# Patient Record
Sex: FEMALE | Race: BLACK OR AFRICAN AMERICAN | Employment: UNEMPLOYED | URBAN - METROPOLITAN AREA
[De-identification: names, ages, dates, MRNs, and addresses within clinical notes are randomized per-mention and may not be internally consistent; named-entity substitution may affect disease eponyms.]

---

## 2018-10-12 RX ORDER — ASPIRIN 81 MG/1
TABLET ORAL
Qty: 30 TABLET | Refills: 4 | OUTPATIENT
Start: 2018-10-12

## 2019-08-12 ENCOUNTER — HOSPITAL ENCOUNTER (EMERGENCY)
Age: 54
Discharge: HOME | End: 2019-08-12
Payer: MEDICAID

## 2019-08-12 ENCOUNTER — APPOINTMENT (OUTPATIENT)
Dept: ULTRASOUND IMAGING | Age: 54
End: 2019-08-12
Payer: MEDICAID

## 2019-08-12 VITALS
BODY MASS INDEX: 22.43 KG/M2 | SYSTOLIC BLOOD PRESSURE: 174 MMHG | RESPIRATION RATE: 17 BRPM | HEART RATE: 81 BPM | TEMPERATURE: 98.7 F | WEIGHT: 148 LBS | DIASTOLIC BLOOD PRESSURE: 101 MMHG | HEIGHT: 68 IN | OXYGEN SATURATION: 100 %

## 2019-08-12 DIAGNOSIS — K80.50 BILIARY COLIC: ICD-10-CM

## 2019-08-12 DIAGNOSIS — K82.8 GALLBLADDER SLUDGE: ICD-10-CM

## 2019-08-12 DIAGNOSIS — K82.4 GALLBLADDER POLYP: ICD-10-CM

## 2019-08-12 DIAGNOSIS — R10.11 ABDOMINAL PAIN, RIGHT UPPER QUADRANT: Primary | ICD-10-CM

## 2019-08-12 LAB
A/G RATIO: 1.3 (ref 1.1–2.2)
ALBUMIN SERPL-MCNC: 4.2 G/DL (ref 3.4–5)
ALP BLD-CCNC: 93 U/L (ref 40–129)
ALT SERPL-CCNC: 31 U/L (ref 10–40)
ANION GAP SERPL CALCULATED.3IONS-SCNC: 13 MMOL/L (ref 3–16)
AST SERPL-CCNC: 28 U/L (ref 15–37)
BASOPHILS ABSOLUTE: 0.1 K/UL (ref 0–0.2)
BASOPHILS RELATIVE PERCENT: 1.1 %
BILIRUB SERPL-MCNC: <0.2 MG/DL (ref 0–1)
BILIRUBIN URINE: NEGATIVE
BLOOD, URINE: ABNORMAL
BUN BLDV-MCNC: 23 MG/DL (ref 7–20)
CALCIUM SERPL-MCNC: 9.6 MG/DL (ref 8.3–10.6)
CHLORIDE BLD-SCNC: 96 MMOL/L (ref 99–110)
CLARITY: CLEAR
CO2: 23 MMOL/L (ref 21–32)
COLOR: YELLOW
CREAT SERPL-MCNC: 1.9 MG/DL (ref 0.6–1.1)
EOSINOPHILS ABSOLUTE: 0.4 K/UL (ref 0–0.6)
EOSINOPHILS RELATIVE PERCENT: 7.2 %
EPITHELIAL CELLS, UA: ABNORMAL /HPF
GFR AFRICAN AMERICAN: 33
GFR NON-AFRICAN AMERICAN: 28
GLOBULIN: 3.3 G/DL
GLUCOSE BLD-MCNC: 338 MG/DL (ref 70–99)
GLUCOSE URINE: 250 MG/DL
HCT VFR BLD CALC: 30.6 % (ref 36–48)
HEMOGLOBIN: 10.4 G/DL (ref 12–16)
KETONES, URINE: NEGATIVE MG/DL
LEUKOCYTE ESTERASE, URINE: NEGATIVE
LIPASE: 92 U/L (ref 13–60)
LYMPHOCYTES ABSOLUTE: 1.5 K/UL (ref 1–5.1)
LYMPHOCYTES RELATIVE PERCENT: 25.4 %
MCH RBC QN AUTO: 26.6 PG (ref 26–34)
MCHC RBC AUTO-ENTMCNC: 34.1 G/DL (ref 31–36)
MCV RBC AUTO: 77.9 FL (ref 80–100)
MICROSCOPIC EXAMINATION: YES
MONOCYTES ABSOLUTE: 0.4 K/UL (ref 0–1.3)
MONOCYTES RELATIVE PERCENT: 6.9 %
NEUTROPHILS ABSOLUTE: 3.4 K/UL (ref 1.7–7.7)
NEUTROPHILS RELATIVE PERCENT: 59.4 %
NITRITE, URINE: NEGATIVE
PDW BLD-RTO: 14.4 % (ref 12.4–15.4)
PH UA: 6.5 (ref 5–8)
PLATELET # BLD: 218 K/UL (ref 135–450)
PMV BLD AUTO: 7 FL (ref 5–10.5)
POTASSIUM SERPL-SCNC: 4 MMOL/L (ref 3.5–5.1)
PROTEIN UA: 100 MG/DL
RBC # BLD: 3.92 M/UL (ref 4–5.2)
RBC UA: ABNORMAL /HPF (ref 0–2)
SODIUM BLD-SCNC: 132 MMOL/L (ref 136–145)
SPECIFIC GRAVITY UA: 1.02 (ref 1–1.03)
TOTAL PROTEIN: 7.5 G/DL (ref 6.4–8.2)
URINE REFLEX TO CULTURE: ABNORMAL
URINE TYPE: ABNORMAL
UROBILINOGEN, URINE: 0.2 E.U./DL
WBC # BLD: 5.7 K/UL (ref 4–11)
WBC UA: ABNORMAL /HPF (ref 0–5)

## 2019-08-12 PROCEDURE — 96361 HYDRATE IV INFUSION ADD-ON: CPT

## 2019-08-12 PROCEDURE — 80053 COMPREHEN METABOLIC PANEL: CPT

## 2019-08-12 PROCEDURE — 96374 THER/PROPH/DIAG INJ IV PUSH: CPT

## 2019-08-12 PROCEDURE — 83540 ASSAY OF IRON: CPT

## 2019-08-12 PROCEDURE — 85025 COMPLETE CBC W/AUTO DIFF WBC: CPT

## 2019-08-12 PROCEDURE — 81001 URINALYSIS AUTO W/SCOPE: CPT

## 2019-08-12 PROCEDURE — 83690 ASSAY OF LIPASE: CPT

## 2019-08-12 PROCEDURE — 2580000003 HC RX 258: Performed by: PHYSICIAN ASSISTANT

## 2019-08-12 PROCEDURE — 96375 TX/PRO/DX INJ NEW DRUG ADDON: CPT

## 2019-08-12 PROCEDURE — 76705 ECHO EXAM OF ABDOMEN: CPT

## 2019-08-12 PROCEDURE — 99284 EMERGENCY DEPT VISIT MOD MDM: CPT

## 2019-08-12 PROCEDURE — 83550 IRON BINDING TEST: CPT

## 2019-08-12 PROCEDURE — 6360000002 HC RX W HCPCS: Performed by: PHYSICIAN ASSISTANT

## 2019-08-12 PROCEDURE — 6370000000 HC RX 637 (ALT 250 FOR IP): Performed by: PHYSICIAN ASSISTANT

## 2019-08-12 RX ORDER — 0.9 % SODIUM CHLORIDE 0.9 %
1000 INTRAVENOUS SOLUTION INTRAVENOUS ONCE
Status: COMPLETED | OUTPATIENT
Start: 2019-08-12 | End: 2019-08-12

## 2019-08-12 RX ORDER — HYDROCODONE BITARTRATE AND ACETAMINOPHEN 5; 325 MG/1; MG/1
1 TABLET ORAL EVERY 6 HOURS PRN
Qty: 10 TABLET | Refills: 0 | Status: SHIPPED | OUTPATIENT
Start: 2019-08-12 | End: 2019-08-15

## 2019-08-12 RX ORDER — ONDANSETRON 4 MG/1
4 TABLET, ORALLY DISINTEGRATING ORAL EVERY 8 HOURS PRN
Qty: 12 TABLET | Refills: 0 | Status: SHIPPED | OUTPATIENT
Start: 2019-08-12 | End: 2019-08-19

## 2019-08-12 RX ORDER — ONDANSETRON 2 MG/ML
4 INJECTION INTRAMUSCULAR; INTRAVENOUS ONCE
Status: COMPLETED | OUTPATIENT
Start: 2019-08-12 | End: 2019-08-12

## 2019-08-12 RX ORDER — HYDROCODONE BITARTRATE AND ACETAMINOPHEN 5; 325 MG/1; MG/1
1 TABLET ORAL ONCE
Status: COMPLETED | OUTPATIENT
Start: 2019-08-12 | End: 2019-08-12

## 2019-08-12 RX ORDER — MORPHINE SULFATE 4 MG/ML
4 INJECTION, SOLUTION INTRAMUSCULAR; INTRAVENOUS ONCE
Status: COMPLETED | OUTPATIENT
Start: 2019-08-12 | End: 2019-08-12

## 2019-08-12 RX ADMIN — HYDROCODONE BITARTRATE AND ACETAMINOPHEN 1 TABLET: 5; 325 TABLET ORAL at 17:21

## 2019-08-12 RX ADMIN — ONDANSETRON 4 MG: 2 INJECTION INTRAMUSCULAR; INTRAVENOUS at 17:21

## 2019-08-12 RX ADMIN — SODIUM CHLORIDE 1000 ML: 9 INJECTION, SOLUTION INTRAVENOUS at 14:45

## 2019-08-12 RX ADMIN — MORPHINE SULFATE 4 MG: 4 INJECTION INTRAVENOUS at 14:45

## 2019-08-12 ASSESSMENT — PAIN SCALES - GENERAL
PAINLEVEL_OUTOF10: 10

## 2019-08-12 ASSESSMENT — PAIN DESCRIPTION - PAIN TYPE: TYPE: ACUTE PAIN

## 2019-08-12 ASSESSMENT — PAIN DESCRIPTION - LOCATION: LOCATION: FLANK

## 2019-08-12 NOTE — ED NOTES
Pt return from [address] [place] [address] Rd,[address].       Kathryn Alexander, RN  08/12/19 0881

## 2019-08-13 ENCOUNTER — TELEPHONE (OUTPATIENT)
Dept: SURGERY | Age: 54
End: 2019-08-13

## 2019-08-13 LAB
IRON SATURATION: 36 % (ref 15–50)
IRON: 98 UG/DL (ref 37–145)
TOTAL IRON BINDING CAPACITY: 275 UG/DL (ref 260–445)

## 2019-08-13 NOTE — TELEPHONE ENCOUNTER
Tried to call pt to get her an office appt to be eval by Dr Jonatan Rivera. Pts ph # goes to a voicemail that is not her and is not listed on her emergency contacts. There is no additional # I am able to reach the pt at.

## 2019-08-19 ENCOUNTER — PREP FOR PROCEDURE (OUTPATIENT)
Dept: SURGERY | Age: 54
End: 2019-08-19

## 2019-08-19 ENCOUNTER — INITIAL CONSULT (OUTPATIENT)
Dept: SURGERY | Age: 54
End: 2019-08-19
Payer: MEDICAID

## 2019-08-19 VITALS
DIASTOLIC BLOOD PRESSURE: 76 MMHG | WEIGHT: 153.4 LBS | HEIGHT: 68 IN | SYSTOLIC BLOOD PRESSURE: 130 MMHG | BODY MASS INDEX: 23.25 KG/M2

## 2019-08-19 DIAGNOSIS — K81.1 CHRONIC CHOLECYSTITIS: Primary | ICD-10-CM

## 2019-08-19 PROCEDURE — G8427 DOCREV CUR MEDS BY ELIG CLIN: HCPCS | Performed by: SURGERY

## 2019-08-19 PROCEDURE — 99243 OFF/OP CNSLTJ NEW/EST LOW 30: CPT | Performed by: SURGERY

## 2019-08-19 PROCEDURE — G8420 CALC BMI NORM PARAMETERS: HCPCS | Performed by: SURGERY

## 2019-08-19 RX ORDER — AMITRIPTYLINE HYDROCHLORIDE 25 MG/1
25 TABLET, FILM COATED ORAL NIGHTLY
COMMUNITY
End: 2019-08-21 | Stop reason: ALTCHOICE

## 2019-08-19 RX ORDER — SODIUM CHLORIDE 0.9 % (FLUSH) 0.9 %
10 SYRINGE (ML) INJECTION PRN
Status: CANCELLED | OUTPATIENT
Start: 2019-08-19

## 2019-08-19 RX ORDER — OXYCODONE HYDROCHLORIDE 5 MG/1
5 TABLET ORAL EVERY 6 HOURS PRN
Qty: 15 TABLET | Refills: 0 | Status: SHIPPED | OUTPATIENT
Start: 2019-08-19 | End: 2019-08-26

## 2019-08-19 RX ORDER — QUETIAPINE FUMARATE 50 MG/1
50 TABLET, EXTENDED RELEASE ORAL NIGHTLY
Status: ON HOLD | COMMUNITY
End: 2022-07-23 | Stop reason: CLARIF

## 2019-08-19 RX ORDER — HEPARIN SODIUM 5000 [USP'U]/ML
5000 INJECTION, SOLUTION INTRAVENOUS; SUBCUTANEOUS ONCE
Status: CANCELLED | OUTPATIENT
Start: 2019-08-19

## 2019-08-19 RX ORDER — SODIUM CHLORIDE 0.9 % (FLUSH) 0.9 %
10 SYRINGE (ML) INJECTION EVERY 12 HOURS SCHEDULED
Status: CANCELLED | OUTPATIENT
Start: 2019-08-19

## 2019-08-19 RX ORDER — ASPIRIN 325 MG
325 TABLET ORAL DAILY
Status: ON HOLD | COMMUNITY
End: 2022-07-23 | Stop reason: CLARIF

## 2019-08-19 RX ORDER — METOPROLOL SUCCINATE 25 MG/1
25 TABLET, EXTENDED RELEASE ORAL DAILY
COMMUNITY
End: 2022-08-21 | Stop reason: ALTCHOICE

## 2019-08-19 RX ORDER — OMEPRAZOLE 40 MG/1
40 CAPSULE, DELAYED RELEASE ORAL DAILY
Status: ON HOLD | COMMUNITY
End: 2022-07-23 | Stop reason: CLARIF

## 2019-08-19 RX ORDER — ATORVASTATIN CALCIUM 80 MG/1
80 TABLET, FILM COATED ORAL DAILY
Status: ON HOLD | COMMUNITY
End: 2022-07-23 | Stop reason: CLARIF

## 2019-08-19 RX ORDER — FERROUS SULFATE 325(65) MG
325 TABLET ORAL
COMMUNITY
End: 2022-08-21 | Stop reason: ALTCHOICE

## 2019-08-19 RX ORDER — HYDRALAZINE HYDROCHLORIDE 50 MG/1
50 TABLET, FILM COATED ORAL 2 TIMES DAILY
Status: ON HOLD | COMMUNITY
End: 2022-07-27 | Stop reason: SDUPTHER

## 2019-08-19 RX ORDER — LOSARTAN POTASSIUM 50 MG/1
50 TABLET ORAL DAILY
Status: ON HOLD | COMMUNITY
End: 2022-07-23 | Stop reason: CLARIF

## 2019-08-19 RX ORDER — ACETAMINOPHEN 500 MG
500 TABLET ORAL EVERY 6 HOURS PRN
COMMUNITY
End: 2022-08-21 | Stop reason: ALTCHOICE

## 2019-08-19 RX ORDER — BUPROPION HYDROCHLORIDE 150 MG/1
150 TABLET, EXTENDED RELEASE ORAL 2 TIMES DAILY
COMMUNITY
End: 2022-08-21 | Stop reason: ALTCHOICE

## 2019-08-19 RX ORDER — LORATADINE 10 MG/1
10 TABLET ORAL DAILY
Status: ON HOLD | COMMUNITY
End: 2022-07-23 | Stop reason: CLARIF

## 2019-08-23 ENCOUNTER — TELEPHONE (OUTPATIENT)
Dept: SURGERY | Age: 54
End: 2019-08-23

## 2019-08-23 NOTE — TELEPHONE ENCOUNTER
Pt is scheduled for [place] on Monday, she wanted to let you know she has had some phlegm and sinus drainage, no fever, I advised if she develops a cough or a fever then she may need to be rescheduled.

## 2020-06-12 ENCOUNTER — TELEPHONE (OUTPATIENT)
Dept: BARIATRICS/WEIGHT MGMT | Age: 55
End: 2020-06-12

## 2020-06-19 ENCOUNTER — TELEPHONE (OUTPATIENT)
Dept: BARIATRICS/WEIGHT MGMT | Age: 55
End: 2020-06-19

## 2020-06-19 NOTE — TELEPHONE ENCOUNTER
2nd attempt to get a hold of patient. (1st:6/12/202)    LVM instructing pt to call clinic to set up appointment.

## 2020-07-28 DIAGNOSIS — N18.9 HISTORY OF ANEMIA DUE TO CKD: ICD-10-CM

## 2020-07-28 DIAGNOSIS — Z86.2 HISTORY OF ANEMIA DUE TO CKD: ICD-10-CM

## 2020-08-10 ENCOUNTER — TELEPHONE (OUTPATIENT)
Dept: BARIATRICS/WEIGHT MGMT | Age: 55
End: 2020-08-10

## 2020-08-10 NOTE — TELEPHONE ENCOUNTER
[phone]    [facility] for return call     Multiple attempts to get pt scheduled for PD cath referral     Attempt #5

## 2020-08-18 RX ORDER — ACETAMINOPHEN 500 MG/1
TABLET, FILM COATED ORAL
Qty: 90 TABLET | Refills: 0 | OUTPATIENT
Start: 2020-08-18

## 2020-08-20 PROBLEM — N18.9 HISTORY OF ANEMIA DUE TO CKD: Status: ACTIVE | Noted: 2020-08-20

## 2020-08-20 PROBLEM — Z86.2 HISTORY OF ANEMIA DUE TO CKD: Status: ACTIVE | Noted: 2020-08-20

## 2020-09-02 ENCOUNTER — TELEPHONE (OUTPATIENT)
Dept: SURGERY | Age: 55
End: 2020-09-02

## 2020-09-02 NOTE — TELEPHONE ENCOUNTER
Naomi huitron/ Nephrology is requesting a return call @ [phone] regarding an updated referral for patient. Please advise. Thank you!

## 2020-09-02 NOTE — TELEPHONE ENCOUNTER
Spoke with Naomi and advised that multiple attempts were made and Naomi states that pt has been in denial and now with Critical labs     Advised of Dr Menchaca Milder schedule this month and will be talking with Nephro to see what they are wanting as far as this plan now

## 2021-12-15 ENCOUNTER — CLINICAL DOCUMENTATION (OUTPATIENT)
Dept: OTHER | Age: 56
End: 2021-12-15

## 2022-07-22 ENCOUNTER — APPOINTMENT (OUTPATIENT)
Dept: GENERAL RADIOLOGY | Age: 57
DRG: 248 | End: 2022-07-22
Payer: MEDICAID

## 2022-07-22 ENCOUNTER — HOSPITAL ENCOUNTER (INPATIENT)
Age: 57
LOS: 4 days | Discharge: HOME HEALTH | DRG: 248 | End: 2022-07-27
Attending: EMERGENCY MEDICINE | Admitting: INTERNAL MEDICINE
Payer: MEDICAID

## 2022-07-22 ENCOUNTER — APPOINTMENT (OUTPATIENT)
Dept: CT IMAGING | Age: 57
DRG: 248 | End: 2022-07-22
Payer: MEDICAID

## 2022-07-22 DIAGNOSIS — K65.9 PERITONITIS (HCC): Primary | ICD-10-CM

## 2022-07-22 PROCEDURE — 71045 X-RAY EXAM CHEST 1 VIEW: CPT

## 2022-07-22 PROCEDURE — 80048 BASIC METABOLIC PNL TOTAL CA: CPT

## 2022-07-22 PROCEDURE — 83036 HEMOGLOBIN GLYCOSYLATED A1C: CPT

## 2022-07-22 PROCEDURE — 83735 ASSAY OF MAGNESIUM: CPT

## 2022-07-22 PROCEDURE — 89051 BODY FLUID CELL COUNT: CPT

## 2022-07-22 PROCEDURE — 36415 COLL VENOUS BLD VENIPUNCTURE: CPT

## 2022-07-22 PROCEDURE — 81001 URINALYSIS AUTO W/SCOPE: CPT

## 2022-07-22 PROCEDURE — 99285 EMERGENCY DEPT VISIT HI MDM: CPT

## 2022-07-22 PROCEDURE — 85025 COMPLETE CBC W/AUTO DIFF WBC: CPT

## 2022-07-22 RX ORDER — ACETAMINOPHEN 325 MG/1
650 TABLET ORAL ONCE
Status: DISCONTINUED | OUTPATIENT
Start: 2022-07-22 | End: 2022-07-27 | Stop reason: HOSPADM

## 2022-07-22 ASSESSMENT — PAIN DESCRIPTION - ONSET: ONSET: ON-GOING

## 2022-07-22 ASSESSMENT — PAIN DESCRIPTION - FREQUENCY: FREQUENCY: CONTINUOUS

## 2022-07-22 ASSESSMENT — PAIN - FUNCTIONAL ASSESSMENT
PAIN_FUNCTIONAL_ASSESSMENT: 0-10
PAIN_FUNCTIONAL_ASSESSMENT: ACTIVITIES ARE NOT PREVENTED

## 2022-07-22 ASSESSMENT — PAIN DESCRIPTION - ORIENTATION: ORIENTATION: MID

## 2022-07-22 ASSESSMENT — PAIN DESCRIPTION - LOCATION: LOCATION: LEG;ARM

## 2022-07-22 ASSESSMENT — PAIN SCALES - GENERAL: PAINLEVEL_OUTOF10: 9

## 2022-07-22 ASSESSMENT — PAIN DESCRIPTION - DESCRIPTORS: DESCRIPTORS: SHOOTING

## 2022-07-22 ASSESSMENT — PAIN DESCRIPTION - PAIN TYPE: TYPE: ACUTE PAIN

## 2022-07-23 ENCOUNTER — APPOINTMENT (OUTPATIENT)
Dept: CT IMAGING | Age: 57
DRG: 248 | End: 2022-07-23
Payer: MEDICAID

## 2022-07-23 PROBLEM — K65.9 PERITONITIS (HCC): Status: ACTIVE | Noted: 2022-07-23

## 2022-07-23 LAB
AMORPHOUS: ABNORMAL /HPF
ANION GAP SERPL CALCULATED.3IONS-SCNC: 11 MMOL/L (ref 3–16)
APPEARANCE FLUID: NORMAL
BACTERIA: ABNORMAL /HPF
BASO FLUID: 1 %
BASOPHILS ABSOLUTE: 0 K/UL (ref 0–0.2)
BASOPHILS RELATIVE PERCENT: 0.5 %
BILIRUBIN URINE: NEGATIVE
BLOOD, URINE: NEGATIVE
BUN BLDV-MCNC: 67 MG/DL (ref 7–20)
CALCIUM SERPL-MCNC: 9.7 MG/DL (ref 8.3–10.6)
CELL COUNT FLUID TYPE: NORMAL
CHLORIDE BLD-SCNC: 95 MMOL/L (ref 99–110)
CLARITY: CLEAR
CLOT EVALUATION: NORMAL
CO2: 26 MMOL/L (ref 21–32)
COLOR FLUID: NORMAL
COLOR: YELLOW
CREAT SERPL-MCNC: 11 MG/DL (ref 0.6–1.1)
EOSINOPHIL FLUID: 21 %
EOSINOPHILS ABSOLUTE: 0.2 K/UL (ref 0–0.6)
EOSINOPHILS RELATIVE PERCENT: 2.6 %
EPITHELIAL CELLS, UA: ABNORMAL /HPF (ref 0–5)
ESTIMATED AVERAGE GLUCOSE: 137 MG/DL
GFR AFRICAN AMERICAN: 4
GFR NON-AFRICAN AMERICAN: 4
GLUCOSE BLD-MCNC: 110 MG/DL (ref 70–99)
GLUCOSE BLD-MCNC: 125 MG/DL (ref 70–99)
GLUCOSE BLD-MCNC: 169 MG/DL (ref 70–99)
GLUCOSE BLD-MCNC: 69 MG/DL (ref 70–99)
GLUCOSE BLD-MCNC: 80 MG/DL (ref 70–99)
GLUCOSE URINE: NEGATIVE MG/DL
HBA1C MFR BLD: 6.4 %
HCT VFR BLD CALC: 31.5 % (ref 36–48)
HEMOGLOBIN: 10.3 G/DL (ref 12–16)
HYALINE CASTS: ABNORMAL /LPF (ref 0–2)
KETONES, URINE: NEGATIVE MG/DL
LEUKOCYTE ESTERASE, URINE: ABNORMAL
LYMPHOCYTES ABSOLUTE: 2.2 K/UL (ref 1–5.1)
LYMPHOCYTES RELATIVE PERCENT: 27.8 %
LYMPHOCYTES, BODY FLUID: 21 %
MACROPHAGE FLUID: 15 %
MAGNESIUM: 3.1 MG/DL (ref 1.8–2.4)
MCH RBC QN AUTO: 26.9 PG (ref 26–34)
MCHC RBC AUTO-ENTMCNC: 32.7 G/DL (ref 31–36)
MCV RBC AUTO: 82.3 FL (ref 80–100)
MICROSCOPIC EXAMINATION: YES
MONOCYTE, FLUID: 8 %
MONOCYTES ABSOLUTE: 0.7 K/UL (ref 0–1.3)
MONOCYTES RELATIVE PERCENT: 8.2 %
MUCUS: ABNORMAL /LPF
NEUTROPHIL, FLUID: 34 %
NEUTROPHILS ABSOLUTE: 4.9 K/UL (ref 1.7–7.7)
NEUTROPHILS RELATIVE PERCENT: 60.9 %
NITRITE, URINE: NEGATIVE
NUCLEATED CELLS FLUID: 3351 /CUMM
NUMBER OF CELLS COUNTED FLUID: 100
PDW BLD-RTO: 14.6 % (ref 12.4–15.4)
PERFORMED ON: ABNORMAL
PH UA: 7 (ref 5–8)
PLATELET # BLD: 230 K/UL (ref 135–450)
PMV BLD AUTO: 6.7 FL (ref 5–10.5)
POTASSIUM SERPL-SCNC: 4.2 MMOL/L (ref 3.5–5.1)
PROTEIN UA: 30 MG/DL
RBC # BLD: 3.82 M/UL (ref 4–5.2)
RBC FLUID: 2500 /CUMM
RBC UA: ABNORMAL /HPF (ref 0–4)
SODIUM BLD-SCNC: 132 MMOL/L (ref 136–145)
SPECIFIC GRAVITY UA: 1.01 (ref 1–1.03)
URINE REFLEX TO CULTURE: ABNORMAL
URINE TYPE: ABNORMAL
UROBILINOGEN, URINE: 0.2 E.U./DL
WBC # BLD: 8 K/UL (ref 4–11)
WBC UA: ABNORMAL /HPF (ref 0–5)

## 2022-07-23 PROCEDURE — 82746 ASSAY OF FOLIC ACID SERUM: CPT

## 2022-07-23 PROCEDURE — 87205 SMEAR GRAM STAIN: CPT

## 2022-07-23 PROCEDURE — 70450 CT HEAD/BRAIN W/O DYE: CPT

## 2022-07-23 PROCEDURE — 2580000003 HC RX 258: Performed by: INTERNAL MEDICINE

## 2022-07-23 PROCEDURE — 87070 CULTURE OTHR SPECIMN AEROBIC: CPT

## 2022-07-23 PROCEDURE — 3E1M39Z IRRIGATION OF PERITONEAL CAVITY USING DIALYSATE, PERCUTANEOUS APPROACH: ICD-10-PCS | Performed by: INTERNAL MEDICINE

## 2022-07-23 PROCEDURE — 6360000002 HC RX W HCPCS: Performed by: EMERGENCY MEDICINE

## 2022-07-23 PROCEDURE — 6360000002 HC RX W HCPCS: Performed by: INTERNAL MEDICINE

## 2022-07-23 PROCEDURE — 1200000000 HC SEMI PRIVATE

## 2022-07-23 PROCEDURE — 6370000000 HC RX 637 (ALT 250 FOR IP): Performed by: INTERNAL MEDICINE

## 2022-07-23 PROCEDURE — 2580000003 HC RX 258: Performed by: EMERGENCY MEDICINE

## 2022-07-23 PROCEDURE — 82607 VITAMIN B-12: CPT

## 2022-07-23 PROCEDURE — 36415 COLL VENOUS BLD VENIPUNCTURE: CPT

## 2022-07-23 RX ORDER — MORPHINE SULFATE 2 MG/ML
2 INJECTION, SOLUTION INTRAMUSCULAR; INTRAVENOUS ONCE
Status: COMPLETED | OUTPATIENT
Start: 2022-07-24 | End: 2022-07-24

## 2022-07-23 RX ORDER — SODIUM CHLORIDE, SODIUM LACTATE, CALCIUM CHLORIDE, MAGNESIUM CHLORIDE AND DEXTROSE 1.5; 538; 448; 18.3; 5.08 G/100ML; MG/100ML; MG/100ML; MG/100ML; MG/100ML
2000 INJECTION, SOLUTION INTRAPERITONEAL EVERY 6 HOURS
Status: DISCONTINUED | OUTPATIENT
Start: 2022-07-23 | End: 2022-07-25

## 2022-07-23 RX ORDER — ATORVASTATIN CALCIUM 40 MG/1
80 TABLET, FILM COATED ORAL NIGHTLY
Status: DISCONTINUED | OUTPATIENT
Start: 2022-07-23 | End: 2022-07-23

## 2022-07-23 RX ORDER — GABAPENTIN 600 MG/1
300 TABLET ORAL NIGHTLY
COMMUNITY
End: 2022-08-21 | Stop reason: ALTCHOICE

## 2022-07-23 RX ORDER — HEPARIN SODIUM 5000 [USP'U]/ML
5000 INJECTION, SOLUTION INTRAVENOUS; SUBCUTANEOUS EVERY 8 HOURS SCHEDULED
Status: DISCONTINUED | OUTPATIENT
Start: 2022-07-23 | End: 2022-07-27 | Stop reason: HOSPADM

## 2022-07-23 RX ORDER — SUCROFERRIC OXYHYDROXIDE 500 MG/1
500 TABLET, CHEWABLE ORAL
Status: ON HOLD | COMMUNITY
End: 2022-07-27 | Stop reason: HOSPADM

## 2022-07-23 RX ORDER — NIFEDIPINE 30 MG/1
30 TABLET, FILM COATED, EXTENDED RELEASE ORAL 2 TIMES DAILY
COMMUNITY
End: 2022-07-27

## 2022-07-23 RX ORDER — SODIUM CHLORIDE 9 MG/ML
INJECTION, SOLUTION INTRAVENOUS PRN
Status: DISCONTINUED | OUTPATIENT
Start: 2022-07-23 | End: 2022-07-27 | Stop reason: HOSPADM

## 2022-07-23 RX ORDER — BUPROPION HYDROCHLORIDE 150 MG/1
150 TABLET, EXTENDED RELEASE ORAL 2 TIMES DAILY
Status: DISCONTINUED | OUTPATIENT
Start: 2022-07-23 | End: 2022-07-27 | Stop reason: HOSPADM

## 2022-07-23 RX ORDER — SODIUM CHLORIDE 0.9 % (FLUSH) 0.9 %
5-40 SYRINGE (ML) INJECTION EVERY 12 HOURS SCHEDULED
Status: DISCONTINUED | OUTPATIENT
Start: 2022-07-23 | End: 2022-07-27 | Stop reason: HOSPADM

## 2022-07-23 RX ORDER — FLUORIDE TOOTHPASTE
15 TOOTHPASTE DENTAL 3 TIMES DAILY PRN
Status: DISCONTINUED | OUTPATIENT
Start: 2022-07-23 | End: 2022-07-27 | Stop reason: HOSPADM

## 2022-07-23 RX ORDER — QUETIAPINE FUMARATE 400 MG/1
200 TABLET, FILM COATED ORAL NIGHTLY
COMMUNITY
End: 2022-08-21 | Stop reason: ALTCHOICE

## 2022-07-23 RX ORDER — SKIN PROTECTANT 44 G/100G
OINTMENT TOPICAL 4 TIMES DAILY PRN
Status: ON HOLD | COMMUNITY
End: 2022-08-24 | Stop reason: HOSPADM

## 2022-07-23 RX ORDER — INSULIN LISPRO 100 [IU]/ML
0-4 INJECTION, SOLUTION INTRAVENOUS; SUBCUTANEOUS NIGHTLY
Status: DISCONTINUED | OUTPATIENT
Start: 2022-07-23 | End: 2022-07-27 | Stop reason: HOSPADM

## 2022-07-23 RX ORDER — ROSUVASTATIN CALCIUM 40 MG/1
40 TABLET, COATED ORAL NIGHTLY
COMMUNITY

## 2022-07-23 RX ORDER — AMMONIUM LACTATE 12 G/100G
LOTION TOPICAL PRN
Status: ON HOLD | COMMUNITY
End: 2022-08-24 | Stop reason: HOSPADM

## 2022-07-23 RX ORDER — ONDANSETRON 2 MG/ML
4 INJECTION INTRAMUSCULAR; INTRAVENOUS EVERY 6 HOURS PRN
Status: DISCONTINUED | OUTPATIENT
Start: 2022-07-23 | End: 2022-07-27 | Stop reason: HOSPADM

## 2022-07-23 RX ORDER — HYDRALAZINE HYDROCHLORIDE 50 MG/1
50 TABLET, FILM COATED ORAL DAILY
Status: DISCONTINUED | OUTPATIENT
Start: 2022-07-23 | End: 2022-07-27 | Stop reason: HOSPADM

## 2022-07-23 RX ORDER — GENTAMICIN SULFATE 1 MG/G
CREAM TOPICAL DAILY
Status: DISCONTINUED | OUTPATIENT
Start: 2022-07-24 | End: 2022-07-27 | Stop reason: HOSPADM

## 2022-07-23 RX ORDER — TORSEMIDE 20 MG/1
80 TABLET ORAL DAILY
Status: ON HOLD | COMMUNITY
End: 2022-08-24 | Stop reason: HOSPADM

## 2022-07-23 RX ORDER — ERGOCALCIFEROL 1.25 MG/1
50000 CAPSULE ORAL WEEKLY
COMMUNITY
End: 2022-08-21 | Stop reason: ALTCHOICE

## 2022-07-23 RX ORDER — DEXTROSE MONOHYDRATE 100 MG/ML
INJECTION, SOLUTION INTRAVENOUS CONTINUOUS PRN
Status: DISCONTINUED | OUTPATIENT
Start: 2022-07-23 | End: 2022-07-27 | Stop reason: HOSPADM

## 2022-07-23 RX ORDER — POLYETHYLENE GLYCOL 3350 17 G/17G
17 POWDER, FOR SOLUTION ORAL DAILY
COMMUNITY
End: 2022-08-21 | Stop reason: ALTCHOICE

## 2022-07-23 RX ORDER — AMOXICILLIN 250 MG
1 CAPSULE ORAL 2 TIMES DAILY
COMMUNITY
End: 2022-08-15

## 2022-07-23 RX ORDER — CALCITRIOL 0.25 UG/1
0.25 CAPSULE, LIQUID FILLED ORAL DAILY
COMMUNITY

## 2022-07-23 RX ORDER — NEPHROCAP 1 MG
1 CAP ORAL DAILY
Status: ON HOLD | COMMUNITY
End: 2022-09-08 | Stop reason: HOSPADM

## 2022-07-23 RX ORDER — ONDANSETRON 4 MG/1
4 TABLET, ORALLY DISINTEGRATING ORAL EVERY 8 HOURS PRN
Status: DISCONTINUED | OUTPATIENT
Start: 2022-07-23 | End: 2022-07-27 | Stop reason: HOSPADM

## 2022-07-23 RX ORDER — DOXEPIN HYDROCHLORIDE 50 MG/1
50 CAPSULE ORAL NIGHTLY
COMMUNITY
End: 2022-09-08

## 2022-07-23 RX ORDER — BENZTROPINE MESYLATE 1 MG/1
1 TABLET ORAL NIGHTLY
COMMUNITY

## 2022-07-23 RX ORDER — POLYETHYLENE GLYCOL 3350 17 G/17G
17 POWDER, FOR SOLUTION ORAL DAILY PRN
Status: DISCONTINUED | OUTPATIENT
Start: 2022-07-23 | End: 2022-07-27 | Stop reason: HOSPADM

## 2022-07-23 RX ORDER — ACETAMINOPHEN 325 MG/1
650 TABLET ORAL EVERY 6 HOURS PRN
Status: DISCONTINUED | OUTPATIENT
Start: 2022-07-23 | End: 2022-07-27 | Stop reason: HOSPADM

## 2022-07-23 RX ORDER — SODIUM CHLORIDE 0.9 % (FLUSH) 0.9 %
5-40 SYRINGE (ML) INJECTION PRN
Status: DISCONTINUED | OUTPATIENT
Start: 2022-07-23 | End: 2022-07-27 | Stop reason: HOSPADM

## 2022-07-23 RX ORDER — INSULIN LISPRO 100 [IU]/ML
0-4 INJECTION, SOLUTION INTRAVENOUS; SUBCUTANEOUS
Status: DISCONTINUED | OUTPATIENT
Start: 2022-07-23 | End: 2022-07-27 | Stop reason: HOSPADM

## 2022-07-23 RX ORDER — METOPROLOL SUCCINATE 25 MG/1
25 TABLET, EXTENDED RELEASE ORAL DAILY
Status: DISCONTINUED | OUTPATIENT
Start: 2022-07-23 | End: 2022-07-27 | Stop reason: HOSPADM

## 2022-07-23 RX ORDER — LIDOCAINE 50 MG/G
1 PATCH TOPICAL DAILY
COMMUNITY
End: 2022-08-21 | Stop reason: ALTCHOICE

## 2022-07-23 RX ORDER — MORPHINE SULFATE 2 MG/ML
2 INJECTION, SOLUTION INTRAMUSCULAR; INTRAVENOUS
Status: COMPLETED | OUTPATIENT
Start: 2022-07-23 | End: 2022-07-23

## 2022-07-23 RX ORDER — QUETIAPINE FUMARATE 50 MG/1
50 TABLET, EXTENDED RELEASE ORAL NIGHTLY
Status: DISCONTINUED | OUTPATIENT
Start: 2022-07-23 | End: 2022-07-24

## 2022-07-23 RX ORDER — HYDROXYZINE HYDROCHLORIDE 25 MG/1
25 TABLET, FILM COATED ORAL 3 TIMES DAILY PRN
Status: DISCONTINUED | OUTPATIENT
Start: 2022-07-23 | End: 2022-07-27 | Stop reason: HOSPADM

## 2022-07-23 RX ORDER — CLONIDINE HYDROCHLORIDE 0.1 MG/1
0.2 TABLET ORAL 2 TIMES DAILY
Status: DISCONTINUED | OUTPATIENT
Start: 2022-07-23 | End: 2022-07-23

## 2022-07-23 RX ORDER — CHOLECALCIFEROL (VITAMIN D3) 125 MCG
5 CAPSULE ORAL NIGHTLY
COMMUNITY
End: 2022-08-21 | Stop reason: ALTCHOICE

## 2022-07-23 RX ORDER — NEPHROCAP 1 MG
1 CAP ORAL DAILY
Status: DISCONTINUED | OUTPATIENT
Start: 2022-07-23 | End: 2022-07-27 | Stop reason: HOSPADM

## 2022-07-23 RX ORDER — ACETAMINOPHEN 650 MG/1
650 SUPPOSITORY RECTAL EVERY 6 HOURS PRN
Status: DISCONTINUED | OUTPATIENT
Start: 2022-07-23 | End: 2022-07-27 | Stop reason: HOSPADM

## 2022-07-23 RX ADMIN — CEFEPIME 2000 MG: 2 INJECTION, POWDER, FOR SOLUTION INTRAVENOUS at 03:18

## 2022-07-23 RX ADMIN — METOPROLOL SUCCINATE 25 MG: 25 TABLET, EXTENDED RELEASE ORAL at 08:20

## 2022-07-23 RX ADMIN — HEPARIN SODIUM 5000 UNITS: 5000 INJECTION INTRAVENOUS; SUBCUTANEOUS at 14:57

## 2022-07-23 RX ADMIN — MORPHINE SULFATE 2 MG: 2 INJECTION, SOLUTION INTRAMUSCULAR; INTRAVENOUS at 17:00

## 2022-07-23 RX ADMIN — BUPROPION HYDROCHLORIDE 150 MG: 150 TABLET, EXTENDED RELEASE ORAL at 08:20

## 2022-07-23 RX ADMIN — SODIUM CHLORIDE, PRESERVATIVE FREE 10 ML: 5 INJECTION INTRAVENOUS at 08:16

## 2022-07-23 RX ADMIN — VANCOMYCIN HYDROCHLORIDE 1500 MG: 10 INJECTION, POWDER, LYOPHILIZED, FOR SOLUTION INTRAVENOUS at 03:58

## 2022-07-23 RX ADMIN — QUETIAPINE FUMARATE 50 MG: 50 TABLET, EXTENDED RELEASE ORAL at 20:20

## 2022-07-23 RX ADMIN — HEPARIN SODIUM 5000 UNITS: 5000 INJECTION INTRAVENOUS; SUBCUTANEOUS at 20:21

## 2022-07-23 RX ADMIN — SODIUM CHLORIDE, PRESERVATIVE FREE 5 ML: 5 INJECTION INTRAVENOUS at 23:31

## 2022-07-23 RX ADMIN — BUPROPION HYDROCHLORIDE 150 MG: 150 TABLET, EXTENDED RELEASE ORAL at 20:20

## 2022-07-23 RX ADMIN — HYDRALAZINE HYDROCHLORIDE 50 MG: 50 TABLET, FILM COATED ORAL at 08:20

## 2022-07-23 ASSESSMENT — PAIN DESCRIPTION - ONSET
ONSET: GRADUAL
ONSET: GRADUAL

## 2022-07-23 ASSESSMENT — PAIN DESCRIPTION - FREQUENCY
FREQUENCY: INTERMITTENT

## 2022-07-23 ASSESSMENT — ENCOUNTER SYMPTOMS
EYE PAIN: 0
NAUSEA: 0
SHORTNESS OF BREATH: 0
COUGH: 0
DIARRHEA: 0
WHEEZING: 0
ABDOMINAL PAIN: 1
VOMITING: 0

## 2022-07-23 ASSESSMENT — PAIN - FUNCTIONAL ASSESSMENT
PAIN_FUNCTIONAL_ASSESSMENT: ACTIVITIES ARE NOT PREVENTED
PAIN_FUNCTIONAL_ASSESSMENT: ACTIVITIES ARE NOT PREVENTED
PAIN_FUNCTIONAL_ASSESSMENT: PREVENTS OR INTERFERES SOME ACTIVE ACTIVITIES AND ADLS

## 2022-07-23 ASSESSMENT — PAIN DESCRIPTION - LOCATION
LOCATION: ABDOMEN

## 2022-07-23 ASSESSMENT — PAIN DESCRIPTION - ORIENTATION
ORIENTATION: MID
ORIENTATION: LOWER;MID
ORIENTATION: MID

## 2022-07-23 ASSESSMENT — PAIN DESCRIPTION - DESCRIPTORS
DESCRIPTORS: SHARP

## 2022-07-23 ASSESSMENT — PAIN SCALES - GENERAL
PAINLEVEL_OUTOF10: 9
PAINLEVEL_OUTOF10: 3
PAINLEVEL_OUTOF10: 6
PAINLEVEL_OUTOF10: 9

## 2022-07-23 ASSESSMENT — PAIN DESCRIPTION - PAIN TYPE
TYPE: ACUTE PAIN

## 2022-07-23 NOTE — ED PROVIDER NOTES
[address] [facility]          ATTENDING PHYSICIAN NOTE       Date of evaluation: 7/22/2022    Chief Complaint     Fall and Leg Pain      History of Present Illness     Marianne Phillips is a 65 y.o. female who presents with chief complaint of falls. Patient states that she has been having frequent falls for the past 3 to 4months. She states that she seems to just lose her balance and or have weakness and fall to the ground. She does not lose consciousness. Has seen her PCP for this but has not gotten an explanation for the cause. She states that today she was out in the yard when she bent over and continue to fall forward striking her head on the ground. She had no loss of consciousness but was not immediately able to get herself up off the ground. An individual who was walking by helped her up and she was able to stand for a second or 2 when she felt weak and fell back to the ground. She states she had to crawl into her house from there and has pain in her bilateral knees and elbows from that. She denies any vertigo with these episodes though does occasionally have some room spinning symptoms but these are not associated with falls. No headaches. No discoordination of her arms or legs other than when she is walking. He also states that she is been having some lower abdominal pain for the past week. No vomiting. No diarrhea. No dysuria or hematuria. Does have ESRD on PD and states she is been doing her PD with no cloudy diasylate or change in diasylate. Review of Systems     Review of Systems   Constitutional:  Positive for fatigue. Negative for chills and fever. HENT:  Negative for congestion. Eyes:  Negative for pain. Respiratory:  Negative for cough, shortness of breath and wheezing. Cardiovascular:  Negative for chest pain and leg swelling. Gastrointestinal:  Positive for abdominal pain. Negative for diarrhea, nausea and vomiting.    Genitourinary: Negative for dysuria. Musculoskeletal:  Negative for arthralgias. Skin:  Negative for rash. Neurological:  Positive for weakness. Negative for headaches. All other systems reviewed and are negative. Past Medical, Surgical, Family, and Social History         Diagnosis Date    JONATHAN (acute kidney injury) ([facility] 9/3/2014    Anxiety     Asthma     Bipolar disorder ([facility]     Bronchitis     Chronic back pain     Depression     Diabetes mellitus ([facility]     DM II (diabetes mellitus, type II), controlled ([facility] 9/3/2014    Hypertension          Procedure Laterality Date    ENDOMETRIAL ABLATION      INNER EAR SURGERY      TUBAL LIGATION       Her family history includes Cancer in her maternal grandmother and mother; Diabetes in her mother; Heart Disease in her maternal grandfather. She reports that she has been smoking cigarettes. She has a 0.75 pack-year smoking history. She has never used smokeless tobacco. She reports that she does not drink alcohol and does not use drugs. Medications     Current Discharge Medication List        CONTINUE these medications which have NOT CHANGED    Details   Rosuvastatin Calcium 40 MG CPSP Take 40 mg by mouth daily      calcitRIOL (ROCALTROL) 0.25 MCG capsule Take 0.25 mcg by mouth in the morning. doxepin (SINEQUAN) 50 MG capsule Take 50 mg by mouth nightly One hour before bedtime      gabapentin (NEURONTIN) 600 MG tablet Take 600 mg by mouth nightly. At bedtime      melatonin 5 MG TABS tablet Take 5 mg by mouth nightly      NIFEdipine (ADALAT CC) 30 MG extended release tablet Take 30 mg by mouth in the morning and 30 mg before bedtime. QUEtiapine (SEROQUEL) 400 MG tablet Take 400 mg by mouth nightly      senna-docusate (PERICOLACE) 8.6-50 MG per tablet Take 1 tablet by mouth in the morning and 1 tablet in the evening. \"Stimulant laxative plus\".       torsemide (DEMADEX) 20 MG tablet Take 80 mg by mouth in the morning.      hydrOXYzine (ATARAX) 25 MG tablet Take 25 mg by mouth 3 times daily as needed for Itching      loratadine (CLARITIN) 10 MG tablet Take 10 mg by mouth daily      buPROPion (WELLBUTRIN SR) 150 MG extended release tablet Take 150 mg by mouth 2 times daily      QUEtiapine (SEROQUEL XR) 50 MG extended release tablet Take 50 mg by mouth nightly      omeprazole (PRILOSEC) 40 MG delayed release capsule Take 40 mg by mouth daily      acetaminophen (TYLENOL) 500 MG tablet Take 500 mg by mouth every 6 hours as needed for Pain      atorvastatin (LIPITOR) 80 MG tablet Take 80 mg by mouth daily      ferrous sulfate 325 (65 Fe) MG tablet Take 325 mg by mouth daily (with breakfast)      hydrALAZINE (APRESOLINE) 50 MG tablet Take 50 mg by mouth in the morning and 50 mg before bedtime. metoprolol succinate (TOPROL XL) 25 MG extended release tablet Take 25 mg by mouth daily      aspirin 325 MG tablet Take 325 mg by mouth daily      losartan (COZAAR) 50 MG tablet Take 50 mg by mouth daily      insulin glargine (LANTUS;BASAGLAR) 100 UNIT/ML injection pen Inject into the skin nightly      insulin lispro (HUMALOG) 100 UNIT/ML injection vial Inject into the skin 3 times daily (before meals)      cloNIDine (CATAPRES) 0.2 MG tablet Take 1 tablet by mouth 2 times daily  Qty: 14 tablet, Refills: 0      VITAMIN D, CHOLECALCIFEROL, PO Take 50,000 Units by mouth once a week. amLODIPine (NORVASC) 10 MG tablet Take 1 tablet by mouth daily. Qty: 30 tablet, Refills: 3             Allergies     She is allergic to latex, banana, other, peanuts [peanut oil], tomato, barley grass, milk-related compounds, and shellfish-derived products. Physical Exam     ED Triage Vitals [07/22/22 2212]   Enc Vitals Group      /85      Heart Rate 84      Resp 18      Temp 98.5 °F (36.9 °C)      Temp Source Oral      SpO2 98 %      Weight 151 lb 14.4 oz (68.9 kg)      Height 5' 8\" (1.727 m)      Head Circumference       Peak Flow       Pain Score       Pain Loc       Pain Edu? Excl.  in [address]? General:  Non-toxic, no acute distress, fully cooperative with my exam    HEENT:  NCAT    Neck:  Supple    Pulmonary:   No increased work of breathing; CTAB    Cardiac:  RRR, no murmur, thrill or gallop. Capillary refill <3s. 2+ distal pulses    Abdomen:  Soft, mild diffuse tenderness, nondistended; no focal rebound or guarding, PD catheter in place    Musculoskeletal:  Grossly intact without obvious injury or deformity     Neuro:  Alert and oriented X 4, cranial nerves II-XII intact. Patient has 5 out of 5 strength at flexion and extension of the wrist, elbow, and shoulder. Patient has 5 out of 5 strength at flexion and extension of the knee, hip, and ankle. Patient has intact sensation in the distribution of the median, radial, and ulnar nerves bilateral upper extremities, and intact sensation in the distribution of the superficial peroneal, deep peroneal, and tibial nerves bilateral lower extremities. The patient has a normal gait without ataxia, and a negative Romberg test.  There is no pronator drift. Patient has no cerebellar findings, including no dysmetria. Skin: No rashes      Diagnostic Results       RADIOLOGY:  CT Head W/O Contrast   Final Result      No acute intracranial process. XR CHEST PORTABLE   Final Result      No acute chest process.           LABS:   Results for orders placed or performed during the hospital encounter of 07/22/22   BMP   Result Value Ref Range    Sodium 132 (L) 136 - 145 mmol/L    Potassium 4.2 3.5 - 5.1 mmol/L    Chloride 95 (L) 99 - 110 mmol/L    CO2 26 21 - 32 mmol/L    Anion Gap 11 3 - 16    Glucose 80 70 - 99 mg/dL    BUN 67 (H) 7 - 20 mg/dL    Creatinine 11.0 (HH) 0.6 - 1.1 mg/dL    GFR Non-African American 4 (A) >60    GFR  4 (A) >60    Calcium 9.7 8.3 - 10.6 mg/dL   CBC with Auto Differential   Result Value Ref Range    WBC 8.0 4.0 - 11.0 K/uL    RBC 3.82 (L) 4.00 - 5.20 M/uL    Hemoglobin 10.3 (L) 12.0 - 16.0 g/dL    Hematocrit 31.5 (L) 36.0 - 48.0 %    MCV 82.3 80.0 - 100.0 fL    MCH 26.9 26.0 - 34.0 pg    MCHC 32.7 31.0 - 36.0 g/dL    RDW 14.6 12.4 - 15.4 %    Platelets 757 049 - 145 K/uL    MPV 6.7 5.0 - 10.5 fL    Neutrophils % 60.9 %    Lymphocytes % 27.8 %    Monocytes % 8.2 %    Eosinophils % 2.6 %    Basophils % 0.5 %    Neutrophils Absolute 4.9 1.7 - 7.7 K/uL    Lymphocytes Absolute 2.2 1.0 - 5.1 K/uL    Monocytes Absolute 0.7 0.0 - 1.3 K/uL    Eosinophils Absolute 0.2 0.0 - 0.6 K/uL    Basophils Absolute 0.0 0.0 - 0.2 K/uL   Urinalysis with Reflex to Culture    Specimen: Urine, clean catch   Result Value Ref Range    Color, UA Yellow Straw/Yellow    Clarity, UA Clear Clear    Glucose, Ur Negative Negative mg/dL    Bilirubin Urine Negative Negative    Ketones, Urine Negative Negative mg/dL    Specific Gravity, UA 1.015 1.005 - 1.030    Blood, Urine Negative Negative    pH, UA 7.0 5.0 - 8.0    Protein, UA 30 (A) Negative mg/dL    Urobilinogen, Urine 0.2 <2.0 E.U./dL    Nitrite, Urine Negative Negative    Leukocyte Esterase, Urine TRACE (A) Negative    Microscopic Examination YES     Urine Type Voided     Urine Reflex to Culture Not Indicated    Magnesium   Result Value Ref Range    Magnesium 3.10 (H) 1.80 - 2.40 mg/dL   Cell Count with Differential, Body Fluid   Result Value Ref Range    Cell Count Fluid Type Peritoneal dial     Color, Fluid Pale Yellow     Appearance, Fluid Hazy     Clot Eval. see below     Nucl Cell, Fluid 3,351 /cumm    RBC, Fluid 2,500 /cumm    Neutrophil Count, Fluid 34 %    Lymphocytes, Body Fluid 21 %    Monocyte Count, Fluid 8 %    Eos, Fluid 21 %    Basos, Fluid 1 %    Macrophages 15 %    Number of Cells Counted Fluid 100    Microscopic Urinalysis   Result Value Ref Range    Hyaline Casts, UA 3-5 (A) 0 - 2 /LPF    Mucus, UA 2+ (A) None Seen /LPF    WBC, UA 3-5 0 - 5 /HPF    RBC, UA None seen 0 - 4 /HPF    Epithelial Cells, UA 21-50 (A) 0 - 5 /HPF    Bacteria, UA 2+ (A) None Seen /HPF    Amorphous, UA 2+ /HPF Dial) 0-4 Units    insulin lispro (1 Unit Dial) 0-4 Units    Virt-Caps CAPS 1 mg (Patient Supplied)    morphine (PF) injection 2 mg       CONSULTS:  IP CONSULT TO NEPHROLOGY  IP CONSULT TO HOSPITALIST    MEDICAL DECISION MAKING / ASSESSMENT / Rossy Canchola is a 65 y.o. female with ESRD on PD who presents with episodes of fall and weakness. From a musculoskeletal standpoint she has no real tenderness in her knees or elbows and she is able to range it without difficulty. He is able to bear weight. I have very low suspicion for fracture. Does have some mild use abdominal tenderness. Attempting to get the dialysis alert to send for analysis but noted to have slightly worsening renal function with elevated BUN and creatinine is significantly above her baseline. She did exhibit some odd behavior though she had an otherwise normal neurologic examination. I wondered if we were not dealing with some component of uremic encephalopathy. Initially I discussed the case with nephrology with this concern and they recommended there were some changes they could make it to her peritoneal dialysis which might improve this but given that she is having frequent falls and I do not think that she is totally at her baseline I felt this was best done as an inpatient. After I spoke with them however we were able to get dialysis alert and that the cell count was highly concerning for peritonitis. Broad-spectrum antibiotics initiated. Discussed with the hospitalist and admitted for further care. Clinical Impression     1. Peritonitis ([address]        Disposition     PATIENT REFERRED TO:  No follow-up provider specified.     DISCHARGE MEDICATIONS:  Current Discharge Medication List          DISPOSITION Admitted 07/23/2022 03:23:30 AM         Salvador French MD  07/23/22 1041

## 2022-07-23 NOTE — CONSULTS
Clinical Pharmacy Progress Note    Vancomycin - Management by Pharmacy    Consult Date(s): 7/23  Consulting Provider(s): Dr Shruthi Faulkner / Plan    Intraabdominal infection - Vancomycin  Concurrent Antimicrobials: Cefepime  Day of Vanc Therapy / Ordered Duration: #1  Current Dosing Method: Intermittent Dosing by Levels  Therapeutic Goal: ~ 15 mg/L  Current Dose / Frequency: Intermittent via levels  Plan / Rationale:   Patient is ESRD on PD. Will dose via levels at this time. Patient received 1500mg IV today in the ED. No further dose today. Plan to obtain a random level 7/24 AM and reassess. Will continue to monitor clinical condition and make adjustments to regimen as appropriate. Thank you for consulting Pharmacy! Karla Cazares PharmD., BCPS   7/23/2022 10:57 AM  Wireless: [phone]        Subjective/Objective: Ms. Thee Herrera is a 65 y.o. female with a PMHx significant for ESRD on PD, HTN, DM type II, bipolarism, anxiety, depression, and asthma. Presents with chronic weakness with falls at home and lower abdominal pain. Dialysate collected in the ED, concerning for infection. Pharmacy has been consulted to dose vancomycin. Ht Readings from Last 1 Encounters:   07/22/22 5' 8\" (1.727 m)     Wt Readings from Last 1 Encounters:   07/23/22 156 lb 15.5 oz (71.2 kg)       Current & Prior Antimicrobial Regimen(s):  Cefepime (7/23-current)  Vancomycin - pharmacy to dose  1500mg IV in ED (7/23)  Intermittent via levels (7/23-current)  Date: Vanc Level: Vanc Dose:   7/23  1500mg IV   7/24 Ordered             Level(s) / Doses:    Date Time Dose Level / Type of Level Interpretation                 Note: Serum levels collected for AUC-based dosing may be high if collected in close proximity to the dose administered. This is not necessarily indicative of toxicity.     Cultures & Sensitivities:    Date Site Micro Susceptibility / Result   7/23 PD fluid In process            Labs / Ancillary Data:    No estimate of CrCL, as the patient is ESRD on PD. Recent Labs     07/22/22  2345   CREATININE 11.0*   BUN 67*   WBC 8.0       Additional Lab Values / Findings of Note:    Procalcitonin: No results for input(s): PROCAL in the last 72 hours.

## 2022-07-23 NOTE — CONSULTS
Patient Name: Shirlene Bravo                                                    Primary Physician: Ramos Casiano MD  Admitting Dx: Peritonitis [facility]) [K65.9]    Nephrology [facility]  [email or web address]                                          Assessment / Plan:     ESRD on PD  Started on PD 1800 cc fill x 4 exchanges using 1.5%  Vol: Weigth is 71.2 kg. Not removing fluid with PD. Anemia: Hgb 10.3 and on Mircera as outpatient. SHPT: Rocaltrol and Ergo. No binders. Falls / Weakness  Workup per primary service. Neurontin can contribut and she is on a large dose of 600 mg. Hold for now. HTN  On Toprol 25 mg, Hydralazine 50 mg, Nifedipine 30 mg. Please call our office at [phone] or [employer] with any questions or contact me directly. Subjective:     CC / Reason for Consult:  ESRD on PD    HPI / PMHx:  This is a consult for Shirlene Bravo  requested by Ramos Casiano MD for the reason of  ESRD . Shirlene Bravo is a 65 y.o. female admitted for Peritonitis ([facility] [address] [K65.9] with PMHx of ESRD, Anemia in CKD, SHPT. She has been having frequent falls with leg weaknees but denies dizziness or LOC. She feels generally weak all over and has felt this way for months. Covid in December she did not regain her sense of taste and smell and her appetite is poor. She lost the tip of her dialysis catheter twice but says she did not seek assistance with this. She sometimes has a low temperature. No fevers. She says her only medication change is her Seroquel has been decreased. I thank Dr. Ramos Casiano MD for consulting [facility] in the care of your patient. Please call with any questions or concerns. [phone]. [place] [facility]    Objective:     SocHx: Smokes x 15 yrs. Denies alcohol. FamHx: Parents . DM and Cancer.      Current Medications:  Scheduled Medications:  buPROPion, 150 mg, BID  hydrALAZINE, 50 mg, Daily  metoprolol succinate, 25 mg, Daily  QUEtiapine, 50 mg, Nightly  sodium chloride flush, 5-40 mL, 2 times per day  heparin (porcine), 5,000 Units, 3 times per day  insulin lispro, 0-4 Units, TID WC  insulin lispro, 0-4 Units, Nightly  Virt-Caps, 1 capsule, Daily  [START ON 7/24/2022] cefepime, 1,000 mg, Q24H  vancomycin (VANCOCIN) intermittent dosing (placeholder), , RX Placeholder  acetaminophen, 650 mg, Once       IV Meds:  dextrose  sodium chloride       PRN Medications:  hydrOXYzine HCl, 25 mg, TID PRN  glucose, 4 tablet, PRN  dextrose bolus, 125 mL, PRN   Or  dextrose bolus, 250 mL, PRN  glucagon (rDNA), 1 mg, PRN  dextrose, , Continuous PRN  sodium chloride flush, 5-40 mL, PRN  sodium chloride, , PRN  ondansetron, 4 mg, Q8H PRN   Or  ondansetron, 4 mg, Q6H PRN  polyethylene glycol, 17 g, Daily PRN  acetaminophen, 650 mg, Q6H PRN   Or  acetaminophen, 650 mg, Q6H PRN  morphine, 2 mg, Once PRN        Allergies: Latex, Banana, Other, Peanuts [peanut oil], Tomato, Barley grass, Milk-related compounds, and Shellfish-derived products    ROS: Positives in BOLD     GEN:   fevers, chills, sweats, fatigue and weight loss     HEENT:    nasal congestion, sore mouth and sore throat     Resp:    cough, hemoptysis, pneumonia or dyspnea on exertion     Card:  chest pain, chest pressure/discomfort, dyspnea, palpitations,  lower extremity edema     GI:   nausea, vomiting, diarrhea, constipation and abdominal pain     :  dysuria, nocturia, urinary incontinence, hesitancy and hematuria     Derm:   rash, skin lesion(s), pruritus and dryness     Neuro:   headaches, dizziness, seizures, gait problems, tremor and weakness     MS:  myalgias, arthralgias, neck pain and back pain     Endo:    nephropathy and cardiovascular disease    PE:      Gen: alert, well appearing, and in no distress     HEENT:pupils equal and reactive, extraocular eye movements intact      Neuro: alert, oriented, normal speech, no focal findings or movement disorder noted     Neck:  supple, no significant adenopathy     Cardio: normal rate, regular rhythm, normal S1, S2, no murmurs, rubs, clicks or gallops      Resp: clear to auscultation, no wheezes, rales or rhonchi, symmetric air entry. GI:  soft, nontender, nondistended, no masses or organomegaly. Ext:  peripheral pulses normal, no pedal edema, no clubbing or cyanosis      MS: no joint tenderness, deformity or swelling      DERM: normal coloration and turgor, no rashesor bruising.        Vitals: Patient Vitals for the past 8 hrs:   BP Temp Temp src Pulse Resp SpO2 Weight   07/23/22 0814 130/75 97.8 °F (36.6 °C) Oral 88 18 98 % --   07/23/22 0609 -- -- -- -- -- -- 156 lb 15.5 oz (71.2 kg)   07/23/22 0511 132/75 97.2 °F (36.2 °C) Oral 79 18 97 % --   07/23/22 0400 125/73 -- -- 72 18 -- --          I/Os:   Intake/Output Summary (Last 24 hours) at 7/23/2022 1133  Last data filed at 7/23/2022 0730  Gross per 24 hour   Intake 49.9 ml   Output --   Net 49.9 ml       LABS:    Lab Results   Component Value Date/Time    CREATININE 11.0 07/22/2022 11:45 PM    BUN 67 07/22/2022 11:45 PM     07/22/2022 11:45 PM    K 4.2 07/22/2022 11:45 PM    CL 95 07/22/2022 11:45 PM    CO2 26 07/22/2022 11:45 PM     No results found for: [id number]   Lab Results   Component Value Date/Time    WBC 8.0 07/22/2022 11:45 PM    HGB 10.3 07/22/2022 11:45 PM    HCT 31.5 07/22/2022 11:45 PM    MCV 82.3 07/22/2022 11:45 PM     07/22/2022 11:45 PM      Lab Results   Component Value Date/Time    IRON 98 08/12/2019 01:50 PM    TIBC 275 08/12/2019 01:50 PM      No results found for: Anahi Crews  Lab Results   Component Value Date/Time    CALCIUM 9.7 07/22/2022 11:45 PM    PHOS 3.1 09/03/2014 04:34 AM

## 2022-07-23 NOTE — ED TRIAGE NOTES
Patient arrived in the ER with Leg pain. Patient states that she fell around 2:00 pm today. Patient states that she hurt her knee. Also, has pain in her shoulder and elbows.

## 2022-07-23 NOTE — H&P
Hospital Medicine History & Physical      PCP: Montse Damon DO, DO    Date of Admission: 7/22/2022    Date of Service: Pt seen/examined on 07/23/22 and Admitted to Inpatient with expected LOS greater than two midnights due to medical therapy. Chief Complaint:  Falls      History Of Present Illness:      Aura Torres is a 65 y.o. female with past medical history as below, including frequent falls. She has no dizziness or LOC, no lack of coordination or focal weakness. She feels generally weak all over and has felt this way for months. She has felt generally week since she had Covid in December she did not regain her sense of taste and smell and her appetite is poor. She has also been having lower abdominal discomfort. It has actually been present for some time. She lost the tip of her dialysis catheter twice but says she did not seek assistance with his. She sometimes has a low temperature. No fevers. No constipation. She still makes some urine. She has had dysuria but not recently. Smokes occasionally, denies etoh or drug use. She says her only medication change is her Seroquel has been decreased. In ER dialysate was collected concerning for PD infection. She was started on broad spectrum antibiotics. UA 3-5 WBCs, +Epi cells and bacteria. Past Medical History:          Diagnosis Date    JONATHAN (acute kidney injury) ([facility] [address] 9/3/2014    Anxiety     Asthma     Bipolar disorder ([facility] [address]     Bronchitis     Chronic back pain     Depression     Diabetes mellitus ([facility] [address]     DM II (diabetes mellitus, type II), controlled ([facility] [address] 9/3/2014    Hypertension        Past Surgical History:          Procedure Laterality Date    ENDOMETRIAL ABLATION      INNER EAR SURGERY      TUBAL LIGATION         Medications Prior to Admission:      Prior to Admission medications    Medication Sig Start Date End Date Taking?  Authorizing Provider   Rosuvastatin Calcium 40 MG CPSP Take 40 mg by mouth daily   Yes Historical Provider, MD calcitRIOL (ROCALTROL) 0.25 MCG capsule Take 0.25 mcg by mouth in the morning. Yes Historical Provider, MD   doxepin (SINEQUAN) 50 MG capsule Take 50 mg by mouth nightly One hour before bedtime   Yes Historical Provider, MD   gabapentin (NEURONTIN) 600 MG tablet Take 600 mg by mouth nightly. At bedtime   Yes Historical Provider, MD   melatonin 5 MG TABS tablet Take 5 mg by mouth nightly   Yes Historical Provider, MD   NIFEdipine (ADALAT CC) 30 MG extended release tablet Take 30 mg by mouth in the morning and 30 mg before bedtime. Yes Historical Provider, MD   QUEtiapine (SEROQUEL) 400 MG tablet Take 400 mg by mouth nightly   Yes Historical Provider, MD   senna-docusate (PERICOLACE) 8.6-50 MG per tablet Take 1 tablet by mouth in the morning and 1 tablet in the evening. \"Stimulant laxative plus\". Yes Historical Provider, MD   torsemide (DEMADEX) 20 MG tablet Take 80 mg by mouth in the morning.    Yes Historical Provider, MD   hydrOXYzine (ATARAX) 25 MG tablet Take 25 mg by mouth 3 times daily as needed for Itching    Historical Provider, MD   loratadine (CLARITIN) 10 MG tablet Take 10 mg by mouth daily  Patient not taking: Reported on 7/23/2022    Historical Provider, MD   buPROPion (WELLBUTRIN SR) 150 MG extended release tablet Take 150 mg by mouth 2 times daily    Historical Provider, MD   QUEtiapine (SEROQUEL XR) 50 MG extended release tablet Take 50 mg by mouth nightly    Historical Provider, MD   omeprazole (PRILOSEC) 40 MG delayed release capsule Take 40 mg by mouth daily  Patient not taking: Reported on 7/23/2022    Historical Provider, MD   acetaminophen (TYLENOL) 500 MG tablet Take 500 mg by mouth every 6 hours as needed for Pain    Historical Provider, MD   atorvastatin (LIPITOR) 80 MG tablet Take 80 mg by mouth daily  Patient not taking: Reported on 7/23/2022    Historical Provider, MD   ferrous sulfate 325 (65 Fe) MG tablet Take 325 mg by mouth daily (with breakfast)    Historical Provider, MD hydrALAZINE (APRESOLINE) 50 MG tablet Take 50 mg by mouth in the morning and 50 mg before bedtime. Historical Provider, MD   metoprolol succinate (TOPROL XL) 25 MG extended release tablet Take 25 mg by mouth daily    Historical Provider, MD   aspirin 325 MG tablet Take 325 mg by mouth daily  Patient not taking: Reported on 7/23/2022    Historical Provider, MD   losartan (COZAAR) 50 MG tablet Take 50 mg by mouth daily  Patient not taking: Reported on 7/23/2022    Historical Provider, MD   insulin glargine (LANTUS;BASAGLAR) 100 UNIT/ML injection pen Inject into the skin nightly  Patient not taking: Reported on 7/23/2022    Historical Provider, MD   insulin lispro (HUMALOG) 100 UNIT/ML injection vial Inject into the skin 3 times daily (before meals)  Patient not taking: Reported on 7/23/2022    Historical Provider, MD   cloNIDine (CATAPRES) 0.2 MG tablet Take 1 tablet by mouth 2 times daily  Patient not taking: Reported on 7/23/2022 8/3/15   Kathryn Ferreira PA-C   VITAMIN D, CHOLECALCIFEROL, PO Take 50,000 Units by mouth once a week. Historical Provider, MD   amLODIPine (NORVASC) 10 MG tablet Take 1 tablet by mouth daily. Patient not taking: Reported on 7/23/2022 9/3/14   Darlyn Lepe MD       Allergies:  Latex, Banana, Other, Peanuts [peanut oil], Tomato, Barley grass, Milk-related compounds, and Shellfish-derived products    Social History:      The patient currently lives in private residence     TOBACCO:   reports that she has been smoking cigarettes. She has a 0.75 pack-year smoking history. She has never used smokeless tobacco.  ETOH:   reports no history of alcohol use. Family History:      Reviewed in detail and positive as follows:        Problem Relation Age of Onset    Diabetes Mother     Cancer Mother     Cancer Maternal Grandmother     Heart Disease Maternal Grandfather        REVIEW OF SYSTEMS:   Pertinent positives as noted in the HPI. All other systems reviewed and negative.     PHYSICAL EXAM:    /75   Pulse 88   Temp 97.8 °F (36.6 °C) (Oral)   Resp 18   Ht 5' 8\" (1.727 m)   Wt 156 lb 15.5 oz (71.2 kg)   SpO2 98%   BMI 23.87 kg/m²     General appearance: No apparent distress, appears stated age and cooperative. HEENT: Normal cephalic, atraumatic without obvious deformity. Pupils equal, round, and reactive to light. Extra ocular muscles intact. Conjunctivae/corneas clear. Neck: Supple, with full range of motion. No jugular venous distention. Trachea midline. Respiratory:  Normal respiratory effort. Clear to auscultation, bilaterally without Rales/Wheezes/Rhonchi. Cardiovascular: Regular rate and rhythm with normal S1/S2 without murmurs, rubs or gallops. Abdomen: Soft, non-tender, non-distended with normal bowel sounds. Musculoskelatal: No clubbing, cyanosis or edema bilaterally. Full range of motion without deformity. Skin: Skin color, texture, turgor normal.  No rashes or lesions. Neurologic:  Neurovascularly intact without any focal sensory/motor deficits. Cranial nerves: II-XII intact, grossly non-focal.  Psychiatric: Alert and oriented, thought content appropriate, normal insight    Labs:     Recent Labs     07/22/22  2345   WBC 8.0   HGB 10.3*   HCT 31.5*        Recent Labs     07/22/22  2345   *   K 4.2   CL 95*   CO2 26   BUN 67*   CREATININE 11.0*   CALCIUM 9.7     No results for input(s): AST, ALT, BILIDIR, BILITOT, ALKPHOS in the last 72 hours. No results for input(s): INR in the last 72 hours. No results for input(s): Kathyrn Belch in the last 72 hours.     Urinalysis:      Lab Results   Component Value Date/Time    NITRU Negative 07/22/2022 11:36 PM    WBCUA 3-5 07/22/2022 11:36 PM    BACTERIA 2+ 07/22/2022 11:36 PM    RBCUA None seen 07/22/2022 11:36 PM    BLOODU Negative 07/22/2022 11:36 PM    SPECGRAV 1.015 07/22/2022 11:36 PM    GLUCOSEU Negative 07/22/2022 11:36 PM       Studies:  CT Head W/O Contrast   Final Result      No acute intracranial

## 2022-07-23 NOTE — CONSULTS
Clinical Pharmacy Progress Note  Medication History     Admit Date: 7/22/2022    Pharmacy consulted to verify home medication list by Dr Jef Yap. List of of current medications patient is taking is complete. Home Medication list in EPIC updated to reflect changes noted below. Source of information: patient interview via phone, pharmacy fills    [facility]'s home [facility] made to medication list:   Medications removed:   ASA 325mg   Atorvastatin  Amlodipine  Clonidine  Loratadine  Losartan  Lantus  Humalog  Medications added:   Miralax  Ferrous sulfate  Vit D weekly on Sundays  Velphoro  Lidoderm patch  Dermaphor  Benztropine  Virt Caps softgel  LacHydrin  Medication doses adjusted:   Quetiapine -- clarified to 200mg QHS (pt did this herself, takes only half-tab as she feels she doesn't need it)  Gabapentin -- clarified to 300mg QHS  Other notes:   PS sent to Dr Jef Yap.     Current Outpatient Medications   Medication Instructions    acetaminophen (TYLENOL) 500 mg, Oral, EVERY 6 HOURS PRN    ammonium lactate (LAC-HYDRIN) 12 % lotion Topical, PRN, Apply topically to feet    B Complex-C-Folic Acid (VIRT-CAPS) 1 MG CAPS 1 capsule, Oral, DAILY    benztropine (COGENTIN) 1 mg, Oral, Nightly    buPROPion (WELLBUTRIN SR) 150 mg, Oral, 2 TIMES DAILY    calcitRIOL (ROCALTROL) 0.25 mcg, Oral, DAILY    doxepin (SINEQUAN) 50 mg, Oral, NIGHTLY, One hour before bedtime    Emollient (DERMAPHOR) OINT ointment Topical, 4 TIMES DAILY PRN    ferrous sulfate (IRON 325) 325 mg, Oral, DAILY WITH BREAKFAST    gabapentin (NEURONTIN) 300 mg, Oral, NIGHTLY    hydrALAZINE (APRESOLINE) 50 mg, Oral, 2 TIMES DAILY    hydrOXYzine HCl (ATARAX) 25 mg, Oral, 3 TIMES DAILY PRN    lidocaine (LIDODERM) 5 % 1 patch, TransDERmal, DAILY, 12 hours on, 12 hours off.    melatonin 5 mg, Oral, NIGHTLY    metoprolol succinate (TOPROL XL) 25 mg, Oral, DAILY    NIFEdipine (ADALAT CC) 30 mg, Oral, 2 TIMES DAILY    polyethylene glycol (GLYCOLAX) 17 g, Oral, DAILY    QUEtiapine (SEROQUEL) 200 mg, Oral, NIGHTLY    rosuvastatin (CRESTOR) 40 mg, Oral, DAILY    senna-docusate (PERICOLACE) 8.6-50 MG per tablet 1 tablet, Oral, 2 TIMES DAILY    torsemide (DEMADEX) 80 mg, Oral, DAILY    Velphoro 500 mg, Oral, 3 TIMES DAILY WITH MEALS    vitamin D (ERGOCALCIFEROL) 50,000 Units, Oral, WEEKLY, Sundays       Please call with any questions.   Rishi ReyesD., [facility]   7/23/2022 1:23 PM  Wireless: [phone]

## 2022-07-23 NOTE — FLOWSHEET NOTE
CCPD Order              Exchanges: 4              Exchange Volume: 2000ml              Total Time: 9hrs              Dextrose: 1.5%              Last Fill: N/A              Total Volume: 8000ml     Orders verified. Supplies taken to pt's room. Report received. Cycler set up, primed and pre tested. Dressing changed on [facility] Cath site. Initial effluent clear yellow.        07/23/22 1558   Vitals   /84   Temp 97.5 °F (36.4 °C)   Temp Source Oral   Heart Rate 80   Resp 18   Weight 150 lb 12.7 oz (68.4 kg)   Cycler   Verification of Prescription CCPD   Total Volume Programmed 8000 mL   Therapy Time (Hours:Minutes) 0900   Fill Volume 2000 mL   Dextrose Setting Same (Nonextraneal)   I Drain Alarm 0 mL   Number of Cycles 4   Bag Volume 5000 mL   Number of Bags Used 2   Dianeal Solution Other (Comment)  (Delflex 1.5% in 5L)   I Drain (mL) 55 mL

## 2022-07-23 NOTE — PROGRESS NOTES
Admission: Patient received to room 6325 from ED. Patient admitted with Dx of Peritonitis. Patient A&Ox4 upon arrival. Patient c/o 9/10 sharp intermittent abdominal pain upon arrival.Tele monitor applied, rate and rhythm verified with monitor reader. Admission assessment as charted. VSS. Patient oriented to room, staff, and call system. Educated on fall protocol and hourly rounding. Patient informed to utilize call light with any needs. Pt verbalized understanding. Will continue to monitor.

## 2022-07-24 LAB
ANION GAP SERPL CALCULATED.3IONS-SCNC: 11 MMOL/L (ref 3–16)
BASOPHILS ABSOLUTE: 0 K/UL (ref 0–0.2)
BASOPHILS RELATIVE PERCENT: 0.3 %
BUN BLDV-MCNC: 60 MG/DL (ref 7–20)
CALCIUM SERPL-MCNC: 8.5 MG/DL (ref 8.3–10.6)
CHLORIDE BLD-SCNC: 97 MMOL/L (ref 99–110)
CO2: 27 MMOL/L (ref 21–32)
CREAT SERPL-MCNC: 10 MG/DL (ref 0.6–1.1)
EOSINOPHILS ABSOLUTE: 0.3 K/UL (ref 0–0.6)
EOSINOPHILS RELATIVE PERCENT: 4.6 %
FOLATE: >20 NG/ML (ref 4.78–24.2)
GFR AFRICAN AMERICAN: 5
GFR NON-AFRICAN AMERICAN: 4
GLUCOSE BLD-MCNC: 106 MG/DL (ref 70–99)
GLUCOSE BLD-MCNC: 127 MG/DL (ref 70–99)
GLUCOSE BLD-MCNC: 138 MG/DL (ref 70–99)
GLUCOSE BLD-MCNC: 151 MG/DL (ref 70–99)
GLUCOSE BLD-MCNC: 159 MG/DL (ref 70–99)
HCT VFR BLD CALC: 29.9 % (ref 36–48)
HEMOGLOBIN: 9.9 G/DL (ref 12–16)
LYMPHOCYTES ABSOLUTE: 2.1 K/UL (ref 1–5.1)
LYMPHOCYTES RELATIVE PERCENT: 32.2 %
MCH RBC QN AUTO: 27.2 PG (ref 26–34)
MCHC RBC AUTO-ENTMCNC: 33.2 G/DL (ref 31–36)
MCV RBC AUTO: 81.9 FL (ref 80–100)
MONOCYTES ABSOLUTE: 0.5 K/UL (ref 0–1.3)
MONOCYTES RELATIVE PERCENT: 7.4 %
NEUTROPHILS ABSOLUTE: 3.7 K/UL (ref 1.7–7.7)
NEUTROPHILS RELATIVE PERCENT: 55.5 %
PDW BLD-RTO: 14.3 % (ref 12.4–15.4)
PERFORMED ON: ABNORMAL
PLATELET # BLD: 200 K/UL (ref 135–450)
PMV BLD AUTO: 6.4 FL (ref 5–10.5)
POTASSIUM REFLEX MAGNESIUM: 3.6 MMOL/L (ref 3.5–5.1)
RBC # BLD: 3.65 M/UL (ref 4–5.2)
SODIUM BLD-SCNC: 135 MMOL/L (ref 136–145)
VANCOMYCIN RANDOM: 19.5 UG/ML
VITAMIN B-12: 978 PG/ML (ref 211–911)
WBC # BLD: 6.6 K/UL (ref 4–11)

## 2022-07-24 PROCEDURE — 97530 THERAPEUTIC ACTIVITIES: CPT

## 2022-07-24 PROCEDURE — 6360000002 HC RX W HCPCS: Performed by: INTERNAL MEDICINE

## 2022-07-24 PROCEDURE — 80048 BASIC METABOLIC PNL TOTAL CA: CPT

## 2022-07-24 PROCEDURE — G0257 UNSCHED DIALYSIS ESRD PT HOS: HCPCS

## 2022-07-24 PROCEDURE — 36415 COLL VENOUS BLD VENIPUNCTURE: CPT

## 2022-07-24 PROCEDURE — 2580000003 HC RX 258: Performed by: INTERNAL MEDICINE

## 2022-07-24 PROCEDURE — 6370000000 HC RX 637 (ALT 250 FOR IP): Performed by: INTERNAL MEDICINE

## 2022-07-24 PROCEDURE — 97116 GAIT TRAINING THERAPY: CPT

## 2022-07-24 PROCEDURE — 97162 PT EVAL MOD COMPLEX 30 MIN: CPT

## 2022-07-24 PROCEDURE — 1200000000 HC SEMI PRIVATE

## 2022-07-24 PROCEDURE — 85025 COMPLETE CBC W/AUTO DIFF WBC: CPT

## 2022-07-24 PROCEDURE — 80202 ASSAY OF VANCOMYCIN: CPT

## 2022-07-24 RX ORDER — SENNA AND DOCUSATE SODIUM 50; 8.6 MG/1; MG/1
1 TABLET, FILM COATED ORAL 2 TIMES DAILY
Status: DISCONTINUED | OUTPATIENT
Start: 2022-07-24 | End: 2022-07-26

## 2022-07-24 RX ORDER — DOXEPIN HYDROCHLORIDE 25 MG/1
50 CAPSULE ORAL NIGHTLY
Status: DISCONTINUED | OUTPATIENT
Start: 2022-07-24 | End: 2022-07-27 | Stop reason: HOSPADM

## 2022-07-24 RX ORDER — ROSUVASTATIN CALCIUM 20 MG/1
40 TABLET, COATED ORAL DAILY
Status: DISCONTINUED | OUTPATIENT
Start: 2022-07-24 | End: 2022-07-27 | Stop reason: HOSPADM

## 2022-07-24 RX ORDER — TORSEMIDE 20 MG/1
80 TABLET ORAL DAILY
Status: DISCONTINUED | OUTPATIENT
Start: 2022-07-24 | End: 2022-07-27 | Stop reason: HOSPADM

## 2022-07-24 RX ORDER — POLYETHYLENE GLYCOL 3350 17 G/17G
17 POWDER, FOR SOLUTION ORAL DAILY
Status: DISCONTINUED | OUTPATIENT
Start: 2022-07-24 | End: 2022-07-25

## 2022-07-24 RX ORDER — LANOLIN ALCOHOL/MO/W.PET/CERES
3 CREAM (GRAM) TOPICAL NIGHTLY PRN
Status: DISCONTINUED | OUTPATIENT
Start: 2022-07-24 | End: 2022-07-24

## 2022-07-24 RX ORDER — DIPHENHYDRAMINE HYDROCHLORIDE, ZINC ACETATE 2; .1 G/100G; G/100G
CREAM TOPICAL 3 TIMES DAILY PRN
Status: DISCONTINUED | OUTPATIENT
Start: 2022-07-24 | End: 2022-07-27 | Stop reason: HOSPADM

## 2022-07-24 RX ORDER — CALCITRIOL 0.25 UG/1
0.25 CAPSULE, LIQUID FILLED ORAL DAILY
Status: DISCONTINUED | OUTPATIENT
Start: 2022-07-24 | End: 2022-07-27 | Stop reason: HOSPADM

## 2022-07-24 RX ORDER — LANOLIN ALCOHOL/MO/W.PET/CERES
6 CREAM (GRAM) TOPICAL NIGHTLY PRN
Status: DISCONTINUED | OUTPATIENT
Start: 2022-07-24 | End: 2022-07-27 | Stop reason: HOSPADM

## 2022-07-24 RX ORDER — QUETIAPINE FUMARATE 200 MG/1
200 TABLET, FILM COATED ORAL NIGHTLY
Status: DISCONTINUED | OUTPATIENT
Start: 2022-07-24 | End: 2022-07-27 | Stop reason: HOSPADM

## 2022-07-24 RX ORDER — BENZTROPINE MESYLATE 1 MG/1
1 TABLET ORAL NIGHTLY
Status: DISCONTINUED | OUTPATIENT
Start: 2022-07-24 | End: 2022-07-27 | Stop reason: HOSPADM

## 2022-07-24 RX ADMIN — HEPARIN SODIUM 5000 UNITS: 5000 INJECTION INTRAVENOUS; SUBCUTANEOUS at 21:27

## 2022-07-24 RX ADMIN — ROSUVASTATIN CALCIUM 40 MG: 20 TABLET, FILM COATED ORAL at 10:00

## 2022-07-24 RX ADMIN — CALCITRIOL CAPSULES 0.25 MCG 0.25 MCG: 0.25 CAPSULE ORAL at 12:09

## 2022-07-24 RX ADMIN — GENTAMICIN SULFATE: 1 CREAM TOPICAL at 18:12

## 2022-07-24 RX ADMIN — HYDROMORPHONE HYDROCHLORIDE 0.25 MG: 1 INJECTION, SOLUTION INTRAMUSCULAR; INTRAVENOUS; SUBCUTANEOUS at 12:10

## 2022-07-24 RX ADMIN — HYDRALAZINE HYDROCHLORIDE 50 MG: 50 TABLET, FILM COATED ORAL at 08:07

## 2022-07-24 RX ADMIN — HYDROMORPHONE HYDROCHLORIDE 0.25 MG: 1 INJECTION, SOLUTION INTRAMUSCULAR; INTRAVENOUS; SUBCUTANEOUS at 18:33

## 2022-07-24 RX ADMIN — DOXEPIN HYDROCHLORIDE 50 MG: 25 CAPSULE ORAL at 21:30

## 2022-07-24 RX ADMIN — SENNOSIDES AND DOCUSATE SODIUM 1 TABLET: 50; 8.6 TABLET ORAL at 12:09

## 2022-07-24 RX ADMIN — SENNOSIDES AND DOCUSATE SODIUM 1 TABLET: 50; 8.6 TABLET ORAL at 16:40

## 2022-07-24 RX ADMIN — SODIUM CHLORIDE, PRESERVATIVE FREE 5 ML: 5 INJECTION INTRAVENOUS at 23:17

## 2022-07-24 RX ADMIN — POLYETHYLENE GLYCOL (3350) 17 G: 17 POWDER, FOR SOLUTION ORAL at 12:41

## 2022-07-24 RX ADMIN — TORSEMIDE 80 MG: 20 TABLET ORAL at 12:09

## 2022-07-24 RX ADMIN — Medication 15 ML: at 04:05

## 2022-07-24 RX ADMIN — CEFEPIME HYDROCHLORIDE 1000 MG: 1 INJECTION, POWDER, FOR SOLUTION INTRAMUSCULAR; INTRAVENOUS at 06:06

## 2022-07-24 RX ADMIN — HEPARIN SODIUM 5000 UNITS: 5000 INJECTION INTRAVENOUS; SUBCUTANEOUS at 14:50

## 2022-07-24 RX ADMIN — QUETIAPINE FUMARATE 200 MG: 200 TABLET ORAL at 21:27

## 2022-07-24 RX ADMIN — BUPROPION HYDROCHLORIDE 150 MG: 150 TABLET, EXTENDED RELEASE ORAL at 08:07

## 2022-07-24 RX ADMIN — BUPROPION HYDROCHLORIDE 150 MG: 150 TABLET, EXTENDED RELEASE ORAL at 21:26

## 2022-07-24 RX ADMIN — BENZTROPINE MESYLATE 1 MG: 1 TABLET ORAL at 21:26

## 2022-07-24 RX ADMIN — MORPHINE SULFATE 2 MG: 2 INJECTION, SOLUTION INTRAMUSCULAR; INTRAVENOUS at 00:19

## 2022-07-24 RX ADMIN — HEPARIN SODIUM 5000 UNITS: 5000 INJECTION INTRAVENOUS; SUBCUTANEOUS at 06:14

## 2022-07-24 RX ADMIN — Medication 6 MG: at 21:26

## 2022-07-24 RX ADMIN — METOPROLOL SUCCINATE 25 MG: 25 TABLET, EXTENDED RELEASE ORAL at 08:07

## 2022-07-24 ASSESSMENT — PAIN DESCRIPTION - ORIENTATION
ORIENTATION: MID

## 2022-07-24 ASSESSMENT — PAIN DESCRIPTION - ONSET
ONSET: GRADUAL

## 2022-07-24 ASSESSMENT — PAIN SCALES - GENERAL
PAINLEVEL_OUTOF10: 8
PAINLEVEL_OUTOF10: 7
PAINLEVEL_OUTOF10: 2
PAINLEVEL_OUTOF10: 6

## 2022-07-24 ASSESSMENT — PAIN SCALES - WONG BAKER
WONGBAKER_NUMERICALRESPONSE: 0
WONGBAKER_NUMERICALRESPONSE: 2
WONGBAKER_NUMERICALRESPONSE: 0

## 2022-07-24 ASSESSMENT — PAIN - FUNCTIONAL ASSESSMENT
PAIN_FUNCTIONAL_ASSESSMENT: ACTIVITIES ARE NOT PREVENTED

## 2022-07-24 ASSESSMENT — PAIN DESCRIPTION - PAIN TYPE
TYPE: ACUTE PAIN
TYPE: ACUTE PAIN

## 2022-07-24 ASSESSMENT — PAIN DESCRIPTION - LOCATION
LOCATION: ABDOMEN

## 2022-07-24 ASSESSMENT — PAIN DESCRIPTION - DESCRIPTORS
DESCRIPTORS: ACHING

## 2022-07-24 ASSESSMENT — PAIN DESCRIPTION - FREQUENCY
FREQUENCY: INTERMITTENT

## 2022-07-24 NOTE — PROGRESS NOTES
Clinical Pharmacy Progress Note    Vancomycin - Management by Pharmacy    Consult Date(s): 7/23  Consulting Provider(s): Dr Alysa Alvarado / Plan    Intraabdominal infection - Vancomycin  Concurrent Antimicrobials: Cefepime  Day of Vanc Therapy / Ordered Duration: #2  Current Dosing Method: Intermittent Dosing by Levels  Therapeutic Goal: ~ 15 mg/L  Current Dose / Frequency: Intermittent via levels  Plan / Rationale:   Patient is ESRD on PD. Will dose via levels at this time. Patient received 1500mg IV yesterday in the ED. Random level today = 19.5 mg/L. No further dose needed today. Will order a random level for 7/25 AM.    Will continue to monitor clinical condition and make adjustments to regimen as appropriate. Thank you for consulting Pharmacy! Chelsea ReyesD., [facility]   7/24/2022 7:54 AM  Wireless: [phone]    Interval update:  Antibiotics continue. Resumed CCPD yesterday; one complaint of pain during nighttime cycling. Subjective/Objective: Ms. Aleyda Chavez is a 65 y.o. female with a PMHx significant for ESRD on PD, HTN, DM type II, bipolarism, anxiety, depression, and asthma. Presents with chronic weakness with falls at home and lower abdominal pain. Dialysate collected in the ED, concerning for infection. Pharmacy has been consulted to dose vancomycin. Ht Readings from Last 1 Encounters:   07/22/22 5' 8\" (1.727 m)     Wt Readings from Last 1 Encounters:   07/24/22 150 lb 2.1 oz (68.1 kg)       Current & Prior Antimicrobial Regimen(s):  Cefepime (7/23-current)  Vancomycin - pharmacy to dose  1500mg IV in ED (7/23)  Intermittent via levels (7/23-current)  Date: Vanc Level: Vanc Dose:   7/23  1500mg IV   7/24 19.5 mg/L --   7/25 Ordered        Level(s) / Doses:    Date Time Dose Level / Type of Level Interpretation                 Note: Serum levels collected for AUC-based dosing may be high if collected in close proximity to the dose administered.  This is not necessarily indicative of toxicity. Cultures & Sensitivities:    Date Site Micro Susceptibility / Result   7/23 PD fluid No org seen            Labs / Ancillary Data:    No estimate of CrCL, as the patient is ESRD on PD. Recent Labs     07/22/22  2345   CREATININE 11.0*   BUN 67*   WBC 8.0         Additional Lab Values / Findings of Note:    Procalcitonin: No results for input(s): PROCAL in the last 72 hours.

## 2022-07-24 NOTE — PROGRESS NOTES
Patient Name: Luz Sifuentes                                                    Primary Physician: Ursula Carbajal MD  Admitting Dx: Peritonitis [facility]) [K65.9]    Nephrology [address] [facility]  [email or web address] [email or web address]                                          Interval Hx & Plan:     Patient on PD with no issues last night. Reduced time to 8 hrs and may reduce further as she states she was on 6 hrs at home. Labs reviewed and no changes to Rx. Lytes are stable. BP good. Patient states she's been off Neurontin for months so not contributing to falls / weakness. Assessment:     ESRD on PD  Started on PD 1800 cc fill x 4 exchanges using 1.5%  Vol: Weigth is 71.2 kg. Not removing fluid with PD. Anemia: Hgb 10.3 and on Mircera as outpatient. SHPT: Rocaltrol and Ergo. No binders. Falls / Weakness  Workup per primary service. Neurontin can contribut and she is on a large dose of 600 mg. Hold for now. HTN  On Toprol 25 mg, Hydralazine 50 mg, Nifedipine 30 mg. Please call our office at [phone] or [employer] with any questions or contact me directly. Subjective:     CC / Reason for Consult:  ESRD on PD    HPI / PMHx:  This is a consult for Luz Sifuentes  requested by Ursula Carbajal MD for the reason of  ESRD . Luz Sifuentes is a 65 y.o. female admitted for Peritonitis ([facility] [address] [K65.9] with PMHx of ESRD, Anemia in CKD, SHPT. She has been having frequent falls with leg weaknees but denies dizziness or LOC. She feels generally weak all over and has felt this way for months. Covid in December she did not regain her sense of taste and smell and her appetite is poor. She lost the tip of her dialysis catheter twice but says she did not seek assistance with this. She sometimes has a low temperature. No fevers. She says her only medication change is her Seroquel has been decreased. I thank Dr. Ursula Carbajal MD for consulting [facility] [address] [facility] in the care of your patient. Please call with any questions or concerns. [phone]. Reg Trevino    Objective:     SocHx: Smokes x 15 yrs. Denies alcohol. FamHx: Parents . DM and Cancer.      Current Medications:  Scheduled Medications:  polyethylene glycol, 17 g, Daily  QUEtiapine, 200 mg, Nightly  benztropine, 1 mg, Nightly  doxepin, 50 mg, Nightly  calcitRIOL, 0.25 mcg, Daily  rosuvastatin, 40 mg, Daily  sennosides-docusate sodium, 1 tablet, BID  torsemide, 80 mg, Daily  buPROPion, 150 mg, BID  hydrALAZINE, 50 mg, Daily  metoprolol succinate, 25 mg, Daily  sodium chloride flush, 5-40 mL, 2 times per day  heparin (porcine), 5,000 Units, 3 times per day  insulin lispro, 0-4 Units, TID WC  insulin lispro, 0-4 Units, Nightly  Virt-Caps, 1 capsule, Daily  cefepime, 1,000 mg, Q24H  vancomycin (VANCOCIN) intermittent dosing (placeholder), , RX Placeholder  dianeal lo-josh 1.5%, 2,000 mL, Q6H  dianeal lo-josh 1.5%, 2,000 mL, Q6H  dianeal lo-josh 1.5%, 2,000 mL, Q6H  gentamicin, , Daily  acetaminophen, 650 mg, Once     IV Meds:  dextrose  sodium chloride     PRN Medications:  HYDROmorphone, 0.25 mg, Q6H PRN  melatonin, 6 mg, Nightly PRN  diphenhydrAMINE-zinc acetate, , TID PRN  [Held by provider] hydrOXYzine HCl, 25 mg, TID PRN  glucose, 4 tablet, PRN  dextrose bolus, 125 mL, PRN   Or  dextrose bolus, 250 mL, PRN  glucagon (rDNA), 1 mg, PRN  dextrose, , Continuous PRN  sodium chloride flush, 5-40 mL, PRN  sodium chloride, , PRN  ondansetron, 4 mg, Q8H PRN   Or  ondansetron, 4 mg, Q6H PRN  polyethylene glycol, 17 g, Daily PRN  acetaminophen, 650 mg, Q6H PRN   Or  acetaminophen, 650 mg, Q6H PRN  biotene, 15 mL, TID PRN      Allergies: Latex, Banana, Other, Peanuts [peanut oil], Tomato, Barley grass, Milk-related compounds, and Shellfish-derived products    ROS: Positives in BOLD     GEN:   fevers, chills, sweats, fatigue and weight loss     HEENT:    nasal congestion, sore mouth and sore throat     Resp: cough, hemoptysis, pneumonia or dyspnea on exertion     Card:  chest pain, chest pressure/discomfort, dyspnea, palpitations,  lower extremity edema     GI:   nausea, vomiting, diarrhea, constipation and abdominal pain     :  dysuria, nocturia, urinary incontinence, hesitancy and hematuria     Derm:   rash, skin lesion(s), pruritus and dryness     Neuro:   headaches, dizziness, seizures, gait problems, tremor and weakness     MS:  myalgias, arthralgias, neck pain and back pain     Endo:    nephropathy and cardiovascular disease    PE:      Gen: alert, well appearing, and in no distress     HEENT:pupils equal and reactive, extraocular eye movements intact      Neuro: alert, oriented, normal speech, no focal findings or movement disorder noted     Neck:  supple, no significant adenopathy     Cardio: normal rate, regular rhythm, normal S1, S2, no murmurs, rubs, clicks or gallops      Resp: clear to auscultation, no wheezes, rales or rhonchi, symmetric air entry. GI:  soft, nontender, nondistended, no masses or organomegaly. Ext:  peripheral pulses normal, no pedal edema, no clubbing or cyanosis      MS: no joint tenderness, deformity or swelling      DERM: normal coloration and turgor, no rashesor bruising.        Vitals: Patient Vitals for the past 8 hrs:   BP Temp Temp src Pulse Resp SpO2 Weight   07/24/22 0917 132/85 98 °F (36.7 °C) Oral 72 18 -- --   07/24/22 0603 (!) 150/87 98.3 °F (36.8 °C) Oral 75 18 100 % 150 lb 2.1 oz (68.1 kg)            I/Os:   Intake/Output Summary (Last 24 hours) at 7/24/2022 1048  Last data filed at 7/24/2022 0957  Gross per 24 hour   Intake 480 ml   Output 455 ml   Net 25 ml         LABS:    Lab Results   Component Value Date/Time    CREATININE 10.0 07/24/2022 07:41 AM    BUN 60 07/24/2022 07:41 AM     07/24/2022 07:41 AM    K 3.6 07/24/2022 07:41 AM    CL 97 07/24/2022 07:41 AM    CO2 27 07/24/2022 07:41 AM     No results found for: Wilton Snide   Lab Results Component Value Date/Time    WBC 6.6 07/24/2022 07:41 AM    HGB 9.9 07/24/2022 07:41 AM    HCT 29.9 07/24/2022 07:41 AM    MCV 81.9 07/24/2022 07:41 AM     07/24/2022 07:41 AM      Lab Results   Component Value Date/Time    IRON 98 08/12/2019 01:50 PM    TIBC 275 08/12/2019 01:50 PM      No results found for: Krystian Watts  Lab Results   Component Value Date/Time    CALCIUM 8.5 07/24/2022 07:41 AM    PHOS 3.1 09/03/2014 04:34 AM

## 2022-07-24 NOTE — FLOWSHEET NOTE
CCPD Order   Exchanges: 4   Exchange Volume: 2000 ml   Total Time: 8 hrs   Dextrose: 1.5%   Last Fill: 0 ml   Total Volume: 8000 ml     Orders verified. Supplies taken to pt's room. Report received. Cycler set up, primed and pre tested. Dressing changed on [facility] Cath site. Pt connected to cycler. CCPD initiated without problem. Initial effluent clear. If problems should arise please call the [phone] number on top of PD cycler machine.        If problems persist please call [phone]     07/24/22 5801   Vital Signs   BP (!) 148/94   Temp 96.8 °F (36 °C)   Heart Rate (!) 101   Resp 18   SpO2 96 %   Weight 149 lb 14.6 oz (68 kg)   Weight Method Actual;Standing scale   Percent Weight Change -0.15

## 2022-07-24 NOTE — PROGRESS NOTES
Hospitalist Progress Note      PCP: Savannah Vega DO,     Date of Admission: 7/22/2022    Falls     History Of Present Illness:       Maliha Cabrera is a 65 y.o. female with past medical history as below, including frequent falls. She has no dizziness or LOC, no lack of coordination or focal weakness. She feels generally weak all over and has felt this way for months. She has felt generally week since she had Covid in December she did not regain her sense of taste and smell and her appetite is poor. She has also been having lower abdominal discomfort. It has actually been present for some time. She lost the tip of her dialysis catheter twice but says she did not seek assistance with his. She sometimes has a low temperature. No fevers. No constipation. She still makes some urine. She has had dysuria but not recently. Smokes occasionally, denies etoh or drug use. She says her only medication change is her Seroquel has been decreased. In ER dialysate was collected concerning for PD infection. She was started on broad spectrum antibiotics. UA 3-5 WBCs, +Epi cells and bacteria. Subjective: Has some continued abdominal pain. Also complains of generalized weakness. Worked with therapy this morning.      Medications:  Reviewed    Infusion Medications    dextrose      sodium chloride       Scheduled Medications    polyethylene glycol  17 g Oral Daily    QUEtiapine  200 mg Oral Nightly    benztropine  1 mg Oral Nightly    doxepin  50 mg Oral Nightly    calcitRIOL  0.25 mcg Oral Daily    rosuvastatin  40 mg Oral Daily    senna-docusate  1 tablet Oral BID    torsemide  80 mg Oral Daily    buPROPion  150 mg Oral BID    hydrALAZINE  50 mg Oral Daily    metoprolol succinate  25 mg Oral Daily    sodium chloride flush  5-40 mL IntraVENous 2 times per day    heparin (porcine)  5,000 Units SubCUTAneous 3 times per day    insulin lispro  0-4 Units SubCUTAneous TID WC    insulin lispro  0-4 Units SubCUTAneous Nightly Virt-Caps  1 capsule Oral Daily    cefepime  1,000 mg IntraVENous Q24H    vancomycin (VANCOCIN) intermittent dosing (placeholder)   Other RX Placeholder    dianeal lo-josh 1.5%  2,000 mL IntraPERitoneal Q6H    dianeal lo-josh 1.5%  2,000 mL IntraPERitoneal Q6H    dianeal lo-josh 1.5%  2,000 mL IntraPERitoneal Q6H    gentamicin   Topical Daily    acetaminophen  650 mg Oral Once     PRN Meds: HYDROmorphone, melatonin, diphenhydrAMINE-zinc acetate, [Held by provider] hydrOXYzine HCl, glucose, dextrose bolus **OR** dextrose bolus, glucagon (rDNA), dextrose, sodium chloride flush, sodium chloride, ondansetron **OR** ondansetron, polyethylene glycol, acetaminophen **OR** acetaminophen, biotene      Intake/Output Summary (Last 24 hours) at 7/24/2022 0819  Last data filed at 7/24/2022 0603  Gross per 24 hour   Intake 240 ml   Output 455 ml   Net -215 ml       Exam:    BP (!) 150/87   Pulse 75   Temp 98.3 °F (36.8 °C) (Oral)   Resp 18   Ht 5' 8\" (1.727 m)   Wt 150 lb 2.1 oz (68.1 kg)   SpO2 100%   BMI 22.83 kg/m²     General appearance: No apparent distress, appears stated age and cooperative. HEENT: Pupils equal, round, and reactive to light. Conjunctivae/corneas clear. Neck: Supple, with full range of motion. No jugular venous distention. Trachea midline. Respiratory:  Normal respiratory effort. Clear to auscultation, bilaterally without Rales/Wheezes/Rhonchi. Cardiovascular: Regular rate and rhythm with normal S1/S2 without murmurs, rubs or gallops. Abdomen: Soft, non-tender, non-distended with normal bowel sounds. Musculoskelatal: No clubbing, cyanosis or edema bilaterally. Skin: Skin color, texture, turgor normal.  No rashes or lesions.   Neurologic:  Cranial nerves: II-XII intact, grossly non-focal.  Psychiatric: Alert and oriented, thought content appropriate, normal insight    Labs:   Recent Labs     07/22/22  2345 07/24/22  0741   WBC 8.0 6.6   HGB 10.3* 9.9*   HCT 31.5* 29.9*    200     Recent Labs     07/22/22  2345   *   K 4.2   CL 95*   CO2 26   BUN 67*   CREATININE 11.0*   CALCIUM 9.7     No results for input(s): AST, ALT, BILIDIR, BILITOT, ALKPHOS in the last 72 hours. No results for input(s): INR in the last 72 hours. No results for input(s): Arnulfo Shorts in the last 72 hours. Studies:  CT Head W/O Contrast   Final Result      No acute intracranial process. XR CHEST PORTABLE   Final Result      No acute chest process. Assessment/Plan:    Active Hospital Problems    Diagnosis Date Noted    Peritonitis ([facility] [address] [K65.9] 07/23/2022     Priority: Medium     PLAN:     Falls  Fatigue and weight loss ongoing since December  Intermittent tremor  Bilateral leg weakness  PCP note documents significant weight loss  She was sent for CT and colonoscopy last spring as part of weight loss work-up     Hx of BPPV  Curently denies symptoms associated with falls  Previously referred to vestibular rehab     ESRD on dialysis with PD  Dialysate sent for Analysis and culture  Continue vancomycin and Cefepime  Follow-up culture results     DVT Prophylaxis: Heparin subq  Diet: ADULT DIET;  Regular; Low Potassium (Less than 3000 mg/day)  Code Status: Full Code    PT/OT Eval Status:     Dispo - Inpatient to home with [facility] PT/OT when ready    Aleah Javed DO

## 2022-07-24 NOTE — PROGRESS NOTES
Pt complained of abdominal pain. Requesting another dose of Morphine, [facility], medicated with Morphine 2 mg ivp as ordered, offered heating packs as well for added comfort. Will continue to monitor.

## 2022-07-24 NOTE — PROGRESS NOTES
Physical Therapy  Facility/Department: [address]  Physical Therapy Initial Assessment and Treatment    Name: Kelvin Klein  : 1965  MRN: [id number]  Date of Service: 2022    Discharge Recommendations:  Kelvin Klein scored a 20/24 on the AM-PAC short mobility form. Current research shows that an AM-PAC score of 18 or greater is typically associated with a discharge to the patient's home setting. Based on the patient's AM-PAC score and their current functional mobility deficits, it is recommended that the patient have 2-3 sessions per week of Physical Therapy at d/c to increase the patient's independence. At this time, this patient demonstrates the endurance and safety to discharge home. Please see assessment section for further patient specific details. PT Equipment Recommendations  Equipment Needed: Yes (Rolling walker)      Assessment   Assessment: Pt from home with peritonitis. Pt reports multiple recent falls at home. Overall gait is steady with increased balance using rolling walker. Recommend rolling walker for d/c. Pt lives alone and would benefit from home PT. Will continue to follow in hospital setting. Treatment Diagnosis: Decreased mobility associated with peritonitis. Decision Making: Medium Complexity  Requires PT Follow-Up: Yes     Plan   Plan:  (2-5)  Current Treatment Recommendations: Transfer training, Functional mobility training, Strengthening, Gait training    Safety Devices  Type of Devices: Left in chair, Chair alarm in place, Call light within reach, Nurse notified     Restrictions  Up with assist     Subjective   Chart Reviewed: Yes  Additional Pertinent Hx: Pt to ED  with mulitple falls and LE pain. Pt admitted for peritonitis. CXR (-). Head CT (-). PMH:  anxiety, asthma, back pain, bipolar, depression, DM, HTN, JONATHAN  Diagnosis: Peritonitis    Subjective  Subjective: Pt found supine in bed. Pt reports mulitple falls. \"It's scary. \"  Pt tearful about falls and not seeing her sons recently. \"They didn't even see me on [address]. \"  Pt reports stomach pain. Not rated, RN aware. Social/Functional History  Lives With: Alone  Type of Home: Apartment (1st floor)  Home Layout: One level  Home Access: Stairs to enter with rails (3-4 steps with B rails outside + 3 steps inside to unit)  Bathroom Shower/Tub: Tub/Shower unit  Bathroom Toilet: Standard  Bathroom Equipment:  (None)  Home Equipment:  (None)  Has the patient had two or more falls in the past year or any fall with injury in the past year?: Yes (Falls started in December- Pt reports falling every other day recently)  ADL Assistance: Independent  Homemaking Assistance: Independent  Ambulation Assistance: Independent  Transfer Assistance: Independent  Active : No (Takes [employer])  Occupation: On disability  Additional Comments: Pt does her own PD at home. Vision/Hearing  Vision: Within Functional Limits (wears reading glasses)  Hearing: Within functional limits      Cognition   Within Functional Limits     Objective   Gross Assessment  AROM: Within functional limits  Strength:  Within functional limits     Bed mobility  Supine to Sit: Supervision (HOB raised, using rail)    Transfers  Sit to Stand: Stand by assistance  Stand to sit: Stand by assistance    Ambulation 1  Device: No Device  Assistance: Contact guard assistance  Gait Deviations: Decreased step length;Decreased step height  Distance: 15ft    Ambulation 2  Device 2: Rolling Walker  Assistance 2: Stand by assistance  Gait Deviations: Slow Anamaria;Decreased step length  Distance: 40ft    Balance  Sitting - Static: Good  Sitting - Dynamic: Good  Standing - Static: Fair  Standing - Dynamic: Fair (with use of rolling walker)      AM-PAC Score  AM-PAC Inpatient Mobility Raw Score : 20 (07/24/22 0933)  AM-PAC Inpatient T-Scale Score : 47.67 (07/24/22 0933)  Mobility Inpatient CMS 0-100% Score: 35.83 (07/24/22 0933)  Mobility Inpatient CMS G-Code Modifier : ISABELLA (07/24/22 7555)      Goals  Short Term Goals  Time Frame for Short term goals: Discharge  Short term goal 1: supine <> sit independent  Short term goal 2: sit <> stand independent  Short term goal 3: ambulate 150ft with/without assistive device supervision  Short term goal 4: ambulate up/down 4 steps with rail CGA  Patient Goals   Patient goals :  \"Get some more help\"       Education  Patient Education  Education Given To: Patient  Education Provided: Role of Therapy  Education Method: Verbal  Education Outcome: Verbalized understanding      Therapy Time   Individual Concurrent Group Co-treatment   Time In [phone]         Time Out 0932         Minutes 41         Timed Code Treatment Minutes: 25  Total Treatment Minutes:  [id number] [facility],

## 2022-07-25 LAB
ALBUMIN SERPL-MCNC: 2.7 G/DL (ref 3.4–5)
ANION GAP SERPL CALCULATED.3IONS-SCNC: 9 MMOL/L (ref 3–16)
APPEARANCE FLUID: NORMAL
BUN BLDV-MCNC: 53 MG/DL (ref 7–20)
CALCIUM SERPL-MCNC: 8.7 MG/DL (ref 8.3–10.6)
CELL COUNT FLUID TYPE: NORMAL
CHLORIDE BLD-SCNC: 97 MMOL/L (ref 99–110)
CLOT EVALUATION: NORMAL
CO2: 28 MMOL/L (ref 21–32)
COLOR FLUID: NORMAL
CREAT SERPL-MCNC: 9.2 MG/DL (ref 0.6–1.1)
EOSINOPHIL FLUID: 22 %
GFR AFRICAN AMERICAN: 5
GFR NON-AFRICAN AMERICAN: 4
GLUCOSE BLD-MCNC: 115 MG/DL (ref 70–99)
GLUCOSE BLD-MCNC: 141 MG/DL (ref 70–99)
GLUCOSE BLD-MCNC: 146 MG/DL (ref 70–99)
GLUCOSE BLD-MCNC: 171 MG/DL (ref 70–99)
GLUCOSE BLD-MCNC: 72 MG/DL (ref 70–99)
HCT VFR BLD CALC: 30.2 % (ref 36–48)
HEMOGLOBIN: 9.8 G/DL (ref 12–16)
LYMPHOCYTES, BODY FLUID: 4 %
MCH RBC QN AUTO: 26.8 PG (ref 26–34)
MCHC RBC AUTO-ENTMCNC: 32.4 G/DL (ref 31–36)
MCV RBC AUTO: 82.8 FL (ref 80–100)
MESOTHELIAL FLUID: 2 %
MONOCYTE, FLUID: 24 %
NEUTROPHIL, FLUID: 48 %
NUCLEATED CELLS FLUID: 178 /CUMM
NUMBER OF CELLS COUNTED FLUID: 100
PDW BLD-RTO: 14.1 % (ref 12.4–15.4)
PERFORMED ON: ABNORMAL
PERFORMED ON: NORMAL
PHOSPHORUS: 6.2 MG/DL (ref 2.5–4.9)
PLATELET # BLD: 196 K/UL (ref 135–450)
PMV BLD AUTO: 6.7 FL (ref 5–10.5)
POTASSIUM SERPL-SCNC: 3.6 MMOL/L (ref 3.5–5.1)
RBC # BLD: 3.64 M/UL (ref 4–5.2)
RBC FLUID: <1000 /CUMM
SODIUM BLD-SCNC: 134 MMOL/L (ref 136–145)
VANCOMYCIN RANDOM: 17.6 UG/ML
WBC # BLD: 6.5 K/UL (ref 4–11)

## 2022-07-25 PROCEDURE — 90945 DIALYSIS ONE EVALUATION: CPT

## 2022-07-25 PROCEDURE — 6360000002 HC RX W HCPCS: Performed by: INTERNAL MEDICINE

## 2022-07-25 PROCEDURE — 80069 RENAL FUNCTION PANEL: CPT

## 2022-07-25 PROCEDURE — 80202 ASSAY OF VANCOMYCIN: CPT

## 2022-07-25 PROCEDURE — 97166 OT EVAL MOD COMPLEX 45 MIN: CPT

## 2022-07-25 PROCEDURE — 2580000003 HC RX 258: Performed by: INTERNAL MEDICINE

## 2022-07-25 PROCEDURE — 36415 COLL VENOUS BLD VENIPUNCTURE: CPT

## 2022-07-25 PROCEDURE — 87102 FUNGUS ISOLATION CULTURE: CPT

## 2022-07-25 PROCEDURE — 97530 THERAPEUTIC ACTIVITIES: CPT

## 2022-07-25 PROCEDURE — 97535 SELF CARE MNGMENT TRAINING: CPT

## 2022-07-25 PROCEDURE — 89051 BODY FLUID CELL COUNT: CPT

## 2022-07-25 PROCEDURE — 6370000000 HC RX 637 (ALT 250 FOR IP): Performed by: INTERNAL MEDICINE

## 2022-07-25 PROCEDURE — 1200000000 HC SEMI PRIVATE

## 2022-07-25 PROCEDURE — 87070 CULTURE OTHR SPECIMN AEROBIC: CPT

## 2022-07-25 PROCEDURE — 87205 SMEAR GRAM STAIN: CPT

## 2022-07-25 PROCEDURE — 6370000000 HC RX 637 (ALT 250 FOR IP): Performed by: STUDENT IN AN ORGANIZED HEALTH CARE EDUCATION/TRAINING PROGRAM

## 2022-07-25 PROCEDURE — 85027 COMPLETE CBC AUTOMATED: CPT

## 2022-07-25 RX ORDER — POLYETHYLENE GLYCOL 3350 17 G/17G
17 POWDER, FOR SOLUTION ORAL 2 TIMES DAILY
Status: DISCONTINUED | OUTPATIENT
Start: 2022-07-25 | End: 2022-07-26

## 2022-07-25 RX ORDER — SODIUM CHLORIDE, SODIUM LACTATE, CALCIUM CHLORIDE, MAGNESIUM CHLORIDE AND DEXTROSE 1.5; 538; 448; 18.3; 5.08 G/100ML; MG/100ML; MG/100ML; MG/100ML; MG/100ML
1500 INJECTION, SOLUTION INTRAPERITONEAL ONCE
Status: DISCONTINUED | OUTPATIENT
Start: 2022-07-25 | End: 2022-07-25

## 2022-07-25 RX ORDER — SODIUM CHLORIDE, SODIUM LACTATE, CALCIUM CHLORIDE, MAGNESIUM CHLORIDE AND DEXTROSE 1.5; 538; 448; 18.3; 5.08 G/100ML; MG/100ML; MG/100ML; MG/100ML; MG/100ML
2000 INJECTION, SOLUTION INTRAPERITONEAL ONCE
Status: DISCONTINUED | OUTPATIENT
Start: 2022-07-25 | End: 2022-07-25

## 2022-07-25 RX ORDER — SODIUM CHLORIDE, SODIUM LACTATE, CALCIUM CHLORIDE, MAGNESIUM CHLORIDE AND DEXTROSE 1.5; 538; 448; 18.3; 5.08 G/100ML; MG/100ML; MG/100ML; MG/100ML; MG/100ML
2000 INJECTION, SOLUTION INTRAPERITONEAL ONCE
Status: COMPLETED | OUTPATIENT
Start: 2022-07-25 | End: 2022-07-25

## 2022-07-25 RX ADMIN — QUETIAPINE FUMARATE 200 MG: 200 TABLET ORAL at 20:59

## 2022-07-25 RX ADMIN — SODIUM CHLORIDE, PRESERVATIVE FREE 10 ML: 5 INJECTION INTRAVENOUS at 10:24

## 2022-07-25 RX ADMIN — Medication 6 MG: at 21:00

## 2022-07-25 RX ADMIN — HYDROMORPHONE HYDROCHLORIDE 0.25 MG: 1 INJECTION, SOLUTION INTRAMUSCULAR; INTRAVENOUS; SUBCUTANEOUS at 15:54

## 2022-07-25 RX ADMIN — POLYETHYLENE GLYCOL 3350 17 G: 17 POWDER, FOR SOLUTION ORAL at 06:38

## 2022-07-25 RX ADMIN — HYDROMORPHONE HYDROCHLORIDE 0.25 MG: 1 INJECTION, SOLUTION INTRAMUSCULAR; INTRAVENOUS; SUBCUTANEOUS at 20:59

## 2022-07-25 RX ADMIN — CEFEPIME HYDROCHLORIDE 1000 MG: 1 INJECTION, POWDER, FOR SOLUTION INTRAMUSCULAR; INTRAVENOUS at 06:27

## 2022-07-25 RX ADMIN — TORSEMIDE 80 MG: 20 TABLET ORAL at 10:24

## 2022-07-25 RX ADMIN — GENTAMICIN SULFATE: 1 CREAM TOPICAL at 14:21

## 2022-07-25 RX ADMIN — HEPARIN SODIUM 5000 UNITS: 5000 INJECTION INTRAVENOUS; SUBCUTANEOUS at 21:00

## 2022-07-25 RX ADMIN — BENZTROPINE MESYLATE 1 MG: 1 TABLET ORAL at 20:59

## 2022-07-25 RX ADMIN — HYDROMORPHONE HYDROCHLORIDE 0.25 MG: 1 INJECTION, SOLUTION INTRAMUSCULAR; INTRAVENOUS; SUBCUTANEOUS at 06:43

## 2022-07-25 RX ADMIN — BUPROPION HYDROCHLORIDE 150 MG: 150 TABLET, EXTENDED RELEASE ORAL at 10:23

## 2022-07-25 RX ADMIN — HYDRALAZINE HYDROCHLORIDE 50 MG: 50 TABLET, FILM COATED ORAL at 10:24

## 2022-07-25 RX ADMIN — BUPROPION HYDROCHLORIDE 150 MG: 150 TABLET, EXTENDED RELEASE ORAL at 21:00

## 2022-07-25 RX ADMIN — METOPROLOL SUCCINATE 25 MG: 25 TABLET, EXTENDED RELEASE ORAL at 10:24

## 2022-07-25 RX ADMIN — POLYETHYLENE GLYCOL 3350 17 G: 17 POWDER, FOR SOLUTION ORAL at 10:24

## 2022-07-25 RX ADMIN — HEPARIN SODIUM 5000 UNITS: 5000 INJECTION INTRAVENOUS; SUBCUTANEOUS at 06:27

## 2022-07-25 RX ADMIN — SENNOSIDES AND DOCUSATE SODIUM 1 TABLET: 50; 8.6 TABLET ORAL at 10:24

## 2022-07-25 RX ADMIN — SENNOSIDES AND DOCUSATE SODIUM 1 TABLET: 50; 8.6 TABLET ORAL at 15:55

## 2022-07-25 RX ADMIN — SODIUM CHLORIDE, SODIUM LACTATE, CALCIUM CHLORIDE, MAGNESIUM CHLORIDE AND DEXTROSE 2000 ML: 1.5; 538; 448; 18.3; 5.08 INJECTION, SOLUTION INTRAPERITONEAL at 14:19

## 2022-07-25 RX ADMIN — SODIUM CHLORIDE, PRESERVATIVE FREE 10 ML: 5 INJECTION INTRAVENOUS at 21:00

## 2022-07-25 RX ADMIN — HEPARIN SODIUM 5000 UNITS: 5000 INJECTION INTRAVENOUS; SUBCUTANEOUS at 14:21

## 2022-07-25 RX ADMIN — ROSUVASTATIN CALCIUM 40 MG: 20 TABLET, FILM COATED ORAL at 10:24

## 2022-07-25 RX ADMIN — POLYETHYLENE GLYCOL 3350 17 G: 17 POWDER, FOR SOLUTION ORAL at 21:01

## 2022-07-25 RX ADMIN — CALCITRIOL CAPSULES 0.25 MCG 0.25 MCG: 0.25 CAPSULE ORAL at 10:24

## 2022-07-25 ASSESSMENT — PAIN DESCRIPTION - ONSET
ONSET: ON-GOING

## 2022-07-25 ASSESSMENT — PAIN SCALES - WONG BAKER
WONGBAKER_NUMERICALRESPONSE: 0

## 2022-07-25 ASSESSMENT — PAIN DESCRIPTION - ORIENTATION: ORIENTATION: RIGHT;LEFT

## 2022-07-25 ASSESSMENT — PAIN DESCRIPTION - LOCATION
LOCATION: ABDOMEN
LOCATION: BACK;ABDOMEN
LOCATION: ABDOMEN

## 2022-07-25 ASSESSMENT — PAIN SCALES - GENERAL
PAINLEVEL_OUTOF10: 9
PAINLEVEL_OUTOF10: 0
PAINLEVEL_OUTOF10: 8
PAINLEVEL_OUTOF10: 9
PAINLEVEL_OUTOF10: 8

## 2022-07-25 ASSESSMENT — PAIN DESCRIPTION - FREQUENCY
FREQUENCY: CONTINUOUS

## 2022-07-25 ASSESSMENT — PAIN - FUNCTIONAL ASSESSMENT
PAIN_FUNCTIONAL_ASSESSMENT: PREVENTS OR INTERFERES SOME ACTIVE ACTIVITIES AND ADLS
PAIN_FUNCTIONAL_ASSESSMENT: ACTIVITIES ARE NOT PREVENTED

## 2022-07-25 ASSESSMENT — PAIN DESCRIPTION - DESCRIPTORS
DESCRIPTORS: SHARP;JABBING
DESCRIPTORS: JABBING;SHARP
DESCRIPTORS: ACHING;CRAMPING
DESCRIPTORS: CRAMPING

## 2022-07-25 ASSESSMENT — PAIN DESCRIPTION - PAIN TYPE: TYPE: ACUTE PAIN

## 2022-07-25 NOTE — PROGRESS NOTES
Occupational Therapy  Facility/Department: [address]  Occupational Therapy Initial Assessment/Treatment     Name: Thee Herrera  : 1965  MRN: [id number]  Date of Service: 2022    Discharge Recommendations:      Thee Herrera scored a 21/24 on the AM-PAC ADL Inpatient form. Current research shows that an AM-PAC score of 18 or greater is typically associated with a discharge to the patient's home setting. Based on the patient's AM-PAC score, and their current ADL deficits, it is recommended that the patient have 2-3 sessions per week of Occupational Therapy at d/c to increase the patient's independence. At this time, this patient demonstrates the endurance and safety to discharge home with home services and a follow up treatment frequency of 2-3x/wk. Please see assessment section for further patient specific details. If patient discharges prior to next session this note will serve as a discharge summary. Please see below for the latest assessment towards goals. Patient Diagnosis(es): The encounter diagnosis was Peritonitis ([address] Past Medical History:  has a past medical history of JONATHAN (acute kidney injury) ([address] Anxiety, Asthma, Bipolar disorder ([address] Bronchitis, Chronic back pain, Depression, Diabetes mellitus ([address] DM II (diabetes mellitus, type II), controlled ([address] and Hypertension. Past Surgical History:  has a past surgical history that includes Inner ear surgery; Tubal ligation; and Endometrial ablation. Treatment Diagnosis: functional mobility/ADL defitic      Assessment   Performance deficits / Impairments: Decreased functional mobility ; Decreased ADL status; Decreased balance;Decreased safe awareness;Decreased strength;Decreased endurance  Assessment: pt is a 65 y.o. female presenting below baseline this date requiring CGA for all standing balane, transfers and functional mobility with RW.  Pt demo'd ability to don B socks with SBA and anticipate SBA-CGA for all ADLs. Pt reports several falls at home and BLE weakness. OT will follow while admitted and would benefit from initial 24/hr assist and HHOT for increase strength and balance needed for functional mobility and ADL performance. Treatment Diagnosis: functional mobility/ADL defitic  Prognosis: Good  Decision Making: Medium Complexity  REQUIRES OT FOLLOW-UP: Yes  Activity Tolerance  Activity Tolerance: Patient Tolerated treatment well        Plan   Plan  Times per Week: 2-5  Times per Day: Daily  Current Treatment Recommendations: Strengthening, Functional mobility training, Balance training, Endurance training, Self-Care / ADL, Patient/Caregiver education & training, Safety education & training     Restrictions  Position Activity Restriction  Other position/activity restrictions: Up with assist    Subjective   General  Chart Reviewed: Yes  Patient assessed for rehabilitation services?: Yes  Additional Pertinent Hx: Pt to ED 7/22 with mulitple falls and LE pain. Pt admitted for peritonitis. CXR (-). Head CT (-). PMH:  anxiety, asthma, back pain, bipolar, depression, DM, HTN, JONATHAN  Family / Caregiver Present: No  Referring Practitioner: Black  Diagnosis: peritonitis  Subjective  Subjective: pt in bed upon arrival, expressing frustrtion with diet restrictions. Pt educated on diet orders. Pt agreeable to OT evaluation.      Social/Functional History  Social/Functional History  Lives With: Alone  Type of Home: Apartment (1st floor)  Home Layout: One level  Home Access: Stairs to enter with rails (3-4 steps with B rails outside + 3 steps inside to unit)  Bathroom Shower/Tub: Tub/Shower unit  Bathroom Toilet: Standard  Bathroom Equipment:  (None)  Home Equipment:  (None)  Has the patient had two or more falls in the past year or any fall with injury in the past year?: Yes (Falls started in December- Pt reports falling every other day recently)  ADL Assistance: Independent  Homemaking Assistance: Independent  Ambulation Assistance: Independent  Transfer Assistance: Independent  Active : No (Takes [employer])  Occupation: On disability  Additional Comments: Pt does her own PD at home. Objective   Heart Rate: 81  Heart Rate Source: Monitor  BP: 120/77  BP Location: Left upper arm  BP Method: Automatic  Patient Position: Semi fowlers  MAP (Calculated): 91.33  Resp: 16  SpO2: 97 %  O2 Device: None (Room air)             Safety Devices  Type of Devices: Left in chair;Chair alarm in place;Call light within reach;Nurse notified  Bed Mobility Training  Bed Mobility Training: Yes  Supine to Sit: Stand-by assistance (HOB elevated)  Scooting: Supervision (to EOB)  Balance  Sitting: Intact (SPV EOB/chair)  Standing: With support (CGA-SBA with RW)  Transfer Training  Transfer Training: Yes  Sit to Stand: Contact-guard assistance (with RW, VCs for hand placement)  Stand to Sit: Contact-guard assistance  Toilet Transfer: Contact-guard assistance (VCs for hand placement, with Rw)  Gait  Overall Level of Assistance: Contact-guard assistance (pt ambulated <> bathroom and ~50 ft in hallway with RW. Pt jerky and requiring VCs for RW management. No LOB.)     AROM: Generally decreased, functional (BUE shoulder ROM restricted to 90 degrees from previous shoulder injuries, all other joints WFL)  PROM: Within functional limits  Strength: Generally decreased, functional  Tone: Normal  Sensation: Intact  ADL  LE Dressing: Stand by assistance  LE Dressing Skilled Clinical Factors: Pt donned B socks EOB with SBA. Additional Comments: pt declined all other ADLs                 Cognition  Overall Cognitive Status: Exceptions  Arousal/Alertness: Appropriate responses to stimuli  Following Commands: Follows one step commands with increased time; Follows one step commands with repetition  Attention Span: Attends with cues to redirect  Memory: Appears intact  Safety Judgement: Decreased awareness of need for assistance  Insights: Fully aware of deficits  Cognition Comment: Pt appears anxious, fidgity and unable to relax. Easily tearful and frustrated  Orientation  Overall Orientation Status: Within Functional Limits                  Education Given To: Patient  Education Provided: Role of Therapy;Plan of Care; Fall Prevention Strategies;Transfer Training;Precautions  Education Method: Demonstration;Verbal  Barriers to Learning: None  Education Outcome: Verbalized understanding;Demonstrated understanding;Continued education needed                                                                       AM-PAC Score        AM-PAC Inpatient Daily Activity Raw Score: 21 (07/25/22 1238)  AM-PAC Inpatient ADL T-Scale Score : 44.27 (07/25/22 1238)  ADL Inpatient CMS 0-100% Score: 32.79 (07/25/22 1238)  ADL Inpatient CMS G-Code Modifier : CJ (07/25/22 1238)           Goals  Short Term Goals  Time Frame for Short term goals: DC  Short Term Goal 1: pt will complete toileting with SBA-not met  Short Term Goal 2: pt will complete toilet transer with SPV-not met  Short Term Goal 3: pt will increase dynamic standing tolerance to 4 min with RW to complete ADL-not met  Patient Goals   Patient goals : to go home       Therapy Time   Individual Concurrent Group Co-treatment   Time In 2035         Time Out 1150         Minutes 38          Timed code tx minutes:23  Total tx minutes:38         Gabriel Steven, OT

## 2022-07-25 NOTE — FLOWSHEET NOTE
Disconnected from CCPD per protocol. Effluent: clear yellow, no fibrin noted. Total time: 8 hr 59 min   Total UF:  219 ml. Total Volume:  8007 ml. Dwell time gained:  0 hr 7 min. Pt Tolerated procedure without difficulty.    Report given to: Patricio Espinal RN         07/25/22 0413   Vitals   BP (!) 101/59   Temp 97.9 °F (36.6 °C)   Temp Source Oral   Heart Rate 72   Resp 17   SpO2 99 %   Weight 147 lb 14.9 oz (67.1 kg)  (standing scale)   Cycler   Ultrafiltration (UF) (mL) 219 mL

## 2022-07-25 NOTE — PROGRESS NOTES
mg Oral Daily    metoprolol succinate  25 mg Oral Daily    sodium chloride flush  5-40 mL IntraVENous 2 times per day    heparin (porcine)  5,000 Units SubCUTAneous 3 times per day    insulin lispro  0-4 Units SubCUTAneous TID WC    insulin lispro  0-4 Units SubCUTAneous Nightly    Virt-Caps  1 capsule Oral Daily    cefepime  1,000 mg IntraVENous Q24H    vancomycin (VANCOCIN) intermittent dosing (placeholder)   Other RX Placeholder    dianeal lo-josh 1.5%  2,000 mL IntraPERitoneal Q6H    dianeal lo-josh 1.5%  2,000 mL IntraPERitoneal Q6H    dianeal lo-josh 1.5%  2,000 mL IntraPERitoneal Q6H    gentamicin   Topical Daily    acetaminophen  650 mg Oral Once     PRN Meds: HYDROmorphone, melatonin, diphenhydrAMINE-zinc acetate, [Held by provider] hydrOXYzine HCl, glucose, dextrose bolus **OR** dextrose bolus, glucagon (rDNA), dextrose, sodium chloride flush, sodium chloride, ondansetron **OR** ondansetron, polyethylene glycol, acetaminophen **OR** acetaminophen, biotene      Intake/Output Summary (Last 24 hours) at 7/25/2022 0934  Last data filed at 7/25/2022 0413  Gross per 24 hour   Intake 240 ml   Output 219 ml   Net 21 ml         Exam:    /68   Pulse 77   Temp 97.7 °F (36.5 °C) (Oral)   Resp 17   Ht 5' 8\" (1.727 m)   Wt 147 lb 14.9 oz (67.1 kg) Comment: standing scale  SpO2 98%   BMI 22.49 kg/m²     General appearance: No apparent distress, appears stated age and cooperative. HEENT: Pupils equal, round, and reactive to light. Conjunctivae/corneas clear. Neck: Supple, with full range of motion. No jugular venous distention. Trachea midline. Respiratory:  Normal respiratory effort. Clear to auscultation, bilaterally without Rales/Wheezes/Rhonchi. Cardiovascular: Regular rate and rhythm with normal S1/S2 without murmurs, rubs or gallops. Abdomen: Soft, non-tender, non-distended with normal bowel sounds. PD catheter in place  Musculoskelatal: No clubbing, cyanosis or edema bilaterally.     Skin: Skin Nutrition, metabolism, and development symptoms Elevated alkaline phosphatase level

## 2022-07-25 NOTE — PLAN OF CARE
Problem: Pain  Goal: Verbalizes/displays adequate comfort level or baseline comfort level  Outcome: Progressing   Medicated with Dilaudid 0.25 mg ivp this am for complaints of abdominal cramping after PD completion. Requested a laxative, Glycolax given as ordered. Will continue to monitor alteration in comfort.

## 2022-07-25 NOTE — PROGRESS NOTES
Clinical Pharmacy Progress Note    Vancomycin - Management by Pharmacy    Consult Date(s): 7/23  Consulting Provider(s): Dr Leatha Preston / Plan    Intraabdominal infection - Vancomycin  Concurrent Antimicrobials: Cefepime  Day of Vanc Therapy / Ordered Duration: #2  Current Dosing Method: Intermittent Dosing by Levels  Therapeutic Goal: ~ 15 mg/L  Current Dose / Frequency: Intermittent via levels  Plan / Rationale:   Patient is ESRD on PD. Will dose via levels at this time. Patient received 1500mg IV in ED on 7/23. Random level today = 17.6 mcg/mL. (From 19.5 mcg/mL yesterday). Pt clearing vancomycin slowly - expect therapeutic level through tomorrow am.  No further dose needed today. Will order repeat random level for tomorrow am.  Will continue to monitor clinical condition and make adjustments to regimen as appropriate. Thank you for consulting Pharmacy! Please call with any questions:    Yasmin Dasilva  Pharm. D. BCPS   7/25/2022 10:25 AM    [phone] (office)  [phone] (wireless)  [phone] (Main Pharmacy)      Interval update:  Afebrile and HDS. Antibiotics continue. CCPD done again overnight with no issues. Repeat order for PD fluid culture after 7/23 culture negative. Subjective/Objective: Ms. Keo Biggs is a 65 y.o. female with a PMHx significant for ESRD on PD, HTN, DM type II, bipolarism, anxiety, depression, and asthma. Presents with chronic weakness with falls at home and lower abdominal pain. Dialysate collected in the ED, concerning for infection. Pharmacy has been consulted to dose vancomycin.     Ht Readings from Last 1 Encounters:   07/22/22 5' 8\" (1.727 m)     Wt Readings from Last 1 Encounters:   07/25/22 147 lb 14.9 oz (67.1 kg)       Current & Prior Antimicrobial Regimen(s):  Cefepime (7/23-current)  Vancomycin - pharmacy to dose  1500mg IV in ED (7/23)  Intermittent via levels (7/23-current)  Date: Vanc Level: Vanc Dose:   7/23  1500mg IV   7/24 19.5 mcg/mL --   7/25 17.6 mcg/mL -       Cultures & Sensitivities:    Date Site Micro Susceptibility / Result   7/23 PD fluid No org seen    7/25 PD fluid ordered      Labs / Ancillary Data:    No estimate of CrCL, as the patient is ESRD on PD. Recent Labs     07/22/22  2345 07/24/22  0741 07/25/22  0719   CREATININE 11.0* 10.0* 9.2*   BUN 67* 60* 53*   WBC 8.0 6.6 6.5         Additional Lab Values / Findings of Note:    Procalcitonin: No results for input(s): PROCAL in the last 72 hours.

## 2022-07-25 NOTE — CARE COORDINATION
CM following. Pt is from home, plans to return at DC. Pt does home PD. Pt is interested in [facility] and [employer] services; [facility] liaison following, CM will submit COA referral.  CM will continue to follow for DC needs and recs.         Electronically signed by CLAUDIO Willis, YSABEL on 7/25/2022 at 2:37 PM

## 2022-07-25 NOTE — PLAN OF CARE
Dialysate instilled 2L, 1.5%. solution to dwell x 2 hours then drain. Dressing changed to PD catheter and gentamycin ointment applied. No drainage or swelling at site.

## 2022-07-25 NOTE — ADT AUTH CERT
Subscriber Details  Hospital Account [de-identified]  CVG Subscriber Name/Sex/Relation Subscriber  Subscriber Address/Phone Subscriber Emp/Emp Phone   7. [address]   [phone] [address] - Female   (Self) 1965 [address]   [phone](L)        Utilization Reviews         Renal Failure, Chronic - Care Day 2 (2022) by Harsha Paula RN       Review Status Review Entered   Completed 2022 11:05      Criteria Review      Care Day: 2 Care Date: 2022 Level of Care: Telemetry    Guideline Day 2    Level Of Care    (X) Floor    2022 11:05 AM EDT by Livia Wood      tele    Clinical Status    (X) * Hemodynamic stability    2022 11:05 AM EDT by Livia Wood      97.7, 74, 18, 130/82    (X) * Nausea and vomiting absent or improved    2022 11:05 AM EDT by Livia Wood      no issues noted    (X) * Electrolytes at baseline or improved    2022 11:05 AM EDT by Livia Wood      no issues noted    (X) * Acid-base status at baseline or improved    2022 11:05 AM EDT by Livia Wood      no issues noted    Activity    (X) Activity as tolerated    2022 11:05 AM EDT by Livia Wood      as rommel    Routes    (X) Parenteral or oral medications    2022 11:05 AM EDT by Livia Wood      noted    Interventions    (X) Possible dialysis    2022 11:05 AM EDT by Livia Wood      PD noted    (X) Monitor electrolytes, glucose, acid-base, and volume status    2022 11:05 AM EDT by Livia Wood      labs noted    * Milestone   Additional Notes   22      VS- temp 97.7, HR 74, RR 18, /82, O2 94%          Labs- Na 135, Cl 97, BUN 60, Creat 10, GFR 4, Glucose 159, Hgb 9.9,           Internal medicine-   Has some continued abdominal pain. Also complains of generalized weakness. Worked with therapy this morning.         Falls   Fatigue and weight loss ongoing since December Intermittent tremor   Bilateral leg weakness   PCP note documents significant weight loss   She was sent for CT and colonoscopy last spring as part of weight loss work-up       Hx of BPPV   Curently denies symptoms associated with falls   Previously referred to vestibular rehab       ESRD on dialysis with PD   Dialysate sent for Analysis and culture   Continue vancomycin and Cefepime   Follow-up culture results       DVT Prophylaxis: Heparin subq   Diet: ADULT DIET; Regular; Low Potassium (Less than 3000 mg/day)   Code Status: Full Code       PT/OT Eval Status:        Dispo - Inpatient to home with Truong Brady PT/OT when ready                   Nephrology-   Interval Hx & Plan:   Patient on PD with no issues last night. Reduced time to 8 hrs and may reduce further as she states she was on 6 hrs at home. Labs reviewed and no changes to Rx. Lytes are stable. BP good. Patient states she's been off Neurontin for months so not contributing to falls / weakness. Assessment:   ESRD on PD   Started on PD 1800 cc fill x 4 exchanges using 1.5%   Vol: Weigth is 71.2 kg. Not removing fluid with PD. Anemia: Hgb 10.3 and on Mircera as outpatient. SHPT: Rocaltrol and Ergo. No binders. Falls / Weakness   Workup per primary service. Neurontin can contribut and she is on a large dose of 600 mg. Hold for now. HTN   On Toprol 25 mg, Hydralazine 50 mg, Nifedipine 30 mg.               Summary- Patient in Telemetry unit          Patient received Cogentin 1mg PO x1, Wellbutrin 150mg PO x2, Rocatrol 0.25mcg PO x1, Maxipime 1000mg IV x1, Sinequan 50mg PO x1, Heparin 5000units SC x3, Apresoline 50mg PO x1, Insulin SC QID SS, Toprol 25mg PO x1, Morphine 2mg IV x1, Glycolax 17g PO x1, Seroquel 200mg PO x1, Crestor 40mg PO x1, Senokot8.6-50mg PO x2, Demadex 80mg PO x1, Dilaudid 0.25mg IV x2, Melatonin 6mg PO x1,            Physician advisor recommends inpatient by Liza Fontana RN       Review Status Review Entered   In Primary 2022 10:58      Criteria Review   We recommend that the following pt's current hospitalization under Inpatient status Is appropriate       Name: Aura Torres   : 1965   CSN: [id number]   INSURANCE: [place] Medicaid        Clinical summary 66 yo F with ESRD on dialysis with PD presented with chief complaint of falls. Patient states that she has been having frequent falls for the past 3 to 4months. She states that she seems to just lose her balance and or have weakness and fall to the ground. Admitted for peritonitis   Dialysate sent for Analysis and culture  Continue vancomycin and Cefepime  Vitals Stable  Labs and Imaging Cr 10.0  MCG criteria applies yes  Comments       This chart was reviewed at 5:12 PM 2022    Yash Kevin MD   Physician [facility]   CELL : [phone]        Renal Failure, Chronic - Care Day 1 (2022) by Juliana Alcala RN       Review Status Review Entered   Completed 2022 13:51      Criteria Review      Care Day: 1 Care Date: 2022 Level of Care: Telemetry    Guideline Day 1    Clinical Status    (X) * Clinical Indications met    2022 1:51 PM EDT by Tariq Marie      JONATHAN-Peritonitis ([facility] [address]    (X) Possible metabolic abnormalities    2022 1:51 PM EDT by Tariq Marie      22 23:45  BUN,BUNPL: 67 (H)  Creatinine: 11.0 (HH)    Routes    (X) Parenteral or oral hydration    2022 1:51 PM EDT by Tariq Marie      ADULT DIET; Regular; Low Potassium (Less than 3000 mg/day) (Order #[id number]) on 22    (X) Parenteral or oral medications    2022 1:51 PM EDT by Radha Garduno      cefepime (MAXIPIME) 2000 mg IVPB minibag  Dose: 2,000 mg  Freq: ONCE Route: IV    (X) Oral diet as tolerated    2022 1:51 PM EDT by Tariq Marie      ADULT DIET;  Regular; Low Potassium (Less than 3000 mg/day) (Order #[id number]) on 22    Interventions    (X) Urinalysis, chemistries, CBC, other laboratory tests    (X) Possible CXR, ECG, renal ultrasound    7/24/2022 1:51 PM EDT by Mendoza Yates      Chest xray:No acute chest process. (X) Consultations    7/24/2022 1:51 PM EDT by Mendoza Yates      Nephrology    (X) Monitor electrolytes, glucose, acid-base, and volume status    7/24/2022 1:51 PM EDT by Mendoza Yates      7/22/22 23:45  Sodium: 132 (L)  Potassium: 4.2  Chloride: 95 (L)    * Milestone   Additional Notes   DATE: 7/23/22         Chief Complaint/ED Presentation:   Fall and Leg Pain          Relevant baselines: (lab values, vitals, o2 amount/delivery, etc.)   ESRD on PD       Vitals: 105/71, 85, 18, 97.2 and 95%         Abnl/Pertinent Labs/Radiology/Diagnostic Studies:   7/22/22 23:16   XR CHEST PORTABLE: Rpt   No acute chest process. 7/22/22 23:36   Urine Reflex to Culture: Not Indicated   Color, UA: Yellow   Clarity, UA: Clear   Glucose, UA: Negative   Bilirubin, Urine: Negative   Ketones, Urine: Negative   Specific Gravity, UA: 1.015   Blood, Urine: Negative   pH, UA: 7.0   Protein, UA: 30 ! Urobilinogen, Urine: 0.2   Nitrite, Urine: Negative   Leukocyte Esterase, Urine: TRACE ! Urine Type: Voided   Hyaline Casts, UA: 3-5 ! Mucus, UA: 2+ ! WBC, UA: 3-5   RBC, UA: None seen   Epithelial Cells, UA: 21-50 ! Bacteria, UA: 2+ ! Amorphous, UA: 2+   Microscopic Examination: YES   URINALYSIS WITH REFLEX TO CULTURE: Rpt !      7/22/22 23:45   Sodium: 132 (L)   Potassium: 4.2   Chloride: 95 (L)   CO2: 26   BUN,BUNPL: 67 (H)   Creatinine: 11.0 (HH)   Anion Gap: 11   GFR Non-: 4 ! GFR : 4 !    Magnesium: 3.10 (H)   GLUCOSE, FASTING,GF: 80   CALCIUM, SERUM, 000653: 9.7   Hemoglobin A1C: 6.4   eAG (mg/dL): 137.0   WBC: 8.0   RBC: 3.82 (L)   Hemoglobin Quant: 10.3 (L)   Hematocrit: 31.5 (L)   MCV: 82.3   MCH: 26.9   MCHC: 32.7   MPV: 6.7   RDW: 14.6   Platelet Count: 049   Neutrophils %: 60.9   Lymphocyte %: 27.8   Monocytes %: 8.2   Eosinophils %: 2.6   Basophils %: 0.5   Neutrophils Absolute: 4.9   Lymphocytes Absolute: 2.2   Monocytes Absolute: 0.7   Eosinophils Absolute: 0.2   Basophils Absolute: 0.0      7/23/22 00:11   CULTURE, BODY FLUID: Rpt   Gram Stain Result: No organisms seen (IP)   Source + CELL CT/DIFF-BF: Peritoneal dial   Appearance, Fluid: Hazy   Basos, Fluid: 1   Color, Fluid: Pale Yellow   Eos, Fluid: 21   RBC, Fluid: 2,500   Lymphocytes, Body Fluid: 21   Monocyte Count, Fluid: 8   Neutrophil Count, Fluid: 34   Nucl Cell, Fluid: 3,351   Clot Eval.: see below   Number of Cells Counted Fluid: 100   Macrophages: 15      7/23/22 00:31   CT HEAD WO CONTRAST: Rpt   No acute intracranial process. 7/23/22 08:48   POC Glucose: 69 (L)      7/23/22 12:55   FOLATE, FOLAT: >20.00   Vitamin B-12: 978 (H)   FOLATE: Rpt   VITAMIN B12: Rpt ! ED treatment:   ED meds: see below   ED orders: ct head wo contrast, xr chest, hemoglobin a1c, micro urinalysis, bmp, cbc, urinalysis, mag, cell count, culture      Admission Diagnosis/Op Note:   Peritonitis (Hcc)       Pertinent Medical History:   JONATHAN (acute kidney injury) ([facility] [address]   HTN      Physical Exam:   General:  Non-toxic, no acute distress, fully cooperative with my exam   HEENT:  NCAT   Neck:  Supple   Pulmonary:   No increased work of breathing; CTAB   Cardiac:  RRR, no murmur, thrill or gallop. Capillary refill <3s. 2+ distal pulses   Abdomen:  Soft, mild diffuse tenderness, nondistended; no focal rebound or guarding, PD catheter in place   Musculoskeletal:  Grossly intact without obvious injury or deformity   Neuro:  Alert and oriented X 4, cranial nerves II-XII intact. Patient has 5 out of 5 strength at flexion and extension of the wrist, elbow, and shoulder. Patient has 5 out of 5 strength at flexion and extension of the knee, hip, and ankle.   Patient has intact sensation in the distribution of the median, radial, and ulnar nerves bilateral upper extremities, and intact sensation in the distribution of the superficial peroneal, deep peroneal, and tibial nerves bilateral lower extremities. The patient has a normal gait without ataxia, and a negative Romberg test.  There is no pronator drift. Patient has no cerebellar findings, including no dysmetria. Skin: No rashes      MD Consults/Assessments & Plans:   IM note: Active Hospital Problems     Diagnosis Date Noted   · Peritonitis ([facility] [address] [K65.9] 07/23/2022       Priority: Medium           PLAN:       Falls   Fatigue and weight loss   Intermittent tremor   Bilateral leg weakness   PCP note documents significant weight loss   She was sent for CT and colonoscopy last spring as part of weight loss work-up       Hx of BPPV   Curently denies symptoms associated with falls   Previously referred to vestibular rehab       ESRD on dialysis with PD   Dialysate sent for Analysis and culture   Continue vancomycin and Cefepime         Neph note:       Assessment / Plan:       ESRD on PD   · Started on PD 1800 cc fill x 4 exchanges using 1.5%   · Vol: Weigth is 71.2 kg. Not removing fluid with PD. · Anemia: Hgb 10.3 and on Mircera as outpatient. · SHPT: Rocaltrol and Ergo. No binders. Falls / Weakness   · Workup per primary service. · Neurontin can contribut and she is on a large dose of 600 mg. Hold for now. HTN   · On Toprol 25 mg, Hydralazine 50 mg, Nifedipine 30 mg. Medications:   cefepime (MAXIPIME) 2000 mg IVPB minibag   Dose: 2,000 mg   Freq: ONCE Route: IV      vancomycin (VANCOCIN) 1,500 mg in dextrose 5 % 250 mL IVPB   Dose: 1,500 mg   Freq: ONCE Route: IV      morphine (PF) injection 2 mg   Dose: 2 mg   Freq: ONCE PRN Route: IV   PRN Reason: Pain Severe (7-10)x1            PT/OT/SLP/CM Assessments or Notes:   ED doctor note:    Attempting to get the dialysis alert to send for analysis but noted to have slightly worsening renal function with elevated BUN and creatinine is significantly above her baseline. She did exhibit some odd behavior though she had an otherwise normal neurologic examination. I wondered if we were not dealing with some component of uremic encephalopathy. Initially I discussed the case with nephrology with this concern and they recommended there were some changes they could make it to her peritoneal dialysis which might improve this but given that she is having frequent falls and I do not think that she is totally at her baseline I felt this was best done as an inpatient           Renal Failure, Chronic - Clinical Indications for Admission to Inpatient Care by Isaac Hanks RN       Review Status Review Entered   Completed 7/24/2022 13:46      Criteria Review      Clinical Indications for Admission to Inpatient Care    Most Recent : Josephine Baron Most Recent Date: 7/24/2022 1:46 PM EDT    (X) Admission is indicated for  1 or more  of the following  [A] (1) (2) (3) (4) (5) (6) (7) (8):       (X) Infection that requires inpatient care (eg, peritonitis, access site infection not appropriate       for outpatient treatment) (14)       7/24/2022 1:46 PM EDT by Josephine Baron         1. Peritonitis ([facility] [address]

## 2022-07-25 NOTE — PROGRESS NOTES
Patient Name: Mary Salinas                                                    Primary Physician: Adela Rizvi MD  Admitting Dx: Peritonitis [facility]) [K65.9]    Nephrology [address] [facility]  [email or web address]                                          Interval Hx & Plan:     Patient feel fine  No issues with PD. No SOB. Lytes stable  BP softer side. Patient reported she is constipated for 3- 4 day and since then she is having lower abdominal cramping. Increase stool softeners  Rule out peritonitis ( less likely clinically)Once manual PD cycle and labs for PD effluent cell count/Cx ordered. Continue routine PD daily. Monitor BP. In case of hypotension, might have to cut down BP medications. D/W patient and she agree. D/W RN      Assessment:     ESRD on PD  On PD 1800 cc fill x 4 exchanges using 1.5%  Vol: Weigth is 71.2 kg. Not removing fluid with PD. Anemia: Hgb 10.3 and on Mircera as outpatient. SHPT: Rocaltrol and Ergo. No binders. Falls / General Weakness  Workup per primary service. Neurontin can contribut and she is on a large dose of 600 mg. HTN  On Toprol 25 mg, Hydralazine 50 mg, Nifedipine 30 mg. Please call our office at [phone] or [employer] with any questions or contact me directly. Subjective:     CC / Reason for Consult:  ESRD on PD    HPI / PMHx:  This is a consult for Mary Salinas  requested by Adela Rizvi MD for the reason of  ESRD . Mary Salinas is a 65 y.o. female admitted for Peritonitis ([facility] [address] [K65.9] with PMHx of ESRD, Anemia in CKD, SHPT. She has been having frequent falls with leg weaknees but denies dizziness or LOC. She feels generally weak all over and has felt this way for months. Covid in December she did not regain her sense of taste and smell and her appetite is poor. She lost the tip of her dialysis catheter twice but says she did not seek assistance with this.  She sometimes has a low temperature. No fevers. She says her only medication change is her Seroquel has been decreased. I thank Dr. Ronit Muñoz MD for consulting [facility] [address] [facility] Nephrology in the care of your patient. Please call with any questions or concerns. [phone]. Lisa Piedra    Objective:     SocHx: Smokes x 15 yrs. Denies alcohol. FamHx: Parents . DM and Cancer.      Current Medications:  Scheduled Medications:  polyethylene glycol, 17 g, Daily  QUEtiapine, 200 mg, Nightly  benztropine, 1 mg, Nightly  doxepin, 50 mg, Nightly  calcitRIOL, 0.25 mcg, Daily  rosuvastatin, 40 mg, Daily  sennosides-docusate sodium, 1 tablet, BID  torsemide, 80 mg, Daily  buPROPion, 150 mg, BID  hydrALAZINE, 50 mg, Daily  metoprolol succinate, 25 mg, Daily  sodium chloride flush, 5-40 mL, 2 times per day  heparin (porcine), 5,000 Units, 3 times per day  insulin lispro, 0-4 Units, TID WC  insulin lispro, 0-4 Units, Nightly  Virt-Caps, 1 capsule, Daily  cefepime, 1,000 mg, Q24H  vancomycin (VANCOCIN) intermittent dosing (placeholder), , RX Placeholder  dianeal lo-josh 1.5%, 2,000 mL, Q6H  dianeal lo-josh 1.5%, 2,000 mL, Q6H  dianeal lo-josh 1.5%, 2,000 mL, Q6H  gentamicin, , Daily  acetaminophen, 650 mg, Once     IV Meds:  dextrose  sodium chloride     PRN Medications:  HYDROmorphone, 0.25 mg, Q6H PRN  melatonin, 6 mg, Nightly PRN  diphenhydrAMINE-zinc acetate, , TID PRN  [Held by provider] hydrOXYzine HCl, 25 mg, TID PRN  glucose, 4 tablet, PRN  dextrose bolus, 125 mL, PRN   Or  dextrose bolus, 250 mL, PRN  glucagon (rDNA), 1 mg, PRN  dextrose, , Continuous PRN  sodium chloride flush, 5-40 mL, PRN  sodium chloride, , PRN  ondansetron, 4 mg, Q8H PRN   Or  ondansetron, 4 mg, Q6H PRN  polyethylene glycol, 17 g, Daily PRN  acetaminophen, 650 mg, Q6H PRN   Or  acetaminophen, 650 mg, Q6H PRN  biotene, 15 mL, TID PRN      Allergies: Latex, Banana, Other, Peanuts [peanut oil], Tomato, Barley grass, Milk-related compounds, and Shellfish-derived products    ROS: Positives in BOLD     GEN:   fevers, chills, sweats, fatigue and weight loss     HEENT:    nasal congestion, sore mouth and sore throat     Resp:    cough, hemoptysis, pneumonia or dyspnea on exertion     Card:  chest pain, chest pressure/discomfort, dyspnea, palpitations,  lower extremity edema     GI:   nausea, vomiting, diarrhea, constipation and abdominal pain     :  dysuria, nocturia, urinary incontinence, hesitancy and hematuria     Derm:   rash, skin lesion(s), pruritus and dryness     Neuro:   headaches, dizziness, seizures, gait problems, tremor and weakness     MS:  myalgias, arthralgias, neck pain and back pain     Endo:    nephropathy and cardiovascular disease    PE:      Gen: alert, well appearing, and in no distress     HEENT:pupils equal and reactive, extraocular eye movements intact      Neuro: alert, oriented, normal speech, no focal findings or movement disorder noted     Neck:  supple, no significant adenopathy     Cardio: normal rate, regular rhythm, normal S1, S2, no murmurs, rubs, clicks or gallops      Resp: clear to auscultation, no wheezes, rales or rhonchi, symmetric air entry. GI:  soft, ? tender, nondistended, no masses or organomegaly. No erythema or discharge from PD catheter site     Ext:  peripheral pulses normal, no pedal edema, no clubbing or cyanosis      MS: no joint tenderness, deformity or swelling      DERM: normal coloration and turgor, no rashesor bruising.           Vitals: Patient Vitals for the past 8 hrs:   BP Temp Temp src Pulse Resp SpO2 Weight   07/25/22 0413 (!) 101/59 97.9 °F (36.6 °C) Oral 72 17 99 % 147 lb 14.9 oz (67.1 kg)            I/Os:   Intake/Output Summary (Last 24 hours) at 7/25/2022 0751  Last data filed at 7/25/2022 0413  Gross per 24 hour   Intake 240 ml   Output 219 ml   Net 21 ml         LABS:    Lab Results   Component Value Date/Time    CREATININE 10.0 07/24/2022 07:41 AM    BUN 60 07/24/2022 07:41 AM    NA 135 07/24/2022 07:41 AM    K 3.6 07/24/2022 07:41 AM    CL 97 07/24/2022 07:41 AM    CO2 27 07/24/2022 07:41 AM     No results found for: [id number]   Lab Results   Component Value Date/Time    WBC 6.6 07/24/2022 07:41 AM    HGB 9.9 07/24/2022 07:41 AM    HCT 29.9 07/24/2022 07:41 AM    MCV 81.9 07/24/2022 07:41 AM     07/24/2022 07:41 AM      Lab Results   Component Value Date/Time    IRON 98 08/12/2019 01:50 PM    TIBC 275 08/12/2019 01:50 PM      No results found for: Jaime English  Lab Results   Component Value Date/Time    CALCIUM 8.5 07/24/2022 07:41 AM    PHOS 3.1 09/03/2014 04:34 AM

## 2022-07-26 PROBLEM — T80.90XA COMPLICATION OF PERITONEAL DIALYSIS: Status: ACTIVE | Noted: 2022-07-26

## 2022-07-26 PROBLEM — N18.6 CHRONIC KIDNEY DISEASE WITH END STAGE RENAL FAILURE ON DIALYSIS (HCC): Status: ACTIVE | Noted: 2022-07-26

## 2022-07-26 PROBLEM — Z99.2 CHRONIC KIDNEY DISEASE WITH END STAGE RENAL FAILURE ON DIALYSIS (HCC): Status: ACTIVE | Noted: 2022-07-26

## 2022-07-26 PROBLEM — T85.71XA PERITONITIS ASSOCIATED WITH PERITONEAL DIALYSIS (HCC): Status: ACTIVE | Noted: 2022-07-26

## 2022-07-26 PROBLEM — K59.04 CHRONIC IDIOPATHIC CONSTIPATION: Status: ACTIVE | Noted: 2022-07-26

## 2022-07-26 LAB
ALBUMIN SERPL-MCNC: 3.1 G/DL (ref 3.4–5)
ANION GAP SERPL CALCULATED.3IONS-SCNC: 11 MMOL/L (ref 3–16)
BODY FLUID CULTURE, STERILE: NORMAL
BUN BLDV-MCNC: 48 MG/DL (ref 7–20)
CALCIUM SERPL-MCNC: 8.9 MG/DL (ref 8.3–10.6)
CHLORIDE BLD-SCNC: 98 MMOL/L (ref 99–110)
CO2: 27 MMOL/L (ref 21–32)
CREAT SERPL-MCNC: 8.3 MG/DL (ref 0.6–1.1)
GFR AFRICAN AMERICAN: 6
GFR NON-AFRICAN AMERICAN: 5
GLUCOSE BLD-MCNC: 106 MG/DL (ref 70–99)
GLUCOSE BLD-MCNC: 146 MG/DL (ref 70–99)
GLUCOSE BLD-MCNC: 163 MG/DL (ref 70–99)
GLUCOSE BLD-MCNC: 182 MG/DL (ref 70–99)
GLUCOSE BLD-MCNC: 75 MG/DL (ref 70–99)
GLUCOSE BLD-MCNC: 90 MG/DL (ref 70–99)
GRAM STAIN RESULT: NORMAL
PERFORMED ON: ABNORMAL
PERFORMED ON: NORMAL
PHOSPHORUS: 7.3 MG/DL (ref 2.5–4.9)
POTASSIUM SERPL-SCNC: 3.6 MMOL/L (ref 3.5–5.1)
SODIUM BLD-SCNC: 136 MMOL/L (ref 136–145)
VANCOMYCIN RANDOM: 12.9 UG/ML

## 2022-07-26 PROCEDURE — 90945 DIALYSIS ONE EVALUATION: CPT

## 2022-07-26 PROCEDURE — 2580000003 HC RX 258: Performed by: STUDENT IN AN ORGANIZED HEALTH CARE EDUCATION/TRAINING PROGRAM

## 2022-07-26 PROCEDURE — 6370000000 HC RX 637 (ALT 250 FOR IP): Performed by: INTERNAL MEDICINE

## 2022-07-26 PROCEDURE — 6360000002 HC RX W HCPCS: Performed by: INTERNAL MEDICINE

## 2022-07-26 PROCEDURE — 99255 IP/OBS CONSLTJ NEW/EST HI 80: CPT | Performed by: INTERNAL MEDICINE

## 2022-07-26 PROCEDURE — 36415 COLL VENOUS BLD VENIPUNCTURE: CPT

## 2022-07-26 PROCEDURE — 80069 RENAL FUNCTION PANEL: CPT

## 2022-07-26 PROCEDURE — 6360000002 HC RX W HCPCS: Performed by: STUDENT IN AN ORGANIZED HEALTH CARE EDUCATION/TRAINING PROGRAM

## 2022-07-26 PROCEDURE — 2580000003 HC RX 258: Performed by: INTERNAL MEDICINE

## 2022-07-26 PROCEDURE — 6370000000 HC RX 637 (ALT 250 FOR IP): Performed by: STUDENT IN AN ORGANIZED HEALTH CARE EDUCATION/TRAINING PROGRAM

## 2022-07-26 PROCEDURE — 80202 ASSAY OF VANCOMYCIN: CPT

## 2022-07-26 PROCEDURE — 1200000000 HC SEMI PRIVATE

## 2022-07-26 RX ORDER — POLYETHYLENE GLYCOL 3350 17 G/17G
17 POWDER, FOR SOLUTION ORAL 3 TIMES DAILY
Status: DISCONTINUED | OUTPATIENT
Start: 2022-07-26 | End: 2022-07-26

## 2022-07-26 RX ORDER — POLYETHYLENE GLYCOL 3350 17 G/17G
17 POWDER, FOR SOLUTION ORAL 2 TIMES DAILY
Status: DISCONTINUED | OUTPATIENT
Start: 2022-07-26 | End: 2022-07-27 | Stop reason: HOSPADM

## 2022-07-26 RX ORDER — METHOCARBAMOL 500 MG/1
750 TABLET, FILM COATED ORAL 4 TIMES DAILY PRN
Status: DISCONTINUED | OUTPATIENT
Start: 2022-07-26 | End: 2022-07-27 | Stop reason: HOSPADM

## 2022-07-26 RX ORDER — DOCUSATE SODIUM 100 MG/1
100 CAPSULE, LIQUID FILLED ORAL 2 TIMES DAILY
Status: DISCONTINUED | OUTPATIENT
Start: 2022-07-26 | End: 2022-07-27 | Stop reason: HOSPADM

## 2022-07-26 RX ADMIN — CALCITRIOL CAPSULES 0.25 MCG 0.25 MCG: 0.25 CAPSULE ORAL at 09:54

## 2022-07-26 RX ADMIN — HEPARIN SODIUM 5000 UNITS: 5000 INJECTION INTRAVENOUS; SUBCUTANEOUS at 05:57

## 2022-07-26 RX ADMIN — BUPROPION HYDROCHLORIDE 150 MG: 150 TABLET, EXTENDED RELEASE ORAL at 09:55

## 2022-07-26 RX ADMIN — GENTAMICIN SULFATE: 1 CREAM TOPICAL at 09:56

## 2022-07-26 RX ADMIN — BENZTROPINE MESYLATE 1 MG: 1 TABLET ORAL at 20:32

## 2022-07-26 RX ADMIN — DOCUSATE SODIUM 100 MG: 100 CAPSULE, LIQUID FILLED ORAL at 11:46

## 2022-07-26 RX ADMIN — SODIUM CHLORIDE, PRESERVATIVE FREE 10 ML: 5 INJECTION INTRAVENOUS at 20:33

## 2022-07-26 RX ADMIN — HYDROMORPHONE HYDROCHLORIDE 0.25 MG: 1 INJECTION, SOLUTION INTRAMUSCULAR; INTRAVENOUS; SUBCUTANEOUS at 23:34

## 2022-07-26 RX ADMIN — HEPARIN SODIUM 5000 UNITS: 5000 INJECTION INTRAVENOUS; SUBCUTANEOUS at 23:33

## 2022-07-26 RX ADMIN — VANCOMYCIN HYDROCHLORIDE 750 MG: 10 INJECTION, POWDER, LYOPHILIZED, FOR SOLUTION INTRAVENOUS at 11:51

## 2022-07-26 RX ADMIN — DOXEPIN HYDROCHLORIDE 50 MG: 25 CAPSULE ORAL at 20:36

## 2022-07-26 RX ADMIN — SODIUM CHLORIDE, PRESERVATIVE FREE 10 ML: 5 INJECTION INTRAVENOUS at 09:55

## 2022-07-26 RX ADMIN — SENNOSIDES AND DOCUSATE SODIUM 1 TABLET: 50; 8.6 TABLET ORAL at 09:55

## 2022-07-26 RX ADMIN — BUPROPION HYDROCHLORIDE 150 MG: 150 TABLET, EXTENDED RELEASE ORAL at 20:32

## 2022-07-26 RX ADMIN — HYDRALAZINE HYDROCHLORIDE 50 MG: 50 TABLET, FILM COATED ORAL at 09:55

## 2022-07-26 RX ADMIN — ROSUVASTATIN CALCIUM 40 MG: 20 TABLET, FILM COATED ORAL at 09:54

## 2022-07-26 RX ADMIN — HEPARIN SODIUM 5000 UNITS: 5000 INJECTION INTRAVENOUS; SUBCUTANEOUS at 14:31

## 2022-07-26 RX ADMIN — SODIUM CHLORIDE 20 ML/HR: 9 INJECTION, SOLUTION INTRAVENOUS at 11:50

## 2022-07-26 RX ADMIN — POLYETHYLENE GLYCOL 3350 17 G: 17 POWDER, FOR SOLUTION ORAL at 09:54

## 2022-07-26 RX ADMIN — NYSTATIN 500000 UNITS: 100000 SUSPENSION ORAL at 20:32

## 2022-07-26 RX ADMIN — ONDANSETRON 4 MG: 2 INJECTION INTRAMUSCULAR; INTRAVENOUS at 16:09

## 2022-07-26 RX ADMIN — POLYETHYLENE GLYCOL 3350 17 G: 17 POWDER, FOR SOLUTION ORAL at 20:31

## 2022-07-26 RX ADMIN — NYSTATIN 500000 UNITS: 100000 SUSPENSION ORAL at 17:37

## 2022-07-26 RX ADMIN — NYSTATIN 500000 UNITS: 100000 SUSPENSION ORAL at 09:54

## 2022-07-26 RX ADMIN — METOPROLOL SUCCINATE 25 MG: 25 TABLET, EXTENDED RELEASE ORAL at 09:55

## 2022-07-26 RX ADMIN — QUETIAPINE FUMARATE 200 MG: 200 TABLET ORAL at 20:32

## 2022-07-26 RX ADMIN — TORSEMIDE 80 MG: 20 TABLET ORAL at 09:54

## 2022-07-26 RX ADMIN — METHOCARBAMOL 750 MG: 500 TABLET ORAL at 20:42

## 2022-07-26 RX ADMIN — METHOCARBAMOL 750 MG: 500 TABLET ORAL at 11:46

## 2022-07-26 RX ADMIN — HYDROMORPHONE HYDROCHLORIDE 0.25 MG: 1 INJECTION, SOLUTION INTRAMUSCULAR; INTRAVENOUS; SUBCUTANEOUS at 16:09

## 2022-07-26 RX ADMIN — CEFEPIME HYDROCHLORIDE 1000 MG: 1 INJECTION, POWDER, FOR SOLUTION INTRAMUSCULAR; INTRAVENOUS at 05:51

## 2022-07-26 RX ADMIN — DOCUSATE SODIUM 100 MG: 100 CAPSULE, LIQUID FILLED ORAL at 20:32

## 2022-07-26 RX ADMIN — HYDROMORPHONE HYDROCHLORIDE 0.25 MG: 1 INJECTION, SOLUTION INTRAMUSCULAR; INTRAVENOUS; SUBCUTANEOUS at 07:58

## 2022-07-26 RX ADMIN — NYSTATIN 500000 UNITS: 100000 SUSPENSION ORAL at 13:03

## 2022-07-26 ASSESSMENT — PAIN DESCRIPTION - ORIENTATION
ORIENTATION: LEFT;RIGHT
ORIENTATION: LEFT

## 2022-07-26 ASSESSMENT — PAIN DESCRIPTION - PAIN TYPE
TYPE: ACUTE PAIN

## 2022-07-26 ASSESSMENT — PAIN DESCRIPTION - DESCRIPTORS
DESCRIPTORS: SHARP
DESCRIPTORS: CRAMPING;SHARP
DESCRIPTORS: SHARP

## 2022-07-26 ASSESSMENT — PAIN DESCRIPTION - FREQUENCY
FREQUENCY: CONTINUOUS

## 2022-07-26 ASSESSMENT — PAIN - FUNCTIONAL ASSESSMENT
PAIN_FUNCTIONAL_ASSESSMENT: ACTIVITIES ARE NOT PREVENTED

## 2022-07-26 ASSESSMENT — PAIN SCALES - GENERAL
PAINLEVEL_OUTOF10: 9
PAINLEVEL_OUTOF10: 9
PAINLEVEL_OUTOF10: 10
PAINLEVEL_OUTOF10: 10
PAINLEVEL_OUTOF10: 9
PAINLEVEL_OUTOF10: 8
PAINLEVEL_OUTOF10: 10
PAINLEVEL_OUTOF10: 0
PAINLEVEL_OUTOF10: 0
PAINLEVEL_OUTOF10: 10
PAINLEVEL_OUTOF10: 10

## 2022-07-26 ASSESSMENT — PAIN DESCRIPTION - ONSET
ONSET: ON-GOING

## 2022-07-26 ASSESSMENT — PAIN DESCRIPTION - LOCATION
LOCATION: ABDOMEN

## 2022-07-26 NOTE — FLOWSHEET NOTE
Disconnected from CCPD per protocol. Effluent: Clear  Total time: 08 hr 24 min   Total UF:  147 ml. Total Volume:  00 ml. Dwell time gained:  00 hr 17 min.   Pt Tolerated procedure: Pt c/o abd pain - was recently med for it.       07/26/22 0846   Vitals   /82   Temp 97.7 °F (36.5 °C)   Heart Rate 76   Resp 18   Weight 151 lb 10.8 oz (68.8 kg)   Cycler   Ultrafiltration (UF) (mL) 147 mL   Average Dwell Time (Hours:Minutes) 94   Lost Dwell Time (Hours:Minutes) 0017

## 2022-07-26 NOTE — FLOWSHEET NOTE
CCPD Order              Exchanges: 4              Exchange Volume: 2000 ml              Total Time: 8 hrs              Dextrose: 1.5%              Last Fill: 0 ml              Total Volume: 8000 ml     Orders verified. Supplies taken to pt's room. Report received. Cycler set up, primed and pre tested. Dressing changed on [facility] Cath site. Pt connected to cycler. CCPD initiated without problem. Initial effluent clear. If problems should arise please call the [phone] number on top of PD cycler machine.        If problems persist please call [phone]     07/25/22 2346   Vitals   /67   Temp 97.9 °F (36.6 °C)   Temp Source Oral   Heart Rate 74   Resp 18   SpO2 99 %   Weight 151 lb 3.8 oz (68.6 kg)

## 2022-07-26 NOTE — PLAN OF CARE
Problem: Discharge Planning  Goal: Discharge to home or other facility with appropriate resources  7/26/2022 0015 by Wiliam Bhakta RN  Outcome: Progressing Towards Goal     Problem: Pain  Goal: Verbalizes/displays adequate comfort level or baseline comfort level  7/26/2022 0015 by Wiliam Bhakta RN  Outcome: Progressing Towards Goal     Problem: Safety - Adult  Goal: Free from fall injury  7/26/2022 0015 by Wiliam Bhakta RN  Outcome: Progressing Towards Goal     Problem: Skin/Tissue Integrity  Goal: Absence of new skin breakdown  Description: 1. Monitor for areas of redness and/or skin breakdown  2. Assess vascular access sites hourly  3. Every 4-6 hours minimum:  Change oxygen saturation probe site  4. Every 4-6 hours:  If on nasal continuous positive airway pressure, respiratory therapy assess nares and determine need for appliance change or resting period.   7/26/2022 0015 by Wiliam Bhakta RN  Outcome: Progressing Towards Goal     Problem: Chronic Conditions and Co-morbidities  Goal: Patient's chronic conditions and co-morbidity symptoms are monitored and maintained or improved  7/26/2022 0015 by Wiliam Bhakta RN  Outcome: Progressing Towards Goal

## 2022-07-26 NOTE — PLAN OF CARE
Problem: Discharge Planning  Goal: Discharge to home or other facility with appropriate resources  Note: She will return home at discharge. Problem: Pain  Goal: Verbalizes/displays adequate comfort level or baseline comfort level  Note: She complains of abdominal pain. She is on multiple stool softeners and laxatives. She has declined a SSE at this time. She did have one small formed stool. She also declined a suppository because she feels like she will go. Medicated with dilaudid x 1, and robaxin. She did rest after robaxin was given. Problem: Safety - Adult  Goal: Free from fall injury  Note: She is a fall risk. Bed alarm on, and call light in reach. Problem: Skin/Tissue Integrity  Goal: Absence of new skin breakdown  Description: 1. Monitor for areas of redness and/or skin breakdown  2. Assess vascular access sites hourly  3. Every 4-6 hours minimum:  Change oxygen saturation probe site  4. Every 4-6 hours:  If on nasal continuous positive airway pressure, respiratory therapy assess nares and determine need for appliance change or resting period. Note: She has skinned and scabbed knees. Bandaids removed, and areas are open to air. She repositions on her own. Problem: Chronic Conditions and Co-morbidities  Goal: Patient's chronic conditions and co-morbidity symptoms are monitored and maintained or improved  Note: She is receiving CCPD nightly. Treating with IV antibiotics for peritonitis. ID consulted.

## 2022-07-26 NOTE — CONSULTS
Infectious Diseases Inpatient Consult Note    RESIDENT NOTE - reviewed / edited, attending note at bottom    Reason for Consult:   PD associated peritonitis   Requesting Physician:   Dr. Helen Hicks  Primary Care Physician:  Lenin Rodgers DO, DO  History Obtained From:   Pt, EPIC    Admit Date: 7/22/2022  Hospital Day: 5    CHIEF COMPLAINT:       Chief Complaint   Patient presents with    Fall    Leg Pain       HISTORY OF PRESENT ILLNESS:      This is a 65year old female with a past medical history of CKD on PD c/b anemia, BPPV, T2DM, HTN who presents with generalized weakness and abdominal pain. She initially presented to ED with fatigue, weight loss and falls, denies LOC or focal weakness. PD dialysate in ER revealed hazy pale yellow fluid with 3.35K nuclear cells with neutrophils 34, lymphocytes 21, concerning for PD associated peritonitis. Body fluid culture since that time NGTD. BMP with Na 132, WBC 8.0. UA with 2+ bacteria, 3-5 WBC, trace LE, negative nitrite. She was started on cefepime, vancomycin and topical gentamicin at PD catheter site. She has also been started on oral nystatin swish/swallow for fungal peritonitis prophylaxis. Vitals have been unremarkable and she has remained afebrile and hemodynamically stable since then. She had an episode of chills yesterday but denies further episodes, fevers, or diaphoresis. She currently endorses L sided abdominal pain that is intermittent up to an 8/10. The pt states that her PD catheter tip came out last Tuesday and it was replaced in clinic at that time and she thinks may have contributed to possible infection. Of note, per Fredda Kehr PD nurse suggested pt is not following ID prophylaxis. 7/25/22 repeat PD dialysate with hazy pale yellow appearance, 128 nuclear cells, 48 neutrophils, 4 lymphocytes. Repeat dialysate culture at this time NGTD. Fungal cultures pending.     Past Medical History:    Past Medical History:   Diagnosis Date    JONATHAN (acute kidney injury) ([facility] 9/3/2014    Anxiety     Asthma     Bipolar disorder ([facility]     Bronchitis     Chronic back pain     Depression     Diabetes mellitus ([facility]     DM II (diabetes mellitus, type II), controlled ([place] [address].) 9/3/2014    Hypertension        Past Surgical History:    Past Surgical History:   Procedure Laterality Date    ENDOMETRIAL ABLATION      INNER EAR SURGERY      TUBAL LIGATION         Current Medications:     nystatin  5 mL Oral 4x Daily    polyethylene glycol  17 g Oral BID    glycerin (ADULT)  1 suppository Rectal Once    docusate sodium  100 mg Oral BID    QUEtiapine  200 mg Oral Nightly    benztropine  1 mg Oral Nightly    doxepin  50 mg Oral Nightly    calcitRIOL  0.25 mcg Oral Daily    rosuvastatin  40 mg Oral Daily    torsemide  80 mg Oral Daily    buPROPion  150 mg Oral BID    hydrALAZINE  50 mg Oral Daily    metoprolol succinate  25 mg Oral Daily    sodium chloride flush  5-40 mL IntraVENous 2 times per day    heparin (porcine)  5,000 Units SubCUTAneous 3 times per day    insulin lispro  0-4 Units SubCUTAneous TID WC    insulin lispro  0-4 Units SubCUTAneous Nightly    Virt-Caps  1 capsule Oral Daily    cefepime  1,000 mg IntraVENous Q24H    vancomycin (VANCOCIN) intermittent dosing (placeholder)   Other RX Placeholder    gentamicin   Topical Daily    acetaminophen  650 mg Oral Once       Allergies:  Latex, Banana, Other, Peanuts [peanut oil], Tomato, Barley grass, Milk-related compounds, and Shellfish-derived products    Social History:    The patient currently lives in private residence     TOBACCO:   reports that she has been smoking cigarettes. She has a 0.75 pack-year smoking history. She has never used smokeless tobacco.  ETOH:   reports no history of alcohol use. Family History:   No immunodeficiency    REVIEW OF SYSTEMS:  Per HPI  + Chills + Weight loss  No visual change, eye pain, eye discharge. No oral lesion, sore throat, dysphagia. Denies cough / sputum.    Denies chest pain, palpitations. +Abdominal pain. Denies n / v   No diarrhea. Denies dysuria  Denies joint swelling or pain. No myalgia, arthralgia. Denies skin changes, itching  Denies focal weakness, sensory change or other neurologic symptom    Denies new / worse depression, psychiatric symptoms    PHYSICAL EXAM:    Vitals:    /75   Pulse 78   Temp 97 °F (36.1 °C) (Oral)   Resp 18   Ht 5' 8\" (1.727 m)   Wt 151 lb 10.8 oz (68.8 kg)   SpO2 100%   BMI 23.06 kg/m²     GENERAL: No apparent distress. HEENT: Membranes moist, no oral lesion, PERRL  NECK:  Supple, no lymphadenopathy  LUNGS: Clear b/l, no rales, no dullness  CARDIAC: RRR, no murmur appreciated  ABD:  +Clean, non-erythematous L PD site.  Tender L side to palpation with no rebound or guarding. + BS, soft / NT  EXT:  No rash, no edema, no lesions  NEURO: No focal neurologic findings  PSYCH: Orientation, sensorium, mood normal  LINES:  Peripheral iv    DATA:    Lab Results   Component Value Date    WBC 6.5 07/25/2022    HGB 9.8 (L) 07/25/2022    HCT 30.2 (L) 07/25/2022    MCV 82.8 07/25/2022     07/25/2022     Lab Results   Component Value Date    CREATININE 8.3 (HH) 07/26/2022    BUN 48 (H) 07/26/2022     07/26/2022    K 3.6 07/26/2022    CL 98 (L) 07/26/2022    CO2 27 07/26/2022       Hepatic Function Panel:   Lab Results   Component Value Date/Time    ALKPHOS 93 08/12/2019 01:50 PM    ALT 31 08/12/2019 01:50 PM    AST 28 08/12/2019 01:50 PM    PROT 7.5 08/12/2019 01:50 PM    PROT 6.9 05/18/2011 10:15 AM    BILITOT <0.2 08/12/2019 01:50 PM    LABALBU 3.1 07/26/2022 07:50 AM       Micro:  7/23/22 Dialysate cell count: Hazy pale yellow fluid with 3.35K nuclear cells with neutrophils 34, lymphocytes 21  7/23/22 Dialysate culture NGTD  7/25/22 Dialysate cell count: Hazy pale yellow appearance, 128 nuclear cells, 48 neutrophils, 4 lymphocytes  7/25/22 Dialysate culture NGTD  7/25/22 Fungal cultures NGTD    Imaging:   Reviewed    Assessment & Plan:    PD associated peritonitis  Initial dialysate cell count 7/23 concerning for peritonitis, however this seems to be improving with broad spectrum coverage given significantly decreased nuclear cell count on 7/25 dialysate cell count. - 7/23, 7/25 dialysate cultures NGTD  - 7/25 fungal culture NGTD  - Agree with cefepime, vancomycin, topical gentamicin, nystatin rinse:   - Intra-peritoneal abx 10 more days   - Nystatin swish/swallow for 7 more days after abx course  - Educate on PD site care / sterile technique    Discussed with Dr Vi Juarez MD    Addendum to Resident Consult note:  Pt seen, examined and evaluated. I have reviewed the current history, physical findings, labs and assessment and plan and agree with note as documented by resident (Dr Phyllis Figueredo). 66 yo woman with hx CKD on PD, anemia, DM, HTN, weakness  Developed RF and on PD dialysis since Nov / Dec 2021 per pt  No hx PD infection or complication per pt    Presented with fatigue, falls, abd pain. No BM in 'days' (not uncommon for pt to go '4 days' without BM)  Poor historian    In ED 7/22 - afeb, WBC 8. Dialysate 7/23 - hazy, 3,351 WBC - 34%PMN. Cult no growth  Admit, started on Cefepime, Vancomycin    F/u PD fluid 7/25 - 178 WBC - 48%PMN. Cult no growth to date    Today 7/26, afeb  Less abd pain. No BM in 'over 4 days'  + BS, soft, tender LLQ around site of PD catheter, dressing over site    IMP/  PD related peritonitis, improved  No microbiologic dx    Constipation     REC/  Cont empiric antibiotic in hosp - vanco / cefepime  Bowel regimen / meds - hoping less abd pain after BM  F/u Dialysate cell count and diff  Will f/u on PD fluid cult / fungal cult     After discharge IP antibiotic (vanco / cefepime poss choices)    - cefepime daily / vanco every 5 days  for 5 days, per protocol, per Dr Lili Jules Making:   The following items were considered in medical decision making:  Discussion of patient care with other providers  Reviewed clinical lab tests  Reviewed radiology tests  Reviewed other diagnostic tests/interventions  Microbiology cultures and other micro tests reviewed      Risk of Complications/Morbidity: High   Illness(es)/ Infection present that pose threat to bodily function. There is potential for severe exacerbation of infection/side effects of treatment.   Therapy requires intensive monitoring for antimicrobial agent toxicity    Discussed with pt, Renal - Dang Mendez (over phone)  Howard Waters MD      .

## 2022-07-26 NOTE — CARE COORDINATION
CM following. Pt is from home, plans to return at [place]. Pt does home PD. Pt is interested in [place] [phone] and [address] [email or web address] services; [place] [phone] liaison following, CM will submit COA referral.  CM will continue to follow for DC needs and recs.         Electronically signed by CLAUDIO Pryor, LSW on 7/26/2022 at 5:20 PM

## 2022-07-26 NOTE — PROGRESS NOTES
Encounters:   07/26/22 151 lb 10.8 oz (68.8 kg)       Current & Prior Antimicrobial Regimen(s):  Cefepime (7/23-current)  Vancomycin - pharmacy to dose  1500mg IV in ED (7/23)  750 mg IV x1  (7/26)  Intermittent via levels (7/23-current)  Date: Vanc Level: Vanc Dose:   7/23  1500mg IV   7/24 19.5 mcg/mL --   7/25  17.6 mcg/mL -   7/26 12.9 mcg/mL 750 mg IV       Cultures & Sensitivities:    Date Site Micro Susceptibility / Result   7/23 PD fluid No org seen    7/25 PD fluid ordered      Labs / Ancillary Data:    No estimate of CrCL, as the patient is ESRD on PD. Recent Labs     07/24/22  0741 07/25/22  0719 07/26/22  0750   CREATININE 10.0* 9.2* 8.3*   BUN 60* 53* 48*   WBC 6.6 6.5  --          Additional Lab Values / Findings of Note:    Procalcitonin: No results for input(s): PROCAL in the last 72 hours.

## 2022-07-26 NOTE — PROGRESS NOTES
Patient Name: Chelsi Bower                                                    Primary Physician: King Bingham MD  Admitting Dx: Peritonitis [facility]) [K65.9]    Nephrology [facility]  [email or web address]                                          Interval Hx & Plan:     Patient still constipated with abdominal pain. No issues with PD. No SOB. Lytes stable  BP stable. Continue stool softeners and also add suppository   Continue antibiotics for peritonitis. IV Cefepime and Vancomycin  PD fluid Cx so far negative. Follow Cx  D/W Alice Johnson PD nurse and patient is not following proper ID prophylaxis instructions. Arranging Intra-peritoneal antibiotics for 10 more days  Oral Nystatin swish and swallow for fungal peritonitis prophylaxis. Continue till 7 days after patient finishes antibiotics. ID consult   Topical Gentamycin daily at PD catheter insertion site   Continue routine PD daily. Monitor BP    D/W patient and she agree. D/W RN      Assessment:     ESRD on PD  On PD 1800 cc fill x 4 exchanges using 1.5%  Vol: [place] is 71.2 kg. Not removing fluid with PD. Anemia: Hgb 10.3 and on Mircera as outpatient. SHPT: Rocaltrol and Ergo. No binders. Falls / General Weakness  Workup per primary service. Neurontin can contribut and she is on a large dose of 600 mg. HTN  On Toprol 25 mg, Hydralazine 50 mg, Nifedipine 30 mg. Please call our office at [phone] or [employer] with any questions or contact me directly. Subjective:     CC / Reason for Consult:  ESRD on PD    HPI / PMHx:  This is a consult for Chelsi Bower  requested by King Bingham MD for the reason of  ESRD . Chelsi Bower is a 65 y.o. female admitted for Peritonitis ([facility] [K65.9] with PMHx of ESRD, Anemia in CKD, SHPT. She has been having frequent falls with leg weaknees but denies dizziness or LOC.   She feels generally weak all over and has felt this way for months. Covid in December she did not regain her sense of taste and smell and her appetite is poor. She lost the tip of her dialysis catheter twice but says she did not seek assistance with this. She sometimes has a low temperature. No fevers. She says her only medication change is her Seroquel has been decreased. I thank Dr. Luis Alfredo Baez MD for consulting [place] [place] [place] in the care of your patient. Please call with any questions or concerns. [phone]. Elsie Rodriguez    Objective:     SocHx: Smokes x 15 yrs. Denies alcohol. FamHx: Parents . DM and Cancer.      Current Medications:  Scheduled Medications:  polyethylene glycol, 17 g, BID  QUEtiapine, 200 mg, Nightly  benztropine, 1 mg, Nightly  doxepin, 50 mg, Nightly  calcitRIOL, 0.25 mcg, Daily  rosuvastatin, 40 mg, Daily  sennosides-docusate sodium, 1 tablet, BID  torsemide, 80 mg, Daily  buPROPion, 150 mg, BID  hydrALAZINE, 50 mg, Daily  metoprolol succinate, 25 mg, Daily  sodium chloride flush, 5-40 mL, 2 times per day  heparin (porcine), 5,000 Units, 3 times per day  insulin lispro, 0-4 Units, TID WC  insulin lispro, 0-4 Units, Nightly  Virt-Caps, 1 capsule, Daily  cefepime, 1,000 mg, Q24H  vancomycin (VANCOCIN) intermittent dosing (placeholder), , RX Placeholder  gentamicin, , Daily  acetaminophen, 650 mg, Once     IV Meds:  dextrose  sodium chloride     PRN Medications:  HYDROmorphone, 0.25 mg, Q6H PRN  melatonin, 6 mg, Nightly PRN  diphenhydrAMINE-zinc acetate, , TID PRN  [Held by provider] hydrOXYzine HCl, 25 mg, TID PRN  glucose, 4 tablet, PRN  dextrose bolus, 125 mL, PRN   Or  dextrose bolus, 250 mL, PRN  glucagon (rDNA), 1 mg, PRN  dextrose, , Continuous PRN  sodium chloride flush, 5-40 mL, PRN  sodium chloride, , PRN  ondansetron, 4 mg, Q8H PRN   Or  ondansetron, 4 mg, Q6H PRN  polyethylene glycol, 17 g, Daily PRN  acetaminophen, 650 mg, Q6H PRN   Or  acetaminophen, 650 mg, Q6H PRN  biotene, 15 mL, TID PRN      Allergies: Latex, Banana, Other, Peanuts [peanut oil], Tomato, Barley grass, Milk-related compounds, and Shellfish-derived products    ROS: Positives in BOLD     GEN:   fevers, chills, sweats, fatigue and weight loss     HEENT:    nasal congestion, sore mouth and sore throat     Resp:    cough, hemoptysis, pneumonia or dyspnea on exertion     Card:  chest pain, chest pressure/discomfort, dyspnea, palpitations,  lower extremity edema     GI:   nausea, vomiting, diarrhea, constipation and abdominal pain     :  dysuria, nocturia, urinary incontinence, hesitancy and hematuria     Derm:   rash, skin lesion(s), pruritus and dryness     Neuro:   headaches, dizziness, seizures, gait problems, tremor and weakness     MS:  myalgias, arthralgias, neck pain and back pain     Endo:    nephropathy and cardiovascular disease    PE:      Gen: alert, well appearing, and in no distress     HEENT:pupils equal and reactive, extraocular eye movements intact      Neuro: alert, oriented, normal speech, no focal findings or movement disorder noted     Neck:  supple, no significant adenopathy     Cardio: normal rate, regular rhythm, normal S1, S2, no murmurs, rubs, clicks or gallops      Resp: clear to auscultation, no wheezes, rales or rhonchi, symmetric air entry. GI:  soft, + lower abdominal tenderness, nondistended, no masses or organomegaly. No erythema or discharge from PD catheter site     Ext:  peripheral pulses normal, no pedal edema, no clubbing or cyanosis      MS: no joint tenderness, deformity or swelling      DERM: normal coloration and turgor, no rashesor bruising.           Vitals: Patient Vitals for the past 8 hrs:   BP Temp Temp src Pulse Resp SpO2   07/26/22 0415 106/69 96.9 °F (36.1 °C) Oral 66 18 96 %   07/26/22 0139 125/85 97.9 °F (36.6 °C) Oral 76 16 96 %            I/Os:   Intake/Output Summary (Last 24 hours) at 7/26/2022 0728  Last data filed at 7/25/2022 1526  Gross per 24 hour   Intake 2000 ml Output 1950 ml   Net 50 ml         LABS:    Lab Results   Component Value Date/Time    CREATININE 9.2 07/25/2022 07:19 AM    BUN 53 07/25/2022 07:19 AM     07/25/2022 07:19 AM    K 3.6 07/25/2022 07:19 AM    K 3.6 07/24/2022 07:41 AM    CL 97 07/25/2022 07:19 AM    CO2 28 07/25/2022 07:19 AM     No results found for: [id number]   Lab Results   Component Value Date/Time    WBC 6.5 07/25/2022 07:19 AM    HGB 9.8 07/25/2022 07:19 AM    HCT 30.2 07/25/2022 07:19 AM    MCV 82.8 07/25/2022 07:19 AM     07/25/2022 07:19 AM      Lab Results   Component Value Date/Time    IRON 98 08/12/2019 01:50 PM    TIBC 275 08/12/2019 01:50 PM      No results found for: Yulissa Garcia  Lab Results   Component Value Date/Time    CALCIUM 8.7 07/25/2022 07:19 AM    PHOS 6.2 07/25/2022 07:19 AM

## 2022-07-27 VITALS
HEART RATE: 81 BPM | RESPIRATION RATE: 16 BRPM | HEIGHT: 68 IN | TEMPERATURE: 96.7 F | DIASTOLIC BLOOD PRESSURE: 78 MMHG | SYSTOLIC BLOOD PRESSURE: 125 MMHG | OXYGEN SATURATION: 99 % | WEIGHT: 152.12 LBS | BODY MASS INDEX: 23.05 KG/M2

## 2022-07-27 LAB
ALBUMIN SERPL-MCNC: 3.1 G/DL (ref 3.4–5)
ANION GAP SERPL CALCULATED.3IONS-SCNC: 8 MMOL/L (ref 3–16)
BUN BLDV-MCNC: 44 MG/DL (ref 7–20)
CALCIUM SERPL-MCNC: 8.7 MG/DL (ref 8.3–10.6)
CHLORIDE BLD-SCNC: 95 MMOL/L (ref 99–110)
CO2: 30 MMOL/L (ref 21–32)
CREAT SERPL-MCNC: 8 MG/DL (ref 0.6–1.1)
GFR AFRICAN AMERICAN: 6
GFR NON-AFRICAN AMERICAN: 5
GLUCOSE BLD-MCNC: 178 MG/DL (ref 70–99)
GLUCOSE BLD-MCNC: 81 MG/DL (ref 70–99)
GLUCOSE BLD-MCNC: 88 MG/DL (ref 70–99)
PERFORMED ON: ABNORMAL
PERFORMED ON: NORMAL
PHOSPHORUS: 6.9 MG/DL (ref 2.5–4.9)
POTASSIUM SERPL-SCNC: 3.5 MMOL/L (ref 3.5–5.1)
SODIUM BLD-SCNC: 133 MMOL/L (ref 136–145)

## 2022-07-27 PROCEDURE — 2580000003 HC RX 258: Performed by: INTERNAL MEDICINE

## 2022-07-27 PROCEDURE — 97116 GAIT TRAINING THERAPY: CPT

## 2022-07-27 PROCEDURE — 6360000002 HC RX W HCPCS: Performed by: INTERNAL MEDICINE

## 2022-07-27 PROCEDURE — 6370000000 HC RX 637 (ALT 250 FOR IP): Performed by: INTERNAL MEDICINE

## 2022-07-27 PROCEDURE — 80069 RENAL FUNCTION PANEL: CPT

## 2022-07-27 PROCEDURE — 36415 COLL VENOUS BLD VENIPUNCTURE: CPT

## 2022-07-27 PROCEDURE — 99231 SBSQ HOSP IP/OBS SF/LOW 25: CPT | Performed by: INTERNAL MEDICINE

## 2022-07-27 PROCEDURE — 6370000000 HC RX 637 (ALT 250 FOR IP): Performed by: STUDENT IN AN ORGANIZED HEALTH CARE EDUCATION/TRAINING PROGRAM

## 2022-07-27 RX ORDER — DIPHENHYDRAMINE HYDROCHLORIDE, ZINC ACETATE 2; .1 G/100G; G/100G
CREAM TOPICAL
Qty: 15 G | Refills: 0 | Status: ON HOLD | OUTPATIENT
Start: 2022-07-27 | End: 2022-08-24 | Stop reason: HOSPADM

## 2022-07-27 RX ORDER — SEVELAMER CARBONATE 800 MG/1
800 TABLET, FILM COATED ORAL
Status: DISCONTINUED | OUTPATIENT
Start: 2022-07-27 | End: 2022-07-27 | Stop reason: HOSPADM

## 2022-07-27 RX ORDER — SEVELAMER CARBONATE 800 MG/1
800 TABLET, FILM COATED ORAL
Qty: 90 TABLET | Refills: 3 | Status: ON HOLD | OUTPATIENT
Start: 2022-07-27 | End: 2022-08-24 | Stop reason: SDUPTHER

## 2022-07-27 RX ORDER — GENTAMICIN SULFATE 1 MG/G
CREAM TOPICAL
Qty: 15 G | Refills: 0 | Status: SHIPPED | OUTPATIENT
Start: 2022-07-27 | End: 2022-08-21 | Stop reason: ALTCHOICE

## 2022-07-27 RX ORDER — PSEUDOEPHEDRINE HCL 30 MG
100 TABLET ORAL 2 TIMES DAILY
Qty: 60 CAPSULE | Refills: 1 | Status: SHIPPED | OUTPATIENT
Start: 2022-07-27 | End: 2022-08-21 | Stop reason: ALTCHOICE

## 2022-07-27 RX ORDER — HYDRALAZINE HYDROCHLORIDE 50 MG/1
50 TABLET, FILM COATED ORAL DAILY
Qty: 90 TABLET | Refills: 3 | Status: SHIPPED | OUTPATIENT
Start: 2022-07-27 | End: 2022-08-21 | Stop reason: ALTCHOICE

## 2022-07-27 RX ORDER — METHOCARBAMOL 750 MG/1
750 TABLET, FILM COATED ORAL 4 TIMES DAILY PRN
Qty: 40 TABLET | Refills: 0 | Status: SHIPPED | OUTPATIENT
Start: 2022-07-27 | End: 2022-08-06

## 2022-07-27 RX ORDER — FLUORIDE TOOTHPASTE
15 TOOTHPASTE DENTAL 3 TIMES DAILY PRN
Qty: 59 ML | Refills: 0 | Status: SHIPPED | OUTPATIENT
Start: 2022-07-27 | End: 2022-08-21 | Stop reason: ALTCHOICE

## 2022-07-27 RX ADMIN — HEPARIN SODIUM 5000 UNITS: 5000 INJECTION INTRAVENOUS; SUBCUTANEOUS at 05:24

## 2022-07-27 RX ADMIN — POLYETHYLENE GLYCOL 3350 17 G: 17 POWDER, FOR SOLUTION ORAL at 09:32

## 2022-07-27 RX ADMIN — CALCITRIOL CAPSULES 0.25 MCG 0.25 MCG: 0.25 CAPSULE ORAL at 09:33

## 2022-07-27 RX ADMIN — TORSEMIDE 80 MG: 20 TABLET ORAL at 09:32

## 2022-07-27 RX ADMIN — SODIUM CHLORIDE, PRESERVATIVE FREE 10 ML: 5 INJECTION INTRAVENOUS at 09:34

## 2022-07-27 RX ADMIN — HYDROMORPHONE HYDROCHLORIDE 0.25 MG: 1 INJECTION, SOLUTION INTRAMUSCULAR; INTRAVENOUS; SUBCUTANEOUS at 05:24

## 2022-07-27 RX ADMIN — BUPROPION HYDROCHLORIDE 150 MG: 150 TABLET, EXTENDED RELEASE ORAL at 09:33

## 2022-07-27 RX ADMIN — CEFEPIME HYDROCHLORIDE 1000 MG: 1 INJECTION, POWDER, FOR SOLUTION INTRAMUSCULAR; INTRAVENOUS at 05:28

## 2022-07-27 RX ADMIN — NYSTATIN 500000 UNITS: 100000 SUSPENSION ORAL at 09:32

## 2022-07-27 RX ADMIN — HYDRALAZINE HYDROCHLORIDE 50 MG: 50 TABLET, FILM COATED ORAL at 09:33

## 2022-07-27 RX ADMIN — HEPARIN SODIUM 5000 UNITS: 5000 INJECTION INTRAVENOUS; SUBCUTANEOUS at 13:24

## 2022-07-27 RX ADMIN — ROSUVASTATIN CALCIUM 40 MG: 20 TABLET, FILM COATED ORAL at 09:33

## 2022-07-27 RX ADMIN — METOPROLOL SUCCINATE 25 MG: 25 TABLET, EXTENDED RELEASE ORAL at 09:33

## 2022-07-27 RX ADMIN — GENTAMICIN SULFATE: 1 CREAM TOPICAL at 10:51

## 2022-07-27 RX ADMIN — DOCUSATE SODIUM 100 MG: 100 CAPSULE, LIQUID FILLED ORAL at 09:33

## 2022-07-27 RX ADMIN — NYSTATIN 500000 UNITS: 100000 SUSPENSION ORAL at 13:24

## 2022-07-27 RX ADMIN — SEVELAMER CARBONATE 800 MG: 800 TABLET, FILM COATED ORAL at 13:24

## 2022-07-27 ASSESSMENT — PAIN SCALES - GENERAL: PAINLEVEL_OUTOF10: 8

## 2022-07-27 ASSESSMENT — PAIN DESCRIPTION - LOCATION: LOCATION: ABDOMEN

## 2022-07-27 NOTE — PROGRESS NOTES
Clinical Pharmacy Progress Note    Vancomycin - Management by Pharmacy    Consult Date(s): 7/23  Consulting Provider(s): Dr Edith Garcia / Plan    Intraabdominal infection - Vancomycin  Concurrent Antimicrobials: Cefepime  Day of Vanc Therapy / Ordered Duration: #5  Current Dosing Method: Intermittent Dosing by Levels  Therapeutic Goal: ~ 15 mg/L  Current Dose / Frequency: Intermittent via levels  Plan / Rationale:   Patient is ESRD on PD. Will dose via levels at this time. Pt had 750 mg IV x Once yesterday for a vancomycin level of 12.9 mg/L. Will not redose today as pt should remain therapeutic for several days. Plan was to discharge pt yesterday, however pt remained admitted. Will check a level tomorrow AM to assess need for further dosing if pt remains admitted. Will continue to monitor clinical condition and make adjustments to regimen as appropriate. Thank you for consulting Pharmacy! Please call with any questions:    Erica Simeon PharmD  Main Pharmacy: [id number]  7/27/2022 9:51 AM        Interval update:  PD fluid cultures continue no growth. Afebrile, no new CBC to monitor WBC. Pt complains of extreme ABD pain. Subjective/Objective: Ms. Aura Torres is a 65 y.o. female with a PMHx significant for ESRD on PD, HTN, DM type II, bipolarism, anxiety, depression, and asthma. Presents with chronic weakness with falls at home and lower abdominal pain. Dialysate collected in the ED, concerning for infection. Pharmacy has been consulted to dose vancomycin.     Ht Readings from Last 1 Encounters:   07/22/22 5' 8\" (1.727 m)     Wt Readings from Last 1 Encounters:   07/27/22 152 lb 1.9 oz (69 kg)       Current & Prior Antimicrobial Regimen(s):  Cefepime (7/23-current)  Vancomycin - pharmacy to dose  1500mg IV in ED (7/23)  750 mg IV x1  (7/26)  Intermittent via levels (7/23-current)  Date: Vanc Level: Vanc Dose:   7/23  1500mg IV   7/24 19.5 mcg/mL --   7/25  17.6 mcg/mL -   7/26 12.9

## 2022-07-27 NOTE — CARE COORDINATION
Case Management Assessment            Discharge Note                    Date / Time of Note: 7/27/2022 11:24 AM                  Discharge Note Completed by: CLAUDIO Naranjo, ZOHAIBW    Patient Name: Albino Snellen   YOB: 1965  Diagnosis: Peritonitis [facility]) [K65.9]   Date / Time: 7/22/2022 10:09 PM    Current PCP: Patience Fernandez DO, DO  Clinic patient: No    Hospitalization in the last 30 days: No    Advance Directives:  Code Status: Full Code  [place] DNR form completed and on chart: Yes    Financial:  Payor: Enrique Kern / Plan: Son Baeza / Product Type: *No Product type* /      Pharmacy:    [facility] [id number] [address] [phone] - F [phone]  [address] [place] [address]  Phone: [phone] Fax: [phone]    [facility] [id number] [address] [phone] - F [phone]  [address] [address]  Phone: [phone] Fax: [phone]    [facility] [id number] - [facility] [address] [facility] [phone] [facility] [phone]  [address]  Phone: [phone] Fax: [phone]    [facility] [address] [facility] [address] [phone] [facility] [phone]  [address] [place] [address]  Phone: [phone] Fax: [phone]      Assistance purchasing medications?:    Assistance provided by Case Management: None at this time    Does patient want to participate in local refill/ meds to beds program?: Yes    Meds To Beds General Rules:  1. Can ONLY be done Monday- Friday between 8:30am-5pm  2. Prescription(s) must be in pharmacy by 3pm to be filled same day  3. Copy of patient's insurance/ prescription drug card and patient face sheet must be sent along with the prescription(s)  4. Cost of Rx cannot be added to hospital bill.  If financial assistance is needed, please contact unit case manager or ;  or  CANNOT provide pharmacy voucher for patients co-pays  5. Patients can then  the prescription on their way out of the hospital at discharge, or pharmacy can deliver to the bedside if staff is available. (payment due at time of pick-up or delivery - cash, check, or card accepted)     Able to afford home medications/ co-pay costs: Yes    ADLS:  Current PT AM-PAC Score: 20 /24  Current OT AM-PAC Score: 21 /24      DISCHARGE Disposition: Home with [address]: PT/OT     LOC at discharge: Not Applicable  MARGARITA Completed: Yes    Notification completed in HENS/PAS?:  Not Applicable    IMM Completed:   Not Indicated    Transportation:  Transportation PLAN for discharge:  Lyft    Mode of Transport: [address]:  [address] ordered at discharge: Yes  [address]: tonya  Orders faxed:     Durable Medical Equipment:  DME Provider: [address] obtained during hospitalization: bath bench and rollator    Home Oxygen and Respiratory Equipment:  Oxygen needed at discharge?: No    Dialysis:  Dialysis patient: Yes - Home PD    Dialysis Center:  [facility]  Address: [address]   Phone: [phone]      [place] [facility] [place] care:  [place] [facility]    Additional CM Notes:  Pt will DC home today via [employer]. Pt will resume home PD. Liaison is still looking for [place] ; barriers due to insurance and staffing. Pt agreeable with following up on HHC/OP therapies with her PCP. Pt is interested in COA services; CM submitted referral (used son's contact info). Rollator and bath bench were delivered to room via [employer]. Pt reported receiving a [employer] cell phone but losing it, and having no other working phone. CM completed new Assurance phone application online, gave pt the paperwork. No other needs at this time.         The Plan for Transition of Care is related to the following treatment goals of Peritonitis [facility]) [K65.9]    The Patient and/or patient representative Cindi and her family were provided with a choice of provider and agrees with the discharge plan Yes    Freedom of choice list was provided with basic dialogue that supports the patient's individualized plan of care/goals and shares the quality data associated with the providers.  Yes    Care Transitions patient: No    CLAUDIO Alicia, [facility]  Case Management Department  Ph: [phone]  Fax: [phone]

## 2022-07-27 NOTE — PROGRESS NOTES
ID Follow-up NOTE  RESIDENT NOTE - reviewed / edited, attending note at bottom    CC:   PD associated peritonitis   Antibiotics: Cefepime, Vancomycin     Admit Date: 7/22/2022  Hospital Day: 6    Subjective:     Sitting upright on edge of bed in clothes, pleasant and conversational.   States that she overall feels improved, had 1 soft bowel movement since yesterday. Notes abdominal pain that seems to be improved compared to yesterday. She was assessed by PT however she remains concern that she will fall again at home. Objective:     Patient Vitals for the past 8 hrs:   BP Temp Temp src Pulse Resp SpO2 Weight   07/27/22 0755 125/78 (!) 96.7 °F (35.9 °C) Oral 81 16 99 % --   07/27/22 0525 119/83 96.9 °F (36.1 °C) -- 73 16 -- 152 lb 1.9 oz (69 kg)     I/O last 3 completed shifts: In: 360 [P.O.:360]  Out: 463   I/O this shift:  In: 120 [P.O.:120]  Out: -     EXAM:  GENERAL:      No apparent distress. HEENT:           Membranes moist, no oral lesion, PERRL  NECK:             Supple, no lymphadenopathy  LUNGS:           Clear b/l, no rales, no dullness  CARDIAC:       RRR, no murmur appreciated  ABD:                +Clean, non-erythematous L PD site.  Tender L side to palpation with no rebound or guarding. + BS, soft / NT  EXT:                No rash, no edema, no lesions  NEURO:          No focal neurologic findings  PSYCH:           Orientation, sensorium, mood normal  LINES:             Peripheral iv       Data Review:  Lab Results   Component Value Date    WBC 6.5 07/25/2022    HGB 9.8 (L) 07/25/2022    HCT 30.2 (L) 07/25/2022    MCV 82.8 07/25/2022     07/25/2022     Lab Results   Component Value Date    CREATININE 8.0 (HH) 07/27/2022    BUN 44 (H) 07/27/2022     (L) 07/27/2022    K 3.5 07/27/2022    CL 95 (L) 07/27/2022    CO2 30 07/27/2022       Hepatic Function Panel:   Lab Results   Component Value Date/Time    ALKPHOS 93 08/12/2019 01:50 PM    ALT 31 08/12/2019 01:50 PM    AST 28 08/12/2019 01:50 PM    PROT 7.5 08/12/2019 01:50 PM    PROT 6.9 05/18/2011 10:15 AM    BILITOT <0.2 08/12/2019 01:50 PM    LABALBU 3.1 07/27/2022 08:59 AM       MICRO:  7/23/22 Dialysate cell count: Hazy pale yellow fluid with 3.35K nuclear cells with neutrophils 34, lymphocytes 21  7/23/22 Dialysate culture NGTD  7/25/22 Dialysate cell count: Hazy pale yellow appearance, 178 nuclear cells, 48 neutrophils, 4 lymphocytes  7/25/22 Dialysate culture NGTD  7/25/22 Fungal cultures NGTD    IMAGING:  Reviewed    Scheduled Meds:   sevelamer  800 mg Oral TID WC    nystatin  5 mL Oral 4x Daily    polyethylene glycol  17 g Oral BID    glycerin (ADULT)  1 suppository Rectal Once    docusate sodium  100 mg Oral BID    QUEtiapine  200 mg Oral Nightly    benztropine  1 mg Oral Nightly    doxepin  50 mg Oral Nightly    calcitRIOL  0.25 mcg Oral Daily    rosuvastatin  40 mg Oral Daily    torsemide  80 mg Oral Daily    buPROPion  150 mg Oral BID    hydrALAZINE  50 mg Oral Daily    metoprolol succinate  25 mg Oral Daily    sodium chloride flush  5-40 mL IntraVENous 2 times per day    heparin (porcine)  5,000 Units SubCUTAneous 3 times per day    insulin lispro  0-4 Units SubCUTAneous TID WC    insulin lispro  0-4 Units SubCUTAneous Nightly    Virt-Caps  1 capsule Oral Daily    cefepime  1,000 mg IntraVENous Q24H    vancomycin (VANCOCIN) intermittent dosing (placeholder)   Other RX Placeholder    gentamicin   Topical Daily    acetaminophen  650 mg Oral Once       Continuous Infusions:   dextrose      sodium chloride 25 mL/hr at 07/27/22 0526       PRN Meds:  methocarbamol, HYDROmorphone, melatonin, diphenhydrAMINE-zinc acetate, [Held by provider] hydrOXYzine HCl, glucose, dextrose bolus **OR** dextrose bolus, glucagon (rDNA), dextrose, sodium chloride flush, sodium chloride, ondansetron **OR** ondansetron, polyethylene glycol, acetaminophen **OR** acetaminophen, biotene      Assessment & Plan     PD associated peritonitis - improved  Initial dialysate cell count 7/23 concerning for peritonitis, however this seems to be improving with broad spectrum coverage given significantly decreased nuclear cell count on 7/25 dialysate cell count. No history of prior PD related infection per pt. - 7/23, 7/25 dialysate cultures NGTD  - 7/25 fungal culture NGTD  - Inpatient antibiotics:  - Continue inpatient vancomycin and cefepime  - Continue topical gentamicin and oral nystatin rinse/swallow  - Continue IP antibiotics after discharge cefepime daily / vanomycin every 5 days for 5 days  - continue bowel regimen     Discussed with Dr Livier Duran MD PGY-1      Addendum to Resident Progress Note:  Pt seen, examined and evaluated. I have reviewed the current history, physical findings, labs and assessment and plan and agree with note as documented by resident (Dr Ap Alba). 66 yo woman with hx CKD on PD, anemia, DM, HTN, weakness  Developed RF and on PD dialysis since Nov / Dec 2021 per pt  No hx PD infection or complication per pt     Presented with fatigue, falls, abd pain. No BM in 'days' (not uncommon for pt to go '4 days' without BM)  Poor historian     In ED 7/22 - afeb, WBC 8. Dialysate 7/23 - hazy, 3,351 WBC - 34%PMN. Cult no growth  Admit, started on Cefepime, Vancomycin     F/u PD fluid 7/25 - 178 WBC - 48%PMN. Cult no growth to date     7/26, afeb. Less abd pain. No BM in 'over 4 days'  + BS, soft, tender LLQ around site of PD catheter, dressing over site     7/27 Pt reports she had BM. Abd mildly tender on L (around area of PD catheter    IMP/  PD related peritonitis, improved  No microbiologic dx (cult - no growth to date)     Constipation      REC/  Cont empiric antibiotic in hosp - vanco / cefepime  Bowel regimen / meds - hoping less abd pain after BM     After discharge IP antibiotic (vanco / cefepime poss choices)   - cefepime daily / vanco every 5 days  for 5 days, per protocol, per Dr Aryan Jimenez Making:   The following items were considered in medical decision making:  Discussion of patient care with other providers  Reviewed clinical lab tests  Reviewed radiology tests  Reviewed other diagnostic tests/interventions  Microbiology cultures and other micro tests reviewed       Discussed with Edy Pineda (over phone) on 7/26  Zackary Smith MD

## 2022-07-27 NOTE — DISCHARGE INSTR - COC
Continuity of Care Form    Patient Name: Chelsi Bower   :  1965  MRN:  [id number]    Admit date:  2022  Discharge date:  ***    Code Status Order: Full Code   Advance Directives:     Admitting Physician:  King Bingham MD  PCP: Alex Crockett DO, DO    Discharging Nurse: [facility] Unit/Room#: 1399/3306-83  Discharging Unit Phone Number: ***    Emergency Contact:   Extended Emergency Contact Information  Primary Emergency Contact: [address] Phone: [phone]  Relation: Child    Past Surgical History:  Past Surgical History:   Procedure Laterality Date    ENDOMETRIAL ABLATION      INNER EAR SURGERY      TUBAL LIGATION         Immunization History: There is no immunization history on file for this patient.     Active Problems:  Patient Active Problem List   Diagnosis Code    Chest pain R07.9    DM II (diabetes mellitus, type II), controlled (HCC) E11.9    HTN (hypertension) I10    JONATHAN (acute kidney injury) ([facility] [address] N17.9    Asthma J45.909    History of anemia due to CKD N18.9, Z86.2    Peritonitis ([address] K65.9    Chronic kidney disease with end stage renal failure on dialysis ([address] W15.9, P87.0    Complication of peritoneal dialysis T80.90XA    Peritonitis associated with peritoneal dialysis ([address] T85.71XA    Chronic idiopathic constipation K59.04       Isolation/Infection:   Isolation            No Isolation          Patient Infection Status       None to display            Nurse Assessment:  Last Vital Signs: /78   Pulse 81   Temp (!) 96.7 °F (35.9 °C) (Oral)   Resp 16   Ht 5' 8\" (1.727 m)   Wt 152 lb 1.9 oz (69 kg)   SpO2 99%   BMI 23.13 kg/m²     Last documented pain score (0-10 scale): Pain Level: 8  Last Weight:   Wt Readings from Last 1 Encounters:   22 152 lb 1.9 oz (69 kg)       CASE MANAGEMENT/SOCIAL WORK SECTION    Inpatient Status Date: ***    Readmission Risk Assessment Score:  Readmission Risk              Risk of Unplanned Readmission: [id number]           Discharging to Facility/ Agency   Name:   Address:  Phone:  Fax:    Dialysis Facility (if applicable)   Name:  Address:  Dialysis Schedule:  Phone:  Fax:    / signature: {Esignature:[id number]}    PHYSICIAN SECTION    Prognosis: {Prognosis:8779334434}    Condition at Discharge: Khloe Charles Patient Condition:[id number]}    Rehab Potential (if transferring to Rehab): {Prognosis:3259633419}    Recommended Labs or Other Treatments After Discharge: ***    Physician Certification: I certify the above information and transfer of Chet Fang  is necessary for the continuing treatment of the diagnosis listed and that she requires {Admit to Appropriate Level of Care:29625} for {GREATER/LESS:170597432} 30 days.      Update Admission H&P: {CHP DME Changes in AQJD:586672964}    PHYSICIAN SIGNATURE:  {Esignature:[id number]}

## 2022-07-27 NOTE — PLAN OF CARE
Problem: Discharge Planning  Goal: Discharge to home or other facility with appropriate resources  7/27/2022 0136 by Yumiko Gardiner RN  Outcome: Progressing Towards Goal  Flowsheets (Taken 7/27/2022 0136)  Discharge to home or other facility with appropriate resources:   Identify barriers to discharge with patient and caregiver   Arrange for needed discharge resources and transportation as appropriate   Identify discharge learning needs (meds, wound care, etc)   Refer to discharge planning if patient needs post-hospital services based on physician order or complex needs related to functional status, cognitive ability or social support system     Problem: Pain  Goal: Verbalizes/displays adequate comfort level or baseline comfort level  7/27/2022 0136 by Yumiko Gardiner RN  Outcome: Progressing Towards Goal  Flowsheets (Taken 7/27/2022 0136)  Verbalizes/displays adequate comfort level or baseline comfort level:   Encourage patient to monitor pain and request assistance   Assess pain using appropriate pain scale   Administer analgesics based on type and severity of pain and evaluate response   Implement non-pharmacological measures as appropriate and evaluate response     Problem: Safety - Adult  Goal: Free from fall injury  7/27/2022 0136 by Yumiko Gardiner RN  Outcome: Progressing Towards Goal  Note: Pt is at risk for falls. Fall precautions in place. Call light in reach, bed alarm is on, bed locked and in the lowest position. Will continue to monitor. Problem: Skin/Tissue Integrity  Goal: Absence of new skin breakdown  Description: 1. Monitor for areas of redness and/or skin breakdown  2. Assess vascular access sites hourly  3. Every 4-6 hours minimum:  Change oxygen saturation probe site  4. Every 4-6 hours:  If on nasal continuous positive airway pressure, respiratory therapy assess nares and determine need for appliance change or resting period.   7/27/2022 0136 by Yumiko Gardiner RN  Outcome: Progressing Towards Goal  Note: No skin breakdown noted. Has abrasions to bilateral knees. Turns and repositions self.      Problem: Chronic Conditions and Co-morbidities  Goal: Patient's chronic conditions and co-morbidity symptoms are monitored and maintained or improved  7/27/2022 0136 by Yumiko Gardiner RN  Outcome: Progressing Towards Goal  Flowsheets (Taken 7/27/2022 0136)  Care Plan - Patient's Chronic Conditions and Co-Morbidity Symptoms are Monitored and Maintained or Improved: Monitor and assess patient's chronic conditions and comorbid symptoms for stability, deterioration, or improvement

## 2022-07-27 NOTE — PROGRESS NOTES
Patient Name: Marianne Alan                                                    Primary Physician: Thong Szymanski MD  Admitting Dx: Peritonitis [facility]) [K65.9]    Nephrology [address] [facility]  [email or web address] [employer]                                          Interval Hx & Plan:   Patient seen at bedside. Patient appear comfortable but asking for pain medications. Had bowel movement with stool softeners/suppository. No issues with PD. No SOB. Lytes stable except high phos and mild low Na  BP stable. Continue stool softeners  PD fluid Cx so far negative. Follow Cx  Arranged Intra-peritoneal antibiotics for 10 more days. I gram cefepime daily and 1.5 gram Vancomycin every 5 days x 2. Dwell for 6 hours. Oral Nystatin swish and swallow for fungal peritonitis prophylaxis for 20 more days. ID Dr Iwona Palencia seen patient. Appreciate help   Topical Gentamycin daily at PD catheter insertion site   Continue routine PD daily. Monitor BP  Start Sevelamer 800 TID with meals. Hold ferric citrate as it can worsen constipation   Plan for D/C today. Appear to have pain medication seeking behavior    D/W patient and she agree. D/W [facility] home dialysis nurse. Assessment:     ESRD on PD  On PD 1800 cc fill x 4 exchanges using 1.5%  Vol: Weigth is 71.2 kg. Not removing fluid with PD. Anemia: Hgb 10.3 and on Mircera as outpatient. SHPT: Rocaltrol and Ergo. Continue     Falls / General Weakness  Workup per primary service. Neurontin can contribute    HTN  On Toprol 25 mg, Hydralazine 50 mg, Nifedipine 30 mg.     Peritonitis  On Antibiotics as above    Constipation. Started stool softeners. Please call our office at [phone] or [employer] with any questions or contact me directly. Subjective:     CC / Reason for Consult:  ESRD on PD    HPI / PMHx:  This is a consult for Marianne Phillips  requested by Thong Szymanski MD for the reason of  ESRD .   Marianne Phillips is a 65 y.o. female admitted for Peritonitis ([facility] [address] [K65.9] with PMHx of ESRD, Anemia in CKD, SHPT. She has been having frequent falls with leg weaknees but denies dizziness or LOC. She feels generally weak all over and has felt this way for months. Covid in December she did not regain her sense of taste and smell and her appetite is poor. She lost the tip of her dialysis catheter twice but says she did not seek assistance with this. She sometimes has a low temperature. No fevers. She says her only medication change is her Seroquel has been decreased. I thank Dr. Tobi Yepez MD for consulting [facility] [facility] in the care of your patient. Please call with any questions or concerns. [phone]. Samia Netta    Objective:     SocHx: Smokes x 15 yrs. Denies alcohol. FamHx: Parents . DM and Cancer.      Current Medications:  Scheduled Medications:  nystatin, 5 mL, 4x Daily  polyethylene glycol, 17 g, BID  glycerin (ADULT), 1 suppository, Once  docusate sodium, 100 mg, BID  QUEtiapine, 200 mg, Nightly  benztropine, 1 mg, Nightly  doxepin, 50 mg, Nightly  calcitRIOL, 0.25 mcg, Daily  rosuvastatin, 40 mg, Daily  torsemide, 80 mg, Daily  buPROPion, 150 mg, BID  hydrALAZINE, 50 mg, Daily  metoprolol succinate, 25 mg, Daily  sodium chloride flush, 5-40 mL, 2 times per day  heparin (porcine), 5,000 Units, 3 times per day  insulin lispro, 0-4 Units, TID WC  insulin lispro, 0-4 Units, Nightly  Virt-Caps, 1 capsule, Daily  cefepime, 1,000 mg, Q24H  vancomycin (VANCOCIN) intermittent dosing (placeholder), , RX Placeholder  gentamicin, , Daily  acetaminophen, 650 mg, Once     IV Meds:  dextrose  sodium chloride, Last Rate: 25 mL/hr at 22 0526     PRN Medications:  methocarbamol, 750 mg, 4x Daily PRN  HYDROmorphone, 0.25 mg, Q6H PRN  melatonin, 6 mg, Nightly PRN  diphenhydrAMINE-zinc acetate, , TID PRN  [Held by provider] hydrOXYzine HCl, 25 mg, TID PRN  glucose, 4 tablet, PRN  dextrose bolus, 125 mL, PRN   Or  dextrose bolus, 250 mL, PRN  glucagon (rDNA), 1 mg, PRN  dextrose, , Continuous PRN  sodium chloride flush, 5-40 mL, PRN  sodium chloride, , PRN  ondansetron, 4 mg, Q8H PRN   Or  ondansetron, 4 mg, Q6H PRN  polyethylene glycol, 17 g, Daily PRN  acetaminophen, 650 mg, Q6H PRN   Or  acetaminophen, 650 mg, Q6H PRN  biotene, 15 mL, TID PRN      Allergies: Latex, Banana, Other, Peanuts [peanut oil], Tomato, Barley grass, Milk-related compounds, and Shellfish-derived products    ROS: Positives in BOLD     GEN:   fevers, chills, sweats, fatigue and weight loss     HEENT:    nasal congestion, sore mouth and sore throat     Resp:    cough, hemoptysis, pneumonia or dyspnea on exertion     Card:  chest pain, chest pressure/discomfort, dyspnea, palpitations,  lower extremity edema     GI:   nausea, vomiting, diarrhea, constipation and abdominal pain     :  dysuria, nocturia, urinary incontinence, hesitancy and hematuria     Derm:   rash, skin lesion(s), pruritus and dryness     Neuro:   headaches, dizziness, seizures, gait problems, tremor and weakness     MS:  myalgias, arthralgias, neck pain and back pain     Endo:    nephropathy and cardiovascular disease    PE:      Gen: alert, well appearing, and in no distress     HEENT:pupils equal and reactive, extraocular eye movements intact      Neuro: alert, oriented, normal speech, no focal findings or movement disorder noted     Neck:  supple, no significant adenopathy     Cardio: normal rate, regular rhythm, normal S1, S2, no murmurs, rubs, clicks or gallops      Resp: clear to auscultation, no wheezes, rales or rhonchi, symmetric air entry. GI:  soft, + mild lower abdominal tenderness, nondistended, no masses or organomegaly.  No erythema or discharge from PD catheter site( examined yesterday)     Ext:  peripheral pulses normal, no pedal edema, no clubbing or cyanosis      MS: no joint tenderness, deformity or swelling      DERM: normal coloration and turgor, no rashesor bruising.           Vitals: Patient Vitals for the past 8 hrs:   BP Temp Temp src Pulse Resp SpO2 Weight   07/27/22 0755 125/78 (!) 96.7 °F (35.9 °C) Oral 81 16 99 % --   07/27/22 0525 119/83 96.9 °F (36.1 °C) -- 73 16 -- 152 lb 1.9 oz (69 kg)   07/27/22 0402 (!) 144/93 96.9 °F (36.1 °C) Oral 74 16 99 % --            I/Os:   Intake/Output Summary (Last 24 hours) at 7/27/2022 1110  Last data filed at 7/27/2022 0918  Gross per 24 hour   Intake 480 ml   Output 316 ml   Net 164 ml         LABS:    Lab Results   Component Value Date/Time    CREATININE 8.0 07/27/2022 08:59 AM    BUN 44 07/27/2022 08:59 AM     07/27/2022 08:59 AM    K 3.5 07/27/2022 08:59 AM    K 3.6 07/24/2022 07:41 AM    CL 95 07/27/2022 08:59 AM    CO2 30 07/27/2022 08:59 AM     No results found for: [id number]   Lab Results   Component Value Date/Time    WBC 6.5 07/25/2022 07:19 AM    HGB 9.8 07/25/2022 07:19 AM    HCT 30.2 07/25/2022 07:19 AM    MCV 82.8 07/25/2022 07:19 AM     07/25/2022 07:19 AM      Lab Results   Component Value Date/Time    IRON 98 08/12/2019 01:50 PM    TIBC 275 08/12/2019 01:50 PM      No results found for: Roselyn Chen  Lab Results   Component Value Date/Time    CALCIUM 8.7 07/27/2022 08:59 AM    PHOS 6.9 07/27/2022 08:59 AM

## 2022-07-27 NOTE — PROGRESS NOTES
Physical Therapy  Facility/Department: [address]  Daily Treatment Note  NAME: Fly Mccain  : 1965  MRN: [id number]    Date of Service: 2022    Discharge Recommendations: Fly Mccain scored a 21/24 on the AM-PAC short mobility form. Current research shows that an AM-PAC score of 18 or greater is typically associated with a discharge to the patient's home setting. Based on the patient's AM-PAC score and their current functional mobility deficits, it is recommended that the patient have 2-3 sessions per week of Physical Therapy at d/c to increase the patient's independence. At this time, this patient demonstrates the endurance and safety to discharge home with OP PT services and a follow up treatment frequency of 2-3x/wk. Please see assessment section for further patient specific details. If patient discharges prior to next session this note will serve as a discharge summary. Please see below for the latest assessment towards goals. PT Equipment Recommendations  Equipment Needed: Yes  Mobility Devices: Mildred Tesfaye: Rolling    Patient Diagnosis(es): The encounter diagnosis was Peritonitis ([address] Assessment   Assessment: On this date, pt was able to tolerate bed mobility, functional transfers, amb w/ RW, and stair negotiation. required SBA for bed mobility with HOB elevated, SBA for functional transfers, SBA for amb w/ RW, and SBA for stair negotiation. pt did not report any feelings of weakness/fatigue during tx session, but did report lower abdominal discomfort. due to pt's IV line, stair negotiation was limited to 2 stairs, with pt feeling like she could have performed more. pt discussed her fear of falling and is open to further PT services upon d/c. pt would benefit from OP PT upon d/c.  PT will F/U  Activity Tolerance: Patient tolerated evaluation without incident;Patient tolerated treatment well  Equipment Needed: Yes  Mobility Devices: [employer] Plan  Plan:  (2-5)  Current Treatment Recommendations: Transfer training;Functional mobility training;Strengthening;Gait training;ROM;Balance training;Neuromuscular re-education;Stair training;Home exercise program     Restrictions  Position Activity Restriction  Other position/activity restrictions: Up with assist     Subjective    Subjective  Subjective: Pt found supine in bed, agreeable to PT, no reports of SOB or chest pain. Pt to ED 7/22 with mulitple falls and LE pain. Pt admitted for peritonitis. CXR (-). Head CT (-). PMH: anxiety, asthma, back pain, bipolar, depression, DM, HTN, JONATHAN  Pain: reports abdominal discomfort and R shoulder discomfort  Orientation  Overall Orientation Status: Within Functional Limits  Cognition  Overall Cognitive Status: Exceptions  Arousal/Alertness: Appropriate responses to stimuli  Following Commands: Follows one step commands with increased time; Follows one step commands with repetition  Attention Span: Attends with cues to redirect  Memory: Appears intact  Safety Judgement: Decreased awareness of need for assistance  Insights: Fully aware of deficits  Cognition Comment: Pt appears anxious, fidgity and unable to relax.  Easily tearful and frustrated     Objective   Vitals     Bed Mobility Training  Bed Mobility Training: Yes  Supine to Sit: Stand-by assistance (HOB elevated)  Sit to Supine: Stand-by assistance  Scooting: Supervision (at EOB)  Balance  Sitting: Intact  Standing: With support (SBA at [employer])  Transfer Training  Transfer Training: Yes  Sit to Stand: Stand-by assistance (from EOB w/ RW, VC for hand placement)  Stand to Sit: Stand-by assistance (from RW to EOB, VC for hand placement)  Gait Training  Gait Training: Yes  Right Side Weight Bearing: As tolerated  Left Side Weight Bearing: As tolerated  Gait  Overall Level of Assistance: Stand-by assistance (amb 300' w/ RW, no overt LOB, demonstrated a slow and cautious gait)  Speed/Anamaria: Pace decreased (< 100 feet/min); Shuffled  Step Length: Left shortened;Right shortened  Gait Abnormalities: Shuffling gait  Distance (ft): 300 Feet  Assistive Device: Walker, rolling  Rail Use: None  Stairs - Level of Assistance: Stand-by assistance (use of RHR upon ascent, use of LHR upon descent, demonstrated ability to perform reciprocating pattern with no overt LOB)  Number of Stairs Trained: 2           Safety Devices  Type of Devices: Gait belt;Bed alarm in place; Left in bed;Nurse notified       Goals  Short Term Goals  Time Frame for Short term goals: Discharge  Short term goal 1: supine <> sit independent  Short term goal 2: sit <> stand independent  Short term goal 3: ambulate 150ft with/without assistive device supervision  Short term goal 4: ambulate up/down 4 steps with rail CGA  Additional Goals?: Yes  Patient Goals   Patient goals :  \"Get some more help\"    Education  Patient Education  Education Given To: Patient  Education Provided: Role of Therapy;Plan of Care;Home Exercise Program;Precautions  Education Method: Verbal  Education Outcome: Verbalized understanding;Demonstrated understanding    Therapy Time   Individual Concurrent Group Co-treatment   Time In 0957         Time Out 1020         Minutes [address] [facility]

## 2022-07-27 NOTE — DISCHARGE SUMMARY
Hospital Medicine Discharge Summary    Patient ID: Tory Andrade      Patient's PCP: Ingris Pereira DO,     Admit Date: 7/22/2022     Discharge Date:   07/27/22    Admitting Physician: Tiffanie Contreras MD     Discharge Physician: Pietro Mayes MD     Discharge Diagnoses: Active Hospital Problems    Diagnosis Date Noted    Chronic kidney disease with end stage renal failure on dialysis ([facility] [address] [N18.6, Z99.2] 07/26/2022     Priority: Medium    Complication of peritoneal dialysis [T80.90XA] 07/26/2022     Priority: Medium    Peritonitis associated with peritoneal dialysis [facility]) Jack Velarde 07/26/2022     Priority: Medium    Chronic idiopathic constipation [K59.04] 07/26/2022     Priority: Medium    Peritonitis [facility]) [K65.9] 07/23/2022     Priority: Medium       The patient was seen and examined on day of discharge and this discharge summary is in conjunction with any daily progress note from day of discharge. Hospital Course:   Patient was admitted and treated for following:    #Falls  #Fatigue and weight loss ongoing since December  #Intermittent tremor  #Bilateral leg weakness  PCP note documents significant weight loss. She was sent for CT and colonoscopy last spring as part of weight loss work-up . CT chest was unremarkable. EGD showed small hiatal hernia with normal upper endoscopy. Colonoscopy showed 4 small polyps. Patient was advised to have CT scan of the abdomen as an outpatient and repeat colonoscopy in 5 years. Patient weight loss was attributed to poor caloric intake. Patient worked with therapy and did well. Patient is being discharged in stable condition with home health with recommendation to encourage oral intake and follow-up with PCP. Hx of BPPV  Denied symptoms associated with falls. Previously referred to vestibular rehab. Patient would likely benefit from it.   Patient to follow-up with them     #ESRD on dialysis with PD  #PD associated peritonitis  Dialysate sent for analysis and culture 07/23. Culture showed no growth to date. Repeat dialysate fluid analysis suggestive of peritonitis. Cultures were negative. ID was consulted and recommended antibiotics. Antifungal was added per nephrology. Patient to continue intraperitoneal antibiotics and nystatin on discharge. Patient follow-up with nephrology. Constipation  Bowel regimen was given. Patient had a bowel movement with enema. Hypertension  Blood pressure was monitored and medication were continued     Anxiety  Home medication were continued    Physical Exam Performed:     /78   Pulse 81   Temp (!) 96.7 °F (35.9 °C) (Oral)   Resp 16   Ht 5' 8\" (1.727 m)   Wt 152 lb 1.9 oz (69 kg)   SpO2 99%   BMI 23.13 kg/m²     General appearance: No apparent distress, appears stated age and cooperative. HEENT: Pupils equal, round, and reactive to light. Conjunctivae/corneas clear. Neck: Supple, with full range of motion. No jugular venous distention. Trachea midline. Respiratory:  Normal respiratory effort. Clear to auscultation, bilaterally without Rales/Wheezes/Rhonchi. Cardiovascular: Regular rate and rhythm with normal S1/S2 without murmurs, rubs or gallops. Abdomen: Soft, mildly tender with deep palpation, non-distended with normal bowel sounds. PD catheter in place  Musculoskelatal: No clubbing, cyanosis or edema bilaterally. Skin: Skin color, texture, turgor normal.  No rashes or lesions. Neurologic:  Cranial nerves: II-XII intact, grossly non-focal.  Psychiatric: Alert and oriented, thought content appropriate, normal insight    Labs:  For convenience and continuity at follow-up the following most recent labs are provided:      CBC:    Lab Results   Component Value Date/Time    WBC 6.5 07/25/2022 07:19 AM    HGB 9.8 07/25/2022 07:19 AM    HCT 30.2 07/25/2022 07:19 AM     07/25/2022 07:19 AM       Renal:    Lab Results   Component Value Date/Time     07/26/2022 07:50 AM    K 3.6 07/26/2022 07:50 AM    K 3.6 07/24/2022 07:41 AM    CL 98 07/26/2022 07:50 AM    CO2 27 07/26/2022 07:50 AM    BUN 48 07/26/2022 07:50 AM    CREATININE 8.3 07/26/2022 07:50 AM    CALCIUM 8.9 07/26/2022 07:50 AM    PHOS 7.3 07/26/2022 07:50 AM         Significant Diagnostic Studies    Radiology:   CT Head W/O Contrast   Final Result      No acute intracranial process. XR CHEST PORTABLE   Final Result      No acute chest process. Consults:     IP CONSULT TO NEPHROLOGY  IP CONSULT TO HOSPITALIST  IP CONSULT TO PHARMACY  PHARMACY TO DOSE VANCOMYCIN  IP CONSULT TO INFECTIOUS DISEASES    Disposition: Home with home health    Condition at Discharge: Stable    Discharge Instructions/Follow-up:  Use sterile technique for peritoneal dialysis  Follow up with nephrology  Follow up with PCP    Code Status:  Full Code     Activity: activity as tolerated    Diet: renal diet      Discharge Medications:     Current Discharge Medication List             Details   diphenhydrAMINE-zinc acetate 2-0.1 % cream Apply topically 3 times daily as needed. Qty: 15 g, Refills: 0      gentamicin (GARAMYCIN) 0.1 % cream Apply topically 3 times daily. Qty: 15 g, Refills: 0      docusate sodium (COLACE, DULCOLAX) 100 MG CAPS Take 100 mg by mouth in the morning and 100 mg before bedtime. Qty: 60 capsule, Refills: 1      Mouthwashes (BIOTENE) LIQD oral solution Swish and spit 15 mLs 3 times daily as needed (dry mouth)  Qty: 59 mL, Refills: 0      nystatin (MYCOSTATIN) 509130 UNIT/ML suspension Take 5 mLs by mouth in the morning and 5 mLs at noon and 5 mLs in the evening and 5 mLs before bedtime. Do all this for 20 days. Qty: 400 mL, Refills: 0      methocarbamol (ROBAXIN) 750 MG tablet Take 1 tablet by mouth 4 times daily as needed (muscle spasms)  Qty: 40 tablet, Refills: 0                Details   hydrALAZINE (APRESOLINE) 50 MG tablet Take 1 tablet by mouth in the morning.   Qty: 90 tablet, Refills: 3                Details

## 2022-07-28 LAB
BODY FLUID CULTURE, STERILE: NORMAL
GRAM STAIN RESULT: NORMAL

## 2022-08-15 ENCOUNTER — HOSPITAL ENCOUNTER (EMERGENCY)
Age: 57
Discharge: HOME | End: 2022-08-15
Attending: EMERGENCY MEDICINE
Payer: MEDICAID

## 2022-08-15 VITALS
SYSTOLIC BLOOD PRESSURE: 112 MMHG | HEIGHT: 68 IN | DIASTOLIC BLOOD PRESSURE: 72 MMHG | TEMPERATURE: 97.9 F | BODY MASS INDEX: 21.82 KG/M2 | OXYGEN SATURATION: 97 % | HEART RATE: 83 BPM | RESPIRATION RATE: 14 BRPM | WEIGHT: 144 LBS

## 2022-08-15 DIAGNOSIS — K59.00 CONSTIPATION, UNSPECIFIED CONSTIPATION TYPE: ICD-10-CM

## 2022-08-15 DIAGNOSIS — R10.84 GENERALIZED ABDOMINAL PAIN: Primary | ICD-10-CM

## 2022-08-15 LAB
ALBUMIN SERPL-MCNC: 2.9 G/DL (ref 3.4–5)
ALP BLD-CCNC: 88 U/L (ref 40–129)
ALT SERPL-CCNC: 16 U/L (ref 10–40)
ANION GAP SERPL CALCULATED.3IONS-SCNC: 13 MMOL/L (ref 3–16)
AST SERPL-CCNC: 19 U/L (ref 15–37)
BASE EXCESS VENOUS: 5.3 MMOL/L (ref -2–3)
BASOPHILS ABSOLUTE: 0.1 K/UL (ref 0–0.2)
BASOPHILS RELATIVE PERCENT: 0.9 %
BILIRUB SERPL-MCNC: <0.2 MG/DL (ref 0–1)
BILIRUBIN DIRECT: <0.2 MG/DL (ref 0–0.3)
BILIRUBIN, INDIRECT: ABNORMAL MG/DL (ref 0–1)
BUN BLDV-MCNC: 41 MG/DL (ref 7–20)
CALCIUM SERPL-MCNC: 8.8 MG/DL (ref 8.3–10.6)
CARBOXYHEMOGLOBIN: 1.6 % (ref 0–1.5)
CHLORIDE BLD-SCNC: 96 MMOL/L (ref 99–110)
CO2: 27 MMOL/L (ref 21–32)
CREAT SERPL-MCNC: 7.2 MG/DL (ref 0.6–1.1)
EOSINOPHILS ABSOLUTE: 0.2 K/UL (ref 0–0.6)
EOSINOPHILS RELATIVE PERCENT: 3.2 %
GFR AFRICAN AMERICAN: 7
GFR NON-AFRICAN AMERICAN: 6
GLUCOSE BLD-MCNC: 197 MG/DL (ref 70–99)
HCO3 VENOUS: 31.1 MMOL/L (ref 24–28)
HCT VFR BLD CALC: 31 % (ref 36–48)
HEMOGLOBIN, VEN, REDUCED: 25.5 %
HEMOGLOBIN: 10.2 G/DL (ref 12–16)
LIPASE: 31 U/L (ref 13–60)
LYMPHOCYTES ABSOLUTE: 1.1 K/UL (ref 1–5.1)
LYMPHOCYTES RELATIVE PERCENT: 15.2 %
MCH RBC QN AUTO: 26.5 PG (ref 26–34)
MCHC RBC AUTO-ENTMCNC: 33 G/DL (ref 31–36)
MCV RBC AUTO: 80.3 FL (ref 80–100)
METHEMOGLOBIN VENOUS: 0.4 % (ref 0–1.5)
MONOCYTES ABSOLUTE: 0.5 K/UL (ref 0–1.3)
MONOCYTES RELATIVE PERCENT: 6.6 %
NEUTROPHILS ABSOLUTE: 5.4 K/UL (ref 1.7–7.7)
NEUTROPHILS RELATIVE PERCENT: 74.1 %
O2 SAT, VEN: 74 %
PCO2, VEN: 50.3 MMHG (ref 41–51)
PDW BLD-RTO: 15 % (ref 12.4–15.4)
PH VENOUS: 7.4 (ref 7.35–7.45)
PLATELET # BLD: 235 K/UL (ref 135–450)
PMV BLD AUTO: 6.7 FL (ref 5–10.5)
PO2, VEN: 39.2 MMHG (ref 25–40)
POTASSIUM SERPL-SCNC: 4 MMOL/L (ref 3.5–5.1)
RBC # BLD: 3.86 M/UL (ref 4–5.2)
SODIUM BLD-SCNC: 136 MMOL/L (ref 136–145)
TCO2 CALC VENOUS: 33 MMOL/L
TOTAL PROTEIN: 5.7 G/DL (ref 6.4–8.2)
WBC # BLD: 7.3 K/UL (ref 4–11)

## 2022-08-15 PROCEDURE — 80048 BASIC METABOLIC PNL TOTAL CA: CPT

## 2022-08-15 PROCEDURE — 6360000002 HC RX W HCPCS: Performed by: PHYSICIAN ASSISTANT

## 2022-08-15 PROCEDURE — 99284 EMERGENCY DEPT VISIT MOD MDM: CPT

## 2022-08-15 PROCEDURE — 85025 COMPLETE CBC W/AUTO DIFF WBC: CPT

## 2022-08-15 PROCEDURE — 82803 BLOOD GASES ANY COMBINATION: CPT

## 2022-08-15 PROCEDURE — 80076 HEPATIC FUNCTION PANEL: CPT

## 2022-08-15 PROCEDURE — 83690 ASSAY OF LIPASE: CPT

## 2022-08-15 PROCEDURE — 96374 THER/PROPH/DIAG INJ IV PUSH: CPT

## 2022-08-15 RX ORDER — SENNA AND DOCUSATE SODIUM 50; 8.6 MG/1; MG/1
1 TABLET, FILM COATED ORAL DAILY
Qty: 30 TABLET | Refills: 0 | Status: SHIPPED | OUTPATIENT
Start: 2022-08-15

## 2022-08-15 RX ORDER — ONDANSETRON 2 MG/ML
4 INJECTION INTRAMUSCULAR; INTRAVENOUS ONCE
Status: COMPLETED | OUTPATIENT
Start: 2022-08-15 | End: 2022-08-15

## 2022-08-15 RX ORDER — DICYCLOMINE HYDROCHLORIDE 10 MG/1
10 CAPSULE ORAL 4 TIMES DAILY
Qty: 120 CAPSULE | Refills: 3 | Status: SHIPPED | OUTPATIENT
Start: 2022-08-15

## 2022-08-15 RX ADMIN — ONDANSETRON 4 MG: 2 INJECTION INTRAMUSCULAR; INTRAVENOUS at 12:25

## 2022-08-15 ASSESSMENT — PAIN - FUNCTIONAL ASSESSMENT
PAIN_FUNCTIONAL_ASSESSMENT: ACTIVITIES ARE NOT PREVENTED
PAIN_FUNCTIONAL_ASSESSMENT: 0-10

## 2022-08-15 ASSESSMENT — ENCOUNTER SYMPTOMS
DIARRHEA: 0
VOMITING: 0
CONSTIPATION: 1
SHORTNESS OF BREATH: 0
ABDOMINAL PAIN: 1
NAUSEA: 1

## 2022-08-15 ASSESSMENT — PAIN DESCRIPTION - DESCRIPTORS: DESCRIPTORS: DISCOMFORT

## 2022-08-15 ASSESSMENT — PAIN DESCRIPTION - PAIN TYPE: TYPE: ACUTE PAIN

## 2022-08-15 ASSESSMENT — PAIN DESCRIPTION - LOCATION: LOCATION: ABDOMEN

## 2022-08-15 ASSESSMENT — PAIN SCALES - GENERAL: PAINLEVEL_OUTOF10: 9

## 2022-08-15 NOTE — ED TRIAGE NOTES
Patient arrived to ED with continued abdominal pain that she was recently seen in ED for. Patient endorses not being able to have a bowel movement x4 days now. Patient endorses nausea without emesis as well.

## 2022-08-15 NOTE — ED PROVIDER NOTES
[address] [facility] [address] ENCOUNTER          PHYSICIAN ASSISTANT NOTE       Date of evaluation: 8/15/2022    Chief Complaint     Abdominal Pain and Constipation (X4 days now)      History of Present Illness     HPI: Albino Snellen is a 65 y.o. female with history of CKD (on peritoneal dialysis), diabetes, hypertension who presents to the emergency department with generalized abdominal pain and constipation. Patient was recently admitted for presumed peritonitis, though fluid cultures did not grow out anything. She was still treated preventively with antibiotics and antifungals. She is still finishing the nystatin. Patient has had 4 days of diffuse and achy abdominal pain. She has had associated nausea and chills though no documented fevers. She has also been more constipated and notes that her last bowel movement was 4 days ago. She was on a bowel regimen while she was inpatient, but notes that she is only been taking MiraLAX now as an outpatient. With the exception of the above, there are no aggravating or alleviating factors. Review of Systems     Review of Systems   Constitutional:  Positive for chills. Negative for fever. HENT:  Negative for congestion. Respiratory:  Negative for shortness of breath. Cardiovascular:  Negative for chest pain. Gastrointestinal:  Positive for abdominal pain, constipation and nausea. Negative for diarrhea and vomiting. Neurological:  Negative for dizziness and headaches. As stated above, all other systems reviewed and are otherwise negative. Past Medical, Surgical, Family, and Social History     She has a past medical history of JONATHAN (acute kidney injury) ([address] Anxiety, Asthma, Bipolar disorder ([address] Bronchitis, Chronic back pain, Depression, Diabetes mellitus ([address] DM II (diabetes mellitus, type II), controlled ([address] and Hypertension. She has a past surgical history that includes Inner ear surgery;  Tubal ligation; and Endometrial ablation. Her family history includes Cancer in her maternal grandmother and mother; Diabetes in her mother; Heart Disease in her maternal grandfather. She reports that she has been smoking cigarettes. She has a 0.75 pack-year smoking history. She has never used smokeless tobacco. She reports that she does not drink alcohol and does not use drugs. Medications     Previous Medications    ACETAMINOPHEN (TYLENOL) 500 MG TABLET    Take 500 mg by mouth every 6 hours as needed for Pain    AMMONIUM LACTATE (LAC-HYDRIN) 12 % LOTION    Apply topically as needed for Dry Skin Apply topically to feet    B COMPLEX-C-FOLIC ACID (VIRT-CAPS) 1 MG CAPS    Take 1 capsule by mouth in the morning. BENZTROPINE (COGENTIN) 1 MG TABLET    Take 1 mg by mouth at bedtime    BUPROPION (WELLBUTRIN SR) 150 MG EXTENDED RELEASE TABLET    Take 150 mg by mouth 2 times daily    CALCITRIOL (ROCALTROL) 0.25 MCG CAPSULE    Take 0.25 mcg by mouth in the morning. DIPHENHYDRAMINE-ZINC ACETATE 2-0.1 % CREAM    Apply topically 3 times daily as needed. DOCUSATE SODIUM (COLACE, DULCOLAX) 100 MG CAPS    Take 100 mg by mouth in the morning and 100 mg before bedtime. DOXEPIN (SINEQUAN) 50 MG CAPSULE    Take 50 mg by mouth nightly One hour before bedtime    EMOLLIENT (DERMAPHOR) OINT OINTMENT    Apply topically 4 times daily as needed    FERROUS SULFATE 325 (65 FE) MG TABLET    Take 325 mg by mouth daily (with breakfast)    GABAPENTIN (NEURONTIN) 600 MG TABLET    Take 300 mg by mouth nightly. GENTAMICIN (GARAMYCIN) 0.1 % CREAM    Apply topically 3 times daily. HYDRALAZINE (APRESOLINE) 50 MG TABLET    Take 1 tablet by mouth in the morning. HYDROXYZINE (ATARAX) 25 MG TABLET    Take 25 mg by mouth 3 times daily as needed (itching from food allergies)    LIDOCAINE (LIDODERM) 5 %    Place 1 patch onto the skin in the morning. 12 hours on, 12 hours off. Alan Bai     MELATONIN 5 MG TABS TABLET    Take 5 mg by mouth nightly    METOPROLOL SUCCINATE (TOPROL XL) 25 MG EXTENDED RELEASE TABLET    Take 25 mg by mouth daily    MOUTHWASHES (BIOTENE) LIQD ORAL SOLUTION    Swish and spit 15 mLs 3 times daily as needed (dry mouth)    NYSTATIN (MYCOSTATIN) 321217 UNIT/ML SUSPENSION    Take 5 mLs by mouth in the morning and 5 mLs at noon and 5 mLs in the evening and 5 mLs before bedtime. Do all this for 20 days. POLYETHYLENE GLYCOL (GLYCOLAX) 17 G PACKET    Take 17 g by mouth in the morning. QUETIAPINE (SEROQUEL) 400 MG TABLET    Take 200 mg by mouth nightly    ROSUVASTATIN (CRESTOR) 40 MG TABLET    Take 40 mg by mouth daily    SEVELAMER (RENVELA) 800 MG TABLET    Take 1 tablet by mouth in the morning and 1 tablet at noon and 1 tablet in the evening. Take with meals. TORSEMIDE (DEMADEX) 20 MG TABLET    Take 80 mg by mouth in the morning. VITAMIN D (ERGOCALCIFEROL) 1.25 MG (78708 UT) CAPS CAPSULE    Take 50,000 Units by mouth once a week Sundays       Allergies     She is allergic to latex, banana, other, peanuts [peanut oil], tomato, barley grass, milk-related compounds, and shellfish-derived products. Physical Exam     INITIAL VITALS: BP: 118/76, Temp: 97.9 °F (36.6 °C), Heart Rate: 80, Resp: 14, SpO2: 100 %  Physical Exam  Vitals reviewed. Constitutional:       General: She is not in acute distress. Appearance: Normal appearance. She is normal weight. She is not ill-appearing, toxic-appearing or diaphoretic. HENT:      Head: Normocephalic and atraumatic. Nose: Nose normal.      Mouth/Throat:      Mouth: Mucous membranes are moist.      Pharynx: Oropharynx is clear. Eyes:      Extraocular Movements: Extraocular movements intact. Conjunctiva/sclera: Conjunctivae normal.   Cardiovascular:      Rate and Rhythm: Normal rate. Pulses: Normal pulses. Pulmonary:      Effort: Pulmonary effort is normal. No respiratory distress. Abdominal:      General: Abdomen is flat. There is no distension. Palpations: Abdomen is soft. Tenderness: There is no abdominal tenderness. There is no guarding or rebound. Comments: No tenderness whatsoever, no distention, rebound or guarding. No rigidity. Musculoskeletal:         General: Normal range of motion. Cervical back: Normal range of motion. Right lower leg: No edema. Left lower leg: No edema. Skin:     General: Skin is warm and dry. Neurological:      General: No focal deficit present. Mental Status: She is alert and oriented to person, place, and time.    Psychiatric:         Mood and Affect: Mood normal.         Behavior: Behavior normal.     Diagnostic Results     RADIOLOGY:  No orders to display     LABS:   Results for orders placed or performed during the hospital encounter of 08/15/22   CBC with Auto Differential   Result Value Ref Range    WBC 7.3 4.0 - 11.0 K/uL    RBC 3.86 (L) 4.00 - 5.20 M/uL    Hemoglobin 10.2 (L) 12.0 - 16.0 g/dL    Hematocrit 31.0 (L) 36.0 - 48.0 %    MCV 80.3 80.0 - 100.0 fL    MCH 26.5 26.0 - 34.0 pg    MCHC 33.0 31.0 - 36.0 g/dL    RDW 15.0 12.4 - 15.4 %    Platelets 669 520 - 720 K/uL    MPV 6.7 5.0 - 10.5 fL    Neutrophils % 74.1 %    Lymphocytes % 15.2 %    Monocytes % 6.6 %    Eosinophils % 3.2 %    Basophils % 0.9 %    Neutrophils Absolute 5.4 1.7 - 7.7 K/uL    Lymphocytes Absolute 1.1 1.0 - 5.1 K/uL    Monocytes Absolute 0.5 0.0 - 1.3 K/uL    Eosinophils Absolute 0.2 0.0 - 0.6 K/uL    Basophils Absolute 0.1 0.0 - 0.2 K/uL   Basic Metabolic Panel   Result Value Ref Range    Sodium 136 136 - 145 mmol/L    Potassium 4.0 3.5 - 5.1 mmol/L    Chloride 96 (L) 99 - 110 mmol/L    CO2 27 21 - 32 mmol/L    Anion Gap 13 3 - 16    Glucose 197 (H) 70 - 99 mg/dL    BUN 41 (H) 7 - 20 mg/dL    Creatinine 7.2 (HH) 0.6 - 1.1 mg/dL    GFR Non- 6 (A) >60    GFR  7 (A) >60    Calcium 8.8 8.3 - 10.6 mg/dL   Blood gas, venous (Lab)   Result Value Ref Range    pH, Carlos 7.400 7.350 - 7.450    pCO2, Carlos 50.3 41.0 - 51.0 mmHg    pO2, Carlos 39.2 25.0 - 40.0 mmHg    HCO3, Venous 31.1 (H) 24.0 - 28.0 mmol/L    Base Excess, Carlos 5.3 (H) -2.0 - 3.0 mmol/L    O2 Sat, Carlos 74 Not established %    Carboxyhemoglobin 1.6 (H) 0.0 - 1.5 %    MetHgb, Carlos 0.4 0.0 - 1.5 %    TC02 (Calc), Carlos 33 mmol/L    Hemoglobin, Carlos, Reduced 25.50 %   Lipase   Result Value Ref Range    Lipase 31.0 13.0 - 60.0 U/L   Hepatic Function Panel   Result Value Ref Range    Total Protein 5.7 (L) 6.4 - 8.2 g/dL    Albumin 2.9 (L) 3.4 - 5.0 g/dL    Alkaline Phosphatase 88 40 - 129 U/L    ALT 16 10 - 40 U/L    AST 19 15 - 37 U/L    Total Bilirubin <0.2 0.0 - 1.0 mg/dL    Bilirubin, Direct <0.2 0.0 - 0.3 mg/dL    Bilirubin, Indirect see below 0.0 - 1.0 mg/dL       RECENT VITALS:  BP: 112/72, Temp: 97.9 °F (36.6 °C), Heart Rate: 83, Resp: 14, SpO2: 97 %     Procedures     N/a    ED Course     Nursing Notes, Past Medical Hx,Past Surgical Hx, Social Hx, Allergies, and Family Hx were reviewed. The patient was given the following medications:  Orders Placed This Encounter   Medications    ondansetron (ZOFRAN) injection 4 mg    sennosides-docusate sodium (SENOKOT-S) 8.6-50 MG tablet     Sig: Take 1 tablet by mouth in the morning. Dispense:  30 tablet     Refill:  0    dicyclomine (BENTYL) 10 MG capsule     Sig: Take 1 capsule by mouth in the morning and 1 capsule at noon and 1 capsule in the evening and 1 capsule before bedtime. Dispense:  120 capsule     Refill:  3     CONSULTS:  None    MEDICAL DECISION MAKING / ASSESSMENT / PLAN     Vitals:    08/15/22 1137 08/15/22 1203 08/15/22 1430   BP: 118/76 120/77 112/72   Pulse: 80 84 83   Resp: 14 27 14   Temp: 97.9 °F (36.6 °C)     TempSrc: Oral     SpO2: 100% 100% 97%   Weight: 144 lb (65.3 kg)     Height: 5' 8\" (1.727 m)       Leda Cast is a 65 y.o. female who presents to the emergency department with generalized abdominal pain and concerns for constipation.   She was admitted to the hospital from 7/22-7/27 with concerns for peritonitis. Patient was discharged on antibiotics and antifungals. Fluid cultures never ultimately grew out any organisms. Patient is continue to use her peritoneal dialysis catheter throughout this and has not had any issues. For the past 4 days she has had some increased generalized abdominal pain and was concerned that the infection could be recurrent. She also has had decreased bowel movements over the past 4 days. While she was admitted she was on a bowel regimen though has only been taking MiraLAX now added as an outpatient. Patient's abdomen is entirely soft without any focal tenderness, rebound, distention guarding or distention. This in combination with her normal white blood cell count of 7.3 and hemodynamic stability here without any tachycardia or fever I have low suspicion for peritonitis. I did explain this to the patient who verbalized her agreement and understanding of this. We discussed potentially administration of an enema here in the emergency department the patient declined which I do feel is reasonable. Given her benign abdominal exam I have low suspicion for small bowel obstruction as well I do not feel that she requires any CT imaging at this time. Patient's renal function is at her baseline, with no hyperkalemia and the ability for the patient to go home and perform peritoneal dialysis as previously scheduled. Given patient's reassuring work-up here I do feel that she is appropriate for discharge with a more aggressive bowel regimen with strict return precautions and close follow-up. I discussed all this with the patient who was given opportunity to ask questions and feels comfortable to plan for discharge home. I do not believe that this patient requires any further work-up or inpatient management for their complaints, and are appropriate for outpatient follow-up.   I discussed the findings of my physical exam and work-up with the patient, and they were given the opportunity to ask questions. The patient is agreeable with the plan and is ready to be discharged home. The patient was discharged home with prescriptions for Senna, Bentyl. Patient instructed to follow-up with PCP, nephrology as soon as possible, and given strict return precautions. The patient was evaluated by myself and the ED Attending Physician, Dr. Enedelia Montero. All management and disposition plans were discussed and agreed upon. Clinical Impression     1. Generalized abdominal pain    2. Constipation, unspecified constipation type        This note was dictated using voice-recognition software, which occasionally leads to inadvertent typographic errors. Disposition     PATIENT REFERRED TO:  [place]ia [place]  [id number] [address]    Schedule an appointment as soon as possible for a visit       DISCHARGE MEDICATIONS:  New Prescriptions    DICYCLOMINE (BENTYL) 10 MG CAPSULE    Take 1 capsule by mouth in the morning and 1 capsule at noon and 1 capsule in the evening and 1 capsule before bedtime. SENNOSIDES-DOCUSATE SODIUM (SENOKOT-S) 8.6-50 MG TABLET    Take 1 tablet by mouth in the morning.        DISPOSITION Decision To Discharge 08/15/2022 02:12:47 PM       COSMO Allred  08/15/22 [id number]

## 2022-08-15 NOTE — ED NOTES
During discharge instructions patient began to complain about her left shoulder pain and stated that she spoke to provider in regards to it but no updated information or education was provided.      Teo Mahajan RN  08/15/22 [id number]

## 2022-08-15 NOTE — ED NOTES
Discharge instructions provided to patient by RN at bedside. No further concerns addressed at this time.      Jm Olmedo RN  08/15/22 2613

## 2022-08-15 NOTE — DISCHARGE INSTRUCTIONS
ED Course     Today, you were seen in the Emergency Department. You have been diagnosed with:   1. Generalized abdominal pain    2. Constipation, unspecified constipation type      Disposition/Plan     IMPORTANT INSTRUCTIONS:  In order to continue to manage your constipation, you should take docusate as prescribed twice per day every day for at least the next several days in order to soften your stool and make it easier to pass. Once you are having about 1 bowel movement per day, you may begin to back down to using docusate only once per day. If you find that you are having too frequent or soft of bowel movements, you may decrease the docusate at that time to once per day, or once every other day. For the first few days, you may also add a dose of Miralax daily, in order to help resolve the remainder of your constipation. You may stop taking the Miralax once you are having regular bowel movements. During this period of time, you should continue to eat extra fiber and drink extra water, in order to help keep your stool soft. Please understand that as long as you are taking pain medications, you will need to take a stool softener daily, as pain medications cause constipation. Please call your doctor today to discuss your ER visit and arrange a follow-up appointment. Call your doctor, or return to the emergency department, if you have persistent difficulties with bowel movements, worsening abdominal pain, or if you develop vomiting, fever greater than 101°F, or other concerning symptoms. PLEASE FOLLOW UP WITH:  DO Miguel Pelletier [address]    Schedule an appointment as soon as possible for a visit     DISCHARGE MEDICATIONS:  The following medications were prescribed for the you during this visit.  Take them as directed below:     New Prescriptions    DICYCLOMINE (BENTYL) 10 MG CAPSULE    Take 1 capsule by mouth in the morning and 1 capsule at noon and 1 capsule in the evening and 1 capsule before bedtime. SENNOSIDES-DOCUSATE SODIUM (SENOKOT-S) 8.6-50 MG TABLET    Take 1 tablet by mouth in the morning. Additional important information has been included with this packet, please make sure that you have read this information as it will better help you understand you illness/injury better.

## 2022-08-15 NOTE — PROGRESS NOTES
Hospitalist Progress Note      PCP: Mauricio Marie DO,     Date of Admission: 7/22/2022    Falls     History Of Present Illness:       Shirlene Bravo is a 65 y.o. female with past medical history as below, including frequent falls. She has no dizziness or LOC, no lack of coordination or focal weakness. She feels generally weak all over and has felt this way for months. She has felt generally week since she had Covid in December she did not regain her sense of taste and smell and her appetite is poor. She has also been having lower abdominal discomfort. It has actually been present for some time. She lost the tip of her dialysis catheter twice but says she did not seek assistance with his. She sometimes has a low temperature. No fevers. No constipation. She still makes some urine. She has had dysuria but not recently. Smokes occasionally, denies etoh or drug use. She says her only medication change is her Seroquel has been decreased. In ER dialysate was collected concerning for PD infection. She was started on broad spectrum antibiotics. UA 3-5 WBCs, +Epi cells and bacteria. Subjective: Patient on arrival to the room, patient resting comfortably under covers. Patient complaining of extreme abdominal pain. Also mentioning inability to care for herself and she keeps on falling and dropping things but patient worked with therapy and did well. There seems to be discrepancy between what patient complains about and how she appears.     Medications:  Reviewed    Infusion Medications    dextrose      sodium chloride       Scheduled Medications    nystatin  5 mL Oral 4x Daily    polyethylene glycol  17 g Oral BID    QUEtiapine  200 mg Oral Nightly    benztropine  1 mg Oral Nightly    doxepin  50 mg Oral Nightly    calcitRIOL  0.25 mcg Oral Daily    rosuvastatin  40 mg Oral Daily    sennosides-docusate sodium  1 tablet Oral BID    torsemide  80 mg Oral Daily    buPROPion  150 mg Oral BID    hydrALAZINE  50 mg Oral Daily    metoprolol succinate  25 mg Oral Daily    sodium chloride flush  5-40 mL IntraVENous 2 times per day    heparin (porcine)  5,000 Units SubCUTAneous 3 times per day    insulin lispro  0-4 Units SubCUTAneous TID WC    insulin lispro  0-4 Units SubCUTAneous Nightly    Virt-Caps  1 capsule Oral Daily    cefepime  1,000 mg IntraVENous Q24H    vancomycin (VANCOCIN) intermittent dosing (placeholder)   Other RX Placeholder    gentamicin   Topical Daily    acetaminophen  650 mg Oral Once     PRN Meds: HYDROmorphone, melatonin, diphenhydrAMINE-zinc acetate, [Held by provider] hydrOXYzine HCl, glucose, dextrose bolus **OR** dextrose bolus, glucagon (rDNA), dextrose, sodium chloride flush, sodium chloride, ondansetron **OR** ondansetron, polyethylene glycol, acetaminophen **OR** acetaminophen, biotene      Intake/Output Summary (Last 24 hours) at 7/26/2022 0859  Last data filed at 7/26/2022 0846  Gross per 24 hour   Intake 2000 ml   Output 2097 ml   Net -97 ml         Exam:    /82   Pulse 76   Temp 97.7 °F (36.5 °C)   Resp 18   Ht 5' 8\" (1.727 m)   Wt 151 lb 10.8 oz (68.8 kg)   SpO2 100%   BMI 23.06 kg/m²     General appearance: No apparent distress, appears stated age and cooperative. HEENT: Pupils equal, round, and reactive to light. Conjunctivae/corneas clear. Neck: Supple, with full range of motion. No jugular venous distention. Trachea midline. Respiratory:  Normal respiratory effort. Clear to auscultation, bilaterally without Rales/Wheezes/Rhonchi. Cardiovascular: Regular rate and rhythm with normal S1/S2 without murmurs, rubs or gallops. Abdomen: Soft, mildly tender with deep palpation, non-distended with normal bowel sounds. PD catheter in place  Musculoskelatal: No clubbing, cyanosis or edema bilaterally. Skin: Skin color, texture, turgor normal.  No rashes or lesions.   Neurologic:  Cranial nerves: II-XII intact, grossly non-focal.  Psychiatric: Alert and oriented, thought content appropriate, normal insight    Labs:   Recent Labs     07/24/22  0741 07/25/22  0719   WBC 6.6 6.5   HGB 9.9* 9.8*   HCT 29.9* 30.2*    196       Recent Labs     07/24/22  0741 07/25/22  0719   * 134*   K 3.6 3.6   CL 97* 97*   CO2 27 28   BUN 60* 53*   CREATININE 10.0* 9.2*   CALCIUM 8.5 8.7   PHOS  --  6.2*       No results for input(s): AST, ALT, BILIDIR, BILITOT, ALKPHOS in the last 72 hours. No results for input(s): INR in the last 72 hours. No results for input(s): Prentis Revels in the last 72 hours. Studies:  CT Head W/O Contrast   Final Result      No acute intracranial process. XR CHEST PORTABLE   Final Result      No acute chest process. Assessment/Plan:    Active Hospital Problems    Diagnosis Date Noted    Per[facility]itis ([facility] [K65.9] 07/23/2022     Priority: Medium     PLAN:     Falls  Fatigue and weight loss ongoing since December  Intermittent tremor  Bilateral leg weakness  PCP note documents significant weight loss  She was sent for CT and colonoscopy last spring as part of weight loss work-up     Hx of BPPV  Curently denies symptoms associated with falls  Previously referred to vestibular rehab     #ESRD on dialysis with PD  #PD associated peritonitis  Dialysate sent for Analysis and culture 07/23. Culture showed no growth to date  Repeat dialysate fluid analysis  suggestive of peritonitis  Continue vancomycin and Cefepime. Antifungal added  ID consulted  Discussed with Dr. Leonides Lawrence, appreciate recommendation    Constipation  Continue bowel regimen  Soapsuds enema ordered    Hypertension  Monitor blood pressure  Continue home medication    Anxiety  Continue home meds patient    DVT Prophylaxis: Heparin subq  Diet: ADULT DIET;  Regular; Low Potassium (Less than 3000 mg/day)  Code Status: Full Code    PT/OT Eval Status:     Dispo -pending pain resolution, ID consult, nephrology recs, PT/OT     Marylee Ginger, MD  Attending Physician  Hospitalist No

## 2022-08-20 ENCOUNTER — APPOINTMENT (OUTPATIENT)
Dept: CT IMAGING | Age: 57
DRG: 466 | End: 2022-08-20
Payer: MEDICAID

## 2022-08-20 ENCOUNTER — HOSPITAL ENCOUNTER (INPATIENT)
Age: 57
LOS: 12 days | Discharge: NURSING, REHAB OR LONG-TERM CARE | DRG: 466 | End: 2022-09-02
Attending: EMERGENCY MEDICINE | Admitting: INTERNAL MEDICINE
Payer: MEDICAID

## 2022-08-20 ENCOUNTER — APPOINTMENT (OUTPATIENT)
Dept: GENERAL RADIOLOGY | Age: 57
DRG: 466 | End: 2022-08-20
Payer: MEDICAID

## 2022-08-20 DIAGNOSIS — I21.4 NSTEMI (NON-ST ELEVATED MYOCARDIAL INFARCTION) (HCC): Primary | ICD-10-CM

## 2022-08-20 DIAGNOSIS — T85.71XA PERITONITIS ASSOCIATED WITH PERITONEAL DIALYSIS, INITIAL ENCOUNTER (HCC): ICD-10-CM

## 2022-08-20 LAB
BASOPHILS ABSOLUTE: 0.1 K/UL (ref 0–0.2)
BASOPHILS RELATIVE PERCENT: 0.8 %
BILIRUBIN URINE: NEGATIVE
BLOOD, URINE: NEGATIVE
CLARITY: CLEAR
COLOR: YELLOW
EOSINOPHILS ABSOLUTE: 0.3 K/UL (ref 0–0.6)
EOSINOPHILS RELATIVE PERCENT: 3.4 %
GLUCOSE URINE: NEGATIVE MG/DL
HCT VFR BLD CALC: 30.2 % (ref 36–48)
HEMOGLOBIN: 9.9 G/DL (ref 12–16)
KETONES, URINE: ABNORMAL MG/DL
LEUKOCYTE ESTERASE, URINE: NEGATIVE
LYMPHOCYTES ABSOLUTE: 2.1 K/UL (ref 1–5.1)
LYMPHOCYTES RELATIVE PERCENT: 25.3 %
MCH RBC QN AUTO: 26.5 PG (ref 26–34)
MCHC RBC AUTO-ENTMCNC: 32.8 G/DL (ref 31–36)
MCV RBC AUTO: 80.8 FL (ref 80–100)
MICROSCOPIC EXAMINATION: YES
MONOCYTES ABSOLUTE: 0.5 K/UL (ref 0–1.3)
MONOCYTES RELATIVE PERCENT: 6.3 %
NEUTROPHILS ABSOLUTE: 5.2 K/UL (ref 1.7–7.7)
NEUTROPHILS RELATIVE PERCENT: 64.2 %
NITRITE, URINE: NEGATIVE
PDW BLD-RTO: 14.8 % (ref 12.4–15.4)
PH UA: 6 (ref 5–8)
PLATELET # BLD: 250 K/UL (ref 135–450)
PMV BLD AUTO: 6.3 FL (ref 5–10.5)
PROTEIN UA: 30 MG/DL
RBC # BLD: 3.74 M/UL (ref 4–5.2)
SPECIFIC GRAVITY UA: 1.02 (ref 1–1.03)
URINE TYPE: ABNORMAL
UROBILINOGEN, URINE: 0.2 E.U./DL
WBC # BLD: 8.2 K/UL (ref 4–11)

## 2022-08-20 PROCEDURE — 87635 SARS-COV-2 COVID-19 AMP PRB: CPT

## 2022-08-20 PROCEDURE — 96360 HYDRATION IV INFUSION INIT: CPT

## 2022-08-20 PROCEDURE — 81001 URINALYSIS AUTO W/SCOPE: CPT

## 2022-08-20 PROCEDURE — 80048 BASIC METABOLIC PNL TOTAL CA: CPT

## 2022-08-20 PROCEDURE — 36415 COLL VENOUS BLD VENIPUNCTURE: CPT

## 2022-08-20 PROCEDURE — 89051 BODY FLUID CELL COUNT: CPT

## 2022-08-20 PROCEDURE — 93005 ELECTROCARDIOGRAM TRACING: CPT | Performed by: EMERGENCY MEDICINE

## 2022-08-20 PROCEDURE — 71046 X-RAY EXAM CHEST 2 VIEWS: CPT

## 2022-08-20 PROCEDURE — 2580000003 HC RX 258: Performed by: EMERGENCY MEDICINE

## 2022-08-20 PROCEDURE — 84484 ASSAY OF TROPONIN QUANT: CPT

## 2022-08-20 PROCEDURE — 85025 COMPLETE CBC W/AUTO DIFF WBC: CPT

## 2022-08-20 PROCEDURE — 70450 CT HEAD/BRAIN W/O DYE: CPT

## 2022-08-20 PROCEDURE — 99285 EMERGENCY DEPT VISIT HI MDM: CPT

## 2022-08-20 RX ORDER — 0.9 % SODIUM CHLORIDE 0.9 %
500 INTRAVENOUS SOLUTION INTRAVENOUS ONCE
Status: COMPLETED | OUTPATIENT
Start: 2022-08-20 | End: 2022-08-21

## 2022-08-20 RX ADMIN — SODIUM CHLORIDE 500 ML: 9 INJECTION, SOLUTION INTRAVENOUS at 23:29

## 2022-08-20 ASSESSMENT — PAIN - FUNCTIONAL ASSESSMENT
PAIN_FUNCTIONAL_ASSESSMENT: 0-10
PAIN_FUNCTIONAL_ASSESSMENT_SITE2: PREVENTS OR INTERFERES SOME ACTIVE ACTIVITIES AND ADLS
PAIN_FUNCTIONAL_ASSESSMENT: PREVENTS OR INTERFERES SOME ACTIVE ACTIVITIES AND ADLS

## 2022-08-20 ASSESSMENT — PAIN DESCRIPTION - DESCRIPTORS
DESCRIPTORS: ACHING;DULL
DESCRIPTORS_2: SHARP;STABBING

## 2022-08-20 ASSESSMENT — PAIN DESCRIPTION - LOCATION
LOCATION: ABDOMEN
LOCATION_2: SHOULDER

## 2022-08-20 ASSESSMENT — PAIN SCALES - GENERAL: PAINLEVEL_OUTOF10: 9

## 2022-08-20 ASSESSMENT — PAIN DESCRIPTION - ONSET: ONSET: ON-GOING

## 2022-08-20 ASSESSMENT — PAIN DESCRIPTION - INTENSITY: RATING_2: 9

## 2022-08-20 ASSESSMENT — PAIN DESCRIPTION - FREQUENCY: FREQUENCY: CONTINUOUS

## 2022-08-20 ASSESSMENT — PAIN DESCRIPTION - PAIN TYPE: TYPE: CHRONIC PAIN

## 2022-08-20 ASSESSMENT — PAIN DESCRIPTION - ORIENTATION
ORIENTATION: MID;LOWER
ORIENTATION_2: RIGHT

## 2022-08-21 PROBLEM — R77.8 ELEVATED TROPONIN: Status: ACTIVE | Noted: 2022-08-21

## 2022-08-21 PROBLEM — R79.89 ELEVATED TROPONIN: Status: ACTIVE | Noted: 2022-08-21

## 2022-08-21 LAB
AMORPHOUS: ABNORMAL /HPF
ANION GAP SERPL CALCULATED.3IONS-SCNC: 11 MMOL/L (ref 3–16)
ANION GAP SERPL CALCULATED.3IONS-SCNC: 14 MMOL/L (ref 3–16)
APPEARANCE FLUID: CLEAR
BACTERIA: ABNORMAL /HPF
BUN BLDV-MCNC: 45 MG/DL (ref 7–20)
BUN BLDV-MCNC: 46 MG/DL (ref 7–20)
CALCIUM SERPL-MCNC: 9 MG/DL (ref 8.3–10.6)
CALCIUM SERPL-MCNC: 9.5 MG/DL (ref 8.3–10.6)
CELL COUNT FLUID TYPE: NORMAL
CHLORIDE BLD-SCNC: 94 MMOL/L (ref 99–110)
CHLORIDE BLD-SCNC: 98 MMOL/L (ref 99–110)
CLOT EVALUATION: NORMAL
CO2: 27 MMOL/L (ref 21–32)
CO2: 27 MMOL/L (ref 21–32)
COLOR FLUID: YELLOW
CREAT SERPL-MCNC: 8.8 MG/DL (ref 0.6–1.1)
CREAT SERPL-MCNC: 9.2 MG/DL (ref 0.6–1.1)
EKG ATRIAL RATE: 91 BPM
EKG ATRIAL RATE: 99 BPM
EKG DIAGNOSIS: NORMAL
EKG DIAGNOSIS: NORMAL
EKG P AXIS: 57 DEGREES
EKG P AXIS: 60 DEGREES
EKG P-R INTERVAL: 162 MS
EKG P-R INTERVAL: 180 MS
EKG Q-T INTERVAL: 376 MS
EKG Q-T INTERVAL: 402 MS
EKG QRS DURATION: 88 MS
EKG QRS DURATION: 90 MS
EKG QTC CALCULATION (BAZETT): 482 MS
EKG QTC CALCULATION (BAZETT): 494 MS
EKG R AXIS: -23 DEGREES
EKG R AXIS: -39 DEGREES
EKG T AXIS: 52 DEGREES
EKG T AXIS: 91 DEGREES
EKG VENTRICULAR RATE: 91 BPM
EKG VENTRICULAR RATE: 99 BPM
EOSINOPHIL FLUID: 11 %
EPITHELIAL CELLS, UA: ABNORMAL /HPF (ref 0–5)
GFR AFRICAN AMERICAN: 5
GFR AFRICAN AMERICAN: 6
GFR NON-AFRICAN AMERICAN: 4
GFR NON-AFRICAN AMERICAN: 5
GLUCOSE BLD-MCNC: 117 MG/DL (ref 70–99)
GLUCOSE BLD-MCNC: 78 MG/DL (ref 70–99)
LYMPHOCYTES, BODY FLUID: 17 %
MACROPHAGE FLUID: 14 %
MONOCYTE, FLUID: 45 %
MUCUS: ABNORMAL /LPF
NEUTROPHIL, FLUID: 13 %
NUCLEATED CELLS FLUID: 359 /CUMM
NUMBER OF CELLS COUNTED FLUID: 100
POTASSIUM REFLEX MAGNESIUM: 4 MMOL/L (ref 3.5–5.1)
POTASSIUM SERPL-SCNC: 4 MMOL/L (ref 3.5–5.1)
RBC FLUID: <1000 /CUMM
RBC UA: ABNORMAL /HPF (ref 0–4)
SARS-COV-2, NAAT: NOT DETECTED
SODIUM BLD-SCNC: 135 MMOL/L (ref 136–145)
SODIUM BLD-SCNC: 136 MMOL/L (ref 136–145)
TROPONIN: 0.08 NG/ML
TROPONIN: 0.09 NG/ML
TROPONIN: 0.1 NG/ML
WBC UA: ABNORMAL /HPF (ref 0–5)

## 2022-08-21 PROCEDURE — 3E1M39Z IRRIGATION OF PERITONEAL CAVITY USING DIALYSATE, PERCUTANEOUS APPROACH: ICD-10-PCS | Performed by: STUDENT IN AN ORGANIZED HEALTH CARE EDUCATION/TRAINING PROGRAM

## 2022-08-21 PROCEDURE — 84484 ASSAY OF TROPONIN QUANT: CPT

## 2022-08-21 PROCEDURE — 6360000002 HC RX W HCPCS: Performed by: INTERNAL MEDICINE

## 2022-08-21 PROCEDURE — 2580000003 HC RX 258: Performed by: STUDENT IN AN ORGANIZED HEALTH CARE EDUCATION/TRAINING PROGRAM

## 2022-08-21 PROCEDURE — 6360000002 HC RX W HCPCS: Performed by: STUDENT IN AN ORGANIZED HEALTH CARE EDUCATION/TRAINING PROGRAM

## 2022-08-21 PROCEDURE — 93005 ELECTROCARDIOGRAM TRACING: CPT | Performed by: INTERNAL MEDICINE

## 2022-08-21 PROCEDURE — 6370000000 HC RX 637 (ALT 250 FOR IP): Performed by: STUDENT IN AN ORGANIZED HEALTH CARE EDUCATION/TRAINING PROGRAM

## 2022-08-21 PROCEDURE — 80048 BASIC METABOLIC PNL TOTAL CA: CPT

## 2022-08-21 PROCEDURE — 6370000000 HC RX 637 (ALT 250 FOR IP): Performed by: INTERNAL MEDICINE

## 2022-08-21 PROCEDURE — 36415 COLL VENOUS BLD VENIPUNCTURE: CPT

## 2022-08-21 PROCEDURE — 2580000003 HC RX 258: Performed by: INTERNAL MEDICINE

## 2022-08-21 PROCEDURE — 90945 DIALYSIS ONE EVALUATION: CPT

## 2022-08-21 PROCEDURE — 87070 CULTURE OTHR SPECIMN AEROBIC: CPT

## 2022-08-21 PROCEDURE — 87205 SMEAR GRAM STAIN: CPT

## 2022-08-21 PROCEDURE — 93010 ELECTROCARDIOGRAM REPORT: CPT | Performed by: INTERNAL MEDICINE

## 2022-08-21 PROCEDURE — 1200000000 HC SEMI PRIVATE

## 2022-08-21 PROCEDURE — 99254 IP/OBS CNSLTJ NEW/EST MOD 60: CPT | Performed by: INTERNAL MEDICINE

## 2022-08-21 RX ORDER — CHOLECALCIFEROL (VITAMIN D3) 10 MCG
1 TABLET ORAL DAILY
Status: DISCONTINUED | OUTPATIENT
Start: 2022-08-21 | End: 2022-09-02 | Stop reason: HOSPADM

## 2022-08-21 RX ORDER — ROSUVASTATIN CALCIUM 20 MG/1
40 TABLET, COATED ORAL NIGHTLY
Status: DISCONTINUED | OUTPATIENT
Start: 2022-08-21 | End: 2022-09-02 | Stop reason: HOSPADM

## 2022-08-21 RX ORDER — ONDANSETRON 2 MG/ML
4 INJECTION INTRAMUSCULAR; INTRAVENOUS EVERY 6 HOURS PRN
Status: DISCONTINUED | OUTPATIENT
Start: 2022-08-21 | End: 2022-09-02 | Stop reason: HOSPADM

## 2022-08-21 RX ORDER — POLYETHYLENE GLYCOL 3350 17 G/17G
17 POWDER, FOR SOLUTION ORAL DAILY
Status: DISCONTINUED | OUTPATIENT
Start: 2022-08-21 | End: 2022-08-22

## 2022-08-21 RX ORDER — HYDROXYZINE HYDROCHLORIDE 25 MG/1
25 TABLET, FILM COATED ORAL 3 TIMES DAILY PRN
Status: DISCONTINUED | OUTPATIENT
Start: 2022-08-21 | End: 2022-09-02 | Stop reason: HOSPADM

## 2022-08-21 RX ORDER — BUPROPION HYDROCHLORIDE 150 MG/1
150 TABLET, EXTENDED RELEASE ORAL 2 TIMES DAILY
Status: DISCONTINUED | OUTPATIENT
Start: 2022-08-21 | End: 2022-09-02 | Stop reason: HOSPADM

## 2022-08-21 RX ORDER — ACETAMINOPHEN 325 MG/1
650 TABLET ORAL EVERY 6 HOURS PRN
Status: DISCONTINUED | OUTPATIENT
Start: 2022-08-21 | End: 2022-09-02 | Stop reason: HOSPADM

## 2022-08-21 RX ORDER — CALCITRIOL 0.25 UG/1
0.25 CAPSULE, LIQUID FILLED ORAL DAILY
Status: DISCONTINUED | OUTPATIENT
Start: 2022-08-21 | End: 2022-09-02 | Stop reason: HOSPADM

## 2022-08-21 RX ORDER — HYDRALAZINE HYDROCHLORIDE 50 MG/1
50 TABLET, FILM COATED ORAL DAILY
Status: DISCONTINUED | OUTPATIENT
Start: 2022-08-21 | End: 2022-08-25

## 2022-08-21 RX ORDER — GABAPENTIN 600 MG/1
300 TABLET ORAL NIGHTLY
Status: DISCONTINUED | OUTPATIENT
Start: 2022-08-21 | End: 2022-09-02 | Stop reason: HOSPADM

## 2022-08-21 RX ORDER — SODIUM CHLORIDE, SODIUM LACTATE, CALCIUM CHLORIDE, MAGNESIUM CHLORIDE AND DEXTROSE 1.5; 538; 448; 18.3; 5.08 G/100ML; MG/100ML; MG/100ML; MG/100ML; MG/100ML
8000 INJECTION, SOLUTION INTRAPERITONEAL DAILY
Status: DISCONTINUED | OUTPATIENT
Start: 2022-08-21 | End: 2022-09-02 | Stop reason: HOSPADM

## 2022-08-21 RX ORDER — CALCITRIOL 0.25 UG/1
0.25 CAPSULE, LIQUID FILLED ORAL DAILY
Status: ON HOLD | COMMUNITY
End: 2022-08-24 | Stop reason: HOSPADM

## 2022-08-21 RX ORDER — SENNA AND DOCUSATE SODIUM 50; 8.6 MG/1; MG/1
1 TABLET, FILM COATED ORAL DAILY
Status: DISCONTINUED | OUTPATIENT
Start: 2022-08-21 | End: 2022-08-25

## 2022-08-21 RX ORDER — DOCUSATE SODIUM 100 MG/1
100 CAPSULE, LIQUID FILLED ORAL 2 TIMES DAILY
Status: DISCONTINUED | OUTPATIENT
Start: 2022-08-21 | End: 2022-09-02 | Stop reason: HOSPADM

## 2022-08-21 RX ORDER — SODIUM CHLORIDE 9 MG/ML
INJECTION, SOLUTION INTRAVENOUS PRN
Status: DISCONTINUED | OUTPATIENT
Start: 2022-08-21 | End: 2022-09-02 | Stop reason: HOSPADM

## 2022-08-21 RX ORDER — METOPROLOL SUCCINATE 25 MG/1
25 TABLET, EXTENDED RELEASE ORAL DAILY
Status: DISCONTINUED | OUTPATIENT
Start: 2022-08-21 | End: 2022-08-29

## 2022-08-21 RX ORDER — FERROUS SULFATE 325(65) MG
325 TABLET ORAL
Status: DISCONTINUED | OUTPATIENT
Start: 2022-08-21 | End: 2022-08-28

## 2022-08-21 RX ORDER — QUETIAPINE FUMARATE 200 MG/1
200 TABLET, FILM COATED ORAL NIGHTLY
Status: DISCONTINUED | OUTPATIENT
Start: 2022-08-21 | End: 2022-09-02 | Stop reason: HOSPADM

## 2022-08-21 RX ORDER — MECOBALAMIN 5000 MCG
10 TABLET,DISINTEGRATING ORAL NIGHTLY
Status: DISCONTINUED | OUTPATIENT
Start: 2022-08-21 | End: 2022-09-02 | Stop reason: HOSPADM

## 2022-08-21 RX ORDER — ONDANSETRON 4 MG/1
4 TABLET, ORALLY DISINTEGRATING ORAL EVERY 8 HOURS PRN
Status: DISCONTINUED | OUTPATIENT
Start: 2022-08-21 | End: 2022-09-02 | Stop reason: HOSPADM

## 2022-08-21 RX ORDER — DOXEPIN HYDROCHLORIDE 25 MG/1
50 CAPSULE ORAL NIGHTLY
Status: DISCONTINUED | OUTPATIENT
Start: 2022-08-21 | End: 2022-09-02 | Stop reason: HOSPADM

## 2022-08-21 RX ORDER — HEPARIN SODIUM 5000 [USP'U]/ML
5000 INJECTION, SOLUTION INTRAVENOUS; SUBCUTANEOUS EVERY 8 HOURS SCHEDULED
Status: DISCONTINUED | OUTPATIENT
Start: 2022-08-21 | End: 2022-09-02 | Stop reason: HOSPADM

## 2022-08-21 RX ORDER — LIDOCAINE 4 G/G
1 PATCH TOPICAL DAILY
Status: DISCONTINUED | OUTPATIENT
Start: 2022-08-21 | End: 2022-09-02 | Stop reason: HOSPADM

## 2022-08-21 RX ORDER — BENZTROPINE MESYLATE 1 MG/1
1 TABLET ORAL NIGHTLY
Status: DISCONTINUED | OUTPATIENT
Start: 2022-08-21 | End: 2022-09-02 | Stop reason: HOSPADM

## 2022-08-21 RX ORDER — POLYETHYLENE GLYCOL 3350 17 G/17G
17 POWDER, FOR SOLUTION ORAL DAILY PRN
Status: DISCONTINUED | OUTPATIENT
Start: 2022-08-21 | End: 2022-09-02 | Stop reason: HOSPADM

## 2022-08-21 RX ORDER — FLUCONAZOLE 150 MG/1
150 TABLET ORAL ONCE
Status: COMPLETED | OUTPATIENT
Start: 2022-08-21 | End: 2022-08-21

## 2022-08-21 RX ORDER — CHOLECALCIFEROL (VITAMIN D3) 125 MCG
5 CAPSULE ORAL NIGHTLY
Status: DISCONTINUED | OUTPATIENT
Start: 2022-08-21 | End: 2022-08-21

## 2022-08-21 RX ORDER — SODIUM CHLORIDE 0.9 % (FLUSH) 0.9 %
5-40 SYRINGE (ML) INJECTION PRN
Status: DISCONTINUED | OUTPATIENT
Start: 2022-08-21 | End: 2022-09-02 | Stop reason: HOSPADM

## 2022-08-21 RX ORDER — MECLIZINE HCL 12.5 MG/1
12.5 TABLET ORAL 3 TIMES DAILY PRN
COMMUNITY

## 2022-08-21 RX ORDER — ACETAMINOPHEN 500 MG
1000 TABLET ORAL EVERY 6 HOURS PRN
Status: ON HOLD | COMMUNITY
End: 2022-10-16 | Stop reason: HOSPADM

## 2022-08-21 RX ORDER — SEVELAMER CARBONATE 800 MG/1
800 TABLET, FILM COATED ORAL
Status: DISCONTINUED | OUTPATIENT
Start: 2022-08-21 | End: 2022-08-23

## 2022-08-21 RX ORDER — ASPIRIN 81 MG/1
81 TABLET, CHEWABLE ORAL DAILY
Status: DISCONTINUED | OUTPATIENT
Start: 2022-08-22 | End: 2022-09-02 | Stop reason: HOSPADM

## 2022-08-21 RX ORDER — ACETAMINOPHEN 650 MG/1
650 SUPPOSITORY RECTAL EVERY 6 HOURS PRN
Status: DISCONTINUED | OUTPATIENT
Start: 2022-08-21 | End: 2022-09-02 | Stop reason: HOSPADM

## 2022-08-21 RX ORDER — SODIUM CHLORIDE 0.9 % (FLUSH) 0.9 %
5-40 SYRINGE (ML) INJECTION EVERY 12 HOURS SCHEDULED
Status: DISCONTINUED | OUTPATIENT
Start: 2022-08-21 | End: 2022-09-02 | Stop reason: HOSPADM

## 2022-08-21 RX ADMIN — NYSTATIN 500000 UNITS: 100000 SUSPENSION ORAL at 23:12

## 2022-08-21 RX ADMIN — DOCUSATE SODIUM 100 MG: 100 CAPSULE, LIQUID FILLED ORAL at 14:31

## 2022-08-21 RX ADMIN — BENZTROPINE MESYLATE 1 MG: 1 TABLET ORAL at 22:28

## 2022-08-21 RX ADMIN — VANCOMYCIN HYDROCHLORIDE 1500 MG: 10 INJECTION, POWDER, LYOPHILIZED, FOR SOLUTION INTRAVENOUS at 10:09

## 2022-08-21 RX ADMIN — HEPARIN SODIUM 5000 UNITS: 5000 INJECTION INTRAVENOUS; SUBCUTANEOUS at 22:30

## 2022-08-21 RX ADMIN — FLUCONAZOLE 150 MG: 150 TABLET ORAL at 14:31

## 2022-08-21 RX ADMIN — ROSUVASTATIN CALCIUM 40 MG: 20 TABLET, FILM COATED ORAL at 22:29

## 2022-08-21 RX ADMIN — CALCITRIOL CAPSULES 0.25 MCG 0.25 MCG: 0.25 CAPSULE ORAL at 14:06

## 2022-08-21 RX ADMIN — CEFEPIME 1000 MG: 1 INJECTION, POWDER, FOR SOLUTION INTRAMUSCULAR; INTRAVENOUS at 10:31

## 2022-08-21 RX ADMIN — NEPHROCAP 1 MG: 1 CAP ORAL at 14:03

## 2022-08-21 RX ADMIN — ACETAMINOPHEN 650 MG: 325 TABLET, FILM COATED ORAL at 06:14

## 2022-08-21 RX ADMIN — METOPROLOL SUCCINATE 25 MG: 25 TABLET, EXTENDED RELEASE ORAL at 14:09

## 2022-08-21 RX ADMIN — SODIUM CHLORIDE, PRESERVATIVE FREE 10 ML: 5 INJECTION INTRAVENOUS at 22:39

## 2022-08-21 RX ADMIN — HYDRALAZINE HYDROCHLORIDE 50 MG: 50 TABLET, FILM COATED ORAL at 14:08

## 2022-08-21 RX ADMIN — SENNOSIDES AND DOCUSATE SODIUM 1 TABLET: 50; 8.6 TABLET ORAL at 14:32

## 2022-08-21 RX ADMIN — NYSTATIN 500000 UNITS: 100000 SUSPENSION ORAL at 17:56

## 2022-08-21 RX ADMIN — GABAPENTIN 300 MG: 600 TABLET, FILM COATED ORAL at 22:36

## 2022-08-21 RX ADMIN — QUETIAPINE FUMARATE 200 MG: 200 TABLET ORAL at 22:30

## 2022-08-21 RX ADMIN — DOCUSATE SODIUM 100 MG: 100 CAPSULE, LIQUID FILLED ORAL at 22:29

## 2022-08-21 RX ADMIN — BUPROPION HYDROCHLORIDE 150 MG: 150 TABLET, EXTENDED RELEASE ORAL at 22:29

## 2022-08-21 RX ADMIN — BUPROPION HYDROCHLORIDE 150 MG: 150 TABLET, EXTENDED RELEASE ORAL at 14:04

## 2022-08-21 RX ADMIN — HEPARIN SODIUM 5000 UNITS: 5000 INJECTION INTRAVENOUS; SUBCUTANEOUS at 06:15

## 2022-08-21 RX ADMIN — HYDROXYZINE HYDROCHLORIDE 25 MG: 25 TABLET ORAL at 17:57

## 2022-08-21 RX ADMIN — Medication 10 MG: at 23:13

## 2022-08-21 RX ADMIN — SEVELAMER CARBONATE 800 MG: 800 TABLET, FILM COATED ORAL at 14:10

## 2022-08-21 RX ADMIN — SEVELAMER CARBONATE 800 MG: 800 TABLET, FILM COATED ORAL at 17:57

## 2022-08-21 RX ADMIN — POLYETHYLENE GLYCOL 3350 17 G: 17 POWDER, FOR SOLUTION ORAL at 14:33

## 2022-08-21 RX ADMIN — HEPARIN SODIUM 5000 UNITS: 5000 INJECTION INTRAVENOUS; SUBCUTANEOUS at 14:35

## 2022-08-21 ASSESSMENT — ENCOUNTER SYMPTOMS
ABDOMINAL PAIN: 1
EYE PAIN: 0
DIARRHEA: 0
COUGH: 0
NAUSEA: 0
WHEEZING: 0
VOMITING: 0
SHORTNESS OF BREATH: 0

## 2022-08-21 ASSESSMENT — PAIN DESCRIPTION - LOCATION
LOCATION: ABDOMEN;SHOULDER
LOCATION: SHOULDER

## 2022-08-21 ASSESSMENT — PAIN SCALES - GENERAL
PAINLEVEL_OUTOF10: 8

## 2022-08-21 ASSESSMENT — PAIN SCALES - WONG BAKER: WONGBAKER_NUMERICALRESPONSE: 0

## 2022-08-21 ASSESSMENT — PAIN - FUNCTIONAL ASSESSMENT: PAIN_FUNCTIONAL_ASSESSMENT: 0-10

## 2022-08-21 ASSESSMENT — PAIN DESCRIPTION - DESCRIPTORS: DESCRIPTORS: SHARP

## 2022-08-21 ASSESSMENT — PAIN DESCRIPTION - ORIENTATION: ORIENTATION: LEFT

## 2022-08-21 ASSESSMENT — PAIN DESCRIPTION - FREQUENCY: FREQUENCY: CONTINUOUS

## 2022-08-21 NOTE — ED PROVIDER NOTES
[facility]          ATTENDING PHYSICIAN NOTE       Date of evaluation: 8/20/2022    Chief Complaint     Fatigue, Abdominal Pain, and Shoulder Pain      History of Present Illness     Jaylon Fuller is a 65 y.o. female who presents complaining of fatigue, dizziness, bilateral shoulder pain. Patient states that she been having dizziness which she describes lightheadedness with positional changes and bending over. Feeling very weak. Chest discomfort and shortness of breath. Also with abdominal pain. No vomiting. Has a history of ESRD on PD was unable to do her home dialysis yesterday due to generalized weakness. Review of Systems     Review of Systems   Constitutional:  Positive for fatigue. Negative for chills and fever. HENT:  Negative for congestion. Eyes:  Negative for pain. Respiratory:  Negative for cough, shortness of breath and wheezing. Cardiovascular:  Positive for chest pain. Negative for leg swelling. Gastrointestinal:  Positive for abdominal pain. Negative for diarrhea, nausea and vomiting. Genitourinary:  Negative for dysuria. Musculoskeletal:  Negative for arthralgias. Skin:  Negative for rash. Neurological:  Positive for dizziness. Negative for weakness and headaches. All other systems reviewed and are negative. Past Medical, Surgical, Family, and Social History         Diagnosis Date    JONATHAN (acute kidney injury) ([address] 9/3/2014    Anxiety     Asthma     Bipolar disorder ([address]     Bronchitis     Chronic back pain     Depression     Diabetes mellitus ([address]     DM II (diabetes mellitus, type II), controlled ([address] 9/3/2014    Hypertension          Procedure Laterality Date    ENDOMETRIAL ABLATION      INNER EAR SURGERY      TUBAL LIGATION       Her family history includes Cancer in her maternal grandmother and mother; Diabetes in her mother; Heart Disease in her maternal grandfather.   She reports that she has been smoking cigarettes. She has a 0.75 pack-year smoking history. She has never used smokeless tobacco. She reports that she does not drink alcohol and does not use drugs. Medications     Previous Medications    ACETAMINOPHEN (TYLENOL) 500 MG TABLET    Take 500 mg by mouth every 6 hours as needed for Pain    AMMONIUM LACTATE (LAC-HYDRIN) 12 % LOTION    Apply topically as needed for Dry Skin Apply topically to feet    B COMPLEX-C-FOLIC ACID (VIRT-CAPS) 1 MG CAPS    Take 1 capsule by mouth in the morning. BENZTROPINE (COGENTIN) 1 MG TABLET    Take 1 mg by mouth at bedtime    BUPROPION (WELLBUTRIN SR) 150 MG EXTENDED RELEASE TABLET    Take 150 mg by mouth 2 times daily    CALCITRIOL (ROCALTROL) 0.25 MCG CAPSULE    Take 0.25 mcg by mouth in the morning. DICYCLOMINE (BENTYL) 10 MG CAPSULE    Take 1 capsule by mouth in the morning and 1 capsule at noon and 1 capsule in the evening and 1 capsule before bedtime. DIPHENHYDRAMINE-ZINC ACETATE 2-0.1 % CREAM    Apply topically 3 times daily as needed. DOCUSATE SODIUM (COLACE, DULCOLAX) 100 MG CAPS    Take 100 mg by mouth in the morning and 100 mg before bedtime. DOXEPIN (SINEQUAN) 50 MG CAPSULE    Take 50 mg by mouth nightly One hour before bedtime    EMOLLIENT (DERMAPHOR) OINT OINTMENT    Apply topically 4 times daily as needed    FERROUS SULFATE 325 (65 FE) MG TABLET    Take 325 mg by mouth daily (with breakfast)    GABAPENTIN (NEURONTIN) 600 MG TABLET    Take 300 mg by mouth nightly. GENTAMICIN (GARAMYCIN) 0.1 % CREAM    Apply topically 3 times daily. HYDRALAZINE (APRESOLINE) 50 MG TABLET    Take 1 tablet by mouth in the morning. HYDROXYZINE (ATARAX) 25 MG TABLET    Take 25 mg by mouth 3 times daily as needed (itching from food allergies)    LIDOCAINE (LIDODERM) 5 %    Place 1 patch onto the skin in the morning. 12 hours on, 12 hours off. Yue Gomes     MELATONIN 5 MG TABS TABLET    Take 5 mg by mouth nightly    METOPROLOL SUCCINATE (TOPROL XL) 25 MG EXTENDED RELEASE TABLET    Take 25 mg by mouth daily    MOUTHWASHES (BIOTENE) LIQD ORAL SOLUTION    Swish and spit 15 mLs 3 times daily as needed (dry mouth)    POLYETHYLENE GLYCOL (GLYCOLAX) 17 G PACKET    Take 17 g by mouth in the morning. QUETIAPINE (SEROQUEL) 400 MG TABLET    Take 200 mg by mouth nightly    ROSUVASTATIN (CRESTOR) 40 MG TABLET    Take 40 mg by mouth daily    SENNOSIDES-DOCUSATE SODIUM (SENOKOT-S) 8.6-50 MG TABLET    Take 1 tablet by mouth in the morning. SEVELAMER (RENVELA) 800 MG TABLET    Take 1 tablet by mouth in the morning and 1 tablet at noon and 1 tablet in the evening. Take with meals. TORSEMIDE (DEMADEX) 20 MG TABLET    Take 80 mg by mouth in the morning. VITAMIN D (ERGOCALCIFEROL) 1.25 MG (19538 UT) CAPS CAPSULE    Take 50,000 Units by mouth once a week Sundays       Allergies     She is allergic to latex, banana, other, peanuts [peanut oil], tomato, barley grass, milk-related compounds, and shellfish-derived products. Physical Exam     ED Triage Vitals [08/20/22 2219]   Enc Vitals Group      /72      Heart Rate (!) 103      Resp 16      Temp 98.4 °F (36.9 °C)      Temp Source Oral      SpO2 100 %      Weight 144 lb (65.3 kg)      Height 5' 8\" (1.727 m)      Head Circumference       Peak Flow       Pain Score       Pain Loc       Pain Edu? Excl. in [address]? General:  Non-toxic, no acute distress, fully cooperative with my exam    HEENT:  NCAT, PERRL, EOMI    Neck:  Supple    Pulmonary:   No increased work of breathing; CTAB    Cardiac:  RRR, no murmur, thrill or gallop. Capillary refill <3s. 2+ distal pulses    Abdomen:  Soft, nontender, nondistended; no focal rebound or guarding. PD catheter in place. Musculoskeletal:  Grossly intact without obvious injury or deformity    Neuro: Awake alert and oriented x4, no facial droop, no dysarthria, no dysdiadochokinesia though she is not cooperative with testing finger-to-nose dysmetria due to pain in her shoulders.   Normal gait without ataxia. Normal strength in the arms and legs. Sensation intact to light touch diffusely    Skin: No rashes      Diagnostic Results     EKG   Sinus rhythm. Normal axes and intervals. No acute ST or T wave changes. Overall nonspecific EKG without evidence of acute ischemia. RADIOLOGY:  XR CHEST (2 VW)   Final Result      No acute radiographic abnormality of the chest.      CT HEAD WO CONTRAST   Final Result      No acute intracranial hemorrhage or mass effect.                    LABS:   Results for orders placed or performed during the hospital encounter of 08/20/22   COVID-19, Rapid    Specimen: Nasopharyngeal Swab   Result Value Ref Range    SARS-CoV-2, NAAT Not Detected Not Detected   CBC with Auto Differential   Result Value Ref Range    WBC 8.2 4.0 - 11.0 K/uL    RBC 3.74 (L) 4.00 - 5.20 M/uL    Hemoglobin 9.9 (L) 12.0 - 16.0 g/dL    Hematocrit 30.2 (L) 36.0 - 48.0 %    MCV 80.8 80.0 - 100.0 fL    MCH 26.5 26.0 - 34.0 pg    MCHC 32.8 31.0 - 36.0 g/dL    RDW 14.8 12.4 - 15.4 %    Platelets 873 497 - 955 K/uL    MPV 6.3 5.0 - 10.5 fL    Neutrophils % 64.2 %    Lymphocytes % 25.3 %    Monocytes % 6.3 %    Eosinophils % 3.4 %    Basophils % 0.8 %    Neutrophils Absolute 5.2 1.7 - 7.7 K/uL    Lymphocytes Absolute 2.1 1.0 - 5.1 K/uL    Monocytes Absolute 0.5 0.0 - 1.3 K/uL    Eosinophils Absolute 0.3 0.0 - 0.6 K/uL    Basophils Absolute 0.1 0.0 - 0.2 K/uL   BMP   Result Value Ref Range    Sodium 135 (L) 136 - 145 mmol/L    Potassium 4.0 3.5 - 5.1 mmol/L    Chloride 94 (L) 99 - 110 mmol/L    CO2 27 21 - 32 mmol/L    Anion Gap 14 3 - 16    Glucose 117 (H) 70 - 99 mg/dL    BUN 46 (H) 7 - 20 mg/dL    Creatinine 8.8 (HH) 0.6 - 1.1 mg/dL    GFR Non- 5 (A) >60    GFR  6 (A) >60    Calcium 9.5 8.3 - 10.6 mg/dL   Urinalysis   Result Value Ref Range    Color, UA Yellow Straw/Yellow    Clarity, UA Clear Clear    Glucose, Ur Negative Negative mg/dL    Bilirubin Urine Negative Negative Ketones, Urine TRACE (A) Negative mg/dL    Specific Gravity, UA 1.025 1.005 - 1.030    Blood, Urine Negative Negative    pH, UA 6.0 5.0 - 8.0    Protein, UA 30 (A) Negative mg/dL    Urobilinogen, Urine 0.2 <2.0 E.U./dL    Nitrite, Urine Negative Negative    Leukocyte Esterase, Urine Negative Negative    Microscopic Examination YES     Urine Type Voided    Troponin   Result Value Ref Range    Troponin 0.10 (H) <0.01 ng/mL   Microscopic Urinalysis   Result Value Ref Range    Mucus, UA 1+ (A) None Seen /LPF    WBC, UA 6-9 (A) 0 - 5 /HPF    RBC, UA 3-4 0 - 4 /HPF    Epithelial Cells, UA 21-50 (A) 0 - 5 /HPF    Bacteria, UA 1+ (A) None Seen /HPF    Amorphous, UA 2+ /HPF         RECENT VITALS:  BP: 122/73, Temp: 98.4 °F (36.9 °C), Heart Rate: (!) 103, Resp: 16, SpO2: 92 %     Procedures         ED Course     Nursing Notes, Past Medical Hx, Past Surgical Hx, Social Hx, Allergies, and Family Hx were reviewed. The patient was given the following medications:  Orders Placed This Encounter   Medications    0.9 % sodium chloride bolus       CONSULTS:  Brad 26 / ASSESSMENT / Stacey Leesa is a 65 y.o. female who presents with several complaints including fatigue. EKG nonischemic however troponin was found to be elevated at 0.10. Chest pain is somewhat atypical but given her cardiovascular risk factors with chest pain and elevated troponin I did think she would least benefit from trending MDs out and having cardiology evaluation. Is having more lightheadedness than vertigo. Reassuring examination. Noncontrast head CT was normal.  Labs are consistent with her history of ESRD on PD. Does Glanz abdominal pain but soft on exam.  Have ordered fluid studies but my suspicion for peritonitis is overall lower at this point. Case was discussed with the hospitalist who will admit for further care. Clinical Impression     1.  NSTEMI (non-ST elevated myocardial infarction) [facility])        Disposition     PATIENT REFERRED TO:  No follow-up provider specified.     DISCHARGE MEDICATIONS:  New Prescriptions    No medications on file       DISPOSITION Decision To Admit 08/21/2022 01:57:49 AM         Vladimir Plummer MD  08/21/22 9444

## 2022-08-21 NOTE — CONSULTS
Nephrology Consult Note        [facility]    [employer]       Phone: [phone]                                                  8/21/2022 9:00 AM     Patient: Joann Parks [id number]  A01/A01-01  Date of Admit: 8/20/2022 LOS: 0 days  Referring physician:    Plan:  Continue PD while here and resend cell counts and Cx after proper dwell  Start IV Antibiotics and consult ID  Nystatin swish and swallow  Prevent constipation  Continue home phos binders. Renal diet  Chest pain workup per cardiology  Avoid nephrotoxins  Monitor input, output and weight  Monitor labs and vitals closely  Renally dose all medications    PD orders placed and informed dialysis nurse. Thank you for allowing us to participate in this patient's care    In case of any question please call us at our 24 hour answering service [phone] or from 7 AM to 5 PM via [address] [place] [address] or cell number    Rolando De La Rosa MD  [facility]  [address], [address]  Fax: [phone]  Office: [phone]       Assessment & Plan     Renal function:  ESRD  On PD  No tenderness, erythema or discharge around PD catheter noted  Continue PD while here and resend cell counts and Cx after proper dwell      Possible Peritonitis:   Start IV Antibiotics and consult ID      Electrolytes:  Lab Results   Component Value Date    CREATININE 9.2 ([facility]) 08/21/2022    BUN 45 (H) 08/21/2022     08/21/2022    K 4.0 08/21/2022    CL 98 (L) 08/21/2022    CO2 27 08/21/2022            # CKD- MBD:   Secondary hyperparathyroidism due to renal failure  Monitor Phos level while here. Lab Results   Component Value Date    CALCIUM 9.0 08/21/2022    PHOS 6.9 (H) 07/27/2022         Acid/Base status:  Stable. Volume status/BP:  BP stable  Euvolemic.      Hematology:   CKD related anemia  Goal Hgb 10-11    Lab Results   Component Value Date    IRON 98 08/12/2019    TIBC 275 08/12/2019     Lab Results   Component Value Date of Onset    Diabetes Mother     Cancer Mother     Cancer Maternal Grandmother     Heart Disease Maternal Grandfather          Social History     Social History     Tobacco Use    Smoking status: Some Days     Packs/day: 0.05     Years: 15.00     Pack years: 0.75     Types: Cigarettes    Smokeless tobacco: Never   Substance Use Topics    Alcohol use: No          Past medical, family, and social histories were reviewed as previously documented. Updates were made as necessary. Inpatient Medications and Allergies       Scheduled Meds:   Virt-Caps  1 capsule Oral Daily    benztropine  1 mg Oral Nightly    buPROPion  150 mg Oral BID    calcitRIOL  0.25 mcg Oral Daily    docusate  100 mg Oral BID    doxepin  50 mg Oral Nightly    ferrous sulfate  325 mg Oral Daily with breakfast    gabapentin  300 mg Oral Nightly    hydrALAZINE  50 mg Oral Daily    melatonin  5 mg Oral Nightly    metoprolol succinate  25 mg Oral Daily    polyethylene glycol  17 g Oral Daily    QUEtiapine  200 mg Oral Nightly    rosuvastatin  40 mg Oral Daily    sennosides-docusate sodium  1 tablet Oral Daily    sevelamer  800 mg Oral TID WC    sodium chloride flush  5-40 mL IntraVENous 2 times per day    [START ON 8/22/2022] aspirin  81 mg Oral Daily    heparin (porcine)  5,000 Units SubCUTAneous 3 times per day       Allergies:    Allergies   Allergen Reactions    Latex      Added based on information entered during case entry, please review and add reactions, type, and severity as needed    Banana Hives and Swelling     Swelling of tongue/face    Other Hives and Swelling     POTATOES, TOMATOES, LETTUCE, BANANA, SHRIMP, SOY BEAN, OAT, WHEAT, BARLEY, PEANUTS, PECANS  Potatoes and tomatoes Swelling of tongue/face    Peanuts [Peanut Oil] Hives and Swelling     Swelling of tongue/face    Tomato Hives and Swelling     Swelling of tongue/face    Barley Grass     Milk-Related Compounds     Shellfish-Derived Products          Vital Signs     Vitals: 08/21/22 0758   BP: 122/77   Pulse: 92   Resp: 14   Temp:    SpO2: 96%       No intake or output data in the 24 hours ending 08/21/22 0900      Physical Exam     General appearance: NAD  Head: Normocephalic, without obvious abnormality, atraumatic   Mouth: Moist mucous membrane  Neck: Supple. Lungs: Good air entry bilaterally. No respiratory distress  Heart: S1, S2.  Abdomen: soft, mild tenderness, non-distended  Extremities: No leg edema, warm to touch   Skin: No concerning rashes noted  Psych: good eye contact, normal affect  Neuro: Sleepy.        Laboratory Data     Please see above     Diagnostic Studies   Pertinent images reviewed

## 2022-08-21 NOTE — PLAN OF CARE
Problem: Metabolic/Fluid and Electrolytes - Adult  Goal: Electrolytes maintained within normal limits  Outcome: Progressing  Goal: Hemodynamic stability and optimal renal function maintained  Outcome: Progressing  Goal: Glucose maintained within prescribed range  Outcome: Progressing     Problem: Safety - Adult  Goal: Free from fall injury  Outcome: Progressing     Problem: Discharge Planning  Goal: Discharge to home or other facility with appropriate resources  Outcome: Progressing

## 2022-08-21 NOTE — PROGRESS NOTES
Pt A&Ox4 VSS. Pt planned to start PD this evening. Pt reports receiving PD every day. Pt is tolerating PO intake and denies any N/V and pain at this time. Pt in bed with wheels locked and in the lowest position with call light and personal belongings in reach. Bed alarm is set.      Electronically signed by Miguel Miranda RN on 8/21/2022 at 5:21 PM

## 2022-08-21 NOTE — ED TRIAGE NOTES
Pt presents to ED for weakness, abdominal and shoulder pain. Pt states she was admitted previously for same symptoms. Pt was unable to perform at home dialysis yesterday due to weakness.

## 2022-08-21 NOTE — ED NOTES
Msg sent to Dr. Miya Claire asking about oral medication status since patient is still NPO     Sergio Keita RN  08/21/22 [id number]

## 2022-08-21 NOTE — PROGRESS NOTES
4 Eyes Admission Assessment     I agree as the admission nurse that 2 RN's have performed a thorough Head to Toe Skin Assessment on the patient. ALL assessment sites listed below have been assessed on admission. Areas assessed by both nurses:   [x]   Head, Face, and Ears   [x]   Shoulders, Back, and Chest  [x]   Arms, Elbows, and Hands- healed abrasions  [x]   Coccyx, Sacrum, and Ischium  [x]   Legs, Feet, and Heels- healed abrasions        Does the Patient have Skin Breakdown?   No         Papa Prevention initiated:  No   Wound Care Orders initiated:  No      [facility] nurse consulted for Pressure Injury (Stage 3,4, Unstageable, DTI, NWPT, and Complex wounds) or Papa score 18 or lower:  No      Nurse 1 eSignature: Electronically signed by Joseline Calles RN on 8/21/22 at 3:51 PM EDT    **SHARE this note so that the co-signing nurse is able to place an eSignature**    Nurse 2 eSignature: Electronically signed by Kayden Garrett RN on 8/21/22 at 5:27 PM EDT

## 2022-08-21 NOTE — H&P
Hospital Medicine History & Physical      PCP: Joni Hooper DO, DO    Date of Admission: 8/20/2022    Date of Service: Pt seen/examined on 8/21/2022 and Admitted to Inpatient with expected LOS greater than two midnights due to medical therapy. Chief Complaint: Chest pain      History Of Present Illness:      65 y.o. female with past medical history of ESRD on PD, anxiety, bipolar disorder, depression, diabetes mellitus and hypertension who presented with abdominal pain and chest pain. Symptoms were there for about 2 weeks intermittently. She is more thinking the symptoms localized around the abdominal area and sometimes radiate to the chest.  She is very sleepy to provide details on the history. No shortness of breath. No nausea or vomiting reported. Has regular bowel movement. No dysuria. PD fluid collected in the ER and cell count came back with 359 nucleated cells. She was also complaining of vaginal discharge. Past Medical History:          Diagnosis Date    JONATHAN (acute kidney injury) ([facility] [address] 9/3/2014    Anxiety     Asthma     Bipolar disorder ([facility] [address]     Bronchitis     Chronic back pain     Depression     Diabetes mellitus ([facility] [address]     DM II (diabetes mellitus, type II), controlled ([facility] [address] 9/3/2014    Hypertension        Past Surgical History:          Procedure Laterality Date    ENDOMETRIAL ABLATION      INNER EAR SURGERY      TUBAL LIGATION         Medications Prior to Admission:      Prior to Admission medications    Medication Sig Start Date End Date Taking?  Authorizing Provider   calcitRIOL (ROCALTROL) 0.25 MCG capsule Take 0.25 mcg by mouth daily   Yes Historical Provider, MD   meclizine (ANTIVERT) 12.5 MG tablet Take 12.5 mg by mouth 3 times daily as needed for Dizziness   Yes Historical Provider, MD   acetaminophen (TYLENOL) 500 MG tablet Take 1,000 mg by mouth every 6 hours as needed for Pain (1-2 tablets every 4 hours PRN)   Yes Historical Provider, MD   sennosides-docusate sodium (SENOKOT-S) 8.6-50 MG tablet Take 1 tablet by mouth in the morning. Patient not taking: Reported on 8/21/2022 8/15/22   COSMO Costa   dicyclomine (BENTYL) 10 MG capsule Take 1 capsule by mouth in the morning and 1 capsule at noon and 1 capsule in the evening and 1 capsule before bedtime. 8/15/22   COSMO Costa   diphenhydrAMINE-zinc acetate 2-0.1 % cream Apply topically 3 times daily as needed. Patient not taking: Reported on 8/21/2022 7/27/22   Kedar Samayoa MD   sevelamer (RENVELA) 800 MG tablet Take 1 tablet by mouth in the morning and 1 tablet at noon and 1 tablet in the evening. Take with meals. 7/27/22   Kedar Samayoa MD   rosuvastatin (CRESTOR) 40 MG tablet Take 40 mg by mouth daily  Patient not taking: Reported on 8/21/2022    Historical Provider, MD   calcitRIOL (ROCALTROL) 0.25 MCG capsule Take 0.25 mcg by mouth in the morning. Historical Provider, MD   doxepin (SINEQUAN) 50 MG capsule Take 50 mg by mouth nightly One hour before bedtime    Historical Provider, MD   torsemide (DEMADEX) 20 MG tablet Take 80 mg by mouth in the morning. Patient not taking: Reported on 8/21/2022    Historical Provider, MD   Emollient [facility]) OINT ointment Apply topically 4 times daily as needed  Patient not taking: Reported on 8/21/2022    Historical Provider, MD   ammonium lactate (LAC-HYDRIN) 12 % lotion Apply topically as needed for Dry Skin Apply topically to feet  Patient not taking: Reported on 8/21/2022    Historical Provider, MD   benztropine (COGENTIN) 1 MG tablet Take 1 mg by mouth at bedtime  Patient not taking: Reported on 8/21/2022    Historical Provider, MD   B Complex-C-Folic Acid (VIRT-CAPS) 1 MG CAPS Take 1 capsule by mouth in the morning. Patient not taking: Reported on 8/21/2022    Historical Provider, MD   NIFEdipine (ADALAT CC) 30 MG extended release tablet Take 30 mg by mouth in the morning and 30 mg before bedtime.   7/27/22  Historical Provider, MD       Allergies: Latex, Banana, Other, Peanuts [peanut oil], Tomato, Barley grass, Milk-related compounds, and Shellfish-derived products    Social History:      The patient currently lives alone at home. TOBACCO:   reports that she has been smoking cigarettes. She has a 0.75 pack-year smoking history. She has never used smokeless tobacco.  ETOH:   reports no history of alcohol use. E-cigarette/Vaping       Questions Responses    E-cigarette/Vaping Use     Start Date     Passive Exposure     Quit Date     Counseling Given     Comments               Family History:       Reviewed and negative in regards to presenting illness/complaint. Problem Relation Age of Onset    Diabetes Mother     Cancer Mother     Cancer Maternal Grandmother     Heart Disease Maternal Grandfather        REVIEW OF SYSTEMS COMPLETED:   Pertinent positives as noted in the HPI. All other systems reviewed and negative. PHYSICAL EXAM PERFORMED:    /88   Pulse (!) 101   Temp 98.4 °F (36.9 °C) (Oral)   Resp 18   Ht 5' 8\" (1.727 m)   Wt 144 lb (65.3 kg)   SpO2 98%   BMI 21.90 kg/m²     General appearance:  No apparent distress, appears stated age and cooperative. HEENT:  Normal cephalic, atraumatic without obvious deformity. Pupils equal, round, and reactive to light. Extra ocular muscles intact. Conjunctivae/corneas clear. Neck: Supple, with full range of motion. No jugular venous distention. Trachea midline. Respiratory:  Normal respiratory effort. Clear to auscultation, bilaterally without Rales/Wheezes/Rhonchi. Cardiovascular:  Regular rate and rhythm with normal S1/S2 without murmurs, rubs or gallops. Abdomen: Soft, moderate tenderness, non-distended with normal bowel sounds. PD catheter in place covered with gauze. Musculoskeletal:  No clubbing, cyanosis or edema bilaterally. Full range of motion without deformity. Skin: Skin color, texture, turgor normal.  No rashes or lesions.   Neurologic:  Neurovascularly intact without any focal sensory/motor deficits. Cranial nerves: II-XII intact, grossly non-focal.  Psychiatric:  Alert and oriented, thought content appropriate, normal insight      Labs:     Recent Labs     08/20/22  2335   WBC 8.2   HGB 9.9*   HCT 30.2*        Recent Labs     08/20/22  2335 08/21/22  0628   * 136   K 4.0 4.0   CL 94* 98*   CO2 27 27   BUN 46* 45*   CREATININE 8.8* 9.2*   CALCIUM 9.5 9.0     No results for input(s): AST, ALT, BILIDIR, BILITOT, ALKPHOS in the last 72 hours. No results for input(s): INR in the last 72 hours. Recent Labs     08/20/22 2335 08/21/22  0335 08/21/22  0628   TROPONINI 0.10* 0.08* 0.09*       Urinalysis:      Lab Results   Component Value Date/Time    NITRU Negative 08/20/2022 11:35 PM    WBCUA 6-9 08/20/2022 11:35 PM    BACTERIA 1+ 08/20/2022 11:35 PM    RBCUA 3-4 08/20/2022 11:35 PM    BLOODU Negative 08/20/2022 11:35 PM    SPECGRAV 1.025 08/20/2022 11:35 PM    GLUCOSEU Negative 08/20/2022 11:35 PM       Radiology:       XR CHEST (2 VW)   Final Result      No acute radiographic abnormality of the chest.      CT HEAD WO CONTRAST   Final Result      No acute intracranial hemorrhage or mass effect.                NM MYOCARDIAL SPECT REST EXERCISE OR RX    (Results Pending)       Consults:    IP CONSULT TO HOSPITALIST  IP CONSULT TO CARDIOLOGY  IP CONSULT TO NEPHROLOGY  PHARMACY TO DOSE VANCOMYCIN  IP CONSULT TO INFECTIOUS DISEASES    ASSESSMENT:    Active Hospital Problems    Diagnosis Date Noted    Elevated troponin [R77.8] 08/21/2022     Priority: Medium    Atypical chest pain [R07.89] 09/03/2014   #ESRD on PD  #Abdominal pain, peritonitis  #Anxiety  #Hypertension  #Bipolar disorder  #Diabetes mellitus  #Normocytic anemia  #Vaginitis    PLAN:  -Cardiology consulted with plan for echocardiogram and stress test in the morning.  -Nephrology consulted and following for peritoneal dialysis  -Started on cefepime and vancomycin for peritonitis  -Infectious disease consulted by nephrology  -Continue home meds for anxiety and bipolar disorder  -1 dose of fluconazole 150 mg for yeast infection.  -Supportive care    DVT Prophylaxis: Heparin subcu  Diet: Regular diet. N.p.o. after midnight. Code Status: Full Code    PT/OT Eval Status: N/A    Dispo -Justus Joya MD    Thank you Littie Dakins, DO, DO for the opportunity to be involved in this patient's care. If you have any questions or concerns please feel free to contact me at [phone].

## 2022-08-21 NOTE — ED NOTES
Dr. Teofilo Parry here for consult. States he spoke with dialysis and they plan to start PD on patient this evening and then they will send more fluid for another cell count.       Annalise Calle RN  08/21/22 3636

## 2022-08-21 NOTE — ED NOTES
Cardiology has been perfect served about NPO status and status on taking ordered oral routine medications.       Ollie Apgar, RN  [phone]

## 2022-08-21 NOTE — ED NOTES
Call placed to pharmacy to release ordered medications.  Meds have been fully verified with the patient     Lizet Houston RN  08/21/22 7911

## 2022-08-21 NOTE — CONSULTS
Clinical Pharmacy Progress Note    Vancomycin - Management by Pharmacy    Consult Date(s): 8/21  Consulting Provider(s): Dr Ellis Reach / Plan    Intra-abdominal - empiric peritonitis - Vancomycin  Concurrent Antimicrobials: Cefepime  Day of Vanc Therapy / Ordered Duration: #1  Current Dosing Method: Intermittent Dosing by Levels  Therapeutic Goal: ~ 15 mg/L  Current Dose / Frequency: Intermittent via levels  Plan / Rationale:   Patient is ESRD on PD at home. Will dose vancomycin via levels at this time. Will order a loading dose of 1500mg IV x1 today, and then plan for a random level 8/22 AM.    Will continue to monitor clinical condition and make adjustments to regimen as appropriate. Thank you for consulting Pharmacy! Asiya ReyesD., BCPS   8/21/2022 9:24 AM  Wireless: [phone]      Subjective/Objective: Ms. Art Dominguez is a 65 y.o. female with a PMHx significant for ESRD on PD, HTN, DM type II, asthma, chronic back pain, Bipolar dx, and depression. Admitted for IV antibiotics for empiric peritonitis; PD to continue while cultures in progress. Pharmacy has been consulted to dose vancomycin. Ht Readings from Last 1 Encounters:   08/20/22 5' 8\" (1.727 m)     Wt Readings from Last 1 Encounters:   08/20/22 144 lb (65.3 kg)       Current & Prior Antimicrobial Regimen(s):  Cefepime (8/21-current)  Vancomycin - pharmacy to dose  Intermittent via levels (8/21-current)  Date: Vanc level: Vanc dose:   8/21  Ordered 1500mg   8/22 Ordered             Level(s) / Doses:    Date Time Dose Level / Type of Level Interpretation                 Note: Serum levels collected for AUC-based dosing may be high if collected in close proximity to the dose administered. This is not necessarily indicative of toxicity. Cultures & Sensitivities:    Date Site Micro Susceptibility / Result   8/21 PD fluid Ordered            Labs / Ancillary Data:    No estimate of CrCL, as the patient is ESRD on PD. Recent Labs     08/20/22  2335 08/21/22  0628   CREATININE 8.8* 9.2*   BUN 46* 45*   WBC 8.2  --        Additional Lab Values / Findings of Note:    Procalcitonin: No results for input(s): PROCAL in the last 72 hours.

## 2022-08-22 PROBLEM — R10.30 LOWER ABDOMINAL PAIN: Status: ACTIVE | Noted: 2022-08-22

## 2022-08-22 LAB
ALBUMIN SERPL-MCNC: 3 G/DL (ref 3.4–5)
ANION GAP SERPL CALCULATED.3IONS-SCNC: 10 MMOL/L (ref 3–16)
APPEARANCE FLUID: CLEAR
BUN BLDV-MCNC: 41 MG/DL (ref 7–20)
CALCIUM SERPL-MCNC: 9.1 MG/DL (ref 8.3–10.6)
CELL COUNT FLUID TYPE: NORMAL
CHLORIDE BLD-SCNC: 98 MMOL/L (ref 99–110)
CLOT EVALUATION: NORMAL
CO2: 28 MMOL/L (ref 21–32)
COLOR FLUID: COLORLESS
CREAT SERPL-MCNC: 7.8 MG/DL (ref 0.6–1.1)
FLUID DIFF COMMENT: NORMAL
GFR AFRICAN AMERICAN: 6
GFR NON-AFRICAN AMERICAN: 5
GLUCOSE BLD-MCNC: 163 MG/DL (ref 70–99)
HCT VFR BLD CALC: 29.2 % (ref 36–48)
HEMOGLOBIN: 9.5 G/DL (ref 12–16)
LV EF: 53 %
LV EF: 64 %
LVEF MODALITY: NORMAL
LVEF MODALITY: NORMAL
MCH RBC QN AUTO: 26.2 PG (ref 26–34)
MCHC RBC AUTO-ENTMCNC: 32.6 G/DL (ref 31–36)
MCV RBC AUTO: 80.2 FL (ref 80–100)
NUCLEATED CELLS FLUID: 10 /CUMM
PDW BLD-RTO: 14.9 % (ref 12.4–15.4)
PHOSPHORUS: 5.6 MG/DL (ref 2.5–4.9)
PLATELET # BLD: 253 K/UL (ref 135–450)
PMV BLD AUTO: 6.5 FL (ref 5–10.5)
POTASSIUM SERPL-SCNC: 3.7 MMOL/L (ref 3.5–5.1)
RBC # BLD: 3.64 M/UL (ref 4–5.2)
RBC FLUID: 0 /CUMM
SODIUM BLD-SCNC: 136 MMOL/L (ref 136–145)
VANCOMYCIN RANDOM: 21.4 UG/ML
WBC # BLD: 6.9 K/UL (ref 4–11)

## 2022-08-22 PROCEDURE — 2580000003 HC RX 258: Performed by: STUDENT IN AN ORGANIZED HEALTH CARE EDUCATION/TRAINING PROGRAM

## 2022-08-22 PROCEDURE — C8929 TTE W OR WO FOL WCON,DOPPLER: HCPCS

## 2022-08-22 PROCEDURE — 87205 SMEAR GRAM STAIN: CPT

## 2022-08-22 PROCEDURE — 89050 BODY FLUID CELL COUNT: CPT

## 2022-08-22 PROCEDURE — 3430000000 HC RX DIAGNOSTIC RADIOPHARMACEUTICAL: Performed by: INTERNAL MEDICINE

## 2022-08-22 PROCEDURE — 99255 IP/OBS CONSLTJ NEW/EST HI 80: CPT | Performed by: INTERNAL MEDICINE

## 2022-08-22 PROCEDURE — 85027 COMPLETE CBC AUTOMATED: CPT

## 2022-08-22 PROCEDURE — 6370000000 HC RX 637 (ALT 250 FOR IP): Performed by: INTERNAL MEDICINE

## 2022-08-22 PROCEDURE — 93017 CV STRESS TEST TRACING ONLY: CPT

## 2022-08-22 PROCEDURE — 80202 ASSAY OF VANCOMYCIN: CPT

## 2022-08-22 PROCEDURE — 78452 HT MUSCLE IMAGE SPECT MULT: CPT

## 2022-08-22 PROCEDURE — 99223 1ST HOSP IP/OBS HIGH 75: CPT | Performed by: INTERNAL MEDICINE

## 2022-08-22 PROCEDURE — 87070 CULTURE OTHR SPECIMN AEROBIC: CPT

## 2022-08-22 PROCEDURE — A9502 TC99M TETROFOSMIN: HCPCS | Performed by: INTERNAL MEDICINE

## 2022-08-22 PROCEDURE — 36415 COLL VENOUS BLD VENIPUNCTURE: CPT

## 2022-08-22 PROCEDURE — 80069 RENAL FUNCTION PANEL: CPT

## 2022-08-22 PROCEDURE — 6360000002 HC RX W HCPCS: Performed by: INTERNAL MEDICINE

## 2022-08-22 PROCEDURE — 2580000003 HC RX 258: Performed by: INTERNAL MEDICINE

## 2022-08-22 PROCEDURE — 1200000000 HC SEMI PRIVATE

## 2022-08-22 PROCEDURE — 87102 FUNGUS ISOLATION CULTURE: CPT

## 2022-08-22 PROCEDURE — 6360000002 HC RX W HCPCS: Performed by: STUDENT IN AN ORGANIZED HEALTH CARE EDUCATION/TRAINING PROGRAM

## 2022-08-22 PROCEDURE — 6370000000 HC RX 637 (ALT 250 FOR IP): Performed by: STUDENT IN AN ORGANIZED HEALTH CARE EDUCATION/TRAINING PROGRAM

## 2022-08-22 RX ORDER — POLYETHYLENE GLYCOL 3350 17 G/17G
17 POWDER, FOR SOLUTION ORAL 2 TIMES DAILY
Status: DISCONTINUED | OUTPATIENT
Start: 2022-08-22 | End: 2022-09-02 | Stop reason: HOSPADM

## 2022-08-22 RX ORDER — HYDRALAZINE HYDROCHLORIDE 20 MG/ML
5 INJECTION INTRAMUSCULAR; INTRAVENOUS EVERY 6 HOURS PRN
Status: DISCONTINUED | OUTPATIENT
Start: 2022-08-22 | End: 2022-09-02 | Stop reason: HOSPADM

## 2022-08-22 RX ORDER — OXYCODONE HYDROCHLORIDE AND ACETAMINOPHEN 5; 325 MG/1; MG/1
1 TABLET ORAL EVERY 6 HOURS PRN
Status: DISCONTINUED | OUTPATIENT
Start: 2022-08-22 | End: 2022-08-23

## 2022-08-22 RX ADMIN — HEPARIN SODIUM 5000 UNITS: 5000 INJECTION INTRAVENOUS; SUBCUTANEOUS at 07:57

## 2022-08-22 RX ADMIN — HEPARIN SODIUM 5000 UNITS: 5000 INJECTION INTRAVENOUS; SUBCUTANEOUS at 21:15

## 2022-08-22 RX ADMIN — DOXEPIN HYDROCHLORIDE 50 MG: 25 CAPSULE ORAL at 21:15

## 2022-08-22 RX ADMIN — CALCITRIOL CAPSULES 0.25 MCG 0.25 MCG: 0.25 CAPSULE ORAL at 09:32

## 2022-08-22 RX ADMIN — NYSTATIN 500000 UNITS: 100000 SUSPENSION ORAL at 21:07

## 2022-08-22 RX ADMIN — GABAPENTIN 300 MG: 600 TABLET, FILM COATED ORAL at 21:02

## 2022-08-22 RX ADMIN — SODIUM CHLORIDE: 9 INJECTION, SOLUTION INTRAVENOUS at 10:00

## 2022-08-22 RX ADMIN — QUETIAPINE FUMARATE 200 MG: 200 TABLET ORAL at 21:01

## 2022-08-22 RX ADMIN — NYSTATIN 500000 UNITS: 100000 SUSPENSION ORAL at 14:08

## 2022-08-22 RX ADMIN — TETROFOSMIN 10 MILLICURIE: 1.38 INJECTION, POWDER, LYOPHILIZED, FOR SOLUTION INTRAVENOUS at 10:10

## 2022-08-22 RX ADMIN — NYSTATIN 500000 UNITS: 100000 SUSPENSION ORAL at 09:33

## 2022-08-22 RX ADMIN — NEPHROCAP 1 MG: 1 CAP ORAL at 09:31

## 2022-08-22 RX ADMIN — METOPROLOL SUCCINATE 25 MG: 25 TABLET, EXTENDED RELEASE ORAL at 09:32

## 2022-08-22 RX ADMIN — OXYCODONE AND ACETAMINOPHEN 1 TABLET: 5; 325 TABLET ORAL at 15:36

## 2022-08-22 RX ADMIN — HYDRALAZINE HYDROCHLORIDE 50 MG: 50 TABLET, FILM COATED ORAL at 09:31

## 2022-08-22 RX ADMIN — ROSUVASTATIN CALCIUM 40 MG: 20 TABLET, FILM COATED ORAL at 21:01

## 2022-08-22 RX ADMIN — HEPARIN SODIUM 5000 UNITS: 5000 INJECTION INTRAVENOUS; SUBCUTANEOUS at 14:23

## 2022-08-22 RX ADMIN — FERROUS SULFATE TAB 325 MG (65 MG ELEMENTAL FE) 325 MG: 325 (65 FE) TAB at 09:45

## 2022-08-22 RX ADMIN — SEVELAMER CARBONATE 800 MG: 800 TABLET, FILM COATED ORAL at 17:02

## 2022-08-22 RX ADMIN — DOXEPIN HYDROCHLORIDE 50 MG: 25 CAPSULE ORAL at 00:11

## 2022-08-22 RX ADMIN — REGADENOSON 0.4 MG: 0.08 INJECTION, SOLUTION INTRAVENOUS at 11:39

## 2022-08-22 RX ADMIN — SEVELAMER CARBONATE 800 MG: 800 TABLET, FILM COATED ORAL at 09:45

## 2022-08-22 RX ADMIN — BUPROPION HYDROCHLORIDE 150 MG: 150 TABLET, EXTENDED RELEASE ORAL at 09:31

## 2022-08-22 RX ADMIN — SODIUM CHLORIDE, PRESERVATIVE FREE 10 ML: 5 INJECTION INTRAVENOUS at 21:07

## 2022-08-22 RX ADMIN — DOCUSATE SODIUM 100 MG: 100 CAPSULE, LIQUID FILLED ORAL at 09:33

## 2022-08-22 RX ADMIN — TETROFOSMIN 30 MILLICURIE: 1.38 INJECTION, POWDER, LYOPHILIZED, FOR SOLUTION INTRAVENOUS at 11:39

## 2022-08-22 RX ADMIN — SENNOSIDES AND DOCUSATE SODIUM 1 TABLET: 50; 8.6 TABLET ORAL at 09:31

## 2022-08-22 RX ADMIN — ASPIRIN 81 MG 81 MG: 81 TABLET ORAL at 09:33

## 2022-08-22 RX ADMIN — SEVELAMER CARBONATE 800 MG: 800 TABLET, FILM COATED ORAL at 14:08

## 2022-08-22 RX ADMIN — Medication 10 MG: at 21:01

## 2022-08-22 RX ADMIN — BENZTROPINE MESYLATE 1 MG: 1 TABLET ORAL at 21:01

## 2022-08-22 RX ADMIN — DOCUSATE SODIUM 100 MG: 100 CAPSULE, LIQUID FILLED ORAL at 21:01

## 2022-08-22 RX ADMIN — CEFEPIME 1000 MG: 1 INJECTION, POWDER, FOR SOLUTION INTRAMUSCULAR; INTRAVENOUS at 09:51

## 2022-08-22 RX ADMIN — HYDRALAZINE HYDROCHLORIDE 5 MG: 20 INJECTION INTRAMUSCULAR; INTRAVENOUS at 16:17

## 2022-08-22 RX ADMIN — BUPROPION HYDROCHLORIDE 150 MG: 150 TABLET, EXTENDED RELEASE ORAL at 21:01

## 2022-08-22 RX ADMIN — SODIUM CHLORIDE, PRESERVATIVE FREE 10 ML: 5 INJECTION INTRAVENOUS at 09:34

## 2022-08-22 RX ADMIN — POLYETHYLENE GLYCOL 3350 17 G: 17 POWDER, FOR SOLUTION ORAL at 09:32

## 2022-08-22 RX ADMIN — POLYETHYLENE GLYCOL 3350 17 G: 17 POWDER, FOR SOLUTION ORAL at 21:01

## 2022-08-22 RX ADMIN — NYSTATIN 500000 UNITS: 100000 SUSPENSION ORAL at 17:02

## 2022-08-22 ASSESSMENT — PAIN SCALES - GENERAL
PAINLEVEL_OUTOF10: 8
PAINLEVEL_OUTOF10: 5
PAINLEVEL_OUTOF10: 9
PAINLEVEL_OUTOF10: 8

## 2022-08-22 ASSESSMENT — PAIN DESCRIPTION - LOCATION
LOCATION: ABDOMEN;SHOULDER
LOCATION: ABDOMEN;SHOULDER

## 2022-08-22 NOTE — PROGRESS NOTES
[facility] Daily Progress Note      Admit Date:  8/20/2022    Subjective:  Ms. Nidhi Ruelas was seen and examined. F/U cp/trop/RF. No specific complaints. Denies cp.     Objective:   /84   Pulse 79   Temp 97.4 °F (36.3 °C) (Oral)   Resp 16   Ht 5' 8\" (1.727 m)   Wt 140 lb 10.5 oz (63.8 kg)   SpO2 100%   BMI 21.39 kg/m²     Intake/Output Summary (Last 24 hours) at 8/22/2022 1019  Last data filed at 8/22/2022 0555  Gross per 24 hour   Intake 72.7 ml   Output 443 ml   Net -370.3 ml       TELEMETRY: Sinus     Physical Exam:  General:  Awake, alert, NAD  Skin:  Warm and dry  Neck:  JVP not elevated  Chest:  Clear to auscultation, respiration normal  Cardiovascular:  RRR S1S2  Abdomen:  Soft nontender  Extremities:  no edema    Medications:    polyethylene glycol  17 g Oral BID    b complex-C-folic acid  1 capsule Oral Daily    benztropine  1 mg Oral Nightly    buPROPion  150 mg Oral BID    calcitRIOL  0.25 mcg Oral Daily    docusate sodium  100 mg Oral BID    doxepin  50 mg Oral Nightly    ferrous sulfate  325 mg Oral Daily with breakfast    gabapentin  300 mg Oral Nightly    hydrALAZINE  50 mg Oral Daily    metoprolol succinate  25 mg Oral Daily    QUEtiapine  200 mg Oral Nightly    rosuvastatin  40 mg Oral Nightly    sennosides-docusate sodium  1 tablet Oral Daily    sevelamer  800 mg Oral TID WC    sodium chloride flush  5-40 mL IntraVENous 2 times per day    aspirin  81 mg Oral Daily    heparin (porcine)  5,000 Units SubCUTAneous 3 times per day    cefepime  1,000 mg IntraVENous Q24H    dianeal lo-josh 1.5%  8,000 mL IntraPERitoneal Daily    nystatin  5 mL Oral 4x Daily    vancomycin (VANCOCIN) intermittent dosing (placeholder)   Other RX Placeholder    melatonin  10 mg Oral Nightly    lidocaine  1 patch TransDERmal Daily      sodium chloride 25 mL/hr at 08/22/22 1000     technetium tetrofosmin, technetium tetrofosmin, regadenoson, hydrOXYzine HCl, sodium chloride flush, sodium chloride,

## 2022-08-22 NOTE — FLOWSHEET NOTE
Disconnected from [facility]CPD per protocol. Effluent: clear yellow, no fibrin noted. Total time: 9 hr 48 min   Total UF:  409 ml. Total Volume:  8013 ml. Dwell time gained:  0 hr 6 min. Pt Tolerated procedure without difficulty. Report given to: Shelton Lamb RN  Last BM: 8/20/22     Effluent obtained and sent to lab per orders.         08/22/22 0555   Vitals   /79   Temp 97.6 °F (36.4 °C)   Temp Source Oral   Heart Rate 82   Resp 17   Weight 140 lb 10.5 oz (63.8 kg)   Peritoneal Dialysis Catheter Mid lower abdomen   Placement Date: 07/22/22   Present on Admission/Arrival: Yes  Catheter Location: Mid lower abdomen   Status Deaccessed   Site Condition Clean, dry, intact   Dressing Gauze   Dialysis Type Continuous cycling   Exit Site Condition Clean, dry   Catheter Care Given Yes   Cycler   Ultrafiltration (UF) (mL) 409 mL

## 2022-08-22 NOTE — FLOWSHEET NOTE
[facility] Order   Exchanges: 4   Exchange Volume: 2000 ml   Total Time: 9 hrs   Dextrose: 1.5%   Last Fill: 0 ml   Total Volume: 8000 ml     Orders verified. Supplies taken to pt's room. Report received. Cycler set up, primed and pre tested. Dressing changed on [facility] Cath site. Pt connected to cycler. CCPD initiated without problem. Initial effluent clear. If problems should arise please call the [phone] number on top of PD cycler machine.        If problems persist please call [phone]

## 2022-08-22 NOTE — FLOWSHEET NOTE
[facility]PD Order   Exchanges: 4   Exchange Volume: 2000 ml   Total Time: 9 hrs   Dextrose: 1.5%   Last Fill: 0 ml   Total Volume: 8000 ml     Orders verified. Supplies taken to pt's room. Report received. Cycler set up, primed and pre tested. Dressing changed on [facility] Cath site. Pt connected to cycler. CCPD initiated without problem. Initial effluent clear. If problems should arise please call the [phone] number on top of PD cycler machine.            08/22/22 1800   Vital Signs   /83   Temp 98.2 °F (36.8 °C)   Heart Rate 87   Resp 16   SpO2 100 %   Weight 142 lb 3.2 oz (64.5 kg)   Weight Method Actual;Standing scale   Percent Weight Change 1.1

## 2022-08-22 NOTE — PROGRESS NOTES
Patient A&Ox4. VSS. PD running in room over night without issues. Patient is complaining of R shoulder/neck pain - lidocaine patch applied with relief. Patient remains NPO since midnight. No complaints of N/V. Pt takes all PO well one at a time. Bed alarm is on, bed locked in lowest position. Call light and table within reach.

## 2022-08-22 NOTE — PROGRESS NOTES
Comprehensive Nutrition Assessment    Type and Reason for Visit:  Initial, Positive Nutrition Screen    Nutrition Recommendations/Plan:   ADAT per MD  Once diet advances add Glucerna BID  Monitor for intakes once diet advances pt at risk for malnutrition, insufficient data  Monitor nutrition adequacy, pertinent labs, bowel habits, wt changes, and clinical progress     Malnutrition Assessment:  Malnutrition Status: At risk for malnutrition (Comment) (08/22/22 1259)    Context:  Acute Illness     Findings of the 6 clinical characteristics of malnutrition:  Energy Intake:  Unable to assess  Weight Loss:  Greater than 5% over 1 month (8% in 1 month period)       Nutrition Assessment:    Positive nutrition screen: Attempted to visit pt, in cardiology. Pt admitted w/ atypical chest pain, PMHx of ESRD on PD, T2DM and HTN. Pt is currently NPO, no intakes to assess. Per EMR pt has had 8% wt loss in past month. Not enough information to assess malnutrition, however pt is at risk. Once pt diet is advanced will add Glucerna BID and follow up w/ pt to obtain history and assess intakes. Will continue to monitor. Nutrition Related Findings:    Labs reviewed. On PD. Active bowel sounds. Abdomen distended, constipated. Last BM 8/19. BUN 41. Cr 7.8 GFR 5. Phos 5.6. . Wound Type: None       Current Nutrition Intake & Therapies:    Average Meal Intake: NPO  Average Supplements Intake: NPO  Diet NPO    Anthropometric Measures:  Height: 5' 7.99\" (172.7 cm)  Ideal Body Weight (IBW): 140 lbs (64 kg)       Current Body Weight: 140 lb 10.5 oz (63.8 kg), 100.5 % IBW. Weight Source: Standing Scale  Current BMI (kg/m2): 21.4        Weight Adjustment For: No Adjustment                 BMI Categories: Normal Weight (BMI 18.5-24. 9)      Nutrition Diagnosis:   Predicted inadequate energy intake related to inadequate protein-energy intake as evidenced by poor intake prior to admission    Nutrition Interventions:   Food and/or Nutrient Delivery: Start Oral Diet, Start Oral Nutrition Supplement  Nutrition Education/Counseling: Education not indicated  Coordination of Nutrition Care: Continue to monitor while inpatient       Goals:  Previous Goal Met: Progressing toward Goal(s)  Goals: Initiate PO diet, within 2 days       Nutrition Monitoring and Evaluation:   Behavioral-Environmental Outcomes: None Identified  Food/Nutrient Intake Outcomes: Diet Advancement/Tolerance  Physical Signs/Symptoms Outcomes: Biochemical Data, Nutrition Focused Physical Findings, Weight, Constipation    Discharge Planning:     Too soon to determine     Marylene Monaco, [address] [place] [address],   Contact: [phone]

## 2022-08-22 NOTE — PROGRESS NOTES
Clinical Pharmacy Progress Note    Vancomycin - Management by Pharmacy    Consult Date(s): 8/21  Consulting Provider(s): Dr Jovan Sewell / Jairo Hughes:  1)  Intra-abdominal infection - possible PD peritonitis - Vancomycin  Concurrent Antimicrobials: Cefepime  Day of Vanc Therapy / Ordered Duration: #2  Current Dosing Method: Intermittent Dosing by Levels  Therapeutic Goal: Trough ~ 15 mg/L  Plan:  Patient is ESRD on PD at home. Will dose vancomycin intermittently via levels at this time. Random level this AM = 21.4 mcg/mL. Will not give any Vancomycin today as levels will predictably remain therapeutic through at least tomorrow AM.  Random level is ordered for tomorrow AM.  Will continue to monitor clinical condition and make adjustments to regimen as appropriate. Please call with questions--  Thanks--  Shahram Estes, PharmD, BCPS, BCGP  [id number] ([facility])   8/22/2022 9:17 AM        Interval update:  PD fluid culture with no growth thus far. Tolerated PD overnight per notes. Stress test, echo, and cardiology evaluation pending. Subjective/Objective:  Bean Lozano is a 65 y.o. female with a PMHx significant for ESRD on PD, HTN, DM type II, asthma, chronic back pain, Bipolar dx, and depression who is admitted with suspected PD associated peritonitis. Pt also with chest pain & elevated troponin. Pharmacy is consulted to dose vancomycin.     Ht Readings from Last 1 Encounters:   08/20/22 5' 8\" (1.727 m)     Wt Readings from Last 1 Encounters:   08/22/22 140 lb 10.5 oz (63.8 kg)       Current & Prior Antimicrobial Regimen(s):  Cefepime 1000mg EI q24h (8/21-current)  Vancomycin - pharmacy to dose  Intermittent via levels (8/21-current)    Date: Vanc level: Vanc dose:   8/21  1500mg   8/22 21.4 mcg/mL --          Cultures & Sensitivities:    Date Site Micro Susceptibility / Result   8/21 PD fluid No growth to date            Labs / Ancillary Data:    No estimate of CrCL, as the patient is ESRD on PD.     Recent Labs     08/20/22  2335 08/21/22  0628 08/22/22  0604   CREATININE 8.8* 9.2* 7.8*   BUN 46* 45* 41*   WBC 8.2  --  6.9

## 2022-08-22 NOTE — PROGRESS NOTES
Pt A&Ox4 BP elevated this shift, hydralazine given, check MAR. Pt unsteady with gait and claims to fall at home a lot. Pt X1 contact guard assist for ambulation, fall precautions set. Pt starting PD this evening. Pt reports pain in abd and shoulder. Lidocaine patch applied. Pt denies N/V this shift. Pt tolerating PO intake. Pt in bed with wheels locked and in the lowest position with call light and personal belongings in reach. Bed alarm is set.      Electronically signed by Kendal Wheeler RN on 8/22/2022 at 6:36 PM

## 2022-08-22 NOTE — PROGRESS NOTES
Nephrology Consult Note        [facility]    [facility]       Phone: [phone]                                                  8/22/2022 7:59 AM     Patient: Deborah Mitchell [id number]  [id number]  Date of Admit: 8/20/2022 LOS: 1 days  Referring physician:    Plan:  Patient feel fine  Vitals stable  On room air  Lytes stable  Still has lower abdominal pain  Chest pain better  No SOB. Continue PD while here   Follow repeat cell counts and Cx after proper dwell  IV Antibiotics for now  Consulted ID  Nystatin swish and swallow  Increase stool softeners. Patient not having regular bowel movements. Continue home phos binders. Renal diet  Chest pain workup per cardiology  Avoid nephrotoxins  Monitor input, output and weight  Monitor labs and vitals closely  Renally dose all medications    PD orders placed and informed dialysis nurse. Do not know if patient finished last Peritonitis treatment. Left message for [place] PD nurse to call me    Thank you for allowing us to participate in this patient's care    In case of any question please call us at our 24 hour answering service [phone] or from 7 AM to 5 PM via [address] [place] [address] or cell number    Grayson Munguia MD  [facility]  Melva Professor [address], [address]  Fax: [phone]  Office: [phone]       Assessment & Plan     Renal function:  ESRD  On PD  No tenderness, erythema or discharge around PD catheter noted  Continue PD while here and resend cell counts and Cx after proper dwell      Possible Peritonitis:   Antibiotics and consult ID      Electrolytes:  Lab Results   Component Value Date    CREATININE 7.8 (HH) 08/22/2022    BUN 41 (H) 08/22/2022     08/22/2022    K 3.7 08/22/2022    CL 98 (L) 08/22/2022    CO2 28 08/22/2022            # CKD- MBD:   Secondary hyperparathyroidism due to renal failure  Monitor Phos level while here.    Lab Results   Component Value Date    CALCIUM 9.1 08/22/2022    PHOS 5.6 (H) 08/22/2022         Acid/Base status:  Stable. Volume status/BP:  BP stable  Euvolemic. Hematology:   CKD related anemia  Goal Hgb 10-11    Lab Results   Component Value Date    IRON 98 08/12/2019    TIBC 275 08/12/2019     Lab Results   Component Value Date    WBC 6.9 08/22/2022    HGB 9.5 (L) 08/22/2022    HCT 29.2 (L) 08/22/2022    MCV 80.2 08/22/2022     08/22/2022             Reason for Consult and Chief Complaint     Reason for consult: ESRD    Chief complaint:   Chief Complaint   Patient presents with    Fatigue    Abdominal Pain    Shoulder Pain       History of Present Illness   Anny Buckner is a 65 y.o. with PMH significant for ESRD on PD being admitted with abdominal pain and chest pain. Per patient abdominal pain is ongoing for 2 weeks. Patient seen in ER and she is sleepy and goes back to sleep after answering questions. Not sure about PD fluid color. PD fluid cell count checked in ER and it showed 359 nucleated cells. Do not know if PD fluid was collected from dry abdomen or after proper dwell. ER MD is now not here. No SOB. No GI symptoms      Review of Systems   Positive in bold or unable to assess     GEN: Fever, chills, night sweats. HEENT: Changes in vision, sore throat, rhinorrhea   CVS: Chest pain, palpitations, swelling or edema in legs  Pulmonary: Cough, hemoptysis, SOB  GI: Nausea, vomiting, diarrhea, constipation, abdominal pain  : Bladder incontinence, dysuria,hematuria. MSK: Muscle or joint or bone pains  Skin: Rashes, ulcers, skin thickness  CNS: Headache, dizziness, confusion, focal weakness, seizure. Psych: Anxiety, agitation, depression. Reviewed all 12 systems, negative except as above.      Past Medical History     Past Medical History:   Diagnosis Date    JONATHAN (acute kidney injury) ([facility] [address] [facility] 9/3/2014    Anxiety     Asthma     Bipolar disorder ([facility] [facility]     Bronchitis     Chronic back pain     Depression     Diabetes mellitus ([facility] [address] [facility]     DM II (diabetes mellitus, type II), controlled ([facility] [address] 9/3/2014    Hypertension          Past Surgical History     Past Surgical History:   Procedure Laterality Date    ENDOMETRIAL ABLATION      INNER EAR SURGERY      TUBAL LIGATION           Family History     Family History   Problem Relation Age of Onset    Diabetes Mother     Cancer Mother     Cancer Maternal Grandmother     Heart Disease Maternal Grandfather          Social History     Social History     Tobacco Use    Smoking status: Some Days     Packs/day: 0.05     Years: 15.00     Pack years: 0.75     Types: Cigarettes    Smokeless tobacco: Never   Substance Use Topics    Alcohol use: No          Past medical, family, and social histories were reviewed as previously documented. Updates were made as necessary. Inpatient Medications and Allergies       Scheduled Meds:   b complex-C-folic acid  1 capsule Oral Daily    benztropine  1 mg Oral Nightly    buPROPion  150 mg Oral BID    calcitRIOL  0.25 mcg Oral Daily    docusate sodium  100 mg Oral BID    doxepin  50 mg Oral Nightly    ferrous sulfate  325 mg Oral Daily with breakfast    gabapentin  300 mg Oral Nightly    hydrALAZINE  50 mg Oral Daily    metoprolol succinate  25 mg Oral Daily    polyethylene glycol  17 g Oral Daily    QUEtiapine  200 mg Oral Nightly    rosuvastatin  40 mg Oral Nightly    sennosides-docusate sodium  1 tablet Oral Daily    sevelamer  800 mg Oral TID WC    sodium chloride flush  5-40 mL IntraVENous 2 times per day    aspirin  81 mg Oral Daily    heparin (porcine)  5,000 Units SubCUTAneous 3 times per day    cefepime  1,000 mg IntraVENous Q24H    dianeal lo-josh 1.5%  8,000 mL IntraPERitoneal Daily    nystatin  5 mL Oral 4x Daily    vancomycin (VANCOCIN) intermittent dosing (placeholder)   Other RX Placeholder    melatonin  10 mg Oral Nightly    lidocaine  1 patch TransDERmal Daily       Allergies:    Allergies   Allergen Reactions    Latex      Added based on information entered during case entry, please review and add reactions, type, and severity as needed    Banana Hives and Swelling     Swelling of tongue/face    Other Hives and Swelling     POTATOES, TOMATOES, LETTUCE, BANANA, SHRIMP, SOY BEAN, OAT, WHEAT, BARLEY, PEANUTS, PECANS  Potatoes and tomatoes Swelling of tongue/face    Peanuts [Peanut Oil] Hives and Swelling     Swelling of tongue/face    Tomato Hives and Swelling     Swelling of tongue/face    Barley Grass     Milk-Related Compounds     Shellfish-Derived Products          Vital Signs     Vitals:    08/22/22 0555   BP: 129/79   Pulse: 82   Resp: 17   Temp: 97.6 °F (36.4 °C)   SpO2:          Intake/Output Summary (Last 24 hours) at 8/22/2022 0759  Last data filed at 8/22/2022 0555  Gross per 24 hour   Intake 72.7 ml   Output 443 ml   Net -370.3 ml         Physical Exam     General appearance: NAD  Head: Normocephalic, without obvious abnormality, atraumatic   Mouth: Moist mucous membrane  Neck: Supple. Lungs: Good air entry bilaterally. No respiratory distress  Heart: S1, S2.  Abdomen: soft, mild tenderness in lower abdomen,  non-distended  Extremities: No leg edema, warm to touch   Skin: No concerning rashes noted  Psych: good eye contact, normal affect  Neuro: AAO x 3.        Laboratory Data     Please see above     Diagnostic Studies   Pertinent images reviewed

## 2022-08-22 NOTE — PROGRESS NOTES
Pt BP elevated, notified MD. Awaiting new orders.      Electronically signed by Liz Ibrahim RN on 8/22/2022 at 2:53 PM

## 2022-08-22 NOTE — CONSULTS
Infectious Diseases Inpatient Consult Note    Reason for Consult:   Concern PD peritonitis  Requesting Physician:   Dr Ofelia Hartman  Primary Care Physician:  Munir Spencer DO, DO  History Obtained From:   Pt, EPIC    Admit Date: 8/20/2022  Hospital Day: 3    CHIEF COMPLAINT:       Chief Complaint   Patient presents with    Fatigue    Abdominal Pain    Shoulder Pain       HISTORY OF PRESENT ILLNESS:      66 yo man with hx DM, HTN, CRF on PD, LBP, bipolar d/o    Admit 7/22-27 with fall. Pt had elevated WBC in PD fluid, cult neg  Plan to give Vanco + Cefepime in dialyate x 5 days    Poor / confusing historian --  Presents with chest pain - 2 weeks, intermittent, no asssoc with activity, no SOB  Pt also c/o lower abd pain, had improved after last hosp then worse  Assoc nausea and loss of appetite, worse over few days. Poor appetite. Loose, dark stools   She never received dialysate with Vanco / Cefepime    In ED 8/20, afeb, WBC 8.2, UA neg  PD fluid 8/21 - 359 WBC - 13%PMN  Repeat 8/22 -  10 WBC     Today 8/22, afeb  No chest pain.   + abd pain with palpation, less  than yesterday      Past Medical History:    Past Medical History:   Diagnosis Date    JONATHAN (acute kidney injury) ([facility] 9/3/2014    Anxiety     Asthma     Bipolar disorder ([facility]     Bronchitis     Chronic back pain     Depression     Diabetes mellitus ([facility] [address]     DM II (diabetes mellitus, type II), controlled ([facility] [address] 9/3/2014    Hypertension        Past Surgical History:    Past Surgical History:   Procedure Laterality Date    ENDOMETRIAL ABLATION      INNER EAR SURGERY      TUBAL LIGATION         Current Medications:     polyethylene glycol  17 g Oral BID    b complex-C-folic acid  1 capsule Oral Daily    benztropine  1 mg Oral Nightly    buPROPion  150 mg Oral BID    calcitRIOL  0.25 mcg Oral Daily    docusate sodium  100 mg Oral BID    doxepin  50 mg Oral Nightly    ferrous sulfate  325 mg Oral Daily with breakfast    gabapentin  300 mg Oral Nightly hydrALAZINE  50 mg Oral Daily    metoprolol succinate  25 mg Oral Daily    QUEtiapine  200 mg Oral Nightly    rosuvastatin  40 mg Oral Nightly    sennosides-docusate sodium  1 tablet Oral Daily    sevelamer  800 mg Oral TID WC    sodium chloride flush  5-40 mL IntraVENous 2 times per day    aspirin  81 mg Oral Daily    heparin (porcine)  5,000 Units SubCUTAneous 3 times per day    cefepime  1,000 mg IntraVENous Q24H    dianeal lo-josh 1.5%  8,000 mL IntraPERitoneal Daily    nystatin  5 mL Oral 4x Daily    vancomycin (VANCOCIN) intermittent dosing (placeholder)   Other RX Placeholder    melatonin  10 mg Oral Nightly    lidocaine  1 patch TransDERmal Daily       Allergies:  Latex, Banana, Other, Peanuts [peanut oil], Tomato, Barley grass, Milk-related compounds, and Shellfish-derived products    Social History:    TOBACCO:    Occasional cig use  ETOH:    None   DRUGS:   None   MARITAL STATUS:   Single   OCCUPATION:   None     Patient lives in community    Family History:   No immunodeficiency    REVIEW OF SYSTEMS:    No fever / chills / sweats. No weight loss. No visual change, eye pain, eye discharge. No oral lesion, sore throat, dysphagia. Denies cough / sputum. + chest pain  + n / v / abd pain.  + diarrhea. See HPI  Denies dysuria or change in urinary function. Denies joint swelling or pain. No myalgia, arthralgia. Denies skin changes, itching  Denies focal weakness, sensory change or other neurologic symptom    Denies new / worse depression, psychiatric symptoms    PHYSICAL EXAM:      Vitals:    BP (!) 161/97   Pulse 75   Temp 97.4 °F (36.3 °C) (Oral)   Resp 16   Ht 5' 7.99\" (1.727 m)   Wt 140 lb 10.5 oz (63.8 kg)   SpO2 100%   BMI 21.39 kg/m²     GENERAL: No apparent distress.   RA  HEENT: Membranes moist, no oral lesion, PERRL  NECK:  Supple, no lymphadenopathy  LUNGS: Clear b/l, no rales, no dullness  CARDIAC: RRR, no murmur appreciated  ABD:  + BS, soft  - lower abd pain, worse with letting go / rebound  EXT:  No rash, no edema, no lesions  NEURO: No focal neurologic findings  PSYCH: Orientation, sensorium, mood normal  LINES:  Peripheral iv    DATA:    Lab Results   Component Value Date    WBC 6.9 2022    HGB 9.5 (L) 2022    HCT 29.2 (L) 2022    MCV 80.2 2022     2022     Lab Results   Component Value Date    CREATININE 7.8 (HH) 2022    BUN 41 (H) 2022     2022    K 3.7 2022    CL 98 (L) 2022    CO2 28 2022       Hepatic Function Panel:   Lab Results   Component Value Date/Time    ALKPHOS 88 08/15/2022 12:28 PM    ALT 16 08/15/2022 12:28 PM    AST 19 08/15/2022 12:28 PM    PROT 5.7 08/15/2022 12:28 PM    PROT 6.9 2011 10:15 AM    BILITOT <0.2 08/15/2022 12:28 PM    BILIDIR <0.2 08/15/2022 12:28 PM    IBILI see below 08/15/2022 12:28 PM    LABALBU 3.0 2022 06:04 AM       Micro:   SARS CoV-2 NAAT neg   PD flud - GS neg, cult no growth to date    Imagin/20 CXR neg      IMPRESSION:      Patient Active Problem List   Diagnosis    Atypical chest pain    DM II (diabetes mellitus, type II), controlled ([facility] [address]    HTN (hypertension)    JONATHAN (acute kidney injury) ([facility] [facility]    Asthma    History of anemia due to CKD    Peritonitis ([facility] [facility]    Chronic kidney disease with end stage renal failure on dialysis [facility])    Complication of peritoneal dialysis    Peritonitis associated with peritoneal dialysis ([facility] [address]    Chronic idiopathic constipation    Elevated troponin       Hx DM, HTN, CRF on PD, LBP, bipolar d/o, anemia  On PD since Nov / Dec 2021    Admit - with fall.    Pt had elevated WBC in PD fluid ( 0 WBC 3,351 - 34%PMN), cult neg, t  Discharged with plan to give IP vanco / cefepime x 5 days    Presents with chest pain - 2 weeks, intermittent, no asssoc with activity, no SOB  In ED , afeb, WBC 8.2, UA neg    PD fluid  - 359 WBC - 13%PMN  Repeat  -  10 WBC     IMP/  Peritonitis by exam though does NOT have neutrophilic pleocytosis (Neutrophils >250) seen with bacterial peritonitis   - may be 'partially treated' or 'atypical' agent - yeast or mycobacterium    RECOMMENDATIONS:    Cont empiric Vanco / Cefepime  Will f/u on cult   Daily dialysate cell count and diff  Fluid for cult - aerobic and anaerobic, mycobacterial and yeast    Medical Decision Making: The following items were considered in medical decision making:  Discussion of patient care with other providers  Reviewed clinical lab tests  Reviewed radiology tests  Reviewed other diagnostic tests/interventions  Microbiology cultures and other micro tests reviewed      Risk of Complications/Morbidity: High   Illness(es)/ Infection present that pose threat to bodily function. There is potential for severe exacerbation of infection/side effects of treatment.   Therapy requires intensive monitoring for antimicrobial agent toxicity    Will confer with Renal  Discussed with pt, RN  Ahsan Pandey MD

## 2022-08-22 NOTE — CARE COORDINATION
Case Management Assessment           Initial Evaluation                Date / Time of Evaluation: 8/22/2022 5:08 PM                 Assessment Completed by: CLAUDIO Oro    Patient Name: Marianne Phillips     YOB: 1965  Diagnosis: Chest pain [R07.9]  NSTEMI (non-ST elevated myocardial infarction) [facility]) [I21.4]     Date / Time: 8/20/2022 10:12 PM    Patient Admission Status: Inpatient    If patient is discharged prior to next notation, then this note serves as note for discharge by case management.      Current PCP: Michael Mondragon DO, DO  Clinic Patient: No    Chart Reviewed: Yes  Patient/ Family Interviewed: No    Initial assessment completed at bedside with: chart review    Hospitalization in the last 30 days: Yes    Emergency Contacts:  Extended Emergency Contact Information  Primary Emergency Contact: [address] Phone: [phone]  Relation: Child    Advance Directives:   Code Status: Full 2021 Sophia Solisy: No  Agent:   Contact Number:     Copy present: No     In paper Chart: No    Scanned into EMR No    Financial  Payor: Flor Rivas / Plan: Dalton Gu / Product Type: *No Product type* /     Pre-cert required for SNF: Yes    Pharmacy    [facility] [id number] - [address] [phone] - f [phone]  [address] [place], [place] [id number]  Phone: [phone] Fax: [phone]    [facility] [id number] [place], [place] - [address] [place] [phone] - F [phone]  [address] [place] [address]  Phone: [phone] Fax: [phone]    [facility] [id number] - [place], [place] - [address] [facility] [phone] Edward Forman [phone]  [address] [facility] [address] [facility] [address]  Phone: [phone] Fax: [phone]    [address] - [place] [phone] Edward Forman [phone]  [address] [facility] [address]  Phone: [phone] HD/PD prior to admission: Yes  Nephrologist: unknown at this time    HD Center:  [facility]  Address: [address]   Phone: [phone]    DISCHARGE PLAN:  Disposition: home no services vs. Home w/ [address] [facility], [place]HELLE [address] for discharge:  Lyft      Factors facilitating achievement of predicted outcomes: Motivated    Barriers to discharge: cards recs for ECHO/lauren, IV ABX, nephrology following. Additional Case Management Notes: Patient lives at home independently pta. Patient is active with [facility] for PD services. Patient may have been active recently with [facility]. Patient plans to discharge home, [facility] referral may be needed. Patient will need Lyft transportation at discharge. The Plan for Transition of Care is related to the following treatment goals of Chest pain [R07.9]  NSTEMI (non-ST elevated myocardial infarction) [facility]) [I21.4]    The Patient and/or patient representative Cindi and her family were provided with a choice of provider and agrees with the discharge plan Yes    Freedom of choice list was provided with basic dialogue that supports the patient's individualized plan of care/goals and shares the quality data associated with the providers.  Yes    Care Transition patient: No    CLAUDIO England  [facility]  Case Management Department  Ph: [phone]   Fax: [phone]

## 2022-08-23 LAB
ALBUMIN SERPL-MCNC: 2.9 G/DL (ref 3.4–5)
ANION GAP SERPL CALCULATED.3IONS-SCNC: 11 MMOL/L (ref 3–16)
APPEARANCE FLUID: CLEAR
BUN BLDV-MCNC: 37 MG/DL (ref 7–20)
CALCIUM SERPL-MCNC: 9 MG/DL (ref 8.3–10.6)
CELL COUNT FLUID TYPE: NORMAL
CHLORIDE BLD-SCNC: 98 MMOL/L (ref 99–110)
CLOT EVALUATION: NORMAL
CO2: 28 MMOL/L (ref 21–32)
COLOR FLUID: COLORLESS
CREAT SERPL-MCNC: 7.4 MG/DL (ref 0.6–1.1)
FLUID DIFF COMMENT: NORMAL
GFR AFRICAN AMERICAN: 7
GFR NON-AFRICAN AMERICAN: 6
GLUCOSE BLD-MCNC: 173 MG/DL (ref 70–99)
NUCLEATED CELLS FLUID: 3 /CUMM
PHOSPHORUS: 6.3 MG/DL (ref 2.5–4.9)
POTASSIUM SERPL-SCNC: 4.1 MMOL/L (ref 3.5–5.1)
RBC FLUID: 1 /CUMM
SODIUM BLD-SCNC: 137 MMOL/L (ref 136–145)
VANCOMYCIN RANDOM: 16.7 UG/ML

## 2022-08-23 PROCEDURE — 6370000000 HC RX 637 (ALT 250 FOR IP): Performed by: STUDENT IN AN ORGANIZED HEALTH CARE EDUCATION/TRAINING PROGRAM

## 2022-08-23 PROCEDURE — 89050 BODY FLUID CELL COUNT: CPT

## 2022-08-23 PROCEDURE — 87102 FUNGUS ISOLATION CULTURE: CPT

## 2022-08-23 PROCEDURE — 6370000000 HC RX 637 (ALT 250 FOR IP): Performed by: INTERNAL MEDICINE

## 2022-08-23 PROCEDURE — 99232 SBSQ HOSP IP/OBS MODERATE 35: CPT | Performed by: INTERNAL MEDICINE

## 2022-08-23 PROCEDURE — 80069 RENAL FUNCTION PANEL: CPT

## 2022-08-23 PROCEDURE — 6360000002 HC RX W HCPCS: Performed by: STUDENT IN AN ORGANIZED HEALTH CARE EDUCATION/TRAINING PROGRAM

## 2022-08-23 PROCEDURE — 2580000003 HC RX 258: Performed by: STUDENT IN AN ORGANIZED HEALTH CARE EDUCATION/TRAINING PROGRAM

## 2022-08-23 PROCEDURE — 6360000002 HC RX W HCPCS: Performed by: INTERNAL MEDICINE

## 2022-08-23 PROCEDURE — 87205 SMEAR GRAM STAIN: CPT

## 2022-08-23 PROCEDURE — 90945 DIALYSIS ONE EVALUATION: CPT

## 2022-08-23 PROCEDURE — 1200000000 HC SEMI PRIVATE

## 2022-08-23 PROCEDURE — 36415 COLL VENOUS BLD VENIPUNCTURE: CPT

## 2022-08-23 PROCEDURE — 2580000003 HC RX 258: Performed by: INTERNAL MEDICINE

## 2022-08-23 PROCEDURE — 80202 ASSAY OF VANCOMYCIN: CPT

## 2022-08-23 PROCEDURE — 87070 CULTURE OTHR SPECIMN AEROBIC: CPT

## 2022-08-23 RX ORDER — OXYCODONE HYDROCHLORIDE 5 MG/1
10 TABLET ORAL EVERY 4 HOURS PRN
Status: DISCONTINUED | OUTPATIENT
Start: 2022-08-23 | End: 2022-09-02 | Stop reason: HOSPADM

## 2022-08-23 RX ORDER — SEVELAMER CARBONATE 800 MG/1
1600 TABLET, FILM COATED ORAL
Status: DISCONTINUED | OUTPATIENT
Start: 2022-08-23 | End: 2022-09-02 | Stop reason: HOSPADM

## 2022-08-23 RX ADMIN — HEPARIN SODIUM 5000 UNITS: 5000 INJECTION INTRAVENOUS; SUBCUTANEOUS at 22:20

## 2022-08-23 RX ADMIN — METOPROLOL SUCCINATE 25 MG: 25 TABLET, EXTENDED RELEASE ORAL at 08:30

## 2022-08-23 RX ADMIN — POLYETHYLENE GLYCOL 3350 17 G: 17 POWDER, FOR SOLUTION ORAL at 22:19

## 2022-08-23 RX ADMIN — OXYCODONE 10 MG: 5 TABLET ORAL at 23:31

## 2022-08-23 RX ADMIN — HEPARIN SODIUM 5000 UNITS: 5000 INJECTION INTRAVENOUS; SUBCUTANEOUS at 15:22

## 2022-08-23 RX ADMIN — SEVELAMER CARBONATE 1600 MG: 800 TABLET, FILM COATED ORAL at 12:23

## 2022-08-23 RX ADMIN — OXYCODONE AND ACETAMINOPHEN 1 TABLET: 5; 325 TABLET ORAL at 04:48

## 2022-08-23 RX ADMIN — HEPARIN SODIUM 5000 UNITS: 5000 INJECTION INTRAVENOUS; SUBCUTANEOUS at 07:51

## 2022-08-23 RX ADMIN — QUETIAPINE FUMARATE 200 MG: 200 TABLET ORAL at 22:19

## 2022-08-23 RX ADMIN — VANCOMYCIN HYDROCHLORIDE 1000 MG: 10 INJECTION, POWDER, LYOPHILIZED, FOR SOLUTION INTRAVENOUS at 12:26

## 2022-08-23 RX ADMIN — SENNOSIDES AND DOCUSATE SODIUM 1 TABLET: 50; 8.6 TABLET ORAL at 08:31

## 2022-08-23 RX ADMIN — HYDRALAZINE HYDROCHLORIDE 50 MG: 50 TABLET, FILM COATED ORAL at 08:31

## 2022-08-23 RX ADMIN — NEPHROCAP 1 MG: 1 CAP ORAL at 08:28

## 2022-08-23 RX ADMIN — GABAPENTIN 300 MG: 600 TABLET, FILM COATED ORAL at 22:23

## 2022-08-23 RX ADMIN — BUPROPION HYDROCHLORIDE 150 MG: 150 TABLET, EXTENDED RELEASE ORAL at 08:31

## 2022-08-23 RX ADMIN — SEVELAMER CARBONATE 1600 MG: 800 TABLET, FILM COATED ORAL at 17:53

## 2022-08-23 RX ADMIN — GLYCERIN 2 G: 2 SUPPOSITORY RECTAL at 15:23

## 2022-08-23 RX ADMIN — DOCUSATE SODIUM 100 MG: 100 CAPSULE, LIQUID FILLED ORAL at 08:30

## 2022-08-23 RX ADMIN — OXYCODONE AND ACETAMINOPHEN 1 TABLET: 5; 325 TABLET ORAL at 12:23

## 2022-08-23 RX ADMIN — SODIUM CHLORIDE, PRESERVATIVE FREE 10 ML: 5 INJECTION INTRAVENOUS at 22:20

## 2022-08-23 RX ADMIN — BENZTROPINE MESYLATE 1 MG: 1 TABLET ORAL at 22:19

## 2022-08-23 RX ADMIN — FERROUS SULFATE TAB 325 MG (65 MG ELEMENTAL FE) 325 MG: 325 (65 FE) TAB at 08:30

## 2022-08-23 RX ADMIN — NYSTATIN 500000 UNITS: 100000 SUSPENSION ORAL at 22:21

## 2022-08-23 RX ADMIN — CEFEPIME 1000 MG: 1 INJECTION, POWDER, FOR SOLUTION INTRAMUSCULAR; INTRAVENOUS at 08:42

## 2022-08-23 RX ADMIN — NYSTATIN 500000 UNITS: 100000 SUSPENSION ORAL at 17:53

## 2022-08-23 RX ADMIN — DOCUSATE SODIUM 100 MG: 100 CAPSULE, LIQUID FILLED ORAL at 22:19

## 2022-08-23 RX ADMIN — Medication 10 MG: at 22:20

## 2022-08-23 RX ADMIN — BUPROPION HYDROCHLORIDE 150 MG: 150 TABLET, EXTENDED RELEASE ORAL at 22:19

## 2022-08-23 RX ADMIN — ASPIRIN 81 MG 81 MG: 81 TABLET ORAL at 08:30

## 2022-08-23 RX ADMIN — SODIUM CHLORIDE, PRESERVATIVE FREE 10 ML: 5 INJECTION INTRAVENOUS at 08:27

## 2022-08-23 RX ADMIN — ROSUVASTATIN CALCIUM 40 MG: 20 TABLET, FILM COATED ORAL at 22:20

## 2022-08-23 RX ADMIN — POLYETHYLENE GLYCOL 3350 17 G: 17 POWDER, FOR SOLUTION ORAL at 08:27

## 2022-08-23 RX ADMIN — NYSTATIN 500000 UNITS: 100000 SUSPENSION ORAL at 08:28

## 2022-08-23 RX ADMIN — CALCITRIOL CAPSULES 0.25 MCG 0.25 MCG: 0.25 CAPSULE ORAL at 08:29

## 2022-08-23 RX ADMIN — SEVELAMER CARBONATE 1600 MG: 800 TABLET, FILM COATED ORAL at 08:28

## 2022-08-23 RX ADMIN — NYSTATIN 500000 UNITS: 100000 SUSPENSION ORAL at 12:24

## 2022-08-23 ASSESSMENT — PAIN DESCRIPTION - ORIENTATION
ORIENTATION: LOWER
ORIENTATION: LOWER;LEFT
ORIENTATION: LOWER

## 2022-08-23 ASSESSMENT — PAIN DESCRIPTION - DESCRIPTORS
DESCRIPTORS: DULL;ACHING
DESCRIPTORS: STABBING

## 2022-08-23 ASSESSMENT — PAIN DESCRIPTION - LOCATION
LOCATION: ABDOMEN

## 2022-08-23 ASSESSMENT — PAIN SCALES - GENERAL
PAINLEVEL_OUTOF10: 9
PAINLEVEL_OUTOF10: 9
PAINLEVEL_OUTOF10: 10
PAINLEVEL_OUTOF10: 7
PAINLEVEL_OUTOF10: 9
PAINLEVEL_OUTOF10: 7

## 2022-08-23 ASSESSMENT — PAIN DESCRIPTION - ONSET: ONSET: GRADUAL

## 2022-08-23 ASSESSMENT — PAIN SCALES - WONG BAKER: WONGBAKER_NUMERICALRESPONSE: 0

## 2022-08-23 ASSESSMENT — PAIN DESCRIPTION - PAIN TYPE
TYPE: ACUTE PAIN
TYPE: ACUTE PAIN

## 2022-08-23 ASSESSMENT — PAIN DESCRIPTION - FREQUENCY: FREQUENCY: INTERMITTENT

## 2022-08-23 ASSESSMENT — PAIN - FUNCTIONAL ASSESSMENT
PAIN_FUNCTIONAL_ASSESSMENT: PREVENTS OR INTERFERES SOME ACTIVE ACTIVITIES AND ADLS
PAIN_FUNCTIONAL_ASSESSMENT: PREVENTS OR INTERFERES SOME ACTIVE ACTIVITIES AND ADLS

## 2022-08-23 NOTE — PROGRESS NOTES
Pt seen and examined  Has abdominal discomfort  Otherwise she is feeling ok  States pain is in lower area     Vitals:    08/23/22 0543 08/23/22 0823 08/23/22 1210 08/23/22 1543   BP: 115/71 128/84 139/83 135/88   Pulse: 70 88 87 83   Resp: 18 15 16 18   Temp: 97.4 °F (36.3 °C) 97.4 °F (36.3 °C) 97.3 °F (36.3 °C) 98.2 °F (36.8 °C)   TempSrc: Oral Oral Oral Oral   SpO2: 98% 99% 100% 99%   Weight: 142 lb 3.2 oz (64.5 kg)      Height:          Gen: pleasant alert oriented  Neck: no jvd  Chest: clear bilaterally  Cvs: s2s1 no murmurs  Abd: soft nd nt bs normal active tender to deep palpation  Ext: no edema positive pulses    Bun creatinine 37 and 7.4    Albumin 2.9    66 yo female  w esrd on pd admitted w abdominal pain concern for peritonitis  On antibiotics awaiting cultures  Changed to oxycodone for higher dose because per patient percocet was not helping  Continue with monitoring

## 2022-08-23 NOTE — PROGRESS NOTES
Patient A&Ox4. VSS. PD in place and running  without issues. Patient ambulates to bathroom x1 assist - still remains a little unsteady at times. Pt tolerating reg diet, no complaints of N/V. Pt takes all PO well one at a time. Prn percocet given pain with relief. Bed alarm is on, bed locked in lowest position. Call light and table within reach.

## 2022-08-23 NOTE — PROGRESS NOTES
ID Follow-up NOTE    CC:   PD peritonitis  Antibiotics: Vancomycin, Cefepime    Admit Date: 2022  Hospital Day: 4    Subjective:     Patient c/o abdpain, less than yesterday / admission  No nausea / vomiting  No diarrhea      Objective:     Patient Vitals for the past 8 hrs:   BP Temp Temp src Pulse Resp SpO2   22 1210 139/83 97.3 °F (36.3 °C) Oral 87 16 100 %   22 0823 128/84 97.4 °F (36.3 °C) Oral 88 15 99 %     I/O last 3 completed shifts: In: 250 [P.O.:220; I.V.:30]  Out: 1021 [Urine:400]  I/O this shift:  In: 10 [I.V.:10]  Out: -     EXAM:  GENERAL: No apparent distress.     HEENT: Membranes moist, no oral lesion  NECK:  Supple, no lymphadenopathy  LUNGS: Clear b/l, no rales, no dullness  CARDIAC: RRR, no murmur appreciated  ABD:  + BS, soft / NT  EXT:  No rash, no edema, no lesions  NEURO: No focal neurologic findings  PSYCH: Orientation, sensorium, mood normal  LINES:  Peripheral iv       Data Review:  Lab Results   Component Value Date    WBC 6.9 2022    HGB 9.5 (L) 2022    HCT 29.2 (L) 2022    MCV 80.2 2022     2022     Lab Results   Component Value Date    CREATININE 7.4 (HH) 2022    BUN 37 (H) 2022     2022    K 4.1 2022    CL 98 (L) 2022    CO2 28 2022       Hepatic Function Panel:   Lab Results   Component Value Date/Time    ALKPHOS 88 08/15/2022 12:28 PM    ALT 16 08/15/2022 12:28 PM    AST 19 08/15/2022 12:28 PM    PROT 5.7 08/15/2022 12:28 PM    PROT 6.9 2011 10:15 AM    BILITOT <0.2 08/15/2022 12:28 PM    BILIDIR <0.2 08/15/2022 12:28 PM    IBILI see below 08/15/2022 12:28 PM    LABALBU 2.9 2022 05:35 AM       Micro:   SARS CoV-2 NAAT neg   flud - GS neg, cult no growth to date   fluid - GS neg, cult no growth to date   fluid - GS neg, cult in process   fungal cult - stain neg, cult sent up     Imagin/20 CXR neg      Scheduled Meds:   vancomycin  1,000 mg IntraVENous Q72H    sevelamer  1,600 mg Oral TID WC    glycerin (ADULT)  1 suppository Rectal Once    polyethylene glycol  17 g Oral BID    b complex-C-folic acid  1 capsule Oral Daily    benztropine  1 mg Oral Nightly    buPROPion  150 mg Oral BID    calcitRIOL  0.25 mcg Oral Daily    docusate sodium  100 mg Oral BID    doxepin  50 mg Oral Nightly    ferrous sulfate  325 mg Oral Daily with breakfast    gabapentin  300 mg Oral Nightly    hydrALAZINE  50 mg Oral Daily    metoprolol succinate  25 mg Oral Daily    QUEtiapine  200 mg Oral Nightly    rosuvastatin  40 mg Oral Nightly    sennosides-docusate sodium  1 tablet Oral Daily    sodium chloride flush  5-40 mL IntraVENous 2 times per day    aspirin  81 mg Oral Daily    heparin (porcine)  5,000 Units SubCUTAneous 3 times per day    cefepime  1,000 mg IntraVENous Q24H    dianeal lo-josh 1.5%  8,000 mL IntraPERitoneal Daily    nystatin  5 mL Oral 4x Daily    vancomycin (VANCOCIN) intermittent dosing (placeholder)   Other RX Placeholder    melatonin  10 mg Oral Nightly    lidocaine  1 patch TransDERmal Daily       Continuous Infusions:   sodium chloride 25 mL/hr at 08/22/22 1000       PRN Meds:  oxyCODONE-acetaminophen, hydrALAZINE, hydrOXYzine HCl, sodium chloride flush, sodium chloride, ondansetron **OR** ondansetron, acetaminophen **OR** acetaminophen, polyethylene glycol      Assessment:     Hx DM, HTN, CRF on PD, LBP, bipolar d/o, anemia  On PD since Nov / Dec 2021     [facility] 7/22-27 with fall.    Pt had elevated WBC in PD fluid (7/23 0 WBC 3,351 - 34%PMN), cult neg, t  Discharged with plan to give IP vanco / cefepime x 5 days (pt never obtain dialysates / never took this)     Presents with chest pain - 2 weeks, intermittent, no asssoc with activity, no SOB  In ED 8/20, afeb, WBC 8.2, UA neg     PD fluid 8/21 - 359 WBC - 13%PMN  Repeat 8/22 -  10 WBC   Repeat 8/23 - 3 WBC      IMP/  Peritonitis by exam though does NOT have neutrophilic pleocytosis (neutrophils >250) seen with bacterial peritonitis   - may be 'partially treated' or 'atypical' agent - yeast or mycobacterium     Plan:     Cont empiric Vanco / Cefepime  Will f/u on cult - no growth to date  Daily dialysate cell count and diff, WBC 3 today 8/23  Fluid for cult - mycobacterial and yeast sent today 8/23    Empiric antibiotics - after discharge IP antibiotics     Medical Decision Making:   The following items were considered in medical decision making:  Discussion of patient care with other providers  Reviewed clinical lab tests  Reviewed radiology tests  Reviewed other diagnostic tests/interventions  Microbiology cultures and other micro tests reviewed      Discussed with pt  Discussed with Chris Sharpe on 8/22  Colin Wylie MD

## 2022-08-23 NOTE — PROGRESS NOTES
CCPD Order   Exchanges: 4   Exchange Volume: 2000 ml   Total Time: 9 hrs   Dextrose: 1.5%   Last Fill: 0 ml   Total Volume: 8000 ml     Orders verified. Supplies taken to pt's room. Report received. Cycler set up, primed and pre tested. Dressing changed on [facility] Cath site. Pt connected to cycler. CCPD initiated without problem. Initial effluent clear. If problems should arise please call the [phone] number on top of PD cycler machine.        If problems persist please call [phone]

## 2022-08-23 NOTE — PROGRESS NOTES
Patient alert and oriented. VSS. Pt c/o lower abdominal pain near PD catheter access. PRN percocet administered with relief. Patient tolerating diet, no allergic reactions noted. Intermittent antibiotics infused per orders. All needs met at this time.

## 2022-08-23 NOTE — PROGRESS NOTES
Clinical Pharmacy Progress Note    Vancomycin - Management by Pharmacy    Consult Date(s): 8/21  Consulting Provider(s): Dr Mark Pereira / Kunal Huang:  1)  Intra-abdominal infection - possible PD peritonitis - Vancomycin  Concurrent Antimicrobials: Cefepime  Day of Vanc Therapy: 2  Current Dosing Method: Intermittent Dosing by Levels  Therapeutic Goal: Trough ~ 15 mg/L  Plan:  Patient is ESRD on PD at home. Will dose vancomycin intermittently via levels at this time. Random level this AM = 16.7 mcg/mL. Using 2-point kinetics, pt's est half-life ~ 65 hrs. Will schedule 1000mg IV q72h - next dose today. Will plan to check random level in AM 8/25 to ensure levels will remain therapeutic through 72hr interval.  Will continue to monitor clinical condition and make adjustments to regimen as appropriate. Please call with questions--  Thanks--  Baron Beebe, PharmD, BCPS, BCGP  [id number] (\[facility] [place] [facility]\"")   8/23/2022 7:35 AM        Interval update:  PD fluid culture with no growth thus far. Tolerated PD overnight per notes. Subjective/Objective:  Barbara Horan is a 65 y.o. female with a PMHx significant for ESRD on PD, HTN, DM type II, asthma, chronic back pain, Bipolar dx, and depression who is admitted with suspected PD associated peritonitis. Pt also with chest pain & elevated troponin. Pharmacy is consulted to dose vancomycin    Ht Readings from Last 1 Encounters:   08/22/22 5' 7.99\" (1.727 m)     Wt Readings from Last 1 Encounters:   08/23/22 142 lb 3.2 oz (64.5 kg)       Current & Prior Antimicrobial Regimen(s):  Cefepime 1000mg EI q24h (8/21-current)  Vancomycin - pharmacy to dose  Intermittent via levels (8/21-current)    Date: Vanc level: Vanc dose:   8/21  1500mg   8/22 21.4 mcg/mL --   8/23 16.7 mcg/mL 1000mg ordered     2-point kinetics (using levels from 8/22 & 8/23):   Est Ke ~0.0106/hr  Est T1/2 ~65hr    Cultures & Sensitivities:    Date Site Micro Susceptibility / Result   8/21 PD fluid No growth to date    8/22 PD fluid No growth to date        Recent Labs     08/20/22  2335 08/21/22  0628 08/22/22  0604 08/23/22  0535   CREATININE 8.8* 9.2* 7.8* 7.4*   BUN 46* 45* 41* 37*   WBC 8.2  --  6.9  --      CrCl is not estimated 2/2 ESRD on PD

## 2022-08-23 NOTE — CARE COORDINATION
Patient lives at home independently pta. Patient is active with [facility] for PD services. Patient supposed to be set up w/HHC at prior DC but appears they might not have been. Patient plans to discharge home w/HHC and will receive IP abx through PD services. Patient will need Lyft transportation at discharge. SW sent referral to [facility]. SW following.   Electronically signed by CLAUDIO Power, ZOHAIBW on 8/23/2022 at [address] [place] [address]  [phone]

## 2022-08-23 NOTE — PROGRESS NOTES
Nephrology Consult Note        [facility]    [email or web address]       Phone: [phone]                                                  8/23/2022 7:57 AM     Patient: Kim Pedraza [id number]  [id number]  Date of Admit: 8/20/2022 LOS: 2 days  Referring physician:    Plan:  Patient feel fine  Vitals stable  On room air  Lytes stable  Still has lower abdominal pain but improving  Chest pain resolved. No SOB on room air  Still constipated. Continue PD while here   Follow repeat cell counts and Cx. Confirmed with PD nurse from [place] that last time patient did not start IP Antibiotics after discharge. D/W ID and sending fungal and Mycobacterial PD effluent cultures also  Follow pending Cx  IV Antibiotics for now. Appreciate ID help. Nystatin swish and swallow  Continue stool softeners for regular bowel movement. Suppository ordered. Increase Sevelamer dose. Renal diet  Chest pain workup per cardiology  Avoid nephrotoxins  Monitor input, output and weight  Monitor labs and vitals closely  Renally dose all medications    PD orders placed and informed dialysis nurse. Thank you for allowing us to participate in this patient's care    In case of any question please call us at our 24 hour answering service [phone] or from 7 AM to 5 PM via [employer] or cell number    Milagros Ruelas MD  [facility]  Melva Professor Kameron Cain [facility] [address], [address]  Fax: [phone]  Office: [phone]       Assessment & Plan     Renal function:  ESRD  On PD  No tenderness, erythema or discharge around PD catheter noted  Continue PD      Possible Peritonitis:   Antibiotics per ID  Patient non compliant.        Electrolytes:  Lab Results   Component Value Date    CREATININE 7.4 (HH) 08/23/2022    BUN 37 (H) 08/23/2022     08/23/2022    K 4.1 08/23/2022    CL 98 (L) 08/23/2022    CO2 28 08/23/2022            # CKD- MBD:   Secondary hyperparathyroidism due to renal failure  Monitor Phos level while here. Lab Results   Component Value Date    CALCIUM 9.0 08/23/2022    PHOS 6.3 (H) 08/23/2022         Acid/Base status:  Stable. Volume status/BP:  BP stable  Euvolemic. Hematology:   CKD related anemia  Goal Hgb 10-11    Lab Results   Component Value Date    IRON 98 08/12/2019    TIBC 275 08/12/2019     Lab Results   Component Value Date    WBC 6.9 08/22/2022    HGB 9.5 (L) 08/22/2022    HCT 29.2 (L) 08/22/2022    MCV 80.2 08/22/2022     08/22/2022             Reason for Consult and Chief Complaint     Reason for consult: ESRD    Chief complaint:   Chief Complaint   Patient presents with    Fatigue    Abdominal Pain    Shoulder Pain       History of Present Illness   Shirlene Bravo is a 65 y.o. with PMH significant for ESRD on PD being admitted with abdominal pain and chest pain. Per patient abdominal pain is ongoing for 2 weeks. Patient seen in ER and she is sleepy and goes back to sleep after answering questions. Not sure about PD fluid color. PD fluid cell count checked in ER and it showed 359 nucleated cells. Do not know if PD fluid was collected from dry abdomen or after proper dwell. ER MD is now not here. No SOB. No GI symptoms      Review of Systems   Positive in bold or unable to assess     GEN: Fever, chills, night sweats. HEENT: Changes in vision, sore throat, rhinorrhea   CVS: Chest pain, palpitations, swelling or edema in legs  Pulmonary: Cough, hemoptysis, SOB  GI: Nausea, vomiting, diarrhea, constipation, abdominal pain  : Bladder incontinence, dysuria,hematuria. MSK: Muscle or joint or bone pains  Skin: Rashes, ulcers, skin thickness  CNS: Headache, dizziness, confusion, focal weakness, seizure. Psych: Anxiety, agitation, depression. Reviewed all 12 systems, negative except as above.      Past Medical History     Past Medical History:   Diagnosis Date    JONATHAN (acute kidney injury) ([facility] 9/3/2014    Anxiety     Asthma     Bipolar disorder ([facility]     Bronchitis Chronic back pain     Depression     Di[facility]     DM II (diabetes mellitus, type II), controlled ([facility] 9/3/2014    Hypertension          Past Surgical History     Past Surgical History:   Procedure Laterality Date    ENDOMETRIAL ABLATION      INNER EAR SURGERY      TUBAL LIGATION           Family History     Family History   Problem Relation Age of Onset    Diabetes Mother     Cancer Mother     Cancer Maternal Grandmother     Heart Disease Maternal Grandfather          Social History     Social History     Tobacco Use    Smoking status: Some Days     Packs/day: 0.05     Years: 15.00     Pack years: 0.75     Types: Cigarettes    Smokeless tobacco: Never   Substance Use Topics    Alcohol use: No          Past medical, family, and social histories were reviewed as previously documented. Updates were made as necessary.       Inpatient Medications and Allergies       Scheduled Meds:   vancomycin  1,000 mg IntraVENous Q72H    polyethylene glycol  17 g Oral BID    b complex-C-folic acid  1 capsule Oral Daily    benztropine  1 mg Oral Nightly    buPROPion  150 mg Oral BID    calcitRIOL  0.25 mcg Oral Daily    docusate sodium  100 mg Oral BID    doxepin  50 mg Oral Nightly    ferrous sulfate  325 mg Oral Daily with breakfast    gabapentin  300 mg Oral Nightly    hydrALAZINE  50 mg Oral Daily    metoprolol succinate  25 mg Oral Daily    QUEtiapine  200 mg Oral Nightly    rosuvastatin  40 mg Oral Nightly    sennosides-docusate sodium  1 tablet Oral Daily    sevelamer  800 mg Oral TID WC    sodium chloride flush  5-40 mL IntraVENous 2 times per day    aspirin  81 mg Oral Daily    heparin (porcine)  5,000 Units SubCUTAneous 3 times per day    cefepime  1,000 mg IntraVENous Q24H    dianeal lo-josh 1.5%  8,000 mL IntraPERitoneal Daily    nystatin  5 mL Oral 4x Daily    vancomycin (VANCOCIN) intermittent dosing (placeholder)   Other RX Placeholder    melatonin  10 mg Oral Nightly    lidocaine  1 patch TransDERmal Daily       Allergies: Allergies   Allergen Reactions    Latex      Added based on information entered during case entry, please review and add reactions, type, and severity as needed    Banana Hives and Swelling     Swelling of tongue/face    Other Hives and Swelling     POTATOES, TOMATOES, LETTUCE, BANANA, SHRIMP, SOY BEAN, OAT, WHEAT, BARLEY, PEANUTS, PECANS  Potatoes and tomatoes Swelling of tongue/face    Peanuts [Peanut Oil] Hives and Swelling     Swelling of tongue/face    Tomato Hives and Swelling     Swelling of tongue/face    Barley Grass     Milk-Related Compounds     Shellfish-Derived Products          Vital Signs     Vitals:    08/23/22 0543   BP: 115/71   Pulse: 70   Resp: 18   Temp: 97.4 °F (36.3 °C)   SpO2: 98%         Intake/Output Summary (Last 24 hours) at 8/23/2022 0757  Last data filed at 8/23/2022 0543  Gross per 24 hour   Intake 240 ml   Output 578 ml   Net -338 ml           Physical Exam     General appearance: NAD  Head: Normocephalic, without obvious abnormality, atraumatic   Mouth: Moist mucous membrane  Neck: Supple. Lungs: Good air entry bilaterally. No respiratory distress on RA  Heart: S1, S2.  Abdomen: soft, mild tenderness in lower abdomen,  non-distended  Extremities: No leg edema, warm to touch   Skin: No concerning rashes noted  Psych: good eye contact, normal affect  Neuro: AAO x 3.        Laboratory Data     Please see above     Diagnostic Studies   Pertinent images reviewed

## 2022-08-24 LAB
ALBUMIN SERPL-MCNC: 2.8 G/DL (ref 3.4–5)
ANION GAP SERPL CALCULATED.3IONS-SCNC: 12 MMOL/L (ref 3–16)
APPEARANCE FLUID: CLEAR
BODY FLUID CULTURE, STERILE: NORMAL
BUN BLDV-MCNC: 36 MG/DL (ref 7–20)
CALCIUM SERPL-MCNC: 8.6 MG/DL (ref 8.3–10.6)
CELL COUNT FLUID TYPE: NORMAL
CHLORIDE BLD-SCNC: 96 MMOL/L (ref 99–110)
CLOT EVALUATION: NORMAL
CO2: 27 MMOL/L (ref 21–32)
COLOR FLUID: COLORLESS
CREAT SERPL-MCNC: 7.2 MG/DL (ref 0.6–1.1)
FLUID DIFF COMMENT: NORMAL
GFR AFRICAN AMERICAN: 7
GFR NON-AFRICAN AMERICAN: 6
GLUCOSE BLD-MCNC: 90 MG/DL (ref 70–99)
GRAM STAIN RESULT: NORMAL
NUCLEATED CELLS FLUID: 2 /CUMM
PHOSPHORUS: 5.4 MG/DL (ref 2.5–4.9)
POTASSIUM SERPL-SCNC: 3.9 MMOL/L (ref 3.5–5.1)
RBC FLUID: 0 /CUMM
SODIUM BLD-SCNC: 135 MMOL/L (ref 136–145)

## 2022-08-24 PROCEDURE — 97530 THERAPEUTIC ACTIVITIES: CPT

## 2022-08-24 PROCEDURE — 89050 BODY FLUID CELL COUNT: CPT

## 2022-08-24 PROCEDURE — 6370000000 HC RX 637 (ALT 250 FOR IP): Performed by: STUDENT IN AN ORGANIZED HEALTH CARE EDUCATION/TRAINING PROGRAM

## 2022-08-24 PROCEDURE — 36415 COLL VENOUS BLD VENIPUNCTURE: CPT

## 2022-08-24 PROCEDURE — 6370000000 HC RX 637 (ALT 250 FOR IP): Performed by: INTERNAL MEDICINE

## 2022-08-24 PROCEDURE — 1200000000 HC SEMI PRIVATE

## 2022-08-24 PROCEDURE — 6360000002 HC RX W HCPCS: Performed by: INTERNAL MEDICINE

## 2022-08-24 PROCEDURE — 97535 SELF CARE MNGMENT TRAINING: CPT

## 2022-08-24 PROCEDURE — 80069 RENAL FUNCTION PANEL: CPT

## 2022-08-24 PROCEDURE — 99232 SBSQ HOSP IP/OBS MODERATE 35: CPT | Performed by: INTERNAL MEDICINE

## 2022-08-24 PROCEDURE — 2580000003 HC RX 258: Performed by: STUDENT IN AN ORGANIZED HEALTH CARE EDUCATION/TRAINING PROGRAM

## 2022-08-24 PROCEDURE — 97116 GAIT TRAINING THERAPY: CPT

## 2022-08-24 PROCEDURE — 6360000002 HC RX W HCPCS: Performed by: STUDENT IN AN ORGANIZED HEALTH CARE EDUCATION/TRAINING PROGRAM

## 2022-08-24 PROCEDURE — 97166 OT EVAL MOD COMPLEX 45 MIN: CPT

## 2022-08-24 PROCEDURE — 90945 DIALYSIS ONE EVALUATION: CPT

## 2022-08-24 PROCEDURE — 97161 PT EVAL LOW COMPLEX 20 MIN: CPT

## 2022-08-24 PROCEDURE — 2580000003 HC RX 258: Performed by: INTERNAL MEDICINE

## 2022-08-24 RX ORDER — CHOLECALCIFEROL (VITAMIN D3) 125 MCG
5 CAPSULE ORAL NIGHTLY
COMMUNITY
Start: 2022-08-24

## 2022-08-24 RX ORDER — OXYCODONE HYDROCHLORIDE 5 MG/1
5 TABLET ORAL EVERY 6 HOURS PRN
Qty: 15 TABLET | Refills: 0 | Status: SHIPPED | OUTPATIENT
Start: 2022-08-24 | End: 2022-08-31

## 2022-08-24 RX ORDER — METOPROLOL SUCCINATE 25 MG/1
25 TABLET, EXTENDED RELEASE ORAL DAILY
Qty: 30 TABLET | Refills: 0 | Status: SHIPPED | OUTPATIENT
Start: 2022-08-24

## 2022-08-24 RX ORDER — SEVELAMER CARBONATE 800 MG/1
2 TABLET, FILM COATED ORAL
Qty: 90 TABLET | Refills: 3 | Status: ON HOLD | OUTPATIENT
Start: 2022-08-24 | End: 2022-09-08 | Stop reason: HOSPADM

## 2022-08-24 RX ORDER — ASPIRIN 81 MG/1
81 TABLET, CHEWABLE ORAL DAILY
Qty: 30 TABLET | Refills: 3 | Status: SHIPPED | OUTPATIENT
Start: 2022-08-25

## 2022-08-24 RX ORDER — BUPROPION HYDROCHLORIDE 150 MG/1
150 TABLET, EXTENDED RELEASE ORAL DAILY
Qty: 30 TABLET | Refills: 0 | Status: SHIPPED | OUTPATIENT
Start: 2022-08-24

## 2022-08-24 RX ADMIN — NYSTATIN 500000 UNITS: 100000 SUSPENSION ORAL at 12:43

## 2022-08-24 RX ADMIN — ASPIRIN 81 MG 81 MG: 81 TABLET ORAL at 08:07

## 2022-08-24 RX ADMIN — DOXEPIN HYDROCHLORIDE 50 MG: 25 CAPSULE ORAL at 20:25

## 2022-08-24 RX ADMIN — SEVELAMER CARBONATE 1600 MG: 800 TABLET, FILM COATED ORAL at 17:41

## 2022-08-24 RX ADMIN — POLYETHYLENE GLYCOL 3350 17 G: 17 POWDER, FOR SOLUTION ORAL at 20:28

## 2022-08-24 RX ADMIN — HYDROXYZINE HYDROCHLORIDE 25 MG: 25 TABLET ORAL at 17:41

## 2022-08-24 RX ADMIN — NYSTATIN 500000 UNITS: 100000 SUSPENSION ORAL at 15:48

## 2022-08-24 RX ADMIN — QUETIAPINE FUMARATE 200 MG: 200 TABLET ORAL at 20:24

## 2022-08-24 RX ADMIN — SENNOSIDES AND DOCUSATE SODIUM 1 TABLET: 50; 8.6 TABLET ORAL at 08:07

## 2022-08-24 RX ADMIN — DOCUSATE SODIUM 100 MG: 100 CAPSULE, LIQUID FILLED ORAL at 08:07

## 2022-08-24 RX ADMIN — HYDRALAZINE HYDROCHLORIDE 50 MG: 50 TABLET, FILM COATED ORAL at 08:07

## 2022-08-24 RX ADMIN — CALCITRIOL CAPSULES 0.25 MCG 0.25 MCG: 0.25 CAPSULE ORAL at 08:09

## 2022-08-24 RX ADMIN — BENZTROPINE MESYLATE 1 MG: 1 TABLET ORAL at 20:24

## 2022-08-24 RX ADMIN — SEVELAMER CARBONATE 1600 MG: 800 TABLET, FILM COATED ORAL at 08:08

## 2022-08-24 RX ADMIN — SEVELAMER CARBONATE 1600 MG: 800 TABLET, FILM COATED ORAL at 12:43

## 2022-08-24 RX ADMIN — SODIUM CHLORIDE, PRESERVATIVE FREE 10 ML: 5 INJECTION INTRAVENOUS at 08:00

## 2022-08-24 RX ADMIN — NYSTATIN 500000 UNITS: 100000 SUSPENSION ORAL at 20:24

## 2022-08-24 RX ADMIN — NEPHROCAP 1 MG: 1 CAP ORAL at 09:34

## 2022-08-24 RX ADMIN — OXYCODONE 10 MG: 5 TABLET ORAL at 06:57

## 2022-08-24 RX ADMIN — POLYETHYLENE GLYCOL 3350 17 G: 17 POWDER, FOR SOLUTION ORAL at 08:07

## 2022-08-24 RX ADMIN — HEPARIN SODIUM 5000 UNITS: 5000 INJECTION INTRAVENOUS; SUBCUTANEOUS at 06:56

## 2022-08-24 RX ADMIN — Medication 10 MG: at 20:24

## 2022-08-24 RX ADMIN — ROSUVASTATIN CALCIUM 40 MG: 20 TABLET, FILM COATED ORAL at 20:24

## 2022-08-24 RX ADMIN — HEPARIN SODIUM 5000 UNITS: 5000 INJECTION INTRAVENOUS; SUBCUTANEOUS at 15:48

## 2022-08-24 RX ADMIN — CEFEPIME 1000 MG: 1 INJECTION, POWDER, FOR SOLUTION INTRAMUSCULAR; INTRAVENOUS at 08:17

## 2022-08-24 RX ADMIN — OXYCODONE 10 MG: 5 TABLET ORAL at 15:48

## 2022-08-24 RX ADMIN — GABAPENTIN 300 MG: 600 TABLET, FILM COATED ORAL at 20:25

## 2022-08-24 RX ADMIN — BUPROPION HYDROCHLORIDE 150 MG: 150 TABLET, EXTENDED RELEASE ORAL at 09:34

## 2022-08-24 RX ADMIN — DOCUSATE SODIUM 100 MG: 100 CAPSULE, LIQUID FILLED ORAL at 20:24

## 2022-08-24 RX ADMIN — FERROUS SULFATE TAB 325 MG (65 MG ELEMENTAL FE) 325 MG: 325 (65 FE) TAB at 08:14

## 2022-08-24 RX ADMIN — HEPARIN SODIUM 5000 UNITS: 5000 INJECTION INTRAVENOUS; SUBCUTANEOUS at 20:24

## 2022-08-24 RX ADMIN — SODIUM CHLORIDE: 9 INJECTION, SOLUTION INTRAVENOUS at 08:16

## 2022-08-24 RX ADMIN — METOPROLOL SUCCINATE 25 MG: 25 TABLET, EXTENDED RELEASE ORAL at 08:07

## 2022-08-24 RX ADMIN — NYSTATIN 500000 UNITS: 100000 SUSPENSION ORAL at 08:07

## 2022-08-24 ASSESSMENT — PAIN SCALES - GENERAL
PAINLEVEL_OUTOF10: 10
PAINLEVEL_OUTOF10: 9
PAINLEVEL_OUTOF10: 9

## 2022-08-24 ASSESSMENT — PAIN DESCRIPTION - ORIENTATION
ORIENTATION: LOWER
ORIENTATION: LOWER

## 2022-08-24 ASSESSMENT — PAIN DESCRIPTION - FREQUENCY
FREQUENCY: INTERMITTENT
FREQUENCY: INTERMITTENT

## 2022-08-24 ASSESSMENT — PAIN DESCRIPTION - DESCRIPTORS
DESCRIPTORS: ACHING
DESCRIPTORS: ACHING

## 2022-08-24 ASSESSMENT — PAIN DESCRIPTION - PAIN TYPE
TYPE: ACUTE PAIN
TYPE: ACUTE PAIN

## 2022-08-24 ASSESSMENT — PAIN DESCRIPTION - ONSET: ONSET: GRADUAL

## 2022-08-24 ASSESSMENT — PAIN - FUNCTIONAL ASSESSMENT: PAIN_FUNCTIONAL_ASSESSMENT: PREVENTS OR INTERFERES SOME ACTIVE ACTIVITIES AND ADLS

## 2022-08-24 ASSESSMENT — PAIN DESCRIPTION - LOCATION
LOCATION: ABDOMEN
LOCATION: ABDOMEN

## 2022-08-24 NOTE — CARE COORDINATION
be added to hospital bill. If financial assistance is needed, please contact unit  or ;  or  CANNOT provide pharmacy voucher for patients co-pays  5. Patients can then  the prescription on their way out of the hospital at discharge, or pharmacy can deliver to the bedside if staff is available. (payment due at time of pick-up or delivery - cash, check, or card accepted)     Able to afford home medications/ co-pay costs: Yes    ADLS:  Current PT AM-PAC Score:   /24  Current OT AM-PAC Score:   /24      DISCHARGE Disposition: [address]: [address]     LOC at discharge: Not Applicable  MARGARITA Completed: Not Indicated    Notification completed in HENS/PAS?:  Not Applicable    IMM Completed:   Not Indicated    Transportation:  Transportation PLAN for discharge:  Lyft    Mode of Transport:  Lyft  Reason for medical transport: Not Applicable  Name of Transport Company: Not Applicable  Time of Transport: n/a    Transport form completed: Not Indicated    Home Care:  [address] ordered at discharge: Yes  [address]: [facility]  Phone: [phone]  Fax: [phone]  Orders faxed: Yes - Cielo Huerta to fax    Durable Medical Equipment:  DME Provider: ***  Equipment obtained during hospitalization: {EQUIPMENT:[id number]}    Dialysis:  Dialysis patient: Yes    Dialysis Center:  [facility]  Address: [address] [place] [address]   Phone: [phone]    Referrals made at [facility] for outpatient continued care:  Not Applicable    Additional CM Notes: Patient lives independently pta. Patient discharging to her home [place] [address] through [address]. Patient requiring Lyft at discharge. Patient denies any other CM needs at discharge. Patient has 3 children and 14 grandchildren for support post discharge.     The Plan for Transition of Care is related to the following treatment goals of Chest pain [R07.9]  NSTEMI (non-ST elevated myocardial infarction) ([facility] [I21.4]    The Patient and/or patient representative Cindi and her family were provided with a choice of provider and agrees with the discharge plan Yes    Freedom of choice list was provided with basic dialogue that supports the patient's individualized plan of care/goals and shares the quality data associated with the providers.  Yes    Care Transitions patient: No    Sravani Hancock, CLAUDIO  [facility]  Case Management Department  Ph: [phone]  Fax: [phone]

## 2022-08-24 NOTE — FLOWSHEET NOTE
Treatment time: 9 Hours 4 minutes  Net UF: 241 ml  Dwell Time: 9 minutes gained    Treatment completed without complications or complaints from patient. Effluent yellow and lines taped to patient per protocol. Patient resting comfortably with VSS upon exiting room. Copy of dialysis treatment record placed in chart, to be scanned into EMR and report given to Ivory holder RN.

## 2022-08-24 NOTE — PROGRESS NOTES
Patient A&Ox4. VSS on RA. Patient reports pain of 9/10 during the shift and was given prn oxycodone with benefit. Pt experiencing some confusion and some hallucinations overnight. PD administered overnight. Pt tolerating regular diet. SBA to restroom and voiding minimal urinary output. Pt plans to d/c to home with [facility] pending PD cultures. All care needs addressed with no further needs at this time. Bed is locked and in lowest position with alarm on. Call light and bedside table within reach. Will continue to monitor per protocol.      Electronically signed by Ad Naranjo RN on 8/24/2022 at 5:59 AM

## 2022-08-24 NOTE — DISCHARGE INSTR - COC
Continuity of Care Form    Patient Name: Dori David   :  1965  MRN:  [id number]    Admit date:  2022  Discharge date:  2022    Code Status Order: Full Code   Advance Directives:     Admitting Physician:  Johanne Vazquez MD  PCP: Aruna Blackwell DO, DO    Discharging Nurse: [facility] Unit/Room#: 5885/7786-34  Discharging Unit Phone Number: [phone]    Emergency Contact:   Extended Emergency Contact Information  Primary Emergency Contact: Dimitri Santo  Mobile Phone: [phone]  Relation: Child    Past Surgical History:  Past Surgical History:   Procedure Laterality Date    ENDOMETRIAL ABLATION      INNER EAR SURGERY      TUBAL LIGATION         Immunization History: There is no immunization history on file for this patient.     Active Problems:  Patient Active Problem List   Diagnosis Code    Atypical chest pain R07.89    DM II (diabetes mellitus, type II), controlled (HCC) E11.9    HTN (hypertension) I10    JONATHAN (acute kidney injury) ([facility] N17.9    Asthma J45.909    History of anemia due to CKD N18.9, Z86.2    Peritonitis ([facility] K65.9    Chronic kidney disease with end stage renal failure on dialysis ([facility] U14.4, A37.5    Complication of peritoneal dialysis T80.90XA    Peritonitis associated with peritoneal dialysis ([facility] T85.71XA    Chronic idiopathic constipation K59.04    Elevated troponin R77.8    Lower abdominal pain R10.30       Isolation/Infection:   Isolation            No Isolation          Patient Infection Status       Infection Onset Added Last Indicated Last Indicated By Review Planned Expiration Resolved Resolved By    None active    Resolved    COVID-19 (Rule Out) 22 COVID-19, Rapid (Ordered)   22 Rule-Out Test Resulted            Nurse Assessment:  Last Vital Signs: BP 95/79   Pulse 88   Temp 98.3 °F (36.8 °C) (Oral)   Resp 18   Ht 5' 7.99\" (1.727 m)   Wt 149 lb 14.6 oz (68 kg)   SpO2 96%   BMI 22.80 kg/m²     Last documented pain score (0-10 scale): Pain Level: 10  Last Weight:   Wt Readings from Last 1 Encounters:   08/31/22 149 lb 14.6 oz (68 kg)     Mental Status:  oriented and alert    IV Access:  - PD catheter    Nursing Mobility/ADLs:  Walking   Assisted  Transfer  Assisted  Bathing  Assisted  Dressing  Assisted  Toileting  Assisted  Feeding  Assisted  Med Admin  Assisted  Med Delivery   whole    Wound Care Documentation and Therapy:        Elimination:  Continence: Bowel: Yes  Bladder: Yes  Urinary Catheter: None   Colostomy/Ileostomy/Ileal Conduit: No       Date of Last BM: 9/1/22    Intake/Output Summary (Last 24 hours) at 8/31/2022 1337  Last data filed at 8/31/2022 0720  Gross per 24 hour   Intake 240 ml   Output 372 ml   Net -132 ml     I/O last 3 completed shifts: In: 360 [P.O.:360]  Out: -272     Safety Concerns: At Risk for Falls    Impairments/Disabilities:      None    Nutrition Therapy:  Current Nutrition Therapy:   - Oral Diet:  General    Routes of Feeding: Oral  Liquids: Thin Liquids  Daily Fluid Restriction: no  Last Modified Barium Swallow with Video (Video Swallowing Test): not done    Treatments at the Time of Hospital Discharge:   Respiratory Treatments: ***  Oxygen Therapy:  is not on home oxygen therapy.   Ventilator:    - No ventilator support    Rehab Therapies: Physical Therapy and Occupational Therapy  Weight Bearing Status/Restrictions: No weight bearing restrictions  Other Medical Equipment (for information only, NOT a DME order):  walker  Other Treatments: ***    Patient's personal belongings (please select all that are sent with patient):  Glasses    RN SIGNATURE:  Electronically signed by Nicole Blanca RN on 9/2/22 at 10:13 AM EDT    CASE MANAGEMENT/SOCIAL WORK SECTION    Inpatient Status Date: 8/21/22    Readmission Risk Assessment Score:  Readmission Risk              Risk of Unplanned Readmission:  28           Discharging to Facility/ [address] (SNF): [facility]   Address: [address] [place], [place]  Phone: [phone]    Fax: [phone]    Dialysis Facililty  [facility]    Address: [address]     Phone: [phone]    / signature: Electronically signed by Alfredo Miller MSW, LSW on 9/1/22 at 11:43 AM EDT    PHYSICIAN SECTION    Prognosis: Fair    Condition at Discharge: Stable    Rehab Potential (if transferring to Rehab): Fair    Recommended Labs or Other Treatments After Discharge:     Physician Certification: I certify the above information and transfer of Zee Hector  is necessary for the continuing treatment of the diagnosis listed and that she requires [facility] for less 30 days.      Update Admission H&P: No change in H&P    PHYSICIAN SIGNATURE:  Electronically signed by Larisa Arias MD on 8/31/22 at 1:37 PM EDT

## 2022-08-24 NOTE — PROGRESS NOTES
Occupational Therapy  Facility/Department: [address] [place] [address]  Occupational Therapy Initial Assessment/Tx Note    Name: Luz Sifuentes  : 1965  MRN: [id number]  Date of Service: 2022    Assessment: Pt admitted with chest and abdominal pain, AMS, fall. Pt reports upwards of 20 falls since December. On eval, pt presents with severe gross motor ataxia which affects balance and ADLs. She nearly fell from edge of bed at beginning of session then required min assist and walker for additional mobility. Pt set to d/c home with home health care today, but is not at all safe to do so. She also exhibits cognitive deficits that are concerning given she manages her own PD at home. Pt would benefit from continued inpt OT/PT at d/c to increase safety and independence prior to discharging home. RN aware. Discharge Recommendations: Luz Sifuentes scored a 16/24 on the AM-PAC ADL Inpatient form. Current research shows that an AM-PAC score of 17 or less is typically not associated with a discharge to the patient's home setting. Based on the patient's AM-PAC score and their current ADL deficits, it is recommended that the patient have 3-5 sessions per week of Occupational Therapy at d/c to increase the patient's independence. Please see assessment section for further patient specific details. OT Equipment Recommendations  Equipment Needed: No (defer to next facility)       Treatment Diagnosis: Decreased activity tolerance, impaired ADLs and mobility      Assessment   Performance deficits / Impairments: Decreased functional mobility ; Decreased ADL status; Decreased cognition;Decreased safe awareness;Decreased endurance;Decreased balance;Decreased high-level IADLs;Decreased coordination  Treatment Diagnosis: Decreased activity tolerance, impaired ADLs and mobility  Decision Making: Medium Complexity  REQUIRES OT FOLLOW-UP: Yes  Activity Tolerance  Activity Tolerance: Patient Tolerated treatment well  Activity Tolerance Independent  Transfer Assistance: Independent  Active : No (reports has license but no vehicle, uses [employer] transportation for appts and grocery)  Additional Comments: Denies ever having any home health care (chart indicates she has been active with an agency before). Does her own PD at home. Objective              Safety Devices  Type of Devices: Chair alarm in place;Nurse notified;Gait belt;Call light within reach; Left in chair (LEs elevated)  Bed Mobility Training  Bed Mobility Training: Yes  Supine to Sit: Contact-guard assistance  Scooting: Total assistance (pt impulsively attempting to reposition sitting EOB, leaning back, causing sliding. OT required additional assist to prevent fall and reposition pt back onto bed safely)  Balance  Sitting: Impaired (CGA at all times, tendency for posterior leaning causing sliding, 3 episodes of fully losing balance posteriorly, max assist)  Standing: Impaired (CGA at walker, min assist during grooming at sink)  Transfer Training  Transfer Training: Yes  Sit to Stand: Minimum assistance (mod cues)  Stand to Sit: Minimum assistance  Bed to Chair: Minimum assistance (min cues, walker)  Gait  Overall Level of Assistance: Minimum assistance (to/from bathroom with walker)     AROM: Generally decreased, functional (B rotator cuff related impairments. R 75 degrees, L 90 degrees)  Strength: Within functional limits (B shoulder impairments as above,  and elbow flex/ext 4+/5)  Coordination: Grossly decreased, non-functional (significant gross motor ataxia affecting balance/safety, fine motor somewhat less severe, able to perform ADLs with effort)  ADL  Feeding: Modified independent ;Setup  Feeding Skilled Clinical Factors: Did not recall eating lunch, RN clarifies she did  Grooming: Modified independent ;Setup;Verbal cueing  Grooming Skilled Clinical Factors: hand hygiene at sink with CGA to min assist for standing balance  LE Dressing:  Moderate assistance;Setup;Verbal cueing  LE Dressing Skilled Clinical Factors: OT donned shoes, pt doffed them by pushing them off with opposite foot seated EOB  Toileting: Unable to assess(comment)  Toileting Skilled Clinical Factors: anticipate min to mod assist for balance + difficulty transferring on/off toilet              Vision  Vision: Within Functional Limits  Hearing  Hearing: Within functional limits  Cognition  Overall Cognitive Status: Exceptions  Cognition Comment: alert, oriented x4, impaired memory (short and long term), decreased insight and safety awareness, reports frequently losing her train of though mid sentence  Orientation  Overall Orientation Status: Within Functional Limits       Education Given To: Patient  Education Provided: Role of Therapy;Plan of Care;Precautions; ADL Adaptive Strategies;Transfer Training;Energy Conservation; Fall Prevention Strategies  Education Method: Verbal  Barriers to Learning: Cognition  Education Outcome: Verbalized understanding;Continued education needed            AM-PAC Score     AM-[facility] Inpatient Daily Activity Raw Score: 16 (08/24/22 1546)  AM-PAC Inpatient ADL T-Scale Score : 35.96 (08/24/22 1546)  ADL Inpatient CMS 0-100% Score: 53.32 (08/24/22 1546)  ADL Inpatient CMS G-Code Modifier : CK (08/24/22 1546)      Goals  Short Term Goals  Time Frame for Short term goals: by D/C  Short Term Goal 1: Transfer to/from all common surfaces SBA - Not met  Short Term Goal 2: Maintain unsupported sitting balance spvn during functional tasks - Not met  Short Term Goal 3: Dress lower body SBA - Not met  Short Term Goal 4: Maintain static/dynamic standing balance spvn during ADLs - Not met       Therapy Time   Individual Concurrent Group Co-treatment   Time In 1449         Time Out 1542         Minutes 53          Timed Code Tx Min: 38  Total Tx Time: 53       Radha Carrion, OT

## 2022-08-24 NOTE — PROGRESS NOTES
Patient being transferred to [address] [place] [address] room 3309. Called Jairo RN with pt report and Dialysis RN contact information. Transporting pt along with PD machine. Machine is up and running in new room. Contacted [facility] (Dialysis) with new location.

## 2022-08-24 NOTE — PROGRESS NOTES
I have seen, examined and evaluated the patient as did the resident physician. We have discussed the plan of care and decisions made during that discussion were incorporated into this note. I have reviewed the resident physician's note and agree with the assessment and plan of care as documented. Cindi Ortiz 65 y.o. female  - hx of DM, ESRD on PD, anxiety, bipolar disorder, depression, and hypertension who presented with abdominal pain and chest pain, x 2 weeks. Noted to be confused. Chest pain intermittent, non specific. Lower abd pain, a/w nausea/loss of appetite, loose stools. Recently in 07/22-07/27/22 was treated empirically elevated WBC in PD fluid, cult neg, rx with vanc  cefepime x 5 days.   -  PD fluid collected in the ER 08/21 359 nucleated cells, 13%PMN.  Repeat 8/22 - 10 nucleated cells, 08/23 - 3 nucleated cells  - ID is following, being treated for peritonitis, and pending further cultures (anaerobic/aerobic/mycobacterial/yeast)  - Chest pain espinoza negative with no evidence of myocardial ischemia and Echo w/ EF 50-55%, with G2DD, LA dilation, and negative bubble study,    ---- Overall doing well, discussed with Dr. Christ Scherer, he will arrange the PD instill Abx, and I have orderder nystatin swish and swallow x 3 weeks to prevent fungal peritonitis    Meds to beds, discussed with PEACE Casas to [facility] [address], MD  Hospitalist  Attending Physician

## 2022-08-24 NOTE — DISCHARGE INSTRUCTIONS
You will be receiving your antibiotics in your peritoneal dialysate for 10 days. Take oxycodone 5 mg every 6 hours as needed for pain for up to 7 days. Use the nystatin 5 mL 4 times daily for 3 weeks. Start taking Senokot 1 tablet daily to help with bowel movements, aspirin 81 mg daily.

## 2022-08-24 NOTE — PROGRESS NOTES
Nephrology Consult Note        [facility]    [email or web address]       Phone: [phone]                                                  8/24/2022 9:45 AM     Patient: Albino Snellen [id number]  [id number]  Date of Admit: 8/20/2022 LOS: 3 days  Referring physician:    Plan:  Patient feel fine  Vitals stable  On room air  Abdominal pain is improving  Chest pain resolved. No SOB on room air  No labs checked today. Continue PD  Follow repeat cell counts and Cx. So far negative. Follow pending Cx. Patient can be discharged today from nephrology standpoint  Spoke with PD RN for intraperitoneal antibiotics for 10 more days. Cefepime 1 gram daily and Vancomycin 1.5 gram q 5 days x 2. Nystatin swish and swallow for 3 weeks to prevent fungal peritonitis. Continue stool softeners for regular bowel movement. Topical Gentamicin  Increased Sevelamer dose. Chest pain workup per cardiology  Avoid nephrotoxins  Monitor input, output and weight  Monitor labs and vitals closely  Renally dose all medications  Need retraining at PD clinic      D/W patient and Tomasa Cassette PD nurse  D/W Dr Karen Singletary  D/W Conda Manner      Thank you for allowing us to participate in this patient's care    In case of any question please call us at our 24 hour answering service [phone] or from 7 AM to 5 PM via [employer] or cell number    Fidelia Polk MD  [facility]  [address], [address]  Fax: [phone]  Office: [phone]       Assessment & Plan     Renal function:  ESRD  On PD  No tenderness, erythema or discharge around PD catheter noted  Continue PD      Possible Peritonitis:   Antibiotics per ID  Patient non compliant.        Electrolytes:  Lab Results   Component Value Date    CREATININE 7.4 (HH) 08/23/2022    BUN 37 (H) 08/23/2022     08/23/2022    K 4.1 08/23/2022    CL 98 (L) 08/23/2022    CO2 28 08/23/2022            # CKD- MBD:   Secondary hyperparathyroidism due to renal failure  Monitor Phos level while here. Lab Results   Component Value Date    CALCIUM 9.0 08/23/2022    PHOS 6.3 (H) 08/23/2022         Acid/Base status:  Stable. Volume status/BP:  BP stable  Euvolemic. Hematology:   CKD related anemia  Goal Hgb 10-11    Lab Results   Component Value Date    IRON 98 08/12/2019    TIBC 275 08/12/2019     Lab Results   Component Value Date    WBC 6.9 08/22/2022    HGB 9.5 (L) 08/22/2022    HCT 29.2 (L) 08/22/2022    MCV 80.2 08/22/2022     08/22/2022             Reason for Consult and Chief Complaint     Reason for consult: ESRD    Chief complaint:   Chief Complaint   Patient presents with    Fatigue    Abdominal Pain    Shoulder Pain       History of Present Illness   Zenaida Timmons is a 65 y.o. with PMH significant for ESRD on PD being admitted with abdominal pain and chest pain. Per patient abdominal pain is ongoing for 2 weeks. Patient seen in ER and she is sleepy and goes back to sleep after answering questions. Not sure about PD fluid color. PD fluid cell count checked in ER and it showed 359 nucleated cells. Do not know if PD fluid was collected from dry abdomen or after proper dwell. ER MD is now not here. No SOB. No GI symptoms      Review of Systems   Positive in bold or unable to assess     GEN: Fever, chills, night sweats. HEENT: Changes in vision, sore throat, rhinorrhea   CVS: Chest pain, palpitations, swelling or edema in legs  Pulmonary: Cough, hemoptysis, SOB  GI: Nausea, vomiting, diarrhea, constipation, abdominal pain  : Bladder incontinence, dysuria,hematuria. MSK: Muscle or joint or bone pains  Skin: Rashes, ulcers, skin thickness  CNS: Headache, dizziness, confusion, focal weakness, seizure. Psych: Anxiety, agitation, depression. Reviewed all 12 systems, negative except as above.      Past Medical History     Past Medical History:   Diagnosis Date    JONATHAN (acute kidney injury) ([facility] [address] 9/3/2014    Anxiety     Asthma Bipolar disorder ([address]     Bronchitis     Chronic back pain     Depression     Diabetes mellitus ([address]     DM II (diabetes mellitus, type II), controlled ([address] 9/3/2014    Hypertension          Past Surgical History     Past Surgical History:   Procedure Laterality Date    ENDOMETRIAL ABLATION      INNER EAR SURGERY      TUBAL LIGATION           Family History     Family History   Problem Relation Age of Onset    Diabetes Mother     Cancer Mother     Cancer Maternal Grandmother     Heart Disease Maternal Grandfather          Social History     Social History     Tobacco Use    Smoking status: Some Days     Packs/day: 0.05     Years: 15.00     Pack years: 0.75     Types: Cigarettes    Smokeless tobacco: Never   Substance Use Topics    Alcohol use: No          Past medical, family, and social histories were reviewed as previously documented. Updates were made as necessary.       Inpatient Medications and Allergies       Scheduled Meds:   vancomycin  1,000 mg IntraVENous Q72H    sevelamer  1,600 mg Oral TID WC    polyethylene glycol  17 g Oral BID    b complex-C-folic acid  1 capsule Oral Daily    benztropine  1 mg Oral Nightly    buPROPion  150 mg Oral BID    calcitRIOL  0.25 mcg Oral Daily    docusate sodium  100 mg Oral BID    doxepin  50 mg Oral Nightly    ferrous sulfate  325 mg Oral Daily with breakfast    gabapentin  300 mg Oral Nightly    hydrALAZINE  50 mg Oral Daily    metoprolol succinate  25 mg Oral Daily    QUEtiapine  200 mg Oral Nightly    rosuvastatin  40 mg Oral Nightly    sennosides-docusate sodium  1 tablet Oral Daily    sodium chloride flush  5-40 mL IntraVENous 2 times per day    aspirin  81 mg Oral Daily    heparin (porcine)  5,000 Units SubCUTAneous 3 times per day    cefepime  1,000 mg IntraVENous Q24H    dianeal lo-josh 1.5%  8,000 mL IntraPERitoneal Daily    nystatin  5 mL Oral 4x Daily    vancomycin (VANCOCIN) intermittent dosing (placeholder)   Other RX Placeholder    melatonin  10 mg Oral

## 2022-08-24 NOTE — PROGRESS NOTES
Patient remains alert and oriented. VSS. PD setup in room and running. Pt is standby assist, voiding clear/yellow urine, no BM. Pt complains of low abd pain, no n/v and tolerating diet with no allergic reactions noted.

## 2022-08-24 NOTE — DISCHARGE SUMMARY
Hospital Medicine Discharge Summary    Patient ID: Beau Mehta   Gender: female  : 1965   Age: 65 y.o. MRN: [id number]  Code Status: Full Code    Patient's PCP: Lenin Rodgers DO,     Admit Date: 2022     Discharge Date:   22    Admitting Physician: Jass Saez MD     Discharge Physician: Melissa Bocanegra MD     Discharge Diagnoses: Active Hospital Problems    Diagnosis Date Noted    Lower abdominal pain [R10.30] 2022     Priority: Medium    Elevated troponin [R77.8] 2022     Priority: Medium    Peritonitis associated with peritoneal dialysis [facility]) Sathya Ashco 2022     Priority: Medium    Complication of peritoneal dialysis Kenji Mack 2022     Priority: Medium    Chronic kidney disease with end stage renal failure on dialysis [facility]) [N18.6, Z99.2] 2022     Priority: Medium    Atypical chest pain [R07.89] 2014       The patient was seen and examined on day of discharge and this discharge summary is in conjunction with any daily progress note from day of discharge. Hospital Course:  65 y.o. female with past medical history of ESRD on PD, anxiety, bipolar disorder, depression, diabetes mellitus and hypertension who presented with abdominal pain and chest pain. Symptoms were there for about 2 weeks intermittently. She is more thinking the symptoms localized around the abdominal area and sometimes radiate to the chest.  She is very sleepy to provide details on the history. No shortness of breath. No nausea or vomiting reported. Has regular bowel movement. No dysuria. PD fluid collected in the ER and cell count came back with 359 nucleated cells. She was also complaining of vaginal discharge. During hospital stay nephrology was consulted and patient underwent peritoneal dialysis nightly. Infectious disease was consulted and patient was given vancomycin and cefepime intravenously for peritonitis.   Cultures are still pending at time of discharge. Patient was discharged with intraperitoneal Vancomycin and cefepime for 10 more days which nephrology will arrange. Additionally, she was discharged on nystatin swish and swallow for 3 weeks for prophylaxis. For her chest pain, ACS work-up was negative including a negative stress test, echo with EF 50 to 81%, grade 2 diastolic dysfunction left atrial dilation and echo with bubble study. On the date of discharge, the patient reported feeling stable. The patient was found to not be in any acute distress, with vital signs within normal limits, and no new abnormalities on physical examination. Further, the patient expressed appropriate understanding of, and agreement with, the discharge recommendations, medications, and plan. Disposition:  Home [place] [address]    Physical Exam Performed:     /69   Pulse 78   Temp 98.2 °F (36.8 °C) (Oral)   Resp 16   Ht 5' 7.99\" (1.727 m)   Wt 144 lb 6.4 oz (65.5 kg)   SpO2 98%   BMI 21.96 kg/m²       General appearance:  No apparent distress, appears stated age and cooperative. HEENT:  Normal cephalic, atraumatic without obvious deformity. Pupils equal, round, and reactive to light. Extra ocular muscles intact. Conjunctivae/corneas clear. Neck: Supple, with full range of motion. No jugular venous distention. Trachea midline. Respiratory:  Normal respiratory effort. Clear to auscultation, bilaterally without Rales/Wheezes/Rhonchi. Cardiovascular:  Regular rate and rhythm with normal S1/S2 without murmurs, rubs or gallops. Abdomen: Soft, mildly-tender, non-distended with normal bowel sounds. Has left sided peritoneal catheter in place. Musculoskeletal:  No clubbing, cyanosis or edema bilaterally. Full range of motion without deformity. Skin: Skin color, texture, turgor normal.  No rashes or lesions. Neurologic:  Neurovascularly intact without any focal sensory/motor deficits.  Cranial nerves: II-XII intact, grossly non-focal.  Psychiatric:  Alert and oriented, thought content appropriate, normal insight  Capillary Refill: Brisk,< 3 seconds   Peripheral Pulses: +2 palpable, equal bilaterally       Labs: For convenience and continuity at follow-up the following most recent labs are provided:      CBC:    Lab Results   Component Value Date/Time    WBC 6.9 08/22/2022 06:04 AM    HGB 9.5 08/22/2022 06:04 AM    HCT 29.2 08/22/2022 06:04 AM     08/22/2022 06:04 AM       Renal:    Lab Results   Component Value Date/Time     08/24/2022 10:03 AM    K 3.9 08/24/2022 10:03 AM    K 4.0 08/21/2022 06:28 AM    CL 96 08/24/2022 10:03 AM    CO2 27 08/24/2022 10:03 AM    BUN 36 08/24/2022 10:03 AM    CREATININE 7.2 08/24/2022 10:03 AM    CALCIUM 8.6 08/24/2022 10:03 AM    PHOS 5.4 08/24/2022 10:03 AM         Significant Diagnostic Studies    Radiology:   NM MYOCARDIAL SPECT REST EXERCISE OR RX   Final Result      XR CHEST (2 VW)   Final Result      No acute radiographic abnormality of the chest.      CT HEAD WO CONTRAST   Final Result      No acute intracranial hemorrhage or mass effect. Consults:     IP CONSULT TO HOSPITALIST  IP CONSULT TO CARDIOLOGY  IP CONSULT TO NEPHROLOGY  PHARMACY TO DOSE VANCOMYCIN  IP CONSULT TO INFECTIOUS DISEASES  IP CONSULT TO HOME CARE NEEDS      Condition at Discharge: Stable    Discharge Instructions/Follow-up:    Antibiotics in your peritoneal dialysate for 10 days as above. Oxycodone 5 mg every 6 hours as needed for pain for up to 7 days. Nystatin 5 mL 4 times daily for 3 weeks. Senokot 1 tablet daily to help with bowel movements  Aspirin 81 mg daily.     Activity: activity as tolerated    Diet: renal diet      Discharge Medications:     Current Discharge Medication List             Details   aspirin 81 MG chewable tablet Take 1 tablet by mouth daily  Qty: 30 tablet, Refills: 3      nystatin (MYCOSTATIN) [id number] UNIT/ML suspension Take 5 mLs by mouth 4 times daily for 21 days  Qty: 420 mL, Refills: 0 Monica DO, DO for the opportunity to be involved in this patient's care.

## 2022-08-24 NOTE — PROGRESS NOTES
require min A with ambulation. Pt lives alone. Safety concerns for pt returning home as she is at high risk for future falls. Will benefit from continued IP PT to maximize safety and independence. Treatment Diagnosis: impaired gait and transfers  Therapy Prognosis: Good  Decision Making: Low Complexity  Requires PT Follow-Up: Yes     Plan   Plan  Plan:  (2-5x/week)  Current Treatment Recommendations: Balance training, Gait training, Functional mobility training, Transfer training, Stair training, Patient/Caregiver education & training, Therapeutic activities, Equipment evaluation, education, & procurement  Safety Devices  Type of Devices: Chair alarm in place, Nurse notified, Gait belt, Call light within reach, Left in chair (LEs elevated)     Restrictions  Position Activity Restriction  Other position/activity restrictions: up as tolerated (Simultaneous filing. User may not have seen previous data.)     Subjective   General  Chart Reviewed: Yes  Additional Pertinent Hx: Admit 8/20 with Fatigue, Abdominal Pain, and Shoulder Pain; (+) NSTEMI; head CT: (-) acute; PMHx: JONATHAN, anxiety, asthma, bipolar, chronic back pain, DM, HTN  Referring Practitioner: Reg Fitch MD  Diagnosis: chest pain  Subjective  Subjective: Pt found sitting EOB with OT present. Pt agrees to PT. Voices concern about going home alone today due to generalized weakness and imbalance.          Social/Functional History  Social/Functional History  Lives With: Alone  Type of Home: Apartment  Home Layout: One level  Home Access: Stairs to enter with rails  Entrance Stairs - Number of Steps: 3 + 3  Bathroom Shower/Tub: Tub/Shower unit  Bathroom Toilet: Standard  Bathroom Equipment: Shower chair  Home Equipment:  (no DME)  Has the patient had two or more falls in the past year or any fall with injury in the past year?: Yes (1 fall day of admit, 3 in the last month, ~20 since December)  Receives Help From: Other (comment) (has 2 sons in town but reports they do not help her even when asked)  ADL Assistance: [address]: Independent  Ambulation Assistance: Independent  Transfer Assistance: Independent  Active : No (reports has license but no vehicle, uses [email or web address] transportation for appts and grocery)  Additional Comments: Denies ever having any home health care (chart indicates she has been active with an agency before). Does her own PD at home.   Vision/Hearing  Vision  Vision: Within Functional Limits  Hearing  Hearing: Within functional limits    Cognition   Orientation  Overall Orientation Status: Within Functional Limits     Objective         Gross Assessment  Coordination: Generally decreased, functional (B heel to shin coordination impaired)     AROM RLE (degrees)  RLE AROM: WFL  AROM LLE (degrees)  LLE AROM : WFL  Strength RLE  Comment: grossly >3+/5  Strength LLE  Comment: grossly >3+/5              Transfers  Sit to Stand: Minimal Assistance (from EOB x 2 trials)  Stand to sit: Minimal Assistance  Ambulation  Device: Standard Walker  Assistance: Minimal assistance  Quality of Gait: unsteady, ataxic, increased lateral sway, effortful  Gait Deviations: Slow Anamaria;Decreased step length;Decreased step height  Distance: 15 ft, 2 ft  Comments: distance limited by generalized fatigue     Balance  Sitting - Static: Fair  Sitting - Dynamic: Fair (CGA - increased sway noted)  Standing - Static: Fair  Standing - Dynamic: Fair (min A x 1 with RW)           OutComes Score                                                  AM-PAC Score  AM-PAC Inpatient Mobility Raw Score : 15 (08/24/22 1603)  AM-PAC Inpatient T-Scale Score : 39.45 (08/24/22 1603)  Mobility Inpatient CMS 0-100% Score: 57.7 (08/24/22 1603)  Mobility Inpatient CMS G-Code Modifier : CK (08/24/22 1603)          Tinneti Score       Goals  Short Term Goals  Time Frame for Short term goals: discharge  Short term goal 1: Pt will transfer sit <--> stand with supervision  Short term goal 2: Pt will amb 50ft with walker and supervision  Short term goal 3: Pt will negotiate 3 steps with rail and CGA  Patient Goals   Patient goals : eventually return home       Education  Patient Education  Education Given To: Patient  Education Provided: Role of Therapy  Education Method: Demonstration  Barriers to Learning: None  Education Outcome: Verbalized understanding      Therapy Time   Individual Concurrent Group Co-treatment   Time In 1510         Time Out 1535         Minutes 25                 Timed Code Treatment Minutes:  10    Total Treatment Minutes:  25    If patient is discharged prior to next treatment, this note will serve as the discharge summary.   Alberto Avila, PT, DPT  [id number]

## 2022-08-24 NOTE — CARE COORDINATION
CM following: CM met with patient at bedside this AM. Patient verified that she had addy referred for [facility] previously but was unsure of the agency. A referral has been made to [facility] and [facility] liaison Fred Tompkins is following.   Electronically signed by CLAUDIO Gill on 8/24/2022 at 11:46 AM  [phone]

## 2022-08-24 NOTE — PROGRESS NOTES
Clinical Pharmacy Progress Note    Vancomycin - Management by Pharmacy    Consult Date(s): 8/21  Consulting Provider(s): Dr Uche Allison / Sonal Gross:  1)  Intra-abdominal infection - possible PD peritonitis - Vancomycin  Concurrent Antimicrobials: Cefepime  Day of Vanc Therapy: 2  Current Dosing Method: Scheduled PD dosing  Therapeutic Goal: Trough ~ 15 mg/L  Plan:  Patient is ESRD on PD at home. Random level yesterday = 16.7 mcg/mL. Using 2-point kinetics, pt's est half-life ~ 65 hrs. Started yesterday on scheduled dosing of 1000 mg IV q72h. Random level ordered for tomorrow am -in case pt not discharged today, to ensure levels will remain therapeutic through 72hr interval.  Will continue to monitor clinical condition and make adjustments to regimen as appropriate. Please call with any questions:    Juancarlos Vazquez  Pharm. D. BCPS   8/24/2022 10:35 AM    [phone] (office)  [phone] (wireless)           Interval update:   Remains afebrile and HDS. PD fluid culture with no growth thus far. Tolerated PD overnight per notes. OK for discharge today per nephrology with plan for cefepime and vancomycin x 10 days. Subjective/Objective:  Scot Yates is a 65 y.o. female with a PMHx significant for ESRD on PD, HTN, DM type II, asthma, chronic back pain, Bipolar dx, and depression who is admitted with suspected PD associated peritonitis. Pt also with chest pain & elevated troponin. Pharmacy is consulted to dose vancomycin    Ht Readings from Last 1 Encounters:   08/22/22 5' 7.99\" (1.727 m)     Wt Readings from Last 1 Encounters:   08/24/22 144 lb 6.4 oz (65.5 kg)       Current & Prior Antimicrobial Regimen(s):  Cefepime 1000mg EI q24h (8/21-current)  Vancomycin - pharmacy to dose  Intermittent via levels (8/21-current)    Date: Vanc level: Vanc dose:   8/21  1500mg   8/22 21.4 mcg/mL --   8/23 16.7 mcg/mL 1000mg ordered     2-point kinetics (using levels from 8/22 & 8/23):   Est Ke ~0.0106/hr  Est T1/2 ~65hr    Cultures & Sensitivities:    Date Site Micro Susceptibility / Result   8/21 PD fluid No growth to date    8/22 PD fluid No growth to date        Recent Labs     08/22/22  0604 08/23/22  0535   CREATININE 7.8* 7.4*   BUN 41* 37*   WBC 6.9  --      CrCl is not estimated 2/2 ESRD on PD

## 2022-08-24 NOTE — PROGRESS NOTES
ID Follow-up NOTE    CC:   PD peritonitis  Antibiotics: Vancomycin, Cefepime    Admit Date: 2022  Hospital Day: 5    Subjective:     Patient c/o abd pain with exam  No nausea / vomiting  No diarrhea      Objective:     Patient Vitals for the past 8 hrs:   BP Temp Temp src Pulse Resp SpO2   22 1412 111/80 98.4 °F (36.9 °C) Axillary 77 15 97 %       I/O last 3 completed shifts: In: 200 [P.O.:840; I.V.:40; IV Piggyback:350]  Out: 431 [Urine:225]  I/O this shift:  In: -   Out: 241     EXAM:  GENERAL: No apparent distress.     HEENT: Membranes moist, no oral lesion  NECK:  Supple, no lymphadenopathy  LUNGS: Clear b/l, no rales, no dullness  CARDIAC: RRR, no murmur appreciated  ABD:  + BS, soft, PD catheter L side, tender with palpation lower abd with rebound  EXT:  No rash, no edema, no lesions  NEURO: No focal neurologic findings  PSYCH: Orientation, sensorium, mood normal  LINES:  Peripheral iv       Data Review:  Lab Results   Component Value Date    WBC 6.9 2022    HGB 9.5 (L) 2022    HCT 29.2 (L) 2022    MCV 80.2 2022     2022     Lab Results   Component Value Date    CREATININE 7.2 (HH) 2022    BUN 36 (H) 2022     (L) 2022    K 3.9 2022    CL 96 (L) 2022    CO2 27 2022       Hepatic Function Panel:   Lab Results   Component Value Date/Time    ALKPHOS 88 08/15/2022 12:28 PM    ALT 16 08/15/2022 12:28 PM    AST 19 08/15/2022 12:28 PM    PROT 5.7 08/15/2022 12:28 PM    PROT 6.9 2011 10:15 AM    BILITOT <0.2 08/15/2022 12:28 PM    BILIDIR <0.2 08/15/2022 12:28 PM    IBILI see below 08/15/2022 12:28 PM    LABALBU 2.8 2022 10:03 AM       Micro:   SARS CoV-2 NAAT neg   PD flud - GS neg, cult no growth to date   fluid - GS neg, cult no growth to date   PD fluid - GS neg, cult in process   fungal cult - stain neg, cult sent up     Imagin/20 CXR neg      Scheduled Meds:   vancomycin  1,000 mg IntraVENous Q72H    sevelamer  1,600 mg Oral TID WC    polyethylene glycol  17 g Oral BID    b complex-C-folic acid  1 capsule Oral Daily    benztropine  1 mg Oral Nightly    buPROPion  150 mg Oral BID    calcitRIOL  0.25 mcg Oral Daily    docusate sodium  100 mg Oral BID    doxepin  50 mg Oral Nightly    ferrous sulfate  325 mg Oral Daily with breakfast    gabapentin  300 mg Oral Nightly    hydrALAZINE  50 mg Oral Daily    metoprolol succinate  25 mg Oral Daily    QUEtiapine  200 mg Oral Nightly    rosuvastatin  40 mg Oral Nightly    sennosides-docusate sodium  1 tablet Oral Daily    sodium chloride flush  5-40 mL IntraVENous 2 times per day    aspirin  81 mg Oral Daily    heparin (porcine)  5,000 Units SubCUTAneous 3 times per day    cefepime  1,000 mg IntraVENous Q24H    dianeal lo-josh 1.5%  8,000 mL IntraPERitoneal Daily    nystatin  5 mL Oral 4x Daily    vancomycin (VANCOCIN) intermittent dosing (placeholder)   Other RX Placeholder    melatonin  10 mg Oral Nightly    lidocaine  1 patch TransDERmal Daily       Continuous Infusions:   sodium chloride 10 mL/hr at 08/24/22 0816       PRN Meds:  oxyCODONE, hydrALAZINE, hydrOXYzine HCl, sodium chloride flush, sodium chloride, ondansetron **OR** ondansetron, acetaminophen **OR** acetaminophen, polyethylene glycol      Assessment:     Hx DM, HTN, CRF on PD, LBP, bipolar d/o, anemia  On PD since Nov / Dec 2021     [facility] 7/22-27 with fall.    Pt had elevated WBC in PD fluid (7/23 0 WBC 3,351 - 34%PMN), cult neg, t  Discharged with plan to give IP vanco / cefepime x 5 days (pt never obtain dialysates / never took this)     Presents with chest pain - 2 weeks, intermittent, no asssoc with activity, no SOB  In ED 8/20, afeb, WBC 8.2, UA neg     PD fluid 8/21 - 359 WBC - 13%PMN  Repeat 8/22 -  10 WBC   Repeat 8/23 - 3 WBC   Repeat 8/24 - 2 WBC     IMP/  Peritonitis by exam though does NOT have neutrophilic pleocytosis (neutrophils >250) seen with bacterial peritonitis   - may be 'partially treated' or 'atypical' agent - yeast or mycobacterium     Plan:     Cont empiric Vanco / Cefepime  Will f/u on cult - fungal cult in process  Daily dialysate cell count and diff, WBC 2 today 8/24  Fluid for cult - mycobacterial and yeast sent 8/23    Empiric antibiotics - after discharge IP antibiotics     Medical Decision Making:   The following items were considered in medical decision making:  Discussion of patient care with other providers  Reviewed clinical lab tests  Reviewed radiology tests  Reviewed other diagnostic tests/interventions  Microbiology cultures and other micro tests reviewed      Discussed with pt, RN  Discussed with Mark Navarrete by phone this am  Kathryn Clements MD

## 2022-08-24 NOTE — PLAN OF CARE
Problem: Safety - Adult  Goal: Free from fall injury  8/24/2022 0928 by Sabina Sanchez RN  Outcome: Progressing     Problem: Pain  Goal: Verbalizes/displays adequate comfort level or baseline comfort level  Outcome: Progressing     Problem: Genitourinary - Adult  Goal: Absence of urinary retention  Outcome: Progressing

## 2022-08-25 LAB
ANION GAP SERPL CALCULATED.3IONS-SCNC: 8 MMOL/L (ref 3–16)
BODY FLUID CULTURE, STERILE: NORMAL
BUN BLDV-MCNC: 35 MG/DL (ref 7–20)
CALCIUM SERPL-MCNC: 9 MG/DL (ref 8.3–10.6)
CHLORIDE BLD-SCNC: 97 MMOL/L (ref 99–110)
CO2: 29 MMOL/L (ref 21–32)
CREAT SERPL-MCNC: 7 MG/DL (ref 0.6–1.1)
GFR AFRICAN AMERICAN: 7
GFR NON-AFRICAN AMERICAN: 6
GLUCOSE BLD-MCNC: 102 MG/DL (ref 70–99)
GRAM STAIN RESULT: NORMAL
POTASSIUM REFLEX MAGNESIUM: 3.9 MMOL/L (ref 3.5–5.1)
SODIUM BLD-SCNC: 134 MMOL/L (ref 136–145)
VANCOMYCIN RANDOM: 24 UG/ML

## 2022-08-25 PROCEDURE — 6370000000 HC RX 637 (ALT 250 FOR IP): Performed by: STUDENT IN AN ORGANIZED HEALTH CARE EDUCATION/TRAINING PROGRAM

## 2022-08-25 PROCEDURE — 6360000002 HC RX W HCPCS: Performed by: STUDENT IN AN ORGANIZED HEALTH CARE EDUCATION/TRAINING PROGRAM

## 2022-08-25 PROCEDURE — 36415 COLL VENOUS BLD VENIPUNCTURE: CPT

## 2022-08-25 PROCEDURE — 6370000000 HC RX 637 (ALT 250 FOR IP): Performed by: INTERNAL MEDICINE

## 2022-08-25 PROCEDURE — 90945 DIALYSIS ONE EVALUATION: CPT

## 2022-08-25 PROCEDURE — 80048 BASIC METABOLIC PNL TOTAL CA: CPT

## 2022-08-25 PROCEDURE — 80202 ASSAY OF VANCOMYCIN: CPT

## 2022-08-25 PROCEDURE — 2580000003 HC RX 258: Performed by: INTERNAL MEDICINE

## 2022-08-25 PROCEDURE — 6360000002 HC RX W HCPCS: Performed by: INTERNAL MEDICINE

## 2022-08-25 PROCEDURE — A4722 DIALYS SOL FLD VOL > 1999CC: HCPCS | Performed by: STUDENT IN AN ORGANIZED HEALTH CARE EDUCATION/TRAINING PROGRAM

## 2022-08-25 PROCEDURE — 1200000000 HC SEMI PRIVATE

## 2022-08-25 RX ORDER — GABAPENTIN 100 MG/1
100 CAPSULE ORAL ONCE
Status: COMPLETED | OUTPATIENT
Start: 2022-08-25 | End: 2022-08-25

## 2022-08-25 RX ORDER — SENNA AND DOCUSATE SODIUM 50; 8.6 MG/1; MG/1
1 TABLET, FILM COATED ORAL 2 TIMES DAILY
Status: DISCONTINUED | OUTPATIENT
Start: 2022-08-25 | End: 2022-09-02 | Stop reason: HOSPADM

## 2022-08-25 RX ADMIN — SEVELAMER CARBONATE 1600 MG: 800 TABLET, FILM COATED ORAL at 08:23

## 2022-08-25 RX ADMIN — NYSTATIN 500000 UNITS: 100000 SUSPENSION ORAL at 16:00

## 2022-08-25 RX ADMIN — OXYCODONE 10 MG: 5 TABLET ORAL at 21:02

## 2022-08-25 RX ADMIN — BUPROPION HYDROCHLORIDE 150 MG: 150 TABLET, EXTENDED RELEASE ORAL at 08:23

## 2022-08-25 RX ADMIN — POLYETHYLENE GLYCOL 3350 17 G: 17 POWDER, FOR SOLUTION ORAL at 08:24

## 2022-08-25 RX ADMIN — CEFEPIME: 1 INJECTION, POWDER, FOR SOLUTION INTRAMUSCULAR; INTRAVENOUS at 15:16

## 2022-08-25 RX ADMIN — DOCUSATE SODIUM 100 MG: 100 CAPSULE, LIQUID FILLED ORAL at 20:50

## 2022-08-25 RX ADMIN — SEVELAMER CARBONATE 1600 MG: 800 TABLET, FILM COATED ORAL at 16:00

## 2022-08-25 RX ADMIN — NYSTATIN 500000 UNITS: 100000 SUSPENSION ORAL at 20:48

## 2022-08-25 RX ADMIN — ROSUVASTATIN CALCIUM 40 MG: 20 TABLET, FILM COATED ORAL at 23:22

## 2022-08-25 RX ADMIN — HEPARIN SODIUM 5000 UNITS: 5000 INJECTION INTRAVENOUS; SUBCUTANEOUS at 20:50

## 2022-08-25 RX ADMIN — NYSTATIN 500000 UNITS: 100000 SUSPENSION ORAL at 08:22

## 2022-08-25 RX ADMIN — SEVELAMER CARBONATE 1600 MG: 800 TABLET, FILM COATED ORAL at 12:25

## 2022-08-25 RX ADMIN — HYDROXYZINE HYDROCHLORIDE 25 MG: 25 TABLET ORAL at 21:04

## 2022-08-25 RX ADMIN — POLYETHYLENE GLYCOL 3350 17 G: 17 POWDER, FOR SOLUTION ORAL at 20:54

## 2022-08-25 RX ADMIN — NEPHROCAP 1 MG: 1 CAP ORAL at 08:23

## 2022-08-25 RX ADMIN — SODIUM CHLORIDE, PRESERVATIVE FREE 10 ML: 5 INJECTION INTRAVENOUS at 20:52

## 2022-08-25 RX ADMIN — FERROUS SULFATE TAB 325 MG (65 MG ELEMENTAL FE) 325 MG: 325 (65 FE) TAB at 08:22

## 2022-08-25 RX ADMIN — GLYCERIN 2 G: 2 SUPPOSITORY RECTAL at 12:26

## 2022-08-25 RX ADMIN — GABAPENTIN 300 MG: 600 TABLET, FILM COATED ORAL at 20:50

## 2022-08-25 RX ADMIN — DOXEPIN HYDROCHLORIDE 50 MG: 25 CAPSULE ORAL at 20:51

## 2022-08-25 RX ADMIN — GABAPENTIN 100 MG: 100 CAPSULE ORAL at 16:00

## 2022-08-25 RX ADMIN — SODIUM CHLORIDE, PRESERVATIVE FREE 10 ML: 5 INJECTION INTRAVENOUS at 08:24

## 2022-08-25 RX ADMIN — OXYCODONE 10 MG: 5 TABLET ORAL at 08:22

## 2022-08-25 RX ADMIN — QUETIAPINE FUMARATE 200 MG: 200 TABLET ORAL at 23:21

## 2022-08-25 RX ADMIN — SENNOSIDES AND DOCUSATE SODIUM 1 TABLET: 50; 8.6 TABLET ORAL at 20:49

## 2022-08-25 RX ADMIN — CALCITRIOL CAPSULES 0.25 MCG 0.25 MCG: 0.25 CAPSULE ORAL at 08:22

## 2022-08-25 RX ADMIN — HYDRALAZINE HYDROCHLORIDE 50 MG: 50 TABLET, FILM COATED ORAL at 08:22

## 2022-08-25 RX ADMIN — Medication 10 MG: at 20:48

## 2022-08-25 RX ADMIN — NYSTATIN 500000 UNITS: 100000 SUSPENSION ORAL at 12:25

## 2022-08-25 RX ADMIN — ASPIRIN 81 MG 81 MG: 81 TABLET ORAL at 08:22

## 2022-08-25 RX ADMIN — BUPROPION HYDROCHLORIDE 150 MG: 150 TABLET, EXTENDED RELEASE ORAL at 20:50

## 2022-08-25 RX ADMIN — METOPROLOL SUCCINATE 25 MG: 25 TABLET, EXTENDED RELEASE ORAL at 08:22

## 2022-08-25 RX ADMIN — SENNOSIDES AND DOCUSATE SODIUM 1 TABLET: 50; 8.6 TABLET ORAL at 08:22

## 2022-08-25 RX ADMIN — HEPARIN SODIUM 5000 UNITS: 5000 INJECTION INTRAVENOUS; SUBCUTANEOUS at 12:25

## 2022-08-25 RX ADMIN — DOCUSATE SODIUM 100 MG: 100 CAPSULE, LIQUID FILLED ORAL at 08:22

## 2022-08-25 RX ADMIN — BENZTROPINE MESYLATE 1 MG: 1 TABLET ORAL at 20:50

## 2022-08-25 ASSESSMENT — PAIN DESCRIPTION - ORIENTATION: ORIENTATION: LEFT

## 2022-08-25 ASSESSMENT — PAIN DESCRIPTION - LOCATION
LOCATION: ABDOMEN
LOCATION: HIP

## 2022-08-25 ASSESSMENT — PAIN SCALES - GENERAL
PAINLEVEL_OUTOF10: 9
PAINLEVEL_OUTOF10: 9

## 2022-08-25 ASSESSMENT — PAIN DESCRIPTION - DESCRIPTORS
DESCRIPTORS: ACHING
DESCRIPTORS: ACHING

## 2022-08-25 ASSESSMENT — PAIN DESCRIPTION - FREQUENCY: FREQUENCY: INTERMITTENT

## 2022-08-25 ASSESSMENT — PAIN DESCRIPTION - PAIN TYPE: TYPE: ACUTE PAIN

## 2022-08-25 NOTE — PROGRESS NOTES
Internal Medicine PGY-3 Resident Progress Note        PCP: Munir Spencer DO,     Date of Admission: 8/20/2022    Chief Complaint: Abdominal pain    Hospital Course:   65 y.o. female with past medical history of ESRD on PD, anxiety, bipolar disorder, depression, diabetes mellitus and hypertension who presented with abdominal pain and chest pain. Symptoms were there for about 2 weeks intermittently. She is more thinking the symptoms localized around the abdominal area and sometimes radiate to the chest.  She is very sleepy to provide details on the history. No shortness of breath. No nausea or vomiting reported. Has regular bowel movement. No dysuria. PD fluid collected in the ER and cell count came back with 359 nucleated cells. She was also complaining of vaginal discharge. During hospital stay nephrology was consulted and patient underwent peritoneal dialysis nightly. Infectious disease was consulted and patient was given vancomycin and cefepime intravenously for peritonitis. Subjective:   Patient doing better. Still complaining of mild abdominal pain. No other acute complaints. Denies CP, fever/chills. Likely discharge today home with [place] [id number].     Medications:  Reviewed    Infusion Medications    sodium chloride 10 mL/hr at 08/24/22 0816     Scheduled Medications    custom dialysis builder   IntraPERitoneal Once    sennosides-docusate sodium  1 tablet Oral BID    sevelamer  1,600 mg Oral TID WC    polyethylene glycol  17 g Oral BID    b complex-C-folic acid  1 capsule Oral Daily    benztropine  1 mg Oral Nightly    buPROPion  150 mg Oral BID    calcitRIOL  0.25 mcg Oral Daily    docusate sodium  100 mg Oral BID    doxepin  50 mg Oral Nightly    ferrous sulfate  325 mg Oral Daily with breakfast    gabapentin  300 mg Oral Nightly    metoprolol succinate  25 mg Oral Daily    QUEtiapine  200 mg Oral Nightly    rosuvastatin  40 mg Oral Nightly    sodium chloride flush  5-40 mL IntraVENous 2 times per day aspirin  81 mg Oral Daily    heparin (porcine)  5,000 Units SubCUTAneous 3 times per day    dianeal lo-josh 1.5%  8,000 mL IntraPERitoneal Daily    nystatin  5 mL Oral 4x Daily    melatonin  10 mg Oral Nightly    lidocaine  1 patch TransDERmal Daily     PRN Meds: oxyCODONE, hydrALAZINE, hydrOXYzine HCl, sodium chloride flush, sodium chloride, ondansetron **OR** ondansetron, acetaminophen **OR** acetaminophen, polyethylene glycol      Intake/Output Summary (Last 24 hours) at 8/25/2022 1311  Last data filed at 8/25/2022 1733  Gross per 24 hour   Intake 120 ml   Output 5 ml   Net 115 ml       Physical Exam Performed:    /79   Pulse 77   Temp 97.8 °F (36.6 °C) (Oral)   Resp 16   Ht 5' 7.99\" (1.727 m)   Wt 137 lb 2 oz (62.2 kg)   SpO2 96%   BMI 20.85 kg/m²     General appearance:  No apparent distress, appears stated age and cooperative. HEENT:  Normal cephalic, atraumatic without obvious deformity. Pupils equal, round, and reactive to light. Extra ocular muscles intact. Conjunctivae/corneas clear. Neck: Supple, with full range of motion. No jugular venous distention. Trachea midline. Respiratory:  Normal respiratory effort. Clear to auscultation, bilaterally without Rales/Wheezes/Rhonchi. Cardiovascular:  Regular rate and rhythm with normal S1/S2 without murmurs, rubs or gallops. Abdomen: Soft, mildly-tender, non-distended with normal bowel sounds. Has left sided peritoneal catheter in place. Musculoskeletal:  No clubbing, cyanosis or edema bilaterally. Full range of motion without deformity. Skin: Skin color, texture, turgor normal.  No rashes or lesions. Neurologic:  Neurovascularly intact without any focal sensory/motor deficits.  Cranial nerves: II-XII intact, grossly non-focal.  Psychiatric:  Alert and oriented, thought content appropriate, normal insight  Capillary Refill: Brisk,< 3 seconds   Peripheral Pulses: +2 palpable, equal bilaterally       Labs:   No results for input(s): WBC, HGB, HCT, PLT in the last 72 hours. Recent Labs     08/23/22  0535 08/24/22  1003 08/25/22  0928    135* 134*   K 4.1 3.9 3.9   CL 98* 96* 97*   CO2 28 27 29   BUN 37* 36* 35*   CREATININE 7.4* 7.2* 7.0*   CALCIUM 9.0 8.6 9.0   PHOS 6.3* 5.4*  --      No results for input(s): AST, ALT, BILIDIR, BILITOT, ALKPHOS in the last 72 hours. No results for input(s): INR in the last 72 hours. No results for input(s): Leta Mcdonald in the last 72 hours. Urinalysis:      Lab Results   Component Value Date/Time    NITRU Negative 08/20/2022 11:35 PM    WBCUA 6-9 08/20/2022 11:35 PM    BACTERIA 1+ 08/20/2022 11:35 PM    RBCUA 3-4 08/20/2022 11:35 PM    BLOODU Negative 08/20/2022 11:35 PM    SPECGRAV 1.025 08/20/2022 11:35 PM    GLUCOSEU Negative 08/20/2022 11:35 PM       Radiology:  NM MYOCARDIAL SPECT REST EXERCISE OR RX   Final Result      XR CHEST (2 VW)   Final Result      No acute radiographic abnormality of the chest.      CT HEAD WO CONTRAST   Final Result      No acute intracranial hemorrhage or mass effect. Assessment/Plan:    Active Hospital Problems    Diagnosis Date Noted    Lower abdominal pain [R10.30] 08/22/2022     Priority: Medium    Elevated troponin [R77.8] 08/21/2022     Priority: Medium    Peritonitis associated with peritoneal dialysis [facility]) Nain Pamela 07/26/2022     Priority: Medium    Complication of peritoneal dialysis Brissa Plymouth 07/26/2022     Priority: Medium    Chronic kidney disease with end stage renal failure on dialysis ([facility] [address] [N18.6, Z99.2] 07/26/2022     Priority: Medium    Atypical chest pain [R07.89] 09/03/2014   Abdominal pain, peritonitis  PD fluid collected in the ER 08/21 359 nucleated cells, 13%PMN.  Repeat 8/22 - 10 nucleated cells, 08/23 - 3 nucleated cells, 8/24 - 2 nucleated cells  - ID is following, being treated for peritonitis, and pending further cultures  - Continue Vanc and cefepime  - Will need IP abx on discharge  - Nephro consulted  - Nystatin swish and swallow x 3 weeks to prevent fungal peritonitis on DC    Chest pain likely referred from peritonitis - resolved  Cardio c/s  Lexiscan stress test low risk with good uptake at rest and stress. Echo w/ EF 50-55%, with G2DD, LA dilation, and negative bubble study  Ischemia ruled out    ESRD on PD  - Nephro consulted  - PD nightly    Anxiety, Hypertension, Bipolar disorder - continue PTA meds  Diabetes mellitus      DVT Prophylaxis: SQH  Diet: ADULT DIET;  Regular  Code Status: Full Code  PT/OT Eval Status: pending  Dispo - IP, likely DC today    I will discuss the patient with MD Cindy Hayes MD  Internal Medicine Resident PGY-3

## 2022-08-25 NOTE — PROGRESS NOTES
Internal Medicine PGY-3 Resident Progress Note        PCP: Joni Hooper DO,     Date of Admission: 8/20/2022    Chief Complaint: Abdominal pain    Hospital Course:   65 y.o. female with past medical history of ESRD on PD, anxiety, bipolar disorder, depression, diabetes mellitus and hypertension who presented with abdominal pain and chest pain. Symptoms were there for about 2 weeks intermittently. She is more thinking the symptoms localized around the abdominal area and sometimes radiate to the chest.  She is very sleepy to provide details on the history. No shortness of breath. No nausea or vomiting reported. Has regular bowel movement. No dysuria. PD fluid collected in the ER and cell count came back with 359 nucleated cells. She was also complaining of vaginal discharge. During hospital stay nephrology was consulted and patient underwent peritoneal dialysis nightly. Infectious disease was consulted and patient was given vancomycin and cefepime intravenously for peritonitis. Subjective:   Patient not Dc'd 2/2 PT/OT feeling unsafe for pt to DC home alone. Patient doing better. Still complaining of mild abdominal pain. No other acute complaints. Denies CP, fever/chills. Likely discharge once SNF arranged.     Medications:  Reviewed    Infusion Medications    sodium chloride 10 mL/hr at 08/24/22 0816     Scheduled Medications    custom dialysis builder   IntraPERitoneal Once    sennosides-docusate sodium  1 tablet Oral BID    sevelamer  1,600 mg Oral TID WC    polyethylene glycol  17 g Oral BID    b complex-C-folic acid  1 capsule Oral Daily    benztropine  1 mg Oral Nightly    buPROPion  150 mg Oral BID    calcitRIOL  0.25 mcg Oral Daily    docusate sodium  100 mg Oral BID    doxepin  50 mg Oral Nightly    ferrous sulfate  325 mg Oral Daily with breakfast    gabapentin  300 mg Oral Nightly    metoprolol succinate  25 mg Oral Daily    QUEtiapine  200 mg Oral Nightly    rosuvastatin  40 mg Oral Nightly    sodium chloride flush  5-40 mL IntraVENous 2 times per day    aspirin  81 mg Oral Daily    heparin (porcine)  5,000 Units SubCUTAneous 3 times per day    dianeal lo-josh 1.5%  8,000 mL IntraPERitoneal Daily    nystatin  5 mL Oral 4x Daily    melatonin  10 mg Oral Nightly    lidocaine  1 patch TransDERmal Daily     PRN Meds: oxyCODONE, hydrALAZINE, hydrOXYzine HCl, sodium chloride flush, sodium chloride, ondansetron **OR** ondansetron, acetaminophen **OR** acetaminophen, polyethylene glycol      Intake/Output Summary (Last 24 hours) at 8/25/2022 1326  Last data filed at 8/25/2022 9098  Gross per 24 hour   Intake 120 ml   Output 5 ml   Net 115 ml       Physical Exam Performed:    /79   Pulse 77   Temp 97.8 °F (36.6 °C) (Oral)   Resp 16   Ht 5' 7.99\" (1.727 m)   Wt 137 lb 2 oz (62.2 kg)   SpO2 96%   BMI 20.85 kg/m²     General appearance:  No apparent distress, appears stated age and cooperative. HEENT:  Normal cephalic, atraumatic without obvious deformity. Pupils equal, round, and reactive to light. Extra ocular muscles intact. Conjunctivae/corneas clear. Neck: Supple, with full range of motion. No jugular venous distention. Trachea midline. Respiratory:  Normal respiratory effort. Clear to auscultation, bilaterally without Rales/Wheezes/Rhonchi. Cardiovascular:  Regular rate and rhythm with normal S1/S2 without murmurs, rubs or gallops. Abdomen: Soft, mildly-tender, non-distended with normal bowel sounds. Has left sided peritoneal catheter in place. Musculoskeletal:  No clubbing, cyanosis or edema bilaterally. Full range of motion without deformity. Skin: Skin color, texture, turgor normal.  No rashes or lesions. Neurologic:  Neurovascularly intact without any focal sensory/motor deficits.  Cranial nerves: II-XII intact, grossly non-focal.  Psychiatric:  Alert and oriented, thought content appropriate, normal insight  Capillary Refill: Brisk,< 3 seconds   Peripheral Pulses: +2 palpable, equal bilaterally       Labs:   No results for input(s): WBC, HGB, HCT, PLT in the last 72 hours. Recent Labs     08/23/22  0535 08/24/22  1003 08/25/22  0928    135* 134*   K 4.1 3.9 3.9   CL 98* 96* 97*   CO2 28 27 29   BUN 37* 36* 35*   CREATININE 7.4* 7.2* 7.0*   CALCIUM 9.0 8.6 9.0   PHOS 6.3* 5.4*  --      No results for input(s): AST, ALT, BILIDIR, BILITOT, ALKPHOS in the last 72 hours. No results for input(s): INR in the last 72 hours. No results for input(s): Prentis Revels in the last 72 hours. Urinalysis:      Lab Results   Component Value Date/Time    NITRU Negative 08/20/2022 11:35 PM    WBCUA 6-9 08/20/2022 11:35 PM    BACTERIA 1+ 08/20/2022 11:35 PM    RBCUA 3-4 08/20/2022 11:35 PM    BLOODU Negative 08/20/2022 11:35 PM    SPECGRAV 1.025 08/20/2022 11:35 PM    GLUCOSEU Negative 08/20/2022 11:35 PM       Radiology:  NM MYOCARDIAL SPECT REST EXERCISE OR RX   Final Result      XR CHEST (2 VW)   Final Result      No acute radiographic abnormality of the chest.      CT HEAD WO CONTRAST   Final Result      No acute intracranial hemorrhage or mass effect. Assessment/Plan:    Active Hospital Problems    Diagnosis Date Noted    Lower abdominal pain [R10.30] 08/22/2022     Priority: Medium    Elevated troponin [R77.8] 08/21/2022     Priority: Medium    Peritonitis associated with peritoneal dialysis [facility]) Meredeth Handley 07/26/2022     Priority: Medium    Complication of peritoneal dialysis Lyudmila Slocumb 07/26/2022     Priority: Medium    Chronic kidney disease with end stage renal failure on dialysis ([facility] [N18.6, Z99.2] 07/26/2022     Priority: Medium    Atypical chest pain [R07.89] 09/03/2014   Abdominal pain, peritonitis  PD fluid collected in the ER 08/21 359 nucleated cells, 13%PMN.  Repeat 8/22 - 10 nucleated cells, 08/23 - 3 nucleated cells, 8/24 - 2 nucleated cells  - ID is following, being treated for peritonitis, and pending further cultures  - Continue Vanc and cefepime  - Will need IP abx on discharge  - Nephro consulted  - Nystatin swish and swallow x 3 weeks to prevent fungal peritonitis on DC    Chest pain likely referred from peritonitis - resolved  Cardio c/s  Lexiscan stress test low risk with good uptake at rest and stress. Echo w/ EF 50-55%, with G2DD, LA dilation, and negative bubble study  Ischemia ruled out    Deconditioning  PT/OT  -Needs SNF    ESRD on PD  - Nephro consulted  - PD nightly    Anxiety, Hypertension, Bipolar disorder - continue PTA meds  Diabetes mellitus      DVT Prophylaxis: SQH  Diet: ADULT DIET;  Regular  Code Status: Full Code  PT/OT Eval Status: pending  Dispo - IP, Needs SNF    I will discuss the patient with MD Iain Jaquez MD  Internal Medicine Resident PGY-3

## 2022-08-25 NOTE — CARE COORDINATION
JAIME following: JAIME met with patient at bedside this AM and reviewed SNF options. Patient elected for a referral to [facility]. Referrals have been sent. [facility] has declined the referral.  Electronically signed by CLAUDIO Lassiter on 8/25/2022 at 12:38 PM  [phone]    UPDATE: CM left a VM for Robe Shore [phone] with [facility] to follow up on referral sent earlier today. CM left call back information. Electronically signed by CLAUDIO Lassiter on 8/25/2022 at 4:05 PM  [phone]    UPDATE: JAIME spoke with Robe Shore with [facility] who declined the referral. JAIME met with patient at bedside this PM and discussed additional SNF options. Referrals sent to [facility].   Electronically signed by CLAUDIO Lassiter on 8/25/2022 at 4:46 PM  [phone]

## 2022-08-25 NOTE — PROGRESS NOTES
Patient complaining of numbness to R fingers. She is now complaining of numbness to the L hand as well. Neuro assessment otherwise WDL. Patient agitated that nothing is being done for her. MD aware and new orders for gabapentin placed. Will continue to monitor.

## 2022-08-25 NOTE — PROGRESS NOTES
Nutrition Note    Spoke w/ RN to clarify food allergies d/t pt requesting food items on allergy list and RN approving. RN states that MD approved for pt to have these foods as pt is on an allergy medication that prevents allergic reactions.     Electronically signed by Smitha Holliday RD on 8/25/22 at 9:23 AM EDT    Contact: [phone]

## 2022-08-25 NOTE — PROGRESS NOTES
ABX manually infused into PD catheter at 1500. ABX dwelling for 6 hours. Will pass onto next shift that pt will need to be drained at 2100 prior to starting overnight PD.

## 2022-08-25 NOTE — PROGRESS NOTES
Nephrology Consult Note        [facility]    [facility]       Phone: [phone]                                                  8/25/2022 8:19 AM     Patient: Viktor Fuller [id number]  [id number]  Date of Admit: 8/20/2022 LOS: 4 days  Referring physician:    Plan:  Patient feel fine  Vitals stable. BP softer side   On room air  Patient is comfortable but always asking for pain medications  Chest pain resolved. No SOB on room air  Lytes stable on last labs. Today's labs pending. Continue nocturnal Cylcer 4 cycles, 9 hours, 2 liter dwell, 1.5 % dextrose daily at night and after that last fill bag with antibiotics as below. Switch to Intraperitoneal antibiotics, Cefepime 1 gram daily and Vancomycin 1.5 gram today  Dwell for 6 hours and then drain  Continue Cefepime 1 gram daily Intraperitoneal for 9 more days. Next Intraperitoneal Vancomycin on 8/30/22 x 1   Follow repeat cell counts and Cx. Cell counts normal. Cultures So far negative. Follow pending Cx. Nystatin swish and swallow to prevent fungal peritonitis. Continue till one week after finishes antibiotics. Continue stool softeners for regular bowel movement. Suppository x 1 again today. Topical Gentamicin  Increased Sevelamer dose. D/C hydralazine as BP soft. Chest pain workup per cardiology  Avoid nephrotoxins  Monitor input, output and weight  Monitor labs and vitals closely  Renally dose all medications  Need retraining at PD clinic    following for discharge needs. D/W patient  D/W patient's RN  D/W Pharmacist.   Updated Obi Pitts PD nurse    Addendum:   Vancomycin level is OK. No need to give IP vancomycin today.    Next Intraperitoneal Vancomycin dose  1500 mg  on 8/27 and then last dose on 9/1/22  Will continue with Intraperitoneal Cefepime 1 g daily till 9/2/22  D/W Pharmacy and RN          Thank you for allowing us to participate in this patient's care    In case of any question please call us at our 24 hour answering service [phone] or from 7 AM to 5 PM via [facility] or cell number    Maria E Posada MD  [facility] Nephrology  [address]  Fax: [phone]  Office: [phone]       Assessment & Plan     Renal function:  ESRD  On PD  No tenderness, erythema or discharge around PD catheter noted  Continue PD      Possible Peritonitis:   Antibiotics per ID  Patient non compliant. Electrolytes:  Lab Results   Component Value Date    CREATININE 7.2 (HH) 08/24/2022    BUN 36 (H) 08/24/2022     (L) 08/24/2022    K 3.9 08/24/2022    CL 96 (L) 08/24/2022    CO2 27 08/24/2022            # CKD- MBD:   Secondary hyperparathyroidism due to renal failure  Monitor Phos level while here. Lab Results   Component Value Date    CALCIUM 8.6 08/24/2022    PHOS 5.4 (H) 08/24/2022         Acid/Base status:  Stable. Volume status/BP:  BP stable  Euvolemic. Hematology:   CKD related anemia  Goal Hgb 10-11    Lab Results   Component Value Date    IRON 98 08/12/2019    TIBC 275 08/12/2019     Lab Results   Component Value Date    WBC 6.9 08/22/2022    HGB 9.5 (L) 08/22/2022    HCT 29.2 (L) 08/22/2022    MCV 80.2 08/22/2022     08/22/2022             Reason for Consult and Chief Complaint     Reason for consult: ESRD    Chief complaint:   Chief Complaint   Patient presents with    Fatigue    Abdominal Pain    Shoulder Pain       History of Present Illness   Zee Hector is a 65 y.o. with PMH significant for ESRD on PD being admitted with abdominal pain and chest pain. Per patient abdominal pain is ongoing for 2 weeks. Patient seen in ER and she is sleepy and goes back to sleep after answering questions. Not sure about PD fluid color. PD fluid cell count checked in ER and it showed 359 nucleated cells. Do not know if PD fluid was collected from dry abdomen or after proper dwell. ER MD is now not here. No SOB.  No GI symptoms      Review BID    doxepin  50 mg Oral Nightly    ferrous sulfate  325 mg Oral Daily with breakfast    gabapentin  300 mg Oral Nightly    hydrALAZINE  50 mg Oral Daily    metoprolol succinate  25 mg Oral Daily    QUEtiapine  200 mg Oral Nightly    rosuvastatin  40 mg Oral Nightly    sennosides-docusate sodium  1 tablet Oral Daily    sodium chloride flush  5-40 mL IntraVENous 2 times per day    aspirin  81 mg Oral Daily    heparin (porcine)  5,000 Units SubCUTAneous 3 times per day    cefepime  1,000 mg IntraVENous Q24H    dianeal lo-josh 1.5%  8,000 mL IntraPERitoneal Daily    nystatin  5 mL Oral 4x Daily    melatonin  10 mg Oral Nightly    lidocaine  1 patch TransDERmal Daily       Allergies: Allergies   Allergen Reactions    Latex      Added based on information entered during case entry, please review and add reactions, type, and severity as needed    Banana Hives and Swelling     Swelling of tongue/face    Other Hives and Swelling     POTATOES, TOMATOES, LETTUCE, BANANA, SHRIMP, SOY BEAN, OAT, WHEAT, BARLEY, PEANUTS, PECANS  Potatoes and tomatoes Swelling of tongue/face    Peanuts [Peanut Oil] Hives and Swelling     Swelling of tongue/face    Tomato Hives and Swelling     Swelling of tongue/face    Barley Grass     Milk-Related Compounds     Shellfish-Derived Products          Vital Signs     Vitals:    08/24/22 2015   BP: 121/78   Pulse: 79   Resp: 16   Temp: 98.2 °F (36.8 °C)   SpO2: 99%         Intake/Output Summary (Last 24 hours) at 8/25/2022 5273  Last data filed at 8/24/2022 2155  Gross per 24 hour   Intake --   Output 246 ml   Net -246 ml           Physical Exam     General appearance: NAD  Head: Normocephalic, without obvious abnormality, atraumatic   Mouth: Moist mucous membrane  Neck: Supple. Lungs: Good air entry bilaterally.  No respiratory distress on RA  Heart: S1, S2.  Abdomen: soft, mild lower abdominal tenderness ?,   non-distended  Extremities: No leg edema, warm to touch   Skin: No concerning rashes noted  Psych: good eye contact, normal affect  Neuro: AAO x 3.        Laboratory Data     Please see above     Diagnostic Studies   Pertinent images reviewed

## 2022-08-25 NOTE — CONSULTS
Clinical Pharmacy Progress Note    Vancomycin IV has been discontinued (patient lost IV access and abx will be given intraperitoneally now per nephro). Will sign off pharmacy to dose Vancomycin consult. If medication is restarted and pharmacy is to manage dosing, please re-consult at that time.     Please call with questions--  Thanks--  Sierra Barrios, PharmD, BCPS, BCGP  [id number] ([facility])   8/25/2022 10:46 AM

## 2022-08-25 NOTE — FLOWSHEET NOTE
CCPD Order   Exchanges: 4   Exchange Volume: 2000 ml   Total Time: 9 hrs   Dextrose: 1.5%   Last Fill: 0 ml   Total Volume: 8000 ml     Orders verified. Supplies taken to pt's room. Report received. Cycler set up, primed and pre tested. [facility] site. Pt connected to cycler. CCPD initiated without problem. Initial effluent clear. If problems should arise please call the [phone] number on top of PD cycler machine.        If problems persist please call [phone]

## 2022-08-25 NOTE — PROGRESS NOTES
Nephrology Consult Note        [facility]    [facility]       Phone: [phone]                                                  8/25/2022 1:47 PM     Patient: Scot Yates [id number]  [id number]  Date of Admit: 8/20/2022 LOS: 4 days  Referring physician:    Plan:  Patient feel fine  Vitals stable. BP softer side   On room air  Patient is comfortable but always asking for pain medications  Chest pain resolved. No SOB on room air  Lytes stable on last labs. Today's labs pending. Continue nocturnal Cylcer 4 cycles, 9 hours, 2 liter dwell, 1.5 % dextrose daily at night and after that last fill bag with antibiotics as below. Switch to Intraperitoneal antibiotics, Cefepime 1 gram daily and Vancomycin 1.5 gram today  Dwell for 6 hours and then drain  Continue Cefepime 1 gram daily Intraperitoneal for 9 more days. Next Intraperitoneal Vancomycin on 8/30/22 x 1   Follow repeat cell counts and Cx. Cell counts normal. Cultures So far negative. Follow pending Cx. Nystatin swish and swallow to prevent fungal peritonitis. Continue till one week after finishes antibiotics. Continue stool softeners for regular bowel movement. Suppository x 1 again today. Topical Gentamicin  Increased Sevelamer dose. D/C hydralazine as BP soft. Chest pain workup per cardiology  Avoid nephrotoxins  Monitor input, output and weight  Monitor labs and vitals closely  Renally dose all medications  Need retraining at PD clinic    following for discharge needs. D/W patient  D/W patient's RN  D/W Pharmacist.   Updated Sam Rose PD nurse    Addendum:   Vancomycin level is OK. No need to give IP vancomycin today.    Next Intraperitoneal Vancomycin dose  1500 mg  on 8/27 and then last dose on 9/1/22  Will continue with Intraperitoneal Cefepime 1 g daily till 9/2/22  D/W Pharmacy and RN          Thank you for allowing us to participate in this patient's care    In case of any question please call us at our 24 hour answering service [phone] or from 7 AM to 5 PM via [address] or cell number    Megan Santoyo MD  [facility] Nephrology  [address]  Fax: [phone]  Office: [phone]       Assessment & Plan     Renal function:  ESRD  On PD  No tenderness, erythema or discharge around PD catheter noted  Continue PD      Possible Peritonitis:   Antibiotics per ID  Patient non compliant. Electrolytes:  Lab Results   Component Value Date    CREATININE 7.0 (HH) 08/25/2022    BUN 35 (H) 08/25/2022     (L) 08/25/2022    K 3.9 08/25/2022    CL 97 (L) 08/25/2022    CO2 29 08/25/2022            # CKD- MBD:   Secondary hyperparathyroidism due to renal failure  Monitor Phos level while here. Lab Results   Component Value Date    CALCIUM 9.0 08/25/2022    PHOS 5.4 (H) 08/24/2022         Acid/Base status:  Stable. Volume status/BP:  BP stable  Euvolemic. Hematology:   CKD related anemia  Goal Hgb 10-11    Lab Results   Component Value Date    IRON 98 08/12/2019    TIBC 275 08/12/2019     Lab Results   Component Value Date    WBC 6.9 08/22/2022    HGB 9.5 (L) 08/22/2022    HCT 29.2 (L) 08/22/2022    MCV 80.2 08/22/2022     08/22/2022             Reason for Consult and Chief Complaint     Reason for consult: ESRD    Chief complaint:   Chief Complaint   Patient presents with    Fatigue    Abdominal Pain    Shoulder Pain       History of Present Illness   Bean Lozano is a 65 y.o. with PMH significant for ESRD on PD being admitted with abdominal pain and chest pain. Per patient abdominal pain is ongoing for 2 weeks. Patient seen in ER and she is sleepy and goes back to sleep after answering questions. Not sure about PD fluid color. PD fluid cell count checked in ER and it showed 359 nucleated cells. Do not know if PD fluid was collected from dry abdomen or after proper dwell. ER MD is now not here. No SOB.  No GI symptoms      Review of Systems   Positive in bold or unable to assess     GEN: Fever, chills, night sweats. HEENT: Changes in vision, sore throat, rhinorrhea   CVS: Chest pain, palpitations, swelling or edema in legs  Pulmonary: Cough, hemoptysis, SOB  GI: Nausea, vomiting, diarrhea, constipation, abdominal pain  : Bladder incontinence, dysuria,hematuria. MSK: Muscle or joint or bone pains  Skin: Rashes, ulcers, skin thickness  CNS: Headache, dizziness, confusion, focal weakness, seizure. Psych: Anxiety, agitation, depression. Reviewed all 12 systems, negative except as above. Past Medical History     Past Medical History:   Diagnosis Date    [facility] 9/3/2014    Anxiety     Asthma     Bipolar disorder ([facility]     Bronchitis     Chronic back pain     Depression     Diabetes mellitus ([facility]     DM II (diabetes mellitus, type II), controlled ([facility] 9/3/2014    Hypertension          Past Surgical History     Past Surgical History:   Procedure Laterality Date    ENDOMETRIAL ABLATION      INNER EAR SURGERY      TUBAL LIGATION           Family History     Family History   Problem Relation Age of Onset    Diabetes Mother     Cancer Mother     Cancer Maternal Grandmother     Heart Disease Maternal Grandfather          Social History     Social History     Tobacco Use    Smoking status: Some Days     Packs/day: 0.05     Years: 15.00     Pack years: 0.75     Types: Cigarettes    Smokeless tobacco: Never   Substance Use Topics    Alcohol use: No          Past medical, family, and social histories were reviewed as previously documented. Updates were made as necessary.       Inpatient Medications and Allergies       Scheduled Meds:   sennosides-docusate sodium  1 tablet Oral BID    custom dialysis builder   IntraPERitoneal Once    sevelamer  1,600 mg Oral TID WC    polyethylene glycol  17 g Oral BID    b complex-C-folic acid  1 capsule Oral Daily    benztropine  1 mg Oral Nightly    buPROPion  150 mg Oral BID calcitRIOL  0.25 mcg Oral Daily    docusate sodium  100 mg Oral BID    doxepin  50 mg Oral Nightly    ferrous sulfate  325 mg Oral Daily with breakfast    gabapentin  300 mg Oral Nightly    metoprolol succinate  25 mg Oral Daily    QUEtiapine  200 mg Oral Nightly    rosuvastatin  40 mg Oral Nightly    sodium chloride flush  5-40 mL IntraVENous 2 times per day    aspirin  81 mg Oral Daily    heparin (porcine)  5,000 Units SubCUTAneous 3 times per day    dianeal lo-josh 1.5%  8,000 mL IntraPERitoneal Daily    nystatin  5 mL Oral 4x Daily    melatonin  10 mg Oral Nightly    lidocaine  1 patch TransDERmal Daily       Allergies: Allergies   Allergen Reactions    Latex      Added based on information entered during case entry, please review and add reactions, type, and severity as needed    Banana Hives and Swelling     Swelling of tongue/face    Other Hives and Swelling     POTATOES, TOMATOES, LETTUCE, BANANA, SHRIMP, SOY BEAN, OAT, WHEAT, BARLEY, PEANUTS, PECANS  Potatoes and tomatoes Swelling of tongue/face    Peanuts [Peanut Oil] Hives and Swelling     Swelling of tongue/face    Tomato Hives and Swelling     Swelling of tongue/face    Barley Grass     Milk-Related Compounds     Shellfish-Derived Products          Vital Signs     Vitals:    08/25/22 0852   BP:    Pulse:    Resp: 16   Temp:    SpO2:          Intake/Output Summary (Last 24 hours) at 8/25/2022 1347  Last data filed at 8/25/2022 7798  Gross per 24 hour   Intake 120 ml   Output 5 ml   Net 115 ml           Physical Exam     General appearance: NAD  Head: Normocephalic, without obvious abnormality, atraumatic   Mouth: Moist mucous membrane  Neck: Supple. Lungs: Good air entry bilaterally. No respiratory distress on RA  Heart: S1, S2.  Abdomen: soft, mild lower abdominal tenderness ?,   non-distended  Extremities: No leg edema, warm to touch   Skin: No concerning rashes noted  Psych: good eye contact, normal affect  Neuro: AAO x 3.        Laboratory Data Please see above     Diagnostic Studies   Pertinent images reviewed

## 2022-08-26 LAB
ANION GAP SERPL CALCULATED.3IONS-SCNC: 12 MMOL/L (ref 3–16)
BODY FLUID CULTURE, STERILE: NORMAL
BUN BLDV-MCNC: 33 MG/DL (ref 7–20)
CALCIUM SERPL-MCNC: 8.8 MG/DL (ref 8.3–10.6)
CHLORIDE BLD-SCNC: 98 MMOL/L (ref 99–110)
CO2: 25 MMOL/L (ref 21–32)
CREAT SERPL-MCNC: 7.1 MG/DL (ref 0.6–1.1)
GFR AFRICAN AMERICAN: 7
GFR NON-AFRICAN AMERICAN: 6
GLUCOSE BLD-MCNC: 138 MG/DL (ref 70–99)
GLUCOSE BLD-MCNC: 164 MG/DL (ref 70–99)
GRAM STAIN RESULT: NORMAL
PERFORMED ON: ABNORMAL
POTASSIUM REFLEX MAGNESIUM: 3.9 MMOL/L (ref 3.5–5.1)
SODIUM BLD-SCNC: 135 MMOL/L (ref 136–145)

## 2022-08-26 PROCEDURE — 1200000000 HC SEMI PRIVATE

## 2022-08-26 PROCEDURE — 6360000002 HC RX W HCPCS: Performed by: INTERNAL MEDICINE

## 2022-08-26 PROCEDURE — 6370000000 HC RX 637 (ALT 250 FOR IP): Performed by: INTERNAL MEDICINE

## 2022-08-26 PROCEDURE — 6370000000 HC RX 637 (ALT 250 FOR IP): Performed by: STUDENT IN AN ORGANIZED HEALTH CARE EDUCATION/TRAINING PROGRAM

## 2022-08-26 PROCEDURE — 2580000003 HC RX 258: Performed by: INTERNAL MEDICINE

## 2022-08-26 PROCEDURE — 80048 BASIC METABOLIC PNL TOTAL CA: CPT

## 2022-08-26 PROCEDURE — 36415 COLL VENOUS BLD VENIPUNCTURE: CPT

## 2022-08-26 PROCEDURE — 99232 SBSQ HOSP IP/OBS MODERATE 35: CPT | Performed by: INTERNAL MEDICINE

## 2022-08-26 PROCEDURE — A4722 DIALYS SOL FLD VOL > 1999CC: HCPCS | Performed by: STUDENT IN AN ORGANIZED HEALTH CARE EDUCATION/TRAINING PROGRAM

## 2022-08-26 PROCEDURE — 6360000002 HC RX W HCPCS: Performed by: STUDENT IN AN ORGANIZED HEALTH CARE EDUCATION/TRAINING PROGRAM

## 2022-08-26 RX ADMIN — HEPARIN SODIUM 5000 UNITS: 5000 INJECTION INTRAVENOUS; SUBCUTANEOUS at 21:27

## 2022-08-26 RX ADMIN — POLYETHYLENE GLYCOL 3350 17 G: 17 POWDER, FOR SOLUTION ORAL at 08:50

## 2022-08-26 RX ADMIN — QUETIAPINE FUMARATE 200 MG: 200 TABLET ORAL at 21:16

## 2022-08-26 RX ADMIN — BENZTROPINE MESYLATE 1 MG: 1 TABLET ORAL at 21:14

## 2022-08-26 RX ADMIN — NEPHROCAP 1 MG: 1 CAP ORAL at 08:50

## 2022-08-26 RX ADMIN — CEFEPIME: 1 INJECTION, POWDER, FOR SOLUTION INTRAMUSCULAR; INTRAVENOUS at 18:27

## 2022-08-26 RX ADMIN — DOCUSATE SODIUM 100 MG: 100 CAPSULE, LIQUID FILLED ORAL at 21:13

## 2022-08-26 RX ADMIN — DOXEPIN HYDROCHLORIDE 50 MG: 25 CAPSULE ORAL at 21:15

## 2022-08-26 RX ADMIN — DOCUSATE SODIUM 100 MG: 100 CAPSULE, LIQUID FILLED ORAL at 08:49

## 2022-08-26 RX ADMIN — METOPROLOL SUCCINATE 25 MG: 25 TABLET, EXTENDED RELEASE ORAL at 08:49

## 2022-08-26 RX ADMIN — NYSTATIN 500000 UNITS: 100000 SUSPENSION ORAL at 08:49

## 2022-08-26 RX ADMIN — OXYCODONE 10 MG: 5 TABLET ORAL at 08:49

## 2022-08-26 RX ADMIN — OXYCODONE 10 MG: 5 TABLET ORAL at 17:40

## 2022-08-26 RX ADMIN — CALCITRIOL CAPSULES 0.25 MCG 0.25 MCG: 0.25 CAPSULE ORAL at 08:52

## 2022-08-26 RX ADMIN — NYSTATIN 500000 UNITS: 100000 SUSPENSION ORAL at 21:13

## 2022-08-26 RX ADMIN — GABAPENTIN 300 MG: 600 TABLET, FILM COATED ORAL at 21:13

## 2022-08-26 RX ADMIN — HEPARIN SODIUM 5000 UNITS: 5000 INJECTION INTRAVENOUS; SUBCUTANEOUS at 14:10

## 2022-08-26 RX ADMIN — POLYETHYLENE GLYCOL 3350 17 G: 17 POWDER, FOR SOLUTION ORAL at 21:14

## 2022-08-26 RX ADMIN — NYSTATIN 500000 UNITS: 100000 SUSPENSION ORAL at 17:22

## 2022-08-26 RX ADMIN — ACETAMINOPHEN 650 MG: 325 TABLET, FILM COATED ORAL at 21:13

## 2022-08-26 RX ADMIN — BUPROPION HYDROCHLORIDE 150 MG: 150 TABLET, EXTENDED RELEASE ORAL at 08:50

## 2022-08-26 RX ADMIN — ROSUVASTATIN CALCIUM 40 MG: 20 TABLET, FILM COATED ORAL at 21:16

## 2022-08-26 RX ADMIN — SODIUM CHLORIDE, PRESERVATIVE FREE 10 ML: 5 INJECTION INTRAVENOUS at 08:53

## 2022-08-26 RX ADMIN — ASPIRIN 81 MG 81 MG: 81 TABLET ORAL at 08:49

## 2022-08-26 RX ADMIN — NYSTATIN 500000 UNITS: 100000 SUSPENSION ORAL at 14:10

## 2022-08-26 RX ADMIN — SENNOSIDES AND DOCUSATE SODIUM 1 TABLET: 50; 8.6 TABLET ORAL at 21:13

## 2022-08-26 RX ADMIN — SENNOSIDES AND DOCUSATE SODIUM 1 TABLET: 50; 8.6 TABLET ORAL at 08:50

## 2022-08-26 RX ADMIN — FERROUS SULFATE TAB 325 MG (65 MG ELEMENTAL FE) 325 MG: 325 (65 FE) TAB at 08:49

## 2022-08-26 RX ADMIN — SEVELAMER CARBONATE 1600 MG: 800 TABLET, FILM COATED ORAL at 18:04

## 2022-08-26 RX ADMIN — SODIUM CHLORIDE, PRESERVATIVE FREE 10 ML: 5 INJECTION INTRAVENOUS at 21:16

## 2022-08-26 RX ADMIN — SEVELAMER CARBONATE 1600 MG: 800 TABLET, FILM COATED ORAL at 08:51

## 2022-08-26 RX ADMIN — Medication 10 MG: at 21:14

## 2022-08-26 RX ADMIN — BUPROPION HYDROCHLORIDE 150 MG: 150 TABLET, EXTENDED RELEASE ORAL at 21:15

## 2022-08-26 RX ADMIN — SEVELAMER CARBONATE 1600 MG: 800 TABLET, FILM COATED ORAL at 14:10

## 2022-08-26 ASSESSMENT — PAIN SCALES - GENERAL
PAINLEVEL_OUTOF10: 5
PAINLEVEL_OUTOF10: 9
PAINLEVEL_OUTOF10: 9

## 2022-08-26 ASSESSMENT — PAIN DESCRIPTION - DESCRIPTORS: DESCRIPTORS: ACHING

## 2022-08-26 ASSESSMENT — PAIN DESCRIPTION - ORIENTATION
ORIENTATION: LEFT;RIGHT
ORIENTATION: LEFT

## 2022-08-26 ASSESSMENT — PAIN DESCRIPTION - LOCATION
LOCATION: ABDOMEN
LOCATION: ABDOMEN

## 2022-08-26 ASSESSMENT — PAIN DESCRIPTION - FREQUENCY: FREQUENCY: CONTINUOUS

## 2022-08-26 ASSESSMENT — PAIN DESCRIPTION - PAIN TYPE
TYPE: ACUTE PAIN
TYPE: ACUTE PAIN

## 2022-08-26 NOTE — PROGRESS NOTES
08/26/22 0900   Encounter Summary   Encounter Overview/Reason  Initial Encounter   Last Encounter  08/26/22  (volunteer, Mohsen Prescott)

## 2022-08-26 NOTE — PROGRESS NOTES
ID Follow-up NOTE    CC:   PD peritonitis  Antibiotics: Vancomycin, Cefepime    Admit Date: 8/20/2022  Hospital Day: 7    Subjective:     Patient c/o abd pain  No nausea / vomiting  No diarrhea      Objective:     Patient Vitals for the past 8 hrs:   BP Temp Temp src Pulse Resp SpO2 Weight   08/26/22 0820 124/88 98.4 °F (36.9 °C) Oral 81 16 98 % 141 lb 1.5 oz (64 kg)   08/26/22 0330 114/75 97.3 °F (36.3 °C) Oral 78 18 99 % --       I/O last 3 completed shifts: In: 360 [P.O.:360]  Out: 305 [Urine:300]  I/O this shift:  In: -   Out: 416     EXAM:  GENERAL: No apparent distress.     HEENT: Membranes moist, no oral lesion  NECK:  Supple, no lymphadenopathy  LUNGS: Clear b/l, no rales, no dullness  CARDIAC: RRR, no murmur appreciated  ABD:  + BS, soft, PD catheter L side, tender with palpation lower abd - less than previous, no rebound   EXT:  No rash, no edema, no lesions  NEURO: No focal neurologic findings  PSYCH: Orientation, sensorium, mood normal  LINES:  Peripheral iv       Data Review:  Lab Results   Component Value Date    WBC 6.9 08/22/2022    HGB 9.5 (L) 08/22/2022    HCT 29.2 (L) 08/22/2022    MCV 80.2 08/22/2022     08/22/2022     Lab Results   Component Value Date    CREATININE 7.1 (HH) 08/26/2022    BUN 33 (H) 08/26/2022     (L) 08/26/2022    K 3.9 08/26/2022    CL 98 (L) 08/26/2022    CO2 25 08/26/2022       Hepatic Function Panel:   Lab Results   Component Value Date/Time    ALKPHOS 88 08/15/2022 12:28 PM    ALT 16 08/15/2022 12:28 PM    AST 19 08/15/2022 12:28 PM    PROT 5.7 08/15/2022 12:28 PM    PROT 6.9 05/18/2011 10:15 AM    BILITOT <0.2 08/15/2022 12:28 PM    BILIDIR <0.2 08/15/2022 12:28 PM    IBILI see below 08/15/2022 12:28 PM    LABALBU 2.8 08/24/2022 10:03 AM       Micro:  8/20 SARS CoV-2 NAAT neg  8/21 PD flud - GS neg, cult no growth to date  8/22 PD fluid - GS neg, cult no growth to date  8/23 PD fluid - GS neg, cult in process  8/23 fungal cult - stain neg, cult sent up Imagin/20 CXR neg      Scheduled Meds:   sennosides-docusate sodium  1 tablet Oral BID    sevelamer  1,600 mg Oral TID WC    polyethylene glycol  17 g Oral BID    b complex-C-folic acid  1 capsule Oral Daily    benztropine  1 mg Oral Nightly    buPROPion  150 mg Oral BID    calcitRIOL  0.25 mcg Oral Daily    docusate sodium  100 mg Oral BID    doxepin  50 mg Oral Nightly    ferrous sulfate  325 mg Oral Daily with breakfast    gabapentin  300 mg Oral Nightly    metoprolol succinate  25 mg Oral Daily    QUEtiapine  200 mg Oral Nightly    rosuvastatin  40 mg Oral Nightly    sodium chloride flush  5-40 mL IntraVENous 2 times per day    aspirin  81 mg Oral Daily    heparin (porcine)  5,000 Units SubCUTAneous 3 times per day    dianeal lo-josh 1.5%  8,000 mL IntraPERitoneal Daily    nystatin  5 mL Oral 4x Daily    melatonin  10 mg Oral Nightly    lidocaine  1 patch TransDERmal Daily       Continuous Infusions:   sodium chloride 10 mL/hr at 22 0816       PRN Meds:  oxyCODONE, hydrALAZINE, hydrOXYzine HCl, sodium chloride flush, sodium chloride, ondansetron **OR** ondansetron, acetaminophen **OR** acetaminophen, polyethylene glycol      Assessment:     Hx DM, HTN, CRF on PD, LBP, bipolar d/o, anemia  On PD since Nov / Dec 2021     [facility] - with fall.    Pt had elevated WBC in PD fluid ( 0 WBC 3,351 - 34%PMN), cult neg, t  Discharged with plan to give IP vanco / cefepime x 5 days (pt never obtain dialysates / never took this)     Presents with chest pain - 2 weeks, intermittent, no asssoc with activity, no SOB  In ED , afeb, WBC 8.2, UA neg     PD fluid  - 359 WBC - 13%PMN  Repeat  -  10 WBC   Repeat  - 3 WBC   Repeat  - 2 WBC     IMP/  Peritonitis by exam though does NOT have neutrophilic pleocytosis (neutrophils >250) seen with bacterial peritonitis   - may be 'partially treated' or 'atypical' agent - yeast or mycobacterium, cult in process   - f/u PD fluid without significant WBC Plan:     Empiric IP antibiotics (Vanco / Cefepime) per Renal  Will f/u on cult - fungal cult in process (not sent for AF[employer] cult)    Medical Decision Making:   The following items were considered in medical decision making:  Discussion of patient care with other providers  Reviewed clinical lab tests  Reviewed radiology tests  Reviewed other diagnostic tests/interventions  Microbiology cultures and other micro tests reviewed      Discussed with pt  Discussed with Vanesa Thomason by phone 8/24  Will sign off, call with further ID issues   Finn Greenfield MD

## 2022-08-26 NOTE — PROGRESS NOTES
This RN found out this morning that pt had a sealed bag of prescriptions including oxycodone by the sink in pt's room. RN and charge RN came back to the room to talk to her regarding about med safety precaution and that the bag would have to be secured in pharmacy and pt could get them back when she is ready to leave. Pt then got upset and held on to the bag, yelling and cussing at staff for waking her up and entering the room and taking her meds; pt demanded doctor and did not believe nurses being nurses. Supervisors Marisol Watts and Kilo Batista came to the room as well as the house security Leora Dakin tried to talk to patient but still she would not have it. This RN in the mean time attempted to call pt's son Shashank Gates but unsuccessful so left voice mail. Eventually security got a hold of a bag and supervisor Marisol Watts walked it down to pharmacy. Slip is attached to pt's chart!

## 2022-08-26 NOTE — PROGRESS NOTES
Nephrology Consult Note        [facility]    [email or web address]       Phone: [phone]                                                  8/26/2022 2:04 PM     Patient: Zenaida Timmons [id number]  [id number]  Date of Admit: 8/20/2022 LOS: 5 days  Referring physician:    Plan:  Patient seen at bedside  Look comfortable but she has multiple pain issues all over and appear to have psychiatric and pain medicine seeking behavior  Vitals stable. On room air. No SOB. Chest pain resolved. Lytes stable on last labs. Today's labs pending. Continue nocturnal Cylcer 4 cycles, 9 hours, 2 liter dwell, 1.5 % dextrose daily at night and after that last fill bag with antibiotics as below. 2 liter bag 1.5 % with Cefepime 1 gram daily till 9/2/22   Next Intraperitoneal Vancomycin dose 1500 mg  on 8/27 and then last dose on 9/1/22  Follow repeat cell counts and Cx. Cell counts normal. Cultures So far negative. Nystatin swish and swallow to prevent fungal peritonitis. Continue till one week after finishes antibiotics. Continue stool softeners for regular bowel movement. Suppository x 1 again today. Topical Gentamicin  Increased Sevelamer dose. Chest pain workup per cardiology  Avoid nephrotoxins  Monitor input, output and weight  Monitor labs and vitals closely  Renally dose all medications  Need retraining at PD clinic    following for discharge needs.       D/W patient  D/W patient's RN  D/W pharmacist and dialysis RN  D/W Dr Sandi Barnes          Thank you for allowing us to participate in this patient's care    In case of any question please call us at our 24 hour answering service [phone] or from 7 AM to 5 PM via [employer] or cell number    Pito Hdz MD  [facility]  [address]  [place], [address]  Fax: [phone]  Office: [phone]       Assessment & Plan     Renal function:  ESRD  On PD  No tenderness, erythema or discharge around PD or joint or bone pains  Skin: Rashes, ulcers, skin thickness  CNS: Headache, dizziness, confusion, focal weakness, seizure. Psych: Anxiety, agitation, depression. Reviewed all 12 systems, negative except as above. Past Medical History     Past Medical History:   Diagnosis Date    JONATHAN (acute kidney injury) ([facility] 9/3/2014    Anxiety     Asthma     Bipolar disorder ([facility]     Bronchitis     Chronic back pain     Depression     Diabetes mellitus ([facility]     DM II (diabetes mellitus, type II), controlled ([facility] 9/3/2014    Hypertension          Past Surgical History     Past Surgical History:   Procedure Laterality Date    ENDOMETRIAL ABLATION      INNER EAR SURGERY      TUBAL LIGATION           Family History     Family History   Problem Relation Age of Onset    Diabetes Mother     Cancer Mother     Cancer Maternal Grandmother     Heart Disease Maternal Grandfather          Social History     Social History     Tobacco Use    Smoking status: Some Days     Packs/day: 0.05     Years: 15.00     Pack years: 0.75     Types: Cigarettes    Smokeless tobacco: Never   Substance Use Topics    Alcohol use: No          Past medical, family, and social histories were reviewed as previously documented. Updates were made as necessary.       Inpatient Medications and Allergies       Scheduled Meds:   custom dialysis builder   IntraPERitoneal Once    sennosides-docusate sodium  1 tablet Oral BID    sevelamer  1,600 mg Oral TID WC    polyethylene glycol  17 g Oral BID    b complex-C-folic acid  1 capsule Oral Daily    benztropine  1 mg Oral Nightly    buPROPion  150 mg Oral BID    calcitRIOL  0.25 mcg Oral Daily    docusate sodium  100 mg Oral BID    doxepin  50 mg Oral Nightly    ferrous sulfate  325 mg Oral Daily with breakfast    gabapentin  300 mg Oral Nightly    metoprolol succinate  25 mg Oral Daily    QUEtiapine  200 mg Oral Nightly    rosuvastatin  40 mg Oral Nightly    sodium chloride flush  5-40 mL IntraVENous 2 times per day

## 2022-08-26 NOTE — PROGRESS NOTES
Internal Medicine PGY-3 Resident Progress Note        PCP: Margarito Taylor DO,     Date of Admission: 8/20/2022    Chief Complaint: Abdominal pain    Hospital Course:   65 y.o. female with past medical history of ESRD on PD, anxiety, bipolar disorder, depression, diabetes mellitus and hypertension who presented with abdominal pain and chest pain. Symptoms were there for about 2 weeks intermittently. She is more thinking the symptoms localized around the abdominal area and sometimes radiate to the chest.  She is very sleepy to provide details on the history. No shortness of breath. No nausea or vomiting reported. Has regular bowel movement. No dysuria. PD fluid collected in the ER and cell count came back with 359 nucleated cells. She was also complaining of vaginal discharge. During hospital stay nephrology was consulted and patient underwent peritoneal dialysis nightly. Infectious disease was consulted and patient was given vancomycin and cefepime intravenously for peritonitis. Subjective:   Pt found to have a bag of pills including oxycodone by the sink in her bathroom overnight. Required security and the charge and nurse manager to have patient give pill bag to be stored in pharmacy until discharge. No longer with any abdominal pain. No other acute complaints. Denies CP, fever/chills. Discharge once SNF arranged.     Medications:  Reviewed    Infusion Medications    sodium chloride 10 mL/hr at 08/24/22 0816     Scheduled Medications    sennosides-docusate sodium  1 tablet Oral BID    sevelamer  1,600 mg Oral TID WC    polyethylene glycol  17 g Oral BID    b complex-C-folic acid  1 capsule Oral Daily    benztropine  1 mg Oral Nightly    buPROPion  150 mg Oral BID    calcitRIOL  0.25 mcg Oral Daily    docusate sodium  100 mg Oral BID    doxepin  50 mg Oral Nightly    ferrous sulfate  325 mg Oral Daily with breakfast    gabapentin  300 mg Oral Nightly    metoprolol succinate  25 mg Oral Daily QUEtiapine  200 mg Oral Nightly    rosuvastatin  40 mg Oral Nightly    sodium chloride flush  5-40 mL IntraVENous 2 times per day    aspirin  81 mg Oral Daily    heparin (porcine)  5,000 Units SubCUTAneous 3 times per day    dianeal lo-josh 1.5%  8,000 mL IntraPERitoneal Daily    nystatin  5 mL Oral 4x Daily    melatonin  10 mg Oral Nightly    lidocaine  1 patch TransDERmal Daily     PRN Meds: oxyCODONE, hydrALAZINE, hydrOXYzine HCl, sodium chloride flush, sodium chloride, ondansetron **OR** ondansetron, acetaminophen **OR** acetaminophen, polyethylene glycol      Intake/Output Summary (Last 24 hours) at 8/26/2022 0911  Last data filed at 8/26/2022 0820  Gross per 24 hour   Intake 240 ml   Output 716 ml   Net -476 ml       Physical Exam Performed:    /88   Pulse 81   Temp 98.4 °F (36.9 °C) (Oral)   Resp 16   Ht 5' 7.99\" (1.727 m)   Wt 141 lb 1.5 oz (64 kg)   SpO2 98%   BMI 21.46 kg/m²     General appearance:  No apparent distress, appears stated age and cooperative. HEENT:  Normal cephalic, atraumatic without obvious deformity. Pupils equal, round, and reactive to light. Extra ocular muscles intact. Conjunctivae/corneas clear. Neck: Supple, with full range of motion. No jugular venous distention. Trachea midline. Respiratory:  Normal respiratory effort. Clear to auscultation, bilaterally without Rales/Wheezes/Rhonchi. Cardiovascular:  Regular rate and rhythm with normal S1/S2 without murmurs, rubs or gallops. Abdomen: Soft, non-tender, non-distended with normal bowel sounds. Has left sided peritoneal catheter in place. Musculoskeletal:  No clubbing, cyanosis or edema bilaterally. Full range of motion without deformity. Skin: Skin color, texture, turgor normal.  No rashes or lesions. Neurologic:  Neurovascularly intact without any focal sensory/motor deficits.  Cranial nerves: II-XII intact, grossly non-focal.  Psychiatric:  Alert and oriented, thought content appropriate, normal insight  Capillary Refill: Brisk,< 3 seconds   Peripheral Pulses: +2 palpable, equal bilaterally       Labs:   No results for input(s): WBC, HGB, HCT, PLT in the last 72 hours. Recent Labs     08/24/22  1003 08/25/22  0928 08/26/22  0757   * 134* 135*   K 3.9 3.9 3.9   CL 96* 97* 98*   CO2 27 29 25   BUN 36* 35* 33*   CREATININE 7.2* 7.0* 7.1*   CALCIUM 8.6 9.0 8.8   PHOS 5.4*  --   --      No results for input(s): AST, ALT, BILIDIR, BILITOT, ALKPHOS in the last 72 hours. No results for input(s): INR in the last 72 hours. No results for input(s): Assunta Grewal in the last 72 hours. Urinalysis:      Lab Results   Component Value Date/Time    NITRU Negative 08/20/2022 11:35 PM    WBCUA 6-9 08/20/2022 11:35 PM    BACTERIA 1+ 08/20/2022 11:35 PM    RBCUA 3-4 08/20/2022 11:35 PM    BLOODU Negative 08/20/2022 11:35 PM    SPECGRAV 1.025 08/20/2022 11:35 PM    GLUCOSEU Negative 08/20/2022 11:35 PM       Radiology:  NM MYOCARDIAL SPECT REST EXERCISE OR RX   Final Result      XR CHEST (2 VW)   Final Result      No acute radiographic abnormality of the chest.      CT HEAD WO CONTRAST   Final Result      No acute intracranial hemorrhage or mass effect. Assessment/Plan:    Active Hospital Problems    Diagnosis Date Noted    Lower abdominal pain [R10.30] 08/22/2022     Priority: Medium    Elevated troponin [R77.8] 08/21/2022     Priority: Medium    Peritonitis associated with peritoneal dialysis [facility] George Daniels 07/26/2022     Priority: Medium    Complication of peritoneal dialysis Perales Derick 07/26/2022     Priority: Medium    Chronic kidney disease with end stage renal failure on dialysis ([facility] [N18.6, Z99.2] 07/26/2022     Priority: Medium    Atypical chest pain [R07.89] 09/03/2014   Abdominal pain, peritonitis  PD fluid collected in the ER 08/21 359 nucleated cells, 13%PMN.  Repeat 8/22 - 10 nucleated cells, 08/23 - 3 nucleated cells, 8/24 - 2 nucleated cells  - ID is following, being treated for peritonitis, and pending further cultures  - Continue Vanc and cefepime  - Will need IP abx on discharge  - Nephro consulted  - Nystatin swish and swallow x 3 weeks to prevent fungal peritonitis on DC    Chest pain likely referred from peritonitis - resolved  Cardio c/s  Lexiscan stress test low risk with good uptake at rest and stress. Echo w/ EF 50-55%, with G2DD, LA dilation, and negative bubble study  Ischemia ruled out    Deconditioning  PT/OT  -Needs SNF    ESRD on PD  - Nephro consulted  - PD nightly    Anxiety, Hypertension, Bipolar disorder - continue PTA meds  Diabetes mellitus      DVT Prophylaxis: SQH  Diet: ADULT DIET;  Regular  Code Status: Full Code  PT/OT Eval Status: pending  Dispo - IP, Needs SNF    I will discuss the patient with MD Ricardo Moise MD  Internal Medicine Resident PGY-3

## 2022-08-26 NOTE — FLOWSHEET NOTE
Disconnected from [facility] per protocol. Effluent: Clear  Total time: 12 hr 37 min   Total UF:  416 ml. Total Volume:  8007 ml. Dwell time gained:  00 hr 00 min. Pt Tolerated procedure: Pt tearful in room, c/o are vague. Upset her \"meds\" were taken from her.          08/26/22 0820   Vitals   /88   Temp 98.4 °F (36.9 °C)   Heart Rate 81   Resp 16   Weight 141 lb 1.5 oz (64 kg)   Cycler   Ultrafiltration (UF) (mL) 416 mL   Average Dwell Time (Hours:Minutes) 105   Lost Dwell Time (Hours:Minutes) 0000

## 2022-08-26 NOTE — CARE COORDINATION
CM following:   CM left a VM for patient's son Luis M Dillard per request placed with patient's current nurse. CM left appropriate contact information. JAIME spoke with Carmen Francisco at [facility] who reports they can accept the patient and verified that the patient utilizes [facility] for PD. CM inquired about accepting the patient with precert pending, however, Carmen Francisco explained that [facility] will require that they conduct training with [facility] staff prior to admissions. Carmen Francisco will keep in touch with CM about potential timing of admission. Carmen Francisco started precert for patient today. Electronically signed by CLAUDIO Villalobos on 8/26/2022 at 9:34 AM  [phone]    UPDATE: CM spoke with patient's son Luis M Dillard by phone. CM updated that original SNF referrals were declined but that new SNF referrals have been made. Luis M Dillard expressed appreciation and understanding. CM met with patient at bedside and informed patient that [facility] has accepted the referral and started precert. Patient requested a brochure for [facility]. CM reminded that one was provided yesterday, patient unable to locate and requested a new one be provided. CM to locate and provide. Electronically signed by CLAUDIO Villalobos on 8/26/2022 at 3:41 PM  [phone]    UPDATE: CM brought [facility] brochure to patient's room. Patient was sleeping and unable to be roused. CM discussed with nurse on duty who will ensure the patient receives the brochure left in her room.   Electronically signed by CLAUDIO Villalobos on 8/26/2022 at 4:44 PM  [phone]

## 2022-08-27 PROCEDURE — 6370000000 HC RX 637 (ALT 250 FOR IP): Performed by: INTERNAL MEDICINE

## 2022-08-27 PROCEDURE — 6360000002 HC RX W HCPCS: Performed by: INTERNAL MEDICINE

## 2022-08-27 PROCEDURE — 6370000000 HC RX 637 (ALT 250 FOR IP): Performed by: STUDENT IN AN ORGANIZED HEALTH CARE EDUCATION/TRAINING PROGRAM

## 2022-08-27 PROCEDURE — 1200000000 HC SEMI PRIVATE

## 2022-08-27 RX ADMIN — FERROUS SULFATE TAB 325 MG (65 MG ELEMENTAL FE) 325 MG: 325 (65 FE) TAB at 09:33

## 2022-08-27 RX ADMIN — DOXEPIN HYDROCHLORIDE 50 MG: 25 CAPSULE ORAL at 22:01

## 2022-08-27 RX ADMIN — GABAPENTIN 300 MG: 600 TABLET, FILM COATED ORAL at 21:55

## 2022-08-27 RX ADMIN — QUETIAPINE FUMARATE 200 MG: 200 TABLET ORAL at 21:55

## 2022-08-27 RX ADMIN — SEVELAMER CARBONATE 1600 MG: 800 TABLET, FILM COATED ORAL at 09:34

## 2022-08-27 RX ADMIN — ROSUVASTATIN CALCIUM 40 MG: 20 TABLET, FILM COATED ORAL at 21:55

## 2022-08-27 RX ADMIN — HEPARIN SODIUM 5000 UNITS: 5000 INJECTION INTRAVENOUS; SUBCUTANEOUS at 21:55

## 2022-08-27 RX ADMIN — SENNOSIDES AND DOCUSATE SODIUM 1 TABLET: 50; 8.6 TABLET ORAL at 21:55

## 2022-08-27 RX ADMIN — DOCUSATE SODIUM 100 MG: 100 CAPSULE, LIQUID FILLED ORAL at 21:54

## 2022-08-27 RX ADMIN — CALCITRIOL CAPSULES 0.25 MCG 0.25 MCG: 0.25 CAPSULE ORAL at 09:34

## 2022-08-27 RX ADMIN — BUPROPION HYDROCHLORIDE 150 MG: 150 TABLET, EXTENDED RELEASE ORAL at 09:34

## 2022-08-27 RX ADMIN — METOPROLOL SUCCINATE 25 MG: 25 TABLET, EXTENDED RELEASE ORAL at 09:33

## 2022-08-27 RX ADMIN — SEVELAMER CARBONATE 1600 MG: 800 TABLET, FILM COATED ORAL at 17:25

## 2022-08-27 RX ADMIN — Medication 10 MG: at 21:54

## 2022-08-27 RX ADMIN — POLYETHYLENE GLYCOL 3350 17 G: 17 POWDER, FOR SOLUTION ORAL at 09:33

## 2022-08-27 RX ADMIN — NYSTATIN 500000 UNITS: 100000 SUSPENSION ORAL at 17:24

## 2022-08-27 RX ADMIN — BENZTROPINE MESYLATE 1 MG: 1 TABLET ORAL at 21:55

## 2022-08-27 RX ADMIN — BUPROPION HYDROCHLORIDE 150 MG: 150 TABLET, EXTENDED RELEASE ORAL at 22:01

## 2022-08-27 RX ADMIN — ASPIRIN 81 MG 81 MG: 81 TABLET ORAL at 09:33

## 2022-08-27 RX ADMIN — NEPHROCAP 1 MG: 1 CAP ORAL at 09:34

## 2022-08-27 RX ADMIN — SENNOSIDES AND DOCUSATE SODIUM 1 TABLET: 50; 8.6 TABLET ORAL at 09:33

## 2022-08-27 RX ADMIN — DOCUSATE SODIUM 100 MG: 100 CAPSULE, LIQUID FILLED ORAL at 09:33

## 2022-08-27 RX ADMIN — NYSTATIN 500000 UNITS: 100000 SUSPENSION ORAL at 09:33

## 2022-08-27 NOTE — PROGRESS NOTES
Internal Medicine PGY-3 Resident Progress Note        PCP: Mena Adams DO, DO    Date of Admission: 8/20/2022    Chief Complaint: Abdominal pain    Hospital Course:   65 y.o. female with past medical history of ESRD on PD, anxiety, bipolar disorder, depression, diabetes mellitus and hypertension who presented with abdominal pain and chest pain. Symptoms were there for about 2 weeks intermittently. She is more thinking the symptoms localized around the abdominal area and sometimes radiate to the chest.  She is very sleepy to provide details on the history. No shortness of breath. No nausea or vomiting reported. Has regular bowel movement. No dysuria. PD fluid collected in the ER and cell count came back with 359 nucleated cells. She was also complaining of vaginal discharge. During hospital stay nephrology was consulted and patient underwent peritoneal dialysis nightly. Infectious disease was consulted and patient was given vancomycin and cefepime intravenously for peritonitis.     Subjective:   No new complains, says wants to make sure the snf is good and will find out today    Medications:  Reviewed    Infusion Medications    sodium chloride 10 mL/hr at 08/24/22 0816     Scheduled Medications    custom dialysis builder   IntraPERitoneal Once    sennosides-docusate sodium  1 tablet Oral BID    sevelamer  1,600 mg Oral TID WC    polyethylene glycol  17 g Oral BID    b complex-C-folic acid  1 capsule Oral Daily    benztropine  1 mg Oral Nightly    buPROPion  150 mg Oral BID    calcitRIOL  0.25 mcg Oral Daily    docusate sodium  100 mg Oral BID    doxepin  50 mg Oral Nightly    ferrous sulfate  325 mg Oral Daily with breakfast    gabapentin  300 mg Oral Nightly    metoprolol succinate  25 mg Oral Daily    QUEtiapine  200 mg Oral Nightly    rosuvastatin  40 mg Oral Nightly    sodium chloride flush  5-40 mL IntraVENous 2 times per day    aspirin  81 mg Oral Daily    heparin (porcine)  5,000 Units SubCUTAneous 3 times per day    dianeal lo-josh 1.5%  8,000 mL IntraPERitoneal Daily    nystatin  5 mL Oral 4x Daily    melatonin  10 mg Oral Nightly    lidocaine  1 patch TransDERmal Daily     PRN Meds: oxyCODONE, hydrALAZINE, hydrOXYzine HCl, sodium chloride flush, sodium chloride, ondansetron **OR** ondansetron, acetaminophen **OR** acetaminophen, polyethylene glycol      Intake/Output Summary (Last 24 hours) at 8/27/2022 0755  Last data filed at 8/26/2022 1959  Gross per 24 hour   Intake 2240 ml   Output 416 ml   Net 1824 ml       Physical Exam Performed:    BP 98/63   Pulse 74   Temp 97.5 °F (36.4 °C) (Oral)   Resp 16   Ht 5' 7.99\" (1.727 m)   Wt 141 lb 1.5 oz (64 kg)   SpO2 92%   BMI 21.46 kg/m²     General appearance:  No apparent distress, appears stated age and cooperative. HEENT:  Normal cephalic, atraumatic without obvious deformity. Pupils equal, round, and reactive to light. Extra ocular muscles intact. Conjunctivae/corneas clear. Neck: Supple, with full range of motion. No jugular venous distention. Trachea midline. Respiratory:  Normal respiratory effort. Clear to auscultation, bilaterally without Rales/Wheezes/Rhonchi. Cardiovascular:  Regular rate and rhythm with normal S1/S2 without murmurs, rubs or gallops. Abdomen: Soft, non-tender, non-distended with normal bowel sounds. Has left sided peritoneal catheter in place. Musculoskeletal:  No clubbing, cyanosis or edema bilaterally. Full range of motion without deformity. Skin: Skin color, texture, turgor normal.  No rashes or lesions. Neurologic:  Neurovascularly intact without any focal sensory/motor deficits. Cranial nerves: II-XII intact, grossly non-focal.  Psychiatric:  Alert and oriented, thought content appropriate, normal insight  Capillary Refill: Brisk,< 3 seconds   Peripheral Pulses: +2 palpable, equal bilaterally       Labs:   No results for input(s): WBC, HGB, HCT, PLT in the last 72 hours.   Recent Labs     08/24/22  1003 08/25/22  0928 08/26/22  0757   * 134* 135*   K 3.9 3.9 3.9   CL 96* 97* 98*   CO2 27 29 25   BUN 36* 35* 33*   CREATININE 7.2* 7.0* 7.1*   CALCIUM 8.6 9.0 8.8   PHOS 5.4*  --   --      No results for input(s): AST, ALT, BILIDIR, BILITOT, ALKPHOS in the last 72 hours. No results for input(s): INR in the last 72 hours. No results for input(s): Kiersten Gazella in the last 72 hours. Urinalysis:      Lab Results   Component Value Date/Time    NITRU Negative 08/20/2022 11:35 PM    WBCUA 6-9 08/20/2022 11:35 PM    BACTERIA 1+ 08/20/2022 11:35 PM    RBCUA 3-4 08/20/2022 11:35 PM    BLOODU Negative 08/20/2022 11:35 PM    SPECGRAV 1.025 08/20/2022 11:35 PM    GLUCOSEU Negative 08/20/2022 11:35 PM       Radiology:  NM MYOCARDIAL SPECT REST EXERCISE OR RX   Final Result      XR CHEST (2 VW)   Final Result      No acute radiographic abnormality of the chest.      CT HEAD WO CONTRAST   Final Result      No acute intracranial hemorrhage or mass effect. Assessment/Plan:    Active Hospital Problems    Diagnosis Date Noted    Lower abdominal pain [R10.30] 08/22/2022     Priority: Medium    Elevated troponin [R77.8] 08/21/2022     Priority: Medium    Peritonitis associated with peritoneal dialysis [facility]) Isaac Yap 07/26/2022     Priority: Medium    Complication of peritoneal dialysis [facility] 07/26/2022     Priority: Medium    Chronic kidney disease with end stage renal failure on dialysis ([facility] [place] [address] [N18.6, Z99.2] 07/26/2022     Priority: Medium    Atypical chest pain [R07.89] 09/03/2014   Abdominal pain, peritonitis  PD fluid collected in the ER 08/21 359 nucleated cells, 13%PMN.  Repeat 8/22 - 10 nucleated cells, 08/23 - 3 nucleated cells, 8/24 - 2 nucleated cells  - ID is following, being treated for peritonitis, and pending further cultures  - Continue Vanc and cefepime  - Will need IP abx on discharge  - Nephro consulted  - Nystatin swish and swallow x 3 weeks to prevent fungal peritonitis on DC    Chest pain likely referred from peritonitis - resolved  Cardio c/s  Lexiscan stress test low risk with good uptake at rest and stress. Echo w/ EF 50-55%, with G2DD, LA dilation, and negative bubble study  Ischemia ruled out    Deconditioning  PT/OT  -Needs SNF    ESRD on PD  - Nephro consulted  - PD nightly    Anxiety, Hypertension, Bipolar disorder - continue PTA meds  Diabetes mellitus      DVT Prophylaxis: SQH  Diet: ADULT DIET;  Regular  Code Status: Full Code  PT/OT Eval Status: pending  Dispo - medically optimized for dc, pending placement, precert started for [facility]    Celia Aguilera MD  Hospitalist  Attending Physician

## 2022-08-27 NOTE — PROGRESS NOTES
BP is stable   Labs are pending     Plan:    Continue nocturnal Cylcer 4 cycles, 9 hours, 2 liter dwell, 1.5 % dextrose daily at night and after that last fill bag with antibiotics as below. 2 liter bag 1.5 % with Cefepime 1 gram daily till 9/2/22   Next Intraperitoneal Vancomycin dose 1500 mg  on 8/27 and then last dose on 9/1/22  Follow repeat cell counts and Cx. Cell counts normal. Cultures So far negative. Nystatin swish and swallow to prevent fungal peritonitis. Continue till one week after finishes antibiotics. Continue stool softeners for regular bowel movement. Topical Gentamicin  ContinueSevelamer dose. Chest pain workup per cardiology     Assessment :       ESRD  On PD  No tenderness, erythema or discharge around PD catheter noted  Continue PD        Possible Peritonitis:   Antibiotics per ID  Patient non compliant. Acid/Base status:  Stable. Volume status/BP:  BP stable  Euvolemic. Hematology:   CKD related anemia  Goal Hgb 10-11     Please call with questions at-  24 hrs Answering service [phone]   7 am-5 pm at Perfect [employer], or cell phone  Dr Allison Michael MD        CC/ Reason for consult:     ESRD    HPI:     Aleyda Chavez is a 65 y.o. with PMH significant for ESRD on PD being admitted with abdominal pain and chest pain. Per patient abdominal pain is ongoing for 2 weeks. Patient seen in ER and she is sleepy and goes back to sleep after answering questions. Not sure about PD fluid color. PD fluid cell count checked in ER and it showed 359 nucleated cells. Do not know if PD fluid was collected from dry abdomen or after proper dwell. ER MD is now not here. No SOB. No GI symptoms    ROS:     GEN: Fever, chills, night sweats.   HEENT: Changes in vision, sore throat, rhinorrhea   CVS: Chest pain, palpitations, swelling or edema in legs  Pulmonary: Cough, hemoptysis, SOB  GI: Nausea, vomiting, diarrhea, constipation, abdominal pain  : Bladder incontinence, dysuria,hematuria. MSK: Muscle or joint or bone pains  Skin: Rashes, ulcers, skin thickness  CNS: Headache, dizziness, confusion, focal weakness, seizure. Psych: Anxiety, agitation, depression. Reviewed all 12 systems, negative except as above. All other ROS are reviewed and are Negative    Vitals:     Vitals:    08/27/22 0426   BP: 98/63   Pulse: 74   Resp: 16   Temp: 97.5 °F (36.4 °C)   SpO2: 92%       I & O:       Intake/Output Summary (Last 24 hours) at 8/27/2022 0743  Last data filed at 8/26/2022 1959  Gross per 24 hour   Intake 2240 ml   Output 416 ml   Net 1824 ml         Physical Examination:     General appearance: NAD  Head: Normocephalic, without obvious abnormality, atraumatic   Mouth: Moist mucous membrane  Neck: Supple. Lungs: Good air entry bilaterally. No respiratory distress on RA  Heart: S1, S2.  Abdomen: soft, mild lower abdominal tenderness ?,   non-distended  Extremities: No leg edema, warm to touch   Skin: No concerning rashes noted  Psych: good eye contact, normal affect  Neuro: AAO x 3. Non focal grossly.           Meds:      custom dialysis builder   IntraPERitoneal Once    sennosides-docusate sodium  1 tablet Oral BID    sevelamer  1,600 mg Oral TID WC    polyethylene glycol  17 g Oral BID    b complex-C-folic acid  1 capsule Oral Daily    benztropine  1 mg Oral Nightly    buPROPion  150 mg Oral BID    calcitRIOL  0.25 mcg Oral Daily    docusate sodium  100 mg Oral BID    doxepin  50 mg Oral Nightly    ferrous sulfate  325 mg Oral Daily with breakfast    gabapentin  300 mg Oral Nightly    metoprolol succinate  25 mg Oral Daily    QUEtiapine  200 mg Oral Nightly    rosuvastatin  40 mg Oral Nightly    sodium chloride flush  5-40 mL IntraVENous 2 times per day    aspirin  81 mg Oral Daily    heparin (porcine)  5,000 Units SubCUTAneous 3 times per day    dianeal lo-josh 1.5%  8,000 mL IntraPERitoneal Daily    nystatin  5 mL Oral 4x Daily    melatonin  10 mg Oral Nightly    lidocaine  1

## 2022-08-27 NOTE — PROGRESS NOTES
Pt started manual dialysis at [address] and completed and disconnected at 0125. Tolerated well.  No complaints

## 2022-08-27 NOTE — PROGRESS NOTES
VSS, afebrile  Safety precautions in place  Call light in reach  Pt denies pain  Pt reports weakness to BLE and decreased sensation to all extremities   Updated on care plan

## 2022-08-28 LAB
ANION GAP SERPL CALCULATED.3IONS-SCNC: 9 MMOL/L (ref 3–16)
BASOPHILS ABSOLUTE: 0.1 K/UL (ref 0–0.2)
BASOPHILS RELATIVE PERCENT: 0.7 %
BUN BLDV-MCNC: 37 MG/DL (ref 7–20)
CALCIUM SERPL-MCNC: 9 MG/DL (ref 8.3–10.6)
CHLORIDE BLD-SCNC: 99 MMOL/L (ref 99–110)
CO2: 29 MMOL/L (ref 21–32)
CREAT SERPL-MCNC: 7.8 MG/DL (ref 0.6–1.1)
EOSINOPHILS ABSOLUTE: 0.4 K/UL (ref 0–0.6)
EOSINOPHILS RELATIVE PERCENT: 5.3 %
GFR AFRICAN AMERICAN: 6
GFR NON-AFRICAN AMERICAN: 5
GLUCOSE BLD-MCNC: 144 MG/DL (ref 70–99)
HCT VFR BLD CALC: 27 % (ref 36–48)
HEMOGLOBIN: 8.9 G/DL (ref 12–16)
LYMPHOCYTES ABSOLUTE: 2.4 K/UL (ref 1–5.1)
LYMPHOCYTES RELATIVE PERCENT: 32.7 %
MCH RBC QN AUTO: 26.1 PG (ref 26–34)
MCHC RBC AUTO-ENTMCNC: 32.9 G/DL (ref 31–36)
MCV RBC AUTO: 79.3 FL (ref 80–100)
MONOCYTES ABSOLUTE: 0.4 K/UL (ref 0–1.3)
MONOCYTES RELATIVE PERCENT: 5.7 %
NEUTROPHILS ABSOLUTE: 4.1 K/UL (ref 1.7–7.7)
NEUTROPHILS RELATIVE PERCENT: 55.6 %
PDW BLD-RTO: 14.7 % (ref 12.4–15.4)
PLATELET # BLD: 191 K/UL (ref 135–450)
PMV BLD AUTO: 6.8 FL (ref 5–10.5)
POTASSIUM REFLEX MAGNESIUM: 4.2 MMOL/L (ref 3.5–5.1)
RBC # BLD: 3.4 M/UL (ref 4–5.2)
SODIUM BLD-SCNC: 137 MMOL/L (ref 136–145)
WBC # BLD: 7.4 K/UL (ref 4–11)

## 2022-08-28 PROCEDURE — 6370000000 HC RX 637 (ALT 250 FOR IP): Performed by: INTERNAL MEDICINE

## 2022-08-28 PROCEDURE — 90945 DIALYSIS ONE EVALUATION: CPT

## 2022-08-28 PROCEDURE — 89051 BODY FLUID CELL COUNT: CPT

## 2022-08-28 PROCEDURE — A4722 DIALYS SOL FLD VOL > 1999CC: HCPCS | Performed by: STUDENT IN AN ORGANIZED HEALTH CARE EDUCATION/TRAINING PROGRAM

## 2022-08-28 PROCEDURE — 85025 COMPLETE CBC W/AUTO DIFF WBC: CPT

## 2022-08-28 PROCEDURE — 6360000002 HC RX W HCPCS: Performed by: STUDENT IN AN ORGANIZED HEALTH CARE EDUCATION/TRAINING PROGRAM

## 2022-08-28 PROCEDURE — 6360000002 HC RX W HCPCS: Performed by: INTERNAL MEDICINE

## 2022-08-28 PROCEDURE — 36415 COLL VENOUS BLD VENIPUNCTURE: CPT

## 2022-08-28 PROCEDURE — 2580000003 HC RX 258: Performed by: INTERNAL MEDICINE

## 2022-08-28 PROCEDURE — 6370000000 HC RX 637 (ALT 250 FOR IP): Performed by: STUDENT IN AN ORGANIZED HEALTH CARE EDUCATION/TRAINING PROGRAM

## 2022-08-28 PROCEDURE — 1200000000 HC SEMI PRIVATE

## 2022-08-28 PROCEDURE — 80048 BASIC METABOLIC PNL TOTAL CA: CPT

## 2022-08-28 RX ADMIN — SODIUM CHLORIDE, PRESERVATIVE FREE 10 ML: 5 INJECTION INTRAVENOUS at 21:00

## 2022-08-28 RX ADMIN — GABAPENTIN 300 MG: 600 TABLET, FILM COATED ORAL at 20:53

## 2022-08-28 RX ADMIN — NYSTATIN 500000 UNITS: 100000 SUSPENSION ORAL at 13:01

## 2022-08-28 RX ADMIN — POLYETHYLENE GLYCOL 3350 17 G: 17 POWDER, FOR SOLUTION ORAL at 20:53

## 2022-08-28 RX ADMIN — FERROUS SULFATE TAB 325 MG (65 MG ELEMENTAL FE) 325 MG: 325 (65 FE) TAB at 10:45

## 2022-08-28 RX ADMIN — METOPROLOL SUCCINATE 25 MG: 25 TABLET, EXTENDED RELEASE ORAL at 10:45

## 2022-08-28 RX ADMIN — VANCOMYCIN HYDROCHLORIDE: 1 INJECTION, POWDER, LYOPHILIZED, FOR SOLUTION INTRAVENOUS at 16:33

## 2022-08-28 RX ADMIN — DOCUSATE SODIUM 100 MG: 100 CAPSULE, LIQUID FILLED ORAL at 20:54

## 2022-08-28 RX ADMIN — ROSUVASTATIN CALCIUM 40 MG: 20 TABLET, FILM COATED ORAL at 20:54

## 2022-08-28 RX ADMIN — ASPIRIN 81 MG 81 MG: 81 TABLET ORAL at 10:45

## 2022-08-28 RX ADMIN — BUPROPION HYDROCHLORIDE 150 MG: 150 TABLET, EXTENDED RELEASE ORAL at 20:54

## 2022-08-28 RX ADMIN — SENNOSIDES AND DOCUSATE SODIUM 1 TABLET: 50; 8.6 TABLET ORAL at 10:46

## 2022-08-28 RX ADMIN — SENNOSIDES AND DOCUSATE SODIUM 1 TABLET: 50; 8.6 TABLET ORAL at 20:55

## 2022-08-28 RX ADMIN — QUETIAPINE FUMARATE 200 MG: 200 TABLET ORAL at 20:55

## 2022-08-28 RX ADMIN — BENZTROPINE MESYLATE 1 MG: 1 TABLET ORAL at 20:54

## 2022-08-28 RX ADMIN — DOCUSATE SODIUM 100 MG: 100 CAPSULE, LIQUID FILLED ORAL at 10:45

## 2022-08-28 RX ADMIN — HEPARIN SODIUM 5000 UNITS: 5000 INJECTION INTRAVENOUS; SUBCUTANEOUS at 13:01

## 2022-08-28 RX ADMIN — NYSTATIN 500000 UNITS: 100000 SUSPENSION ORAL at 16:34

## 2022-08-28 RX ADMIN — OXYCODONE 10 MG: 5 TABLET ORAL at 12:57

## 2022-08-28 RX ADMIN — CALCITRIOL CAPSULES 0.25 MCG 0.25 MCG: 0.25 CAPSULE ORAL at 10:45

## 2022-08-28 RX ADMIN — SEVELAMER CARBONATE 1600 MG: 800 TABLET, FILM COATED ORAL at 16:36

## 2022-08-28 RX ADMIN — NYSTATIN 500000 UNITS: 100000 SUSPENSION ORAL at 20:53

## 2022-08-28 RX ADMIN — SEVELAMER CARBONATE 1600 MG: 800 TABLET, FILM COATED ORAL at 10:45

## 2022-08-28 RX ADMIN — BUPROPION HYDROCHLORIDE 150 MG: 150 TABLET, EXTENDED RELEASE ORAL at 10:46

## 2022-08-28 RX ADMIN — HEPARIN SODIUM 5000 UNITS: 5000 INJECTION INTRAVENOUS; SUBCUTANEOUS at 20:54

## 2022-08-28 RX ADMIN — DOXEPIN HYDROCHLORIDE 50 MG: 25 CAPSULE ORAL at 20:58

## 2022-08-28 RX ADMIN — NYSTATIN 500000 UNITS: 100000 SUSPENSION ORAL at 10:46

## 2022-08-28 RX ADMIN — Medication 10 MG: at 20:54

## 2022-08-28 RX ADMIN — NEPHROCAP 1 MG: 1 CAP ORAL at 10:46

## 2022-08-28 ASSESSMENT — PAIN DESCRIPTION - ORIENTATION: ORIENTATION: LEFT;RIGHT

## 2022-08-28 ASSESSMENT — PAIN DESCRIPTION - PAIN TYPE: TYPE: ACUTE PAIN

## 2022-08-28 ASSESSMENT — PAIN - FUNCTIONAL ASSESSMENT: PAIN_FUNCTIONAL_ASSESSMENT: PREVENTS OR INTERFERES SOME ACTIVE ACTIVITIES AND ADLS

## 2022-08-28 ASSESSMENT — PAIN SCALES - GENERAL
PAINLEVEL_OUTOF10: 7
PAINLEVEL_OUTOF10: 0
PAINLEVEL_OUTOF10: 3

## 2022-08-28 ASSESSMENT — PAIN DESCRIPTION - ONSET: ONSET: GRADUAL

## 2022-08-28 ASSESSMENT — PAIN DESCRIPTION - LOCATION: LOCATION: ABDOMEN

## 2022-08-28 ASSESSMENT — PAIN DESCRIPTION - DESCRIPTORS: DESCRIPTORS: ACHING

## 2022-08-28 ASSESSMENT — PAIN DESCRIPTION - FREQUENCY: FREQUENCY: CONTINUOUS

## 2022-08-28 NOTE — PROGRESS NOTES
Internal Medicine Progress Note        PCP: Abigail Claudio DO,     Date of Admission: 8/20/2022    Chief Complaint: Abdominal pain    Hospital Course:   65 y.o. female with past medical history of ESRD on PD, anxiety, bipolar disorder, depression, diabetes mellitus and hypertension who presented with abdominal pain and chest pain. Symptoms were there for about 2 weeks intermittently. She is more thinking the symptoms localized around the abdominal area and sometimes radiate to the chest.  She is very sleepy to provide details on the history. No shortness of breath. No nausea or vomiting reported. Has regular bowel movement. No dysuria. PD fluid collected in the ER and cell count came back with 359 nucleated cells. She was also complaining of vaginal discharge. During hospital stay nephrology was consulted and patient underwent peritoneal dialysis nightly. Infectious disease was consulted and patient was given vancomycin and cefepime intravenously for peritonitis. Subjective:   Still complains abdominal pain, today states she had stool which was black and tarry appearing.     Medications:  Reviewed    Infusion Medications    sodium chloride 10 mL/hr at 08/24/22 0816     Scheduled Medications    sennosides-docusate sodium  1 tablet Oral BID    sevelamer  1,600 mg Oral TID WC    polyethylene glycol  17 g Oral BID    b complex-C-folic acid  1 capsule Oral Daily    benztropine  1 mg Oral Nightly    buPROPion  150 mg Oral BID    calcitRIOL  0.25 mcg Oral Daily    docusate sodium  100 mg Oral BID    doxepin  50 mg Oral Nightly    gabapentin  300 mg Oral Nightly    metoprolol succinate  25 mg Oral Daily    QUEtiapine  200 mg Oral Nightly    rosuvastatin  40 mg Oral Nightly    sodium chloride flush  5-40 mL IntraVENous 2 times per day    aspirin  81 mg Oral Daily    heparin (porcine)  5,000 Units SubCUTAneous 3 times per day    dianeal lo-josh 1.5%  8,000 mL IntraPERitoneal Daily    nystatin  5 mL Oral 4x Daily    melatonin  10 mg Oral Nightly    lidocaine  1 patch TransDERmal Daily     PRN Meds: oxyCODONE, hydrALAZINE, hydrOXYzine HCl, sodium chloride flush, sodium chloride, ondansetron **OR** ondansetron, acetaminophen **OR** acetaminophen, polyethylene glycol      Intake/Output Summary (Last 24 hours) at 8/28/2022 1942  Last data filed at 8/28/2022 1025  Gross per 24 hour   Intake --   Output 152 ml   Net -152 ml       Physical Exam Performed:    BP (!) 116/94   Pulse 78   Temp 97.7 °F (36.5 °C) (Oral)   Resp 17   Ht 5' 7.99\" (1.727 m)   Wt 141 lb 8.6 oz (64.2 kg)   SpO2 100%   BMI 21.53 kg/m²     General appearance:  No apparent distress, appears stated age and cooperative. HEENT:  Normal cephalic, atraumatic without obvious deformity. Pupils equal, round, and reactive to light. Extra ocular muscles intact. Conjunctivae/corneas clear. Neck: Supple, with full range of motion. No jugular venous distention. Trachea midline. Respiratory:  Normal respiratory effort. Clear to auscultation, bilaterally without Rales/Wheezes/Rhonchi. Cardiovascular:  Regular rate and rhythm with normal S1/S2 without murmurs, rubs or gallops. Abdomen: Soft, non-tender, non-distended with normal bowel sounds. Has left sided peritoneal catheter in place. Musculoskeletal:  No clubbing, cyanosis or edema bilaterally. Full range of motion without deformity. Skin: Skin color, texture, turgor normal.  No rashes or lesions. Neurologic:  Neurovascularly intact without any focal sensory/motor deficits. Cranial nerves: II-XII intact, grossly non-focal.  Psychiatric:  Alert and oriented, thought content appropriate, normal insight  Capillary Refill: Brisk,< 3 seconds   Peripheral Pulses: +2 palpable, equal bilaterally       Labs:   No results for input(s): WBC, HGB, HCT, PLT in the last 72 hours.   Recent Labs     08/26/22  0757 08/28/22  0719   * 137   K 3.9 4.2   CL 98* 99   CO2 25 29   BUN 33* 37*   CREATININE 7.1* 7.8* CALCIUM 8.8 9.0     No results for input(s): AST, ALT, BILIDIR, BILITOT, ALKPHOS in the last 72 hours. No results for input(s): INR in the last 72 hours. No results for input(s): Nuha Horsfall in the last 72 hours. Urinalysis:      Lab Results   Component Value Date/Time    NITRU Negative 08/20/2022 11:35 PM    WBCUA 6-9 08/20/2022 11:35 PM    BACTERIA 1+ 08/20/2022 11:35 PM    RBCUA 3-4 08/20/2022 11:35 PM    BLOODU Negative 08/20/2022 11:35 PM    SPECGRAV 1.025 08/20/2022 11:35 PM    GLUCOSEU Negative 08/20/2022 11:35 PM       Radiology:  NM MYOCARDIAL SPECT REST EXERCISE OR RX   Final Result      XR CHEST (2 VW)   Final Result      No acute radiographic abnormality of the chest.      CT HEAD WO CONTRAST   Final Result      No acute intracranial hemorrhage or mass effect. Assessment/Plan:    Active Hospital Problems    Diagnosis Date Noted    Lower abdominal pain [R10.30] 08/22/2022     Priority: Medium    Elevated troponin [R77.8] 08/21/2022     Priority: Medium    Peritonitis associated with peritoneal dialysis [facility]) Karel Rosenberg 07/26/2022     Priority: Medium    Complication of peritoneal dialysis [facility] 07/26/2022     Priority: Medium    Chronic kidney disease with end stage renal failure on dialysis [facility]) [N18.6, Z99.2] 07/26/2022     Priority: Medium    Atypical chest pain [R07.89] 09/03/2014     Black tarry stool, likely due to iron pill use, with abdominal pain I will DC oral iron pills. Recheck CBC. Hemodynamically she is stable and low suspicion for GI bleed. Abdominal pain, peritonitis  PD fluid collected in the ER 08/21 359 nucleated cells, 13%PMN.  Repeat 8/22 - 10 nucleated cells, 08/23 - 3 nucleated cells, 8/24 - 2 nucleated cells  - ID is following, being treated for peritonitis, and pending further cultures  - Continue Vanc and cefepime  - Will need IP abx on discharge  - Nephro consulted  - Nystatin swish and swallow x 3 weeks to prevent fungal peritonitis on DC    Chest pain likely referred from peritonitis - resolved  Cardio c/s  Lexiscan stress test low risk with good uptake at rest and stress. Echo w/ EF 50-55%, with G2DD, LA dilation, and negative bubble study  Ischemia ruled out    Deconditioning  PT/OT  -Needs SNF    ESRD on PD  - Nephro consulted  - PD nightly    Anxiety, Hypertension, Bipolar disorder - continue PTA meds  Diabetes mellitus      DVT Prophylaxis: SQH  Diet: ADULT DIET;  Regular  Code Status: Full Code  PT/OT Eval Status: pending  Dispo - medically optimized for dc, pending placement, precert started for [facility]    Temo Baker MD  Hospitalist  Attending Physician

## 2022-08-28 NOTE — CARE COORDINATION
SW following Pt today, Sunday. We are planning SNF placement at [facility]  and are still awaiting [facility] which was started last Friday. Pt will continue HD at [facility].  AMAYA/JAIME will follow up in AM.    YSABEL Lancaster  Case Management  [phone]

## 2022-08-28 NOTE — PROGRESS NOTES
Bladder incontinence, dysuria,hematuria. MSK: Muscle or joint or bone pains  Skin: Rashes, ulcers, skin thickness  CNS: Headache, dizziness, confusion, focal weakness, seizure. Psych: Anxiety, agitation, depression. Reviewed all 12 systems, negative except as above. All other ROS are reviewed and are Negative    Vitals:     Vitals:    08/28/22 0320   BP: 97/67   Pulse: 76   Resp: 16   Temp: 98 °F (36.7 °C)   SpO2: 98%       I & O:     No intake or output data in the 24 hours ending 08/28/22 0942        Physical Examination:     General appearance: NAD  Head: Normocephalic, without obvious abnormality, atraumatic   Mouth: Moist mucous membrane  Neck: Supple. Lungs: Good air entry bilaterally. No respiratory distress on RA  Heart: S1, S2.  Abdomen: soft, mild lower abdominal tenderness ?,   non-distended  Extremities: No leg edema, warm to touch   Skin: No concerning rashes noted  Psych: good eye contact, normal affect  Neuro: AAO x 3. Non focal grossly.           Meds:      custom dialysis builder   IntraPERitoneal Once    sennosides-docusate sodium  1 tablet Oral BID    sevelamer  1,600 mg Oral TID WC    polyethylene glycol  17 g Oral BID    b complex-C-folic acid  1 capsule Oral Daily    benztropine  1 mg Oral Nightly    buPROPion  150 mg Oral BID    calcitRIOL  0.25 mcg Oral Daily    docusate sodium  100 mg Oral BID    doxepin  50 mg Oral Nightly    ferrous sulfate  325 mg Oral Daily with breakfast    gabapentin  300 mg Oral Nightly    metoprolol succinate  25 mg Oral Daily    QUEtiapine  200 mg Oral Nightly    rosuvastatin  40 mg Oral Nightly    sodium chloride flush  5-40 mL IntraVENous 2 times per day    aspirin  81 mg Oral Daily    heparin (porcine)  5,000 Units SubCUTAneous 3 times per day    dianeal lo-josh 1.5%  8,000 mL IntraPERitoneal Daily    nystatin  5 mL Oral 4x Daily    melatonin  10 mg Oral Nightly    lidocaine  1 patch TransDERmal Daily       Labs:     No results for input(s): WBC, HGB, HCT, PLT in the last 72 hours.        Recent Labs     08/26/22  0757 08/28/22  0719   * 137   K 3.9 4.2   CL 98* 99   CO2 25 29   BUN 33* 37*   CREATININE 7.1* 7.8*   GLUCOSE 138* 144*          Nephrology  [address] [place] [address], [facility]  Office: [phone]  Fax: [phone]

## 2022-08-28 NOTE — FLOWSHEET NOTE
Disconnected from [facility] per protocol. Effluent: clear   Total time: 9 hr 02 min   Total UF:  152 ml. Total Volume:  8007 ml. Dwell time gained:  0 hr 05 min. Pt Tolerated procedure: well    Last BM: 8/28 this am per pt  Pt verbally and aggressive, dis shoveled.         08/28/22 1025   Vitals   Temp 98 °F (36.7 °C)   Temp Source Oral   Weight 141 lb 8.6 oz (64.2 kg)   Peritoneal Dialysis Catheter Mid lower abdomen   Placement Date: 07/22/22   Present on Admission/Arrival: Yes  Catheter Location: Mid lower abdomen   Status Deaccessed   Site Condition Clean, dry, intact   Cycler   Verification of Prescription CCPD   Total Volume Programmed 8007 mL   Therapy Time (Hours:Minutes) 9:02   Number of Cycles 4   Number of Bags Used 2   Ultrafiltration (UF) (mL) 152 mL   Average Dwell Time (Hours:Minutes) 0:05

## 2022-08-28 NOTE — PROGRESS NOTES
4 Eyes Admission Assessment     I agree as the admission nurse that 2 RN's have performed a thorough Head to Toe Skin Assessment on the patient. ALL assessment sites listed below have been assessed on admission. Areas assessed by both nurses: Yu/Renata  [x]   Head, Face, and Ears   [x]   Shoulders, Back, and Chest  [x]   Arms, Elbows, and Hands   [x]   Coccyx, Sacrum, and Ischium  [x]   Legs, Feet, and Heels        Does the Patient have Skin Breakdown?   No         Papa Prevention initiated:  No   Wound Care Orders initiated:  No      [facility] nurse consulted for Pressure Injury (Stage 3,4, Unstageable, DTI, NWPT, and Complex wounds) or Papa score 18 or lower:  No      Nurse 1 eSignature: Electronically signed by Yoselin Donovan RN on 8/28/22 at 6:36 PM EDT    **SHARE this note so that the co-signing nurse is able to place an eSignature**    Nurse 2 eSignature: Electronically signed by Oralia Saavedra RN on 8/28/22 at 6:37 PM EDT

## 2022-08-28 NOTE — PROGRESS NOTES
This RN attempted to wake patient for vitals, assessment, and morning medications. Called pt name 3 times, pt did not respond. This RN tapped pt arm in attempt to arouse for meds. Pt yelled at this nurse Donavan Aas are you touching me, bitch? \" Informed pt that was inappropriate and stated that this RN was trying to give her her morning medications. Pt stated \"get the fuck out of here, and don't touch me again, you stupid bitch. \"   Will re-attempt vitals, assessment, and medications at a later time

## 2022-08-28 NOTE — PROGRESS NOTES
[facility] Order              Exchanges: 4              Exchange Volume: 2000 ml              Total Time: 9 hrs              Dextrose: 1.5%              Last Fill: 0 ml              Total Volume: 8000 ml     Orders verified. Supplies taken to pt's room. Report received. Cycler set up, primed and pre tested. Dressing changed on at PD Cath site. Pt connected to cycler. CCPD initiated without problem. Initial effluent clear. If problems should arise please call the [phone] number on top of PD cycler machine.        If problems persist please call [phone]

## 2022-08-29 LAB
ANION GAP SERPL CALCULATED.3IONS-SCNC: 10 MMOL/L (ref 3–16)
APPEARANCE FLUID: CLEAR
BASO FLUID: 2 %
BUN BLDV-MCNC: 36 MG/DL (ref 7–20)
CALCIUM SERPL-MCNC: 9.2 MG/DL (ref 8.3–10.6)
CELL COUNT FLUID TYPE: NORMAL
CHLORIDE BLD-SCNC: 98 MMOL/L (ref 99–110)
CLOT EVALUATION: NORMAL
CO2: 26 MMOL/L (ref 21–32)
COLOR FLUID: COLORLESS
CREAT SERPL-MCNC: 7.3 MG/DL (ref 0.6–1.1)
EOSINOPHIL FLUID: 6 %
FLUID DIFF COMMENT: NORMAL
FUNGUS (MYCOLOGY) CULTURE: NORMAL
FUNGUS STAIN: NORMAL
GFR AFRICAN AMERICAN: 7
GFR NON-AFRICAN AMERICAN: 6
GLUCOSE BLD-MCNC: 126 MG/DL (ref 70–99)
LYMPHOCYTES, BODY FLUID: 26 %
MACROPHAGE FLUID: 20 %
MESOTHELIAL FLUID: 2 %
MONOCYTE, FLUID: 22 %
NEUTROPHIL, FLUID: 22 %
NUCLEATED CELLS FLUID: 7 /CUMM
NUMBER OF CELLS COUNTED FLUID: 50
POTASSIUM REFLEX MAGNESIUM: 4 MMOL/L (ref 3.5–5.1)
RBC FLUID: <1000 /CUMM
SODIUM BLD-SCNC: 134 MMOL/L (ref 136–145)

## 2022-08-29 PROCEDURE — 87205 SMEAR GRAM STAIN: CPT

## 2022-08-29 PROCEDURE — 6360000002 HC RX W HCPCS: Performed by: STUDENT IN AN ORGANIZED HEALTH CARE EDUCATION/TRAINING PROGRAM

## 2022-08-29 PROCEDURE — 6370000000 HC RX 637 (ALT 250 FOR IP): Performed by: INTERNAL MEDICINE

## 2022-08-29 PROCEDURE — 6370000000 HC RX 637 (ALT 250 FOR IP): Performed by: STUDENT IN AN ORGANIZED HEALTH CARE EDUCATION/TRAINING PROGRAM

## 2022-08-29 PROCEDURE — 87015 SPECIMEN INFECT AGNT CONCNTJ: CPT

## 2022-08-29 PROCEDURE — 80048 BASIC METABOLIC PNL TOTAL CA: CPT

## 2022-08-29 PROCEDURE — A4722 DIALYS SOL FLD VOL > 1999CC: HCPCS | Performed by: STUDENT IN AN ORGANIZED HEALTH CARE EDUCATION/TRAINING PROGRAM

## 2022-08-29 PROCEDURE — 87206 SMEAR FLUORESCENT/ACID STAI: CPT

## 2022-08-29 PROCEDURE — 87075 CULTR BACTERIA EXCEPT BLOOD: CPT

## 2022-08-29 PROCEDURE — 87116 MYCOBACTERIA CULTURE: CPT

## 2022-08-29 PROCEDURE — 1200000000 HC SEMI PRIVATE

## 2022-08-29 PROCEDURE — 6360000002 HC RX W HCPCS: Performed by: INTERNAL MEDICINE

## 2022-08-29 PROCEDURE — 90945 DIALYSIS ONE EVALUATION: CPT

## 2022-08-29 PROCEDURE — 36415 COLL VENOUS BLD VENIPUNCTURE: CPT

## 2022-08-29 PROCEDURE — 87102 FUNGUS ISOLATION CULTURE: CPT

## 2022-08-29 PROCEDURE — 2580000003 HC RX 258: Performed by: INTERNAL MEDICINE

## 2022-08-29 RX ORDER — METOPROLOL SUCCINATE 25 MG/1
12.5 TABLET, EXTENDED RELEASE ORAL DAILY
Status: DISCONTINUED | OUTPATIENT
Start: 2022-08-30 | End: 2022-08-30

## 2022-08-29 RX ADMIN — QUETIAPINE FUMARATE 200 MG: 200 TABLET ORAL at 21:27

## 2022-08-29 RX ADMIN — GABAPENTIN 300 MG: 600 TABLET, FILM COATED ORAL at 21:27

## 2022-08-29 RX ADMIN — OXYCODONE 10 MG: 5 TABLET ORAL at 19:10

## 2022-08-29 RX ADMIN — DOXEPIN HYDROCHLORIDE 50 MG: 25 CAPSULE ORAL at 21:30

## 2022-08-29 RX ADMIN — DOCUSATE SODIUM 100 MG: 100 CAPSULE, LIQUID FILLED ORAL at 21:27

## 2022-08-29 RX ADMIN — CALCITRIOL CAPSULES 0.25 MCG 0.25 MCG: 0.25 CAPSULE ORAL at 09:19

## 2022-08-29 RX ADMIN — NYSTATIN 500000 UNITS: 100000 SUSPENSION ORAL at 09:19

## 2022-08-29 RX ADMIN — HEPARIN SODIUM 5000 UNITS: 5000 INJECTION INTRAVENOUS; SUBCUTANEOUS at 14:47

## 2022-08-29 RX ADMIN — NYSTATIN 500000 UNITS: 100000 SUSPENSION ORAL at 21:28

## 2022-08-29 RX ADMIN — SODIUM CHLORIDE, PRESERVATIVE FREE 10 ML: 5 INJECTION INTRAVENOUS at 09:20

## 2022-08-29 RX ADMIN — POLYETHYLENE GLYCOL 3350 17 G: 17 POWDER, FOR SOLUTION ORAL at 21:28

## 2022-08-29 RX ADMIN — SENNOSIDES AND DOCUSATE SODIUM 1 TABLET: 50; 8.6 TABLET ORAL at 21:27

## 2022-08-29 RX ADMIN — NYSTATIN 500000 UNITS: 100000 SUSPENSION ORAL at 14:30

## 2022-08-29 RX ADMIN — ROSUVASTATIN CALCIUM 40 MG: 20 TABLET, FILM COATED ORAL at 21:26

## 2022-08-29 RX ADMIN — Medication 10 MG: at 21:26

## 2022-08-29 RX ADMIN — SEVELAMER CARBONATE 1600 MG: 800 TABLET, FILM COATED ORAL at 14:47

## 2022-08-29 RX ADMIN — BUPROPION HYDROCHLORIDE 150 MG: 150 TABLET, EXTENDED RELEASE ORAL at 09:19

## 2022-08-29 RX ADMIN — POLYETHYLENE GLYCOL 3350 17 G: 17 POWDER, FOR SOLUTION ORAL at 09:18

## 2022-08-29 RX ADMIN — BUPROPION HYDROCHLORIDE 150 MG: 150 TABLET, EXTENDED RELEASE ORAL at 21:27

## 2022-08-29 RX ADMIN — CEFEPIME: 1 INJECTION, POWDER, FOR SOLUTION INTRAMUSCULAR; INTRAVENOUS at 12:24

## 2022-08-29 RX ADMIN — ASPIRIN 81 MG 81 MG: 81 TABLET ORAL at 09:19

## 2022-08-29 RX ADMIN — HEPARIN SODIUM 5000 UNITS: 5000 INJECTION INTRAVENOUS; SUBCUTANEOUS at 06:52

## 2022-08-29 RX ADMIN — OXYCODONE 10 MG: 5 TABLET ORAL at 09:19

## 2022-08-29 RX ADMIN — SENNOSIDES AND DOCUSATE SODIUM 1 TABLET: 50; 8.6 TABLET ORAL at 09:19

## 2022-08-29 RX ADMIN — HEPARIN SODIUM 5000 UNITS: 5000 INJECTION INTRAVENOUS; SUBCUTANEOUS at 21:28

## 2022-08-29 RX ADMIN — NEPHROCAP 1 MG: 1 CAP ORAL at 09:19

## 2022-08-29 RX ADMIN — BENZTROPINE MESYLATE 1 MG: 1 TABLET ORAL at 21:27

## 2022-08-29 RX ADMIN — SEVELAMER CARBONATE 1600 MG: 800 TABLET, FILM COATED ORAL at 09:28

## 2022-08-29 RX ADMIN — DOCUSATE SODIUM 100 MG: 100 CAPSULE, LIQUID FILLED ORAL at 09:19

## 2022-08-29 ASSESSMENT — PAIN DESCRIPTION - LOCATION
LOCATION: ABDOMEN
LOCATION: ARM;CHEST
LOCATION: ABDOMEN

## 2022-08-29 ASSESSMENT — PAIN DESCRIPTION - ORIENTATION
ORIENTATION: MID
ORIENTATION: LOWER
ORIENTATION: LOWER

## 2022-08-29 ASSESSMENT — PAIN DESCRIPTION - FREQUENCY
FREQUENCY: CONTINUOUS

## 2022-08-29 ASSESSMENT — PAIN SCALES - GENERAL
PAINLEVEL_OUTOF10: 9
PAINLEVEL_OUTOF10: 0
PAINLEVEL_OUTOF10: 10
PAINLEVEL_OUTOF10: 9

## 2022-08-29 ASSESSMENT — PAIN DESCRIPTION - DESCRIPTORS
DESCRIPTORS: ACHING;DULL
DESCRIPTORS: CRAMPING
DESCRIPTORS: DISCOMFORT
DESCRIPTORS: DULL;ACHING
DESCRIPTORS: ACHING;DULL

## 2022-08-29 ASSESSMENT — PAIN DESCRIPTION - PAIN TYPE: TYPE: ACUTE PAIN

## 2022-08-29 ASSESSMENT — PAIN DESCRIPTION - ONSET
ONSET: ON-GOING

## 2022-08-29 ASSESSMENT — PAIN - FUNCTIONAL ASSESSMENT
PAIN_FUNCTIONAL_ASSESSMENT: ACTIVITIES ARE NOT PREVENTED

## 2022-08-29 NOTE — PROGRESS NOTES
Date    CREATININE 7.8 ([facility]) 08/28/2022    BUN 37 (H) 08/28/2022     08/28/2022    K 4.2 08/28/2022    CL 99 08/28/2022    CO2 29 08/28/2022            # CKD- MBD:   Secondary hyperparathyroidism due to renal failure  Monitor Phos level while here. Lab Results   Component Value Date    CALCIUM 9.0 08/28/2022    PHOS 5.4 (H) 08/24/2022         Acid/Base status:  Stable. Volume status/BP:  BP soft. Asymptomatic. On metoprolol 25 mg daily  EF 50- 55 % with grade 2 Diastolic dysfunction. Euvolemic. Hematology:   CKD related anemia  Goal Hgb 10-11    Lab Results   Component Value Date    IRON 98 08/12/2019    TIBC 275 08/12/2019     Lab Results   Component Value Date    WBC 7.4 08/28/2022    HGB 8.9 (L) 08/28/2022    HCT 27.0 (L) 08/28/2022    MCV 79.3 (L) 08/28/2022     08/28/2022             Reason for Consult and Chief Complaint     Reason for consult: ESRD    Chief complaint:   Chief Complaint   Patient presents with    Fatigue    Abdominal Pain    Shoulder Pain       History of Present Illness   Chet Fang is a 65 y.o. with PMH significant for ESRD on PD being admitted with abdominal pain and chest pain. Per patient abdominal pain is ongoing for 2 weeks. Patient seen in ER and she is sleepy and goes back to sleep after answering questions. Not sure about PD fluid color. PD fluid cell count checked in ER and it showed 359 nucleated cells. Do not know if PD fluid was collected from dry abdomen or after proper dwell. ER MD is now not here. No SOB. No GI symptoms      Review of Systems   Positive in bold or unable to assess     GEN: Fever, chills, night sweats. HEENT: Changes in vision, sore throat, rhinorrhea   CVS: Chest pain, palpitations, swelling or edema in legs  Pulmonary: Cough, hemoptysis, SOB  GI: Nausea, vomiting, diarrhea, constipation, abdominal pain  : Bladder incontinence, dysuria,hematuria.    MSK: Muscle or joint or bone pains  Skin: Rashes, ulcers, skin thickness  CNS: Headache, dizziness, confusion, focal weakness, seizure. Psych: Anxiety, agitation, depression. Reviewed all 12 systems, negative except as above. Past Medical History     Past Medical History:   Diagnosis Date    JONATHAN (acute kidney injury) ([facility] 9/3/2014    Anxiety     Asthma     Bipolar disorder ([facility]     Bronchitis     Chronic back pain     Depression     Diabetes mellitus ([facility]     DM II (diabetes mellitus, type II), controlled ([facility] 9/3/2014    Hypertension          Past Surgical History     Past Surgical History:   Procedure Laterality Date    ENDOMETRIAL ABLATION      INNER EAR SURGERY      TUBAL LIGATION           Family History     Family History   Problem Relation Age of Onset    Diabetes Mother     Cancer Mother     Cancer Maternal Grandmother     Heart Disease Maternal Grandfather          Social History     Social History     Tobacco Use    Smoking status: Some Days     Packs/day: 0.05     Years: 15.00     Pack years: 0.75     Types: Cigarettes    Smokeless tobacco: Never   Substance Use Topics    Alcohol use: No          Past medical, family, and social histories were reviewed as previously documented. Updates were made as necessary.       Inpatient Medications and Allergies       Scheduled Meds:   custom dialysis builder   IntraPERitoneal Daily    sennosides-docusate sodium  1 tablet Oral BID    sevelamer  1,600 mg Oral TID WC    polyethylene glycol  17 g Oral BID    b complex-C-folic acid  1 capsule Oral Daily    benztropine  1 mg Oral Nightly    buPROPion  150 mg Oral BID    calcitRIOL  0.25 mcg Oral Daily    docusate sodium  100 mg Oral BID    doxepin  50 mg Oral Nightly    gabapentin  300 mg Oral Nightly    metoprolol succinate  25 mg Oral Daily    QUEtiapine  200 mg Oral Nightly    rosuvastatin  40 mg Oral Nightly    sodium chloride flush  5-40 mL IntraVENous 2 times per day    aspirin  81 mg Oral Daily    heparin (porcine)  5,000 Units SubCUTAneous 3 times per day    dianeal lo-josh 1.5%  8,000 mL IntraPERitoneal Daily    nystatin  5 mL Oral 4x Daily    melatonin  10 mg Oral Nightly    lidocaine  1 patch TransDERmal Daily       Allergies: Allergies   Allergen Reactions    Latex      Added based on information entered during case entry, please review and add reactions, type, and severity as needed    Banana Hives and Swelling     Swelling of tongue/face    Other Hives and Swelling     POTATOES, TOMATOES, LETTUCE, BANANA, SHRIMP, SOY BEAN, OAT, WHEAT, BARLEY, PEANUTS, PECANS  Potatoes and tomatoes Swelling of tongue/face    Peanuts [Peanut Oil] Hives and Swelling     Swelling of tongue/face    Tomato Hives and Swelling     Swelling of tongue/face    Barley Grass     Milk-Related Compounds     Shellfish-Derived Products          Vital Signs     Vitals:    08/29/22 0354   BP: 108/68   Pulse: 81   Resp: 16   Temp: 98 °F (36.7 °C)   SpO2: 100%         Intake/Output Summary (Last 24 hours) at 8/29/2022 0741  Last data filed at 8/28/2022 2202  Gross per 24 hour   Intake 120 ml   Output 1259 ml   Net -1139 ml           Physical Exam     General appearance: NAD  Head: Normocephalic, without obvious abnormality, atraumatic   Mouth: Moist mucous membrane  Neck: Supple. Lungs: Good air entry bilaterally. No respiratory distress on RA  Heart: S1, S2.  Abdomen: soft, non tender, non-distended  Extremities: No leg edema, warm to touch   Skin: No concerning rashes noted  Psych: good eye contact, normal affect  Neuro: AAO x 3. Non focal grossly.        Laboratory Data     Please see above     Diagnostic Studies   Pertinent images reviewed

## 2022-08-29 NOTE — PROGRESS NOTES
Hospitalist Progress Note      PCP: Mauricio Marie DO,     Date of Admission: 8/20/2022    HPI and Hospital Course:  65 y.o. female with past medical history of ESRD on PD, anxiety, bipolar disorder, depression, diabetes mellitus and hypertension who presented with abdominal pain and chest pain. Symptoms were there for about 2 weeks intermittently. She is more thinking the symptoms localized around the abdominal area and sometimes radiate to the chest.  She is very sleepy to provide details on the history. No shortness of breath. No nausea or vomiting reported. Has regular bowel movement. No dysuria. PD fluid collected in the ER and cell count came back with 359 nucleated cells. She was also complaining of vaginal discharge. During hospital stay nephrology was consulted and patient underwent peritoneal dialysis nightly. Infectious disease was consulted and patient was given vancomycin and cefepime intravenously for peritonitis. Subjective:   Reports feeling ok and wanting to go home. Denies cp or sob. No ad pain.      Medications:  Reviewed    Infusion Medications    sodium chloride 10 mL/hr at 08/24/22 0816     Scheduled Medications    custom dialysis builder   IntraPERitoneal Daily    [START ON 8/30/2022] metoprolol succinate  12.5 mg Oral Daily    sennosides-docusate sodium  1 tablet Oral BID    sevelamer  1,600 mg Oral TID WC    polyethylene glycol  17 g Oral BID    b complex-C-folic acid  1 capsule Oral Daily    benztropine  1 mg Oral Nightly    buPROPion  150 mg Oral BID    calcitRIOL  0.25 mcg Oral Daily    docusate sodium  100 mg Oral BID    doxepin  50 mg Oral Nightly    gabapentin  300 mg Oral Nightly    QUEtiapine  200 mg Oral Nightly    rosuvastatin  40 mg Oral Nightly    sodium chloride flush  5-40 mL IntraVENous 2 times per day    aspirin  81 mg Oral Daily    heparin (porcine)  5,000 Units SubCUTAneous 3 times per day    dianeal lo-josh 1.5%  8,000 mL IntraPERitoneal Daily nystatin  5 mL Oral 4x Daily    melatonin  10 mg Oral Nightly    lidocaine  1 patch TransDERmal Daily     PRN Meds: oxyCODONE, hydrALAZINE, hydrOXYzine HCl, sodium chloride flush, sodium chloride, ondansetron **OR** ondansetron, acetaminophen **OR** acetaminophen, polyethylene glycol      Intake/Output Summary (Last 24 hours) at 8/29/2022 1428  Last data filed at 8/28/2022 2202  Gross per 24 hour   Intake 120 ml   Output 1107 ml   Net -987 ml       Physical Exam Performed:    BP 91/61   Pulse 76   Temp 96.9 °F (36.1 °C) (Oral)   Resp 16   Ht 5' 7.99\" (1.727 m)   Wt 149 lb 4 oz (67.7 kg) Comment: with 2 liter CAPD fill, before drain, standing scale  SpO2 97%   BMI 22.70 kg/m²     General appearance: No apparent distress  HEENT: Conjunctivae/corneas clear. Neck: Supple, with full range of motion. Respiratory:  Normal respiratory effort. Clear to auscultation  Cardiovascular: Regular rate and rhythm   Abdomen: Soft, non-tender  Musculoskeletal: No clubbing, cyanosis or edema bilaterally. Full range of motion without deformity. Neurologic:  grossly non-focal.  Psychiatric: Alert and oriented    Labs:   Recent Labs     08/28/22  1942   WBC 7.4   HGB 8.9*   HCT 27.0*        Recent Labs     08/28/22  0719 08/29/22  0843    134*   K 4.2 4.0   CL 99 98*   CO2 29 26   BUN 37* 36*   CREATININE 7.8* 7.3*   CALCIUM 9.0 9.2     Urinalysis:      Lab Results   Component Value Date/Time    NITRU Negative 08/20/2022 11:35 PM    WBCUA 6-9 08/20/2022 11:35 PM    BACTERIA 1+ 08/20/2022 11:35 PM    RBCUA 3-4 08/20/2022 11:35 PM    BLOODU Negative 08/20/2022 11:35 PM    SPECGRAV 1.025 08/20/2022 11:35 PM    GLUCOSEU Negative 08/20/2022 11:35 PM       Radiology:  NM MYOCARDIAL SPECT REST EXERCISE OR RX   Final Result      XR CHEST (2 VW)   Final Result      No acute radiographic abnormality of the chest.      CT HEAD WO CONTRAST   Final Result      No acute intracranial hemorrhage or mass effect. Assessment/Plan:    Active Hospital Problems    Diagnosis     Lower abdominal pain [R10.30]      Priority: Medium    Elevated troponin [R77.8]      Priority: Medium    Peritonitis associated with peritoneal dialysis [facility] [place]      Priority: Medium    Complication of peritoneal dialysis [T80.90XA]      Priority: Medium    Chronic kidney disease with end stage renal failure on dialysis ([facility] [N18.6, Z99.2]      Priority: Medium    Atypical chest pain [R07.89]      Completed treatment for peritonitis- appreciate ID help. DVT Prophylaxis: Heparin  Diet: ADULT DIET;  Regular  Code Status: Full Code    PT/OT Eval Status: Ongoing    Dispo - Awaiting placement      Heide Kocher, MD

## 2022-08-29 NOTE — PROGRESS NOTES
CCPD Order   Exchanges: 4   Exchange Volume: 2000 ml   Total Time: 9 hrs   Dextrose: 1.5%   Last Fill: 0 ml   Total Volume: 8000 ml     Orders verified. Supplies taken to pt's room. Report received. Cycler set up, primed and pre tested. [facility]. Pt connected to cycler. CCPD initiated without problem. Initial effluent clear. If problems should arise please call the [phone] number on top of PD cycler machine.        If problems persist please call [phone]

## 2022-08-29 NOTE — PROGRESS NOTES
Disconnected from CCPD per protocol. Effluent: Clear/ pale yellow   Total time: 9 hr 30 min   Total UF:  1305 ml. Total Volume:  8013 ml. Dwell time gained:  0 hr 10 min. Pt Tolerated procedure:  Well   Report given to: Dany Greene RN

## 2022-08-29 NOTE — CARE COORDINATION
CM following. CM called [address] [place] admissions [phone] to follow up. They reported that they are in touch with pt's OP PD, Carlo Dawson and coordinating for one of [place]'s nurses Diane Glez) to come and do a PD training, and then [facility] will start precert (since [address] are typically quick). [facility] will call CM back later today with update.         Electronically signed by CLAUDIO Iniguez LSW on 8/29/2022 at 11:41 AM

## 2022-08-29 NOTE — PROGRESS NOTES
CCPD Order   Exchanges: 4   Exchange Volume: 2000 ml   Total Time: 9 hrs   Dextrose: 1.5%   Last Fill: 0 ml   Total Volume: 8000 ml     Orders verified. Supplies taken to pt's room. Report received. Cycler set up, primed and pre tested. [facility]. Pt connected to cycler. CCPD initiated without problem. Initial effluent clear. If problems should arise please call the [phone] number on top of PD cycler machine.        If problems persist please call [phone]

## 2022-08-29 NOTE — PLAN OF CARE
Problem: Infection - Adult  Goal: Absence of infection during hospitalization  Outcome: Progressing  Patient complained of abdominal pain this morning . Patient currently has cefepime dianeal fluid in her abdomen. Problem: Anxiety  Goal: Will report anxiety at manageable levels  Description: INTERVENTIONS:  1. Administer medication as ordered  2. Teach and rehearse alternative coping skills  3. Provide emotional support with 1:1 interaction with staff  Outcome: Progressing  Patient gets frustrated very easily . She does not always express herself well and gets upset. Trying to teach the patient to keep calm.

## 2022-08-29 NOTE — PROGRESS NOTES
Comprehensive Nutrition Assessment    Type and Reason for Visit:  Reassess    Nutrition Recommendations/Plan:   Continue Regular diet  Monitor nutrition adequacy, pertinent labs, bowel habits, wt changes, and clinical progress     Malnutrition Assessment:  Malnutrition Status: At risk for malnutrition (Comment) (08/22/22 1259)    Context:  Acute Illness     Findings of the 6 clinical characteristics of malnutrition:  Energy Intake:  Unable to assess  Weight Loss:  Greater than 5% over 1 month (8% in 1 month period)       Nutrition Assessment:    Follow up: Pt on Regular diet. Previous poor intakes d/t allergies listed in EMR. Per RN ok to have allergen foods. Pt has only 1 recorded meal of %, set to discharge today after PD nurse comes to give pt education. No wt loss during current admission. Will continue to monitor. Nutrition Related Findings: On PD. Active bowel sounds. +BM 8/27. Na 134. Cr 7.3. BUN 36. . Wound Type: None       Current Nutrition Intake & Therapies:    Average Meal Intake: %  Average Supplements Intake: None Ordered  ADULT DIET; Regular    Anthropometric Measures:  Height: 5' 7.99\" (172.7 cm)  Ideal Body Weight (IBW): 140 lbs (64 kg)       Current Body Weight: 140 lb 10.5 oz (63.8 kg), 100.5 % IBW. Weight Source: Standing Scale  Current BMI (kg/m2): 21.4        Weight Adjustment For: No Adjustment                 BMI Categories: Normal Weight (BMI 18.5-24. 9)      Nutrition Diagnosis:   Predicted inadequate energy intake related to inadequate protein-energy intake as evidenced by poor intake prior to admission    Nutrition Interventions:   Food and/or Nutrient Delivery: Continue Current Diet  Nutrition Education/Counseling: Education not indicated  Coordination of Nutrition Care: Continue to monitor while inpatient       Goals:  Previous Goal Met: Progressing toward Goal(s)  Goals: Meet at least 75% of estimated needs, prior to discharge       Nutrition Monitoring and Evaluation:   Behavioral-Environmental Outcomes: None Identified  Food/Nutrient Intake Outcomes: Food and Nutrient Intake  Physical Signs/Symptoms Outcomes: Biochemical Data, Nutrition Focused Physical Findings, Weight    Discharge Planning:     Too soon to determine     Bhakti Martinez, Merle N [address] [place] [address],   Contact: [phone]

## 2022-08-30 LAB
ANION GAP SERPL CALCULATED.3IONS-SCNC: 11 MMOL/L (ref 3–16)
APPEARANCE FLUID: CLEAR
BUN BLDV-MCNC: 36 MG/DL (ref 7–20)
CALCIUM SERPL-MCNC: 8.8 MG/DL (ref 8.3–10.6)
CELL COUNT FLUID TYPE: NORMAL
CHLORIDE BLD-SCNC: 95 MMOL/L (ref 99–110)
CLOT EVALUATION: NORMAL
CO2: 28 MMOL/L (ref 21–32)
COLOR FLUID: COLORLESS
CREAT SERPL-MCNC: 7.9 MG/DL (ref 0.6–1.1)
FLUID DIFF COMMENT: NORMAL
GFR AFRICAN AMERICAN: 6
GFR NON-AFRICAN AMERICAN: 5
GLUCOSE BLD-MCNC: 114 MG/DL (ref 70–99)
NUCLEATED CELLS FLUID: 1 /CUMM
POTASSIUM REFLEX MAGNESIUM: 3.8 MMOL/L (ref 3.5–5.1)
RBC FLUID: 1 /CUMM
SODIUM BLD-SCNC: 134 MMOL/L (ref 136–145)

## 2022-08-30 PROCEDURE — 2580000003 HC RX 258: Performed by: STUDENT IN AN ORGANIZED HEALTH CARE EDUCATION/TRAINING PROGRAM

## 2022-08-30 PROCEDURE — 80048 BASIC METABOLIC PNL TOTAL CA: CPT

## 2022-08-30 PROCEDURE — 97530 THERAPEUTIC ACTIVITIES: CPT

## 2022-08-30 PROCEDURE — 36415 COLL VENOUS BLD VENIPUNCTURE: CPT

## 2022-08-30 PROCEDURE — 6360000002 HC RX W HCPCS: Performed by: STUDENT IN AN ORGANIZED HEALTH CARE EDUCATION/TRAINING PROGRAM

## 2022-08-30 PROCEDURE — 2580000003 HC RX 258: Performed by: INTERNAL MEDICINE

## 2022-08-30 PROCEDURE — 6370000000 HC RX 637 (ALT 250 FOR IP): Performed by: STUDENT IN AN ORGANIZED HEALTH CARE EDUCATION/TRAINING PROGRAM

## 2022-08-30 PROCEDURE — 90945 DIALYSIS ONE EVALUATION: CPT

## 2022-08-30 PROCEDURE — 97116 GAIT TRAINING THERAPY: CPT

## 2022-08-30 PROCEDURE — 6360000002 HC RX W HCPCS: Performed by: INTERNAL MEDICINE

## 2022-08-30 PROCEDURE — 89050 BODY FLUID CELL COUNT: CPT

## 2022-08-30 PROCEDURE — 6370000000 HC RX 637 (ALT 250 FOR IP): Performed by: INTERNAL MEDICINE

## 2022-08-30 PROCEDURE — 1200000000 HC SEMI PRIVATE

## 2022-08-30 PROCEDURE — A4722 DIALYS SOL FLD VOL > 1999CC: HCPCS | Performed by: STUDENT IN AN ORGANIZED HEALTH CARE EDUCATION/TRAINING PROGRAM

## 2022-08-30 RX ORDER — 0.9 % SODIUM CHLORIDE 0.9 %
500 INTRAVENOUS SOLUTION INTRAVENOUS ONCE
Status: DISCONTINUED | OUTPATIENT
Start: 2022-08-30 | End: 2022-08-30

## 2022-08-30 RX ORDER — SODIUM CHLORIDE 9 MG/ML
INJECTION, SOLUTION INTRAVENOUS ONCE
Status: COMPLETED | OUTPATIENT
Start: 2022-08-30 | End: 2022-08-30

## 2022-08-30 RX ORDER — 0.9 % SODIUM CHLORIDE 0.9 %
500 INTRAVENOUS SOLUTION INTRAVENOUS ONCE
Status: COMPLETED | OUTPATIENT
Start: 2022-08-30 | End: 2022-08-30

## 2022-08-30 RX ADMIN — SODIUM CHLORIDE 500 ML: 900 INJECTION, SOLUTION INTRAVENOUS at 07:06

## 2022-08-30 RX ADMIN — EPOETIN ALFA-EPBX 10000 UNITS: 10000 INJECTION, SOLUTION INTRAVENOUS; SUBCUTANEOUS at 12:10

## 2022-08-30 RX ADMIN — OXYCODONE 10 MG: 5 TABLET ORAL at 18:08

## 2022-08-30 RX ADMIN — BENZTROPINE MESYLATE 1 MG: 1 TABLET ORAL at 20:43

## 2022-08-30 RX ADMIN — POLYETHYLENE GLYCOL 3350 17 G: 17 POWDER, FOR SOLUTION ORAL at 20:43

## 2022-08-30 RX ADMIN — ASPIRIN 81 MG 81 MG: 81 TABLET ORAL at 09:29

## 2022-08-30 RX ADMIN — BUPROPION HYDROCHLORIDE 150 MG: 150 TABLET, EXTENDED RELEASE ORAL at 20:46

## 2022-08-30 RX ADMIN — SEVELAMER CARBONATE 1600 MG: 800 TABLET, FILM COATED ORAL at 09:35

## 2022-08-30 RX ADMIN — POLYETHYLENE GLYCOL 3350 17 G: 17 POWDER, FOR SOLUTION ORAL at 09:25

## 2022-08-30 RX ADMIN — NYSTATIN 500000 UNITS: 100000 SUSPENSION ORAL at 20:43

## 2022-08-30 RX ADMIN — SODIUM CHLORIDE, PRESERVATIVE FREE 10 ML: 5 INJECTION INTRAVENOUS at 20:52

## 2022-08-30 RX ADMIN — QUETIAPINE FUMARATE 200 MG: 200 TABLET ORAL at 20:44

## 2022-08-30 RX ADMIN — NYSTATIN 500000 UNITS: 100000 SUSPENSION ORAL at 09:29

## 2022-08-30 RX ADMIN — HEPARIN SODIUM 5000 UNITS: 5000 INJECTION INTRAVENOUS; SUBCUTANEOUS at 20:43

## 2022-08-30 RX ADMIN — GABAPENTIN 300 MG: 600 TABLET, FILM COATED ORAL at 20:44

## 2022-08-30 RX ADMIN — SODIUM CHLORIDE, PRESERVATIVE FREE 10 ML: 5 INJECTION INTRAVENOUS at 09:31

## 2022-08-30 RX ADMIN — SODIUM CHLORIDE: 9 INJECTION, SOLUTION INTRAVENOUS at 13:17

## 2022-08-30 RX ADMIN — CALCITRIOL CAPSULES 0.25 MCG 0.25 MCG: 0.25 CAPSULE ORAL at 09:29

## 2022-08-30 RX ADMIN — NYSTATIN 500000 UNITS: 100000 SUSPENSION ORAL at 18:00

## 2022-08-30 RX ADMIN — DOCUSATE SODIUM 100 MG: 100 CAPSULE, LIQUID FILLED ORAL at 20:43

## 2022-08-30 RX ADMIN — SENNOSIDES AND DOCUSATE SODIUM 1 TABLET: 50; 8.6 TABLET ORAL at 09:29

## 2022-08-30 RX ADMIN — BUPROPION HYDROCHLORIDE 150 MG: 150 TABLET, EXTENDED RELEASE ORAL at 09:29

## 2022-08-30 RX ADMIN — NEPHROCAP 1 MG: 1 CAP ORAL at 09:29

## 2022-08-30 RX ADMIN — SEVELAMER CARBONATE 1600 MG: 800 TABLET, FILM COATED ORAL at 13:31

## 2022-08-30 RX ADMIN — CEFEPIME: 1 INJECTION, POWDER, FOR SOLUTION INTRAMUSCULAR; INTRAVENOUS at 12:10

## 2022-08-30 RX ADMIN — HEPARIN SODIUM 5000 UNITS: 5000 INJECTION INTRAVENOUS; SUBCUTANEOUS at 13:31

## 2022-08-30 RX ADMIN — DOXEPIN HYDROCHLORIDE 50 MG: 25 CAPSULE ORAL at 22:00

## 2022-08-30 RX ADMIN — NYSTATIN 500000 UNITS: 100000 SUSPENSION ORAL at 13:32

## 2022-08-30 RX ADMIN — Medication 10 MG: at 20:42

## 2022-08-30 RX ADMIN — SENNOSIDES AND DOCUSATE SODIUM 1 TABLET: 50; 8.6 TABLET ORAL at 20:42

## 2022-08-30 RX ADMIN — ROSUVASTATIN CALCIUM 40 MG: 20 TABLET, FILM COATED ORAL at 20:43

## 2022-08-30 RX ADMIN — HEPARIN SODIUM 5000 UNITS: 5000 INJECTION INTRAVENOUS; SUBCUTANEOUS at 05:26

## 2022-08-30 RX ADMIN — SEVELAMER CARBONATE 1600 MG: 800 TABLET, FILM COATED ORAL at 17:59

## 2022-08-30 RX ADMIN — DOCUSATE SODIUM 100 MG: 100 CAPSULE, LIQUID FILLED ORAL at 09:29

## 2022-08-30 ASSESSMENT — PAIN SCALES - GENERAL
PAINLEVEL_OUTOF10: 0
PAINLEVEL_OUTOF10: 10

## 2022-08-30 ASSESSMENT — PAIN DESCRIPTION - LOCATION: LOCATION: BACK;ABDOMEN

## 2022-08-30 ASSESSMENT — PAIN DESCRIPTION - DESCRIPTORS: DESCRIPTORS: ACHING

## 2022-08-30 NOTE — PROGRESS NOTES
Physical Therapy  Facility/Department: [address]  Daily Treatment Note  NAME: Joann Parks  : 1965  MRN: [id number]    Date of Service: 2022    Discharge Recommendations: Joann Parks scored a 21/24 on the AM-PAC short mobility form. Current research shows that an AM-PAC score of 17 or less is typically not associated with a discharge to the patient's home setting. Based on the patient's AM-PAC score and their current functional mobility deficits, it is recommended that the patient have 3-5 sessions per week of Physical Therapy at d/c to increase the patient's independence. Please see assessment section for further patient specific details. PT Equipment Recommendations  Equipment Needed: Yes  Other: rec rolling walker if pt returns home    Patient Diagnosis(es): The primary encounter diagnosis was NSTEMI (non-ST elevated myocardial infarction) ([facility] A diagnosis of Peritonitis associated with peritoneal dialysis, initial encounter [facility]) was also pertinent to this visit. Assessment   Assessment: Pt tolerated session well. Gait training was unsteady requiring sequencing cues throughout; lateral  LOB noted throughout. Equipment Needed: Yes  Other: rec rolling walker if pt returns home     Plan    Plan  Plan:  (2-5)     Restrictions  Position Activity Restriction  Other position/activity restrictions: up as tolerated     Subjective    Subjective  Subjective: Pt was sitting EOB upon arrival  Pain: denies pain  Orientation  Overall Orientation Status: Within Functional Limits     Objective   Bed mobility:   Sit to supine: Supervision  Transfer Training  Transfer Training: Yes  Sit to Stand: Stand-by assistance  Stand to Sit: Stand-by assistance  Gait Training  Gait Training: Yes  Gait  Overall Level of Assistance: Minimum assistance  Gait Abnormalities: slow unsteady reciprocal pattern with intermittent path deviations with a slow stepping strategy.    Distance (ft): 140 Feet  Assistive Device: None     PT Exercises  Static Sitting Balance Exercises: Sitting EOB for approx 2x5 mins     Safety Devices  Type of Devices: Chair alarm in place;Nurse notified;Gait belt;Call light within reach; Left in chair       Goals  Short Term Goals  Time Frame for Short term goals: discharge  Short term goal 1: Pt will transfer sit <--> stand with supervision  Short term goal 2: Pt will amb 50ft with walker and supervision  Short term goal 3: Pt will negotiate 3 steps with rail and CGA  Patient Goals   Patient goals : eventually return home    Education  Patient Education  Education Given To: Patient  Education Provided: Role of Therapy  Education Method: Demonstration  Barriers to Learning: None  Education Outcome: Verbalized understanding    Therapy Time   Individual Concurrent Group Co-treatment   Time In 1520         Time Out 1543         Minutes 21               Evens Nino, PTA

## 2022-08-30 NOTE — PROGRESS NOTES
Nephrology Consult Note        [facility]    [email or web address]       Phone: [phone]                                                  8/30/2022 7:50 AM     Patient: Trevin Hawkins [id number]  [id number]  Date of Admit: 8/20/2022 LOS: 9 days  Referring physician:    Plan:  Patient seen at bedside with nurse. Look comfortable  BP was low which improved after IVF bolus. On room air. No SOB. Lytes stable on last labs. Hb was low 8.9        Continue nocturnal Cylcer 4 cycles, 9 hours, 2 liter dwell, 1.5 % dextrose daily at night and after that last fill bag with antibiotics as below. 2 liter bag 1.5 % with Cefepime 1 gram daily till 9/2/22   Next Intraperitoneal Vancomycin dose 1500 mg on 9/1/22 x 1   Bacterial fluid cultures neg. Follow fungal culture  Nystatin swish and swallow to prevent fungal peritonitis. Continue till one week after finishes antibiotics. Continue stool softeners for regular bowel movement  Topical Gentamicin  Increased Sevelamer dose. D/C Metoprolol as BP was low. Agree with IVF bolus as needed. Monitor BP closely. Epogen for anemia. Avoid nephrotoxins  Monitor input, output and weight  Monitor labs and vitals closely  Renally dose all medications  Need retraining at PD clinic    following for discharge needs. Awaiting discharge to rehab      D/W patient      Thank you for allowing us to participate in this patient's care    In case of any question please call us at our 24 hour answering service [phone] or from 7 AM to 5 PM via Perfect Serve or cell number    Diamond Quintana MD  [facility]phrology  Melva Cain [facility] [address], [address]  Fax: [phone]  Office: [phone]       Assessment & Plan   Pain medicine seeking behavior  Patient always look comfortable but whenever ask about any pain patient says\"yes\"     Multiple Psychiatric problems.    Anxiety, bipolar disorder, depression      Renal function:  ESRD  On PD  Continue PD      Peritonitis:   Antibiotics per ID  Patient non compliant      Electrolytes:  Lab Results   Component Value Date    CREATININE 7.3 (HH) 08/29/2022    BUN 36 (H) 08/29/2022     (L) 08/29/2022    K 4.0 08/29/2022    CL 98 (L) 08/29/2022    CO2 26 08/29/2022            # CKD- MBD:   Secondary hyperparathyroidism due to renal failure  Monitor Phos level while here. Lab Results   Component Value Date    CALCIUM 9.2 08/29/2022    PHOS 5.4 (H) 08/24/2022         Acid/Base status:  Stable. Volume status/BP:  BP low  Asymptomatic. On metoprolol   EF 50- 55 % with grade 2 Diastolic dysfunction. Euvolemic. Hematology:   CKD related anemia  Goal Hgb 10-11    Lab Results   Component Value Date    IRON 98 08/12/2019    TIBC 275 08/12/2019     Lab Results   Component Value Date    WBC 7.4 08/28/2022    HGB 8.9 (L) 08/28/2022    HCT 27.0 (L) 08/28/2022    MCV 79.3 (L) 08/28/2022     08/28/2022             Reason for Consult and Chief Complaint     Reason for consult: ESRD    Chief complaint:   Chief Complaint   Patient presents with    Fatigue    Abdominal Pain    Shoulder Pain       History of Present Illness   Jackson Bonilla is a 65 y.o. with PMH significant for ESRD on PD being admitted with abdominal pain and chest pain. Per patient abdominal pain is ongoing for 2 weeks. Patient seen in ER and she is sleepy and goes back to sleep after answering questions. Not sure about PD fluid color. PD fluid cell count checked in ER and it showed 359 nucleated cells. Do not know if PD fluid was collected from dry abdomen or after proper dwell. ER MD is now not here. No SOB. No GI symptoms      Review of Systems   Positive in bold or unable to assess     GEN: Fever, chills, night sweats.   HEENT: Changes in vision, sore throat, rhinorrhea   CVS: Chest pain, palpitations, swelling or edema in legs  Pulmonary: Cough, hemoptysis, SOB  GI: Nausea, vomiting, diarrhea, constipation, abdominal pain  : Bladder incontinence, dysuria,hematuria. MSK: Muscle or joint or bone pains  Skin: Rashes, ulcers, skin thickness  CNS: Headache, dizziness, confusion, focal weakness, seizure. Psych: Anxiety, agitation, depression. Reviewed all 12 systems, negative except as above. Past Medical History     Past Medical History:   Diagnosis Date    JONATHAN (acute kidney injury) ([facility] 9/3/2014    Anxiety     Asthma     Bipolar disorder ([facility]     Bronchitis     Chronic back pain     Depression     Diabetes mellitus ([facility]     DM II (diabetes mellitus, type II), controlled ([facility] 9/3/2014    Hypertension          Past Surgical History     Past Surgical History:   Procedure Laterality Date    ENDOMETRIAL ABLATION      INNER EAR SURGERY      TUBAL LIGATION           Family History     Family History   Problem Relation Age of Onset    Diabetes Mother     Cancer Mother     Cancer Maternal Grandmother     Heart Disease Maternal Grandfather          Social History     Social History     Tobacco Use    Smoking status: Some Days     Packs/day: 0.05     Years: 15.00     Pack years: 0.75     Types: Cigarettes    Smokeless tobacco: Never   Substance Use Topics    Alcohol use: No          Past medical, family, and social histories were reviewed as previously documented. Updates were made as necessary.       Inpatient Medications and Allergies       Scheduled Meds:   sodium chloride  500 mL IntraVENous Once    custom dialysis builder   IntraPERitoneal Daily    sennosides-docusate sodium  1 tablet Oral BID    sevelamer  1,600 mg Oral TID WC    polyethylene glycol  17 g Oral BID    b complex-C-folic acid  1 capsule Oral Daily    benztropine  1 mg Oral Nightly    buPROPion  150 mg Oral BID    calcitRIOL  0.25 mcg Oral Daily    docusate sodium  100 mg Oral BID    doxepin  50 mg Oral Nightly    gabapentin  300 mg Oral Nightly    QUEtiapine  200 mg Oral Nightly    rosuvastatin  40 mg Oral Nightly    sodium chloride flush  5-40 mL IntraVENous 2 times per day    aspirin  81 mg Oral Daily    heparin (porcine)  5,000 Units SubCUTAneous 3 times per day    dianeal lo-josh 1.5%  8,000 mL IntraPERitoneal Daily    nystatin  5 mL Oral 4x Daily    melatonin  10 mg Oral Nightly    lidocaine  1 patch TransDERmal Daily       Allergies: Allergies   Allergen Reactions    Latex      Added based on information entered during case entry, please review and add reactions, type, and severity as needed    Banana Hives and Swelling     Swelling of tongue/face    Other Hives and Swelling     POTATOES, TOMATOES, LETTUCE, BANANA, SHRIMP, SOY BEAN, OAT, WHEAT, BARLEY, PEANUTS, PECANS  Potatoes and tomatoes Swelling of tongue/face    Peanuts [Peanut Oil] Hives and Swelling     Swelling of tongue/face    Tomato Hives and Swelling     Swelling of tongue/face    Barley Grass     Milk-Related Compounds     Shellfish-Derived Products          Vital Signs     Vitals:    08/30/22 0456   BP: 88/64   Pulse: 78   Resp: 18   Temp: 97 °F (36.1 °C)   SpO2: 100%         Intake/Output Summary (Last 24 hours) at 8/30/2022 0750  Last data filed at 8/29/2022 1818  Gross per 24 hour   Intake 2120 ml   Output 2020 ml   Net 100 ml           Physical Exam     General appearance: NAD  Head: Normocephalic, without obvious abnormality, atraumatic   Mouth: Moist mucous membrane  Neck: Supple. Lungs: Good air entry bilaterally. No respiratory distress on RA  Heart: S1, S2.  Abdomen: soft, non tender, non-distended  Extremities: No leg edema, warm to touch   Skin: No concerning rashes noted  Psych: good eye contact, normal affect  Neuro: AAO x 3. Non focal grossly.        Laboratory Data     Please see above     Diagnostic Studies   Pertinent images reviewed

## 2022-08-30 NOTE — FLOWSHEET NOTE
Disconnected from [facility] per protocol. Effluent: Clear    Total time: 16 hr 31 min   Total UF:  -272 ml. Total Volume:  6005 ml. Dwell time gained:  - 01 hr 40 min. Pt Tolerated procedure: Pt offers no c/o. Pt only did 3 exchanges. HD RN went ot room @ 0530 - alarm was going off, pt was laying on tubing. Pt at that time was in drain 3 of 4. HD RN went back to room @ 1100 - to find nothing changed, tubing was clamped now. Drained 3rd exchanged and stopped tx.      Cell count & differential sent to lab.       08/30/22 1115   Vitals   /72   Temp 98.6 °F (37 °C)   Heart Rate 88   Resp 16   Weight 148 lb 13 oz (67.5 kg)   Cycler   Ultrafiltration (UF) (mL) -272 mL   Average Dwell Time (Hours:Minutes) 105   Lost Dwell Time (Hours:Minutes) 01:40

## 2022-08-30 NOTE — PLAN OF CARE
Problem: Anxiety  Goal: Will report anxiety at manageable levels  Description: INTERVENTIONS:  1. Administer medication as ordered  2. Teach and rehearse alternative coping skills  3.  Provide emotional support with 1:1 interaction with staff  8/29/2022 2245 by Rafal Andrade RN  Outcome: Progressing     Problem: Infection - Adult  Goal: Absence of infection during hospitalization  8/29/2022 2244 by Rafal Andrade RN  Outcome: Progressing     Problem: Safety - Adult  Goal: Free from fall injury  Outcome: Progressing

## 2022-08-30 NOTE — PLAN OF CARE
Problem: Anxiety  Goal: Will report anxiety at manageable levels  Description: INTERVENTIONS:  1. Administer medication as ordered  2. Teach and rehearse alternative coping skills  3. Provide emotional support with 1:1 interaction with staff  Outcome: Progressing     Problem: Safety - Adult  Goal: Free from fall injury  Outcome: Progressing     Medications per order, resting in room up in chair today, will continue to monitor.

## 2022-08-30 NOTE — CARE COORDINATION
CM following. JAIME called [employer] in  admissions [phone] to follow up again today. She reported that their DON has made contact with pt's OP PD, Bacilio Ogden about coordinating for one of [place]'s nurses to come and do a PD training. ASHWIN [employer] will call JAIME when she gets another update.         Electronically signed by CLAUDIO Bryan LSW on 8/30/2022 at 12:09 PM

## 2022-08-30 NOTE — PROGRESS NOTES
Patient asking about dietary restrictions. Explained to patient that her allergies listed restrict a lot of choices. Patient told this RN that the allergies listed are not correct. Verified with patient all allergies and which can be removed. Per patient , not allergic to bananas or any fruit, not milk, not wheat, and not potatoes. Unsure of shellfish allergy, tomatoes, and peanuts so those are kept in the chart.

## 2022-08-30 NOTE — PROGRESS NOTES
Hospitalist Progress Note      PCP: Margarito Taylor DO,     Date of Admission: 8/20/2022    HPI and Hospital Course:  65 y.o. female with past medical history of ESRD on PD, anxiety, bipolar disorder, depression, diabetes mellitus and hypertension who presented with abdominal pain and chest pain. Symptoms were there for about 2 weeks intermittently. She is more thinking the symptoms localized around the abdominal area and sometimes radiate to the chest.  She is very sleepy to provide details on the history. No shortness of breath. No nausea or vomiting reported. Has regular bowel movement. No dysuria. PD fluid collected in the ER and cell count came back with 359 nucleated cells. She was also complaining of vaginal discharge. During hospital stay nephrology was consulted and patient underwent peritoneal dialysis nightly. Infectious disease was consulted and patient was given vancomycin and cefepime intravenously for peritonitis. Subjective:   No new complaints. Denies cp or sob. No abd pain. No fever.     Medications:  Reviewed    Infusion Medications    sodium chloride 10 mL/hr at 08/24/22 0816     Scheduled Medications    custom dialysis builder   IntraPERitoneal Daily    sennosides-docusate sodium  1 tablet Oral BID    sevelamer  1,600 mg Oral TID WC    polyethylene glycol  17 g Oral BID    b complex-C-folic acid  1 capsule Oral Daily    benztropine  1 mg Oral Nightly    buPROPion  150 mg Oral BID    calcitRIOL  0.25 mcg Oral Daily    docusate sodium  100 mg Oral BID    doxepin  50 mg Oral Nightly    gabapentin  300 mg Oral Nightly    QUEtiapine  200 mg Oral Nightly    rosuvastatin  40 mg Oral Nightly    sodium chloride flush  5-40 mL IntraVENous 2 times per day    aspirin  81 mg Oral Daily    heparin (porcine)  5,000 Units SubCUTAneous 3 times per day    dianeal lo-josh 1.5%  8,000 mL IntraPERitoneal Daily    nystatin  5 mL Oral 4x Daily    melatonin  10 mg Oral Nightly    lidocaine  1 patch TransDERmal Daily     PRN Meds: oxyCODONE, hydrALAZINE, hydrOXYzine HCl, sodium chloride flush, sodium chloride, ondansetron **OR** ondansetron, acetaminophen **OR** acetaminophen, polyethylene glycol      Intake/Output Summary (Last 24 hours) at 8/30/2022 1524  Last data filed at 8/30/2022 1115  Gross per 24 hour   Intake 240 ml   Output 1748 ml   Net -1508 ml       Physical Exam Performed:    /71   Pulse 88   Temp 98.7 °F (37.1 °C) (Oral)   Resp 16   Ht 5' 7.99\" (1.727 m)   Wt 148 lb 13 oz (67.5 kg)   SpO2 100%   BMI 22.63 kg/m²     General appearance: NAD  Neck: Supple, with full range of motion. Respiratory:  Normal respiratory effort. Musculoskeletal: No edema b/l le  Neurologic:  grossly non-focal.  Psychiatric: Alert and oriented    Labs:   Recent Labs     08/28/22  1942   WBC 7.4   HGB 8.9*   HCT 27.0*        Recent Labs     08/28/22  0719 08/29/22  0843 08/30/22  1053    134* 134*   K 4.2 4.0 3.8   CL 99 98* 95*   CO2 29 26 28   BUN 37* 36* 36*   CREATININE 7.8* 7.3* 7.9*   CALCIUM 9.0 9.2 8.8     Urinalysis:      Lab Results   Component Value Date/Time    NITRU Negative 08/20/2022 11:35 PM    WBCUA 6-9 08/20/2022 11:35 PM    BACTERIA 1+ 08/20/2022 11:35 PM    RBCUA 3-4 08/20/2022 11:35 PM    BLOODU Negative 08/20/2022 11:35 PM    SPECGRAV 1.025 08/20/2022 11:35 PM    GLUCOSEU Negative 08/20/2022 11:35 PM       Radiology:  NM MYOCARDIAL SPECT REST EXERCISE OR RX   Final Result      XR CHEST (2 VW)   Final Result      No acute radiographic abnormality of the chest.      CT HEAD WO CONTRAST   Final Result      No acute intracranial hemorrhage or mass effect.                  Assessment/Plan:    Active Hospital Problems    Diagnosis     Lower abdominal pain [R10.30]      Priority: Medium    Elevated troponin [R77.8]      Priority: Medium    Peritonitis associated with peritoneal dialysis ([address] [T85.71XA]      Priority: Medium    Complication of peritoneal dialysis Deven Rodriguez Priority: Medium    Chronic kidney disease with end stage renal failure on dialysis ([address] [N18.6, Z99.2]      Priority: Medium    Atypical chest pain [R07.89]    Was on abx for peritonitis- completed. Now w/cefepime irrigation- for 6 hours. DVT Prophylaxis: Heparin  Diet: ADULT DIET;  Regular  Code Status: Full Code    PT/OT Eval Status: Ongoing    Dispo - Awaiting placement      Fauzia Grier MD

## 2022-08-31 LAB
APPEARANCE FLUID: CLEAR
CELL COUNT FLUID TYPE: NORMAL
CLOT EVALUATION: NORMAL
COLOR FLUID: COLORLESS
FLUID DIFF COMMENT: NORMAL
NUCLEATED CELLS FLUID: 3 /CUMM
RBC FLUID: <1000 /CUMM

## 2022-08-31 PROCEDURE — 6360000002 HC RX W HCPCS: Performed by: STUDENT IN AN ORGANIZED HEALTH CARE EDUCATION/TRAINING PROGRAM

## 2022-08-31 PROCEDURE — 6370000000 HC RX 637 (ALT 250 FOR IP): Performed by: STUDENT IN AN ORGANIZED HEALTH CARE EDUCATION/TRAINING PROGRAM

## 2022-08-31 PROCEDURE — 6370000000 HC RX 637 (ALT 250 FOR IP): Performed by: INTERNAL MEDICINE

## 2022-08-31 PROCEDURE — 6360000002 HC RX W HCPCS: Performed by: INTERNAL MEDICINE

## 2022-08-31 PROCEDURE — 2580000003 HC RX 258: Performed by: INTERNAL MEDICINE

## 2022-08-31 PROCEDURE — 90945 DIALYSIS ONE EVALUATION: CPT

## 2022-08-31 PROCEDURE — 89050 BODY FLUID CELL COUNT: CPT

## 2022-08-31 PROCEDURE — A4722 DIALYS SOL FLD VOL > 1999CC: HCPCS | Performed by: STUDENT IN AN ORGANIZED HEALTH CARE EDUCATION/TRAINING PROGRAM

## 2022-08-31 PROCEDURE — 1200000000 HC SEMI PRIVATE

## 2022-08-31 RX ADMIN — OXYCODONE 10 MG: 5 TABLET ORAL at 19:05

## 2022-08-31 RX ADMIN — NYSTATIN 500000 UNITS: 100000 SUSPENSION ORAL at 13:22

## 2022-08-31 RX ADMIN — CALCITRIOL CAPSULES 0.25 MCG 0.25 MCG: 0.25 CAPSULE ORAL at 09:04

## 2022-08-31 RX ADMIN — SEVELAMER CARBONATE 1600 MG: 800 TABLET, FILM COATED ORAL at 09:03

## 2022-08-31 RX ADMIN — GABAPENTIN 300 MG: 600 TABLET, FILM COATED ORAL at 20:34

## 2022-08-31 RX ADMIN — BUPROPION HYDROCHLORIDE 150 MG: 150 TABLET, EXTENDED RELEASE ORAL at 20:34

## 2022-08-31 RX ADMIN — QUETIAPINE FUMARATE 200 MG: 200 TABLET ORAL at 20:34

## 2022-08-31 RX ADMIN — CEFEPIME: 1 INJECTION, POWDER, FOR SOLUTION INTRAMUSCULAR; INTRAVENOUS at 09:57

## 2022-08-31 RX ADMIN — DOCUSATE SODIUM 100 MG: 100 CAPSULE, LIQUID FILLED ORAL at 20:34

## 2022-08-31 RX ADMIN — NEPHROCAP 1 MG: 1 CAP ORAL at 09:04

## 2022-08-31 RX ADMIN — POLYETHYLENE GLYCOL 3350 17 G: 17 POWDER, FOR SOLUTION ORAL at 09:03

## 2022-08-31 RX ADMIN — SENNOSIDES AND DOCUSATE SODIUM 1 TABLET: 50; 8.6 TABLET ORAL at 20:34

## 2022-08-31 RX ADMIN — SODIUM CHLORIDE, PRESERVATIVE FREE 10 ML: 5 INJECTION INTRAVENOUS at 20:34

## 2022-08-31 RX ADMIN — BUPROPION HYDROCHLORIDE 150 MG: 150 TABLET, EXTENDED RELEASE ORAL at 09:04

## 2022-08-31 RX ADMIN — ASPIRIN 81 MG 81 MG: 81 TABLET ORAL at 09:04

## 2022-08-31 RX ADMIN — DOXEPIN HYDROCHLORIDE 50 MG: 25 CAPSULE ORAL at 20:34

## 2022-08-31 RX ADMIN — HEPARIN SODIUM 5000 UNITS: 5000 INJECTION INTRAVENOUS; SUBCUTANEOUS at 20:34

## 2022-08-31 RX ADMIN — Medication 10 MG: at 20:34

## 2022-08-31 RX ADMIN — SODIUM CHLORIDE, PRESERVATIVE FREE 10 ML: 5 INJECTION INTRAVENOUS at 09:06

## 2022-08-31 RX ADMIN — ROSUVASTATIN CALCIUM 40 MG: 20 TABLET, FILM COATED ORAL at 20:34

## 2022-08-31 RX ADMIN — HEPARIN SODIUM 5000 UNITS: 5000 INJECTION INTRAVENOUS; SUBCUTANEOUS at 13:22

## 2022-08-31 RX ADMIN — SENNOSIDES AND DOCUSATE SODIUM 1 TABLET: 50; 8.6 TABLET ORAL at 09:04

## 2022-08-31 RX ADMIN — NYSTATIN 500000 UNITS: 100000 SUSPENSION ORAL at 09:04

## 2022-08-31 RX ADMIN — BENZTROPINE MESYLATE 1 MG: 1 TABLET ORAL at 20:34

## 2022-08-31 RX ADMIN — DOCUSATE SODIUM 100 MG: 100 CAPSULE, LIQUID FILLED ORAL at 09:04

## 2022-08-31 RX ADMIN — SEVELAMER CARBONATE 1600 MG: 800 TABLET, FILM COATED ORAL at 13:22

## 2022-08-31 RX ADMIN — NYSTATIN 500000 UNITS: 100000 SUSPENSION ORAL at 17:09

## 2022-08-31 RX ADMIN — SEVELAMER CARBONATE 1600 MG: 800 TABLET, FILM COATED ORAL at 17:08

## 2022-08-31 ASSESSMENT — PAIN DESCRIPTION - PAIN TYPE
TYPE: ACUTE PAIN
TYPE: ACUTE PAIN

## 2022-08-31 ASSESSMENT — PAIN DESCRIPTION - ORIENTATION
ORIENTATION: RIGHT;LOWER
ORIENTATION: RIGHT;LOWER
ORIENTATION: LOWER;RIGHT

## 2022-08-31 ASSESSMENT — PAIN DESCRIPTION - FREQUENCY
FREQUENCY: CONTINUOUS
FREQUENCY: CONTINUOUS

## 2022-08-31 ASSESSMENT — PAIN DESCRIPTION - DESCRIPTORS
DESCRIPTORS: ACHING

## 2022-08-31 ASSESSMENT — PAIN DESCRIPTION - ONSET
ONSET: ON-GOING
ONSET: ON-GOING

## 2022-08-31 ASSESSMENT — PAIN SCALES - GENERAL
PAINLEVEL_OUTOF10: 7
PAINLEVEL_OUTOF10: 9
PAINLEVEL_OUTOF10: 9

## 2022-08-31 ASSESSMENT — PAIN DESCRIPTION - LOCATION
LOCATION: ABDOMEN

## 2022-08-31 NOTE — PROGRESS NOTES
Nephrology Consult Note        [facility]    [email or web address]       Phone: [phone]                                                  8/31/2022 6:33 AM     Patient: Deborah Mitchell [id number]  [id number]  Date of Admit: 8/20/2022 LOS: 10 days  Referring physician:    Plan:  Patient seen at bedside with nurse. Look comfortable  Not in pain. BP better after IVF bolus and stopping metoprolol  On room air. No SOB. Lytes stable on last labs. Continue nocturnal Cylcer 4 cycles, 9 hours, 2 liter dwell, 1.5 % dextrose daily at night and after that last fill bag with antibiotics as below. 2 liter bag 1.5 % with Cefepime 1 gram daily till 9/2/22   Next Intraperitoneal Vancomycin dose 1500 mg on 9/1/22 x 1   Bacterial fluid cultures neg. Follow fungal, AFB cultures  Nystatin swish and swallow to prevent fungal peritonitis. Continue till one week after finishes antibiotics. Continue stool softeners for regular bowel movement  Topical Gentamicin  Continue Sevelamer  Epogen given on 8/30 for anemia. Avoid nephrotoxins  Monitor input, output and weight  Monitor labs and vitals closely  Renally dose all medications  Need retraining at PD clinic    following for discharge needs. Awaiting discharge to rehab      D/W patient      Thank you for allowing us to participate in this patient's care    In case of any question please call us at our 24 hour answering service [phone] or from 7 AM to 5 PM via Perfect Serve or cell number    Grayson Munguia MD  [facility] Nephrology  [address]  Fax: [phone]  Office: [phone]       Assessment & Plan   Pain medicine seeking behavior  Patient always look comfortable but whenever ask about any pain patient says\"yes\"     Multiple Psychiatric problems.    Anxiety, bipolar disorder, depression      Renal function:  ESRD  On PD  Continue PD      Peritonitis:   Antibiotics per ID  Patient non compliant      Electrolytes:  Lab Results   Component Value Date    CREATININE 7.9 ([facility]) 08/30/2022    BUN 36 (H) 08/30/2022     (L) 08/30/2022    K 3.8 08/30/2022    CL 95 (L) 08/30/2022    CO2 28 08/30/2022            # CKD- MBD:   Secondary hyperparathyroidism due to renal failure  Monitor Phos level while here. Lab Results   Component Value Date    CALCIUM 8.8 08/30/2022    PHOS 5.4 (H) 08/24/2022         Acid/Base status:  Stable. Volume status/BP:  BP low  Asymptomatic. On metoprolol   EF 50- 55 % with grade 2 Diastolic dysfunction. Euvolemic. Hematology:   CKD related anemia  Goal Hgb 10-11    Lab Results   Component Value Date    IRON 98 08/12/2019    TIBC 275 08/12/2019     Lab Results   Component Value Date    WBC 7.4 08/28/2022    HGB 8.9 (L) 08/28/2022    HCT 27.0 (L) 08/28/2022    MCV 79.3 (L) 08/28/2022     08/28/2022             Reason for Consult and Chief Complaint     Reason for consult: ESRD    Chief complaint:   Chief Complaint   Patient presents with    Fatigue    Abdominal Pain    Shoulder Pain       History of Present Illness   Barbara Horan is a 65 y.o. with PMH significant for ESRD on PD being admitted with abdominal pain and chest pain. Per patient abdominal pain is ongoing for 2 weeks. Patient seen in ER and she is sleepy and goes back to sleep after answering questions. Not sure about PD fluid color. PD fluid cell count checked in ER and it showed 359 nucleated cells. Do not know if PD fluid was collected from dry abdomen or after proper dwell. ER MD is now not here. No SOB. No GI symptoms      Review of Systems   Positive in bold or unable to assess     GEN: Fever, chills, night sweats. HEENT: Changes in vision, sore throat, rhinorrhea   CVS: Chest pain, palpitations, swelling or edema in legs  Pulmonary: Cough, hemoptysis, SOB  GI: Nausea, vomiting, diarrhea, constipation, abdominal pain  : Bladder incontinence, dysuria,hematuria.    MSK: Muscle or dianeal lo-josh 1.5%  8,000 mL IntraPERitoneal Daily    nystatin  5 mL Oral 4x Daily    melatonin  10 mg Oral Nightly    lidocaine  1 patch TransDERmal Daily       Allergies: Allergies   Allergen Reactions    Latex      Added based on information entered during case entry, please review and add reactions, type, and severity as needed    Peanuts [Peanut Oil] Hives and Swelling     Swelling of tongue/face    Tomato Hives and Swelling     Swelling of tongue/face    Shellfish-Derived Products          Vital Signs     Vitals:    08/31/22 0340   BP: 106/67   Pulse: 87   Resp: 18   Temp: 98 °F (36.7 °C)   SpO2: 95%         Intake/Output Summary (Last 24 hours) at 8/31/2022 6149  Last data filed at 8/30/2022 1815  Gross per 24 hour   Intake 360 ml   Output -272 ml   Net 632 ml           Physical Exam     General appearance: NAD  Head: Normocephalic, without obvious abnormality, atraumatic   Mouth: Moist mucous membrane  Neck: Supple. Lungs: Good air entry bilaterally. No respiratory distress on RA  Heart: S1, S2.  Abdomen: soft, non tender, non-distended  Extremities: No leg edema, warm to touch   Skin: No concerning rashes noted  Psych: good eye contact, normal affect  Neuro: AAO x 3. Non focal grossly.        Laboratory Data     Please see above     Diagnostic Studies   Pertinent images reviewed

## 2022-08-31 NOTE — CARE COORDINATION
[phone] reported that Mauricio Reyna scheduled PD training for Friday, 9/2. CM called [place] to see about a sooner appt as pt has been ready for DC, but that is the soon[facility] day [facility]. Per nephrology, pt cannot miss 1 day of PD (miss tomorrow's if she went to SNF today) because she's also receiving abx with PD. CM messaged pt's Attending to discuss concerns that CM and RN's have about pt going home, as pt does not seem safe and competent enough to function and perform PD at home. Attending is in agreement with pt staying admitted until tomorrow after PD. CM rescheduled transport for tomorrow afternoon at 4pm, and notified pt's RN and Isabela. RN will contact [facility] dialysis to make aware that pt will need PD tomorrow.         Electronically signed by CLAUDIO Cordero LSW on 8/31/2022 at 2:48 PM

## 2022-08-31 NOTE — PROGRESS NOTES
Hospitalist Progress Note      PCP: Mia Cox DO,     Date of Admission: 8/20/2022    HPI and Hospital Course:  65 y.o. female with past medical history of ESRD on PD, anxiety, bipolar disorder, depression, diabetes mellitus and hypertension who presented with abdominal pain and chest pain. Symptoms were there for about 2 weeks intermittently. She is more thinking the symptoms localized around the abdominal area and sometimes radiate to the chest.  She is very sleepy to provide details on the history. No shortness of breath. No nausea or vomiting reported. Has regular bowel movement. No dysuria. PD fluid collected in the ER and cell count came back with 359 nucleated cells. She was also complaining of vaginal discharge. During hospital stay nephrology was consulted and patient underwent peritoneal dialysis nightly. Infectious disease was consulted and patient was given vancomycin and cefepime intravenously for peritonitis. Subjective:   Unable to go home as cannot manage PD at home by hersel and also unsteady on her feet. Denies any specific complaints.     Medications:  Reviewed    Infusion Medications    sodium chloride 10 mL/hr at 08/24/22 0816     Scheduled Medications    custom dialysis builder   IntraPERitoneal Daily    sennosides-docusate sodium  1 tablet Oral BID    sevelamer  1,600 mg Oral TID WC    polyethylene glycol  17 g Oral BID    b complex-C-folic acid  1 capsule Oral Daily    benztropine  1 mg Oral Nightly    buPROPion  150 mg Oral BID    calcitRIOL  0.25 mcg Oral Daily    docusate sodium  100 mg Oral BID    doxepin  50 mg Oral Nightly    gabapentin  300 mg Oral Nightly    QUEtiapine  200 mg Oral Nightly    rosuvastatin  40 mg Oral Nightly    sodium chloride flush  5-40 mL IntraVENous 2 times per day    aspirin  81 mg Oral Daily    heparin (porcine)  5,000 Units SubCUTAneous 3 times per day    dianeal lo-josh 1.5%  8,000 mL IntraPERitoneal Daily    nystatin  5 mL Oral 4x Daily    melatonin  10 mg Oral Nightly    lidocaine  1 patch TransDERmal Daily     PRN Meds: oxyCODONE, hydrALAZINE, hydrOXYzine HCl, sodium chloride flush, sodium chloride, ondansetron **OR** ondansetron, acetaminophen **OR** acetaminophen, polyethylene glycol      Intake/Output Summary (Last 24 hours) at 8/31/2022 1604  Last data filed at 8/31/2022 0720  Gross per 24 hour   Intake 120 ml   Output 372 ml   Net -252 ml       Physical Exam Performed:    /77   Pulse 90   Temp 98.1 °F (36.7 °C) (Oral)   Resp 18   Ht 5' 7.99\" (1.727 m)   Wt 149 lb 14.6 oz (68 kg)   SpO2 96%   BMI 22.80 kg/m²     General appearance: No distress  Respiratory:  Ctab  CV: RRR  Musculoskeletal: No edema b/l le  Neurologic:  grossly non-focal.  Psychiatric: Alert and oriented    Labs:   Recent Labs     08/28/22  1942   WBC 7.4   HGB 8.9*   HCT 27.0*        Recent Labs     08/29/22  0843 08/30/22  1053   * 134*   K 4.0 3.8   CL 98* 95*   CO2 26 28   BUN 36* 36*   CREATININE 7.3* 7.9*   CALCIUM 9.2 8.8     Urinalysis:      Lab Results   Component Value Date/Time    NITRU Negative 08/20/2022 11:35 PM    WBCUA 6-9 08/20/2022 11:35 PM    BACTERIA 1+ 08/20/2022 11:35 PM    RBCUA 3-4 08/20/2022 11:35 PM    BLOODU Negative 08/20/2022 11:35 PM    SPECGRAV 1.025 08/20/2022 11:35 PM    GLUCOSEU Negative 08/20/2022 11:35 PM       Radiology:  NM MYOCARDIAL SPECT REST EXERCISE OR RX   Final Result      XR CHEST (2 VW)   Final Result      No acute radiographic abnormality of the chest.      CT HEAD WO CONTRAST   Final Result      No acute intracranial hemorrhage or mass effect.                  Assessment/Plan:    Active Hospital Problems    Diagnosis     Lower abdominal pain [R10.30]      Priority: Medium    Elevated troponin [R77.8]      Priority: Medium    Peritonitis associated with peritoneal dialysis ([address] George Jeremiah      Priority: Medium    Complication of peritoneal dialysis [T80.90XA]      Priority: Medium    Chronic kidney disease with end stage renal failure on dialysis ([facility] [address] [N18.6, Z99.2]      Priority: Medium    Atypical chest pain [R07.89]      SBP:  Was on abx for peritonitis- completed. Now w/cefepime irrigation- for 6 hours. ESRD:  On PD- will need to get PD in am prior to dc to snf. DVT Prophylaxis: Heparin  Diet: ADULT DIET;  Regular  Code Status: Full Code    PT/OT Eval Status: Ongoing    Dispo - hopefully dc to snf in am after PD      Barb Man MD

## 2022-08-31 NOTE — DISCHARGE SUMMARY
Hospital Medicine Discharge Summary    Patient ID: Shirlene Bravo      Patient's PCP: Mauricio Marie DO,     Admit Date: 8/20/2022     Discharge Date:   9/2/2022    Admitting Provider: Ramos Casiano MD     Discharge Provider: Mariposa Rutherford MD     Discharge Diagnoses: Active Hospital Problems    Diagnosis     Lower abdominal pain [R10.30]      Priority: Medium    Elevated troponin [R77.8]      Priority: Medium    Peritonitis associated with peritoneal dialysis [facility]) Kelli Watkins      Priority: Medium    Complication of peritoneal dialysis [T80.90XA]      Priority: Medium    Chronic kidney disease with end stage renal failure on dialysis ([address] [N18.6, Z99.2]      Priority: Medium    Atypical chest pain [R07.89]        The patient was seen and examined on day of discharge and this discharge summary is in conjunction with any daily progress note from day of discharge. HPI and Hospital Course:   65 y.o. female with past medical history of ESRD on PD, anxiety, bipolar disorder, depression, diabetes mellitus and hypertension who presented with abdominal pain and chest pain. Symptoms were there for about 2 weeks intermittently. She is more thinking the symptoms localized around the abdominal area and sometimes radiate to the chest.  She is very sleepy to provide details on the history. No shortness of breath. No nausea or vomiting reported. Has regular bowel movement. No dysuria. PD fluid collected in the ER and cell count came back with 359 nucleated cells. She was also complaining of vaginal discharge. During hospital stay nephrology was consulted and patient underwent peritoneal dialysis nightly. Infectious disease was consulted and patient was given vancomycin and cefepime intravenously for peritonitis. SBP:  Was on abx for peritonitis- completed. Thereafter had abx irrigation during PD cycle- completed 9/2. Appreciate nephro help.      ESRD:  On PD- cont PD cycling on dc at snf.    Physical Exam Performed:     BP (!) 134/97   Pulse 86   Temp 97.8 °F (36.6 °C) (Oral)   Resp 18   Ht 5' 7.99\" (1.727 m)   Wt 150 lb 2.1 oz (68.1 kg)   SpO2 99%   BMI 22.83 kg/m²       General appearance:  No apparent distress  Respiratory:  Normal respiratory effort. Clear to auscultation  Cardiovascular:  Regular rate and rhythm   Musculoskeletal:  No le edema b/l  Neurologic:  Grossly non-focal.  Psychiatric:  Alert and oriented, thought content appropriate    Labs: For convenience and continuity at follow-up the following most recent labs are provided:      CBC:    Lab Results   Component Value Date/Time    WBC 7.4 08/28/2022 07:42 PM    HGB 8.9 08/28/2022 07:42 PM    HCT 27.0 08/28/2022 07:42 PM     08/28/2022 07:42 PM       Renal:    Lab Results   Component Value Date/Time     09/01/2022 06:52 AM    K 3.3 09/01/2022 06:52 AM    K 3.8 08/30/2022 10:53 AM    CL 95 09/01/2022 06:52 AM    CO2 28 09/01/2022 06:52 AM    BUN 29 09/01/2022 06:52 AM    CREATININE 7.7 09/01/2022 06:52 AM    CALCIUM 8.5 09/01/2022 06:52 AM    PHOS 4.5 09/01/2022 06:52 AM         Significant Diagnostic Studies    Radiology:   NM MYOCARDIAL SPECT REST EXERCISE OR RX   Final Result      XR CHEST (2 VW)   Final Result      No acute radiographic abnormality of the chest.      CT HEAD WO CONTRAST   Final Result      No acute intracranial hemorrhage or mass effect.                       Consults:     IP CONSULT TO HOSPITALIST  IP CONSULT TO CARDIOLOGY  IP CONSULT TO NEPHROLOGY  PHARMACY TO DOSE VANCOMYCIN  IP CONSULT TO INFECTIOUS DISEASES  IP CONSULT TO HOME CARE NEEDS    Disposition:  SNF     Condition at Discharge: Stable    Discharge Instructions/Follow-up:  Nephro    Code Status:  Full Code     Activity: activity as tolerated    Diet: renal diet      Discharge Medications:     Current Discharge Medication List             Details   aspirin 81 MG chewable tablet Take 1 tablet by mouth daily  Qty: 30 tablet, Refills: 3      nystatin (MYCOSTATIN) 457705 UNIT/ML suspension Take 5 mLs by mouth 4 times daily for 21 days  Qty: 420 mL, Refills: 0      oxyCODONE (ROXICODONE) 5 MG immediate release tablet Take 1 tablet by mouth every 6 hours as needed for Pain for up to 7 days. Qty: 15 tablet, Refills: 0    Comments: Reduce doses taken as pain becomes manageable  Associated Diagnoses: Peritonitis associated with peritoneal dialysis, initial encounter ([facility]                Details   sevelamer (RENVELA) 800 MG tablet Take 2 tablets by mouth 3 times daily (with meals)  Qty: 90 tablet, Refills: 3      buPROPion (WELLBUTRIN SR) 150 MG extended release tablet Take 1 tablet by mouth daily  Qty: 30 tablet, Refills: 0      metoprolol succinate (TOPROL XL) 25 MG extended release tablet Take 1 tablet by mouth daily  Qty: 30 tablet, Refills: 0      melatonin 5 MG TABS tablet Take 1 tablet by mouth nightly                Details   meclizine (ANTIVERT) 12.5 MG tablet Take 12.5 mg by mouth 3 times daily as needed for Dizziness      acetaminophen (TYLENOL) 500 MG tablet Take 1,000 mg by mouth every 6 hours as needed for Pain (1-2 tablets every 4 hours PRN)      sennosides-docusate sodium (SENOKOT-S) 8.6-50 MG tablet Take 1 tablet by mouth in the morning. Qty: 30 tablet, Refills: 0      dicyclomine (BENTYL) 10 MG capsule Take 1 capsule by mouth in the morning and 1 capsule at noon and 1 capsule in the evening and 1 capsule before bedtime. Qty: 120 capsule, Refills: 3      rosuvastatin (CRESTOR) 40 MG tablet Take 40 mg by mouth daily      calcitRIOL (ROCALTROL) 0.25 MCG capsule Take 0.25 mcg by mouth in the morning. doxepin (SINEQUAN) 50 MG capsule Take 50 mg by mouth nightly One hour before bedtime      benztropine (COGENTIN) 1 MG tablet Take 1 mg by mouth at bedtime      B Complex-C-Folic Acid (VIRT-CAPS) 1 MG CAPS Take 1 capsule by mouth in the morning.              Time Spent on discharge is more than  32 minutes  in the examination, evaluation, counseling and review of medications and discharge plan. Signed:    Fauzia Grier MD   9/2/2022      Thank you Mena Adams DO, DO for the opportunity to be involved in this patient's care.

## 2022-08-31 NOTE — DIALYSIS
CCPD Order              Exchanges: 4              Exchange Volume: 2000 ml              Total Time: 9 hrs              Dextrose: 1.5%              Last Fill: 0 ml              Total Volume: 8000 ml     Orders verified. Supplies taken to pt's room. Report received. Cycler set up, primed and pre tested. Dressing changed on [facility] Cath site. Pt connected to cycler. CCPD initiated without problem. Initial effluent clear. If problems should arise please call the [phone] number on top of PD cycler machine.        If problems persist please call [phone]

## 2022-08-31 NOTE — PROGRESS NOTES
CCPD Order   Exchanges: 4   Exchange Volume: 2000 ml   Total Time: 9 hrs   Dextrose: 1.5%   Last Fill: 0 ml   Total Volume: 8000 ml     Orders verified. Supplies taken to pt's room. Report received. Cycler set up, primed and pre tested. Dressing changed on [facility] Cath site. Pt connected to cycler. CCPD initiated without problem. Initial effluent clear. If problems should arise please call the [phone] number on top of PD cycler machine.        If problems persist please call [phone]

## 2022-08-31 NOTE — FLOWSHEET NOTE
Disconnected from CCPD per protocol. Effluent: Clear  Total time: 09 hr 23 min   Total UF:  372 ml. Total Volume:  8007 ml. Dwell time gained:  00 hr 16 min. Pt Tolerated procedure: Pt offers no c/o.     Cell count & differential sent to lab.       08/31/22 0720   Vitals   /71   Temp 98.1 °F (36.7 °C)   Heart Rate 85   Resp 16   Weight 149 lb 14.6 oz (68 kg)   Cycler   Ultrafiltration (UF) (mL) 372 mL   Average Dwell Time (Hours:Minutes) 110   Lost Dwell Time (Hours:Minutes) 0016

## 2022-08-31 NOTE — PLAN OF CARE
Problem: Anxiety  Goal: Will report anxiety at manageable levels  Description: INTERVENTIONS:  1. Administer medication as ordered  2. Teach and rehearse alternative coping skills  3. Provide emotional support with 1:1 interaction with staff  Outcome: Progressing     Problem: Infection - Adult  Goal: Absence of infection during hospitalization  Outcome: Progressing     Problem: Safety - Adult  Goal: Free from fall injury  Outcome: Progressing     Medications per order, resting in room watching TV/talking on the phone with family. No distress noted, will continue to monitor.

## 2022-09-01 LAB
ALBUMIN SERPL-MCNC: 2.6 G/DL (ref 3.4–5)
ANION GAP SERPL CALCULATED.3IONS-SCNC: 10 MMOL/L (ref 3–16)
APPEARANCE FLUID: CLEAR
BASO FLUID: 1 %
BUN BLDV-MCNC: 29 MG/DL (ref 7–20)
CALCIUM SERPL-MCNC: 8.5 MG/DL (ref 8.3–10.6)
CELL COUNT FLUID TYPE: NORMAL
CHLORIDE BLD-SCNC: 95 MMOL/L (ref 99–110)
CLOT EVALUATION: NORMAL
CO2: 28 MMOL/L (ref 21–32)
COLOR FLUID: COLORLESS
CREAT SERPL-MCNC: 7.7 MG/DL (ref 0.6–1.1)
EOSINOPHIL FLUID: 8 %
GFR AFRICAN AMERICAN: 7
GFR NON-AFRICAN AMERICAN: 5
GLUCOSE BLD-MCNC: 95 MG/DL (ref 70–99)
LYMPHOCYTES, BODY FLUID: 63 %
MACROPHAGE FLUID: 1 %
MESOTHELIAL FLUID: 4 %
MONOCYTE, FLUID: 14 %
NEUTROPHIL, FLUID: 9 %
NUCLEATED CELLS FLUID: 23 /CUMM
NUMBER OF CELLS COUNTED FLUID: 100
PHOSPHORUS: 4.5 MG/DL (ref 2.5–4.9)
POTASSIUM SERPL-SCNC: 3.3 MMOL/L (ref 3.5–5.1)
RBC FLUID: <1000 /CUMM
SODIUM BLD-SCNC: 133 MMOL/L (ref 136–145)

## 2022-09-01 PROCEDURE — 6370000000 HC RX 637 (ALT 250 FOR IP): Performed by: STUDENT IN AN ORGANIZED HEALTH CARE EDUCATION/TRAINING PROGRAM

## 2022-09-01 PROCEDURE — 89051 BODY FLUID CELL COUNT: CPT

## 2022-09-01 PROCEDURE — 6360000002 HC RX W HCPCS: Performed by: STUDENT IN AN ORGANIZED HEALTH CARE EDUCATION/TRAINING PROGRAM

## 2022-09-01 PROCEDURE — 6370000000 HC RX 637 (ALT 250 FOR IP): Performed by: INTERNAL MEDICINE

## 2022-09-01 PROCEDURE — 2580000003 HC RX 258: Performed by: INTERNAL MEDICINE

## 2022-09-01 PROCEDURE — A4722 DIALYS SOL FLD VOL > 1999CC: HCPCS | Performed by: STUDENT IN AN ORGANIZED HEALTH CARE EDUCATION/TRAINING PROGRAM

## 2022-09-01 PROCEDURE — 1200000000 HC SEMI PRIVATE

## 2022-09-01 PROCEDURE — 80069 RENAL FUNCTION PANEL: CPT

## 2022-09-01 PROCEDURE — 6360000002 HC RX W HCPCS: Performed by: INTERNAL MEDICINE

## 2022-09-01 PROCEDURE — 36415 COLL VENOUS BLD VENIPUNCTURE: CPT

## 2022-09-01 PROCEDURE — 8040000000 HC CCPD INPATIENT

## 2022-09-01 RX ORDER — POTASSIUM CHLORIDE 20 MEQ/1
40 TABLET, EXTENDED RELEASE ORAL ONCE
Status: COMPLETED | OUTPATIENT
Start: 2022-09-01 | End: 2022-09-01

## 2022-09-01 RX ADMIN — SEVELAMER CARBONATE 1600 MG: 800 TABLET, FILM COATED ORAL at 17:20

## 2022-09-01 RX ADMIN — POLYETHYLENE GLYCOL 3350 17 G: 17 POWDER, FOR SOLUTION ORAL at 21:08

## 2022-09-01 RX ADMIN — SEVELAMER CARBONATE 1600 MG: 800 TABLET, FILM COATED ORAL at 12:38

## 2022-09-01 RX ADMIN — BENZTROPINE MESYLATE 1 MG: 1 TABLET ORAL at 21:08

## 2022-09-01 RX ADMIN — SODIUM CHLORIDE, PRESERVATIVE FREE 5 ML: 5 INJECTION INTRAVENOUS at 22:14

## 2022-09-01 RX ADMIN — QUETIAPINE FUMARATE 200 MG: 200 TABLET ORAL at 21:08

## 2022-09-01 RX ADMIN — OXYCODONE 10 MG: 5 TABLET ORAL at 21:30

## 2022-09-01 RX ADMIN — Medication 10 MG: at 21:07

## 2022-09-01 RX ADMIN — POTASSIUM CHLORIDE 40 MEQ: 1500 TABLET, EXTENDED RELEASE ORAL at 08:03

## 2022-09-01 RX ADMIN — SODIUM CHLORIDE, PRESERVATIVE FREE 10 ML: 5 INJECTION INTRAVENOUS at 08:05

## 2022-09-01 RX ADMIN — DOXEPIN HYDROCHLORIDE 50 MG: 25 CAPSULE ORAL at 21:22

## 2022-09-01 RX ADMIN — DOCUSATE SODIUM 100 MG: 100 CAPSULE, LIQUID FILLED ORAL at 08:03

## 2022-09-01 RX ADMIN — POLYETHYLENE GLYCOL 3350 17 G: 17 POWDER, FOR SOLUTION ORAL at 08:03

## 2022-09-01 RX ADMIN — NYSTATIN 500000 UNITS: 100000 SUSPENSION ORAL at 12:38

## 2022-09-01 RX ADMIN — ASPIRIN 81 MG 81 MG: 81 TABLET ORAL at 08:03

## 2022-09-01 RX ADMIN — SENNOSIDES AND DOCUSATE SODIUM 1 TABLET: 50; 8.6 TABLET ORAL at 08:03

## 2022-09-01 RX ADMIN — BUPROPION HYDROCHLORIDE 150 MG: 150 TABLET, EXTENDED RELEASE ORAL at 08:02

## 2022-09-01 RX ADMIN — ROSUVASTATIN CALCIUM 40 MG: 20 TABLET, FILM COATED ORAL at 21:08

## 2022-09-01 RX ADMIN — SENNOSIDES AND DOCUSATE SODIUM 1 TABLET: 50; 8.6 TABLET ORAL at 21:08

## 2022-09-01 RX ADMIN — HEPARIN SODIUM 5000 UNITS: 5000 INJECTION INTRAVENOUS; SUBCUTANEOUS at 15:00

## 2022-09-01 RX ADMIN — VANCOMYCIN HYDROCHLORIDE: 1 INJECTION, POWDER, LYOPHILIZED, FOR SOLUTION INTRAVENOUS at 11:45

## 2022-09-01 RX ADMIN — BUPROPION HYDROCHLORIDE 150 MG: 150 TABLET, EXTENDED RELEASE ORAL at 21:08

## 2022-09-01 RX ADMIN — SEVELAMER CARBONATE 1600 MG: 800 TABLET, FILM COATED ORAL at 08:03

## 2022-09-01 RX ADMIN — DOCUSATE SODIUM 100 MG: 100 CAPSULE, LIQUID FILLED ORAL at 21:08

## 2022-09-01 RX ADMIN — NYSTATIN 500000 UNITS: 100000 SUSPENSION ORAL at 17:20

## 2022-09-01 RX ADMIN — NYSTATIN 500000 UNITS: 100000 SUSPENSION ORAL at 21:08

## 2022-09-01 RX ADMIN — GABAPENTIN 300 MG: 600 TABLET, FILM COATED ORAL at 21:08

## 2022-09-01 RX ADMIN — CALCITRIOL CAPSULES 0.25 MCG 0.25 MCG: 0.25 CAPSULE ORAL at 08:03

## 2022-09-01 RX ADMIN — NEPHROCAP 1 MG: 1 CAP ORAL at 08:03

## 2022-09-01 RX ADMIN — NYSTATIN 500000 UNITS: 100000 SUSPENSION ORAL at 08:02

## 2022-09-01 ASSESSMENT — PAIN DESCRIPTION - DESCRIPTORS: DESCRIPTORS: CRAMPING

## 2022-09-01 ASSESSMENT — PAIN DESCRIPTION - LOCATION: LOCATION: ABDOMEN

## 2022-09-01 ASSESSMENT — PAIN SCALES - GENERAL: PAINLEVEL_OUTOF10: 7

## 2022-09-01 NOTE — ADT AUTH CERT
Chest Pain - Care Day 12 (9/1/2022) by Giovanna Balderas RN       Review Status Review Entered   Completed 9/1/2022 17:01      Criteria Review      Care Day: 12 Care Date: 9/1/2022 Level of Care: Telemetry    Guideline Day 2    Clinical Status    (X) * Hemodynamic stability    9/1/2022 5:01 PM EDT by Milan Issa      144/74  91  No Ischemia noted    ( ) * Acute coronary syndrome ruled out    ( ) * Pain absent or managed    ( ) * Dangerous arrhythmia absent    ( ) * No identification of etiology of pain requiring inpatient care    ( ) * Discharge plans and education understood    Activity    (X) * Ambulatory or acceptable for next level of care    9/1/2022 5:01 PM EDT by Milan Issa      Up as rommel    Routes    (X) * Oral hydration    9/1/2022 5:01 PM EDT by Milan Issa      reg diet    (X) * Oral medications or regimen acceptable for next level of care    9/1/2022 5:01 PM EDT by Milan Issa      ASA 81 mg daily  Renvela 800mg 2tbs 3xs day  Neurontin 300mg Nightly    (X) * Oral diet or acceptable for next level of care    9/1/2022 5:01 PM EDT by Milan Issa      reg diet    Medications    (X) Possible aspirin or cardiac medications    * Milestone   Additional Notes   DATE: 9/1         PERTINENT UPDATES:   Unable to go today as will not get PD until tonight- otherwise will miss PD for today. VITALS:   Temp 97.9 °F (36.6 °C) (Oral)   Resp 18  SpO2 98% RA      ABNL/PERTINENT LABS/RADIOLOGY/DIAGNOSTIC STUDIES:   *   K 3.3*   CL 95*   CO2 28   BUN 29*   CREATININE 7.7*            PHYSICAL EXAM:         MD CONSULTS/ASSESSMENT AND PLAN:   Nephrology   Plan:   Patient seen at bedside   Look comfortable   No pain   BP stable. On room air. No SOB. K mild low   Na mild low           Continue nocturnal   PD with Cylcer 4 cycles, 9 hours, 2 liter dwell, 1.5 % dextrose daily at night and after that last fill bag with antibiotics as below.    2 liter bag 1.5 % with Cefepime 1 gram daily till 9/2/22    Intraperitoneal Vancomycin dose 1500 mg today ( last dose)    Bacterial fluid cultures neg. Follow fungal, AFB cultures   Nystatin swish and swallow to prevent fungal peritonitis. Continue till one week after finishes antibiotics. Continue stool softeners for regular bowel movement   Topical Gentamicin   Continue Sevelamer   Replete potassium   Epogen given on 8/30 for anemia. Avoid nephrotoxins   Monitor input, output and weight   Monitor labs and vitals closely   Renally dose all medications   Need retraining at PD clinic     following for discharge needs. Awaiting discharge to rehab   ============   IM          Peritonitis ([address]      Complication of peritoneal dialysis      Peritonitis associated with peritoneal dialysis [facility]) for esrd        On PD- needs daily cycling- nxt cycle tonight and SNF cannot do the PD until tomorrow.    Will under PD tonight and dc to snf in am.        =======================      MEDICATIONS:   heparin (porcine) injection  5,000 Units,   SubCUTAneous,   3 times per day                    CM ASSESSMENT OR NOTES:   DC will need to be delayed until tomorrow morning due to pt needing to have PD cycling tonight, and not being able to get this st the SNF until tomorrow              Chest Pain - Care Day 10 (8/30/2022) by John Molina RN       Review Status Review Entered   Completed 9/1/2022 16:51      Criteria Review      Care Day: 10 Care Date: 8/30/2022 Level of Care: Telemetry    Guideline Day 2    Clinical Status    (X) * Hemodynamic stability    9/1/2022 4:51 PM EDT by Jazmyne Weathers      110/71  88  No Ischemia noted    ( ) * Acute coronary syndrome ruled out    (X) * Pain absent or managed    9/1/2022 4:51 PM EDT by Jazmyne Weathers      rOXICODONE 10MG tAB x1    ( ) * Dangerous arrhythmia absent    ( ) * No identification of etiology of pain requiring inpatient care    ( ) * Discharge plans and education understood    Activity    (X) * Ambulatory or acceptable for next level of care    9/1/2022 4:51 PM EDT by Haroldine Mask      up as rommel    Routes    (X) * Oral hydration    9/1/2022 4:51 PM EDT by Haroldine Mask      REG DIET    (X) * Oral medications or regimen acceptable for next level of care    9/1/2022 4:51 PM EDT by Haroldine Mask      ASA 81 mg daily  Renvela 800mg 2tbs 3xs day  Neurontin 300mg Nightly    (X) * Oral diet or acceptable for next level of care    9/1/2022 4:51 PM EDT by Haroldine Mask      ADULT DIET; Regular    Medications    (X) Possible aspirin or cardiac medications    9/1/2022 4:51 PM EDT by Kelley Escobar      ASA 81mg Daily    ( ) Discontinue IV medications    9/1/2022 4:51 PM EDT by Patricio Spencer lo-josh 1.5% 2,000 mL with cefepime 1,000 mg solution  Freq: DAILY Route: IP    * Milestone   Additional Notes   DATE: 8/30         PERTINENT UPDATES:   Dialysis   CCPD Order               Exchanges: 4               Exchange Volume: 2000 ml               Total Time: 9 hrs               Dextrose: 1.5%               Last Fill: 0 ml               Total Volume: 8000 ml      VITALS:   /71   Pulse 88   Temp 98.7 °F (37.1 °C) (Oral)   Resp 16 SpO2 100% RA      ABNL/PERTINENT LABS/RADIOLOGY/DIAGNOSTIC STUDIES:   *   K 3.8   CL 95*   CO2 28   BUN 36*   CREATININE 7.9*            PHYSICAL EXAM:         MD CONSULTS/ASSESSMENT AND PLAN:   · Atypical chest pain [R07.89]     Was on abx for peritonitis- completed. Now w/cefepime irrigation- for 6 hours. DVT Prophylaxis: Heparin   Diet: ADULT DIET; Regular   Code Status: Full Code       PT/OT Eval Status: Ongoing       Dispo - Awaiting placement   =========   Nephrology       Plan:   Patient seen at bedside with nurse. Look comfortable   BP was low which improved after IVF bolus. On room air. No SOB. Lytes stable on last labs.     Hb was low 8.9               Continue nocturnal Cylcer 4 cycles, 9 hours, 2 liter dwell, 1.5 % dextrose daily at night and after that last fill bag with antibiotics as below. 2 liter bag 1.5 % with Cefepime 1 gram daily till 9/2/22    Next Intraperitoneal Vancomycin dose 1500 mg on 9/1/22 x 1    Bacterial fluid cultures neg. Follow fungal culture   Nystatin swish and swallow to prevent fungal peritonitis. Continue till one week after finishes antibiotics. Continue stool softeners for regular bowel movement   Topical Gentamicin   Increased Sevelamer dose. D/C Metoprolol as BP was low. Agree with IVF bolus as needed. Monitor BP closely. Epogen for anemia. Avoid nephrotoxins   Monitor input, output and weight   Monitor labs and vitals closely   Renally dose all medications   Need retraining at PD clinic     following for discharge needs.    Awaiting discharge to rehab             MEDICATIONS:      heparin (porcine) injection  5,000 Units,   SubCUTAneous,   3 times per day                    PT/OT/SLP/CM ASSESSMENT OR NOTES:   PT   recommended that the patient have 3-5 sessions per week of Physical Therapy at d/c to increase the patient's independence      CM   Working on SNF placement

## 2022-09-01 NOTE — DIALYSIS
Treatment time: 12 Hours 46 minutes  Net UF: 84 ml      Treatment completed without complications or complaints from patient. Effluent has minimal fibrin and lines taped to patient per protocol. Patient resting comfortably with VSS upon exiting room. Copy of dialysis treatment record placed in chart, to be scanned into EMR and report given to Mir Reeder RN.

## 2022-09-01 NOTE — CARE COORDINATION
ADDENDUM  DC will need to be delayed until tomorrow morning due to pt needing to have PD cycling tonight, and not being able to get this st the SNF until tomorrow. CM rescheduled transport from today to tomorrow at via [facility]. CM updated [facility] at [place], pt's RN and son, Abilio Martinez. Transport form is in pt's chart. Case Management Assessment            Discharge Note                    Date / Time of Note: 9/1/2022 9:08 AM                  Discharge Note Completed by: CLAUDIO Nava, ZOHAIBW    Patient Name: Art Dominguez   YOB: 1965  Diagnosis: Chest pain [R07.9]  NSTEMI (non-ST elevated myocardial infarction) [facility]) [I21.4]   Date / Time: 8/20/2022 10:12 PM    Current PCP: Tessa Carrel, DO, DO  Clinic patient: No    Hospitalization in the last 30 days:  Yes    Advance Directives:  Code Status: Full Code  [place] DNR form completed and on chart: Yes    Financial:  Payor: Sun Woo / Plan: Richard Trujillo / Product Type: *No Product type* /      Pharmacy:    [facility] [id number] - [address] [phone] - F [phone]  [address] [place] [address]  Phone: [phone] Fax: [phone]    [facility] [id number] [place] [id number]  Phone: [phone] Fax: [phone]    [facility] [id number] - [place], [place] - [address] [facility] [phone] - F [phone]  [address] [facility] [address]  Phone: [phone] Fax: [phone]    [facility] [address] [place], [place] - Abhijeet Yanes [phone] Gladys Nova [phone]  [address] [place] [address]  Phone: [phone] Fax: [phone]      Assistance purchasing medications?: Potential Assistance Purchasing Medications: No  Assistance provided by Case Management: None at this time    Does patient want to participate in local refill/ meds to beds program?: Yes    Meds To Beds General Rules:  1. Can ONLY be done Monday- Friday between 8:30am-5pm  2. Prescription(s) must be in pharmacy by 3pm to be filled same day  3. Copy of patient's insurance/ prescription drug card and patient face sheet must be sent along with the prescription(s)  4. Cost of Rx cannot be added to hospital bill. If financial assistance is needed, please contact unit  or ;  or  CANNOT provide pharmacy voucher for patients co-pays  5. Patients can then  the prescription on their way out of the hospital at discharge, or pharmacy can deliver to the bedside if staff is available. (payment due at time of pick-up or delivery - cash, check, or card accepted)     Able to afford home medications/ co-pay costs: Yes    ADLS:  Current PT AM-PAC Score: 21 /24  Current OT AM-PAC Score: 16 /24      DISCHARGE Disposition: [facility] (SNF): [facility] Phone: [phone] Fax: [phone]    LOC at discharge: Skilled  MARGARITA Completed: Yes    Notification completed in HENS/PAS?:  Yes : CM has completed HENS online through secure website for SNF admission at .    Document ID #: [id number]    IMM Completed:   Not Indicated    Transportation:  Transportation PLAN for discharge: EMS transportation   Mode of Transport: Ambulance stretcher - BLS  Reason for medical transport: Bed confined: Meets the following criteria 1) unable to get out of bed without assistance or ambulate, 2) unable to safely sit up in a wheelchair, 3) unable to maintain erect seating position in a chair for time needed for transport  Name of [address] [place] [address], [address]:  [place] [facility]   Phone: [phone]  Time of Transport: 4:00pm    Transport form completed: Yes    Home Oxygen and Respiratory Equipment:  Oxygen needed at discharge?: No    Dialysis:  Dialysis patient: Yes    Dialysis Center:  [facility]  Address: [address]   Phone: [phone]    Referrals made at [facility] for outpatient continued care:  Not Applicable    Additional CM Notes:  Pt will DC to [facility] today. Pt will received PD today in hospital, and Mauricio Reyna will perform PD training [facility] tomorrow. CM notified pt's RN, Miguel Ruiz with [facility] [phone] and son, Jerry Aguirre [phone] of transport time. No other needs at this time. The Plan for Transition of Care is related to the following treatment goals of Chest pain [R07.9]  NSTEMI (non-ST elevated myocardial infarction) [facility]) [I21.4]    The Patient and/or patient representative [place] and her family were provided with a choice of provider and agrees with the discharge plan Yes    Freedom of choice list was provided with basic dialogue that supports the patient's individualized plan of care/goals and shares the quality data associated with the providers.  Yes    Care Transitions patient: No    CLAUDIO Cordero, [facility] [employer]  Case Management Department  Ph: [phone]  Fax: [phone]

## 2022-09-01 NOTE — PROGRESS NOTES
Progress Note  Admit Date: 8/20/2022         Interval History:   Unable to go today as will not get PD until tonight- otherwise will miss PD for today. Pt herself denies any complaints. Scheduled Medications:    sennosides-docusate sodium  1 tablet Oral BID    sevelamer  1,600 mg Oral TID WC    polyethylene glycol  17 g Oral BID    b complex-C-folic acid  1 capsule Oral Daily    benztropine  1 mg Oral Nightly    buPROPion  150 mg Oral BID    calcitRIOL  0.25 mcg Oral Daily    docusate sodium  100 mg Oral BID    doxepin  50 mg Oral Nightly    gabapentin  300 mg Oral Nightly    QUEtiapine  200 mg Oral Nightly    rosuvastatin  40 mg Oral Nightly    sodium chloride flush  5-40 mL IntraVENous 2 times per day    aspirin  81 mg Oral Daily    heparin (porcine)  5,000 Units SubCUTAneous 3 times per day    dianeal lo-josh 1.5%  8,000 mL IntraPERitoneal Daily    nystatin  5 mL Oral 4x Daily    melatonin  10 mg Oral Nightly    lidocaine  1 patch TransDERmal Daily      PRN Medications: oxyCODONE, hydrALAZINE, hydrOXYzine HCl, sodium chloride flush, sodium chloride, ondansetron **OR** ondansetron, acetaminophen **OR** acetaminophen, polyethylene glycol  Diet: ADULT DIET; Regular    Continuous Infusions:   sodium chloride 10 mL/hr at 08/24/22 0816       PHYSICAL EXAM:  /70   Pulse 85   Temp 97.9 °F (36.6 °C) (Oral)   Resp 18   Ht 5' 7.99\" (1.727 m)   Wt 150 lb 2.1 oz (68.1 kg)   SpO2 98%   BMI 22.83 kg/m²   No results for input(s): POCGLU in the last 72 hours.     Intake/Output Summary (Last 24 hours) at 9/1/2022 1409  Last data filed at 9/1/2022 1140  Gross per 24 hour   Intake 2340 ml   Output --   Net 2340 ml       General appearance: alert, appears stated age, and cooperative  Head: Normocephalic, without obvious abnormality, atraumatic  Lungs: clear to auscultation bilaterally  Extremities: no edema, redness or tenderness in the calves or thighs  Neurologic: Mental status: Alert, oriented, thought content appropriate  Motor:grossly normal    LABS:  No results for input(s): WBC, HGB, HCT, PLT in the last 72 hours. Recent Labs     08/30/22  1053 09/01/22  0652   * 133*   K 3.8 3.3*   CL 95* 95*   CO2 28 28   BUN 36* 29*   CREATININE 7.9* 7.7*   GLUCOSE 114* 95     Assessment & Plan:         Peritonitis (HCC)     Complication of peritoneal dialysis     Peritonitis associated with peritoneal dialysis ([facility] for esrd      On PD- needs daily cycling- nxt cycle tonight and SNF cannot do the PD until tomorrow. Will under PD tonight and dc to snf in am.    The patient was informed of the results of any tests, a time was given to answer questions, a plan was proposed and they agreed with plan.     Disposition: Inpatient- dc hopefully tomorrow    Full Code      Delores Bernardo MD

## 2022-09-01 NOTE — PROGRESS NOTES
Nephrology Consult Note        [facility]    [email or web address]       Phone: [phone]                                                  9/1/2022 7:33 AM     Patient: Aleyda Chavez [id number]  [id number]  Date of Admit: 8/20/2022 LOS: 11 days  Referring physician:    Plan:  Patient seen at bedside  Look comfortable  No pain  BP stable. On room air. No SOB. K mild low  Na mild low      Continue nocturnal  PD with Cylcer 4 cycles, 9 hours, 2 liter dwell, 1.5 % dextrose daily at night and after that last fill bag with antibiotics as below. 2 liter bag 1.5 % with Cefepime 1 gram daily till 9/2/22   Intraperitoneal Vancomycin dose 1500 mg today ( last dose)   Bacterial fluid cultures neg. Follow fungal, AFB cultures  Nystatin swish and swallow to prevent fungal peritonitis. Continue till one week after finishes antibiotics. Continue stool softeners for regular bowel movement  Topical Gentamicin  Continue Sevelamer  Replete potassium  Epogen given on 8/30 for anemia. Avoid nephrotoxins  Monitor input, output and weight  Monitor labs and vitals closely  Renally dose all medications  Need retraining at PD clinic    following for discharge needs. Awaiting discharge to rehab      D/W patient      Thank you for allowing us to participate in this patient's care    In case of any question please call us at our 24 hour answering service [phone] or from 7 AM to 5 PM via [employer] or cell number    Jody Ornelas MD  [facility] Nephrology  [address]  Fax: [phone]  Office: [phone]       Assessment & Plan   Pain medicine seeking behavior  Patient always look comfortable but whenever ask about any pain patient says\"yes\"     Multiple Psychiatric problems.    Anxiety, bipolar disorder, depression      Renal function:  ESRD  On PD  Continue PD      Peritonitis:   Antibiotics per ID  Patient non compliant      Electrolytes:  Lab Results   Component confusion, focal weakness, seizure. Psych: Anxiety, agitation, depression. Reviewed all 12 systems, negative except as above. Past Medical History     Past Medical History:   Diagnosis Date    JONATHAN (acute kidney [facility]) ([facility] 9/3/2014    Anxiety     Asthma     Bipolar disorder ([facility]     Bronchitis     Chronic back pain     Depression     Diabetes mellitus ([facility]     DM II (diabetes mellitus, type II), controlled ([facility] 9/3/2014    Hypertension          Past Surgical History     Past Surgical History:   Procedure Laterality Date    ENDOMETRIAL ABLATION      INNER EAR SURGERY      TUBAL LIGATION           Family History     Family History   Problem Relation Age of Onset    Diabetes Mother     Cancer Mother     Cancer Maternal Grandmother     Heart Disease Maternal Grandfather          Social History     Social History     Tobacco Use    Smoking status: Some Days     Packs/day: 0.05     Years: 15.00     Pack years: 0.75     Types: Cigarettes    Smokeless tobacco: Never   Substance Use Topics    Alcohol use: No          Past medical, family, and social histories were reviewed as previously documented. Updates were made as necessary.       Inpatient Medications and Allergies       Scheduled Meds:   custom dialysis builder   IntraPERitoneal Once    sennosides-docusate sodium  1 tablet Oral BID    sevelamer  1,600 mg Oral TID WC    polyethylene glycol  17 g Oral BID    b complex-C-folic acid  1 capsule Oral Daily    benztropine  1 mg Oral Nightly    buPROPion  150 mg Oral BID    calcitRIOL  0.25 mcg Oral Daily    docusate sodium  100 mg Oral BID    doxepin  50 mg Oral Nightly    gabapentin  300 mg Oral Nightly    QUEtiapine  200 mg Oral Nightly    rosuvastatin  40 mg Oral Nightly    sodium chloride flush  5-40 mL IntraVENous 2 times per day    aspirin  81 mg Oral Daily    heparin (porcine)  5,000 Units SubCUTAneous 3 times per day    dianeal lo-josh 1.5%  8,000 mL IntraPERitoneal Daily    nystatin  5 mL Oral 4x Daily melatonin  10 mg Oral Nightly    lidocaine  1 patch TransDERmal Daily       Allergies: Allergies   Allergen Reactions    Latex      Added based on information entered during case entry, please review and add reactions, type, and severity as needed    Peanuts [Peanut Oil] Hives and Swelling     Swelling of tongue/face    Tomato Hives and Swelling     Swelling of tongue/face    Shellfish-Derived Products          Vital Signs     Vitals:    09/01/22 0621   BP: 117/74   Pulse: 80   Resp: 18   Temp: 97.3 °F (36.3 °C)   SpO2: 93%         Intake/Output Summary (Last 24 hours) at 9/1/2022 9851  Last data filed at 8/31/2022 1844  Gross per 24 hour   Intake 4360 ml   Output --   Net 4360 ml           Physical Exam     General appearance: NAD  Head: Normocephalic, without obvious abnormality, atraumatic   Mouth: Moist mucous membrane  Neck: Supple. Lungs: Good air entry bilaterally. No respiratory distress on RA  Heart: S1, S2.  Abdomen: soft, non tender, non-distended  Extremities: No leg edema, warm to touch   Skin: No concerning rashes noted  Psych: good eye contact, normal affect  Neuro: AAO x 3. Non focal grossly.        Laboratory Data     Please see above     Diagnostic Studies   Pertinent images reviewed

## 2022-09-02 VITALS
SYSTOLIC BLOOD PRESSURE: 134 MMHG | RESPIRATION RATE: 18 BRPM | OXYGEN SATURATION: 99 % | TEMPERATURE: 97.8 F | DIASTOLIC BLOOD PRESSURE: 97 MMHG | BODY MASS INDEX: 22.75 KG/M2 | WEIGHT: 150.13 LBS | HEART RATE: 86 BPM | HEIGHT: 68 IN

## 2022-09-02 PROCEDURE — 6370000000 HC RX 637 (ALT 250 FOR IP): Performed by: STUDENT IN AN ORGANIZED HEALTH CARE EDUCATION/TRAINING PROGRAM

## 2022-09-02 PROCEDURE — 6360000002 HC RX W HCPCS: Performed by: STUDENT IN AN ORGANIZED HEALTH CARE EDUCATION/TRAINING PROGRAM

## 2022-09-02 PROCEDURE — 97535 SELF CARE MNGMENT TRAINING: CPT

## 2022-09-02 PROCEDURE — 97530 THERAPEUTIC ACTIVITIES: CPT

## 2022-09-02 PROCEDURE — 6370000000 HC RX 637 (ALT 250 FOR IP): Performed by: INTERNAL MEDICINE

## 2022-09-02 PROCEDURE — 6360000002 HC RX W HCPCS: Performed by: INTERNAL MEDICINE

## 2022-09-02 PROCEDURE — A4722 DIALYS SOL FLD VOL > 1999CC: HCPCS | Performed by: STUDENT IN AN ORGANIZED HEALTH CARE EDUCATION/TRAINING PROGRAM

## 2022-09-02 PROCEDURE — 2580000003 HC RX 258: Performed by: INTERNAL MEDICINE

## 2022-09-02 RX ADMIN — POLYETHYLENE GLYCOL 3350 17 G: 17 POWDER, FOR SOLUTION ORAL at 08:42

## 2022-09-02 RX ADMIN — OXYCODONE 10 MG: 5 TABLET ORAL at 08:42

## 2022-09-02 RX ADMIN — CALCITRIOL CAPSULES 0.25 MCG 0.25 MCG: 0.25 CAPSULE ORAL at 08:42

## 2022-09-02 RX ADMIN — BUPROPION HYDROCHLORIDE 150 MG: 150 TABLET, EXTENDED RELEASE ORAL at 08:42

## 2022-09-02 RX ADMIN — SENNOSIDES AND DOCUSATE SODIUM 1 TABLET: 50; 8.6 TABLET ORAL at 08:42

## 2022-09-02 RX ADMIN — SEVELAMER CARBONATE 1600 MG: 800 TABLET, FILM COATED ORAL at 08:42

## 2022-09-02 RX ADMIN — ASPIRIN 81 MG 81 MG: 81 TABLET ORAL at 08:42

## 2022-09-02 RX ADMIN — SODIUM CHLORIDE, PRESERVATIVE FREE 10 ML: 5 INJECTION INTRAVENOUS at 08:51

## 2022-09-02 RX ADMIN — HYDROXYZINE HYDROCHLORIDE 25 MG: 25 TABLET ORAL at 08:42

## 2022-09-02 RX ADMIN — CEFEPIME: 1 INJECTION, POWDER, FOR SOLUTION INTRAMUSCULAR; INTRAVENOUS at 12:53

## 2022-09-02 RX ADMIN — NEPHROCAP 1 MG: 1 CAP ORAL at 08:42

## 2022-09-02 RX ADMIN — NYSTATIN 500000 UNITS: 100000 SUSPENSION ORAL at 08:42

## 2022-09-02 RX ADMIN — DOCUSATE SODIUM 100 MG: 100 CAPSULE, LIQUID FILLED ORAL at 08:42

## 2022-09-02 ASSESSMENT — PAIN DESCRIPTION - DESCRIPTORS: DESCRIPTORS: ACHING

## 2022-09-02 ASSESSMENT — PAIN DESCRIPTION - ORIENTATION: ORIENTATION: MID

## 2022-09-02 ASSESSMENT — PAIN SCALES - GENERAL
PAINLEVEL_OUTOF10: 6
PAINLEVEL_OUTOF10: 0

## 2022-09-02 ASSESSMENT — PAIN DESCRIPTION - LOCATION: LOCATION: ABDOMEN;BACK;LEG

## 2022-09-02 NOTE — DIALYSIS
Per Shannon Krause RN  Treatment time: 9 Hours 10 minutes  Net UF: 1894 ml  Dwell Time: 8 minutes {Gained;   Treatment completed without complications or complaints from patient. Effluent clear and lines taped to patient per protocol. Patient resting comfortably with VSS upon exiting room. Copy of dialysis treatment record placed in chart, to be scanned into EMR and report given to Nicole Blanca RN.

## 2022-09-02 NOTE — PROGRESS NOTES
Pt d/c'd 9/2/22. IV access removed with no complications. Reviewed d/c instructions, home meds, and f/u information utilizing teach-back method. Patient verbalized understanding. Patient discharged in ambulance and left with all documented belongings. Report called to MILLER Hogue at .

## 2022-09-02 NOTE — PROGRESS NOTES
Nephrology Consult Note        [facility]    [email or web address]       Phone: [phone]                                                  9/2/2022 9:15 AM     Patient: Albino Snellen [id number]  [id number]  Date of Admit: 8/20/2022 LOS: 12 days  Referring physician:    Plan:  Patient seen at bedside  Look comfortable  No pain  BP stable. On room air. No SOB. K mild low  Na mild low      Continue nocturnal  PD with Cylcer 4 cycles, 9 hours, 2 liter dwell, 1.5 % dextrose daily at night and after that last fill bag with antibiotics as below. 2 liter bag 1.5 % with Cefepime 1 gram daily till 9/2/22   Bacterial fluid cultures neg. Follow fungal, AFB cultures  Nystatin swish and swallow to prevent fungal peritonitis. Continue till one week after finishes antibiotics. Continue stool softeners for regular bowel movement  Topical Gentamicin  Continue Sevelamer  Labs pending  Continue retacrit   Avoid nephrotoxins  Monitor input, output and weight  Monitor labs and vitals closely  Renally dose all medications  Need retraining at PD clinic    following for discharge needs. Awaiting discharge to rehab      D/W patient      Thank you for allowing us to participate in this patient's care    In case of any question please call us at our 24 hour answering service [phone] or from 7 AM to 5 PM via [employer] or cell number    Maricarmen Avina MD  [facility] Nephrology  [address]va [address] [facility] [address], [address]  Fax: [phone]  Office: [phone]       Assessment & Plan   Pain medicine seeking behavior  Patient always look comfortable but whenever ask about any pain patient says\"yes\"     Multiple Psychiatric problems.    Anxiety, bipolar disorder, depression      Renal function:  ESRD  On PD  Continue PD      Peritonitis:   Antibiotics per ID  Patient non compliant      Electrolytes:  Lab Results   Component Value Date    CREATININE 7.7 (HH) 09/01/2022    BUN 29 (H) 09/01/2022     depression. Reviewed all 12 systems, negative except as above. Past Medical History     Past Medical History:   Diagnosis Date    JONATHAN (acute kidney injury) ([facility] 9/3/2014    Anxiety     Asthma     Bipolar disorder ([facility]     Bronchitis     Chronic back pain     Depression     Diabetes mellitus ([facility]     DM II (diabetes mellitus, type II), controlled ([facility] 9/3/2014    Hypertension          Past Surgical History     Past Surgical History:   Procedure Laterality Date    ENDOMETRIAL ABLATION      INNER EAR SURGERY      TUBAL LIGATION           Family History     Family History   Problem Relation Age of Onset    Diabetes Mother     Cancer Mother     Cancer Maternal Grandmother     Heart Disease Maternal Grandfather          Social History     Social History     Tobacco Use    Smoking status: Some Days     Packs/day: 0.05     Years: 15.00     Pack years: 0.75     Types: Cigarettes    Smokeless tobacco: Never   Substance Use Topics    Alcohol use: No          Past medical, family, and social histories were reviewed as previously documented. Updates were made as necessary.       Inpatient Medications and Allergies       Scheduled Meds:   custom dialysis builder   IntraPERitoneal Once    sennosides-docusate sodium  1 tablet Oral BID    sevelamer  1,600 mg Oral TID WC    polyethylene glycol  17 g Oral BID    b complex-C-folic acid  1 capsule Oral Daily    benztropine  1 mg Oral Nightly    buPROPion  150 mg Oral BID    calcitRIOL  0.25 mcg Oral Daily    docusate sodium  100 mg Oral BID    doxepin  50 mg Oral Nightly    gabapentin  300 mg Oral Nightly    QUEtiapine  200 mg Oral Nightly    rosuvastatin  40 mg Oral Nightly    sodium chloride flush  5-40 mL IntraVENous 2 times per day    aspirin  81 mg Oral Daily    heparin (porcine)  5,000 Units SubCUTAneous 3 times per day    dianeal lo-josh 1.5%  8,000 mL IntraPERitoneal Daily    nystatin  5 mL Oral 4x Daily    melatonin  10 mg Oral Nightly    lidocaine  1 patch TransDERmal Daily       Allergies: Allergies   Allergen Reactions    Latex      Added based on information entered during case entry, please review and add reactions, type, and severity as needed    Peanuts [Peanut Oil] Hives and Swelling     Swelling of tongue/face    Tomato Hives and Swelling     Swelling of tongue/face    Shellfish-Derived Products          Vital Signs     Vitals:    09/02/22 0835   BP: (!) 146/93   Pulse: 98   Resp: 18   Temp: 97.9 °F (36.6 °C)   SpO2: 100%         Intake/Output Summary (Last 24 hours) at 9/2/2022 0915  Last data filed at 9/1/2022 1140  Gross per 24 hour   Intake 100 ml   Output --   Net 100 ml           Physical Exam     General appearance: NAD  Head: Normocephalic, without obvious abnormality, atraumatic   Mouth: Moist mucous membrane  Neck: Supple. Lungs: Good air entry bilaterally. No respiratory distress on RA  Heart: S1, S2.  Abdomen: soft, non tender, non-distended  Extremities: No leg edema, warm to touch   Skin: No concerning rashes noted  Psych: good eye contact, normal affect  Neuro: AAO x 3. Non focal grossly.        Laboratory Data     Please see above     Diagnostic Studies   Pertinent images reviewed

## 2022-09-02 NOTE — PROGRESS NOTES
Call placed to [facility] at [phone] to confirm PD training and cycling capability once transferred to new facility prior to instilling dialysate as per order.  Per Lois Delong in admissions facility confirmed they are prepared for cycling at [facility] and it is safe to instill dialysate prior to discharge from hospital.

## 2022-09-02 NOTE — PLAN OF CARE
Problem: Anxiety  Goal: Will report anxiety at manageable levels  Description: INTERVENTIONS:  1. Administer medication as ordered  2. Teach and rehearse alternative coping skills  3. Provide emotional support with 1:1 interaction with staff  Outcome: Progressing     Problem: Infection - Adult  Goal: Absence of infection during hospitalization  Outcome: Progressing     Problem: Safety - Adult  Goal: Free from fall injury  Outcome: Progressing     Problem: Skin/Tissue Integrity  Goal: Absence of new skin breakdown  Description: 1. Monitor for areas of redness and/or skin breakdown  2. Assess vascular access sites hourly  3. Every 4-6 hours minimum:  Change oxygen saturation probe site  4. Every 4-6 hours:  If on nasal continuous positive airway pressure, respiratory therapy assess nares and determine need for appliance change or resting period.   Outcome: Progressing     Problem: ABCDS Injury Assessment  Goal: Absence of physical injury  Recent Flowsheet Documentation  Taken 9/1/2022 9423 by Adam Cartagena RN  Absence of Physical Injury: Implement safety measures based on patient assessment

## 2022-09-02 NOTE — PROGRESS NOTES
Occupational Therapy  Occupational Therapy  Daily Treatment Note  Patient Name: Jaylon Fuller  MRN: [id number]    Assessment:   Pt met up in room attempting to get into shower. Pt is unsafe when up requiring heavy VC for safety awareness. Pt completing showering and dressing tasks with good endurance. Pt with requiring CGA to Min A during mobility with 2 lateral LOB. Pt would benefit from inpt OT. Continue OT per POC. Discharge Recommendations:     Jaylon Fuller scored a 17/24 on the AM-PAC ADL Inpatient form. Current research shows that an AM-PAC score of 17 or less is typically not associated with a discharge to the patient's home setting. Based on the patient's AM-PAC score and their current ADL deficits, it is recommended that the patient have 3-5 sessions per week of Occupational Therapy at d/c to increase the patient's independence. Please see assessment section for further patient specific details. If patient discharges prior to next session this note will serve as a discharge summary. Please see below for the latest assessment towards goals. Equipment Needs:  defer     Chart Reviewed: Yes       Other position/activity restrictions: up as tolerated     Additional Pertinent Hx: Admit 8/20 with Fatigue, Abdominal Pain, and Shoulder Pain; (+) NSTEMI; head CT: (-) acute; PMHx: JONATHAN, anxiety, asthma, bipolar, chronic back pain, DM, HTN         Treatment Diagnosis: Decreased activity tolerance, impaired ADLs and mobility    Subjective:   Pt met up in room attempting to get into shower. Pt agitated but becoming more calm throughout session. Pt able to follow commands and easily redirected.      Pain:  denies        Social/Functional History  Lives With: Alone  Type of Home: Apartment  Home Layout: One level  Home Access: Stairs to enter with rails  Entrance Stairs - Number of Steps: 3 + 3  Bathroom Shower/Tub: Tub/Shower unit  Bathroom Toilet: Standard  Bathroom Equipment: Shower chair  Home Equipment:  (no DME)  Has the patient had two or more falls in the past year or any fall with injury in the past year?: Yes (1 fall day of admit, 3 in the last month, ~20 since December)  Receives Help From: Other (comment) (has 2 sons in town but reports they do not help her even when asked)  ADL Assistance: Independent  Homemaking Assistance: Independent  Ambulation Assistance: Independent  Transfer Assistance: Independent  Active : No (reports has license but no vehicle, uses [employer] transportation for appts and grocery)  Additional Comments: Denies ever having any home health care (chart indicates she has been active with an agency before). Does her own PD at home. Prior Function  Receives Help From: Other (comment) (has 2 sons in town but reports they do not help her even when asked)  ADL Assistance: Independent  Homemaking Assistance: Independent  Ambulation Assistance: Independent  Transfer Assistance: Independent  Additional Comments: Denies ever having any home health care (chart indicates she has been active with an agency before). Does her own PD at home. Objective:    Cognition/Orientation:  Impaired    Bed mobility   Sit to Supine:  CGA with heavy Vcs for safety. Pt laying back quickly bumping head on GB. Pt stating \"That didn't hurt\"  Scooting: CGA scooting back on EOB and laterally on shower seat    Functional Mobility   Sit to Stand: CGA to Min A from EOB and shower seat with Vcs for safe positioning. Stand to Sit: CGA Vcs for eccentric control  Bed to Chair Transfer:  CGA to Min A with heavy Vcs for environmental awareness and safety  Other:  Functional mobility to and from bathroom with no device and CGA to Min A with unsteady gait and two lateral LOB with Min A for recovery. Pt requiring Vcs for safety throughout session    ADLs   Bathing: SB to CGA with water and soap management seated on shower seat. LE washing completed seated and in stance with heavy Vcs for safety.    UB dressing: SBA doffing gown and bra   LB dressing: Min A donning briefs with Vcs for safety pt leaning to floor and losing balance seated      Activity Tolerance:  Pt with good endurance for tasks    Patient Education:   General safety and self care - pt will need reinforcement      Safety Devices in Place:  Pt left supine in bed in care of RN. Bed alarm on and call light in reach. Therapy Time:   Individual Concurrent Group Co-treatment   Time In  757         Time Out  850         Minutes  53           Timed Code Treatment Minutes: 53     Total Treatment Minutes: 53      Timed Code Treatment Minutes: Total Treatment Time:     Goals: (as determined and assessed by primary OT)   Short Term Goals  Time Frame for Short term goals: by D/C  Short Term Goal 1: Transfer to/from all common surfaces SBA - Not met  Short Term Goal 2: Maintain unsupported sitting balance spvn during functional tasks - Not met  Short Term Goal 3: Dress lower body SBA - Not met  Short Term Goal 4: Maintain static/dynamic standing balance spvn during ADLs - Not met         Plan:      Times per Week: 2-5x   Times per Day: Daily    If patient is discharged prior to next treatment, this note will serve as the discharge summary.       [address] [place] [address], Ne, YEN/L

## 2022-09-03 LAB — ANAEROBIC CULTURE: NORMAL

## 2022-09-06 ENCOUNTER — HOSPITAL ENCOUNTER (INPATIENT)
Age: 57
LOS: 3 days | Discharge: HOME HEALTH | DRG: 470 | End: 2022-09-09
Attending: STUDENT IN AN ORGANIZED HEALTH CARE EDUCATION/TRAINING PROGRAM | Admitting: INTERNAL MEDICINE
Payer: MEDICAID

## 2022-09-06 ENCOUNTER — APPOINTMENT (OUTPATIENT)
Dept: CT IMAGING | Age: 57
DRG: 470 | End: 2022-09-06
Payer: MEDICAID

## 2022-09-06 ENCOUNTER — APPOINTMENT (OUTPATIENT)
Dept: GENERAL RADIOLOGY | Age: 57
DRG: 470 | End: 2022-09-06
Payer: MEDICAID

## 2022-09-06 DIAGNOSIS — R41.82 ALTERED MENTAL STATUS, UNSPECIFIED ALTERED MENTAL STATUS TYPE: Primary | ICD-10-CM

## 2022-09-06 LAB
ALBUMIN SERPL-MCNC: 2.6 G/DL (ref 3.4–5)
ALP BLD-CCNC: 83 U/L (ref 40–129)
ALT SERPL-CCNC: 36 U/L (ref 10–40)
ANION GAP SERPL CALCULATED.3IONS-SCNC: 10 MMOL/L (ref 3–16)
AST SERPL-CCNC: 50 U/L (ref 15–37)
BACTERIA: ABNORMAL /HPF
BASOPHILS ABSOLUTE: 0.1 K/UL (ref 0–0.2)
BASOPHILS RELATIVE PERCENT: 0.7 %
BILIRUB SERPL-MCNC: <0.2 MG/DL (ref 0–1)
BILIRUBIN DIRECT: <0.2 MG/DL (ref 0–0.3)
BILIRUBIN URINE: NEGATIVE
BILIRUBIN, INDIRECT: ABNORMAL MG/DL (ref 0–1)
BLOOD, URINE: ABNORMAL
BUN BLDV-MCNC: 46 MG/DL (ref 7–20)
CALCIUM SERPL-MCNC: 9.4 MG/DL (ref 8.3–10.6)
CHLORIDE BLD-SCNC: 97 MMOL/L (ref 99–110)
CLARITY: CLEAR
CO2: 28 MMOL/L (ref 21–32)
COLOR: YELLOW
CREAT SERPL-MCNC: 11.8 MG/DL (ref 0.6–1.1)
CRYSTALS, UA: ABNORMAL /HPF
CRYSTALS, UA: ABNORMAL /HPF
EKG ATRIAL RATE: 79 BPM
EKG DIAGNOSIS: NORMAL
EKG P AXIS: 58 DEGREES
EKG P-R INTERVAL: 184 MS
EKG Q-T INTERVAL: 410 MS
EKG QRS DURATION: 94 MS
EKG QTC CALCULATION (BAZETT): 470 MS
EKG R AXIS: -17 DEGREES
EKG T AXIS: 37 DEGREES
EKG VENTRICULAR RATE: 79 BPM
EOSINOPHILS ABSOLUTE: 0.3 K/UL (ref 0–0.6)
EOSINOPHILS RELATIVE PERCENT: 3.4 %
EPITHELIAL CELLS, UA: ABNORMAL /HPF (ref 0–5)
GFR AFRICAN AMERICAN: 4
GFR NON-AFRICAN AMERICAN: 3
GLUCOSE BLD-MCNC: 66 MG/DL (ref 70–99)
GLUCOSE URINE: NEGATIVE MG/DL
HCT VFR BLD CALC: 24.4 % (ref 36–48)
HEMOGLOBIN: 7.9 G/DL (ref 12–16)
KETONES, URINE: NEGATIVE MG/DL
LEUKOCYTE ESTERASE, URINE: NEGATIVE
LYMPHOCYTES ABSOLUTE: 1.5 K/UL (ref 1–5.1)
LYMPHOCYTES RELATIVE PERCENT: 20 %
MAGNESIUM: 2.9 MG/DL (ref 1.8–2.4)
MCH RBC QN AUTO: 26.6 PG (ref 26–34)
MCHC RBC AUTO-ENTMCNC: 32.6 G/DL (ref 31–36)
MCV RBC AUTO: 81.8 FL (ref 80–100)
MICROSCOPIC EXAMINATION: YES
MONOCYTES ABSOLUTE: 0.6 K/UL (ref 0–1.3)
MONOCYTES RELATIVE PERCENT: 7.4 %
NEUTROPHILS ABSOLUTE: 5.3 K/UL (ref 1.7–7.7)
NEUTROPHILS RELATIVE PERCENT: 68.5 %
NITRITE, URINE: NEGATIVE
PDW BLD-RTO: 15.6 % (ref 12.4–15.4)
PH UA: 8 (ref 5–8)
PLATELET # BLD: 205 K/UL (ref 135–450)
PMV BLD AUTO: 6.4 FL (ref 5–10.5)
POTASSIUM SERPL-SCNC: 5.4 MMOL/L (ref 3.5–5.1)
PRO-BNP: ABNORMAL PG/ML (ref 0–124)
PROTEIN UA: 30 MG/DL
RBC # BLD: 2.98 M/UL (ref 4–5.2)
RBC UA: ABNORMAL /HPF (ref 0–4)
SODIUM BLD-SCNC: 135 MMOL/L (ref 136–145)
SPECIFIC GRAVITY UA: 1.01 (ref 1–1.03)
TOTAL PROTEIN: 4.9 G/DL (ref 6.4–8.2)
TROPONIN: 0.08 NG/ML
URINE REFLEX TO CULTURE: ABNORMAL
URINE TYPE: ABNORMAL
UROBILINOGEN, URINE: 0.2 E.U./DL
WBC # BLD: 7.7 K/UL (ref 4–11)
WBC UA: ABNORMAL /HPF (ref 0–5)

## 2022-09-06 PROCEDURE — 83735 ASSAY OF MAGNESIUM: CPT

## 2022-09-06 PROCEDURE — 83880 ASSAY OF NATRIURETIC PEPTIDE: CPT

## 2022-09-06 PROCEDURE — 84484 ASSAY OF TROPONIN QUANT: CPT

## 2022-09-06 PROCEDURE — 71045 X-RAY EXAM CHEST 1 VIEW: CPT

## 2022-09-06 PROCEDURE — 87040 BLOOD CULTURE FOR BACTERIA: CPT

## 2022-09-06 PROCEDURE — 70450 CT HEAD/BRAIN W/O DYE: CPT

## 2022-09-06 PROCEDURE — 6370000000 HC RX 637 (ALT 250 FOR IP)

## 2022-09-06 PROCEDURE — 85025 COMPLETE CBC W/AUTO DIFF WBC: CPT

## 2022-09-06 PROCEDURE — 1200000000 HC SEMI PRIVATE

## 2022-09-06 PROCEDURE — 36415 COLL VENOUS BLD VENIPUNCTURE: CPT

## 2022-09-06 PROCEDURE — 99285 EMERGENCY DEPT VISIT HI MDM: CPT

## 2022-09-06 PROCEDURE — 81001 URINALYSIS AUTO W/SCOPE: CPT

## 2022-09-06 PROCEDURE — 80076 HEPATIC FUNCTION PANEL: CPT

## 2022-09-06 PROCEDURE — 2580000003 HC RX 258

## 2022-09-06 PROCEDURE — 6360000002 HC RX W HCPCS

## 2022-09-06 PROCEDURE — 80048 BASIC METABOLIC PNL TOTAL CA: CPT

## 2022-09-06 PROCEDURE — 93005 ELECTROCARDIOGRAM TRACING: CPT | Performed by: STUDENT IN AN ORGANIZED HEALTH CARE EDUCATION/TRAINING PROGRAM

## 2022-09-06 RX ORDER — SODIUM CHLORIDE, SODIUM LACTATE, CALCIUM CHLORIDE, MAGNESIUM CHLORIDE AND DEXTROSE 1.5; 538; 448; 18.3; 5.08 G/100ML; MG/100ML; MG/100ML; MG/100ML; MG/100ML
2000 INJECTION, SOLUTION INTRAPERITONEAL
Status: COMPLETED | OUTPATIENT
Start: 2022-09-06 | End: 2022-09-07

## 2022-09-06 RX ORDER — ACETAMINOPHEN 650 MG/1
650 SUPPOSITORY RECTAL EVERY 6 HOURS PRN
Status: DISCONTINUED | OUTPATIENT
Start: 2022-09-06 | End: 2022-09-09 | Stop reason: HOSPADM

## 2022-09-06 RX ORDER — POLYETHYLENE GLYCOL 3350 17 G/17G
17 POWDER, FOR SOLUTION ORAL DAILY PRN
Status: DISCONTINUED | OUTPATIENT
Start: 2022-09-06 | End: 2022-09-09 | Stop reason: HOSPADM

## 2022-09-06 RX ORDER — DICYCLOMINE HYDROCHLORIDE 10 MG/1
10 CAPSULE ORAL 4 TIMES DAILY
Status: DISCONTINUED | OUTPATIENT
Start: 2022-09-06 | End: 2022-09-09 | Stop reason: HOSPADM

## 2022-09-06 RX ORDER — ONDANSETRON 2 MG/ML
4 INJECTION INTRAMUSCULAR; INTRAVENOUS EVERY 6 HOURS PRN
Status: DISCONTINUED | OUTPATIENT
Start: 2022-09-06 | End: 2022-09-09 | Stop reason: HOSPADM

## 2022-09-06 RX ORDER — GENTAMICIN SULFATE 1 MG/G
OINTMENT TOPICAL DAILY
Status: DISCONTINUED | OUTPATIENT
Start: 2022-09-06 | End: 2022-09-09 | Stop reason: HOSPADM

## 2022-09-06 RX ORDER — ACETAMINOPHEN 325 MG/1
650 TABLET ORAL EVERY 6 HOURS PRN
Status: DISCONTINUED | OUTPATIENT
Start: 2022-09-06 | End: 2022-09-09 | Stop reason: HOSPADM

## 2022-09-06 RX ORDER — MECOBALAMIN 5000 MCG
5 TABLET,DISINTEGRATING ORAL NIGHTLY
Status: DISCONTINUED | OUTPATIENT
Start: 2022-09-06 | End: 2022-09-09 | Stop reason: HOSPADM

## 2022-09-06 RX ORDER — METOPROLOL SUCCINATE 25 MG/1
25 TABLET, EXTENDED RELEASE ORAL DAILY
Status: DISCONTINUED | OUTPATIENT
Start: 2022-09-07 | End: 2022-09-09 | Stop reason: HOSPADM

## 2022-09-06 RX ORDER — SODIUM CHLORIDE 0.9 % (FLUSH) 0.9 %
5-40 SYRINGE (ML) INJECTION EVERY 12 HOURS SCHEDULED
Status: DISCONTINUED | OUTPATIENT
Start: 2022-09-06 | End: 2022-09-09 | Stop reason: HOSPADM

## 2022-09-06 RX ORDER — SODIUM CHLORIDE 9 MG/ML
INJECTION, SOLUTION INTRAVENOUS PRN
Status: DISCONTINUED | OUTPATIENT
Start: 2022-09-06 | End: 2022-09-09 | Stop reason: HOSPADM

## 2022-09-06 RX ORDER — SODIUM CHLORIDE 0.9 % (FLUSH) 0.9 %
5-40 SYRINGE (ML) INJECTION PRN
Status: DISCONTINUED | OUTPATIENT
Start: 2022-09-06 | End: 2022-09-09 | Stop reason: HOSPADM

## 2022-09-06 RX ORDER — ASPIRIN 81 MG/1
81 TABLET, CHEWABLE ORAL DAILY
Status: DISCONTINUED | OUTPATIENT
Start: 2022-09-07 | End: 2022-09-09 | Stop reason: HOSPADM

## 2022-09-06 RX ORDER — ONDANSETRON 4 MG/1
4 TABLET, ORALLY DISINTEGRATING ORAL EVERY 8 HOURS PRN
Status: DISCONTINUED | OUTPATIENT
Start: 2022-09-06 | End: 2022-09-09 | Stop reason: HOSPADM

## 2022-09-06 RX ORDER — HEPARIN SODIUM 5000 [USP'U]/ML
5000 INJECTION, SOLUTION INTRAVENOUS; SUBCUTANEOUS EVERY 8 HOURS SCHEDULED
Status: DISCONTINUED | OUTPATIENT
Start: 2022-09-06 | End: 2022-09-09 | Stop reason: HOSPADM

## 2022-09-06 RX ADMIN — EPOETIN ALFA-EPBX 10000 UNITS: 10000 INJECTION, SOLUTION INTRAVENOUS; SUBCUTANEOUS at 22:38

## 2022-09-06 RX ADMIN — SODIUM CHLORIDE, SODIUM LACTATE, CALCIUM CHLORIDE, MAGNESIUM CHLORIDE AND DEXTROSE 2000 ML: 1.5; 538; 448; 18.3; 5.08 INJECTION, SOLUTION INTRAPERITONEAL at 23:17

## 2022-09-06 RX ADMIN — GENTAMICIN SULFATE: 1 OINTMENT TOPICAL at 22:37

## 2022-09-06 RX ADMIN — NYSTATIN 500000 UNITS: 100000 SUSPENSION ORAL at 22:37

## 2022-09-06 RX ADMIN — Medication 5 MG: at 22:37

## 2022-09-06 RX ADMIN — SODIUM ZIRCONIUM CYCLOSILICATE 10 G: 10 POWDER, FOR SUSPENSION ORAL at 22:38

## 2022-09-06 RX ADMIN — HEPARIN SODIUM 5000 UNITS: 5000 INJECTION INTRAVENOUS; SUBCUTANEOUS at 22:37

## 2022-09-06 RX ADMIN — DICYCLOMINE HYDROCHLORIDE 10 MG: 10 CAPSULE ORAL at 22:37

## 2022-09-06 ASSESSMENT — PAIN - FUNCTIONAL ASSESSMENT: PAIN_FUNCTIONAL_ASSESSMENT: NONE - DENIES PAIN

## 2022-09-06 ASSESSMENT — PAIN SCALES - GENERAL: PAINLEVEL_OUTOF10: 3

## 2022-09-06 ASSESSMENT — ENCOUNTER SYMPTOMS: ABDOMINAL PAIN: 1

## 2022-09-06 ASSESSMENT — PAIN DESCRIPTION - DESCRIPTORS: DESCRIPTORS: CRAMPING

## 2022-09-06 ASSESSMENT — PAIN DESCRIPTION - LOCATION: LOCATION: ABDOMEN

## 2022-09-06 NOTE — ED NOTES
[address] [facility] 113 RESIDENT NOTE       Date of evaluation: 9/6/2022    Chief Complaint     Other (Has not had dialysis since thur)      History of Present Illness     Kim Pedraza is a 65 y.o. female with PMH of ESRD on PD, anxiety, bipolar disorder, DM2, HTN who presents to the ED from [facility] for worsening confusion and denying dialysis for past week. Patient was AAO x2 on my exam and reported she thinks that her \"abdominal infection is returning Loanne Ask is why she is here. Patient was recently admitted on 8/20/2022 for SBP and was treated with broad-spectrum ABX Vanco and cefepime and discharged to SNF. I called the SNF who reported patient has been getting increasingly confused and denying care including medications and her dialysis (last dialysis was 9/1/2022). She has been walking around naked at SNF has been extremely emotionally labile with frequent crying episodes. Denied any fever, chills, burning micturition, urinary urgency/frequency. Review of Systems     Review of Systems   Constitutional:  Positive for activity change and appetite change. Gastrointestinal:  Positive for abdominal pain. Past Medical, Surgical, Family, and Social History     She has a past medical history of JONATHAN (acute kidney injury) ([address] Anxiety, Asthma, Bipolar disorder ([address] Bronchitis, Chronic back pain, Depression, Diabetes mellitus ([address] DM II (diabetes mellitus, type II), controlled ([address] and Hypertension. She has a past surgical history that includes Inner ear surgery; Tubal ligation; and Endometrial ablation. Her family history includes Cancer in her maternal grandmother and mother; Diabetes in her mother; Heart Disease in her maternal grandfather. She reports that she has been smoking cigarettes. She has a 0.75 pack-year smoking history.  She has never used smokeless tobacco. She reports that she does not drink alcohol and does not use drugs.    Medications     Previous Medications    ACETAMINOPHEN (TYLENOL) 500 MG TABLET    Take 1,000 mg by mouth every 6 hours as needed for Pain (1-2 tablets every 4 hours PRN)    ASPIRIN 81 MG CHEWABLE TABLET    Take 1 tablet by mouth daily    B COMPLEX-C-FOLIC ACID (VIRT-CAPS) 1 MG CAPS    Take 1 capsule by mouth in the morning. BENZTROPINE (COGENTIN) 1 MG TABLET    Take 1 mg by mouth at bedtime    BUPROPION (WELLBUTRIN SR) 150 MG EXTENDED RELEASE TABLET    Take 1 tablet by mouth daily    CALCITRIOL (ROCALTROL) 0.25 MCG CAPSULE    Take 0.25 mcg by mouth in the morning. DICYCLOMINE (BENTYL) 10 MG CAPSULE    Take 1 capsule by mouth in the morning and 1 capsule at noon and 1 capsule in the evening and 1 capsule before bedtime. DOXEPIN (SINEQUAN) 50 MG CAPSULE    Take 50 mg by mouth nightly One hour before bedtime    MECLIZINE (ANTIVERT) 12.5 MG TABLET    Take 12.5 mg by mouth 3 times daily as needed for Dizziness    MELATONIN 5 MG TABS TABLET    Take 1 tablet by mouth nightly    METOPROLOL SUCCINATE (TOPROL XL) 25 MG EXTENDED RELEASE TABLET    Take 1 tablet by mouth daily    NYSTATIN (MYCOSTATIN) 370489 UNIT/ML SUSPENSION    Take 5 mLs by mouth 4 times daily for 21 days    ROSUVASTATIN (CRESTOR) 40 MG TABLET    Take 40 mg by mouth daily    SENNOSIDES-DOCUSATE SODIUM (SENOKOT-S) 8.6-50 MG TABLET    Take 1 tablet by mouth in the morning. SEVELAMER (RENVELA) 800 MG TABLET    Take 2 tablets by mouth 3 times daily (with meals)       Allergies     She is allergic to latex, peanuts [peanut oil], tomato, and shellfish-derived products. Physical Exam     INITIAL VITALS: BP: (!) 154/88, Temp: 97.9 °F (36.6 °C), Heart Rate: 79, Resp: 17, SpO2: 98 %   Physical Exam    Diagnostic Results     EKG   Not performed. RADIOLOGY:  XR CHEST PORTABLE   Final Result      1. Negative portable chest.         CT HEAD WO CONTRAST   Final Result      No acute intracranial process.           LABS:   Results for orders placed or performed during the hospital encounter of 09/06/22   CBC with Auto Differential   Result Value Ref Range    WBC 7.7 4.0 - 11.0 K/uL    RBC 2.98 (L) 4.00 - 5.20 M/uL    Hemoglobin 7.9 (L) 12.0 - 16.0 g/dL    Hematocrit 24.4 (L) 36.0 - 48.0 %    MCV 81.8 80.0 - 100.0 fL    MCH 26.6 26.0 - 34.0 pg    MCHC 32.6 31.0 - 36.0 g/dL    RDW 15.6 (H) 12.4 - 15.4 %    Platelets 003 699 - 879 K/uL    MPV 6.4 5.0 - 10.5 fL    Neutrophils % 68.5 %    Lymphocytes % 20.0 %    Monocytes % 7.4 %    Eosinophils % 3.4 %    Basophils % 0.7 %    Neutrophils Absolute 5.3 1.7 - 7.7 K/uL    Lymphocytes Absolute 1.5 1.0 - 5.1 K/uL    Monocytes Absolute 0.6 0.0 - 1.3 K/uL    Eosinophils Absolute 0.3 0.0 - 0.6 K/uL    Basophils Absolute 0.1 0.0 - 0.2 K/uL   Basic Metabolic Panel   Result Value Ref Range    Sodium 135 (L) 136 - 145 mmol/L    Potassium 5.4 (H) 3.5 - 5.1 mmol/L    Chloride 97 (L) 99 - 110 mmol/L    CO2 28 21 - 32 mmol/L    Anion Gap 10 3 - 16    Glucose 66 (L) 70 - 99 mg/dL    BUN 46 (H) 7 - 20 mg/dL    Creatinine 11.8 (HH) 0.6 - 1.1 mg/dL    GFR Non-African American 3 (A) >60    GFR  4 (A) >60    Calcium 9.4 8.3 - 10.6 mg/dL   Magnesium   Result Value Ref Range    Magnesium 2.90 (H) 1.80 - 2.40 mg/dL   Urinalysis with Reflex to Culture    Specimen: Urine   Result Value Ref Range    Color, UA Yellow Straw/Yellow    Clarity, UA Clear Clear    Glucose, Ur Negative Negative mg/dL    Bilirubin Urine Negative Negative    Ketones, Urine Negative Negative mg/dL    Specific Gravity, UA 1.015 1.005 - 1.030    Blood, Urine TRACE-INTACT (A) Negative    pH, UA 8.0 5.0 - 8.0    Protein, UA 30 (A) Negative mg/dL    Urobilinogen, Urine 0.2 <2.0 E.U./dL    Nitrite, Urine Negative Negative    Leukocyte Esterase, Urine Negative Negative    Microscopic Examination YES     Urine Type NotGiven     Urine Reflex to Culture Not Indicated    Microscopic Urinalysis   Result Value Ref Range    WBC, UA 0-2 0 - 5 /HPF    RBC, UA 0-2 0 - 4 /HPF    Epithelial Cells, UA 6-10 (A) 0 - 5 /HPF    Bacteria, UA 2+ (A) None Seen /HPF    Crystals, UA Few Ca. Oxalate (A) None Seen /HPF    Crystals, UA Few Uric Acid (A) None Seen /HPF       ED BEDSIDE ULTRASOUND:  No results found. RECENT VITALS:  BP: 115/78, Temp: 98.7 °F (37.1 °C),Heart Rate: 79, Resp: 12, SpO2: 97 %     Procedures     None    ED Course     Nursing Notes, Past Medical Hx, Past Surgical Hx, Social Hx, Allergies, and FamilyHx were reviewed. The patient was giventhe following medications:  Orders Placed This Encounter   Medications    dianeal lo-josh 1.5% 2,000 mL    nystatin (MYCOSTATIN) 515735 UNIT/ML suspension 500,000 Units       CONSULTS:  IP CONSULT TO HOSPITALIST    [address] [place] [address] / ASSESSMENT / Orlando Delfino is a 65 y.o. female with PMH of DM2 HTN, ESRD on PD, bipolar disorder, depression/anxiety. Initial evaluation patient was crying reported she does not know why she was sent to the SNF he thinks that the sent her here because her abdominal infection is coming back. Patient was recently admitted for SBP and was treated with intraperitoneal vancomycin and cefepime. I called the SNF who reported patient has been acting increasingly confused and emotionally labile. She has been actively refusing all her medications and has been refusing PD since last Thursday. They reported that she was walking around naked at SNF. On my assessment patient was AO x2, oriented to self and place was confused about the date but knew it was 2022. Patient kept repeating same thing about being confused about why she was here as she was told she was getting better. Patient is a poor historian. In the ED patient is hemodynamically stable. BUN 46, creatinine 11.8, potassium 5.4 and mag of 2.9.  UA revealing calcium oxalate and uric acid crystals. CT head without contrast obtained for confusion was nonrevealing. CXR nonrevealing.   Given patient's worsening confusion and no peritoneal dialysis since past 6 days, she needs to be admitted with internal medicine and nephrology consulted for dialysis. Patient gets dialysis per Dr. Bartolo Villalpando with [address] [place] [address] nephrology group. Dr. Sharmila Doshi saw the patient in the ED and confirmed patient to be admitted inpatient for dialysis and also reported not to collect in the peritoneal dialysis fluid for CBC/culture in the ED as it would be a dry fluid and needs at least 2 to 3 hours for collection. Hence will be collected by the dialysis nurse tomorrow a.m. This patient was also evaluated by the attending physician. All care plans were discussed and agreed upon. Clinical Impression     1. Altered mental status, unspecified altered mental status type        Disposition     PATIENT REFERRED TO:  No follow-up provider specified.     DISCHARGE MEDICATIONS:  New Prescriptions    No medications on file       DISPOSITION Decision To Admit 09/06/2022 06:21:18 PM       Jules Leonard MD  Resident  09/06/22 [id number]

## 2022-09-06 NOTE — ED PROVIDER NOTES
ED Attending Attestation Note     Date of evaluation: 9/6/2022    This patient was seen by the resident. I have seen and examined the patient, agree with the workup, evaluation, management and diagnosis. The care plan has been discussed. I have reviewed the ECG and concur with the resident's interpretation. My assessment reveals a well-appearing woman who presents with questionable altered mental status from her facility where she had been refusing all care. Fortuitously we were able to consult Dr. Helen Hicks in an expeditious fashion. He was able to provide some input to us as well. On exam, the patient is awake alert conversant. She has no aphasia or dysarthria. She has no pronator drift and her lower extremities are at least antigravity x10 seconds bilaterally. She has no deficit with extraocular movements and there is no nystagmus observed. Her lungs are clear to auscultation bilaterally. Her rhythm is regular and her rate is normal and there is no murmur. Her abdomen is soft and benign, and the PD site appears completely unremarkable at the left abdominal wall. Sclera clear and there is no discharge from the eyes. Plan for laboratory studies and CT head.      Myriam Denis MD  09/06/22 [id number]

## 2022-09-06 NOTE — H&P
Internal Medicine  PGY 1  History & Physical      CC AMS    History Obtained From:  patient, electronic medical record    HISTORY OF PRESENT ILLNESS:  Aleyda Chavez is a 65 y.o. female with PMH of ESRD on PD, anxiety, bipolar disorder, DM2, HTN who presents to the ED from [facility] for worsening confusion and denying dialysis for past week. Patient was AAO x2 on my exam and reported she thinks that her \"abdominal infection is returning Shahnaz Spencer is why she is here. Patient was recently admitted on 8/20/2022 for SBP and was treated with broad-spectrum ABX Vanco and cefepime and discharged to SNF (last dose vanc/cef on 9/2). I called the SNF who reported patient has been getting increasingly confused and denying care including medications and her dialysis (last dialysis was 9/1/2022). She has been walking around naked at SNF has been extremely emotionally labile with frequent crying episodes. Denied any fever, chills, burning micturition, urinary urgency/frequency. In the ED patient is hemodynamically stable. BUN 46, creatinine 11.8, potassium 5.4 and mag of 2.9.  UA revealing calcium oxalate and uric acid crystals. CT head without contrast obtained for confusion was nonrevealing. CXR nonrevealing. Given patient's worsening confusion and no peritoneal dialysis since past 6 days, she needs to be admitted with internal medicine and nephrology consulted for dialysis. Patient gets dialysis per Dr. Oneida Hawley with [address] [place] [address] nephrology group. Dr. Elvia Abreu saw the patient in the ED and confirmed patient to be admitted inpatient for dialysis and also reported not to collect in the peritoneal dialysis fluid for CBC/culture in the ED as it would be a dry fluid and needs at least 2 to 3 hours for collection. Hence will be collected by the dialysis nurse tomorrow a.m.     Past Medical History:        Diagnosis Date    JONATHAN (acute kidney injury) ([facility] [address] 9/3/2014    Anxiety     Asthma     Bipolar disorder ([facility]     Bronchitis Chronic back pain     Depression     Diabetes mellitus ([facility]     DM II (diabetes mellitus, type II), controlled ([facility] 9/3/2014    Hypertension        Past Surgical History:        Procedure Laterality Date    ENDOMETRIAL ABLATION      INNER EAR SURGERY      TUBAL LIGATION         Medications Priorto Admission:    Not in a hospital admission. Allergies:  Latex, Peanuts [peanut oil], Tomato, and Shellfish-derived products    Social History:   TOBACCO:   reports that she has been smoking cigarettes. She has a 0.75 pack-year smoking history. She has never used smokeless tobacco.  ETOH:   reports no history of alcohol use. DRUGS : Denies  Patient currently lives [facility]    Family History:       Problem Relation Age of Onset    Diabetes Mother     Cancer Mother     Cancer Maternal Grandmother     Heart Disease Maternal Grandfather        Review of Systems    ROS: A 10 point review of systems was conducted, significant findings as noted in HPI. Physical Exam  Physical exam:      General appearance:  No apparent distress, appears stated age and cooperative. HEENT:  Normal cephalic, atraumatic without obvious deformity. Pupils equal, round, and reactive to light. Extra ocular muscles intact. Conjunctivae/corneas clear. Neck: Supple, with full range of motion. No jugular venous distention. Trachea midline. Respiratory:  Normal respiratory effort. Clear to auscultation, bilaterally without Rales/Wheezes/Rhonchi. Cardiovascular:  Regular rate and rhythm with normal S1/S2 without murmurs, rubs or gallops. Abdomen: Soft, mildly-tender, non-distended with normal bowel sounds. Has left sided peritoneal catheter in place. Musculoskeletal:  No clubbing, cyanosis or edema bilaterally. Full range of motion without deformity. Skin: Skin color, texture, turgor normal.  No rashes or lesions. Neurologic:  Neurovascularly intact without any focal sensory/motor deficits.  Cranial nerves: II-XII intact, grossly non-focal.  Psychiatric:  Alert and oriented, thought content appropriate, normal insight  Capillary Refill: Brisk,< 3 seconds   Peripheral Pulses: +2 palpable, equal bilaterally      Vitals:    09/06/22 1800   BP: 115/78   Pulse:    Resp: 12   Temp:    SpO2: 97%       DATA:    Labs:  CBC:   Recent Labs     09/06/22  1517   WBC 7.7   HGB 7.9*   HCT 24.4*          BMP:   Recent Labs     09/06/22  1517   *   K 5.4*   CL 97*   CO2 28   BUN 46*   CREATININE 11.8*   GLUCOSE 66*     LFT's: No results for input(s): AST, ALT, ALB, BILITOT, ALKPHOS in the last 72 hours. Troponin: No results for input(s): TROPONINI in the last 72 hours. BNP:No results for input(s): BNP in the last 72 hours. ABGs: No results for input(s): PHART, VCE1LCH, PO2ART in the last 72 hours. INR: No results for input(s): INR in the last 72 hours. U/A:  Recent Labs     09/06/22  1643   COLORU Yellow   PHUR 8.0   WBCUA 0-2   RBCUA 0-2   BACTERIA 2+*   CLARITYU Clear   SPECGRAV 1.015   LEUKOCYTESUR Negative   UROBILINOGEN 0.2   BILIRUBINUR Negative   BLOODU TRACE-INTACT*   GLUCOSEU Negative       XR CHEST PORTABLE   Final Result      1. Negative portable chest.         CT HEAD WO CONTRAST   Final Result      No acute intracranial process. ASSESSMENT AND PLAN:    Shirlene Bravo is a 65 y.o. female with PMH of ESRD on PD, anxiety, bipolar disorder, DM2, HTN who presents to the ED from [facility] for worsening confusion and denying dialysis for past week. #Acute Metabolic Encephalopathy  Increasing confusion in setting of missing dialysis. Also w hx of BPD and anxiety. Emotionally labile and Aox2 on interview. - Monitor for improvement w dialysis  - Consider Psych consult    #ESRD on PD  SNF reported patient has been getting increasingly confused and denying care including medications and her dialysis. Last dialysis on 9/1/22. Elevated K to 5.4, Mg 2.9, BUN 46.  CT head no acute abnormalities  - Nephro consult  -

## 2022-09-06 NOTE — CONSULTS
09/06/2022    PHOS 4.5 09/01/2022         Acid/Base status:  Check labs. Volume status/BP:  No volume overload issues    BP stable. Hematology:   CKD related anemia  Goal Hgb 10-11    Lab Results   Component Value Date    IRON 98 08/12/2019    TIBC 275 08/12/2019     Lab Results   Component Value Date    WBC 7.7 09/06/2022    HGB 7.9 (L) 09/06/2022    HCT 24.4 (L) 09/06/2022    MCV 81.8 09/06/2022     09/06/2022             Reason for Consult and Chief Complaint     Reason for consult: ESRD    Chief complaint:   Chief Complaint   Patient presents with    Other     Has not had dialysis since thur       History of Present Illness   Beau Mehta is a 65 y.o. with PMH significant for ESRD on PD was recently discharged from hospital to rehab after getting treatment for peritonitis. Per report patient was refusing PD there and now transferred to ER for \" Altered mental status\". Patient is awake and alert and she does have cognitive issues and to me she appear to be ~ her baseline mental status. Patient is able to follow simple commands and she denied SOB. Patient is very poor historian and she always say \"yes\" to pain question so cannot assess if she has any abdominal or chest pain but she look comfortable. No report of fever. No GI symptoms. Patient just finished IP antibiotics for peritonitis and last IP Ab were on 9/2        Review of Systems   Positive in bold or unable to assess     GEN: Fever, chills, night sweats. HEENT: Changes in vision, sore throat, rhinorrhea   CVS: Chest pain, palpitations, swelling or edema in legs  Pulmonary: Cough, hemoptysis, SOB  GI: Nausea, vomiting, diarrhea, constipation, abdominal pain  : Bladder incontinence, dysuria,hematuria. MSK: Muscle or joint or bone pains  Skin: Rashes, ulcers, skin thickness  CNS: Headache, dizziness, confusion, focal weakness, seizure. Psych: Anxiety, agitation, depression. Reviewed all 12 systems, negative except as above. concerning rashes noted  Psych: good eye contact, normal affect  Neuro: Awake and alert and communicating.        Laboratory Data     Please see above     Diagnostic Studies   Pertinent images reviewed

## 2022-09-06 NOTE — PROGRESS NOTES
Discussed with ER nurse regarding 2 cycles of manual PD once patient reaches floor and also to send PD fluid for cell counts and culture after first PD cycle    Orders placed. ER nurse will d/w floor nurse.      Jozef Ruvalcaab MD

## 2022-09-07 LAB
ALBUMIN SERPL-MCNC: 2.6 G/DL (ref 3.4–5)
ANION GAP SERPL CALCULATED.3IONS-SCNC: 11 MMOL/L (ref 3–16)
APPEARANCE FLUID: CLEAR
BUN BLDV-MCNC: 44 MG/DL (ref 7–20)
CALCIUM SERPL-MCNC: 8.7 MG/DL (ref 8.3–10.6)
CELL COUNT FLUID TYPE: NORMAL
CHLORIDE BLD-SCNC: 101 MMOL/L (ref 99–110)
CLOT EVALUATION: NORMAL
CO2: 25 MMOL/L (ref 21–32)
COLOR FLUID: COLORLESS
CREAT SERPL-MCNC: 11.2 MG/DL (ref 0.6–1.1)
FERRITIN: 1631 NG/ML (ref 15–150)
FOLATE: 19.05 NG/ML (ref 4.78–24.2)
GFR AFRICAN AMERICAN: 4
GFR NON-AFRICAN AMERICAN: 4
GLUCOSE BLD-MCNC: 75 MG/DL (ref 70–99)
HCT VFR BLD CALC: 21.8 % (ref 36–48)
HEMOGLOBIN: 7.3 G/DL (ref 12–16)
IRON SATURATION: 47 % (ref 15–50)
IRON SATURATION: 47 % (ref 15–50)
IRON: 71 UG/DL (ref 37–145)
IRON: 76 UG/DL (ref 37–145)
LYMPHOCYTES, BODY FLUID: 21 %
MCH RBC QN AUTO: 27.1 PG (ref 26–34)
MCHC RBC AUTO-ENTMCNC: 33.3 G/DL (ref 31–36)
MCV RBC AUTO: 81.2 FL (ref 80–100)
MONOCYTE, FLUID: 55 %
NEUTROPHIL, FLUID: 24 %
NUCLEATED CELLS FLUID: 35 /CUMM
NUMBER OF CELLS COUNTED FLUID: 100
PARATHYROID HORMONE INTACT: 48.6 PG/ML (ref 14–72)
PDW BLD-RTO: 15.9 % (ref 12.4–15.4)
PHOSPHORUS: 7.3 MG/DL (ref 2.5–4.9)
PLATELET # BLD: 198 K/UL (ref 135–450)
PMV BLD AUTO: 6.6 FL (ref 5–10.5)
POTASSIUM REFLEX MAGNESIUM: 4.1 MMOL/L (ref 3.5–5.1)
POTASSIUM SERPL-SCNC: 4.1 MMOL/L (ref 3.5–5.1)
RBC # BLD: 2.68 M/UL (ref 4–5.2)
RBC FLUID: <1000 /CUMM
SODIUM BLD-SCNC: 137 MMOL/L (ref 136–145)
TOTAL IRON BINDING CAPACITY: 151 UG/DL (ref 260–445)
TOTAL IRON BINDING CAPACITY: 161 UG/DL (ref 260–445)
VITAMIN B-12: 1116 PG/ML (ref 211–911)
WBC # BLD: 6.4 K/UL (ref 4–11)

## 2022-09-07 PROCEDURE — 82607 VITAMIN B-12: CPT

## 2022-09-07 PROCEDURE — 83970 ASSAY OF PARATHORMONE: CPT

## 2022-09-07 PROCEDURE — 87040 BLOOD CULTURE FOR BACTERIA: CPT

## 2022-09-07 PROCEDURE — 83540 ASSAY OF IRON: CPT

## 2022-09-07 PROCEDURE — 1200000000 HC SEMI PRIVATE

## 2022-09-07 PROCEDURE — 6360000002 HC RX W HCPCS

## 2022-09-07 PROCEDURE — 6370000000 HC RX 637 (ALT 250 FOR IP)

## 2022-09-07 PROCEDURE — 80069 RENAL FUNCTION PANEL: CPT

## 2022-09-07 PROCEDURE — 87070 CULTURE OTHR SPECIMN AEROBIC: CPT

## 2022-09-07 PROCEDURE — 85027 COMPLETE CBC AUTOMATED: CPT

## 2022-09-07 PROCEDURE — 89051 BODY FLUID CELL COUNT: CPT

## 2022-09-07 PROCEDURE — 2580000003 HC RX 258

## 2022-09-07 PROCEDURE — 36415 COLL VENOUS BLD VENIPUNCTURE: CPT

## 2022-09-07 PROCEDURE — 82728 ASSAY OF FERRITIN: CPT

## 2022-09-07 PROCEDURE — 83550 IRON BINDING TEST: CPT

## 2022-09-07 PROCEDURE — 87205 SMEAR GRAM STAIN: CPT

## 2022-09-07 PROCEDURE — 87086 URINE CULTURE/COLONY COUNT: CPT

## 2022-09-07 PROCEDURE — 3E1M39Z IRRIGATION OF PERITONEAL CAVITY USING DIALYSATE, PERCUTANEOUS APPROACH: ICD-10-PCS | Performed by: STUDENT IN AN ORGANIZED HEALTH CARE EDUCATION/TRAINING PROGRAM

## 2022-09-07 PROCEDURE — 82746 ASSAY OF FOLIC ACID SERUM: CPT

## 2022-09-07 PROCEDURE — 90945 DIALYSIS ONE EVALUATION: CPT

## 2022-09-07 RX ORDER — SODIUM CHLORIDE, SODIUM LACTATE, CALCIUM CHLORIDE, MAGNESIUM CHLORIDE AND DEXTROSE 1.5; 538; 448; 18.3; 5.08 G/100ML; MG/100ML; MG/100ML; MG/100ML; MG/100ML
8000 INJECTION, SOLUTION INTRAPERITONEAL DAILY
Status: DISCONTINUED | OUTPATIENT
Start: 2022-09-07 | End: 2022-09-09 | Stop reason: HOSPADM

## 2022-09-07 RX ORDER — BISACODYL 10 MG
10 SUPPOSITORY, RECTAL RECTAL DAILY PRN
COMMUNITY

## 2022-09-07 RX ORDER — BISACODYL 10 MG
10 SUPPOSITORY, RECTAL RECTAL DAILY PRN
Status: DISCONTINUED | OUTPATIENT
Start: 2022-09-07 | End: 2022-09-09 | Stop reason: HOSPADM

## 2022-09-07 RX ORDER — OXYCODONE HYDROCHLORIDE 5 MG/1
5 TABLET ORAL EVERY 6 HOURS PRN
Status: DISCONTINUED | OUTPATIENT
Start: 2022-09-07 | End: 2022-09-09 | Stop reason: HOSPADM

## 2022-09-07 RX ORDER — BUPROPION HYDROCHLORIDE 150 MG/1
150 TABLET, EXTENDED RELEASE ORAL DAILY
Status: DISCONTINUED | OUTPATIENT
Start: 2022-09-07 | End: 2022-09-09 | Stop reason: HOSPADM

## 2022-09-07 RX ORDER — OXYCODONE HYDROCHLORIDE 5 MG/1
5 TABLET ORAL EVERY 6 HOURS PRN
COMMUNITY

## 2022-09-07 RX ORDER — SEVELAMER CARBONATE 800 MG/1
1600 TABLET, FILM COATED ORAL
Status: DISCONTINUED | OUTPATIENT
Start: 2022-09-07 | End: 2022-09-09 | Stop reason: HOSPADM

## 2022-09-07 RX ADMIN — SODIUM CHLORIDE, PRESERVATIVE FREE 10 ML: 5 INJECTION INTRAVENOUS at 09:54

## 2022-09-07 RX ADMIN — SEVELAMER CARBONATE 1600 MG: 800 TABLET, FILM COATED ORAL at 12:52

## 2022-09-07 RX ADMIN — NYSTATIN 500000 UNITS: 100000 SUSPENSION ORAL at 15:47

## 2022-09-07 RX ADMIN — Medication 5 MG: at 21:02

## 2022-09-07 RX ADMIN — ASPIRIN 81 MG 81 MG: 81 TABLET ORAL at 09:39

## 2022-09-07 RX ADMIN — HEPARIN SODIUM 5000 UNITS: 5000 INJECTION INTRAVENOUS; SUBCUTANEOUS at 21:02

## 2022-09-07 RX ADMIN — DICYCLOMINE HYDROCHLORIDE 10 MG: 10 CAPSULE ORAL at 17:29

## 2022-09-07 RX ADMIN — HEPARIN SODIUM 5000 UNITS: 5000 INJECTION INTRAVENOUS; SUBCUTANEOUS at 13:32

## 2022-09-07 RX ADMIN — DICYCLOMINE HYDROCHLORIDE 10 MG: 10 CAPSULE ORAL at 12:52

## 2022-09-07 RX ADMIN — METOPROLOL SUCCINATE 25 MG: 25 TABLET, FILM COATED, EXTENDED RELEASE ORAL at 09:39

## 2022-09-07 RX ADMIN — DICYCLOMINE HYDROCHLORIDE 10 MG: 10 CAPSULE ORAL at 09:39

## 2022-09-07 RX ADMIN — SODIUM CHLORIDE, SODIUM LACTATE, CALCIUM CHLORIDE, MAGNESIUM CHLORIDE AND DEXTROSE 2000 ML: 1.5; 538; 448; 18.3; 5.08 INJECTION, SOLUTION INTRAPERITONEAL at 04:00

## 2022-09-07 RX ADMIN — SODIUM CHLORIDE, PRESERVATIVE FREE 10 ML: 5 INJECTION INTRAVENOUS at 21:02

## 2022-09-07 RX ADMIN — SEVELAMER CARBONATE 1600 MG: 800 TABLET, FILM COATED ORAL at 17:29

## 2022-09-07 RX ADMIN — NYSTATIN 500000 UNITS: 100000 SUSPENSION ORAL at 09:39

## 2022-09-07 RX ADMIN — NYSTATIN 500000 UNITS: 100000 SUSPENSION ORAL at 21:02

## 2022-09-07 RX ADMIN — GENTAMICIN SULFATE: 1 OINTMENT TOPICAL at 18:08

## 2022-09-07 RX ADMIN — DICYCLOMINE HYDROCHLORIDE 10 MG: 10 CAPSULE ORAL at 21:02

## 2022-09-07 RX ADMIN — BUPROPION HYDROCHLORIDE 150 MG: 150 TABLET, EXTENDED RELEASE ORAL at 09:54

## 2022-09-07 ASSESSMENT — PAIN DESCRIPTION - INTENSITY: RATING_2: 0

## 2022-09-07 NOTE — PROGRESS NOTES
Pt admitted to [place] [address] from ED. Pt is A&Ox4, VSS, NSR on tele. Fall precautions in place. Manual PD exchange 1 of 2 in process. Pt instructed to call for assistance.

## 2022-09-07 NOTE — PROGRESS NOTES
4 Eyes Admission Assessment     I agree as the admission nurse that 2 RN's have performed a thorough Head to Toe Skin Assessment on the patient. ALL assessment sites listed below have been assessed on admission. Areas assessed by both nurses:   [x]   Head, Face, and Ears   [x]   Shoulders, Back, and Chest  [x]   Arms, Elbows, and Hands   [x]   Coccyx, Sacrum, and Ischium  [x]   Legs, Feet, and Heels        Does the Patient have Skin Breakdown?   No         Papa Prevention initiated:  No   Wound Care Orders initiated:  No      [facility] nurse consulted for Pressure Injury (Stage 3,4, Unstageable, DTI, NWPT, and Complex wounds) or Papa score 18 or lower:  No      Nurse 1 eSignature: Electronically signed by Arden Saleem RN on 9/6/22 at 11:03 PM EDT    **SHARE this note so that the co-signing nurse is able to place an eSignature**    Nurse 2 eSignature: Electronically signed by Thien Smith RN on 9/6/22 at 11:47 PM EDT

## 2022-09-07 NOTE — PROGRESS NOTES
Progress Note    Admit Date: 9/6/2022  Diet: ADULT DIET; Regular; Low Potassium (Less than 3000 mg/day); Low Phosphorus (Less than 1000 mg)    CC: AMS    Interval history:   - REKHAO  - Received 2 rounds of PD overnight  - Denies abdominal pain, nausea, fever, chills today  - Sad, tearful thinking of wanting to be home with her grandchildren  - Was refusing dialysis and PO medications at [facility] for week since she was discharged    HPI: Tory Andrade is a 65 y.o. female with PMH of ESRD on PD, anxiety, bipolar disorder, DM2, HTN who presents to the ED from [facility] for worsening confusion and denying dialysis for past week. Patient was AAO x2 on my exam and reported she thinks that her \"abdominal infection is returning Drenda Duhamel is why she is here. Patient was recently admitted on 8/20/2022 for SBP and was treated with broad-spectrum ABX Vanco and cefepime and discharged to SNF (last dose vanc/cef on 9/2). I called the SNF who reported patient has been getting increasingly confused and denying care including medications and her dialysis (last dialysis was 9/1/2022). She has been walking around naked at SNF has been extremely emotionally labile with frequent crying episodes. Denied any fever, chills, burning micturition, urinary urgency/frequency. In the ED patient is hemodynamically stable. BUN 46, creatinine 11.8, potassium 5.4 and mag of 2.9.  UA revealing calcium oxalate and uric acid crystals. CT head without contrast obtained for confusion was nonrevealing. CXR nonrevealing. Given patient's worsening confusion and no peritoneal dialysis since past 6 days, she needs to be admitted with internal medicine and nephrology consulted for dialysis. Patient gets dialysis per Dr. Gilford Harman with [address] [place] [address] nephrology group.  Dr. Davis Leavitt saw the patient in the ED and confirmed patient to be admitted inpatient for dialysis and also reported not to collect in the peritoneal dialysis fluid for CBC/culture in the ED as it would be a dry fluid and needs at least 2 to 3 hours for collection. Hence will be collected by the dialysis nurse tomorrow a.m. Medications:     Scheduled Meds:   buPROPion  150 mg Oral Daily    sevelamer  1,600 mg Oral TID WC    nystatin  5 mL Oral TID    gentamicin   Topical Daily    epoetin samanta-epbx  10,000 Units SubCUTAneous Once per day on Tue Thu Sat    aspirin  81 mg Oral Daily    dicyclomine  10 mg Oral 4x Daily    melatonin  5 mg Oral Nightly    metoprolol succinate  25 mg Oral Daily    sodium chloride flush  5-40 mL IntraVENous 2 times per day    heparin (porcine)  5,000 Units SubCUTAneous 3 times per day     Continuous Infusions:   sodium chloride       PRN Meds:bisacodyl, oxyCODONE, sodium chloride flush, sodium chloride, ondansetron **OR** ondansetron, polyethylene glycol, acetaminophen **OR** acetaminophen    Objective:   Vitals:   T-max:  Patient Vitals for the past 8 hrs:   BP Temp Temp src Pulse Resp SpO2   09/07/22 0939 139/89 97.9 °F (36.6 °C) Oral 84 16 100 %   09/07/22 0304 (!) 158/93 97.1 °F (36.2 °C) Oral 89 18 95 %       Intake/Output Summary (Last 24 hours) at 9/7/2022 1049  Last data filed at 9/7/2022 9138  Gross per 24 hour   Intake 4000 ml   Output 4585 ml   Net -585 ml     Review of Systems    Physical Exam  Constitutional:       General: She is not in acute distress. Appearance: Normal appearance. She is not ill-appearing. HENT:      Head: Normocephalic and atraumatic. Cardiovascular:      Rate and Rhythm: Normal rate and regular rhythm. Pulmonary:      Effort: Pulmonary effort is normal.      Breath sounds: Normal breath sounds. Abdominal:      General: There is distension. Tenderness: There is no abdominal tenderness. There is no guarding or rebound. Musculoskeletal:         General: No swelling. Right lower leg: No edema. Left lower leg: No edema. Skin:     Coloration: Skin is not jaundiced.    Neurological:      General: No focal deficit present. Mental Status: She is alert. LABS:    CBC:   Recent Labs     09/06/22 1517 09/07/22  0736   WBC 7.7 6.4   HGB 7.9* 7.3*   HCT 24.4* 21.8*    198   MCV 81.8 81.2     Renal:    Recent Labs     09/06/22 1517 09/07/22  0736   * 137   K 5.4* 4.1  4.1   CL 97* 101   CO2 28 25   BUN 46* 44*   CREATININE 11.8* 11.2*   GLUCOSE 66* 75   CALCIUM 9.4 8.7   MG 2.90*  --    PHOS  --  7.3*   ANIONGAP 10 11     Hepatic:   Recent Labs     09/06/22 2212 09/07/22 0736   AST 50*  --    ALT 36  --    BILITOT <0.2  --    BILIDIR <0.2  --    PROT 4.9*  --    LABALBU 2.6* 2.6*   ALKPHOS 83  --      Troponin:   Recent Labs     09/06/22 2212   TROPONINI 0.08*     BNP: No results for input(s): BNP in the last 72 hours. Lipids: No results for input(s): CHOL, HDL in the last 72 hours. Invalid input(s): LDLCALCU, TRIGLYCERIDE  ABGs:  No results for input(s): PHART, AYN9OLZ, PO2ART, LKN2HJC, BEART, THGBART, R6HVYVGL, UST5YTD in the last 72 hours. INR: No results for input(s): INR in the last 72 hours. Lactate: No results for input(s): LACTATE in the last 72 hours. Cultures:  -----------------------------------------------------------------  RAD:   XR CHEST PORTABLE   Final Result      1. Negative portable chest.         CT HEAD WO CONTRAST   Final Result      No acute intracranial process. Assessment/Plan:     Susan Rapp is a 65 y.o. female with PMH of ESRD on PD, anxiety, bipolar disorder, DM2, HTN admitted from [facility] for acute metabolic encephalopathy 2/2 uremia 2/2 missed dialysis  for a week(last session 6 days prior). Acute Metabolic Encephalopathy  Polypharmacy  Increasing confusion in setting of missing dialysis. Also w hx of BPD and anxiety. Emotionally labile and AOx2 on interview. - Monitor for improvement w dialysis  - 9/7: Med rec completed, showing several psychoactive medications including doxepin, benztropine, bupropion, oxycodone.  Mental status has improved after 2 cycles of PD.  - Psychiatry consulted, appreciate recs     ESRD on PD  SNF reported patient has been getting increasingly confused and denying care including medications and her dialysis. Last dialysis on 9/1/22. Elevated K to 5.4, Mg 2.9, BUN 46. CT head no acute abnormalities  - Nephro consult  - Dialysis  - Monitor daily BMP reflex Mg  - Monitor for improvement in mental status w re initiation of dialysis     Chronic Medical Conditions  Normocytic anemia  Hgb 7.9 on admission, down from baseline ~10. No source of bleeding. Colonoscopy & EGD March 2022 w/ 4 benign polyps & small hiatal hernia. Likely d/t ESRD/ACD. - Monitor serial CBC: Hgb 7.9> 7.3  - B12, Folate normal/elevated on 7/23/22  - Ferritin elevated 1631  - Iron, TIBC, Saturation results pending     Anxiety/Bipolar disorder  - Cont. 150 bupropion daily, 50 mg doxepin nightly    Hypertension  Initially hypertensive in ED, now normalizing  - Continue home 30 mg nifedipine, 25 toprol xl    Diabetes mellitus  - Hx DM2, A1C 6.4 7/22/22  - LDSSI inpatient, hypoglycemia protocol     Will discuss with attending physician Dr. Osorio Buchanan.     Code Status: Full Code  FEN: Regular  PPX: SQH  DISPO: Sachi Ovalle MD, PGY-1  09/07/22  10:49 AM    This patient has been staffed and discussed with Lai Curtis MD.

## 2022-09-07 NOTE — ED NOTES
Attempted report [facility] on [address] states Charge to call back      Render , [facility]ACE  09/06/22 4332

## 2022-09-07 NOTE — DISCHARGE INSTR - COC
Continuity of Care Form    Patient Name: Bean Lozano   :  1965  MRN:  [id number]    Admit date:  2022  Discharge date:  ***    Code Status Order: Full Code   Advance Directives:     Admitting Physician:  Maddison Ace MD  PCP: Kaylyn Conklin DO, DO    Discharging Nurse: [facility] Unit/Room#: 1965/9299-38  Discharging Unit Phone Number: ***    Emergency Contact:   Extended Emergency Contact Information  Primary Emergency Contact: Ira Maria  Mobile Phone: [phone]  Relation: Child    Past Surgical History:  Past Surgical History:   Procedure Laterality Date    ENDOMETRIAL ABLATION      INNER EAR SURGERY      TUBAL LIGATION         Immunization History: There is no immunization history on file for this patient.     Active Problems:  Patient Active Problem List   Diagnosis Code    Atypical chest pain R07.89    DM II (diabetes mellitus, type II), controlled (HCC) E11.9    HTN (hypertension) I10    JONATHAN (acute kidney injury) ([address] N17.9    Asthma J45.909    History of anemia due to CKD N18.9, Z86.2    Peritonitis ([address] K65.9    ESRD on peritoneal dialysis ([address] R05.3, C03.8    Complication of peritoneal dialysis T80.90XA    Peritonitis associated with peritoneal dialysis ([address] T85.71XA    Chronic idiopathic constipation K59.04    Elevated troponin R77.8    Lower abdominal pain R10.30    AMS (altered mental status) R41.82       Isolation/Infection:   Isolation            No Isolation          Patient Infection Status       Infection Onset Added Last Indicated Last Indicated By Review Planned Expiration Resolved Resolved By    None active    Resolved    COVID-19 (Rule Out) 22 COVID-19, Rapid (Ordered)   22 Rule-Out Test Resulted            Nurse Assessment:  Last Vital Signs: /74   Pulse 78   Temp 98.4 °F (36.9 °C) (Oral)   Resp 16   Ht 5' 8\" (1.727 m)   Wt 166 lb 1.6 oz (75.3 kg)   SpO2 95%   BMI 25.26 kg/m²     Last documented pain score (0-10 scale): Pain Level: 3  Last Weight:   Wt Readings from Last 1 Encounters:   22 166 lb 1.6 oz (75.3 kg)     Mental Status:  {IP PT MENTAL STATUS:}    IV Access:  [facility] IV ACCESS:192240710}    Nursing Mobility/ADLs:  Walking   {CHP DME GMGS:884333703}  Transfer  {CHP DME CMYP:359771556}  Bathing  {CHP DME EHSZ:312261907}  Dressing  {CHP DME IVPC:371148915}  Toileting  {CHP DME SFRN:379030845}  Feeding  {CHP DME KKOC:035140949}  Med Admin  {CHP DME TPVZ:557013952}  Med Delivery   { MARGARITA MED Delivery:684849766}    Wound Care Documentation and Therapy:        Elimination:  Continence: Bowel: {YES / WD:17153}  Bladder: {YES / XS:41028}  Urinary Catheter: {Urinary Catheter:673133750}   Colostomy/Ileostomy/Ileal Conduit: {YES / JA:30417}       Date of Last BM: ***    Intake/Output Summary (Last 24 hours) at 2022 1611  Last data filed at 2022 1102  Gross per 24 hour   Intake 4360 ml   Output 4585 ml   Net -225 ml     I/O last 3 completed shifts: In:  [Other:]  Out: 9052 [Urine:500;  Other:2275]    Safety Concerns:     [facility] Safety Concerns:106236817}    Impairments/Disabilities:      [facility] Impairments/Disabilities:049473277}    Nutrition Therapy:  Current Nutrition Therapy:   [facility] Diet List:828606737}    Routes of Feeding: {CHP DME Other Feedings:050927159}  Liquids: {Slp liquid thickness:02645}  Daily Fluid Restriction: {CHP DME Yes amt example:607806038}  Last Modified Barium Swallow with Video (Video Swallowing Test): {Done Not Done ECLS:818211963}    Treatments at the Time of Hospital Discharge:   Respiratory Treatments: ***  Oxygen Therapy:  {Therapy; copd oxygen:53345}  Ventilator:    { CC Vent MPJS:462040030}    Rehab Therapies: {THERAPEUTIC INTERVENTION:5734509586}  Weight Bearing Status/Restrictions: Felicia8 Kristen SANTOS Weight Bearin}  Other Medical Equipment (for information only, NOT a DME order):  {EQUIPMENT:201238900}  Other Treatments: ***    Patient's personal belongings (please select all that are sent with patient):  {CHP DME Belongings:204258401}    RN SIGNATURE:  {Esignature:893760865}    CASE MANAGEMENT/SOCIAL WORK SECTION    Inpatient Status Date: ***    Readmission Risk Assessment Score:  Readmission Risk              Risk of Unplanned Readmission:  28           Discharging to Facility/ [address] [facility]   [address] [facility] [address]           Dialysis Facility (if applicable)   Name:  Address:  Dialysis Schedule:  Phone:  Fax:    / signature: {Esignature:356715580}    PHYSICIAN SECTION    Prognosis: Fair    Condition at Discharge: Stable    Rehab Potential (if transferring to Rehab): Fair    Recommended Labs or Other Treatments After Discharge: Obtain RFP every 5 days and to follow up with nephrology     Physician Certification: I certify the above information and transfer of Aleyda Chavez  is necessary for the continuing treatment of the diagnosis listed and that she requires [place] for greater 30 days.      Update Admission H&P: No change in H&P    PHYSICIAN SIGNATURE:  Electronically signed by Kal Pandya MD on 9/8/22 at 2:55 PM EDT

## 2022-09-07 NOTE — PROGRESS NOTES
Per nephrology order, 1 manual PD exchange completed with 2000ml dialysate 1.5%. Effluent clear. I&O recorded in flowsheet. Body fluid sent off to lab. Second exchange dwelling, to be drained at 0730 am. Catheter exit site care completed. New dressing placed over site on 9/6. Pt tolerated well.

## 2022-09-07 NOTE — CARE COORDINATION
CM  following for  d/c planning:    Patient  admitted  from:  SNF  w/  PD  on site  . [address] Available   Skilled Nursing        Address   [address] [place] [address] [address]    [phone]           CM  will follow up with Slime Howard in admissions  [phone]  if patient can  return when  medically stable  ( and pt agreeable )  . Electronically signed by Tariq Herzog RN on 9/7/2022 at [place] [address]  RN Case Manager  [facility]  [address] [place] [address].   [facility] [address]  [phone]  Fax [phone]

## 2022-09-07 NOTE — CONSULTS
Clinical Pharmacy Progress Note  Medication History      Asked to verify home medications by Dr. Deedee Espinosa. List of of current medications patient is taking is complete. Home Medication list in EPIC updated to reflect changes noted below. Source of information: medication list from [facility]  Per NH report, last doses of maintenance meds were given 9/5. Pt refused meds on 9/6.   Unclear when pt had last doses of PRN meds - marked as \"unknown\" last dose times, but pt has active orders for them at [facility]     Changes made to home medication list:   Medications removed (no longer taking):  none     Medications added:   Bisacodyl prn  Oxycodone prn     Medication doses / instructions adjusted:   none     Please call with questions--  Thanks--  Justin Sanchez, PharmD, [facility], [facility]  [id number] ([facility])   9/7/2022 7:15 AM      Current Outpatient Medications   Medication Instructions    acetaminophen (TYLENOL) 1,000 mg, Oral, EVERY 6 HOURS PRN    aspirin 81 mg, Oral, DAILY    B Complex-C-Folic Acid (VIRT-CAPS) 1 MG CAPS 1 capsule, Oral, DAILY    benztropine (COGENTIN) 1 mg, Oral, Nightly    bisacodyl (DULCOLAX) 10 mg, Rectal, DAILY PRN    buPROPion (WELLBUTRIN SR) 150 mg, Oral, DAILY    calcitRIOL (ROCALTROL) 0.25 mcg, Oral, DAILY    dicyclomine (BENTYL) 10 mg, Oral, 4 TIMES DAILY    doxepin (SINEQUAN) 50 mg, Oral, NIGHTLY, One hour before bedtime    meclizine (ANTIVERT) 12.5 mg, Oral, 3 TIMES DAILY PRN    melatonin 5 mg, Oral, NIGHTLY    metoprolol succinate (TOPROL XL) 25 mg, Oral, DAILY    nystatin (MYCOSTATIN) 500,000 Units, Oral, 4 TIMES DAILY    oxyCODONE (ROXICODONE) 5 mg, Oral, EVERY 6 HOURS PRN    rosuvastatin (CRESTOR) 40 mg, Oral, Nightly    sennosides-docusate sodium (SENOKOT-S) 8.6-50 MG tablet 1 tablet, Oral, DAILY    sevelamer (RENVELA) 1,600 mg, Oral, 3 TIMES DAILY WITH MEALS

## 2022-09-07 NOTE — PROGRESS NOTES
now      Electrolytes:  Lab Results   Component Value Date    CREATININE 11.2 (HH) 09/07/2022    BUN 44 (H) 09/07/2022     09/07/2022    K 4.1 09/07/2022    K 4.1 09/07/2022     09/07/2022    CO2 25 09/07/2022            # CKD- MBD:   Secondary hyperparathyroidism due to renal failure  Monitor Phos level while here. Lab Results   Component Value Date    PTH 48.6 09/07/2022    CALCIUM 8.7 09/07/2022    PHOS 7.3 (H) 09/07/2022         Acid/Base status:  Check labs. Volume status/BP:  No volume overload issues    BP stable. Hematology:   CKD related anemia  Goal Hgb 10-11    Lab Results   Component Value Date    IRON 98 08/12/2019    TIBC 275 08/12/2019    FERRITIN 1,631.0 (H) 09/07/2022     Lab Results   Component Value Date    WBC 6.4 09/07/2022    HGB 7.3 (L) 09/07/2022    HCT 21.8 (L) 09/07/2022    MCV 81.2 09/07/2022     09/07/2022             Reason for Consult and Chief Complaint     Reason for consult: ESRD    Chief complaint:   Chief Complaint   Patient presents with    Other     Has not had dialysis since thur       History of Present Illness   Jaylon Fuller is a 65 y.o. with PMH significant for ESRD on PD was recently discharged from hospital to rehab after getting treatment for peritonitis. Per report patient was refusing PD there and now transferred to ER for \" Altered mental status\". Patient is awake and alert and she does have cognitive issues and to me she appear to be ~ her baseline mental status. Patient is able to follow simple commands and she denied SOB. Patient is very poor historian and she always say \"yes\" to pain question so cannot assess if she has any abdominal or chest pain but she look comfortable. No report of fever. No GI symptoms. Patient just finished IP antibiotics for peritonitis and last IP Ab were on 9/2        Review of Systems   Positive in bold or unable to assess     GEN: Fever, chills, night sweats.   HEENT: Changes in vision, sore throat, rhinorrhea   CVS: Chest pain, palpitations, swelling or edema in legs  Pulmonary: Cough, hemoptysis, SOB  GI: Nausea, vomiting, diarrhea, constipation, abdominal pain  : Bladder incontinence, dysuria,hematuria. MSK: Muscle or joint or bone pains  Skin: Rashes, ulcers, skin thickness  CNS: Headache, dizziness, confusion, focal weakness, seizure. Psych: Anxiety, agitation, depression. Reviewed all 12 systems, negative except as above. Past Medical History     Past Medical History:   Diagnosis Date    JONATHAN (acute kidney injury) ([facility] 9/3/2014    Anxiety     Asthma     Bipolar disorder ([facility]     Bronchitis     Chronic back pain     Depression     Diabetes mellitus ([facility]     DM II (diabetes mellitus, type II), controlled ([facility] 9/3/2014    Hypertension          Past Surgical History     Past Surgical History:   Procedure Laterality Date    ENDOMETRIAL ABLATION      INNER EAR SURGERY      TUBAL LIGATION           Family History     Family History   Problem Relation Age of Onset    Diabetes Mother     Cancer Mother     Cancer Maternal Grandmother     Heart Disease Maternal Grandfather          Social History     Social History     Tobacco Use    Smoking status: Some Days     Packs/day: 0.05     Years: 15.00     Pack years: 0.75     Types: Cigarettes    Smokeless tobacco: Never   Substance Use Topics    Alcohol use: No          Past medical, family, and social histories were reviewed as previously documented. Updates were made as necessary. Inpatient Medications and Allergies       Scheduled Meds:    Allergies:    Allergies   Allergen Reactions    Latex      Added based on information entered during case entry, please review and add reactions, type, and severity as needed    Peanuts [Peanut Oil] Hives and Swelling     Swelling of tongue/face    Tomato Hives and Swelling     Swelling of tongue/face    Shellfish-Derived Products          Vital Signs     Vitals:    09/07/22 0304   BP: (!) 158/93   Pulse: 89 Resp: 18   Temp: 97.1 °F (36.2 °C)   SpO2: 95%         Intake/Output Summary (Last 24 hours) at 9/7/2022 0904  Last data filed at 9/7/2022 1466  Gross per 24 hour   Intake 2000 ml   Output 2775 ml   Net -775 ml         Physical Exam     General appearance: NAD  Head: Normocephalic, without obvious abnormality, atraumatic   Mouth: Moist mucous membrane  Neck: Supple. Lungs: Good air entry bilaterally. No respiratory distress on RA  Heart: S1, S2.  Abdomen: soft, non-tender non-distended  Extremities: Mild  leg edema, warm to touch   Skin: No concerning rashes noted  Psych: good eye contact, normal affect  Neuro: Awake and alert and communicating.  Non focal.       Laboratory Data     Please see above     Diagnostic Studies   Pertinent images reviewed

## 2022-09-08 LAB
ALBUMIN SERPL-MCNC: 2.5 G/DL (ref 3.4–5)
ANION GAP SERPL CALCULATED.3IONS-SCNC: 10 MMOL/L (ref 3–16)
APPEARANCE FLUID: CLEAR
BUN BLDV-MCNC: 39 MG/DL (ref 7–20)
CALCIUM SERPL-MCNC: 8.6 MG/DL (ref 8.3–10.6)
CELL COUNT FLUID TYPE: NORMAL
CHLORIDE BLD-SCNC: 100 MMOL/L (ref 99–110)
CLOT EVALUATION: NORMAL
CO2: 29 MMOL/L (ref 21–32)
COLOR FLUID: COLORLESS
CREAT SERPL-MCNC: 10 MG/DL (ref 0.6–1.1)
FLUID DIFF COMMENT: NORMAL
GFR AFRICAN AMERICAN: 5
GFR NON-AFRICAN AMERICAN: 4
GLUCOSE BLD-MCNC: 160 MG/DL (ref 70–99)
NUCLEATED CELLS FLUID: 3 /CUMM
PHOSPHORUS: 6.5 MG/DL (ref 2.5–4.9)
POTASSIUM SERPL-SCNC: 4 MMOL/L (ref 3.5–5.1)
RBC FLUID: <1000 /CUMM
SODIUM BLD-SCNC: 139 MMOL/L (ref 136–145)
URINE CULTURE, ROUTINE: NORMAL

## 2022-09-08 PROCEDURE — 6370000000 HC RX 637 (ALT 250 FOR IP)

## 2022-09-08 PROCEDURE — 97530 THERAPEUTIC ACTIVITIES: CPT

## 2022-09-08 PROCEDURE — 2580000003 HC RX 258

## 2022-09-08 PROCEDURE — 97166 OT EVAL MOD COMPLEX 45 MIN: CPT

## 2022-09-08 PROCEDURE — 97162 PT EVAL MOD COMPLEX 30 MIN: CPT

## 2022-09-08 PROCEDURE — 6370000000 HC RX 637 (ALT 250 FOR IP): Performed by: STUDENT IN AN ORGANIZED HEALTH CARE EDUCATION/TRAINING PROGRAM

## 2022-09-08 PROCEDURE — 89050 BODY FLUID CELL COUNT: CPT

## 2022-09-08 PROCEDURE — 6360000002 HC RX W HCPCS: Performed by: STUDENT IN AN ORGANIZED HEALTH CARE EDUCATION/TRAINING PROGRAM

## 2022-09-08 PROCEDURE — 97116 GAIT TRAINING THERAPY: CPT

## 2022-09-08 PROCEDURE — 6370000000 HC RX 637 (ALT 250 FOR IP): Performed by: NURSE PRACTITIONER

## 2022-09-08 PROCEDURE — 36415 COLL VENOUS BLD VENIPUNCTURE: CPT

## 2022-09-08 PROCEDURE — 90945 DIALYSIS ONE EVALUATION: CPT

## 2022-09-08 PROCEDURE — 6360000002 HC RX W HCPCS

## 2022-09-08 PROCEDURE — 1200000000 HC SEMI PRIVATE

## 2022-09-08 PROCEDURE — 80069 RENAL FUNCTION PANEL: CPT

## 2022-09-08 PROCEDURE — 97535 SELF CARE MNGMENT TRAINING: CPT

## 2022-09-08 PROCEDURE — 2580000003 HC RX 258: Performed by: STUDENT IN AN ORGANIZED HEALTH CARE EDUCATION/TRAINING PROGRAM

## 2022-09-08 RX ORDER — SEVELAMER CARBONATE 800 MG/1
1600 TABLET, FILM COATED ORAL
Qty: 90 TABLET | Refills: 3 | Status: SHIPPED | OUTPATIENT
Start: 2022-09-08

## 2022-09-08 RX ORDER — MIRTAZAPINE 7.5 MG/1
7.5 TABLET, FILM COATED ORAL NIGHTLY
Qty: 30 TABLET | Refills: 3 | Status: SHIPPED | OUTPATIENT
Start: 2022-09-08

## 2022-09-08 RX ORDER — MIRTAZAPINE 15 MG/1
7.5 TABLET, FILM COATED ORAL NIGHTLY
Status: DISCONTINUED | OUTPATIENT
Start: 2022-09-08 | End: 2022-09-09 | Stop reason: HOSPADM

## 2022-09-08 RX ADMIN — NYSTATIN 500000 UNITS: 100000 SUSPENSION ORAL at 14:10

## 2022-09-08 RX ADMIN — GENTAMICIN SULFATE: 1 OINTMENT TOPICAL at 20:53

## 2022-09-08 RX ADMIN — SODIUM CHLORIDE, PRESERVATIVE FREE 10 ML: 5 INJECTION INTRAVENOUS at 09:35

## 2022-09-08 RX ADMIN — BUPROPION HYDROCHLORIDE 150 MG: 150 TABLET, EXTENDED RELEASE ORAL at 09:34

## 2022-09-08 RX ADMIN — DICYCLOMINE HYDROCHLORIDE 10 MG: 10 CAPSULE ORAL at 12:36

## 2022-09-08 RX ADMIN — NYSTATIN 500000 UNITS: 100000 SUSPENSION ORAL at 20:52

## 2022-09-08 RX ADMIN — IRON SUCROSE 200 MG: 20 INJECTION, SOLUTION INTRAVENOUS at 13:10

## 2022-09-08 RX ADMIN — HEPARIN SODIUM 5000 UNITS: 5000 INJECTION INTRAVENOUS; SUBCUTANEOUS at 14:08

## 2022-09-08 RX ADMIN — SEVELAMER CARBONATE 1600 MG: 800 TABLET, FILM COATED ORAL at 12:36

## 2022-09-08 RX ADMIN — DICYCLOMINE HYDROCHLORIDE 10 MG: 10 CAPSULE ORAL at 09:35

## 2022-09-08 RX ADMIN — SODIUM CHLORIDE, PRESERVATIVE FREE 10 ML: 5 INJECTION INTRAVENOUS at 20:53

## 2022-09-08 RX ADMIN — SEVELAMER CARBONATE 1600 MG: 800 TABLET, FILM COATED ORAL at 09:46

## 2022-09-08 RX ADMIN — DICYCLOMINE HYDROCHLORIDE 10 MG: 10 CAPSULE ORAL at 20:52

## 2022-09-08 RX ADMIN — SODIUM CHLORIDE: 9 INJECTION, SOLUTION INTRAVENOUS at 13:10

## 2022-09-08 RX ADMIN — Medication 5 MG: at 20:53

## 2022-09-08 RX ADMIN — SEVELAMER CARBONATE 1600 MG: 800 TABLET, FILM COATED ORAL at 16:57

## 2022-09-08 RX ADMIN — HEPARIN SODIUM 5000 UNITS: 5000 INJECTION INTRAVENOUS; SUBCUTANEOUS at 20:52

## 2022-09-08 RX ADMIN — MIRTAZAPINE 7.5 MG: 15 TABLET, FILM COATED ORAL at 20:53

## 2022-09-08 RX ADMIN — ASPIRIN 81 MG 81 MG: 81 TABLET ORAL at 09:34

## 2022-09-08 RX ADMIN — NYSTATIN 500000 UNITS: 100000 SUSPENSION ORAL at 09:34

## 2022-09-08 RX ADMIN — METOPROLOL SUCCINATE 25 MG: 25 TABLET, FILM COATED, EXTENDED RELEASE ORAL at 09:34

## 2022-09-08 RX ADMIN — EPOETIN ALFA-EPBX 10000 UNITS: 10000 INJECTION, SOLUTION INTRAVENOUS; SUBCUTANEOUS at 09:47

## 2022-09-08 RX ADMIN — DICYCLOMINE HYDROCHLORIDE 10 MG: 10 CAPSULE ORAL at 16:57

## 2022-09-08 ASSESSMENT — PAIN SCALES - GENERAL: PAINLEVEL_OUTOF10: 10
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
## 2022-09-08 NOTE — FLOWSHEET NOTE
09/07/22 1801   Vitals   /85   Temp 98.1 °F (36.7 °C)   Temp Source Oral   Heart Rate 83   Resp 16   SpO2 97 %   Weight 157 lb 10.1 oz (71.5 kg)       CCPD Order   Exchanges: 4   Exchange Volume: 2000 ml   Total Time: 9 hrs   Dextrose: 1.5%   Last Fill: 0 ml   Total Volume: 8000 ml     Orders verified. Supplies taken to pt's room. Report received. Cycler set up, primed and pre tested. Dressing changed on [facility] Cath site. Pt connected to cycler. CCPD initiated without problem. Initial effluent clear. If problems should arise please call the [phone] number on top of PD cycler machine.        If problems persist please call [phone]

## 2022-09-08 NOTE — PROGRESS NOTES
Physical Therapy  Facility/Department: [address]  Physical Therapy Initial Assessment / Treatment    Name: Fly Mccain  : 1965  MRN: [id number]  Date of Service: 2022    Discharge Recommendations: Fly Mccain scored a 19/24 on the AM-PAC short mobility form. Current research shows that an AM-PAC score of 17 or less is typically not associated with a discharge to the patient's home setting. Based on the patient's AM-PAC score and their current functional mobility deficits, it is recommended that the patient have 3-5 sessions per week of Physical Therapy at d/c to increase the patient's independence. Please see assessment section for further patient specific details. If patient discharges prior to next session this note will serve as a discharge summary. Please see below for the latest assessment towards goals. PT Equipment Recommendations  Equipment Needed: No      Patient Diagnosis(es): The encounter diagnosis was Altered mental status, unspecified altered mental status type. Past Medical History:  has a past medical history of JONATHAN (acute kidney injury) ([address] Anxiety, Asthma, Bipolar disorder ([address] Bronchitis, Chronic back pain, Depression, Diabetes mellitus ([address] DM II (diabetes mellitus, type II), controlled ([address] and Hypertension. Past Surgical History:  has a past surgical history that includes Inner ear surgery; Tubal ligation; and Endometrial ablation. Assessment   Body Structures, Functions, Activity Limitations Requiring Skilled Therapeutic Intervention: Decreased functional mobility ; Decreased safe awareness;Decreased cognition;Decreased strength;Decreased balance  Assessment: Pt is a 65 y.o. who recently restarted her PD after 6 days w/o. Pt required CGA while ambulating w/o a device. Minimal unsteadiness while ambulating w/o an AD. Pt became intermittently emotional and aggitated throughout session when discussing SNF and d/c options.  Pt will benefit from continued IP care. Upon d/c PT rec skilled care d/t cognitive status and below baseline status of functional mobility required for her return home. Treatment Diagnosis: decreased functional mobility  Therapy Prognosis: Good  Decision Making: Medium Complexity  Requires PT Follow-Up: Yes  Activity Tolerance  Activity Tolerance: Patient tolerated treatment well;Patient tolerated evaluation without incident     Plan   Plan  Plan:  (2-5x a week)  Current Treatment Recommendations: Strengthening, Balance training, Functional mobility training, Transfer training, Patient/Caregiver education & training, Therapeutic activities, Endurance training, Gait training  Safety Devices  Type of Devices: Left in chair, Nurse notified, Gait belt, Chair alarm in place, Call light within reach     Restrictions  Position Activity Restriction  Other position/activity restrictions: up with assist     Subjective   General  Chart Reviewed: Yes  Additional Pertinent Hx: Adm 9/6 with AMS and refusing dialysis from NH. Referring Practitioner: Yuniel Kwon  Diagnosis: AMS  Follows Commands: Within Functional Limits  Subjective  Subjective: Pt found in bed upon PT arrival, Pt agreeable to therapy. Pt notes no pain. Pt emotional when discussing recent SNF experience as she states they did not allow her to exit her room.          Social/Functional History  Social/Functional History  Type of Home: Facility  Bathroom Shower/Tub: Walk-in shower (at facility)  Bathroom Equipment: Shower chair  Bathroom Accessibility: Accessible  Home Equipment: [address], 4 wheeled (has [employer] at home)  ADL Assistance: Needs assistance  Homemaking Assistance: Needs assistance  Ambulation Assistance: Independent  Transfer Assistance: Independent  Vision/Hearing  Vision  Vision: Impaired  Vision Exceptions: Wears glasses for reading  Hearing  Hearing: Within functional limits    Cognition   Orientation  Overall Orientation Status: Within Functional Limits  Cognition  Overall Cognitive Status: Exceptions  Cognition Comment: Pt has decreased safety awareness, Pt was intermittently aggitated.  Pt admits to denying PD for multiple days     Objective   AROM RLE (degrees)  RLE AROM: WFL  AROM LLE (degrees)  LLE AROM : WFL  Strength RLE  Strength RLE: WFL  Strength LLE  Strength LLE: WFL           Bed mobility  Rolling to Right: Independent  Supine to Sit: Independent  Transfers  Sit to Stand: Stand by assistance  Stand to sit: Stand by assistance  Ambulation  Surface: level tile  Device: No Device  Assistance: Contact guard assistance  Quality of Gait: Pt demonstrated decreased gait speed with minimal unsteadiness  Distance: 200ft x 2, 10ft        OutComes Score  AM-PAC Score  AM-PAC Inpatient Mobility Raw Score : 19 (09/08/22 0936)  AM-PAC Inpatient T-Scale Score : 45.44 (09/08/22 0936)  Mobility Inpatient CMS 0-100% Score: 41.77 (09/08/22 0936)  Mobility Inpatient CMS G-Code Modifier : CK (09/08/22 0936)     Goals  Short Term Goals  Time Frame for Short term goals: d/c  Short term goal 1: Ambulate 400ft with supervision  Short term goal 2: complete all transfers independently  Patient Goals   Patient goals : return home       Education  Patient Education  Education Given To: Patient  Education Provided:  (role of therapy / POC)  Education Method: Verbal  Barriers to Learning: Cognition  Education Outcome: Verbalized understanding;Continued education needed      Therapy Time   Individual Concurrent Group Co-treatment   Time In 0830         Time Out 0910         Minutes 40             Timed code treatment minutes: 25    Total treatment minutes: [facility], Student Physical Therapist

## 2022-09-08 NOTE — FLOWSHEET NOTE
Disconnected from CCPD per protocol. Effluent: Clear  Total time: 09 hr 44 min   Total UF:  109 ml. Total Volume:  8007 ml. Dwell time gained:  01 hr 16 min. Pt Tolerated procedure: Pt mumbling - unable to understand.      Cell count & differential sent to lab.       09/08/22 0658   Vitals   BP (!) 142/82   Temp 98.4 °F (36.9 °C)   Heart Rate 84   Resp 14   Weight 154 lb 15.7 oz (70.3 kg)   Cycler   Ultrafiltration (UF) (mL) 109 mL   Average Dwell Time (Hours:Minutes) 103   Lost Dwell Time (Hours:Minutes) 0076

## 2022-09-08 NOTE — PLAN OF CARE
Problem: Discharge Planning  Goal: Discharge to home or other facility with appropriate resources  Outcome: Progressing  Flowsheets (Taken 9/7/2022 1950)  Discharge to home or other facility with appropriate resources: Identify barriers to discharge with patient and caregiver     Problem: Safety - Adult  Goal: Free from fall injury  Outcome: Progressing     Problem: ABCDS Injury Assessment  Goal: Absence of physical injury  Recent Flowsheet Documentation  Taken 9/8/2022 0058 by Rodolfo Cazares RN  Absence of Physical Injury: Implement safety measures based on patient assessment     Problem: Chronic Conditions and Co-morbidities  Goal: Patient's chronic conditions and co-morbidity symptoms are monitored and maintained or improved  Recent Flowsheet Documentation  Taken 9/7/2022 1950 by Estrella Villarreal 34 - Patient's Chronic Conditions and Co-Morbidity Symptoms are Monitored and Maintained or Improved: Monitor and assess patient's chronic conditions and comorbid symptoms for stability, deterioration, or improvement

## 2022-09-08 NOTE — CARE COORDINATION
CM  following for  d/c planning:    Patient  admitted  from:  SNF  w/  PD  on site  . [address] Available   Skilled Nursing        Address   [address] [place] [address] [address]    [phone]           CM  will follow up with Miguel Ruiz in admissions  [phone]  if patient can  return when  medically stable  ( and pt agreeable )  . Patient  was  out of room  working with  PTOT this am .     Electronically signed by Darian Anders RN on 9/8/2022 at 11:12 AM        Darian Anders RN Case Manager  [facility]  [address] [place] [address].   [facility] [address] [phone]  Fax [phone]

## 2022-09-08 NOTE — PROGRESS NOTES
Nephrology Consult Note        [facility]    [email or web address]       Phone: [phone]                                                  9/8/2022 7:46 AM     Patient: Scot Yates [id number]  [id number]  Date of Admit: 9/6/2022 LOS: 2 days      Interval History and Plan:  Patient seen at bedside  Awake and alert  No complaints. On room air. No SOB. Vitals stable  Lytes stable  Iron level low      Continue PD daily  Continue oral nystatin swish and swallow to minimize chances of fungal peritonitis till 9/9/22  Topical gentamicin ~ PD catheter exit site  Stool softeners to prevent constipation. Continue Sevelamer  Stop Calcitriol as PTH is low. Trend PTH as outpatient. Epogen three times per week for anemia. IV iron while here. Improve nutrition   Avoid nephrotoxins  Monitor input, output and weight  Monitor labs and vitals closely  Renally dose all medications  Patient was advised to be compliant with dialysis and she agree. D/W patient  PD orders placed and informed dialysis nurse      Thank you for allowing us to participate in this patient's care    In case of any question please call us at our 24 hour answering service [phone] or from 7 AM to 5 PM via [employer] or cell number    Selina Bellamy MD  [facility]  [address], [address] [place] [address] Ave  Fax: [phone]  Office: [phone]       16 [facility] ? CT head neg for acute process  Non focal exam  No evidence of peritonitis. Likely due to non compliance with dialysis and underlying psych issues but mental status look at her baseline. Renal function:  ESRD  On PD  Per report patient did not have PD since discharge  PD catheter: Look clean without any erythema, discharge or tenderness. ( Examined upon admission)       Recent Peritonitis:  Finished Antibiotics on 9/2/22  PD fluid clear.    No evidence of peritonitis now      Electrolytes:  Lab Results   Component Value Date CREATININE 11.2 (HH) 09/07/2022    BUN 44 (H) 09/07/2022     09/07/2022    K 4.1 09/07/2022    K 4.1 09/07/2022     09/07/2022    CO2 25 09/07/2022            # CKD- MBD:   Secondary hyperparathyroidism due to renal failure  Monitor Phos level while here. Lab Results   Component Value Date    PTH 48.6 09/07/2022    CALCIUM 8.7 09/07/2022    PHOS 7.3 (H) 09/07/2022         Acid/Base status:  Check labs. Volume status/BP:  No volume overload issues    BP stable. Hematology:   CKD related anemia  Goal Hgb 10-11    Lab Results   Component Value Date    IRON 71 09/07/2022    TIBC 151 (L) 09/07/2022    FERRITIN 1,631.0 (H) 09/07/2022     Lab Results   Component Value Date    WBC 6.4 09/07/2022    HGB 7.3 (L) 09/07/2022    HCT 21.8 (L) 09/07/2022    MCV 81.2 09/07/2022     09/07/2022             Reason for Consult and Chief Complaint     Reason for consult: ESRD    Chief complaint:   Chief Complaint   Patient presents with    Other     Has not had dialysis since thur       History of Present Illness   Fly Mccain is a 65 y.o. with PMH significant for ESRD on PD was recently discharged from hospital to rehab after getting treatment for peritonitis. Per report patient was refusing PD there and now transferred to ER for \" Altered mental status\". Patient is awake and alert and she does have cognitive issues and to me she appear to be ~ her baseline mental status. Patient is able to follow simple commands and she denied SOB. Patient is very poor historian and she always say \"yes\" to pain question so cannot assess if she has any abdominal or chest pain but she look comfortable. No report of fever. No GI symptoms. Patient just finished IP antibiotics for peritonitis and last IP Ab were on 9/2        Review of Systems   Positive in bold or unable to assess     GEN: Fever, chills, night sweats.   HEENT: Changes in vision, sore throat, rhinorrhea   CVS: Chest pain, palpitations, swelling or edema in legs  Pulmonary: Cough, hemoptysis, SOB  GI: Nausea, vomiting, diarrhea, constipation, abdominal pain  : Bladder incontinence, dysuria,hematuria. MSK: Muscle or joint or bone pains  Skin: Rashes, ulcers, skin thickness  CNS: Headache, dizziness, confusion, focal weakness, seizure. Psych: Anxiety, agitation, depression. Reviewed all 12 systems, negative except as above. Past Medical History     Past Medical History:   Diagnosis Date    JONATHAN (acute kidney injury) [facility] 9/3/2014    Anxiety     Asthma     Bipolar disorder ([facility]     Bronchitis     Chronic back pain     Depression     Diabetes mellitus ([facility]     DM II (diabetes mellitus, type II), controlled ([facility] 9/3/2014    Hypertension          Past Surgical History     Past Surgical History:   Procedure Laterality Date    ENDOMETRIAL ABLATION      INNER EAR SURGERY      TUBAL LIGATION           Family History     Family History   Problem Relation Age of Onset    Diabetes Mother     Cancer Mother     Cancer Maternal Grandmother     Heart Disease Maternal Grandfather          Social History     Social History     Tobacco Use    Smoking status: Some Days     Packs/day: 0.05     Years: 15.00     Pack years: 0.75     Types: Cigarettes    Smokeless tobacco: Never   Substance Use Topics    Alcohol use: No          Past medical, family, and social histories were reviewed as previously documented. Updates were made as necessary. Inpatient Medications and Allergies       Scheduled Meds:    Allergies:    Allergies   Allergen Reactions    Latex      Added based on information entered during case entry, please review and add reactions, type, and severity as needed    Peanuts [Peanut Oil] Hives and Swelling     Swelling of tongue/face    Tomato Hives and Swelling     Swelling of tongue/face    Shellfish-Derived Products          Vital Signs     Vitals:    09/08/22 0658   BP: (!) 142/82   Pulse: 84   Resp: 14   Temp: 98.4 °F (36.9 °C)   SpO2: Intake/Output Summary (Last 24 hours) at 9/8/2022 0746  Last data filed at 9/8/2022 5782  Gross per 24 hour   Intake 2360 ml   Output 2619 ml   Net -259 ml           Physical Exam     General appearance: NAD  Head: Normocephalic, without obvious abnormality, atraumatic   Mouth: Moist mucous membrane  Neck: Supple. Lungs: Good air entry bilaterally. No respiratory distress on RA  Heart: S1, S2.  Abdomen: soft, non-tender non-distended  Extremities: Mild  leg edema, warm to touch   Skin: No concerning rashes noted  Psych: good eye contact, normal affect  Neuro: Awake and alert and communicating.  Non focal.       Laboratory Data     Please see above     Diagnostic Studies   Pertinent images reviewed

## 2022-09-08 NOTE — DISCHARGE INSTRUCTIONS
Please stop taking your doxepin and continue taking your Remeron. Continue taking your other medications as prescribe before.    Establish yourself with [facility] outpatient for psychiatry

## 2022-09-08 NOTE — PROGRESS NOTES
CCPD Order   Exchanges: 4   Exchange Volume: 2000 ml   Total Time: 9 hrs   Dextrose: 1.5%   Last Fill: 0 ml   Total Volume: 8000 ml     Orders verified. Supplies taken to pt's room. Report received. Cycler set up, primed and pre tested. Dressing changed on [facility] Cath site. Pt connected to cycler. CCPD initiated without problem. Initial effluent clear. If problems should arise please call the [phone] on top of PD cycler machine.        If problems persist please call [phone]

## 2022-09-08 NOTE — DISCHARGE SUMMARY
INTERNAL MEDICINE DEPARTMENT AT [address] [place] [address]  DISCHARGE SUMMARY    Patient ID: Joann Parks                                             Discharge Date: 9/9/2022   Patient's PCP: Margarito Taylor DO, DO                                          Discharge Physician: Isael Sheffield MD  Admit Date: 9/6/2022   Admitting Physician: No admitting provider for patient encounter. PROBLEMS DURING HOSPITALIZATION:  Present on Admission:   AMS (altered mental status)   HTN (hypertension)   ESRD on peritoneal dialysis ([facility]   DM II (diabetes mellitus, type II), controlled ([facility]    DISCHARGE DIAGNOSES:    HPI: Joann Parks is a 65 y.o. female F with PMH of ESRD on PD, anxiety, bipolar disorder, DM2, HTN. Presents to the ED from [facility] for worsening confusion and denying dialysis for past week. Patient was AAO x2 on presentation and reported she thinks her \"abdominal infection is returning\" which is why she is here. Recently admitted on 8/20/2022 for SBP and was treated with Vanco and Cefepime (completed 9/2) and discharged to SNF. The receiving SNF reported she was getting increasingly confused, denying medications and dialysis (last session 9/1/2022), walking around naked and having extreme emotional lability with frequent crying. Denied fever, chills, burning micturition, urinary urgency/frequency. In the ED she was hemodynamically stable. BUN 46, creatinine 11.8, K 5.4 and Mg 2.9.  UA revealing calcium oxalate and uric acid crystals. CT head WO obtained for confusion was nonrevealing. CXR nonrevealing. Given worsening confusion and no peritoneal dialysis for 6 days, she was admitted with nephrology consulted for dialysis. She gets dialysis with Dr. Jolene Evans at [facility] [place]. Amber. Dr. Abimael Wilson saw her in the ED and instructed to collect in the peritoneal dialysis fluid for CBC/culture to be collected after 2 to 3 hours of PD.     The following issues were addressed during hospitalization:    Acute Metabolic Encephalopathy  Polypharmacy  Increasing confusion in setting of missing dialysis. History of BPD and anxiety with acute emotional lability and erratic behavior at SNF. CT head no acute abnormalities. Likely metabolic encephalopathy due to uremia from missed PD. Her mental status improved greatly with dialysis, however medication review by pharmacy revealed several psychoactive home medications including doxepin, benztropine, bupropion, oxycodone. Psychiatry consulted for polypharmacy and recommended to continue wellbutrin, discontinue doxepin, starting remeron, and following up outpatient at [facility]. ESRD on PD  Denied dialysis for 6 days. Last dialysis on 9/1/22. Elevated K to 5.4, Mg 2.9, BUN 46. Nephrology consulted and she received daily peritoneal dialysis with improvement in electrolyte status. Chronic Medical Conditions    Anemia of CKD  Anemia of Chronic Disease  Hgb 7.9> 7.3 on admission, down from baseline ~10. No source of bleeding. No folate or B12 deficiency, likely ACD/A-ESRD  given elevated ferritin, normal iron levels. Colonoscopy & EGD March 2022 w/ 4 benign polyps & small hiatal hernia. Likely d/t ESRD/ACD. Retacrit injections initiated by Nephro. Anxiety/Bipolar disorder  Psychiatry consulted. Bupropion continued. Doxepin discontinued. Remeron started. Hypertension  Initially hypertensive in ED, normalized over admission. Managed with home 30 mg nifedipine, 25 toprol xl. Type 2 Diabetes Mellitus  A1C 6.4 7/22/22. [facility] inpatient, hypoglycemia protocol    Physical Exam:  /70   Pulse 75   Temp 97.2 °F (36.2 °C) (Oral)   Resp 18   Ht 5' 8\" (1.727 m)   Wt 153 lb 14.1 oz (69.8 kg)   SpO2 96%   BMI 23.40 kg/m²     Physical Exam  Constitutional:       Appearance: Normal appearance. She is normal weight. Cardiovascular:      Rate and Rhythm: Normal rate and regular rhythm. Pulmonary:      Effort: Pulmonary effort is normal. No respiratory distress.       Breath sounds: Normal breath sounds. No stridor. No wheezing or rhonchi. Musculoskeletal:         General: Normal range of motion. Right lower leg: No edema. Left lower leg: No edema. Skin:     General: Skin is warm and dry. Neurological:      General: No focal deficit present. Mental Status: She is alert and oriented to person, place, and time. Psychiatric:         Mood and Affect: Mood normal.         Behavior: Behavior normal.         Thought Content: Thought content normal.         Judgment: Judgment normal.      Consults: nephrology and psychiatry  Significant Diagnostic Studies:  None  Treatments: dialysis: CCPD and remeron, wellbutrin  Disposition: Home with [facility]  Discharged Condition: Stable  Follow Up: Primary Care Physician at . DISCHARGE MEDICATION:     Medication List        START taking these medications      mirtazapine 7.5 MG tablet  Commonly known as: REMERON  Take 1 tablet by mouth nightly            CHANGE how you take these medications      nystatin [id number] UNIT/ML suspension  Commonly known as: MYCOSTATIN  Take 5 mLs by mouth in the morning, at noon, and at bedtime for 5 doses  What changed: when to take this            CONTINUE taking these medications      acetaminophen 500 MG tablet  Commonly known as: TYLENOL     aspirin 81 MG chewable tablet  Take 1 tablet by mouth daily     benztropine 1 MG tablet  Commonly known as: COGENTIN     bisacodyl 10 MG suppository  Commonly known as: DULCOLAX     buPROPion 150 MG extended release tablet  Commonly known as: WELLBUTRIN SR  Take 1 tablet by mouth daily     calcitRIOL 0.25 MCG capsule  Commonly known as: ROCALTROL     dicyclomine 10 MG capsule  Commonly known as: Bentyl  Take 1 capsule by mouth in the morning and 1 capsule at noon and 1 capsule in the evening and 1 capsule before bedtime.      meclizine 12.5 MG tablet  Commonly known as: ANTIVERT     melatonin 5 MG Tabs tablet  Take 1 tablet by mouth nightly     metoprolol succinate 25 MG extended release tablet  Commonly known as: TOPROL XL  Take 1 tablet by mouth daily     oxyCODONE 5 MG immediate release tablet  Commonly known as: ROXICODONE     rosuvastatin 40 MG tablet  Commonly known as: CRESTOR     sennosides-docusate sodium 8.6-50 MG tablet  Commonly known as: SENOKOT-S  Take 1 tablet by mouth in the morning. sevelamer 800 MG tablet  Commonly known as: RENVELA  Take 2 tablets by mouth 3 times daily (with meals)            STOP taking these medications      doxepin 50 MG capsule  Commonly known as: SINEQUAN     NIFEdipine 30 MG extended release tablet  Commonly known as: ADALAT CC     torsemide 20 MG tablet  Commonly known as: DEMADEX     Virt-Caps 1 MG Caps          Medication Instructions:    Please stop taking your             Where to Get Your Medications        These medications were sent to [place], [address] [employer] [phone] - F [phone] [address] [facility] [address]      Phone: [phone]   mirtazapine 7.5 MG tablet  nystatin [id number] UNIT/ML suspension  sevelamer 800 MG tablet       Activity: activity as tolerated  Diet: Low potassium, low phosphorus  Wound Care: none needed    Time Spent on discharge is more than 30 minutes    Signed:  Bailey Mcfarland MD,  PGY-1  9/9/2022

## 2022-09-08 NOTE — PROGRESS NOTES
Occupational Therapy  Facility/Department: [address]  Occupational Therapy Initial Assessment and Treatment      Name: Jaylon Fuller  : 1965  MRN: [id number]  Date of Service: 2022    Discharge Recommendations:  Jaylon Fuller scored a 20/24 on the AM-PAC ADL Inpatient form. Current research shows that an AM-PAC score of 17 or less is typically not associated with a discharge to the patient's home setting. Based on the patient's AM-PAC score and their current ADL deficits, it is recommended that the patient have 3-5 sessions per week of Occupational Therapy at d/c to increase the patient's independence. Please see assessment section for further patient specific details. If patient discharges prior to next session this note will serve as a discharge summary. Please see below for the latest assessment towards goals. OT Equipment Recommendations  Equipment Needed: No       Patient Diagnosis(es): The encounter diagnosis was Altered mental status, unspecified altered mental status type. Past Medical History:  has a past medical history of JONATHAN (acute kidney injury) ([address] Anxiety, Asthma, Bipolar disorder ([address] Bronchitis, Chronic back pain, Depression, Diabetes mellitus ([address] DM II (diabetes mellitus, type II), controlled ([address] and Hypertension. Past Surgical History:  has a past surgical history that includes Inner ear surgery; Tubal ligation; and Endometrial ablation. Treatment Diagnosis: impaired ADLs/transfers      Assessment   Performance deficits / Impairments: Decreased functional mobility ; Decreased ADL status; Decreased balance  Assessment: Presenting from SNF w/ AMS (missed PD treatments). Pt reports at baseline, she lives alone and independent w/ ADLs, IADLs, and ambulating w/o AD; utilizes Lyft for transportation. Presently, pt requiring CGA for functional transfers and unsteady during mobility. Pt admits to falls at home. Safety concerns for home alone.  Feel pt will benefit from ongoing OT upon discharge. Should pt discharge home, recommend 24 hr ASSIST and HHOT. Continue per POC. Treatment Diagnosis: impaired ADLs/transfers  Prognosis: Good  Decision Making: Medium Complexity  REQUIRES OT FOLLOW-UP: Yes  Activity Tolerance  Activity Tolerance: Patient Tolerated treatment well        Plan   Plan  Times per Week: 2-5  Times per Day: Daily  Current Treatment Recommendations: Balance training, Functional mobility training, Safety education & training, Self-Care / ADL, Home management training     Restrictions  Position Activity Restriction  Other position/activity restrictions: up with assist    Subjective   General  Chart Reviewed: Yes  Additional Pertinent Hx: 65 y.o. F who presents to the ED from [facility] for worsening confusion and denying dialysis for past week. Hospital Course: dialysis. PMH of ESRD on PD, anxiety, bipolar disorder, DM2, HTN  Family / Caregiver Present: No  Referring Practitioner: Haylee Clark MD  Diagnosis: AMS    Subjective  Subjective: In chair on arrival - eating lunch. Reporting she did not have a good experience at [facility] \"they wouldn't let me leave my room. \" \"I just want to go home. \"    9/10 abd pain - nurse informed    Social/Functional History  Social/Functional History  Lives With: Alone  Type of Home: Apartment  Home Layout: One level  Home Access:  (3+3)  Bathroom Shower/Tub: Tub/Shower unit  Bathroom Toilet: Standard  Bathroom Equipment: Shower chair  Bathroom Accessibility: Accessible  Home Equipment: Clide [place]ore, 4 wheeled  ADL Assistance: Independent (does own peritoneal dialysis)  Homemaking Assistance: Independent  Ambulation Assistance: Independent  Transfer Assistance: Independent  Active : No  Patient's  Info: takes Lyft to [employer]  Occupation: On disability  Additional Comments: Pt has been at the [facility] since recent discharge from hospital (reports she had a fall at home).  Per chart review, missing PD treatments at SNF and had AMS. Objective                  Safety Devices  Type of Devices: Left in chair;Nurse notified;Gait belt; Chair alarm in place;Call light within reach    Balance  Sitting: Intact  Standing:  (static - SBA; dynamic - CGA)    Gait  Overall Level of Assistance:  (CG/Min A - ambulating in hallway (2 laps). Pt staggers and sways during mobility and intermittently reaching out to wall.)    Toilet Transfers  Toilet - Technique: Ambulating  Equipment Used: Standard toilet (w/ bar)  Toilet Transfer: Contact guard assistance    AROM: Within functional limits  Strength: Within functional limits  Coordination: Within functional limits    ADL  Feeding: Independent;Setup  LE Dressing: Contact guard assistance       Activity Tolerance  Activity Tolerance: Patient tolerated treatment well;Patient tolerated evaluation without incident    Bed mobility  Bed Mobility Comments: NT - in chair on arrival; in chair at end of treatment    Transfers  Sit to stand: Contact guard assistance  Stand to sit: Contact guard assistance    Vision  Vision: Impaired  Vision Exceptions: Wears glasses for reading  Hearing  Hearing: Within functional limits    Cognition  Overall Cognitive Status: Exceptions  Safety Judgement: Decreased awareness of need for safety;Decreased awareness of need for assistance  Insights: Decreased awareness of deficits  Cognition Comment: Pt has decreased safety awareness, Tearful at times. Orientation  Overall Orientation Status: Within Functional Limits                    Education Given To: Patient  Education Provided: Role of Therapy;Plan of Care;ADL Adaptive Strategies;Transfer Training; Fall Prevention Strategies  Education Method: Verbal  Education Outcome: Continued education needed                    Pt seen by OT for eval and treat.  Treatment included: functional transfer/mobility, ADL, pt education                                                   AM-PAC Score        AM-PAC Inpatient Daily Activity Raw Score: 20 (09/08/22 1457)  AM-PAC Inpatient ADL T-Scale Score : 42.03 (09/08/22 1457)  ADL Inpatient CMS 0-100% Score: 38.32 (09/08/22 1457)  ADL Inpatient CMS G-Code Modifier : CJ (09/08/22 1457)        Goals  Short Term Goals  Time Frame for Short term goals: Discharge  Short Term Goal 1: toilet transfer w/ Supervision  Short Term Goal 2: functional mobility for item transport and retrieval using LRAD, Supervision  Short Term Goal 3: LB dressing w/ Supervision  Patient Goals   Patient goals : to return home       Therapy Time   Individual Concurrent Group Co-treatment   Time In 1330         Time Out 1408         Minutes 38          Timed Code Treatment Minutes:   23    Total Treatment Minutes:  [facility] OTR/L #[id number]

## 2022-09-08 NOTE — PROGRESS NOTES
Progress Note    Admit Date: 9/6/2022  Diet: ADULT DIET; Regular; Low Potassium (Less than 3000 mg/day); Low Phosphorus (Less than 1000 mg)    CC: AMS    Interval history:   - NAEO  - Received PD this AM  - No abdominal pain today  - Mentating well, mood and behavior normal today    HPI: Tory Andrade is a 65 y.o. female with PMH of ESRD on PD, anxiety, bipolar disorder, DM2, HTN who presents to the ED from [facility] for worsening confusion and denying dialysis for past week. Patient was AAO x2 on my exam and reported she thinks that her \"abdominal infection is returning Drenda Duhamel is why she is here. Patient was recently admitted on 8/20/2022 for SBP and was treated with broad-spectrum ABX Vanco and cefepime and discharged to SNF (last dose vanc/cef on 9/2). I called the SNF who reported patient has been getting increasingly confused and denying care including medications and her dialysis (last dialysis was 9/1/2022). She has been walking around naked at SNF has been extremely emotionally labile with frequent crying episodes. Denied any fever, chills, burning micturition, urinary urgency/frequency. In the ED patient is hemodynamically stable. BUN 46, creatinine 11.8, potassium 5.4 and mag of 2.9.  UA revealing calcium oxalate and uric acid crystals. CT head without contrast obtained for confusion was nonrevealing. CXR nonrevealing. Given patient's worsening confusion and no peritoneal dialysis since past 6 days, she needs to be admitted with internal medicine and nephrology consulted for dialysis. Patient gets dialysis per Dr. Gilford Harman with [address] [place] [address] nephrology group. Dr. Davis Leavitt saw the patient in the ED and confirmed patient to be admitted inpatient for dialysis and also reported not to collect in the peritoneal dialysis fluid for CBC/culture in the ED as it would be a dry fluid and needs at least 2 to 3 hours for collection. Hence will be collected by the dialysis nurse tomorrow a.m.     Medications: Scheduled Meds:   buPROPion  150 mg Oral Daily    sevelamer  1,600 mg Oral TID WC    dianeal lo-josh 1.5%  8,000 mL IntraPERitoneal Daily    nystatin  5 mL Oral TID    gentamicin   Topical Daily    epoetin samanta-epbx  10,000 Units SubCUTAneous Once per day on Tue Thu Sat    aspirin  81 mg Oral Daily    dicyclomine  10 mg Oral 4x Daily    melatonin  5 mg Oral Nightly    metoprolol succinate  25 mg Oral Daily    sodium chloride flush  5-40 mL IntraVENous 2 times per day    heparin (porcine)  5,000 Units SubCUTAneous 3 times per day     Continuous Infusions:   sodium chloride       PRN Meds:bisacodyl, oxyCODONE, sodium chloride flush, sodium chloride, ondansetron **OR** ondansetron, polyethylene glycol, acetaminophen **OR** acetaminophen    Objective:   Vitals:   T-max:  Patient Vitals for the past 8 hrs:   BP Temp Temp src Pulse Resp SpO2 Weight   09/08/22 0937 135/80 98.9 °F (37.2 °C) Oral 84 16 96 % --   09/08/22 0658 (!) 142/82 98.4 °F (36.9 °C) -- 84 14 -- 154 lb 15.7 oz (70.3 kg)   09/08/22 0553 -- -- -- -- -- -- 155 lb (70.3 kg)   09/08/22 0353 (!) 158/84 98.8 °F (37.1 °C) Oral 85 14 98 % --         Intake/Output Summary (Last 24 hours) at 9/8/2022 0941  Last data filed at 9/8/2022 5223  Gross per 24 hour   Intake 360 ml   Output 809 ml   Net -449 ml       Review of Systems    Physical Exam  Constitutional:       General: She is not in acute distress. Appearance: Normal appearance. She is not ill-appearing. HENT:      Head: Normocephalic and atraumatic. Cardiovascular:      Rate and Rhythm: Normal rate and regular rhythm. Pulmonary:      Effort: Pulmonary effort is normal.      Breath sounds: Normal breath sounds. Abdominal:      General: There is distension. Tenderness: There is no abdominal tenderness. There is no guarding or rebound. Musculoskeletal:         General: No swelling. Right lower leg: No edema. Left lower leg: No edema. Skin:     Coloration: Skin is not jaundiced. Neurological:      General: No focal deficit present. Mental Status: She is alert. LABS:    CBC:   Recent Labs     09/06/22 1517 09/07/22 0736   WBC 7.7 6.4   HGB 7.9* 7.3*   HCT 24.4* 21.8*    198   MCV 81.8 81.2       Renal:    Recent Labs     09/06/22 1517 09/07/22 0736 09/08/22 0718   * 137 139   K 5.4* 4.1  4.1 4.0   CL 97* 101 100   CO2 28 25 29   BUN 46* 44* 39*   CREATININE 11.8* 11.2* 10.0*   GLUCOSE 66* 75 160*   CALCIUM 9.4 8.7 8.6   MG 2.90*  --   --    PHOS  --  7.3* 6.5*   ANIONGAP 10 11 10       Hepatic:   Recent Labs     09/06/22 2212 09/07/22 0736 09/08/22 0718   AST 50*  --   --    ALT 36  --   --    BILITOT <0.2  --   --    BILIDIR <0.2  --   --    PROT 4.9*  --   --    LABALBU 2.6* 2.6* 2.5*   ALKPHOS 83  --   --        Troponin:   Recent Labs     09/06/22 2212   TROPONINI 0.08*       BNP: No results for input(s): BNP in the last 72 hours. Lipids: No results for input(s): CHOL, HDL in the last 72 hours. Invalid input(s): LDLCALCU, TRIGLYCERIDE  ABGs:  No results for input(s): PHART, QJV8RXP, PO2ART, KGV6WOA, BEART, THGBART, D5IHLSEU, MCP5LBW in the last 72 hours. INR: No results for input(s): INR in the last 72 hours. Lactate: No results for input(s): LACTATE in the last 72 hours. Cultures:    BloodCx x 2: NGTD    Peritoneal Fluid Cx: NGTD    UrineCX: NGTD  -----------------------------------------------------------------  RAD:   XR CHEST PORTABLE   Final Result      1. Negative portable chest.         CT HEAD WO CONTRAST   Final Result      No acute intracranial process. Assessment/Plan:     Hardy Humphries is a 65 y.o. female with PMH of ESRD on PD, anxiety, bipolar disorder, DM2, HTN admitted from [facility] for acute metabolic encephalopathy 2/2 uremia 2/2 missed dialysis  for a week(last session 6 days prior). Acute Metabolic Encephalopathy  Polypharmacy  Increasing confusion in setting of missing dialysis.  Also w hx of BPD and anxiety. Emotionally labile and AOx2 on interview. - Monitor for improvement w dialysis  - 9/7: Med rec completed, showing several psychoactive medications including doxepin, benztropine, bupropion, oxycodone. Mental status has improved after 2 cycles of PD.  - Psychiatry consulted, appreciate recs     ESRD on PD  SNF reported patient has been getting increasingly confused and denying care including medications and her dialysis. Last dialysis on 9/1/22. Elevated K to 5.4, Mg 2.9, BUN 46. CT head no acute abnormalities  - Nephro consult  - Dialysis  - Monitor daily BMP reflex Mg  - Monitor for improvement in mental status w re initiation of dialysis     Chronic Medical Conditions  Normocytic anemia  Hgb 7.9 on admission, down from baseline ~10. No source of bleeding. Colonoscopy & EGD March 2022 w/ 4 benign polyps & small hiatal hernia. Likely d/t ESRD/ACD. - Monitor serial CBC: Hgb 7.9> 7.3  - Ferritin elevated 1631  - Iron, iron saturation, Folate normal, B12 elevated  - TIBC elevated at 151  - Retacrit injections per Nephro     Anxiety/Bipolar disorder  - Cont. 150 bupropion daily, 50 mg doxepin nightly    Hypertension  Initially hypertensive in ED, now normalizing  - Continue home 30 mg nifedipine, 25 toprol xl    Diabetes mellitus  - Hx DM2, A1C 6.4 7/22/22  - LDSSI inpatient, hypoglycemia protocol     Will discuss with attending physician Dr. Daja Hill.     Code Status: Full Code  FEN: Regular  PPX: SQH  DISPO: Lorenza Little MD, PGY-1  09/08/22  9:41 AM    This patient has been staffed and discussed with Dionicio Murray MD.

## 2022-09-09 VITALS
WEIGHT: 153.88 LBS | RESPIRATION RATE: 18 BRPM | OXYGEN SATURATION: 96 % | HEART RATE: 75 BPM | BODY MASS INDEX: 23.32 KG/M2 | SYSTOLIC BLOOD PRESSURE: 132 MMHG | DIASTOLIC BLOOD PRESSURE: 70 MMHG | TEMPERATURE: 97.2 F | HEIGHT: 68 IN

## 2022-09-09 LAB
ALBUMIN SERPL-MCNC: 2.7 G/DL (ref 3.4–5)
ANION GAP SERPL CALCULATED.3IONS-SCNC: 9 MMOL/L (ref 3–16)
BUN BLDV-MCNC: 34 MG/DL (ref 7–20)
CALCIUM SERPL-MCNC: 8.8 MG/DL (ref 8.3–10.6)
CHLORIDE BLD-SCNC: 102 MMOL/L (ref 99–110)
CO2: 29 MMOL/L (ref 21–32)
CREAT SERPL-MCNC: 9.3 MG/DL (ref 0.6–1.1)
GFR AFRICAN AMERICAN: 5
GFR NON-AFRICAN AMERICAN: 4
GLUCOSE BLD-MCNC: 85 MG/DL (ref 70–99)
PHOSPHORUS: 5.4 MG/DL (ref 2.5–4.9)
POTASSIUM SERPL-SCNC: 4.2 MMOL/L (ref 3.5–5.1)
SODIUM BLD-SCNC: 140 MMOL/L (ref 136–145)

## 2022-09-09 PROCEDURE — 36415 COLL VENOUS BLD VENIPUNCTURE: CPT

## 2022-09-09 PROCEDURE — 6370000000 HC RX 637 (ALT 250 FOR IP)

## 2022-09-09 PROCEDURE — 2580000003 HC RX 258

## 2022-09-09 PROCEDURE — 80069 RENAL FUNCTION PANEL: CPT

## 2022-09-09 PROCEDURE — 97530 THERAPEUTIC ACTIVITIES: CPT

## 2022-09-09 PROCEDURE — 6360000002 HC RX W HCPCS: Performed by: STUDENT IN AN ORGANIZED HEALTH CARE EDUCATION/TRAINING PROGRAM

## 2022-09-09 PROCEDURE — 6370000000 HC RX 637 (ALT 250 FOR IP): Performed by: STUDENT IN AN ORGANIZED HEALTH CARE EDUCATION/TRAINING PROGRAM

## 2022-09-09 PROCEDURE — 2580000003 HC RX 258: Performed by: STUDENT IN AN ORGANIZED HEALTH CARE EDUCATION/TRAINING PROGRAM

## 2022-09-09 RX ADMIN — NYSTATIN 500000 UNITS: 100000 SUSPENSION ORAL at 08:00

## 2022-09-09 RX ADMIN — SEVELAMER CARBONATE 1600 MG: 800 TABLET, FILM COATED ORAL at 07:59

## 2022-09-09 RX ADMIN — BUPROPION HYDROCHLORIDE 150 MG: 150 TABLET, EXTENDED RELEASE ORAL at 08:00

## 2022-09-09 RX ADMIN — IRON SUCROSE 200 MG: 20 INJECTION, SOLUTION INTRAVENOUS at 11:28

## 2022-09-09 RX ADMIN — METOPROLOL SUCCINATE 25 MG: 25 TABLET, FILM COATED, EXTENDED RELEASE ORAL at 10:17

## 2022-09-09 RX ADMIN — OXYCODONE 5 MG: 5 TABLET ORAL at 10:17

## 2022-09-09 RX ADMIN — SODIUM CHLORIDE, PRESERVATIVE FREE 10 ML: 5 INJECTION INTRAVENOUS at 10:22

## 2022-09-09 RX ADMIN — SODIUM CHLORIDE, PRESERVATIVE FREE 10 ML: 5 INJECTION INTRAVENOUS at 08:00

## 2022-09-09 RX ADMIN — ASPIRIN 81 MG 81 MG: 81 TABLET ORAL at 08:00

## 2022-09-09 RX ADMIN — DICYCLOMINE HYDROCHLORIDE 10 MG: 10 CAPSULE ORAL at 10:22

## 2022-09-09 ASSESSMENT — PAIN DESCRIPTION - LOCATION: LOCATION: ABDOMEN

## 2022-09-09 ASSESSMENT — PAIN - FUNCTIONAL ASSESSMENT: PAIN_FUNCTIONAL_ASSESSMENT: PREVENTS OR INTERFERES SOME ACTIVE ACTIVITIES AND ADLS

## 2022-09-09 ASSESSMENT — PAIN DESCRIPTION - ORIENTATION: ORIENTATION: MID

## 2022-09-09 ASSESSMENT — PAIN DESCRIPTION - INTENSITY: RATING_2: 0

## 2022-09-09 ASSESSMENT — PAIN DESCRIPTION - DESCRIPTORS: DESCRIPTORS: CRAMPING

## 2022-09-09 ASSESSMENT — PAIN SCALES - WONG BAKER: WONGBAKER_NUMERICALRESPONSE: 0

## 2022-09-09 ASSESSMENT — PAIN DESCRIPTION - ONSET: ONSET: SUDDEN

## 2022-09-09 ASSESSMENT — PAIN SCALES - GENERAL: PAINLEVEL_OUTOF10: 6

## 2022-09-09 ASSESSMENT — PAIN DESCRIPTION - PAIN TYPE: TYPE: ACUTE PAIN

## 2022-09-09 ASSESSMENT — PAIN DESCRIPTION - FREQUENCY: FREQUENCY: INTERMITTENT

## 2022-09-09 NOTE — CARE COORDINATION
Case Management Assessment            Discharge Note                    Date / Time of Note: 9/9/2022 12:44 PM                  Discharge Note Completed by: Lety Alexis RN    Patient Name: Zenaida Timmons   YOB: 1965  Diagnosis: Altered mental status, unspecified altered mental status type [R41.82]  AMS (altered mental status) [R41.82]   Date / Time: 9/6/2022  2:00 PM    Current PCP: Donny Monreal DO,   Clinic patient: No    Hospitalization in the last 30 days: Yes    Advance Directives:  Code Status: Full Code  [address] DNR form completed and on chart: No    Financial:  Payor: Kai Nur / Plan: Regan Hinson / Product Type: *No Product type* /      Pharmacy:    [address] [id number] [facility], [facility] [address] [place] [address]  Phone: [phone] Fax: [phone]    [address] [id number] - [place] [employer] - [address] [place] [phone] - F [phone]  [facility] [employer] [address]  Phone: [phone] Fax: [phone]    [address] [id number] - [place], [place] - [address] [facility] [address] [facility] [phone] - f [phone]  [address] [place] [address]  Phone: [phone] Fax: [phone]    [address] [place], [place] - [facility] [phone] [place] [phone]  [address] [place] [address]  Phone: [phone] Fax: [phone]      Assistance purchasing medications?: Potential Assistance Purchasing Medications: No  Assistance provided by Case Management: None at this time    Does patient want to participate in local refill/ meds to beds program?:      Meds To Beds General Rules:  1. Can ONLY be done Monday- Friday between 8:30am-5pm  2. Prescription(s) must be in pharmacy by 3pm to be filled same day  3. Copy of patient's insurance/ prescription drug card and patient face sheet must be sent along with the prescription(s)  4. Cost of Rx cannot be added to hospital bill. If financial assistance is needed, please contact unit  or ;  or  CANNOT provide pharmacy voucher for patients co-pays  5.  Patients can then  the prescription on their way out of the hospital at discharge, or pharmacy can deliver to the bedside if staff is available. (payment due at time of pick-up or delivery - cash, check, or card accepted)     Able to afford home medications/ co-pay costs: Yes    ADLS:  Current PT AM-PAC Score: 19 /24  Current OT AM-PAC Score: 20 /24      DISCHARGE Disposition: Home with [address]: SN PTOT       LOC at discharge: Skilled  [address] Completed: No    Notification completed in HENS/PAS?:  Not Applicable    IMM Completed:   Not Indicated    Transportation:  Transportation PLAN for discharge:  Lyft    Mode of Transport:  Lyft  Reason for medical transport: Not Applicable  Name of Transport Company: Not Applicable  Time of Transport: when  Meds  filled by Pharmacy  will arrange  Lyft    Transport form completed: No    Home Care:  [address] ordered at discharge: Yes  [address]: [facility]  Phone: [phone]  Fax: [phone]  Orders faxed: Yes  can  pull orders from [address]:  [facility] Provider: FRANCISCO  Equipment obtained during hospitalization: NA    Home Oxygen and Respiratory Equipment:  Oxygen needed at discharge?: No  [address]: Not Applicable  Portable tank available for discharge?: No    Dialysis:  Dialysis patient: Yes    Dialysis Center:  Not Applicable  Has  CCPD at home      Hospice Services:  Location: Not Applicable  Agency: Not Applicable    Consents signed: Not Indicated    Referrals made at [facility] for outpatient continued care:  [address]    Additional CM Notes:     Cm confirmed  d/c home today w/ [facility]      Patient prev  active  w/ [facility] :  ores to be pulled from Epic:    Patient  declines  SNF and  per  PTOT cleared f or HOme ,  Patient states she will be compliant  with PD at home and staes she has  cycler and  supplies  to perform  as previous to admission.      -  Patient  states she has key to apt but her son works and not avail to transport , her other son Apoorva Webster lives in [place] and  [place] left VM ( HIPPA compliant )   Patient  will be  provided a  Lyft  once  Meds to beds are  filled  for pt to take with her  ,  CM  d/w [employer] as well. Patient to follow up out pt ., w/  PCP Nephro and  [facility], as instructed:      [facility]:  Meds to beds Utilized:      these medications from [facility], [address] Jay Márquez [phone] - F [phone]  mirtazapine  nystatin  sevelamer    Schedule an appointment with Dereck Saha,  as soon as possible for a visit in 1 week(s)  [facility]   [phone]    Schedule an appointment with Dr. Durga Rodriguez MD as soon as possible for a visit in 2 week(s)  [facility]   [address] [facility]   [phone]    Schedule an appointment with [facility] as soon as possible for a visit in 2 month(s)  [facility] [address] [phone]      The Plan for Transition of Care is related to the following treatment goals of Altered mental status, unspecified altered mental status type [R41.82]  AMS (altered mental status) [R41.82]    The Patient and/or patient representative Clyde and her family were provided with a choice of provider and agrees with the discharge plan Yes    Freedom of choice list was provided with basic dialogue that supports the patient's individualized plan of care/goals and shares the quality data associated with the providers.  Yes    Care Transitions patient: No    Jhon Marie RN  [facility]  Case Management Department  Ph: [phone]

## 2022-09-09 NOTE — PROGRESS NOTES
Disconnected from [employer] per protocol. Effluent: Clear/ pale yellow   Total time: 9 hr 23 min   Total UF:  593 ml. Total Volume:  8007 ml. Dwell time gained:  1 hr 25 min. Pt Tolerated procedure:  Well   Report given to: Radha Moore RN

## 2022-09-09 NOTE — CARE COORDINATION
CTN contacted maryjo with [facility] [phone]. They have accepted this patient and will pull referral from [facility].  They will contact patient and make arrangements for [facility] by 9/11  Electronically signed by Livia Mars LPN on 7/5/6809 at 18:70 AM

## 2022-09-09 NOTE — PLAN OF CARE
Problem: Safety - Adult  Goal: Free from fall injury  Outcome: Progressing     Problem: ABCDS Injury Assessment  Goal: Absence of physical injury  Outcome: Progressing     Problem: Chronic Conditions and Co-morbidities  Goal: Patient's chronic conditions and co-morbidity symptoms are monitored and maintained or improved  Outcome: Progressing     Patient ambulates well with physical therapy in hallway, medications per order, denies needs. Will continue to monitor.

## 2022-09-09 NOTE — PROGRESS NOTES
Nephrology Consult Note        [facility]    [employer]       Phone: [phone]                                                  9/9/2022 8:55 AM     Patient: Fly Mccain [id number]  [id number]  Date of Admit: 9/6/2022 LOS: 3 days      Interval History and Plan:  Patient seen at bedside  Awake and alert  No complaints. On room air. No SOB. Vitals stable  Labs pending. Continue PD daily  Stop nystatin swish and swallow  Topical gentamicin ~ PD catheter exit site  Stool softeners to prevent constipation. Continue Sevelamer  Stop Calcitriol as PTH is low. Trend PTH as outpatient. Epogen three times per week for anemia. IV iron while here. Improve nutrition   Avoid nephrotoxins  Monitor input, output and weight  Monitor labs and vitals closely  Renally dose all medications  Patient was advised to be compliant with dialysis and she agree. D/W patient        Thank you for allowing us to participate in this patient's care    In case of any question please call us at our 24 hour answering service [phone] or from 7 AM to 5 PM via [employer] or cell number    Jennifer Ocampo MD  [facility]ph[facility]  [address], [facility] [address]  Fax: [phone]  Office: [phone]       [facility] ? CT head neg for acute process  Non focal exam  No evidence of peritonitis. Likely due to non compliance with dialysis and underlying psych issues but mental status look at her baseline. Renal function:  ESRD  On PD  Per report patient did not have PD since discharge  PD catheter: Look clean without any erythema, discharge or tenderness. ( Examined upon admission)   Now at baseline. Recent Peritonitis:  Finished Antibiotics on 9/2/22  PD fluid clear.    No evidence of peritonitis now      Electrolytes:  Lab Results   Component Value Date    CREATININE 10.0 (HH) 09/08/2022    BUN 39 (H) 09/08/2022     09/08/2022    K 4.0 09/08/2022  09/08/2022    CO2 29 09/08/2022            # CKD- MBD:   Secondary hyperparathyroidism due to renal failure  Monitor Phos level while here. Lab Results   Component Value Date    PTH 48.6 09/07/2022    CALCIUM 8.6 09/08/2022    PHOS 6.5 (H) 09/08/2022         Acid/Base status:  Check labs. Volume status/BP:  No volume overload issues    BP stable. Hematology:   CKD related anemia  Goal Hgb 10-11    Lab Results   Component Value Date    IRON 71 09/07/2022    TIBC 151 (L) 09/07/2022    FERRITIN 1,631.0 (H) 09/07/2022     Lab Results   Component Value Date    WBC 6.4 09/07/2022    HGB 7.3 (L) 09/07/2022    HCT 21.8 (L) 09/07/2022    MCV 81.2 09/07/2022     09/07/2022             Reason for Consult and Chief Complaint     Reason for consult: ESRD    Chief complaint:   Chief Complaint   Patient presents with    Other     Has not had dialysis since thur       History of Present Illness   Scot Yates is a 65 y.o. with PMH significant for ESRD on PD was recently discharged from hospital to rehab after getting treatment for peritonitis. Per report patient was refusing PD there and now transferred to ER for \" Altered mental status\". Patient is awake and alert and she does have cognitive issues and to me she appear to be ~ her baseline mental status. Patient is able to follow simple commands and she denied SOB. Patient is very poor historian and she always say \"yes\" to pain question so cannot assess if she has any abdominal or chest pain but she look comfortable. No report of fever. No GI symptoms. Patient just finished IP antibiotics for peritonitis and last IP Ab were on 9/2        Review of Systems   Positive in bold or unable to assess     GEN: Fever, chills, night sweats.   HEENT: Changes in vision, sore throat, rhinorrhea   CVS: Chest pain, palpitations, swelling or edema in legs  Pulmonary: Cough, hemoptysis, SOB  GI: Nausea, vomiting, diarrhea, constipation, abdominal pain  : Bladder incontinence, dysuria,hematuria. MSK: Muscle or joint or bone pains  Skin: Rashes, ulcers, skin thickness  CNS: Headache, dizziness, confusion, focal weakness, seizure. Psych: Anxiety, agitation, depression. Reviewed all 12 systems, negative except as above. Past Medical History     Past Medical History:   Diagnosis Date    JONATHAN (acute kidney injury) ([facility] 9/3/2014    Anxiety     Asthma     Bipolar disorder ([facility]     Bronchitis     Chronic back pain     Depression     Diabetes mellitus ([facility]     DM II (diabetes mellitus, type II), controlled ([facility] 9/3/2014    Hypertension          Past Surgical History     Past Surgical History:   Procedure Laterality Date    ENDOMETRIAL ABLATION      INNER EAR SURGERY      TUBAL LIGATION           Family History     Family History   Problem Relation Age of Onset    Diabetes Mother     Cancer Mother     Cancer Maternal Grandmother     Heart Disease Maternal Grandfather          Social History     Social History     Tobacco Use    Smoking status: Some Days     Packs/day: 0.05     Years: 15.00     Pack years: 0.75     Types: Cigarettes    Smokeless tobacco: Never   Substance Use Topics    Alcohol use: No          Past medical, family, and social histories were reviewed as previously documented. Updates were made as necessary. Inpatient Medications and Allergies       Scheduled Meds:    Allergies:    Allergies   Allergen Reactions    Latex      Added based on information entered during case entry, please review and add reactions, type, and severity as needed    Peanuts [Peanut Oil] Hives and Swelling     Swelling of tongue/face    Tomato Hives and Swelling     Swelling of tongue/face    Shellfish-Derived Products          Vital Signs     Vitals:    09/09/22 0836   BP:    Pulse:    Resp:    Temp: 97.2 °F (36.2 °C)   SpO2:          Intake/Output Summary (Last 24 hours) at 9/9/2022 0855  Last data filed at 9/8/2022 1800  Gross per 24 hour   Intake 360 ml   Output -- Net 360 ml           Physical Exam     General appearance: NAD  Head: Normocephalic, without obvious abnormality, atraumatic   Mouth: Moist mucous membrane  Neck: Supple. Lungs: Good air entry bilaterally. No respiratory distress on RA  Heart: S1, S2.  Abdomen: soft, non-tender non-distended  Extremities: Mild  leg edema, warm to touch   Skin: No concerning rashes noted  Psych: good eye contact, normal affect  Neuro: Awake and alert and communicating.  Non focal.       Laboratory Data     Please see above     Diagnostic Studies   Pertinent images reviewed

## 2022-09-09 NOTE — PROGRESS NOTES
home    Education  Patient Education  Education Given To: Patient  Education Method: Verbal  Barriers to Learning: Cognition  Education Outcome: Verbalized understanding;Continued education needed    Therapy Time   Individual Concurrent Group Co-treatment   Time In 0808         Time Out 0825         Minutes [address] [facility] STEPHANI Nino

## 2022-09-10 LAB
BLOOD CULTURE, ROUTINE: NORMAL
BODY FLUID CULTURE, STERILE: NORMAL
GRAM STAIN RESULT: NORMAL
POTASSIUM REFLEX MAGNESIUM: 4 MMOL/L (ref 3.5–5.1)
POTASSIUM REFLEX MAGNESIUM: 4.2 MMOL/L (ref 3.5–5.1)

## 2022-09-11 LAB — CULTURE, BLOOD 2: NORMAL

## 2022-09-20 PROBLEM — R79.89 ELEVATED TROPONIN: Status: RESOLVED | Noted: 2022-08-21 | Resolved: 2022-09-20

## 2022-09-20 PROBLEM — R77.8 ELEVATED TROPONIN: Status: RESOLVED | Noted: 2022-08-21 | Resolved: 2022-09-20

## 2022-09-26 LAB
FUNGUS (MYCOLOGY) CULTURE: NORMAL
FUNGUS (MYCOLOGY) CULTURE: NORMAL
FUNGUS STAIN: NORMAL
FUNGUS STAIN: NORMAL

## 2022-09-30 NOTE — ED PROVIDER NOTES
[address] [facility] RESIDENT NOTE         Date of evaluation: 9/6/2022     Chief Complaint      Other (Has not had dialysis since thur)        History of Present Illness      Keo Biggs is a 65 y.o. female with PMH of ESRD on PD, anxiety, bipolar disorder, DM2, HTN who presents to the ED from [facility] for worsening confusion and denying dialysis for past week. Patient was AAO x2 on my exam and reported she thinks that her \"abdominal infection is returning Yuliya Ghulam is why she is here. Patient was recently admitted on 8/20/2022 for SBP and was treated with broad-spectrum ABX Vanco and cefepime and discharged to SNF. I called the SNF who reported patient has been getting increasingly confused and denying care including medications and her dialysis (last dialysis was 9/1/2022). She has been walking around naked at SNF has been extremely emotionally labile with frequent crying episodes. Denied any fever, chills, burning micturition, urinary urgency/frequency. Review of Systems      Review of Systems   Constitutional:  Positive for activity change and appetite change. Gastrointestinal:  Positive for abdominal pain. Past Medical, Surgical, Family, and Social History      She has a past medical history of JONATHAN (acute kidney injury) ([address] Anxiety, Asthma, Bipolar disorder ([address] Bronchitis, Chronic back pain, Depression, Diabetes mellitus ([address] DM II (diabetes mellitus, type II), controlled ([address] and Hypertension. She has a past surgical history that includes Inner ear surgery; Tubal ligation; and Endometrial ablation. Her family history includes Cancer in her maternal grandmother and mother; Diabetes in her mother; Heart Disease in her maternal grandfather. She reports that she has been smoking cigarettes. She has a 0.75 pack-year smoking history.  She has never used smokeless tobacco. She reports that she does not drink alcohol and does not use LABS:         Results for orders placed or performed during the hospital encounter of 09/06/22   CBC with Auto Differential   Result Value Ref Range     WBC 7.7 4.0 - 11.0 K/uL     RBC 2.98 (L) 4.00 - 5.20 M/uL     Hemoglobin 7.9 (L) 12.0 - 16.0 g/dL     Hematocrit 24.4 (L) 36.0 - 48.0 %     MCV 81.8 80.0 - 100.0 fL     MCH 26.6 26.0 - 34.0 pg     MCHC 32.6 31.0 - 36.0 g/dL     RDW 15.6 (H) 12.4 - 15.4 %     Platelets 503 034 - 209 K/uL     MPV 6.4 5.0 - 10.5 fL     Neutrophils % 68.5 %     Lymphocytes % 20.0 %     Monocytes % 7.4 %     Eosinophils % 3.4 %     Basophils % 0.7 %     Neutrophils Absolute 5.3 1.7 - 7.7 K/uL     Lymphocytes Absolute 1.5 1.0 - 5.1 K/uL     Monocytes Absolute 0.6 0.0 - 1.3 K/uL     Eosinophils Absolute 0.3 0.0 - 0.6 K/uL     Basophils Absolute 0.1 0.0 - 0.2 K/uL   Basic Metabolic Panel   Result Value Ref Range     Sodium 135 (L) 136 - 145 mmol/L     Potassium 5.4 (H) 3.5 - 5.1 mmol/L     Chloride 97 (L) 99 - 110 mmol/L     CO2 28 21 - 32 mmol/L     Anion Gap 10 3 - 16     Glucose 66 (L) 70 - 99 mg/dL     BUN 46 (H) 7 - 20 mg/dL     Creatinine 11.8 (HH) 0.6 - 1.1 mg/dL     GFR Non- 3 (A) >60     GFR  4 (A) >60     Calcium 9.4 8.3 - 10.6 mg/dL   Magnesium   Result Value Ref Range     Magnesium 2.90 (H) 1.80 - 2.40 mg/dL   Urinalysis with Reflex to Culture     Specimen: Urine   Result Value Ref Range     Color, UA Yellow Straw/Yellow     Clarity, UA Clear Clear     Glucose, Ur Negative Negative mg/dL     Bilirubin Urine Negative Negative     Ketones, Urine Negative Negative mg/dL     Specific Gravity, UA 1.015 1.005 - 1.030     Blood, Urine TRACE-INTACT (A) Negative     pH, UA 8.0 5.0 - 8.0     Protein, UA 30 (A) Negative mg/dL     Urobilinogen, Urine 0.2 <2.0 E.U./dL     Nitrite, Urine Negative Negative     Leukocyte Esterase, Urine Negative Negative     Microscopic Examination YES       Urine Type NotGiven       Urine Reflex to Culture Not Indicated Microscopic Urinalysis   Result Value Ref Range     WBC, UA 0-2 0 - 5 /HPF     RBC, UA 0-2 0 - 4 /HPF     Epithelial Cells, UA 6-10 (A) 0 - 5 /HPF     Bacteria, UA 2+ (A) None Seen /HPF     Crystals, UA Few Ca. Oxalate (A) None Seen /HPF     Crystals, UA Few Uric Acid (A) None Seen /HPF         ED BEDSIDE ULTRASOUND:  No results found. RECENT VITALS:  BP: 115/78, Temp: 98.7 °F (37.1 °C),Heart Rate: 79, Resp: 12, SpO2: 97 %      Procedures      None     ED Course      Nursing Notes, Past Medical Hx, Past Surgical Hx, Social Hx, Allergies, and FamilyHx were reviewed. The patient was giventhe following medications:  Encounter Medications        Orders Placed This Encounter   Medications    dianeal lo-josh 1.5% 2,000 mL    nystatin (MYCOSTATIN) 505210 UNIT/ML suspension 500,000 Units            CONSULTS:  IP CONSULT TO HOSPITALIST     [facility] / ASSESSMENT / Carroll Kiko is a 65 y.o. female with PMH of DM2 HTN, ESRD on PD, bipolar disorder, depression/anxiety. Initial evaluation patient was crying reported she does not know why she was sent to the SNF he thinks that the sent her here because her abdominal infection is coming back. Patient was recently admitted for SBP and was treated with intraperitoneal vancomycin and cefepime. I called the SNF who reported patient has been acting increasingly confused and emotionally labile. She has been actively refusing all her medications and has been refusing PD since last Thursday. They reported that she was walking around naked at SNF. On my assessment patient was AO x2, oriented to self and place was confused about the date but knew it was 2022. Patient kept repeating same thing about being confused about why she was here as she was told she was getting better. Patient is a poor historian. In the ED patient is hemodynamically stable.   BUN 46, creatinine 11.8, potassium 5.4 and mag of 2.9.  UA revealing calcium oxalate and uric acid crystals. CT head without contrast obtained for confusion was nonrevealing. CXR nonrevealing. Given patient's worsening confusion and no peritoneal dialysis since past 6 days, she needs to be admitted with internal medicine and nephrology consulted for dialysis. Patient gets dialysis per Dr. Natalie Manzo with [address] [place] [address] nephrology group. Dr. Evangelist Ruiz saw the patient in the ED and confirmed patient to be admitted inpatient for dialysis and also reported not to collect in the peritoneal dialysis fluid for CBC/culture in the ED as it would be a dry fluid and needs at least 2 to 3 hours for collection. Hence will be collected by the dialysis nurse tomorrow a.m. This patient was also evaluated by the attending physician. All care plans were discussed and agreed upon. Clinical Impression      1. Altered mental status, unspecified altered mental status type          Disposition      PATIENT REFERRED TO:  No follow-up provider specified.      DISCHARGE MEDICATIONS:      New Prescriptions     No medications on file         DISPOSITION Decision To Admit 09/06/2022 06:21:18 PM        Aruna Hicks MD  Resident  09/06/22 1821     Aruna Hicks MD  Resident  09/30/22 [id number]

## 2022-10-03 LAB
FUNGUS (MYCOLOGY) CULTURE: NORMAL
FUNGUS STAIN: NORMAL

## 2022-10-06 ENCOUNTER — APPOINTMENT (OUTPATIENT)
Dept: CT IMAGING | Age: 57
End: 2022-10-06
Payer: MEDICAID

## 2022-10-06 ENCOUNTER — HOSPITAL ENCOUNTER (EMERGENCY)
Age: 57
Discharge: HOME | End: 2022-10-06
Attending: EMERGENCY MEDICINE
Payer: MEDICAID

## 2022-10-06 VITALS
SYSTOLIC BLOOD PRESSURE: 116 MMHG | WEIGHT: 140 LBS | DIASTOLIC BLOOD PRESSURE: 75 MMHG | OXYGEN SATURATION: 100 % | HEART RATE: 72 BPM | HEIGHT: 68 IN | BODY MASS INDEX: 21.22 KG/M2 | RESPIRATION RATE: 18 BRPM | TEMPERATURE: 98.6 F
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
## 2022-10-06 DIAGNOSIS — R42 CHRONIC VERTIGO: Primary | ICD-10-CM

## 2022-10-06 LAB
ANION GAP SERPL CALCULATED.3IONS-SCNC: 13 MMOL/L (ref 3–16)
BASE EXCESS VENOUS: -0.2 MMOL/L (ref -2–3)
BASOPHILS ABSOLUTE: 0 K/UL (ref 0–0.2)
BASOPHILS RELATIVE PERCENT: 0.6 %
BUN BLDV-MCNC: 49 MG/DL (ref 7–20)
CALCIUM SERPL-MCNC: 8.8 MG/DL (ref 8.3–10.6)
CARBOXYHEMOGLOBIN: 1.9 % (ref 0–1.5)
CHLORIDE BLD-SCNC: 102 MMOL/L (ref 99–110)
CO2: 20 MMOL/L (ref 21–32)
CREAT SERPL-MCNC: 7.7 MG/DL (ref 0.6–1.1)
EKG ATRIAL RATE: 84 BPM
EKG DIAGNOSIS: NORMAL
EKG P AXIS: 57 DEGREES
EKG P-R INTERVAL: 174 MS
EKG Q-T INTERVAL: 404 MS
EKG QRS DURATION: 86 MS
EKG QTC CALCULATION (BAZETT): 477 MS
EKG R AXIS: -22 DEGREES
EKG T AXIS: 48 DEGREES
EKG VENTRICULAR RATE: 84 BPM
EOSINOPHILS ABSOLUTE: 0.6 K/UL (ref 0–0.6)
EOSINOPHILS RELATIVE PERCENT: 8.2 %
GFR AFRICAN AMERICAN: 7
GFR NON-AFRICAN AMERICAN: 5
GLUCOSE BLD-MCNC: 83 MG/DL (ref 70–99)
HCO3 VENOUS: 26 MMOL/L (ref 24–28)
HCT VFR BLD CALC: 29.1 % (ref 36–48)
HEMOGLOBIN, VEN, REDUCED: 33.3 %
HEMOGLOBIN: 9.3 G/DL (ref 12–16)
LACTIC ACID: 0.5 MMOL/L (ref 0.4–2)
LYMPHOCYTES ABSOLUTE: 1.4 K/UL (ref 1–5.1)
LYMPHOCYTES RELATIVE PERCENT: 18.8 %
MCH RBC QN AUTO: 27.3 PG (ref 26–34)
MCHC RBC AUTO-ENTMCNC: 31.9 G/DL (ref 31–36)
MCV RBC AUTO: 85.6 FL (ref 80–100)
METHEMOGLOBIN VENOUS: 0.5 % (ref 0–1.5)
MONOCYTES ABSOLUTE: 0.6 K/UL (ref 0–1.3)
MONOCYTES RELATIVE PERCENT: 7.8 %
NEUTROPHILS ABSOLUTE: 4.8 K/UL (ref 1.7–7.7)
NEUTROPHILS RELATIVE PERCENT: 64.6 %
O2 SAT, VEN: 66 %
PCO2, VEN: 49.4 MMHG (ref 41–51)
PDW BLD-RTO: 19.9 % (ref 12.4–15.4)
PH VENOUS: 7.33 (ref 7.35–7.45)
PLATELET # BLD: 219 K/UL (ref 135–450)
PMV BLD AUTO: 6.8 FL (ref 5–10.5)
PO2, VEN: 37.9 MMHG (ref 25–40)
POTASSIUM REFLEX MAGNESIUM: 4.9 MMOL/L (ref 3.5–5.1)
PRO-BNP: 1875 PG/ML (ref 0–124)
RBC # BLD: 3.4 M/UL (ref 4–5.2)
SODIUM BLD-SCNC: 135 MMOL/L (ref 136–145)
TCO2 CALC VENOUS: 28 MMOL/L
TROPONIN: 0.05 NG/ML
WBC # BLD: 7.5 K/UL (ref 4–11)

## 2022-10-06 PROCEDURE — 82803 BLOOD GASES ANY COMBINATION: CPT

## 2022-10-06 PROCEDURE — 80048 BASIC METABOLIC PNL TOTAL CA: CPT

## 2022-10-06 PROCEDURE — 99284 EMERGENCY DEPT VISIT MOD MDM: CPT

## 2022-10-06 PROCEDURE — 85025 COMPLETE CBC W/AUTO DIFF WBC: CPT

## 2022-10-06 PROCEDURE — 70450 CT HEAD/BRAIN W/O DYE: CPT

## 2022-10-06 PROCEDURE — 83880 ASSAY OF NATRIURETIC PEPTIDE: CPT

## 2022-10-06 PROCEDURE — 93005 ELECTROCARDIOGRAM TRACING: CPT | Performed by: EMERGENCY MEDICINE

## 2022-10-06 PROCEDURE — 84484 ASSAY OF TROPONIN QUANT: CPT

## 2022-10-06 PROCEDURE — 83605 ASSAY OF LACTIC ACID: CPT

## 2022-10-06 ASSESSMENT — ENCOUNTER SYMPTOMS
SHORTNESS OF BREATH: 0
RHINORRHEA: 0
SORE THROAT: 0
NAUSEA: 0
EYES NEGATIVE: 1
COUGH: 0
ABDOMINAL PAIN: 1
VOMITING: 0

## 2022-10-06 NOTE — ED TRIAGE NOTES
Patient BIBA for vertigo while walking to the bank. Patient states that she became dizzy because of the heat and had to call 911 for assistance.   VSS

## 2022-10-06 NOTE — ED PROVIDER NOTES
[address] [facility]          ATTENDING PHYSICIAN NOTE       Date of evaluation: 10/6/2022    Chief Complaint     Dizziness (Vertigo remains since last visit)      History of Present Illness     Osvaldo Mendiola is a 65 y.o. female with complex past medical history that includes end-stage renal disease on peritoneal dialysis, as well as psychiatric disease, and other comorbidities. She recently left her long-term care facility because she was frustrated that she was not allowed to leave her room and go for walks. She presents today stating that she has been falling multiple times per day since she left the nursing facility, which she feels is combination of her chronic vertigo, and worsening generalized weakness. Patient states that today she was walking to the bank, when she felt worsening vertiginous dizziness, and fell backward, landing on her buttocks, and striking the back of her head on the ground. She believes that she lost consciousness briefly. EMS was called, and she was brought to the emergency department for further evaluation. Patient states that she has been feeling generally weak since leaving the nursing facility, and falls multiple times at home, she does not have help at home. She denies recent URI symptoms or cough. She denies chest pain or shortness of breath. She does endorse chronic lower abdominal discomfort for years, but no different today than usual.  She states that she has been doing her peritoneal dialysis every night without difficulty. She notes no change in the color of her dialysate, or any drainage around the tube. She does note some worsening edema in her ankles over approximately the last week. Review of Systems     Review of Systems   Constitutional:  Positive for fatigue. Negative for activity change, appetite change, chills and fever. HENT: Negative. Negative for congestion, rhinorrhea and sore throat. Eyes: Negative. Negative for visual disturbance. Respiratory:  Negative for cough and shortness of breath. Cardiovascular:  Positive for leg swelling. Negative for chest pain. Gastrointestinal:  Positive for abdominal pain. Negative for nausea and vomiting. Chronic lower abdominal pain, unchanged for months   Genitourinary: Negative. Musculoskeletal: Negative. Skin: Negative. Neurological:  Positive for dizziness, weakness and light-headedness. Chronic episodic dizziness, temporarily improved with meclizine, for the last 6 to 8 months  Generalized weakness, with frequent falls   Psychiatric/Behavioral:  Positive for decreased concentration. Past Medical, Surgical, Family, and Social History     She has a past medical history of JONATHAN (acute kidney injury) ([facility] [address] Anxiety, Asthma, Bipolar disorder ([facility] [address] [facility] Bronchitis, Chronic back pain, Depression, Diabetes mellitus ([facility] [address] DM II (diabetes mellitus, type II), controlled ([facility] [address] and Hypertension. She has a past surgical history that includes Inner ear surgery; Tubal ligation; and Endometrial ablation. Her family history includes Cancer in her maternal grandmother and mother; Diabetes in her mother; Heart Disease in her maternal grandfather. She reports that she has been smoking cigarettes. She has a 0.75 pack-year smoking history. She has never used smokeless tobacco. She reports that she does not drink alcohol and does not use drugs.     Medications     Discharge Medication List as of 10/6/2022  9:10 PM        CONTINUE these medications which have NOT CHANGED    Details   mirtazapine (REMERON) 7.5 MG tablet Take 1 tablet by mouth nightly, Disp-30 tablet, R-3Normal      sevelamer (RENVELA) 800 MG tablet Take 2 tablets by mouth 3 times daily (with meals), Disp-90 tablet, R-3Normal      bisacodyl (DULCOLAX) 10 MG suppository Place 10 mg rectally daily as needed for ConstipationHistorical Med      oxyCODONE (ROXICODONE) 5 MG immediate release tablet Take 5 mg by mouth every 6 hours as needed for Pain. Historical Med      buPROPion (WELLBUTRIN SR) 150 MG extended release tablet Take 1 tablet by mouth daily, Disp-30 tablet, R-0Normal      metoprolol succinate (TOPROL XL) 25 MG extended release tablet Take 1 tablet by mouth daily, Disp-30 tablet, R-0Normal      aspirin 81 MG chewable tablet Take 1 tablet by mouth daily, Disp-30 tablet, R-3Normal      melatonin 5 MG TABS tablet Take 1 tablet by mouth nightlyOTC      meclizine (ANTIVERT) 12.5 MG tablet Take 12.5 mg by mouth 3 times daily as needed for DizzinessHistorical Med      acetaminophen (TYLENOL) 500 MG tablet Take 1,000 mg by mouth every 6 hours as needed for PainHistorical Med      sennosides-docusate sodium (SENOKOT-S) 8.6-50 MG tablet Take 1 tablet by mouth in the morning., Disp-30 tablet, R-0Normal      dicyclomine (BENTYL) 10 MG capsule Take 1 capsule by mouth in the morning and 1 capsule at noon and 1 capsule in the evening and 1 capsule before bedtime. , Disp-120 capsule, R-3Normal      rosuvastatin (CRESTOR) 40 MG tablet Take 40 mg by mouth at bedtimeHistorical Med      calcitRIOL (ROCALTROL) 0.25 MCG capsule Take 0.25 mcg by mouth in the morning. Historical Med      benztropine (COGENTIN) 1 MG tablet Take 1 mg by mouth at bedtimeHistorical Med             Allergies     She is allergic to latex, peanuts [peanut oil], tomato, and shellfish-derived products. Physical Exam     INITIAL VITALS: BP: (!) 163/92, Temp: 98.6 °F (37 °C), Heart Rate: 72, Resp: 17, SpO2: 100 %     General: Slender, generally well appearing. Pleasantly conversational, and in NAD. HEENT: Head is atraumatic, normocephalic. Pupils are equal, round, and reactive to light. Extraocular muscles are intact. Conjunctivae are clear and moist. No redness or drainage from the eyes. No drainage from the nose. The oropharynx appeared to be normal.    Neck: Supple, with full range of motion.  No midline C-spine tenderness to palpation, crepitus, 135 - 450 K/uL    MPV 6.8 5.0 - 10.5 fL    Neutrophils % 64.6 %    Lymphocytes % 18.8 %    Monocytes % 7.8 %    Eosinophils % 8.2 %    Basophils % 0.6 %    Neutrophils Absolute 4.8 1.7 - 7.7 K/uL    Lymphocytes Absolute 1.4 1.0 - 5.1 K/uL    Monocytes Absolute 0.6 0.0 - 1.3 K/uL    Eosinophils Absolute 0.6 0.0 - 0.6 K/uL    Basophils Absolute 0.0 0.0 - 0.2 K/uL   Basic Metabolic Panel w/ Reflex to MG   Result Value Ref Range    Sodium 135 (L) 136 - 145 mmol/L    Potassium reflex Magnesium 4.9 3.5 - 5.1 mmol/L    Chloride 102 99 - 110 mmol/L    CO2 20 (L) 21 - 32 mmol/L    Anion Gap 13 3 - 16    Glucose 83 70 - 99 mg/dL    BUN 49 (H) 7 - 20 mg/dL    Creatinine 7.7 (HH) 0.6 - 1.1 mg/dL    GFR Non- 5 (A) >60    GFR  7 (A) >60    Calcium 8.8 8.3 - 10.6 mg/dL   Blood gas, venous (Lab)   Result Value Ref Range    pH, Carlos 7.329 (L) 7.350 - 7.450    pCO2, Carlos 49.4 41.0 - 51.0 mmHg    pO2, Carlos 37.9 25.0 - 40.0 mmHg    HCO3, Venous 26.0 24.0 - 28.0 mmol/L    Base Excess, Carlos -0.2 -2.0 - 3.0 mmol/L    O2 Sat, Carlos 66 Not established %    Carboxyhemoglobin 1.9 (H) 0.0 - 1.5 %    MetHgb, Carlos 0.5 0.0 - 1.5 %    TC02 (Calc), Carlos 28 mmol/L    Hemoglobin, Carlos, Reduced 33.30 %   Brain Natriuretic Peptide   Result Value Ref Range    Pro-BNP 1,875 (H) 0 - 124 pg/mL   Troponin   Result Value Ref Range    Troponin 0.05 (H) <0.01 ng/mL   Lactic Acid   Result Value Ref Range    Lactic Acid 0.5 0.4 - 2.0 mmol/L   EKG 12 Lead   Result Value Ref Range    Ventricular Rate 84 BPM    Atrial Rate 84 BPM    P-R Interval 174 ms    QRS Duration 86 ms    Q-T Interval 404 ms    QTc Calculation (Bazett) 477 ms    P Axis 57 degrees    R Axis -22 degrees    T Axis 48 degrees    Diagnosis       EKG performed in ER and to be interpreted by ER physician. Confirmed by MD, ER ([id number]),  Abhijit Christopher ([phone]) on 10/6/2022 7:38:45 PM         RECENT VITALS:  BP: 116/75, Temp: 98.6 °F (37 °C), Heart Rate: 72, Resp: 18, SpO2: 100 % discharged immediately. She was able to ambulate steadily in the emergency department, and this was felt to be a reasonable disposition. Patient stated that she had meclizine at home. She was given a referral to ENT for her chronic vertigo, she may benefit from vestibular therapy. Clinical Impression     1. Chronic vertigo    2. Fall from standing, initial encounter        Disposition     PATIENT REFERRED TO:  Samantha Armendariz, [address] [facility] [place] [id number] [phone]    Call in 1 day  to discuss your ER visit, and arrange a follow-up appointment    Fish Subramanian MD  [facility] [address] [facility]    Schedule an appointment as soon as possible for a visit   to discuss ongoing management of vertigo    DISCHARGE MEDICATIONS:  Discharge Medication List as of 10/6/2022  9:10 PM          DISPOSITION Decision To Discharge    (Please note that portions of this note were completed with voice recognition software.   Efforts were made to edit the dictations but occasionally words are mis-transcribed.)     Amadeo Le MD  10/06/22 3049

## 2022-10-07 NOTE — ED NOTES
Patient discharged to home via self. Written discharge instructions reviewed with understanding. Copy of AVS sent home with patient. Patient able to walk from ED without assistance.         Ashley Mcfarland RN  10/06/22 [id number]

## 2022-10-07 NOTE — DISCHARGE INSTRUCTIONS
Discussed, your evaluation in the emergency department was reassuring. You have no findings of injuries resulting from your fall. Your heart and kidney values are at baseline. You should continue to take your meclizine as needed for vertigo. Please call your primary care doctor's office in the morning, to discuss your ER visit, and arrange a follow-up appointment. As you have ongoing difficulties with vertigo that is not completely controlled with your meclizine, you should follow-up with ear nose and throat and discussed vestibular therapy. Please call the ENT doctors office listed above to arrange a follow-up appointment. Please call your doctor, or return to the emergency department for worsening symptoms or other concerns.

## 2022-10-14 ENCOUNTER — HOSPITAL ENCOUNTER (INPATIENT)
Age: 57
LOS: 2 days | Discharge: HOME | DRG: 111 | End: 2022-10-16
Attending: EMERGENCY MEDICINE | Admitting: INTERNAL MEDICINE
Payer: MEDICAID

## 2022-10-14 DIAGNOSIS — R42 DIZZINESS: Primary | ICD-10-CM

## 2022-10-14 PROBLEM — R29.6 RECURRENT FALLS: Status: ACTIVE | Noted: 2022-10-14

## 2022-10-14 LAB
ANION GAP SERPL CALCULATED.3IONS-SCNC: 13 MMOL/L (ref 3–16)
BASOPHILS ABSOLUTE: 0 K/UL (ref 0–0.2)
BASOPHILS RELATIVE PERCENT: 0.6 %
BUN BLDV-MCNC: 49 MG/DL (ref 7–20)
CALCIUM SERPL-MCNC: 8.6 MG/DL (ref 8.3–10.6)
CHLORIDE BLD-SCNC: 104 MMOL/L (ref 99–110)
CO2: 20 MMOL/L (ref 21–32)
CREAT SERPL-MCNC: 7.7 MG/DL (ref 0.6–1.1)
EKG ATRIAL RATE: 88 BPM
EKG DIAGNOSIS: NORMAL
EKG P AXIS: 59 DEGREES
EKG P-R INTERVAL: 178 MS
EKG Q-T INTERVAL: 406 MS
EKG QRS DURATION: 92 MS
EKG QTC CALCULATION (BAZETT): 491 MS
EKG R AXIS: 51 DEGREES
EKG T AXIS: 23 DEGREES
EKG VENTRICULAR RATE: 88 BPM
EOSINOPHILS ABSOLUTE: 0.2 K/UL (ref 0–0.6)
EOSINOPHILS RELATIVE PERCENT: 3.3 %
GFR AFRICAN AMERICAN: 7
GFR NON-AFRICAN AMERICAN: 5
GLUCOSE BLD-MCNC: 80 MG/DL (ref 70–99)
HCT VFR BLD CALC: 27.1 % (ref 36–48)
HEMOGLOBIN: 8.8 G/DL (ref 12–16)
LYMPHOCYTES ABSOLUTE: 1.2 K/UL (ref 1–5.1)
LYMPHOCYTES RELATIVE PERCENT: 21.1 %
MCH RBC QN AUTO: 27.7 PG (ref 26–34)
MCHC RBC AUTO-ENTMCNC: 32.6 G/DL (ref 31–36)
MCV RBC AUTO: 85.2 FL (ref 80–100)
MONOCYTES ABSOLUTE: 0.4 K/UL (ref 0–1.3)
MONOCYTES RELATIVE PERCENT: 6.5 %
NEUTROPHILS ABSOLUTE: 3.9 K/UL (ref 1.7–7.7)
NEUTROPHILS RELATIVE PERCENT: 68.5 %
PDW BLD-RTO: 17.5 % (ref 12.4–15.4)
PLATELET # BLD: 211 K/UL (ref 135–450)
PMV BLD AUTO: 6.1 FL (ref 5–10.5)
POTASSIUM REFLEX MAGNESIUM: 4.6 MMOL/L (ref 3.5–5.1)
RBC # BLD: 3.18 M/UL (ref 4–5.2)
REASON FOR REJECTION: NORMAL
REJECTED TEST: NORMAL
SODIUM BLD-SCNC: 137 MMOL/L (ref 136–145)
WBC # BLD: 5.7 K/UL (ref 4–11)

## 2022-10-14 PROCEDURE — 6370000000 HC RX 637 (ALT 250 FOR IP): Performed by: INTERNAL MEDICINE

## 2022-10-14 PROCEDURE — 93005 ELECTROCARDIOGRAM TRACING: CPT | Performed by: EMERGENCY MEDICINE

## 2022-10-14 PROCEDURE — 96372 THER/PROPH/DIAG INJ SC/IM: CPT

## 2022-10-14 PROCEDURE — 36415 COLL VENOUS BLD VENIPUNCTURE: CPT

## 2022-10-14 PROCEDURE — 1200000000 HC SEMI PRIVATE

## 2022-10-14 PROCEDURE — 96365 THER/PROPH/DIAG IV INF INIT: CPT

## 2022-10-14 PROCEDURE — 96375 TX/PRO/DX INJ NEW DRUG ADDON: CPT

## 2022-10-14 PROCEDURE — 6360000002 HC RX W HCPCS: Performed by: INTERNAL MEDICINE

## 2022-10-14 PROCEDURE — G0378 HOSPITAL OBSERVATION PER HR: HCPCS

## 2022-10-14 PROCEDURE — 80048 BASIC METABOLIC PNL TOTAL CA: CPT

## 2022-10-14 PROCEDURE — 99285 EMERGENCY DEPT VISIT HI MDM: CPT

## 2022-10-14 PROCEDURE — 85025 COMPLETE CBC W/AUTO DIFF WBC: CPT

## 2022-10-14 PROCEDURE — 2580000003 HC RX 258: Performed by: INTERNAL MEDICINE

## 2022-10-14 RX ORDER — ACETAMINOPHEN 650 MG/1
650 SUPPOSITORY RECTAL EVERY 6 HOURS PRN
Status: DISCONTINUED | OUTPATIENT
Start: 2022-10-14 | End: 2022-10-16 | Stop reason: HOSPADM

## 2022-10-14 RX ORDER — ACETAMINOPHEN 325 MG/1
650 TABLET ORAL EVERY 6 HOURS PRN
Status: DISCONTINUED | OUTPATIENT
Start: 2022-10-14 | End: 2022-10-16 | Stop reason: HOSPADM

## 2022-10-14 RX ORDER — MIRTAZAPINE 15 MG/1
7.5 TABLET, FILM COATED ORAL NIGHTLY
Status: DISCONTINUED | OUTPATIENT
Start: 2022-10-14 | End: 2022-10-16 | Stop reason: HOSPADM

## 2022-10-14 RX ORDER — POLYETHYLENE GLYCOL 3350 17 G/17G
17 POWDER, FOR SOLUTION ORAL DAILY PRN
Status: DISCONTINUED | OUTPATIENT
Start: 2022-10-14 | End: 2022-10-16 | Stop reason: HOSPADM

## 2022-10-14 RX ORDER — HEPARIN SODIUM 5000 [USP'U]/ML
5000 INJECTION, SOLUTION INTRAVENOUS; SUBCUTANEOUS EVERY 8 HOURS SCHEDULED
Status: DISCONTINUED | OUTPATIENT
Start: 2022-10-14 | End: 2022-10-16 | Stop reason: HOSPADM

## 2022-10-14 RX ORDER — MECLIZINE HYDROCHLORIDE 25 MG/1
12.5 TABLET ORAL 3 TIMES DAILY PRN
Status: DISCONTINUED | OUTPATIENT
Start: 2022-10-14 | End: 2022-10-16 | Stop reason: HOSPADM

## 2022-10-14 RX ORDER — SEVELAMER CARBONATE 800 MG/1
1600 TABLET, FILM COATED ORAL
Status: DISCONTINUED | OUTPATIENT
Start: 2022-10-15 | End: 2022-10-16 | Stop reason: HOSPADM

## 2022-10-14 RX ORDER — ONDANSETRON 2 MG/ML
4 INJECTION INTRAMUSCULAR; INTRAVENOUS EVERY 6 HOURS PRN
Status: DISCONTINUED | OUTPATIENT
Start: 2022-10-14 | End: 2022-10-16 | Stop reason: HOSPADM

## 2022-10-14 RX ORDER — BUPROPION HYDROCHLORIDE 150 MG/1
150 TABLET, EXTENDED RELEASE ORAL DAILY
Status: DISCONTINUED | OUTPATIENT
Start: 2022-10-15 | End: 2022-10-16 | Stop reason: HOSPADM

## 2022-10-14 RX ORDER — ONDANSETRON 4 MG/1
4 TABLET, ORALLY DISINTEGRATING ORAL EVERY 8 HOURS PRN
Status: DISCONTINUED | OUTPATIENT
Start: 2022-10-14 | End: 2022-10-16 | Stop reason: HOSPADM

## 2022-10-14 RX ORDER — MECOBALAMIN 5000 MCG
5 TABLET,DISINTEGRATING ORAL NIGHTLY
Status: DISCONTINUED | OUTPATIENT
Start: 2022-10-14 | End: 2022-10-16 | Stop reason: HOSPADM

## 2022-10-14 RX ORDER — ASPIRIN 81 MG/1
81 TABLET, CHEWABLE ORAL DAILY
Status: DISCONTINUED | OUTPATIENT
Start: 2022-10-15 | End: 2022-10-16 | Stop reason: HOSPADM

## 2022-10-14 RX ORDER — ROSUVASTATIN CALCIUM 10 MG/1
10 TABLET, COATED ORAL NIGHTLY
Status: DISCONTINUED | OUTPATIENT
Start: 2022-10-14 | End: 2022-10-16 | Stop reason: HOSPADM

## 2022-10-14 RX ORDER — SODIUM CHLORIDE 0.9 % (FLUSH) 0.9 %
5-40 SYRINGE (ML) INJECTION EVERY 12 HOURS SCHEDULED
Status: DISCONTINUED | OUTPATIENT
Start: 2022-10-14 | End: 2022-10-16 | Stop reason: HOSPADM

## 2022-10-14 RX ORDER — SODIUM CHLORIDE 0.9 % (FLUSH) 0.9 %
5-40 SYRINGE (ML) INJECTION PRN
Status: DISCONTINUED | OUTPATIENT
Start: 2022-10-14 | End: 2022-10-16 | Stop reason: HOSPADM

## 2022-10-14 RX ORDER — BENZTROPINE MESYLATE 1 MG/1
1 TABLET ORAL NIGHTLY
Status: DISCONTINUED | OUTPATIENT
Start: 2022-10-14 | End: 2022-10-16 | Stop reason: HOSPADM

## 2022-10-14 RX ORDER — METOPROLOL SUCCINATE 25 MG/1
25 TABLET, EXTENDED RELEASE ORAL DAILY
Status: DISCONTINUED | OUTPATIENT
Start: 2022-10-15 | End: 2022-10-16 | Stop reason: HOSPADM

## 2022-10-14 RX ORDER — CALCITRIOL 0.25 UG/1
0.25 CAPSULE, LIQUID FILLED ORAL DAILY
Status: DISCONTINUED | OUTPATIENT
Start: 2022-10-15 | End: 2022-10-16 | Stop reason: HOSPADM

## 2022-10-14 RX ORDER — HYDRALAZINE HYDROCHLORIDE 20 MG/ML
5 INJECTION INTRAMUSCULAR; INTRAVENOUS EVERY 6 HOURS PRN
Status: DISCONTINUED | OUTPATIENT
Start: 2022-10-14 | End: 2022-10-16 | Stop reason: HOSPADM

## 2022-10-14 RX ORDER — SODIUM CHLORIDE 9 MG/ML
INJECTION, SOLUTION INTRAVENOUS PRN
Status: DISCONTINUED | OUTPATIENT
Start: 2022-10-14 | End: 2022-10-16 | Stop reason: HOSPADM

## 2022-10-14 RX ADMIN — ACETAMINOPHEN 650 MG: 325 TABLET, FILM COATED ORAL at 23:37

## 2022-10-14 RX ADMIN — HYDRALAZINE HYDROCHLORIDE 5 MG: 20 INJECTION INTRAMUSCULAR; INTRAVENOUS at 23:27

## 2022-10-14 RX ADMIN — Medication 5 MG: at 23:27

## 2022-10-14 RX ADMIN — SODIUM CHLORIDE, PRESERVATIVE FREE 10 ML: 5 INJECTION INTRAVENOUS at 23:34

## 2022-10-14 RX ADMIN — MIRTAZAPINE 7.5 MG: 15 TABLET, FILM COATED ORAL at 23:37

## 2022-10-14 RX ADMIN — BENZTROPINE MESYLATE 1 MG: 1 TABLET ORAL at 23:26

## 2022-10-14 RX ADMIN — ROSUVASTATIN 10 MG: 10 TABLET, FILM COATED ORAL at 23:27

## 2022-10-14 RX ADMIN — HEPARIN SODIUM 5000 UNITS: 5000 INJECTION INTRAVENOUS; SUBCUTANEOUS at 23:27

## 2022-10-14 ASSESSMENT — PAIN DESCRIPTION - PAIN TYPE: TYPE: ACUTE PAIN

## 2022-10-14 ASSESSMENT — PAIN SCALES - GENERAL
PAINLEVEL_OUTOF10: 9
PAINLEVEL_OUTOF10: 4
PAINLEVEL_OUTOF10: 0
PAINLEVEL_OUTOF10: 7

## 2022-10-14 ASSESSMENT — PAIN DESCRIPTION - ORIENTATION: ORIENTATION: RIGHT;LOWER

## 2022-10-14 ASSESSMENT — ENCOUNTER SYMPTOMS
ABDOMINAL PAIN: 1
DIARRHEA: 0
EYE REDNESS: 0
SHORTNESS OF BREATH: 0
TROUBLE SWALLOWING: 0
EYE DISCHARGE: 0
VOMITING: 0
ABDOMINAL DISTENTION: 0
STRIDOR: 0

## 2022-10-14 ASSESSMENT — PAIN - FUNCTIONAL ASSESSMENT
PAIN_FUNCTIONAL_ASSESSMENT: 0-10
PAIN_FUNCTIONAL_ASSESSMENT: PREVENTS OR INTERFERES SOME ACTIVE ACTIVITIES AND ADLS
PAIN_FUNCTIONAL_ASSESSMENT: PREVENTS OR INTERFERES WITH MANY ACTIVE NOT PASSIVE ACTIVITIES

## 2022-10-14 ASSESSMENT — PAIN DESCRIPTION - DESCRIPTORS
DESCRIPTORS: ACHING;DULL
DESCRIPTORS: ACHING
DESCRIPTORS: ACHING

## 2022-10-14 ASSESSMENT — PAIN DESCRIPTION - LOCATION
LOCATION: ABDOMEN;SHOULDER
LOCATION: ABDOMEN;SHOULDER
LOCATION: ABDOMEN

## 2022-10-14 ASSESSMENT — PAIN SCALES - WONG BAKER: WONGBAKER_NUMERICALRESPONSE: 2

## 2022-10-14 ASSESSMENT — PAIN DESCRIPTION - FREQUENCY: FREQUENCY: INTERMITTENT

## 2022-10-14 ASSESSMENT — PAIN DESCRIPTION - ONSET: ONSET: GRADUAL

## 2022-10-14 NOTE — ED PROVIDER NOTES
[facility]          ATTENDING PHYSICIAN NOTE       Date of evaluation: 10/14/2022    Chief Complaint     Dizziness (Since beginning of year. Pt has been seen multiple times for dizziness and falls. Pt reports last time she fell was yesterday. States she falls at least 1 time a day. Pt does home dialysis every night. )      History of Present Illness     Sarah Singh is a 65 y.o. female who presents for evaluation of recurrent falls. Has seen her PCP for this. Has been occurring for months, but more recently has been occurring as often as once or twice daily. States her legs give out. Does not lose consciousness with this. Has sudden onset dizziness preceding the fall. No precipitant to dizziness. When she falls, she feels too weak to get up. Also with assoc intermittent generalized weakness. Lives alone. Has been to rehab for this before. No clear etiology found. Most recent head trauma last week. Was seen at that time. Not anticoagulated. Also with memory loss. NO CP, SOB. Occasional palpitations but not assoc with fall. Has abd pain intermittently at baseline. NO change in this. No fever. Does make urine. NO urinary symptoms. No assoc vomiting. Last BM yesterday evening. No blood. Does home peritoneal dialysis nightly. NO change in color of dialysate and no drainage around her acces site. Review of Systems     Review of Systems   Constitutional:  Negative for diaphoresis and fever. HENT:  Negative for ear discharge and trouble swallowing. Eyes:  Negative for discharge and redness. Respiratory:  Negative for shortness of breath and stridor. Cardiovascular:  Positive for leg swelling (chronic). Negative for chest pain. Gastrointestinal:  Positive for abdominal pain (chronic). Negative for abdominal distention, diarrhea and vomiting. Genitourinary:  Negative for difficulty urinating and dysuria.    Musculoskeletal:  Positive for gait problem. Negative for joint swelling. Skin:  Positive for wound. Negative for pallor. Neurological:  Positive for dizziness. Negative for facial asymmetry and speech difficulty. Psychiatric/Behavioral:  Negative for agitation. Past Medical, Surgical, Family, and Social History     She has a past medical history of JONATHAN (acute kidney injury) ([facility] [address] Anxiety, Asthma, Bipolar disorder ([facility] [address] Bronchitis, Chronic back pain, Depression, Diabetes mellitus ([facility] [address] DM II (diabetes mellitus, type II), [facility] and Hypertension. She has a past surgical history that includes Inner ear surgery; Tubal ligation; and Endometrial ablation. Her family history includes Cancer in her maternal grandmother and mother; Diabetes in her mother; Heart Disease in her maternal grandfather. She reports that she has been smoking cigarettes. She has a 0.75 pack-year smoking history. She has never used smokeless tobacco. She reports that she does not drink alcohol and does not use drugs. Medications     Current Discharge Medication List        CONTINUE these medications which have NOT CHANGED    Details   mirtazapine (REMERON) 7.5 MG tablet Take 1 tablet by mouth nightly  Qty: 30 tablet, Refills: 3      sevelamer (RENVELA) 800 MG tablet Take 2 tablets by mouth 3 times daily (with meals)  Qty: 90 tablet, Refills: 3      bisacodyl (DULCOLAX) 10 MG suppository Place 10 mg rectally daily as needed for Constipation      oxyCODONE (ROXICODONE) 5 MG immediate release tablet Take 5 mg by mouth every 6 hours as needed for Pain.       buPROPion (WELLBUTRIN SR) 150 MG extended release tablet Take 1 tablet by mouth daily  Qty: 30 tablet, Refills: 0      metoprolol succinate (TOPROL XL) 25 MG extended release tablet Take 1 tablet by mouth daily  Qty: 30 tablet, Refills: 0      aspirin 81 MG chewable tablet Take 1 tablet by mouth daily  Qty: 30 tablet, Refills: 3      melatonin 5 MG TABS tablet Take 1 tablet by mouth nightly meclizine (ANTIVERT) 12.5 MG tablet Take 12.5 mg by mouth 3 times daily as needed for Dizziness      acetaminophen (TYLENOL) 500 MG tablet Take 1,000 mg by mouth every 6 hours as needed for Pain      sennosides-docusate sodium (SENOKOT-S) 8.6-50 MG tablet Take 1 tablet by mouth in the morning. Qty: 30 tablet, Refills: 0      dicyclomine (BENTYL) 10 MG capsule Take 1 capsule by mouth in the morning and 1 capsule at noon and 1 capsule in the evening and 1 capsule before bedtime. Qty: 120 capsule, Refills: 3      rosuvastatin (CRESTOR) 40 MG tablet Take 40 mg by mouth at bedtime      calcitRIOL (ROCALTROL) 0.25 MCG capsule Take 0.25 mcg by mouth in the morning. benztropine (COGENTIN) 1 MG tablet Take 1 mg by mouth at bedtime             Allergies     She is allergic to latex, peanuts [peanut oil], tomato, and shellfish-derived products. Physical Exam     INITIAL VITALS: BP: (!) 153/95, Temp: 97.4 °F (36.3 °C), Heart Rate: 87, Resp: 16, SpO2: 99 %   Physical Exam  Constitutional:       General: She is not in acute distress. Appearance: Normal appearance. She is not toxic-appearing. HENT:      Head: Normocephalic. Right Ear: External ear normal.      Left Ear: External ear normal.   Eyes:      General:         Right eye: No discharge. Left eye: No discharge. Extraocular Movements: Extraocular movements intact. Cardiovascular:      Rate and Rhythm: Normal rate. Pulmonary:      Effort: Pulmonary effort is normal. No respiratory distress. Abdominal:      General: There is no distension. Tenderness: There is abdominal tenderness (mild, nonfocal (reportedly chronic)). Comments: Peritoneal dialysis access in [facility] with clean, dry dressing, no surrounding erythema   Musculoskeletal:         General: No deformity. Cervical back: Normal range of motion. No rigidity. Right lower leg: Edema (trace) present. Left lower leg: Edema (trace) present.    Skin:     General: Skin is warm and dry. Neurological:      General: No focal deficit present. Mental Status: She is alert. Comments: Intermittent repetitive questioning  No nystagmus  Normal finger to nose     Psychiatric:         Thought Content: Thought content normal.       Diagnostic Results     EKG   EKG interpreted by me with sinus rhythm, normal axis, no acute ischemic changes or significant conduction abnormalities.      RADIOLOGY:  No orders to display       LABS:   Results for orders placed or performed during the hospital encounter of 60/25/09   Basic Metabolic Panel w/ Reflex to MG   Result Value Ref Range    Sodium 137 136 - 145 mmol/L    Potassium reflex Magnesium 4.6 3.5 - 5.1 mmol/L    Chloride 104 99 - 110 mmol/L    CO2 20 (L) 21 - 32 mmol/L    Anion Gap 13 3 - 16    Glucose 80 70 - 99 mg/dL    BUN 49 (H) 7 - 20 mg/dL    Creatinine 7.7 (HH) 0.6 - 1.1 mg/dL    GFR Non- 5 (A) >60    GFR  7 (A) >60    Calcium 8.6 8.3 - 10.6 mg/dL   SPECIMEN REJECTION   Result Value Ref Range    Rejected Test CBCWD     Reason for Rejection see below    CBC with Auto Differential   Result Value Ref Range    WBC 5.7 4.0 - 11.0 K/uL    RBC 3.18 (L) 4.00 - 5.20 M/uL    Hemoglobin 8.8 (L) 12.0 - 16.0 g/dL    Hematocrit 27.1 (L) 36.0 - 48.0 %    MCV 85.2 80.0 - 100.0 fL    MCH 27.7 26.0 - 34.0 pg    MCHC 32.6 31.0 - 36.0 g/dL    RDW 17.5 (H) 12.4 - 15.4 %    Platelets 644 678 - 894 K/uL    MPV 6.1 5.0 - 10.5 fL    Neutrophils % 68.5 %    Lymphocytes % 21.1 %    Monocytes % 6.5 %    Eosinophils % 3.3 %    Basophils % 0.6 %    Neutrophils Absolute 3.9 1.7 - 7.7 K/uL    Lymphocytes Absolute 1.2 1.0 - 5.1 K/uL    Monocytes Absolute 0.4 0.0 - 1.3 K/uL    Eosinophils Absolute 0.2 0.0 - 0.6 K/uL    Basophils Absolute 0.0 0.0 - 0.2 K/uL   EKG 12 Lead   Result Value Ref Range    Ventricular Rate 88 BPM    Atrial Rate 88 BPM    P-R Interval 178 ms    QRS Duration 92 ms    Q-T Interval 406 ms    QTc Calculation (Ferny) 491 ms    P Axis 59 degrees    R Axis 51 degrees    T Axis 23 degrees    Diagnosis       EKG performed in ER and to be interpreted by ER physician. Confirmed by MD, ER ([id number]),  Gabriele Robertson ([phone]) on 10/14/2022 7:19:55 PM       ED BEDSIDE ULTRASOUND:  No results found. RECENT VITALS:  BP: (!) 180/103,Temp: 98.7 °F (37.1 °C), Heart Rate: 97, Resp: 20, SpO2: 100 %     Procedures     N/A    ED Course     Nursing Notes, Past Medical Hx, Past Surgical Hx, Social Hx,Allergies, and Family Hx were reviewed. patient was given the following medications:  Orders Placed This Encounter   Medications    aspirin chewable tablet 81 mg    buPROPion [facility] SR) extended release tablet 150 mg    benztropine (COGENTIN) tablet 1 mg    calcitRIOL (ROCALTROL) capsule 0.25 mcg    meclizine (ANTIVERT) tablet 12.5 mg    melatonin disintegrating tablet 5 mg    metoprolol succinate (TOPROL XL) extended release tablet 25 mg    mirtazapine (REMERON) tablet 7.5 mg    rosuvastatin (CRESTOR) tablet 10 mg    sevelamer (RENVELA) tablet 1,600 mg    sodium chloride flush 0.9 % injection 5-40 mL    sodium chloride flush 0.9 % injection 5-40 mL    0.9 % sodium chloride infusion    OR Linked Order Group     ondansetron (ZOFRAN-ODT) disintegrating tablet 4 mg     ondansetron (ZOFRAN) injection 4 mg    polyethylene glycol (GLYCOLAX) packet 17 g    OR Linked Order Group     acetaminophen (TYLENOL) tablet 650 mg     acetaminophen (TYLENOL) suppository 650 mg    heparin (porcine) injection 5,000 Units    hydrALAZINE (APRESOLINE) injection 5 mg       CONSULTS:  IP CONSULT TO HOSPITALIST  IP CONSULT TO NEPHROLOGY  IP CONSULT TO NEUROLOGY  IP CONSULT TO SOCIAL WORK    MEDICAL DECISIONMAKING / Artemio Mantilla / Sahil Paul is a 65 y.o. female who presents for evaluation of chronic dizziness, now with increasing falls. Do not suspect acute CVA based on time course of symptoms.  On my review of her record, she had HCT performed last week. Denies head trauma since then. Labs do not account for symptoms. NO infectious symptoms to suggest infectious cause and would not expect infection as cause with chronicity of symptoms. Will admit for neuro consult and PT/OT eval with anticipated placement. Clinical Impression     1. Dizziness        Disposition     PATIENT REFERRED TO:  No follow-up provider specified.     DISCHARGE MEDICATIONS:  Current Discharge Medication List          DISPOSITION Admitted 10/14/2022 07:19:25 PM        Amandeep Calderon MD  10/14/22 1823

## 2022-10-14 NOTE — H&P
Hospital Medicine History & Physical      PCP: Calin Florian DO, DO    Date of Admission: 10/14/2022    Date of Service: Pt seen/examined on 10/14/2022 and Admitted to Inpatient with expected LOS greater than two midnights due to medical therapy. Chief Complaint: Dizziness and lightheadedness      History Of Present Illness:    65 y.o. female who presents to ED with complaints of dizziness. Patient has been having the symptoms since beginning of this year which has led to multiple falls. Last fall was yesterday. Denies head injury. States that she falls at least once a day due to the dizziness and lightheadedness. These episodes has been occurring more frequently now. States that her legs give out and she ends up falling onto the floor. Denies loss of consciousness during these episodes. Patient gets suddenly dizzy and lightheaded prior to falling. Patient has history of ESRD and is on peritoneal dialysis. Patient lives by herself. Has done some work-up for her current symptoms as outpatient by her PCP. Not seen by her neurologist    Patient was recently hospitalized from 8/20 through 9/2 for peritonitis related to PD catheter. Patient denies any fever, chills, nausea, vomiting, chest or abdominal pain, shortness of breath, palpitations, changes in bowel habits. Past Medical History:          Diagnosis Date    JONATHAN (acute kidney injury) ([facility] [address] 9/3/2014    Anxiety     Asthma     Bipolar disorder ([facility] [address]     Bronchitis     Chronic back pain     Depression     Diabetes mellitus ([facility] [address]     DM II (diabetes mellitus, type II), controlled ([facility] [address] 9/3/2014    Hypertension        Past Surgical History:          Procedure Laterality Date    ENDOMETRIAL ABLATION      INNER EAR SURGERY      TUBAL LIGATION         Medications Prior to Admission:      Prior to Admission medications    Medication Sig Start Date End Date Taking?  Authorizing Provider   mirtazapine (REMERON) 7.5 MG tablet Take 1 tablet by mouth nightly 9/8/22   Tamanna Yin MD   sevelamer (RENVELA) 800 MG tablet Take 2 tablets by mouth 3 times daily (with meals) 9/8/22   Tamanna Yin MD   bisacodyl (DULCOLAX) 10 MG suppository Place 10 mg rectally daily as needed for Constipation    Historical Provider, MD   oxyCODONE (ROXICODONE) 5 MG immediate release tablet Take 5 mg by mouth every 6 hours as needed for Pain. Historical Provider, MD   buPROPion [facility] SR) 150 MG extended release tablet Take 1 tablet by mouth daily 8/24/22   John Ashley MD   metoprolol succinate (TOPROL XL) 25 MG extended release tablet Take 1 tablet by mouth daily 8/24/22   Jonh Ashley MD   aspirin 81 MG chewable tablet Take 1 tablet by mouth daily 8/25/22   John Ashley MD   melatonin 5 MG TABS tablet Take 1 tablet by mouth nightly 8/24/22   John Ashley MD   meclizine (ANTIVERT) 12.5 MG tablet Take 12.5 mg by mouth 3 times daily as needed for Dizziness    Historical Provider, MD   acetaminophen (TYLENOL) 500 MG tablet Take 1,000 mg by mouth every 6 hours as needed for Pain    Historical Provider, MD   sennosides-docusate sodium (SENOKOT-S) 8.6-50 MG tablet Take 1 tablet by mouth in the morning. 8/15/22   COSMO oRmano   dicyclomine (BENTYL) 10 MG capsule Take 1 capsule by mouth in the morning and 1 capsule at noon and 1 capsule in the evening and 1 capsule before bedtime. 8/15/22   COSMO Romano   rosuvastatin (CRESTOR) 40 MG tablet Take 40 mg by mouth at bedtime    Historical Provider, MD   calcitRIOL (ROCALTROL) 0.25 MCG capsule Take 0.25 mcg by mouth in the morning. Historical Provider, MD   benztropine (COGENTIN) 1 MG tablet Take 1 mg by mouth at bedtime    Historical Provider, MD   doxepin (SINEQUAN) 50 MG capsule Take 50 mg by mouth nightly One hour before bedtime  9/8/22  Historical Provider, MD   NIFEdipine (ADALAT CC) 30 MG extended release tablet Take 30 mg by mouth in the morning and 30 mg before bedtime.   7/27/22 Historical Provider, MD   torsemide (DEMADEX) 20 MG tablet Take 80 mg by mouth in the morning. Patient not taking: Reported on 8/21/2022 8/24/22  Historical Provider, MD       Allergies:  Latex, Peanuts [peanut oil], Tomato, and Shellfish-derived products    Social History:      The patient currently lives at home by herself    TOBACCO:   reports that she has been smoking cigarettes. She has a 0.75 pack-year smoking history. She has never used smokeless tobacco.  ETOH:   reports no history of alcohol use. E-cigarette/Vaping       Questions Responses    E-cigarette/Vaping Use Never User    Start Date     Passive Exposure     Quit Date     Counseling Given     Comments               Family History:    Reviewed and negative in regards to presenting illness/complaint. Problem Relation Age of Onset    Diabetes Mother     Cancer Mother     Cancer Maternal Grandmother     Heart Disease Maternal Grandfather        REVIEW OF SYSTEMS COMPLETED:   Pertinent positives as noted in the HPI. All other systems reviewed and negative. PHYSICAL EXAM PERFORMED:    BP (!) 131/94   Pulse 84   Temp 97.4 °F (36.3 °C) (Oral)   Resp 16   SpO2 99%     General appearance:  No apparent distress, appears stated age and cooperative. HEENT:  Normal cephalic, atraumatic without obvious deformity. Pupils equal, round, and reactive to light. Extra ocular muscles intact. Conjunctivae/corneas clear. Neck: Supple, with full range of motion. No jugular venous distention. Trachea midline. Respiratory:  Normal respiratory effort. Clear to auscultation, bilaterally without Rales/Wheezes/Rhonchi. Cardiovascular:  Regular rate and rhythm with normal S1/S2 without murmurs, rubs or gallops. Abdomen: Soft, non-tender, PD catheter +, no signs of infection, non-distended with normal bowel sounds. Musculoskeletal:  No clubbing, cyanosis or edema bilaterally. Full range of motion without deformity.   Skin: Skin color, texture, turgor normal. No rashes or lesions. Neurologic:  Neurovascularly intact without any focal sensory/motor deficits. Cranial nerves: II-XII intact, grossly non-focal.  Psychiatric:  Alert and oriented, thought content appropriate, normal insight  Capillary Refill: Brisk,3 seconds, normal  Peripheral Pulses: +2 palpable, equal bilaterally       Labs:     Recent Labs     10/14/22  1705   WBC 5.7   HGB 8.8*   HCT 27.1*        Recent Labs     10/14/22  1541      K 4.6      CO2 20*   BUN 49*   CREATININE 7.7*   CALCIUM 8.6     No results for input(s): AST, ALT, BILIDIR, BILITOT, ALKPHOS in the last 72 hours. No results for input(s): INR in the last 72 hours. No results for input(s): Pamla Clines in the last 72 hours. Urinalysis:      Lab Results   Component Value Date/Time    NITRU Negative 09/06/2022 04:43 PM    WBCUA 0-2 09/06/2022 04:43 PM    BACTERIA 2+ 09/06/2022 04:43 PM    RBCUA 0-2 09/06/2022 04:43 PM    BLOODU TRACE-INTACT 09/06/2022 04:43 PM    SPECGRAV 1.015 09/06/2022 04:43 PM    GLUCOSEU Negative 09/06/2022 04:43 PM       Radiology:     CXR: I have reviewed the CXR with the following interpretation: NA  EKG:  I have reviewed the EKG with the following interpretation: Sinus rhythm, VR = 88, QTc = 491, no acute ST-T changes    Consults:    IP CONSULT TO HOSPITALIST  IP CONSULT TO NEPHROLOGY  IP CONSULT TO NEUROLOGY  IP CONSULT TO SOCIAL WORK    ASSESSMENT:    Active Hospital Problems    Diagnosis Date Noted    Recurrent falls [R29.6] 10/14/2022     Priority: Medium    Vertigo [R42] 10/14/2022     Priority: Medium   #Lightheadedness and dizziness-has been going on for more than 10 months. Increasing frequency of mechanical falls lately.   Patient was hospitalized for about 2 weeks in August this year, possibly leading to deconditioning  #Mechanical falls without loss of consciousness  #ESRD on peritoneal dialysis  #Chronic anemia secondary to CKD  #Essential hypertension    PLAN:  -Check orthostatic vital signs  -Continue on meclizine as ordered  -Neurology consult for further recommendations regarding vertigo  -Monitor renal function and electrolytes  -Nephrology consult for continued peritoneal dialysis  -PT/OT  -Social service consult, patient may benefit from short-term rehab placement  -Continued on her home medications appropriately  -Supportive therapy      DVT Prophylaxis: Heparin  Diet: ADULT DIET; Regular; 4 carb choices (60 gm/meal); Low Fat/Low Chol/High Fiber/2 gm Na; Low Potassium (Less than 3000 mg/day); Low Phosphorus (Less than 1000 mg); Less than 60 gm  Code Status: Full Code    PT/OT Eval Status: As tolerated with fall precautions    Dispo -GMF with telemetry       Isabella Hurtado MD    Thank you Avery Belcher DO, DO for the opportunity to be involved in this patient's care. If you have any questions or concerns please feel free to contact me at [phone].

## 2022-10-15 LAB
ALBUMIN SERPL-MCNC: 2.8 G/DL (ref 3.4–5)
ANION GAP SERPL CALCULATED.3IONS-SCNC: 12 MMOL/L (ref 3–16)
ANION GAP SERPL CALCULATED.3IONS-SCNC: 12 MMOL/L (ref 3–16)
APPEARANCE FLUID: CLEAR
BASO FLUID: 1 %
BASOPHILS ABSOLUTE: 0 K/UL (ref 0–0.2)
BASOPHILS RELATIVE PERCENT: 0.7 %
BUN BLDV-MCNC: 47 MG/DL (ref 7–20)
BUN BLDV-MCNC: 48 MG/DL (ref 7–20)
CALCIUM SERPL-MCNC: 8.9 MG/DL (ref 8.3–10.6)
CALCIUM SERPL-MCNC: 8.9 MG/DL (ref 8.3–10.6)
CELL COUNT FLUID TYPE: NORMAL
CHLORIDE BLD-SCNC: 107 MMOL/L (ref 99–110)
CHLORIDE BLD-SCNC: 107 MMOL/L (ref 99–110)
CLOT EVALUATION: NORMAL
CO2: 21 MMOL/L (ref 21–32)
CO2: 21 MMOL/L (ref 21–32)
COLOR FLUID: COLORLESS
CREAT SERPL-MCNC: 7.4 MG/DL (ref 0.6–1.1)
CREAT SERPL-MCNC: 7.6 MG/DL (ref 0.6–1.1)
EOSINOPHIL FLUID: 4 %
EOSINOPHILS ABSOLUTE: 0.3 K/UL (ref 0–0.6)
EOSINOPHILS RELATIVE PERCENT: 4.3 %
FERRITIN: 822.6 NG/ML (ref 15–150)
GFR AFRICAN AMERICAN: 7
GFR AFRICAN AMERICAN: 7
GFR NON-AFRICAN AMERICAN: 6
GFR NON-AFRICAN AMERICAN: 6
GLUCOSE BLD-MCNC: 73 MG/DL (ref 70–99)
GLUCOSE BLD-MCNC: 75 MG/DL (ref 70–99)
HCT VFR BLD CALC: 27.3 % (ref 36–48)
HEMOGLOBIN: 8.9 G/DL (ref 12–16)
IRON SATURATION: 33 % (ref 15–50)
IRON: 54 UG/DL (ref 37–145)
LYMPHOCYTES ABSOLUTE: 2.2 K/UL (ref 1–5.1)
LYMPHOCYTES RELATIVE PERCENT: 35.5 %
LYMPHOCYTES, BODY FLUID: 26 %
MCH RBC QN AUTO: 27.6 PG (ref 26–34)
MCHC RBC AUTO-ENTMCNC: 32.5 G/DL (ref 31–36)
MCV RBC AUTO: 85 FL (ref 80–100)
MONOCYTE, FLUID: 44 %
MONOCYTES ABSOLUTE: 0.4 K/UL (ref 0–1.3)
MONOCYTES RELATIVE PERCENT: 6.3 %
NEUTROPHIL, FLUID: 25 %
NEUTROPHILS ABSOLUTE: 3.3 K/UL (ref 1.7–7.7)
NEUTROPHILS RELATIVE PERCENT: 53.2 %
NUCLEATED CELLS FLUID: 55 /CUMM
NUMBER OF CELLS COUNTED FLUID: 100
PDW BLD-RTO: 17.5 % (ref 12.4–15.4)
PHOSPHORUS: 6.5 MG/DL (ref 2.5–4.9)
PLATELET # BLD: 221 K/UL (ref 135–450)
PMV BLD AUTO: 6.2 FL (ref 5–10.5)
POTASSIUM REFLEX MAGNESIUM: 4.9 MMOL/L (ref 3.5–5.1)
POTASSIUM SERPL-SCNC: 4.8 MMOL/L (ref 3.5–5.1)
RBC # BLD: 3.21 M/UL (ref 4–5.2)
RBC FLUID: <1000 /CUMM
SODIUM BLD-SCNC: 140 MMOL/L (ref 136–145)
SODIUM BLD-SCNC: 140 MMOL/L (ref 136–145)
TOTAL IRON BINDING CAPACITY: 164 UG/DL (ref 260–445)
WBC # BLD: 6.3 K/UL (ref 4–11)

## 2022-10-15 PROCEDURE — 36415 COLL VENOUS BLD VENIPUNCTURE: CPT

## 2022-10-15 PROCEDURE — 6370000000 HC RX 637 (ALT 250 FOR IP): Performed by: INTERNAL MEDICINE

## 2022-10-15 PROCEDURE — 6360000002 HC RX W HCPCS: Performed by: INTERNAL MEDICINE

## 2022-10-15 PROCEDURE — 2580000003 HC RX 258: Performed by: STUDENT IN AN ORGANIZED HEALTH CARE EDUCATION/TRAINING PROGRAM

## 2022-10-15 PROCEDURE — 6360000002 HC RX W HCPCS: Performed by: STUDENT IN AN ORGANIZED HEALTH CARE EDUCATION/TRAINING PROGRAM

## 2022-10-15 PROCEDURE — 1200000000 HC SEMI PRIVATE

## 2022-10-15 PROCEDURE — 96372 THER/PROPH/DIAG INJ SC/IM: CPT

## 2022-10-15 PROCEDURE — 2580000003 HC RX 258: Performed by: INTERNAL MEDICINE

## 2022-10-15 PROCEDURE — G0378 HOSPITAL OBSERVATION PER HR: HCPCS

## 2022-10-15 PROCEDURE — 80069 RENAL FUNCTION PANEL: CPT

## 2022-10-15 PROCEDURE — 83540 ASSAY OF IRON: CPT

## 2022-10-15 PROCEDURE — 82728 ASSAY OF FERRITIN: CPT

## 2022-10-15 PROCEDURE — 96367 TX/PROPH/DG ADDL SEQ IV INF: CPT

## 2022-10-15 PROCEDURE — 83550 IRON BINDING TEST: CPT

## 2022-10-15 PROCEDURE — 89051 BODY FLUID CELL COUNT: CPT

## 2022-10-15 PROCEDURE — 87070 CULTURE OTHR SPECIMN AEROBIC: CPT

## 2022-10-15 PROCEDURE — 87205 SMEAR GRAM STAIN: CPT

## 2022-10-15 PROCEDURE — 85025 COMPLETE CBC W/AUTO DIFF WBC: CPT

## 2022-10-15 PROCEDURE — 90945 DIALYSIS ONE EVALUATION: CPT

## 2022-10-15 RX ORDER — GENTAMICIN SULFATE 1 MG/G
OINTMENT TOPICAL DAILY
Status: DISCONTINUED | OUTPATIENT
Start: 2022-10-15 | End: 2022-10-16 | Stop reason: HOSPADM

## 2022-10-15 RX ORDER — SODIUM CHLORIDE, SODIUM LACTATE, CALCIUM CHLORIDE, MAGNESIUM CHLORIDE AND DEXTROSE 1.5; 538; 448; 18.3; 5.08 G/100ML; MG/100ML; MG/100ML; MG/100ML; MG/100ML
2000 INJECTION, SOLUTION INTRAPERITONEAL ONCE
Status: COMPLETED | OUTPATIENT
Start: 2022-10-15 | End: 2022-10-15

## 2022-10-15 RX ORDER — SODIUM CHLORIDE, SODIUM LACTATE, CALCIUM CHLORIDE, MAGNESIUM CHLORIDE AND DEXTROSE 1.5; 538; 448; 18.3; 5.08 G/100ML; MG/100ML; MG/100ML; MG/100ML; MG/100ML
8000 INJECTION, SOLUTION INTRAPERITONEAL DAILY
Status: DISCONTINUED | OUTPATIENT
Start: 2022-10-15 | End: 2022-10-16 | Stop reason: HOSPADM

## 2022-10-15 RX ORDER — OXYCODONE HYDROCHLORIDE 5 MG/1
5 TABLET ORAL EVERY 6 HOURS PRN
Status: DISCONTINUED | OUTPATIENT
Start: 2022-10-15 | End: 2022-10-16 | Stop reason: HOSPADM

## 2022-10-15 RX ADMIN — Medication 5 MG: at 20:53

## 2022-10-15 RX ADMIN — ASPIRIN 81 MG 81 MG: 81 TABLET ORAL at 09:30

## 2022-10-15 RX ADMIN — SODIUM CHLORIDE, SODIUM LACTATE, CALCIUM CHLORIDE, MAGNESIUM CHLORIDE AND DEXTROSE 2000 ML: 1.5; 538; 448; 18.3; 5.08 INJECTION, SOLUTION INTRAPERITONEAL at 11:31

## 2022-10-15 RX ADMIN — SODIUM CHLORIDE, PRESERVATIVE FREE 10 ML: 5 INJECTION INTRAVENOUS at 20:56

## 2022-10-15 RX ADMIN — ACETAMINOPHEN 650 MG: 325 TABLET, FILM COATED ORAL at 11:45

## 2022-10-15 RX ADMIN — HEPARIN SODIUM 5000 UNITS: 5000 INJECTION INTRAVENOUS; SUBCUTANEOUS at 05:44

## 2022-10-15 RX ADMIN — BENZTROPINE MESYLATE 1 MG: 1 TABLET ORAL at 20:53

## 2022-10-15 RX ADMIN — HEPARIN SODIUM 5000 UNITS: 5000 INJECTION INTRAVENOUS; SUBCUTANEOUS at 20:54

## 2022-10-15 RX ADMIN — BUPROPION HYDROCHLORIDE 150 MG: 150 TABLET, EXTENDED RELEASE ORAL at 09:30

## 2022-10-15 RX ADMIN — HEPARIN SODIUM 5000 UNITS: 5000 INJECTION INTRAVENOUS; SUBCUTANEOUS at 14:02

## 2022-10-15 RX ADMIN — CEFEPIME HYDROCHLORIDE 2000 MG: 2 INJECTION, POWDER, FOR SOLUTION INTRAMUSCULAR; INTRAVENOUS at 14:05

## 2022-10-15 RX ADMIN — SODIUM CHLORIDE, PRESERVATIVE FREE 10 ML: 5 INJECTION INTRAVENOUS at 09:30

## 2022-10-15 RX ADMIN — METOPROLOL SUCCINATE 25 MG: 25 TABLET, FILM COATED, EXTENDED RELEASE ORAL at 09:30

## 2022-10-15 RX ADMIN — CALCITRIOL CAPSULES 0.25 MCG 0.25 MCG: 0.25 CAPSULE ORAL at 09:30

## 2022-10-15 RX ADMIN — EPOETIN ALFA-EPBX 10000 UNITS: 10000 INJECTION, SOLUTION INTRAVENOUS; SUBCUTANEOUS at 14:02

## 2022-10-15 RX ADMIN — MIRTAZAPINE 7.5 MG: 15 TABLET, FILM COATED ORAL at 20:52

## 2022-10-15 RX ADMIN — VANCOMYCIN HYDROCHLORIDE 1500 MG: 10 INJECTION, POWDER, LYOPHILIZED, FOR SOLUTION INTRAVENOUS at 14:45

## 2022-10-15 RX ADMIN — ROSUVASTATIN 10 MG: 10 TABLET, FILM COATED ORAL at 20:53

## 2022-10-15 RX ADMIN — OXYCODONE 5 MG: 5 TABLET ORAL at 22:27

## 2022-10-15 RX ADMIN — SEVELAMER CARBONATE 1600 MG: 800 TABLET, FILM COATED ORAL at 11:45

## 2022-10-15 RX ADMIN — SEVELAMER CARBONATE 1600 MG: 800 TABLET, FILM COATED ORAL at 09:34

## 2022-10-15 ASSESSMENT — PAIN DESCRIPTION - PAIN TYPE: TYPE: ACUTE PAIN

## 2022-10-15 ASSESSMENT — PAIN DESCRIPTION - DESCRIPTORS
DESCRIPTORS: CRAMPING;ACHING
DESCRIPTORS: ACHING;DISCOMFORT

## 2022-10-15 ASSESSMENT — PAIN DESCRIPTION - FREQUENCY: FREQUENCY: CONTINUOUS

## 2022-10-15 ASSESSMENT — PAIN SCALES - GENERAL
PAINLEVEL_OUTOF10: 3
PAINLEVEL_OUTOF10: 0
PAINLEVEL_OUTOF10: 4
PAINLEVEL_OUTOF10: 9
PAINLEVEL_OUTOF10: 4

## 2022-10-15 ASSESSMENT — PAIN DESCRIPTION - LOCATION
LOCATION: SHOULDER
LOCATION: ABDOMEN

## 2022-10-15 ASSESSMENT — PAIN SCALES - WONG BAKER
WONGBAKER_NUMERICALRESPONSE: 2
WONGBAKER_NUMERICALRESPONSE: 2

## 2022-10-15 ASSESSMENT — PAIN DESCRIPTION - ORIENTATION
ORIENTATION: RIGHT
ORIENTATION: LOWER

## 2022-10-15 ASSESSMENT — PAIN DESCRIPTION - ONSET: ONSET: ON-GOING

## 2022-10-15 NOTE — PROGRESS NOTES
Patient admitted to Room 6318 from ED with c/o dizziness and frequent falls. She is a/ox4. VS obtained. Resp. Is easy and even. She is on RA, sinus Tachy on monitor, BP elevated x stella and automatic. Standard fall measures in place. Patient has history of ESRD and is on peritoneal dialysis.

## 2022-10-15 NOTE — PROGRESS NOTES
4 Eyes Admission Assessment     I agree as the admission nurse that 2 RN's have performed a thorough Head to Toe Skin Assessment on the patient. ALL assessment sites listed below have been assessed on admission. Areas assessed by both nurses:   [x]   Head, Face, and Ears   [x]   Shoulders, Back, and Chest  [x]   Arms, Elbows, and Hands   [x]   Coccyx, Sacrum, and Ischium  [x]   Legs, Feet, and Heels        Does the Patient have Skin Breakdown?   No         Papa Prevention initiated:  Yes   Wound Care Orders initiated:  No      [facility] nurse consulted for Pressure Injury (Stage 3,4, Unstageable, DTI, NWPT, and Complex wounds) or Papa score 18 or lower:  No      Nurse 1 eSignature: Electronically signed by Meena Ashley RN on 10/15/22 at 4:18 AM EDT    **SHARE this note so that the co-signing nurse is able to place an eSignature**    Nurse 2 eSignature: Electronically signed by Liang Hernandez RN on 10/15/22 at 4:26 AM EDT

## 2022-10-15 NOTE — CONSULTS
Nephrology Consult Note        [facility]    [email or web address]       Phone: [phone]                                                  10/15/2022 7:42 AM     Patient: Sarah Singh [id number]  [id number]  Date of Admit: 10/14/2022 LOS: 1 days      Plan:  Continue PD tonight with Cycler  Please send PD fluid for cell counts and Cx after one manual cycle 2 hour dwell. Will give a dose of Cefepime and Vancomycin  Topical Gentamicin daily at PD catheter insertion site. Avoid nephrotoxins  Monitor input, output and weight  Monitor labs and vitals closely  Renally dose all medications    D/W patient's nurse and dialysis nurse  Orders placed. Thank you for allowing us to participate in this patient's care    In case of any question please call us at our 24 hour answering service [phone] or from 7 AM to 5 PM via Perfect Serve or cell number    Martin Recio MD  [facility]  [address], [address]  Fax: [phone]  Office: [phone]       Assessment & Plan     Renal function:  ESRD  On PD  Continue PD while here  Rule out peritonitis as patient reported abdominal pain ? ( Patient is poor historian always say yes to pain question)   PD catheter site look clean without any erythema or discharge and no apparent abdominal tenderness. Electrolytes:  Lab Results   Component Value Date    CREATININE 7.7 (HH) 10/14/2022    BUN 49 (H) 10/14/2022     10/14/2022    K 4.6 10/14/2022     10/14/2022    CO2 20 (L) 10/14/2022            # CKD- MBD:   Secondary hyperparathyroidism due to renal failure  Monitor Phos level while here.    Lab Results   Component Value Date    PTH 48.6 09/07/2022    CALCIUM 8.6 10/14/2022    PHOS 5.4 (H) 09/09/2022         Acid/Base status:  Bicarbonate mild low    Volume status/BP:  BP stable    Hematology:   CKD related anemia  Goal Hgb 10-11    Lab Results   Component Value Date    IRON 71 09/07/2022    TIBC 151 (L) 09/07/2022    FERRITIN 1,631.0 (H) 09/07/2022     Lab Results   Component Value Date    WBC 5.7 10/14/2022    HGB 8.8 (L) 10/14/2022    HCT 27.1 (L) 10/14/2022    MCV 85.2 10/14/2022     10/14/2022             Reason for Consult and Chief Complaint     Reason for consult: ESRD    Chief complaint:   Chief Complaint   Patient presents with    Dizziness     Since beginning of year. Pt has been seen multiple times for dizziness and falls. Pt reports last time she fell was yesterday. States she falls at least 1 time a day. Pt does home dialysis every night. History of Present Illness   Vanessa Valentine is a 65 y.o. with PMH significant for ESRD on PD admitted after fall after feeling of dizziness. Patient is not a good historian and said yes to abdominal pain question. Last PD was previous night. PD fluid is clear per patient. No fever, cough, chest pain or SOB. Review of Systems   Positive in bold or unable to assess     GEN: Fever, chills, night sweats. HEENT: Changes in vision, sore throat, rhinorrhea   CVS: Chest pain, palpitations, swelling or edema in legs  Pulmonary: Cough, hemoptysis, SOB  GI: Nausea, vomiting, diarrhea, constipation, abdominal pain  : Bladder incontinence, dysuria,hematuria. MSK: Muscle or joint or bone pains  Skin: Rashes, ulcers, skin thickness  CNS: Headache, dizziness, confusion, focal weakness, seizure. Psych: Anxiety, agitation, depression. Reviewed all 12 systems, negative except as above.      Past Medical History     Past Medical History:   Diagnosis Date    JONATHAN (acute kidney injury) ([facility] [address] 9/3/2014    Anxiety     Asthma     Bipolar disorder ([facility] [address]     Bronchitis     Chronic back pain     Depression     Diabetes mellitus ([facility] [address]     DM II (diabetes mellitus, type II), controlled ([facility] [address] 9/3/2014    Hypertension          Past Surgical History     Past Surgical History:   Procedure Laterality Date    ENDOMETRIAL ABLATION      INNER EAR SURGERY      TUBAL LIGATION Family History     Family History   Problem Relation Age of Onset    Diabetes Mother     Cancer Mother     Cancer Maternal Grandmother     Heart Disease Maternal Grandfather          Social History     Social History     Tobacco Use    Smoking status: Some Days     Packs/day: 0.05     Years: 15.00     Pack years: 0.75     Types: Cigarettes    Smokeless tobacco: Never   Substance Use Topics    Alcohol use: No          Past medical, family, and social histories were reviewed as previously documented. Updates were made as necessary. Inpatient Medications and Allergies       Scheduled Meds:   aspirin  81 mg Oral Daily    buPROPion  150 mg Oral Daily    benztropine  1 mg Oral Nightly    calcitRIOL  0.25 mcg Oral Daily    melatonin  5 mg Oral Nightly    metoprolol succinate  25 mg Oral Daily    mirtazapine  7.5 mg Oral Nightly    rosuvastatin  10 mg Oral Nightly    sevelamer  1,600 mg Oral TID WC    sodium chloride flush  5-40 mL IntraVENous 2 times per day    heparin (porcine)  5,000 Units SubCUTAneous 3 times per day       Allergies: Allergies   Allergen Reactions    Latex      Added based on information entered during case entry, please review and add reactions, type, and severity as needed    Peanuts [Peanut Oil] Hives and Swelling     Swelling of tongue/face    Tomato Hives and Swelling     Swelling of tongue/face    Shellfish-Derived Products          Vital Signs     Vitals:    10/15/22 0542   BP: (!) 144/90   Pulse: (!) 101   Resp:    Temp:    SpO2:        No intake or output data in the 24 hours ending 10/15/22 0742      Physical Exam     General appearance: NAD  Head: Normocephalic, without obvious abnormality, atraumatic   Mouth: Moist mucous membrane  Neck: Supple. Lungs: Good air entry bilaterally.  No respiratory distress on RA  Heart: S1, S2.  Abdomen: soft, non-tender non-distended  Extremities: No leg edema, warm to touch   Skin: No concerning rashes noted  Psych: good eye contact, normal affect  Neuro: AAO x 3, non focal.       Laboratory Data     Please see above     Diagnostic Studies   Pertinent images reviewed

## 2022-10-15 NOTE — PLAN OF CARE
Problem: Discharge Planning  Goal: Discharge to home or other facility with appropriate resources  Outcome: Progressing  Flowsheets (Taken 10/14/2022 3683)  Discharge to home or other facility with appropriate resources: Identify barriers to discharge with patient and caregiver     Problem: Pain  Goal: Verbalizes/displays adequate comfort level or baseline comfort level  Outcome: Progressing

## 2022-10-15 NOTE — CONSULTS
Consult received. Labs and notes were reviewed. Case was discussed with the staff. Full note to follow.     Thanks  Nephrology  [address] [facility], [address] [place] [address]  Office: [phone]  Cell: [phone]  Fax: [phone]

## 2022-10-15 NOTE — PROGRESS NOTES
Pharmacy Note - Renal Dosing    Rosuvastatin 40 mg daily ordered for patient. Per [facility] Renal Dose Adjustment Policy, order will be changed to 10 mg daily. Estimated Creatinine Clearance: Estimated Creatinine Clearance: 8 mL/min (A) (based on SCr of 7.7 mg/dL [facility])). Dialysis Status, JONATHAN, CKD:   BMI: Body mass index is 22.5 kg/m². Rationale for Adjustment: Agent is renally eliminated. Pharmacy will continue to monitor renal function and adjust dose as necessary. Please call with any questions.     Jhoana West, AmyD, BCPS

## 2022-10-15 NOTE — PROGRESS NOTES
Pharmacy Note - Extended Infusion Beta-Lactam Adjustment    Cefepime ordered for treatment of suspected peritonitis/intra-abdominal infection. Per [facility] Extended Infusion Beta-Lactam Policy, 3376 mg IV x 1 dose will be changed to 2000 mg IV x 1 dose administered over 30 minutes. Estimated Creatinine Clearance: Estimated Creatinine Clearance: 9 mL/min (A) (based on SCr of 7.4 mg/dL [facility])). Dialysis Status, JONATHAN, CKD: ESRD - Peritoneal dialysis dependent  BMI: Body mass index is 22.5 kg/m². Rationale for Adjustment: Agent demonstrates time-dependent effect on bacterial eradication. Extended-infusion dosing strategy aims to enhance microbiologic and clinical efficacy. Please call with any questions.     Cherelle Petty PharmD  Clinical Pharmacist - Emergency Dept  Wireless: [phone]  10/15/2022 9:51 AM

## 2022-10-15 NOTE — PROGRESS NOTES
Hospitalist Progress Note      PCP: Diamond Echols DO,     Date of Admission: 10/14/2022    Chief Complaint: Dizziness    Hospital Course: 66 yo F w/ ESRD on PD who was admitted 10/14 for evaluation of recurrent episodes of dizziness/vertigo and frequent falls. Subjective: No acute clinical changes reported overnight. Afebrile and hemodynamically stable. This am complaining of some lower abdominal pain. No fevers, nausea or vomiting. Otherwise no new symptoms. No vertigo or dizziness currently. She confirms she feels it primarily when standing/walking and has been ongoing in similar pattern for months. Medications:  Reviewed    Infusion Medications    sodium chloride       Scheduled Medications    aspirin  81 mg Oral Daily    buPROPion  150 mg Oral Daily    benztropine  1 mg Oral Nightly    calcitRIOL  0.25 mcg Oral Daily    melatonin  5 mg Oral Nightly    metoprolol succinate  25 mg Oral Daily    mirtazapine  7.5 mg Oral Nightly    rosuvastatin  10 mg Oral Nightly    sevelamer  1,600 mg Oral TID WC    sodium chloride flush  5-40 mL IntraVENous 2 times per day    heparin (porcine)  5,000 Units SubCUTAneous 3 times per day     PRN Meds: meclizine, sodium chloride flush, sodium chloride, ondansetron **OR** ondansetron, polyethylene glycol, acetaminophen **OR** acetaminophen, hydrALAZINE    No intake or output data in the 24 hours ending 10/15/22 0905    Physical Exam Performed:    BP (!) 144/90   Pulse (!) 101   Temp 98.2 °F (36.8 °C) (Oral)   Resp 18   Ht 5' 8\" (1.727 m)   Wt 148 lb (67.1 kg)   SpO2 98%   BMI 22.50 kg/m²     General appearance:  No apparent distress, appears stated age and cooperative. HEENT:  Normal cephalic, atraumatic without obvious deformity. Pupils equal, round, and reactive to light. Extra ocular muscles intact. Conjunctivae/corneas clear. Neck: Supple, with full range of motion. No jugular venous distention. Trachea midline. Respiratory:  Normal respiratory effort. Clear to auscultation, bilaterally without Rales/Wheezes/Rhonchi. Cardiovascular:  Regular rate and rhythm with normal S1/S2 without murmurs, rubs or gallops. Abdomen: Soft, non-tender, PD catheter +, no signs of infection, non-distended with normal bowel sounds. Musculoskeletal:  No clubbing, cyanosis or edema bilaterally. Full range of motion without deformity. Skin: Skin color, texture, turgor normal.  No rashes or lesions. Neurologic:  Neurovascularly intact without any focal sensory/motor deficits. Cranial nerves: II-XII intact, grossly non-focal. No nystagmus on rapid eye alternation. One eye-cover-uncover test showed no nystagmus either. Psychiatric:  Alert and oriented, thought content appropriate, normal insight  Capillary Refill: Brisk,3 seconds, normal  Peripheral Pulses: +2 palpable, equal bilaterally       Labs:   Recent Labs     10/14/22  1705   WBC 5.7   HGB 8.8*   HCT 27.1*        Recent Labs     10/14/22  1541      K 4.6      CO2 20*   BUN 49*   CREATININE 7.7*   CALCIUM 8.6     No results for input(s): AST, ALT, BILIDIR, BILITOT, ALKPHOS in the last 72 hours. No results for input(s): INR in the last 72 hours. No results for input(s): Aleene Sauger in the last 72 hours. Urinalysis:      Lab Results   Component Value Date/Time    NITRU Negative 09/06/2022 04:43 PM    WBCUA 0-2 09/06/2022 04:43 PM    BACTERIA 2+ 09/06/2022 04:43 PM    RBCUA 0-2 09/06/2022 04:43 PM    BLOODU TRACE-INTACT 09/06/2022 04:43 PM    SPECGRAV 1.015 09/06/2022 04:43 PM    GLUCOSEU Negative 09/06/2022 04:43 PM       Radiology:  No orders to display           Assessment/Plan:    Active Hospital Problems    Diagnosis     Recurrent falls [R29.6]      Priority: Medium    Vertigo [R42]      Priority: Medium     #Lightheadedness and dizziness-has been going on for more than 10 months. Increasing frequency of mechanical falls lately.   Patient was hospitalized for about 2 weeks in August this year, possibly leading to deconditioning  #Mechanical falls without loss of consciousness  #ESRD on peritoneal dialysis  #Chronic anemia secondary to CKD  #Essential hypertension     -Ordered MRI brain as part of vertigo evaluation: overall concern for CNS process is lower as her vertigo is paroxysmal and seems to be positionally-related (I.e. only when standing or walking). Additionally she has no observable focal neuro deficits on exam and unable to induce nystagmus. Discussed with Neurology team and if MRI abnormal or if she is inpatient for prolonged period pending PT/OT eval and possible placement will consider asking for inpatient eval. Otherwise plan for outpatient Neuro eval.  -Check orthostatic vital signs  -Continue on meclizine as ordered  -Monitor renal function and electrolytes  -Nephrology consult for continued peritoneal dialysis  --Obtain PD fluid sample to test for peritonitis given abd pain  -PT/OT consulted  -Social service consult, patient may benefit from short-term rehab placement  -Continued on her home medications appropriately  -Supportive therapy       DVT Prophylaxis: Heparin subq  Diet: ADULT DIET; Regular; 4 carb choices (60 gm/meal); Low Fat/Low Chol/High Fiber/2 gm Na; Low Potassium (Less than 3000 mg/day); Low Phosphorus (Less than 1000 mg);  Less than 60 gm  Code Status: Full Code  PT/OT Eval Status: ordered    Dispo - TBD      Deven Sheffield MD

## 2022-10-15 NOTE — CONSULTS
Clinical Pharmacy Progress Note    Vancomycin - Management by Pharmacy    Consult Date(s): 10/15/22  Consulting Provider(s): Dr. Zari Layton / Plan  Suspected peritonitis - Vancomycin  Concurrent Antimicrobials:   Cefepime x 1 ordered for 10/15  Day of Vanc Therapy / Ordered Duration: #1  Current Dosing Method: Intermittent Dosing by Levels  Therapeutic Goal: Trough ~ 15 mg/L  Current Dose / Plan:   Given ESRD on PD, will dose vancomycin based on intermittent levels. Have entered placeholder onto [facility] Will give 1500mg IV x1 today (~23 mg/kg) as loading dose. Level ordered for tomorrow AM, Sun 10/16 to assess timing of next dose. Will continue to monitor clinical condition and make adjustments to regimen as appropriate. Thank you for consulting Pharmacy! Anell Najjar, PharmD, [place]  Wireless: [phone]   10/15/2022 9:35 AM          Subjective/Objective:   Beverly Myles is a 65 y.o. female with a PMHx significant for anxiety, asthma, bipolar, depression, DM, ESRD on PD who presented with dizziness. Admitted with lightheaded and dizziness x 10 months with mechanical falls. Nephrology consulted this admission for ESRD on PD - pt reported abdominal pain; started on empiric Abx for r/o peritonitis. Pharmacy is consulted to dose Vancomycin. Patient is known to clinical pharmacy team from previous admission --  Aug 2022 - Initially dosing vancomycin based on intermittent levels - eventually scheudled on 1000mg IV q72h based on levels/2-point kinetic dosing.     Ht Readings from Last 1 Encounters:   10/14/22 5' 8\" (1.727 m)     Wt Readings from Last 1 Encounters:   10/14/22 148 lb (67.1 kg)     Current & Prior Antimicrobial Regimen(s):  Cefepime x 1 ordered for 10/15  Vancomycin - Pharmacy to dose  Intermittent dosing based on levels (10/15-current)    Date Dose Vanc Level   10/15 1500mg IV ordered    10/16  Ordered            Cultures & Sensitivities:  Date Site Micro Susceptibility / Result    PD fluid             Recent Labs     10/14/22  1541 10/14/22  1705 10/15/22  0847   CREATININE 7.7*  --   --    BUN 49*  --   --    WBC  --  5.7 6.3       Estimated CrCl not calculated d/t ESRD on HD    Additional Lab Values / Findings of Note:    No results for input(s): PROCAL in the last 72 hours.

## 2022-10-16 ENCOUNTER — APPOINTMENT (OUTPATIENT)
Dept: MRI IMAGING | Age: 57
DRG: 111 | End: 2022-10-16
Payer: MEDICAID

## 2022-10-16 VITALS
HEART RATE: 85 BPM | TEMPERATURE: 98.7 F | RESPIRATION RATE: 16 BRPM | OXYGEN SATURATION: 99 % | DIASTOLIC BLOOD PRESSURE: 80 MMHG | HEIGHT: 68 IN | WEIGHT: 145.5 LBS | BODY MASS INDEX: 22.05 KG/M2 | SYSTOLIC BLOOD PRESSURE: 154 MMHG

## 2022-10-16 LAB
ALBUMIN SERPL-MCNC: 2.7 G/DL (ref 3.4–5)
ANION GAP SERPL CALCULATED.3IONS-SCNC: 10 MMOL/L (ref 3–16)
BUN BLDV-MCNC: 37 MG/DL (ref 7–20)
CALCIUM SERPL-MCNC: 8.8 MG/DL (ref 8.3–10.6)
CHLORIDE BLD-SCNC: 107 MMOL/L (ref 99–110)
CO2: 22 MMOL/L (ref 21–32)
CREAT SERPL-MCNC: 6.2 MG/DL (ref 0.6–1.1)
GFR AFRICAN AMERICAN: 8
GFR NON-AFRICAN AMERICAN: 7
GLUCOSE BLD-MCNC: 106 MG/DL (ref 70–99)
PHOSPHORUS: 4.9 MG/DL (ref 2.5–4.9)
POTASSIUM SERPL-SCNC: 4.1 MMOL/L (ref 3.5–5.1)
SODIUM BLD-SCNC: 139 MMOL/L (ref 136–145)
VANCOMYCIN RANDOM: 16.3 UG/ML

## 2022-10-16 PROCEDURE — 96376 TX/PRO/DX INJ SAME DRUG ADON: CPT

## 2022-10-16 PROCEDURE — 80202 ASSAY OF VANCOMYCIN: CPT

## 2022-10-16 PROCEDURE — 36415 COLL VENOUS BLD VENIPUNCTURE: CPT

## 2022-10-16 PROCEDURE — G0378 HOSPITAL OBSERVATION PER HR: HCPCS

## 2022-10-16 PROCEDURE — 3E1M39Z IRRIGATION OF PERITONEAL CAVITY USING DIALYSATE, PERCUTANEOUS APPROACH: ICD-10-PCS | Performed by: STUDENT IN AN ORGANIZED HEALTH CARE EDUCATION/TRAINING PROGRAM

## 2022-10-16 PROCEDURE — 6370000000 HC RX 637 (ALT 250 FOR IP): Performed by: INTERNAL MEDICINE

## 2022-10-16 PROCEDURE — 97116 GAIT TRAINING THERAPY: CPT

## 2022-10-16 PROCEDURE — 2580000003 HC RX 258: Performed by: INTERNAL MEDICINE

## 2022-10-16 PROCEDURE — 97165 OT EVAL LOW COMPLEX 30 MIN: CPT

## 2022-10-16 PROCEDURE — 80069 RENAL FUNCTION PANEL: CPT

## 2022-10-16 PROCEDURE — 97162 PT EVAL MOD COMPLEX 30 MIN: CPT

## 2022-10-16 PROCEDURE — 97530 THERAPEUTIC ACTIVITIES: CPT

## 2022-10-16 PROCEDURE — 70551 MRI BRAIN STEM W/O DYE: CPT

## 2022-10-16 PROCEDURE — 6360000002 HC RX W HCPCS: Performed by: INTERNAL MEDICINE

## 2022-10-16 PROCEDURE — 6370000000 HC RX 637 (ALT 250 FOR IP): Performed by: STUDENT IN AN ORGANIZED HEALTH CARE EDUCATION/TRAINING PROGRAM

## 2022-10-16 PROCEDURE — 96372 THER/PROPH/DIAG INJ SC/IM: CPT

## 2022-10-16 RX ORDER — AMLODIPINE BESYLATE 5 MG/1
5 TABLET ORAL DAILY
Qty: 30 TABLET | Refills: 3 | Status: SHIPPED | OUTPATIENT
Start: 2022-10-17

## 2022-10-16 RX ORDER — AMLODIPINE BESYLATE 5 MG/1
5 TABLET ORAL DAILY
Status: DISCONTINUED | OUTPATIENT
Start: 2022-10-16 | End: 2022-10-16 | Stop reason: HOSPADM

## 2022-10-16 RX ADMIN — ASPIRIN 81 MG 81 MG: 81 TABLET ORAL at 09:35

## 2022-10-16 RX ADMIN — HYDRALAZINE HYDROCHLORIDE 5 MG: 20 INJECTION INTRAMUSCULAR; INTRAVENOUS at 06:19

## 2022-10-16 RX ADMIN — HEPARIN SODIUM 5000 UNITS: 5000 INJECTION INTRAVENOUS; SUBCUTANEOUS at 06:20

## 2022-10-16 RX ADMIN — CALCITRIOL CAPSULES 0.25 MCG 0.25 MCG: 0.25 CAPSULE ORAL at 09:35

## 2022-10-16 RX ADMIN — SEVELAMER CARBONATE 1600 MG: 800 TABLET, FILM COATED ORAL at 09:34

## 2022-10-16 RX ADMIN — BUPROPION HYDROCHLORIDE 150 MG: 150 TABLET, EXTENDED RELEASE ORAL at 09:35

## 2022-10-16 RX ADMIN — AMLODIPINE BESYLATE 5 MG: 5 TABLET ORAL at 09:35

## 2022-10-16 RX ADMIN — OXYCODONE 5 MG: 5 TABLET ORAL at 05:23

## 2022-10-16 RX ADMIN — GENTAMICIN SULFATE: 1 OINTMENT TOPICAL at 09:42

## 2022-10-16 RX ADMIN — SODIUM CHLORIDE, PRESERVATIVE FREE 10 ML: 5 INJECTION INTRAVENOUS at 09:41

## 2022-10-16 RX ADMIN — METOPROLOL SUCCINATE 25 MG: 25 TABLET, FILM COATED, EXTENDED RELEASE ORAL at 09:35

## 2022-10-16 RX ADMIN — HYDRALAZINE HYDROCHLORIDE 5 MG: 20 INJECTION INTRAMUSCULAR; INTRAVENOUS at 00:20

## 2022-10-16 ASSESSMENT — PAIN DESCRIPTION - ORIENTATION: ORIENTATION: RIGHT

## 2022-10-16 ASSESSMENT — PAIN DESCRIPTION - DESCRIPTORS: DESCRIPTORS: ACHING;CRAMPING

## 2022-10-16 ASSESSMENT — PAIN SCALES - GENERAL: PAINLEVEL_OUTOF10: 9

## 2022-10-16 ASSESSMENT — PAIN - FUNCTIONAL ASSESSMENT: PAIN_FUNCTIONAL_ASSESSMENT: ACTIVITIES ARE NOT PREVENTED

## 2022-10-16 ASSESSMENT — PAIN DESCRIPTION - LOCATION: LOCATION: SHOULDER

## 2022-10-16 NOTE — FLOWSHEET NOTE
Disconnected from [facility] per protocol. Effluent: Clear  Total time: 09 hr 21 min   Total UF:  234 ml. Total Volume:  7845 ml. Dwell time gained:   hr  min. Pt Tolerated procedure: Pt offers no c/o. Machine was turned off when RN entered room, unable to get Dwell time gained stat.        10/16/22 0730   Vitals   BP (!) 162/89   Temp 98.6 °F (37 °C)   Heart Rate 84   Resp 16   Weight 145 lb 8.1 oz (66 kg)   Cycler   Ultrafiltration (UF) (mL) 234 mL   Average Dwell Time (Hours:Minutes) 105

## 2022-10-16 NOTE — PROGRESS NOTES
Nephrology Consult Note        [facility]    [facility] [employer]       Phone: [phone]                                                  10/16/2022 7:58 AM     Patient: Rey Jones [id number]  [id number]  Date of Admit: 10/14/2022 LOS: 2 days      Interval History and Plan:  Patient feel fine  No pain  No SOB  Lytes stable yesterday  Today's labs pending  BP elevated. No evidence of peritonitis        Continue PD with Cycler  D/C antibiotics. No evidence of peritonitis. Start Amlodipine and monitor BP  Topical Gentamicin daily at PD catheter insertion site. Avoid nephrotoxins  Monitor input, output and weight  Monitor labs and vitals closely  Renally dose all medications    D/W patient        Thank you for allowing us to participate in this patient's care    In case of any question please call us at our 24 hour answering service [phone] or from 7 AM to 5 PM via Perfect Serve or cell number    Claudia Lock MD  [facility]  [address], [address]  Fax: [phone]  Office: [phone]       Assessment & Plan     Renal function:  ESRD  On PD  Continue PD while here  Rule out peritonitis as patient reported abdominal pain ? ( Patient is poor historian always say yes to pain question)   PD catheter site look clean without any erythema or discharge and no apparent abdominal tenderness. Electrolytes:  Lab Results   Component Value Date    CREATININE 7.4 (HH) 10/15/2022    CREATININE 7.6 (HH) 10/15/2022    BUN 48 (H) 10/15/2022    BUN 47 (H) 10/15/2022     10/15/2022     10/15/2022    K 4.9 10/15/2022    K 4.8 10/15/2022     10/15/2022     10/15/2022    CO2 21 10/15/2022    CO2 21 10/15/2022            # CKD- MBD:   Secondary hyperparathyroidism due to renal failure  Monitor Phos level while here.    Lab Results   Component Value Date    PTH 48.6 09/07/2022    CALCIUM 8.9 10/15/2022    CALCIUM 8.9 10/15/2022    PHOS 6.5 (H) 10/15/2022         Acid/Base status:  Stable. Volume status/BP:  BP elevated    Hematology:   CKD related anemia  Goal Hgb 10-11    Lab Results   Component Value Date    IRON 54 10/15/2022    TIBC 164 (L) 10/15/2022    FERRITIN 822.6 (H) 10/15/2022     Lab Results   Component Value Date    WBC 6.3 10/15/2022    HGB 8.9 (L) 10/15/2022    HCT 27.3 (L) 10/15/2022    MCV 85.0 10/15/2022     10/15/2022             Reason for Consult and Chief Complaint     Reason for consult: ESRD    Chief complaint:   Chief Complaint   Patient presents with    Dizziness     Since beginning of year. Pt has been seen multiple times for dizziness and falls. Pt reports last time she fell was yesterday. States she falls at least 1 time a day. Pt does home dialysis every night. History of Present Illness   Zev Casillas is a 65 y.o. with PMH significant for ESRD on PD admitted after fall after feeling of dizziness. Patient is not a good historian and said yes to abdominal pain question. Last PD was previous night. PD fluid is clear per patient. No fever, cough, chest pain or SOB. Review of Systems   Positive in bold or unable to assess     GEN: Fever, chills, night sweats. HEENT: Changes in vision, sore throat, rhinorrhea   CVS: Chest pain, palpitations, swelling or edema in legs  Pulmonary: Cough, hemoptysis, SOB  GI: Nausea, vomiting, diarrhea, constipation, abdominal pain  : Bladder incontinence, dysuria,hematuria. MSK: Muscle or joint or bone pains  Skin: Rashes, ulcers, skin thickness  CNS: Headache, dizziness, confusion, focal weakness, seizure. Psych: Anxiety, agitation, depression. Reviewed all 12 systems, negative except as above.      Past Medical History     Past Medical History:   Diagnosis Date    JONATHAN (acute kidney injury) ([facility] [address] 9/3/2014    Anxiety     Asthma     Bipolar disorder ([facility] [address]     Bronchitis     Chronic back pain     Depression     Diabetes mellitus ([facility] [address]     DM II (diabetes mellitus, type II), controlled ([facility] [address] 9/3/2014    Hypertension          Past Surgical History     Past Surgical History:   Procedure Laterality Date    ENDOMETRIAL ABLATION      INNER EAR SURGERY      TUBAL LIGATION           Family History     Family History   Problem Relation Age of Onset    Diabetes Mother     Cancer Mother     Cancer Maternal Grandmother     Heart Disease Maternal Grandfather          Social History     Social History     Tobacco Use    Smoking status: Some Days     Packs/day: 0.05     Years: 15.00     Pack years: 0.75     Types: Cigarettes    Smokeless tobacco: Never   Substance Use Topics    Alcohol use: No          Past medical, family, and social histories were reviewed as previously documented. Updates were made as necessary. Inpatient Medications and Allergies       Scheduled Meds:   dianeal lo-josh 1.5%  8,000 mL IntraPERitoneal Daily    gentamicin   Topical Daily    aspirin  81 mg Oral Daily    buPROPion  150 mg Oral Daily    benztropine  1 mg Oral Nightly    calcitRIOL  0.25 mcg Oral Daily    melatonin  5 mg Oral Nightly    metoprolol succinate  25 mg Oral Daily    mirtazapine  7.5 mg Oral Nightly    rosuvastatin  10 mg Oral Nightly    sevelamer  1,600 mg Oral TID WC    sodium chloride flush  5-40 mL IntraVENous 2 times per day    heparin (porcine)  5,000 Units SubCUTAneous 3 times per day       Allergies:    Allergies   Allergen Reactions    Latex      Added based on information entered during case entry, please review and add reactions, type, and severity as needed    Peanuts [Peanut Oil] Hives and Swelling     Swelling of tongue/face    Tomato Hives and Swelling     Swelling of tongue/face    Shellfish-Derived Products          Vital Signs     Vitals:    10/16/22 0755   BP: (!) 170/93   Pulse: 87   Resp: 17   Temp: 98 °F (36.7 °C)   SpO2: 100%         Intake/Output Summary (Last 24 hours) at 10/16/2022 0758  Last data filed at 10/16/2022 0730  Gross per 24 hour   Intake 370 ml Output 234 ml   Net 136 ml         Physical Exam     General appearance: NAD  Head: Normocephalic, without obvious abnormality, atraumatic   Mouth: Moist mucous membrane  Neck: Supple. Lungs: Good air entry bilaterally.  No respiratory distress on RA  Heart: S1, S2.  Abdomen: soft, non-tender non-distended  Extremities: No leg edema, warm to touch   Skin: No concerning rashes noted  Psych: good eye contact, normal affect  Neuro: AAO x 3, non focal.       Laboratory Data     Please see above     Diagnostic Studies   Pertinent images reviewed

## 2022-10-16 NOTE — CONSULTS
Clinical Pharmacy Progress Note    Vancomycin has been discontinued. Will sign off pharmacy to dose vancomycin consult. If medication is restarted and pharmacy is to manage dosing, please re-consult at that time.     Daja Payne, PharmD 10/16/2022, 7:59 AM

## 2022-10-16 NOTE — PROGRESS NOTES
Physical Therapy  Facility/Department: [address]  Physical Therapy Initial Assessment and Treatment    Name: Lacy Barker  : 1965  MRN: [id number]  Date of Service: 10/16/2022    Discharge Recommendations:  Lacy Barker scored a 24/24 on the AM-PAC short mobility form. At this time, no further PT is recommended upon discharge. Recommend patient returns to prior setting with prior services. PT Equipment Recommendations  Equipment Needed: No        Assessment   Assessment: Pt from home with dizziness and falls. Pt denies dizziness in hospital.  Pt demonstrates independence with transfers and steady gait. No acute PT needs identified at this time. Recommend continued activity per RN staff for remainder of hospital stay. Will sign off from acute PT services. Decision Making: Medium Complexity  Requires PT Follow-Up: No     Plan   General Plan: Discharge with evaluation only    Safety Devices  Type of Devices: Left in chair, Chair alarm in place, Call light within reach, Nurse notified     Restrictions  Up with assist     Subjective   Chart Reviewed: Yes  Additional Pertinent Hx: Pt to ED 10/14 with dizziness and falls. Brain MRI normal.  PMH:  anxiety, asthma, bipolar, depression, back pain, DM, JONATHAN  Diagnosis: Dizziness    Subjective  Subjective: Pt found supine in bed. \"Nobody helps me. \"  Pt upset about her restricted diet in the hospial.  \"I'm mad! \"  \"I don't fall here. \"    Social/Functional History  Lives With: Alone  Type of Home: Apartment  Home Layout: One level  Home Access: Stairs to enter with rails  Entrance Stairs - Number of Steps: 3 steps in enter building 3 to get to floor  Bathroom Shower/Tub: Tub/Shower unit  Bathroom Toilet: Standard  Bathroom Equipment: Tub transfer bench  Home Equipment: jessica Chin  Has the patient had two or more falls in the past year or any fall with injury in the past year?: Yes (reports multiple falls- but unable to state what happens)  ADL Assistance: Independent  Homemaking Assistance: Independent  Homemaking Responsibilities: Yes  Ambulation Assistance: Independent  Transfer Assistance: Independent  Active : No  Patient's  Info: takes Ty to [employer]  Occupation: Retired  Leisure & Hobbies: backgammon, trouble    Vision/Hearing  Vision: Within Functional Limits  Hearing: Within functional limits      Cognition   Within Functional Limits     Objective   Gross Assessment  AROM: Within functional limits  Strength:  Within functional limits     Bed mobility  Supine to Sit: Independent    Transfers  Sit to Stand: Independent  Stand to Sit: Independent    Ambulation  Device: No Device  Assistance: Independent  Gait Deviations: None  Distance: 350ft    Stairs/Curb  Stairs?: No (Pt declined need to do steps)    Balance  Sitting - Static: Good  Sitting - Dynamic: Good  Standing - Static: Good  Standing - Dynamic: Good    AM-PAC Score  AM-PAC Inpatient Mobility Raw Score : 24 (10/16/22 1116)  AM-PAC Inpatient T-Scale Score : 61.14 (10/16/22 1116)  Mobility Inpatient CMS 0-100% Score: 0 (10/16/22 1116)  Mobility Inpatient CMS G-Code Modifier : CH (10/16/22 1116)      Education  Patient Education  Education Given To: Patient  Education Provided: Role of Therapy  Education Method: Verbal  Education Outcome: Verbalized understanding      Therapy Time   Individual Concurrent Group Co-treatment   Time In 1005         Time Out 1028         Minutes 23         Timed Code Treatment Minutes:  12  Total Treatment Minutes:  [address], PT

## 2022-10-16 NOTE — DISCHARGE INSTR - DIET
Good nutrition is important when healing from an illness, injury, or surgery. Follow any nutrition recommendations given to you during your hospital stay. If you were given an oral nutrition supplement while in the hospital, continue to take this supplement at home. You can take it with meals, in-between meals, and/or before bedtime. These supplements can be purchased at most local grocery stores, pharmacies, and chain [employer]-stores. If you have any questions about your diet or nutrition, call the hospital and ask for the dietitian.     Resume home renal diet

## 2022-10-16 NOTE — DISCHARGE SUMMARY
Discharge order received. Patient being DC home. All paperwork explained to patient. She verbalizes understanding and denies any questions. All belongings sent with patient. IV and tele removed, patient sent home in [place].

## 2022-10-16 NOTE — CARE COORDINATION
A [facility] has been scheduled to transport Ms. Rodriguez home today @ 530p.  [facility] instructed to call the nursing station upon arrival.    Electronically signed by Ana Pham, KALYN RN-[facility]  Case Management  [phone]

## 2022-10-16 NOTE — DISCHARGE SUMMARY
Hospital Medicine Discharge Summary    Patient ID: Darryle Mccreedy      Patient's PCP: Irma Harrington DO, DO    Admit Date: 10/14/2022     Discharge Date:   10/16/2022    Admitting Provider: Senait Llanos MD     Discharge Provider: Henry Arredondo MD     Discharge Diagnoses: Active Hospital Problems    Diagnosis     Recurrent falls [R29.6]      Priority: Medium    Vertigo [R42]      Priority: Medium    ESRD on peritoneal dialysis ([address] [N18.6, Z99.2]      Priority: Medium       The patient was seen and examined on day of discharge and this discharge summary is in conjunction with any daily progress note from day of discharge. Hospital Course: Admitted on 10/14 for falls at home in setting of known chronic vertigo that is being worked-up as an outpatient. MRI of brain obtained and no abnormalities identified. Did not have any vertigo episodes while inpatient. Discussed case with Neurology who reported that due to chronicity of symptoms without acute changes would best be worked up as outpatient. They included their information for follow-up in discharge instructions. Home PD continued while inpatient. Had brief episode of lower abdominal pain on hospital day 2: Tested PD fluid and no signs of peritonitis. PT and OT evaluated patient and scored 24/24 on AM-PAC ADL assessment . Discharged home for outpatient follow-up of her chronic vertigo. Physical Exam Performed:     BP (!) 170/93   Pulse 87   Temp 98 °F (36.7 °C) (Oral)   Resp 17   Ht 5' 8\" (1.727 m)   Wt 145 lb 8.1 oz (66 kg)   SpO2 100%   BMI 22.12 kg/m²       General appearance:  No apparent distress, appears stated age and cooperative. HEENT:  Normal cephalic, atraumatic without obvious deformity. Pupils equal, round, and reactive to light. Extra ocular muscles intact. Conjunctivae/corneas clear. Neck: Supple, with full range of motion. No jugular venous distention. Trachea midline. Respiratory:  Normal respiratory effort. Clear to auscultation, bilaterally without Rales/Wheezes/Rhonchi. Cardiovascular:  Regular rate and rhythm with normal S1/S2 without murmurs, rubs or gallops. Abdomen: Soft, non-tender, PD catheter +, no signs of infection, non-distended with normal bowel sounds. Musculoskeletal:  No clubbing, cyanosis or edema bilaterally. Full range of motion without deformity. Skin: Skin color, texture, turgor normal.  No rashes or lesions. Neurologic:  Neurovascularly intact without any focal sensory/motor deficits. Cranial nerves: II-XII intact, grossly non-focal. No nystagmus on rapid eye alternation. One eye-cover-uncover test showed no nystagmus either. Psychiatric:  Alert and oriented, thought content appropriate, normal insight  Capillary Refill: Brisk,3 seconds, normal  Peripheral Pulses: +2 palpable, equal bilaterally        Labs: For convenience and continuity at follow-up the following most recent labs are provided:      CBC:    Lab Results   Component Value Date/Time    WBC 6.3 10/15/2022 08:47 AM    HGB 8.9 10/15/2022 08:47 AM    HCT 27.3 10/15/2022 08:47 AM     10/15/2022 08:47 AM       Renal:    Lab Results   Component Value Date/Time     10/16/2022 07:11 AM    K 4.1 10/16/2022 07:11 AM    K 4.9 10/15/2022 08:47 AM     10/16/2022 07:11 AM    CO2 22 10/16/2022 07:11 AM    BUN 37 10/16/2022 07:11 AM    CREATININE 6.2 10/16/2022 07:11 AM    CALCIUM 8.8 10/16/2022 07:11 AM    PHOS 4.9 10/16/2022 07:11 AM         Significant Diagnostic Studies    Radiology:   MRI BRAIN WO CONTRAST   Final Result      1.  Normal MRI of the head                        Consults:     IP CONSULT TO HOSPITALIST  IP CONSULT TO NEPHROLOGY  IP CONSULT TO NEUROLOGY  IP CONSULT TO SOCIAL WORK  PHARMACY TO DOSE VANCOMYCIN    Disposition:  to home     Condition at Discharge: Stable    Discharge Instructions/Follow-up:  Follow-up with Neurology at first available appointment    Code Status:  Full Code     Activity: activity as tolerated    Diet: renal diet      Discharge Medications:     Current Discharge Medication List             Details   amLODIPine (NORVASC) 5 MG tablet Take 1 tablet by mouth daily  Qty: 30 tablet, Refills: 3                Details   mirtazapine (REMERON) 7.5 MG tablet Take 1 tablet by mouth nightly  Qty: 30 tablet, Refills: 3      sevelamer (RENVELA) 800 MG tablet Take 2 tablets by mouth 3 times daily (with meals)  Qty: 90 tablet, Refills: 3      bisacodyl (DULCOLAX) 10 MG suppository Place 10 mg rectally daily as needed for Constipation      oxyCODONE (ROXICODONE) 5 MG immediate release tablet Take 5 mg by mouth every 6 hours as needed for Pain. buPROPion (WELLBUTRIN SR) 150 MG extended release tablet Take 1 tablet by mouth daily  Qty: 30 tablet, Refills: 0      metoprolol succinate (TOPROL XL) 25 MG extended release tablet Take 1 tablet by mouth daily  Qty: 30 tablet, Refills: 0      aspirin 81 MG chewable tablet Take 1 tablet by mouth daily  Qty: 30 tablet, Refills: 3      melatonin 5 MG TABS tablet Take 1 tablet by mouth nightly      meclizine (ANTIVERT) 12.5 MG tablet Take 12.5 mg by mouth 3 times daily as needed for Dizziness      sennosides-docusate sodium (SENOKOT-S) 8.6-50 MG tablet Take 1 tablet by mouth in the morning. Qty: 30 tablet, Refills: 0      dicyclomine (BENTYL) 10 MG capsule Take 1 capsule by mouth in the morning and 1 capsule at noon and 1 capsule in the evening and 1 capsule before bedtime. Qty: 120 capsule, Refills: 3      rosuvastatin (CRESTOR) 40 MG tablet Take 40 mg by mouth at bedtime      calcitRIOL (ROCALTROL) 0.25 MCG capsule Take 0.25 mcg by mouth in the morning. benztropine (COGENTIN) 1 MG tablet Take 1 mg by mouth at bedtime             Time Spent on discharge is more than 30 minutes in the examination, evaluation, counseling and review of medications and discharge plan.       Signed:    Bailey Vergara MD   10/16/2022      Thank you Calin Florian DO, DO for the opportunity to be involved in this patient's care. If you have any questions or concerns, please feel free to contact me at [phone].

## 2022-10-16 NOTE — PROGRESS NOTES
Occupational Therapy  Facility/Department: [address]  Occupational Therapy Initial Assessment/treatment    Name: Kenrick Trujillo  : 1965  MRN: [id number]  Date of Service: 10/16/2022    Discharge Recommendations:   Kenrick Trujillo scored a 24/24 on the AM-PAC ADL Inpatient form. At this time, no further OT is recommended upon discharge due to pt performing at baseline. Recommend patient returns to prior setting with prior services. Patient Diagnosis(es): The encounter diagnosis was Dizziness. Past Medical History:  has a past medical history of JONATHAN (acute kidney injury) ([facility] [address] Anxiety, Asthma, Bipolar disorder ([facility] [address] Bronchitis, Chronic back pain, Depression, Diabetes mellitus ([facility] [address] DM II (diabetes mellitus, type II), controlled ([facility] [address] and Hypertension. Past Surgical History:  has a past surgical history that includes Inner ear surgery; Tubal ligation; and Endometrial ablation. Assessment   Assessment: Pt is a 65 y.o. female presenting at baseline this date. She completed all functional mobility and transfers indepenently. She declined ADLs however anticipate pt independent. Pt did not report any dizziness throughout session. DC from OT services. Decision Making: Low Complexity  REQUIRES OT FOLLOW-UP: No  Activity Tolerance  Activity Tolerance: Patient Tolerated treatment well  Activity Tolerance Comments: no dizziness reported throughout session, O2 99%                Restrictions  Position Activity Restriction  Other position/activity restrictions: Up with assist    Subjective   General  Chart Reviewed: Yes  Patient assessed for rehabilitation services?: Yes  Additional Pertinent Hx: 65 y.o. female who presents to ED with complaints of dizziness.   Patient has been having the symptoms since beginning of this year which has led to multiple falls  Family / Caregiver Present: No  Referring Practitioner: Astrid  Diagnosis: dizziness  Subjective  Subjective: Pt in bed upon arrival. Agreeable to PT/OT evaluation. Disgruntled regarding restricted diet. Social/Functional History  Social/Functional History  Lives With: Alone  Type of Home: Apartment  Home Layout: One level  Home Access: Stairs to enter with rails  Entrance Stairs - Number of Steps: 3 steps in enter building 3 to get to floor  Bathroom Shower/Tub: Tub/Shower unit  Humble Toilet: Standard  Bathroom Equipment: Tub transfer bench  Home Equipment: jessica Deras  Has the patient had two or more falls in the past year or any fall with injury in the past year?: Yes (reports multiple falls- but unable to state what happens)  ADL Assistance: [address]: Independent  Homemaking Responsibilities: Yes  Ambulation Assistance: Independent  Transfer Assistance: Independent  Active : No  Patient's  Info: takes [employer] to grocery store  Occupation: Retired  Leisure & Hobbies: backgammon, trouble       Objective              Safety Devices  Type of Devices: Left in chair;Chair alarm in place;Call light within reach;Nurse notified  Balance  Sitting: Intact  Standing: Intact  Gait  Overall Level of Assistance: Independent (some impulsivity with mobility)     AROM: Within functional limits  PROM: Within functional limits  Strength: Within functional limits  Coordination: Within functional limits  Tone: Normal  Sensation: Intact  ADL  Additional Comments: pt declined all ADLs. \"I already did all that\"        Bed mobility  Sit to Supine: Modified independent (HOB elevated)  Transfers  Sit to stand: Independent  Stand to sit:  Independent  Vision  Vision: Within Functional Limits  Hearing  Hearing: Within functional limits  Cognition  Overall Cognitive Status: Exceptions  Arousal/Alertness: Appropriate responses to stimuli  Memory: Appears intact  Safety Judgement: Decreased awareness of need for assistance  Insights: Decreased awareness of deficits  Orientation  Overall Orientation Status: Within Functional Limits                  Education Given To: Patient  Education Provided: Role of Therapy;Plan of Care;Transfer Training;Precautions; ADL Adaptive Strategies  Education Method: Demonstration;Verbal  Barriers to Learning: Cognition (mental health concerns)  Education Outcome: Demonstrated understanding;Verbalized understanding;Continued education needed                                                                        AM-PAC Score        AM-PAC Inpatient Daily Activity Raw Score: 24 (10/16/22 1125)  AM-PAC Inpatient ADL T-Scale Score : 57.54 (10/16/22 1125)  ADL Inpatient CMS 0-100% Score: 0 (10/16/22 1125)  ADL Inpatient CMS G-Code Modifier : [facility] (10/16/22 1125)              Therapy Time   Individual Concurrent Group Co-treatment   Time In 1005         Time Out 1028         Minutes 23          Timed code tx minutes:15  Total tx minutes:23         Gatito Rizzo, OT

## 2022-10-18 LAB
AFB CULTURE (MYCOBACTERIA): NORMAL
AFB SMEAR: NORMAL
BODY FLUID CULTURE, STERILE: NORMAL
GRAM STAIN RESULT: NORMAL

## 2022-12-12 ENCOUNTER — HOSPITAL ENCOUNTER (EMERGENCY)
Age: 57
Discharge: HOME | End: 2022-12-12
Attending: EMERGENCY MEDICINE
Payer: MEDICAID

## 2022-12-12 ENCOUNTER — APPOINTMENT (OUTPATIENT)
Dept: CT IMAGING | Age: 57
End: 2022-12-12
Payer: MEDICAID

## 2022-12-12 VITALS
HEIGHT: 68 IN | SYSTOLIC BLOOD PRESSURE: 120 MMHG | BODY MASS INDEX: 18.19 KG/M2 | DIASTOLIC BLOOD PRESSURE: 96 MMHG | WEIGHT: 120 LBS | OXYGEN SATURATION: 97 % | TEMPERATURE: 97.9 F | RESPIRATION RATE: 17 BRPM | HEART RATE: 98 BPM

## 2022-12-12 DIAGNOSIS — R11.2 NAUSEA AND VOMITING, UNSPECIFIED VOMITING TYPE: Primary | ICD-10-CM

## 2022-12-12 LAB
A/G RATIO: 0.9 (ref 1.1–2.2)
ALBUMIN SERPL-MCNC: 3 G/DL (ref 3.4–5)
ALP BLD-CCNC: 145 U/L (ref 40–129)
ALT SERPL-CCNC: 147 U/L (ref 10–40)
ANION GAP SERPL CALCULATED.3IONS-SCNC: 11 MMOL/L (ref 3–16)
AST SERPL-CCNC: 140 U/L (ref 15–37)
BASOPHILS ABSOLUTE: 0 K/UL (ref 0–0.2)
BASOPHILS RELATIVE PERCENT: 0.6 %
BILIRUB SERPL-MCNC: <0.2 MG/DL (ref 0–1)
BILIRUBIN DIRECT: <0.2 MG/DL (ref 0–0.3)
BILIRUBIN, INDIRECT: NORMAL MG/DL (ref 0–1)
BUN BLDV-MCNC: 50 MG/DL (ref 7–20)
CALCIUM SERPL-MCNC: 9.3 MG/DL (ref 8.3–10.6)
CHLORIDE BLD-SCNC: 92 MMOL/L (ref 99–110)
CO2: 30 MMOL/L (ref 21–32)
CREAT SERPL-MCNC: 9.3 MG/DL (ref 0.6–1.1)
EOSINOPHILS ABSOLUTE: 0.1 K/UL (ref 0–0.6)
EOSINOPHILS RELATIVE PERCENT: 2.1 %
GFR SERPL CREATININE-BSD FRML MDRD: 5 ML/MIN/{1.73_M2}
GLUCOSE BLD-MCNC: 160 MG/DL (ref 70–99)
HCT VFR BLD CALC: 44 % (ref 36–48)
HEMOGLOBIN: 14.3 G/DL (ref 12–16)
LIPASE: 28 U/L (ref 13–60)
LYMPHOCYTES ABSOLUTE: 1.9 K/UL (ref 1–5.1)
LYMPHOCYTES RELATIVE PERCENT: 33.2 %
MCH RBC QN AUTO: 26.8 PG (ref 26–34)
MCHC RBC AUTO-ENTMCNC: 32.4 G/DL (ref 31–36)
MCV RBC AUTO: 82.6 FL (ref 80–100)
MONOCYTES ABSOLUTE: 0.5 K/UL (ref 0–1.3)
MONOCYTES RELATIVE PERCENT: 9.4 %
NEUTROPHILS ABSOLUTE: 3.1 K/UL (ref 1.7–7.7)
NEUTROPHILS RELATIVE PERCENT: 54.7 %
PDW BLD-RTO: 14.8 % (ref 12.4–15.4)
PLATELET # BLD: 222 K/UL (ref 135–450)
PMV BLD AUTO: 6.8 FL (ref 5–10.5)
POTASSIUM REFLEX MAGNESIUM: 3.9 MMOL/L (ref 3.5–5.1)
RBC # BLD: 5.33 M/UL (ref 4–5.2)
SODIUM BLD-SCNC: 133 MMOL/L (ref 136–145)
TOTAL PROTEIN: 6.4 G/DL (ref 6.4–8.2)
WBC # BLD: 5.6 K/UL (ref 4–11)

## 2022-12-12 PROCEDURE — 83690 ASSAY OF LIPASE: CPT

## 2022-12-12 PROCEDURE — 85025 COMPLETE CBC W/AUTO DIFF WBC: CPT

## 2022-12-12 PROCEDURE — 99284 EMERGENCY DEPT VISIT MOD MDM: CPT

## 2022-12-12 PROCEDURE — 6370000000 HC RX 637 (ALT 250 FOR IP): Performed by: EMERGENCY MEDICINE

## 2022-12-12 PROCEDURE — 80053 COMPREHEN METABOLIC PANEL: CPT

## 2022-12-12 PROCEDURE — 74176 CT ABD & PELVIS W/O CONTRAST: CPT

## 2022-12-12 RX ORDER — ONDANSETRON 4 MG/1
4 TABLET, ORALLY DISINTEGRATING ORAL ONCE
Status: COMPLETED | OUTPATIENT
Start: 2022-12-12 | End: 2022-12-12

## 2022-12-12 RX ORDER — OXYCODONE HYDROCHLORIDE 5 MG/1
5 TABLET ORAL ONCE
Status: COMPLETED | OUTPATIENT
Start: 2022-12-12 | End: 2022-12-12

## 2022-12-12 RX ADMIN — ONDANSETRON 4 MG: 4 TABLET, ORALLY DISINTEGRATING ORAL at 14:36

## 2022-12-12 RX ADMIN — OXYCODONE 5 MG: 5 TABLET ORAL at 16:04

## 2022-12-12 ASSESSMENT — ENCOUNTER SYMPTOMS
DIARRHEA: 0
SHORTNESS OF BREATH: 0
SORE THROAT: 0

## 2022-12-12 ASSESSMENT — PAIN SCALES - GENERAL: PAINLEVEL_OUTOF10: 10

## 2022-12-12 NOTE — ED PROVIDER NOTES
[address]          ATTENDING PHYSICIAN NOTE       Date of evaluation: 12/12/2022    Chief Complaint     Abdominal Pain (Abd pain, emesis, x 2mths off and on, seen at [facility] when it started, states no one knows what is wrong with her, reports black diarrhea.)      History of Present Illness     Carlos Claudio is a 65 y.o. female who presents to the emergency department primarily for nonbloody nonbilious emesis that has been ongoing for the last 2 days. She has able to tolerate some liquids, but solids make her symptoms worse. No diarrhea. No fevers or chills. He also complains of moderate intensity constant aching abdominal discomfort, which appears to be chronic for her. She states that this is not new for her, and is consistent with previous episodes of abdominal pain. She does nightly peritoneal dialysis. She has had no complications or change in her symptoms when instilling or draining the fluid. She has not noticed any cloudiness or other abnormalities to the fluid. No drainage or discomfort or abnormality with her peritoneal dialysis site. Review of Systems     Review of Systems   Constitutional:  Negative for fever. HENT:  Negative for sore throat. Respiratory:  Negative for shortness of breath. Gastrointestinal:  Negative for diarrhea. Genitourinary:  Negative for flank pain. Musculoskeletal:  Negative for myalgias. Skin:  Negative for rash. Neurological:  Negative for dizziness. All other systems reviewed and are negative. Past Medical, Surgical, Family, and Social History     She has a past medical history of JONATHAN (acute kidney injury) ([address] Anxiety, Asthma, Bipolar disorder ([address] Bronchitis, Chronic back pain, Depression, Diabetes mellitus ([address] DM II (diabetes mellitus, type II), controlled ([address] and Hypertension. She has a past surgical history that includes Inner ear surgery; Tubal ligation; and Endometrial ablation.   Her family history includes Cancer in her maternal grandmother and mother; Diabetes in her mother; Heart Disease in her maternal grandfather. She reports that she has been smoking cigarettes. She has a 0.75 pack-year smoking history. She has never used smokeless tobacco. She reports that she does not drink alcohol and does not use drugs. Medications     Previous Medications    AMLODIPINE (NORVASC) 5 MG TABLET    Take 1 tablet by mouth daily    ASPIRIN 81 MG CHEWABLE TABLET    Take 1 tablet by mouth daily    BENZTROPINE (COGENTIN) 1 MG TABLET    Take 1 mg by mouth at bedtime    BISACODYL (DULCOLAX) 10 MG SUPPOSITORY    Place 10 mg rectally daily as needed for Constipation    BUPROPION (WELLBUTRIN SR) 150 MG EXTENDED RELEASE TABLET    Take 1 tablet by mouth daily    CALCITRIOL (ROCALTROL) 0.25 MCG CAPSULE    Take 0.25 mcg by mouth in the morning. DICYCLOMINE (BENTYL) 10 MG CAPSULE    Take 1 capsule by mouth in the morning and 1 capsule at noon and 1 capsule in the evening and 1 capsule before bedtime. MECLIZINE (ANTIVERT) 12.5 MG TABLET    Take 12.5 mg by mouth 3 times daily as needed for Dizziness    MELATONIN 5 MG TABS TABLET    Take 1 tablet by mouth nightly    METOPROLOL SUCCINATE (TOPROL XL) 25 MG EXTENDED RELEASE TABLET    Take 1 tablet by mouth daily    MIRTAZAPINE (REMERON) 7.5 MG TABLET    Take 1 tablet by mouth nightly    OXYCODONE (ROXICODONE) 5 MG IMMEDIATE RELEASE TABLET    Take 5 mg by mouth every 6 hours as needed for Pain. ROSUVASTATIN (CRESTOR) 40 MG TABLET    Take 40 mg by mouth at bedtime    SENNOSIDES-DOCUSATE SODIUM (SENOKOT-S) 8.6-50 MG TABLET    Take 1 tablet by mouth in the morning. SEVELAMER (RENVELA) 800 MG TABLET    Take 2 tablets by mouth 3 times daily (with meals)       Allergies     She is allergic to latex, peanuts [peanut oil], tomato, and shellfish-derived products.     Physical Exam     INITIAL VITALS: BP (!) 100/46   Pulse 98   Temp 97.9 °F (36.6 °C) (Oral)   Resp 17 Ht 5' 8\" (1.727 m)   Wt 120 lb (54.4 kg)   SpO2 97%   BMI 18.25 kg/m²    General: Well developed, well nourished female in no acute distress. HEENT: Sclera white, conjunctiva pink, mucous membranes moist and oropharynx clear  Neck: Supple, no meningismus or JVD  Respirations: Unlabored with symmetric chest rise and fall  CV: Regular rate and rhythm with strong distal pulses  Abd: Soft without rebound guarding distention or focal tenderness. Dressing over her peritoneal dialysis line is clean dry and intact. No surrounding erythema or induration. Small amount of fluid visible in the catheter appears normal.  Musculoskeletal: Moves all extremities with no signs of orthopedic trauma or edema. Skin: Warm and dry with no rashes or lesions. Neuro: Awake and alert with no focal neurologic deficits. Normal speech and gait. Psych: Mood and affect are normal.    Diagnostic Results     RADIOLOGY:  CT ABDOMEN PELVIS WO CONTRAST Additional Contrast? None   Final Result   1. Peritoneal dialysis catheter visualized within the pelvis. Small to moderate volume pneumoperitoneum. In the setting of a peritoneal dialysis catheter this is indeterminate. Clinical correlation with exam is recommended. 2. No bowel wall thickening or inflammatory changes.              LABS:   Results for orders placed or performed during the hospital encounter of 12/12/22   CBC with Auto Differential   Result Value Ref Range    WBC 5.6 4.0 - 11.0 K/uL    RBC 5.33 (H) 4.00 - 5.20 M/uL    Hemoglobin 14.3 12.0 - 16.0 g/dL    Hematocrit 44.0 36.0 - 48.0 %    MCV 82.6 80.0 - 100.0 fL    MCH 26.8 26.0 - 34.0 pg    MCHC 32.4 31.0 - 36.0 g/dL    RDW 14.8 12.4 - 15.4 %    Platelets 003 043 - 444 K/uL    MPV 6.8 5.0 - 10.5 fL    Neutrophils % 54.7 %    Lymphocytes % 33.2 %    Monocytes % 9.4 %    Eosinophils % 2.1 %    Basophils % 0.6 %    Neutrophils Absolute 3.1 1.7 - 7.7 K/uL    Lymphocytes Absolute 1.9 1.0 - 5.1 K/uL    Monocytes Absolute 0.5 0.0 - 1.3 K/uL    Eosinophils Absolute 0.1 0.0 - 0.6 K/uL    Basophils Absolute 0.0 0.0 - 0.2 K/uL   Comprehensive Metabolic Panel w/ Reflex to MG   Result Value Ref Range    Sodium 133 (L) 136 - 145 mmol/L    Potassium reflex Magnesium 3.9 3.5 - 5.1 mmol/L    Chloride 92 (L) 99 - 110 mmol/L    CO2 30 21 - 32 mmol/L    Anion Gap 11 3 - 16    Glucose 160 (H) 70 - 99 mg/dL    BUN 50 (H) 7 - 20 mg/dL    Creatinine 9.3 (HH) 0.6 - 1.1 mg/dL    Est, Glom Filt Rate 5 (A) >60    Calcium 9.3 8.3 - 10.6 mg/dL    Total Protein 6.4 6.4 - 8.2 g/dL    Albumin 3.0 (L) 3.4 - 5.0 g/dL    Albumin/Globulin Ratio 0.9 (L) 1.1 - 2.2    Total Bilirubin <0.2 0.0 - 1.0 mg/dL    Alkaline Phosphatase 145 (H) 40 - 129 U/L     (H) 10 - 40 U/L     (H) 15 - 37 U/L   Hepatic Function Panel   Result Value Ref Range    Bilirubin, Direct <0.2 0.0 - 0.3 mg/dL    Bilirubin, Indirect see below 0.0 - 1.0 mg/dL   Lipase   Result Value Ref Range    Lipase 28.0 13.0 - 60.0 U/L       RECENT VITALS:  BP: (!) 100/46, Temp: 97.9 °F (36.6 °C), Heart Rate: 98, Resp: 17, SpO2: 97 %     ED Course     She was seen and examined on arrival to the treatment area. Nursing Notes, Past Medical Hx, Past Surgical Hx, Social Hx, Allergies, and Family Hx were reviewed. She had a recent visit to another emergency department where she was found to have hypokalemia and prescribed a short course of supplemental potassium. A primary care physician office note was concern for a right-sided abdominal mass, and had an ultrasound ordered to further explore, but this had not yet been done. The patient was given the following medications:  Orders Placed This Encounter   Medications    ondansetron (ZOFRAN-ODT) disintegrating tablet 4 mg    oxyCODONE (ROXICODONE) immediate release tablet 5 mg     Nausea was controlled with Zofran, which allowed oral pain medicine to be given for comfort.     I personally discussed aftercare and return precautions, and she expresses understanding. MEDICAL DECISION MAKING / ASSESSMENT / Kristel Eron is a 65 y.o. female presents to the emergency department with a couple of days of nausea and vomiting, which is her acute concern, but in the context of chronic abdominal discomfort and frequent diarrhea. Overall, her physical exam and diagnostic studies are reassuring. Through record review, it appears her primary care physician was concerned about a mass which I did not appreciate on today's exam.  Because this has not been evaluated as planned, I did proceed with cross-sectional imaging to evaluate for any possible complication, including obstruction as a cause for her more acute symptoms. She complains of generalized abdominal pain, she does not have identifiable tenderness on exam.  Abdomen is soft. She does not have a peritoneal exam.    She has nonspecific elevations of some of her liver function tests. Bilirubins are normal.  Normal CT is reassuring, as there is no obvious liver abnormality or ductal abnormality. She may benefit from further outpatient evaluation follow-up, including possible referral, and this will be deferred to her primary care physician. Clinical Impression     1.  Nausea and vomiting, unspecified vomiting type        Disposition     PATIENT REFERRED TO:  [facility] Emergency Department  [address]  [phone]    As needed, If symptoms worsen    DISCHARGE MEDICATIONS:  New Prescriptions    No medications on file       DISPOSITION Decision To Discharge 12/12/2022 03:57:18 PM          Theodore Cramer MD  12/12/22 0221

## 2022-12-12 NOTE — DISCHARGE INSTRUCTIONS
Please continue your home medications as previously prescribed. Please continue to do your nightly home dialysis. You need to follow-up closely with your doctors to further evaluate your abdominal pain. You may need to be seen by a specialist or have other testing done as an outpatient that is not available in the emergency department. Return for any new or worsening symptoms, any other urgent concerns.

## 2023-06-08 ENCOUNTER — APPOINTMENT (OUTPATIENT)
Dept: GENERAL RADIOLOGY | Age: 58
DRG: 720 | End: 2023-06-08
Payer: MEDICAID

## 2023-06-08 ENCOUNTER — HOSPITAL ENCOUNTER (INPATIENT)
Age: 58
LOS: 7 days | Discharge: HOME | DRG: 720 | End: 2023-06-15
Attending: EMERGENCY MEDICINE | Admitting: INTERNAL MEDICINE
Payer: MEDICAID

## 2023-06-08 DIAGNOSIS — G89.4 CHRONIC PAIN SYNDROME: ICD-10-CM

## 2023-06-08 DIAGNOSIS — F41.9 ANXIETY: ICD-10-CM

## 2023-06-08 DIAGNOSIS — J96.01 ACUTE RESPIRATORY FAILURE WITH HYPOXIA (HCC): Primary | ICD-10-CM

## 2023-06-08 PROBLEM — J96.90 RESPIRATORY FAILURE (HCC): Status: ACTIVE | Noted: 2023-06-08

## 2023-06-08 LAB
ALBUMIN SERPL-MCNC: 3.3 G/DL (ref 3.4–5)
ALBUMIN/GLOB SERPL: 1 {RATIO} (ref 1.1–2.2)
ALP SERPL-CCNC: 75 U/L (ref 40–129)
ALT SERPL-CCNC: 23 U/L (ref 10–40)
ANION GAP SERPL CALCULATED.3IONS-SCNC: 17 MMOL/L (ref 3–16)
AST SERPL-CCNC: 25 U/L (ref 15–37)
BASE EXCESS BLDA CALC-SCNC: -6.3 MMOL/L (ref -3–3)
BASE EXCESS BLDV CALC-SCNC: -6.9 MMOL/L (ref -2–3)
BASOPHILS # BLD: 0.1 K/UL (ref 0–0.2)
BASOPHILS NFR BLD: 0.5 %
BILIRUB SERPL-MCNC: <0.2 MG/DL (ref 0–1)
BUN SERPL-MCNC: 51 MG/DL (ref 7–20)
CALCIUM SERPL-MCNC: 8.6 MG/DL (ref 8.3–10.6)
CHLORIDE SERPL-SCNC: 106 MMOL/L (ref 99–110)
CO2 BLDA-SCNC: 21 MMOL/L
CO2 BLDV-SCNC: 20 MMOL/L
CO2 SERPL-SCNC: 16 MMOL/L (ref 21–32)
COHGB MFR BLDA: 1.7 % (ref 0–1.5)
COHGB MFR BLDV: 2.6 % (ref 0–1.5)
CREAT SERPL-MCNC: 5.6 MG/DL (ref 0.6–1.1)
DEPRECATED RDW RBC AUTO: 16.9 % (ref 12.4–15.4)
DO-HGB MFR BLDV: 45.8 %
EKG ATRIAL RATE: 107 BPM
EKG DIAGNOSIS: NORMAL
EKG P AXIS: 50 DEGREES
EKG P-R INTERVAL: 152 MS
EKG Q-T INTERVAL: 354 MS
EKG QRS DURATION: 80 MS
EKG QTC CALCULATION (BAZETT): 472 MS
EKG R AXIS: -26 DEGREES
EKG T AXIS: 102 DEGREES
EKG VENTRICULAR RATE: 107 BPM
EOSINOPHIL # BLD: 0.3 K/UL (ref 0–0.6)
EOSINOPHIL NFR BLD: 2.2 %
FERRITIN SERPL IA-MCNC: 978.7 NG/ML (ref 15–150)
GFR SERPLBLD CREATININE-BSD FMLA CKD-EPI: 8 ML/MIN/{1.73_M2}
GLUCOSE SERPL-MCNC: 166 MG/DL (ref 70–99)
HCO3 BLDA-SCNC: 20 MMOL/L (ref 21–29)
HCO3 BLDV-SCNC: 18.4 MMOL/L (ref 24–28)
HCT VFR BLD AUTO: 25.7 % (ref 36–48)
HGB BLD-MCNC: 8.2 G/DL (ref 12–16)
HGB BLDA-MCNC: 7.6 G/DL
LACTATE BLDV-SCNC: 1 MMOL/L (ref 0.4–1.9)
LACTATE BLDV-SCNC: 1.3 MMOL/L (ref 0.4–1.9)
LYMPHOCYTES # BLD: 1.2 K/UL (ref 1–5.1)
LYMPHOCYTES NFR BLD: 8.3 %
MAGNESIUM SERPL-MCNC: 2.3 MG/DL (ref 1.8–2.4)
MCH RBC QN AUTO: 28 PG (ref 26–34)
MCHC RBC AUTO-ENTMCNC: 32.1 G/DL (ref 31–36)
MCV RBC AUTO: 87.4 FL (ref 80–100)
METHGB MFR BLDA: 0.1 % (ref 0–1.4)
METHGB MFR BLDV: 0.4 % (ref 0–1.5)
MONOCYTES # BLD: 0.8 K/UL (ref 0–1.3)
MONOCYTES NFR BLD: 5.3 %
NEUTROPHILS # BLD: 12.2 K/UL (ref 1.7–7.7)
NEUTROPHILS NFR BLD: 83.7 %
NT-PROBNP SERPL-MCNC: 8055 PG/ML (ref 0–124)
PCO2 BLDA: 39.3 MMHG (ref 35–45)
PCO2 BLDV: 35.5 MMHG (ref 41–51)
PH BLDA: 7.3 [PH] (ref 7.35–7.45)
PH BLDV: 7.32 [PH] (ref 7.35–7.45)
PLATELET # BLD AUTO: 249 K/UL (ref 135–450)
PMV BLD AUTO: 6.7 FL (ref 5–10.5)
PO2 BLDA: 54.4 MMHG (ref 75–108)
PO2 BLDV: 31.3 MMHG (ref 25–40)
POTASSIUM SERPL-SCNC: 4.3 MMOL/L (ref 3.5–5.1)
PROT SERPL-MCNC: 6.5 G/DL (ref 6.4–8.2)
RBC # BLD AUTO: 2.94 M/UL (ref 4–5.2)
SAO2 % BLDA: 88 % (ref 93–100)
SAO2 % BLDV: 53 %
SARS-COV-2 RDRP RESP QL NAA+PROBE: NOT DETECTED
SODIUM SERPL-SCNC: 139 MMOL/L (ref 136–145)
TROPONIN, HIGH SENSITIVITY: 104 NG/L (ref 0–14)
TROPONIN, HIGH SENSITIVITY: 118 NG/L (ref 0–14)
TROPONIN, HIGH SENSITIVITY: 83 NG/L (ref 0–14)
WBC # BLD AUTO: 14.6 K/UL (ref 4–11)

## 2023-06-08 PROCEDURE — 71045 X-RAY EXAM CHEST 1 VIEW: CPT

## 2023-06-08 PROCEDURE — 6370000000 HC RX 637 (ALT 250 FOR IP): Performed by: INTERNAL MEDICINE

## 2023-06-08 PROCEDURE — 87205 SMEAR GRAM STAIN: CPT

## 2023-06-08 PROCEDURE — 82607 VITAMIN B-12: CPT

## 2023-06-08 PROCEDURE — 83735 ASSAY OF MAGNESIUM: CPT

## 2023-06-08 PROCEDURE — 80053 COMPREHEN METABOLIC PANEL: CPT

## 2023-06-08 PROCEDURE — 2580000003 HC RX 258: Performed by: INTERNAL MEDICINE

## 2023-06-08 PROCEDURE — 87040 BLOOD CULTURE FOR BACTERIA: CPT

## 2023-06-08 PROCEDURE — 6360000002 HC RX W HCPCS: Performed by: EMERGENCY MEDICINE

## 2023-06-08 PROCEDURE — 96374 THER/PROPH/DIAG INJ IV PUSH: CPT

## 2023-06-08 PROCEDURE — 81001 URINALYSIS AUTO W/SCOPE: CPT

## 2023-06-08 PROCEDURE — 84484 ASSAY OF TROPONIN QUANT: CPT

## 2023-06-08 PROCEDURE — 82728 ASSAY OF FERRITIN: CPT

## 2023-06-08 PROCEDURE — 99285 EMERGENCY DEPT VISIT HI MDM: CPT

## 2023-06-08 PROCEDURE — 83550 IRON BINDING TEST: CPT

## 2023-06-08 PROCEDURE — 83880 ASSAY OF NATRIURETIC PEPTIDE: CPT

## 2023-06-08 PROCEDURE — 85025 COMPLETE CBC W/AUTO DIFF WBC: CPT

## 2023-06-08 PROCEDURE — 87635 SARS-COV-2 COVID-19 AMP PRB: CPT

## 2023-06-08 PROCEDURE — 36415 COLL VENOUS BLD VENIPUNCTURE: CPT

## 2023-06-08 PROCEDURE — 83540 ASSAY OF IRON: CPT

## 2023-06-08 PROCEDURE — 2060000000 HC ICU INTERMEDIATE R&B

## 2023-06-08 PROCEDURE — 87070 CULTURE OTHR SPECIMN AEROBIC: CPT

## 2023-06-08 PROCEDURE — 6370000000 HC RX 637 (ALT 250 FOR IP): Performed by: EMERGENCY MEDICINE

## 2023-06-08 PROCEDURE — 94660 CPAP INITIATION&MGMT: CPT

## 2023-06-08 PROCEDURE — 2700000000 HC OXYGEN THERAPY PER DAY

## 2023-06-08 PROCEDURE — 82746 ASSAY OF FOLIC ACID SERUM: CPT

## 2023-06-08 PROCEDURE — 93005 ELECTROCARDIOGRAM TRACING: CPT | Performed by: EMERGENCY MEDICINE

## 2023-06-08 PROCEDURE — 94640 AIRWAY INHALATION TREATMENT: CPT

## 2023-06-08 PROCEDURE — 6360000002 HC RX W HCPCS: Performed by: INTERNAL MEDICINE

## 2023-06-08 PROCEDURE — 82803 BLOOD GASES ANY COMBINATION: CPT

## 2023-06-08 PROCEDURE — 6370000000 HC RX 637 (ALT 250 FOR IP): Performed by: NURSE PRACTITIONER

## 2023-06-08 PROCEDURE — 83605 ASSAY OF LACTIC ACID: CPT

## 2023-06-08 PROCEDURE — 94761 N-INVAS EAR/PLS OXIMETRY MLT: CPT

## 2023-06-08 PROCEDURE — 2580000003 HC RX 258: Performed by: EMERGENCY MEDICINE

## 2023-06-08 PROCEDURE — 89051 BODY FLUID CELL COUNT: CPT

## 2023-06-08 PROCEDURE — 90945 DIALYSIS ONE EVALUATION: CPT

## 2023-06-08 RX ORDER — ALBUTEROL SULFATE 2.5 MG/3ML
2.5 SOLUTION RESPIRATORY (INHALATION) ONCE
Status: COMPLETED | OUTPATIENT
Start: 2023-06-08 | End: 2023-06-08

## 2023-06-08 RX ORDER — DIAZEPAM 5 MG/1
5 TABLET ORAL EVERY 8 HOURS PRN
Status: DISCONTINUED | OUTPATIENT
Start: 2023-06-08 | End: 2023-06-15 | Stop reason: HOSPADM

## 2023-06-08 RX ORDER — ONDANSETRON 2 MG/ML
4 INJECTION INTRAMUSCULAR; INTRAVENOUS EVERY 6 HOURS PRN
Status: DISCONTINUED | OUTPATIENT
Start: 2023-06-08 | End: 2023-06-15 | Stop reason: HOSPADM

## 2023-06-08 RX ORDER — ACETAMINOPHEN 325 MG/1
650 TABLET ORAL EVERY 6 HOURS PRN
Status: DISCONTINUED | OUTPATIENT
Start: 2023-06-08 | End: 2023-06-10

## 2023-06-08 RX ORDER — ACETAMINOPHEN 650 MG/1
650 SUPPOSITORY RECTAL EVERY 6 HOURS PRN
Status: DISCONTINUED | OUTPATIENT
Start: 2023-06-08 | End: 2023-06-10

## 2023-06-08 RX ORDER — SODIUM CHLORIDE 0.9 % (FLUSH) 0.9 %
5-40 SYRINGE (ML) INJECTION EVERY 12 HOURS SCHEDULED
Status: DISCONTINUED | OUTPATIENT
Start: 2023-06-08 | End: 2023-06-15 | Stop reason: HOSPADM

## 2023-06-08 RX ORDER — ONDANSETRON 4 MG/1
4 TABLET, ORALLY DISINTEGRATING ORAL EVERY 8 HOURS PRN
Status: DISCONTINUED | OUTPATIENT
Start: 2023-06-08 | End: 2023-06-15 | Stop reason: HOSPADM

## 2023-06-08 RX ORDER — SODIUM CHLORIDE 0.9 % (FLUSH) 0.9 %
5-40 SYRINGE (ML) INJECTION PRN
Status: DISCONTINUED | OUTPATIENT
Start: 2023-06-08 | End: 2023-06-15 | Stop reason: HOSPADM

## 2023-06-08 RX ORDER — POLYETHYLENE GLYCOL 3350 17 G/17G
17 POWDER, FOR SOLUTION ORAL DAILY PRN
Status: DISCONTINUED | OUTPATIENT
Start: 2023-06-08 | End: 2023-06-15 | Stop reason: HOSPADM

## 2023-06-08 RX ORDER — IPRATROPIUM BROMIDE AND ALBUTEROL SULFATE 2.5; .5 MG/3ML; MG/3ML
1 SOLUTION RESPIRATORY (INHALATION)
Status: COMPLETED | OUTPATIENT
Start: 2023-06-08 | End: 2023-06-08

## 2023-06-08 RX ORDER — SODIUM CHLORIDE, SODIUM LACTATE, CALCIUM CHLORIDE, MAGNESIUM CHLORIDE AND DEXTROSE 2.5; 538; 448; 18.3; 5.08 G/100ML; MG/100ML; MG/100ML; MG/100ML; MG/100ML
2500 INJECTION, SOLUTION INTRAPERITONEAL CONTINUOUS
Status: DISCONTINUED | OUTPATIENT
Start: 2023-06-08 | End: 2023-06-15 | Stop reason: HOSPADM

## 2023-06-08 RX ORDER — GENTAMICIN SULFATE 1 MG/G
CREAM TOPICAL DAILY
Status: DISCONTINUED | OUTPATIENT
Start: 2023-06-08 | End: 2023-06-15 | Stop reason: HOSPADM

## 2023-06-08 RX ORDER — SODIUM CHLORIDE 9 MG/ML
INJECTION, SOLUTION INTRAVENOUS PRN
Status: DISCONTINUED | OUTPATIENT
Start: 2023-06-08 | End: 2023-06-15 | Stop reason: HOSPADM

## 2023-06-08 RX ORDER — HEPARIN SODIUM 5000 [USP'U]/ML
5000 INJECTION, SOLUTION INTRAVENOUS; SUBCUTANEOUS EVERY 8 HOURS SCHEDULED
Status: DISCONTINUED | OUTPATIENT
Start: 2023-06-08 | End: 2023-06-15 | Stop reason: HOSPADM

## 2023-06-08 RX ADMIN — ALBUTEROL SULFATE 2.5 MG: 2.5 SOLUTION RESPIRATORY (INHALATION) at 07:05

## 2023-06-08 RX ADMIN — ACETAMINOPHEN 650 MG: 325 TABLET ORAL at 20:05

## 2023-06-08 RX ADMIN — VANCOMYCIN HYDROCHLORIDE 1000 MG: 10 INJECTION, POWDER, LYOPHILIZED, FOR SOLUTION INTRAVENOUS at 08:11

## 2023-06-08 RX ADMIN — IPRATROPIUM BROMIDE AND ALBUTEROL SULFATE 1 DOSE: .5; 3 SOLUTION RESPIRATORY (INHALATION) at 07:03

## 2023-06-08 RX ADMIN — HEPARIN SODIUM 5000 UNITS: 5000 INJECTION INTRAVENOUS; SUBCUTANEOUS at 22:10

## 2023-06-08 RX ADMIN — CEFEPIME 2000 MG: 2 INJECTION, POWDER, FOR SOLUTION INTRAVENOUS at 09:35

## 2023-06-08 RX ADMIN — DIAZEPAM 5 MG: 5 TABLET ORAL at 23:09

## 2023-06-08 RX ADMIN — SODIUM CHLORIDE, PRESERVATIVE FREE 10 ML: 5 INJECTION INTRAVENOUS at 22:10

## 2023-06-08 RX ADMIN — SODIUM CHLORIDE, PRESERVATIVE FREE 5 ML: 5 INJECTION INTRAVENOUS at 09:35

## 2023-06-08 ASSESSMENT — PAIN DESCRIPTION - LOCATION
LOCATION: CHEST
LOCATION: CHEST

## 2023-06-08 ASSESSMENT — PAIN - FUNCTIONAL ASSESSMENT
PAIN_FUNCTIONAL_ASSESSMENT: ACTIVITIES ARE NOT PREVENTED
PAIN_FUNCTIONAL_ASSESSMENT: 0-10
PAIN_FUNCTIONAL_ASSESSMENT: ACTIVITIES ARE NOT PREVENTED

## 2023-06-08 ASSESSMENT — PAIN DESCRIPTION - ONSET
ONSET: ON-GOING
ONSET: ON-GOING

## 2023-06-08 ASSESSMENT — ENCOUNTER SYMPTOMS
DIARRHEA: 0
SHORTNESS OF BREATH: 1
VOMITING: 0
EYE PAIN: 0
COUGH: 1
NAUSEA: 0
WHEEZING: 1
ABDOMINAL PAIN: 1

## 2023-06-08 ASSESSMENT — PAIN DESCRIPTION - FREQUENCY
FREQUENCY: INTERMITTENT
FREQUENCY: OTHER (COMMENT)

## 2023-06-08 ASSESSMENT — PAIN DESCRIPTION - DESCRIPTORS
DESCRIPTORS: PATIENT UNABLE TO DESCRIBE
DESCRIPTORS: PRESSURE

## 2023-06-08 ASSESSMENT — PAIN DESCRIPTION - PAIN TYPE
TYPE: ACUTE PAIN
TYPE: ACUTE PAIN

## 2023-06-08 ASSESSMENT — PAIN SCALES - GENERAL
PAINLEVEL_OUTOF10: 0
PAINLEVEL_OUTOF10: 3
PAINLEVEL_OUTOF10: 8
PAINLEVEL_OUTOF10: 0

## 2023-06-08 ASSESSMENT — PAIN SCALES - WONG BAKER: WONGBAKER_NUMERICALRESPONSE: 0

## 2023-06-09 LAB
ALBUMIN SERPL-MCNC: 3.4 G/DL (ref 3.4–5)
ANION GAP SERPL CALCULATED.3IONS-SCNC: 18 MMOL/L (ref 3–16)
APPEARANCE FLUID: CLEAR
BDY FLUID QUALITY: NORMAL
BILIRUB UR QL STRIP.AUTO: NEGATIVE
BUN SERPL-MCNC: 54 MG/DL (ref 7–20)
CALCIUM SERPL-MCNC: 9 MG/DL (ref 8.3–10.6)
CELL COUNT FLUID TYPE: NORMAL
CHLORIDE SERPL-SCNC: 104 MMOL/L (ref 99–110)
CLARITY UR: CLEAR
CO2 SERPL-SCNC: 18 MMOL/L (ref 21–32)
COLOR FLUID: NORMAL
COLOR UR: YELLOW
CREAT SERPL-MCNC: 5.6 MG/DL (ref 0.6–1.1)
EPI CELLS #/AREA URNS HPF: NORMAL /HPF (ref 0–5)
FOLATE SERPL-MCNC: >20 NG/ML (ref 4.78–24.2)
GFR SERPLBLD CREATININE-BSD FMLA CKD-EPI: 8 ML/MIN/{1.73_M2}
GLUCOSE SERPL-MCNC: 156 MG/DL (ref 70–99)
GLUCOSE UR STRIP.AUTO-MCNC: NEGATIVE MG/DL
HGB UR QL STRIP.AUTO: ABNORMAL
IRON SATN MFR SERPL: 10 % (ref 15–50)
IRON SERPL-MCNC: 17 UG/DL (ref 37–145)
KETONES UR STRIP.AUTO-MCNC: NEGATIVE MG/DL
LACTATE BLDV-SCNC: 0.8 MMOL/L (ref 0.4–2)
LACTATE BLDV-SCNC: 2 MMOL/L (ref 0.4–2)
LEUKOCYTE ESTERASE UR QL STRIP.AUTO: NEGATIVE
LYMPHOCYTES NFR FLD: 4 %
MESOTHL CELL NFR FLD: 83 %
NEUTROPHIL, FLUID: 13 %
NITRITE UR QL STRIP.AUTO: NEGATIVE
NUC CELL # FLD: 180 /CUMM
PH UR STRIP.AUTO: 6.5 [PH] (ref 5–8)
PHOSPHATE SERPL-MCNC: 6.1 MG/DL (ref 2.5–4.9)
POTASSIUM SERPL-SCNC: 4.8 MMOL/L (ref 3.5–5.1)
POTASSIUM SERPL-SCNC: 4.8 MMOL/L (ref 3.5–5.1)
PROCALCITONIN SERPL IA-MCNC: 2.2 NG/ML (ref 0–0.15)
PROT UR STRIP.AUTO-MCNC: 100 MG/DL
RBC #/AREA URNS HPF: NORMAL /HPF (ref 0–4)
RBC FLUID: 1000 /CUMM
SODIUM SERPL-SCNC: 140 MMOL/L (ref 136–145)
SP GR UR STRIP.AUTO: 1.02 (ref 1–1.03)
TIBC SERPL-MCNC: 163 UG/DL (ref 260–445)
TOTAL CELLS COUNTED FLD: 100
TROPONIN, HIGH SENSITIVITY: 141 NG/L (ref 0–14)
UA COMPLETE W REFLEX CULTURE PNL UR: ABNORMAL
UA DIPSTICK W REFLEX MICRO PNL UR: YES
URN SPEC COLLECT METH UR: ABNORMAL
UROBILINOGEN UR STRIP-ACNC: 0.2 E.U./DL
VIT B12 SERPL-MCNC: 527 PG/ML (ref 211–911)
WBC #/AREA URNS HPF: NORMAL /HPF (ref 0–5)

## 2023-06-09 PROCEDURE — 36415 COLL VENOUS BLD VENIPUNCTURE: CPT

## 2023-06-09 PROCEDURE — 6360000002 HC RX W HCPCS

## 2023-06-09 PROCEDURE — 2060000000 HC ICU INTERMEDIATE R&B

## 2023-06-09 PROCEDURE — 2700000000 HC OXYGEN THERAPY PER DAY

## 2023-06-09 PROCEDURE — 94761 N-INVAS EAR/PLS OXIMETRY MLT: CPT

## 2023-06-09 PROCEDURE — 84145 PROCALCITONIN (PCT): CPT

## 2023-06-09 PROCEDURE — 80069 RENAL FUNCTION PANEL: CPT

## 2023-06-09 PROCEDURE — 6370000000 HC RX 637 (ALT 250 FOR IP): Performed by: INTERNAL MEDICINE

## 2023-06-09 PROCEDURE — 84484 ASSAY OF TROPONIN QUANT: CPT

## 2023-06-09 PROCEDURE — 6370000000 HC RX 637 (ALT 250 FOR IP): Performed by: NURSE PRACTITIONER

## 2023-06-09 PROCEDURE — 90935 HEMODIALYSIS ONE EVALUATION: CPT

## 2023-06-09 PROCEDURE — 99223 1ST HOSP IP/OBS HIGH 75: CPT | Performed by: INTERNAL MEDICINE

## 2023-06-09 PROCEDURE — 2580000003 HC RX 258: Performed by: INTERNAL MEDICINE

## 2023-06-09 PROCEDURE — 83605 ASSAY OF LACTIC ACID: CPT

## 2023-06-09 PROCEDURE — 6360000002 HC RX W HCPCS: Performed by: INTERNAL MEDICINE

## 2023-06-09 PROCEDURE — 94660 CPAP INITIATION&MGMT: CPT

## 2023-06-09 RX ADMIN — ACETAMINOPHEN 650 MG: 325 TABLET ORAL at 22:36

## 2023-06-09 RX ADMIN — HEPARIN SODIUM 5000 UNITS: 5000 INJECTION INTRAVENOUS; SUBCUTANEOUS at 06:10

## 2023-06-09 RX ADMIN — SODIUM CHLORIDE, PRESERVATIVE FREE 10 ML: 5 INJECTION INTRAVENOUS at 23:00

## 2023-06-09 RX ADMIN — PIPERACILLIN AND TAZOBACTAM 3375 MG: 3; .375 INJECTION, POWDER, LYOPHILIZED, FOR SOLUTION INTRAVENOUS at 18:54

## 2023-06-09 RX ADMIN — DIAZEPAM 5 MG: 5 TABLET ORAL at 13:21

## 2023-06-09 RX ADMIN — DIAZEPAM 5 MG: 5 TABLET ORAL at 22:37

## 2023-06-09 RX ADMIN — HEPARIN SODIUM 5000 UNITS: 5000 INJECTION INTRAVENOUS; SUBCUTANEOUS at 13:21

## 2023-06-09 RX ADMIN — HEPARIN SODIUM 5000 UNITS: 5000 INJECTION INTRAVENOUS; SUBCUTANEOUS at 22:37

## 2023-06-09 RX ADMIN — EPOETIN ALFA-EPBX 10000 UNITS: 10000 INJECTION, SOLUTION INTRAVENOUS; SUBCUTANEOUS at 18:52

## 2023-06-09 RX ADMIN — SODIUM CHLORIDE, PRESERVATIVE FREE 10 ML: 5 INJECTION INTRAVENOUS at 09:17

## 2023-06-09 RX ADMIN — PIPERACILLIN AND TAZOBACTAM 4500 MG: 4; .5 INJECTION, POWDER, FOR SOLUTION INTRAVENOUS at 09:16

## 2023-06-09 RX ADMIN — ACETAMINOPHEN 650 MG: 325 TABLET ORAL at 04:04

## 2023-06-09 ASSESSMENT — PAIN DESCRIPTION - FREQUENCY
FREQUENCY: INTERMITTENT
FREQUENCY: INTERMITTENT

## 2023-06-09 ASSESSMENT — PAIN DESCRIPTION - DESCRIPTORS
DESCRIPTORS: ACHING;DISCOMFORT

## 2023-06-09 ASSESSMENT — PAIN - FUNCTIONAL ASSESSMENT
PAIN_FUNCTIONAL_ASSESSMENT: ACTIVITIES ARE NOT PREVENTED

## 2023-06-09 ASSESSMENT — PAIN DESCRIPTION - LOCATION
LOCATION: BACK;SHOULDER
LOCATION: BACK;SHOULDER
LOCATION: SHOULDER

## 2023-06-09 ASSESSMENT — PAIN SCALES - GENERAL
PAINLEVEL_OUTOF10: 3
PAINLEVEL_OUTOF10: 3
PAINLEVEL_OUTOF10: 8
PAINLEVEL_OUTOF10: 3

## 2023-06-09 ASSESSMENT — PAIN DESCRIPTION - ONSET
ONSET: GRADUAL
ONSET: GRADUAL

## 2023-06-09 ASSESSMENT — PAIN DESCRIPTION - PAIN TYPE
TYPE: CHRONIC PAIN
TYPE: CHRONIC PAIN

## 2023-06-09 ASSESSMENT — PAIN DESCRIPTION - ORIENTATION
ORIENTATION: RIGHT;LEFT
ORIENTATION: RIGHT;LEFT
ORIENTATION: RIGHT;LEFT;OTHER (COMMENT)

## 2023-06-10 ENCOUNTER — APPOINTMENT (OUTPATIENT)
Dept: GENERAL RADIOLOGY | Age: 58
DRG: 720 | End: 2023-06-10
Payer: MEDICAID

## 2023-06-10 LAB
ALBUMIN SERPL-MCNC: 2.9 G/DL (ref 3.4–5)
ALBUMIN SERPL-MCNC: 3 G/DL (ref 3.4–5)
ANION GAP SERPL CALCULATED.3IONS-SCNC: 14 MMOL/L (ref 3–16)
ANION GAP SERPL CALCULATED.3IONS-SCNC: 16 MMOL/L (ref 3–16)
APPEARANCE FLUID: CLEAR
BASOPHILS # BLD: 0 K/UL (ref 0–0.2)
BASOPHILS NFR BLD: 0.4 %
BDY FLUID QUALITY: NORMAL
BUN SERPL-MCNC: 53 MG/DL (ref 7–20)
BUN SERPL-MCNC: 59 MG/DL (ref 7–20)
CALCIUM SERPL-MCNC: 8.7 MG/DL (ref 8.3–10.6)
CALCIUM SERPL-MCNC: 8.9 MG/DL (ref 8.3–10.6)
CELL COUNT FLUID TYPE: NORMAL
CHLORIDE SERPL-SCNC: 102 MMOL/L (ref 99–110)
CHLORIDE SERPL-SCNC: 99 MMOL/L (ref 99–110)
CO2 SERPL-SCNC: 21 MMOL/L (ref 21–32)
CO2 SERPL-SCNC: 21 MMOL/L (ref 21–32)
COLOR FLUID: COLORLESS
CREAT SERPL-MCNC: 6.1 MG/DL (ref 0.6–1.1)
CREAT SERPL-MCNC: 6.5 MG/DL (ref 0.6–1.1)
DEPRECATED RDW RBC AUTO: 16.5 % (ref 12.4–15.4)
DIFF PNL FLD: NORMAL
EOSINOPHIL # BLD: 0.9 K/UL (ref 0–0.6)
EOSINOPHIL NFR BLD: 6.6 %
GFR SERPLBLD CREATININE-BSD FMLA CKD-EPI: 7 ML/MIN/{1.73_M2}
GFR SERPLBLD CREATININE-BSD FMLA CKD-EPI: 7 ML/MIN/{1.73_M2}
GLUCOSE BLD-MCNC: 225 MG/DL (ref 70–99)
GLUCOSE SERPL-MCNC: 106 MG/DL (ref 70–99)
GLUCOSE SERPL-MCNC: 191 MG/DL (ref 70–99)
HCT VFR BLD AUTO: 23.8 % (ref 36–48)
HGB BLD-MCNC: 7.7 G/DL (ref 12–16)
LYMPHOCYTES # BLD: 1.7 K/UL (ref 1–5.1)
LYMPHOCYTES NFR BLD: 12.9 %
MCH RBC QN AUTO: 28.2 PG (ref 26–34)
MCHC RBC AUTO-ENTMCNC: 32.4 G/DL (ref 31–36)
MCV RBC AUTO: 87 FL (ref 80–100)
MONOCYTES # BLD: 0.8 K/UL (ref 0–1.3)
MONOCYTES NFR BLD: 6.4 %
NEUTROPHILS # BLD: 9.7 K/UL (ref 1.7–7.7)
NEUTROPHILS NFR BLD: 73.7 %
NUC CELL # FLD: 4 /CUMM
PERFORMED ON: ABNORMAL
PHOSPHATE SERPL-MCNC: 6.2 MG/DL (ref 2.5–4.9)
PHOSPHATE SERPL-MCNC: 6.4 MG/DL (ref 2.5–4.9)
PLATELET # BLD AUTO: 239 K/UL (ref 135–450)
PMV BLD AUTO: 7.1 FL (ref 5–10.5)
POTASSIUM SERPL-SCNC: 4.1 MMOL/L (ref 3.5–5.1)
POTASSIUM SERPL-SCNC: 4.2 MMOL/L (ref 3.5–5.1)
RBC # BLD AUTO: 2.74 M/UL (ref 4–5.2)
RBC FLUID: <1000 /CUMM
SODIUM SERPL-SCNC: 136 MMOL/L (ref 136–145)
SODIUM SERPL-SCNC: 137 MMOL/L (ref 136–145)
WBC # BLD AUTO: 13.1 K/UL (ref 4–11)

## 2023-06-10 PROCEDURE — 6360000002 HC RX W HCPCS

## 2023-06-10 PROCEDURE — 6360000002 HC RX W HCPCS: Performed by: INTERNAL MEDICINE

## 2023-06-10 PROCEDURE — 99233 SBSQ HOSP IP/OBS HIGH 50: CPT | Performed by: INTERNAL MEDICINE

## 2023-06-10 PROCEDURE — 90945 DIALYSIS ONE EVALUATION: CPT

## 2023-06-10 PROCEDURE — 51701 INSERT BLADDER CATHETER: CPT

## 2023-06-10 PROCEDURE — 6370000000 HC RX 637 (ALT 250 FOR IP): Performed by: INTERNAL MEDICINE

## 2023-06-10 PROCEDURE — 6370000000 HC RX 637 (ALT 250 FOR IP): Performed by: STUDENT IN AN ORGANIZED HEALTH CARE EDUCATION/TRAINING PROGRAM

## 2023-06-10 PROCEDURE — 89050 BODY FLUID CELL COUNT: CPT

## 2023-06-10 PROCEDURE — 2700000000 HC OXYGEN THERAPY PER DAY

## 2023-06-10 PROCEDURE — 80069 RENAL FUNCTION PANEL: CPT

## 2023-06-10 PROCEDURE — 51798 US URINE CAPACITY MEASURE: CPT

## 2023-06-10 PROCEDURE — 2060000000 HC ICU INTERMEDIATE R&B

## 2023-06-10 PROCEDURE — 74018 RADEX ABDOMEN 1 VIEW: CPT

## 2023-06-10 PROCEDURE — 94660 CPAP INITIATION&MGMT: CPT

## 2023-06-10 PROCEDURE — 94761 N-INVAS EAR/PLS OXIMETRY MLT: CPT

## 2023-06-10 PROCEDURE — 85025 COMPLETE CBC W/AUTO DIFF WBC: CPT

## 2023-06-10 PROCEDURE — 2580000003 HC RX 258: Performed by: INTERNAL MEDICINE

## 2023-06-10 PROCEDURE — 36415 COLL VENOUS BLD VENIPUNCTURE: CPT

## 2023-06-10 PROCEDURE — 71045 X-RAY EXAM CHEST 1 VIEW: CPT

## 2023-06-10 RX ORDER — ACETAMINOPHEN 325 MG/1
650 TABLET ORAL EVERY 6 HOURS PRN
Status: DISCONTINUED | OUTPATIENT
Start: 2023-06-10 | End: 2023-06-15 | Stop reason: HOSPADM

## 2023-06-10 RX ORDER — ZOLPIDEM TARTRATE 5 MG/1
5 TABLET ORAL ONCE
Status: DISCONTINUED | OUTPATIENT
Start: 2023-06-10 | End: 2023-06-15 | Stop reason: HOSPADM

## 2023-06-10 RX ORDER — ACETAMINOPHEN 650 MG/1
650 SUPPOSITORY RECTAL EVERY 6 HOURS PRN
Status: DISCONTINUED | OUTPATIENT
Start: 2023-06-10 | End: 2023-06-15 | Stop reason: HOSPADM

## 2023-06-10 RX ORDER — LANOLIN ALCOHOL/MO/W.PET/CERES
3 CREAM (GRAM) TOPICAL NIGHTLY PRN
Status: DISCONTINUED | OUTPATIENT
Start: 2023-06-10 | End: 2023-06-15 | Stop reason: HOSPADM

## 2023-06-10 RX ORDER — POLYETHYLENE GLYCOL 3350 17 G/17G
17 POWDER, FOR SOLUTION ORAL DAILY
Status: COMPLETED | OUTPATIENT
Start: 2023-06-10 | End: 2023-06-10

## 2023-06-10 RX ORDER — FUROSEMIDE 10 MG/ML
40 INJECTION INTRAMUSCULAR; INTRAVENOUS ONCE
Status: COMPLETED | OUTPATIENT
Start: 2023-06-10 | End: 2023-06-10

## 2023-06-10 RX ADMIN — ONDANSETRON 4 MG: 2 INJECTION INTRAMUSCULAR; INTRAVENOUS at 17:49

## 2023-06-10 RX ADMIN — HEPARIN SODIUM 5000 UNITS: 5000 INJECTION INTRAVENOUS; SUBCUTANEOUS at 15:27

## 2023-06-10 RX ADMIN — PIPERACILLIN AND TAZOBACTAM 3375 MG: 3; .375 INJECTION, POWDER, LYOPHILIZED, FOR SOLUTION INTRAVENOUS at 05:08

## 2023-06-10 RX ADMIN — GENTAMICIN SULFATE: 1 CREAM TOPICAL at 09:21

## 2023-06-10 RX ADMIN — HEPARIN SODIUM 5000 UNITS: 5000 INJECTION INTRAVENOUS; SUBCUTANEOUS at 05:47

## 2023-06-10 RX ADMIN — SODIUM CHLORIDE, PRESERVATIVE FREE 10 ML: 5 INJECTION INTRAVENOUS at 21:30

## 2023-06-10 RX ADMIN — HEPARIN SODIUM 5000 UNITS: 5000 INJECTION INTRAVENOUS; SUBCUTANEOUS at 22:38

## 2023-06-10 RX ADMIN — SODIUM CHLORIDE 25 ML: 9 INJECTION, SOLUTION INTRAVENOUS at 05:07

## 2023-06-10 RX ADMIN — SODIUM CHLORIDE 20 ML: 9 INJECTION, SOLUTION INTRAVENOUS at 18:50

## 2023-06-10 RX ADMIN — FUROSEMIDE 40 MG: 10 INJECTION, SOLUTION INTRAMUSCULAR; INTRAVENOUS at 18:42

## 2023-06-10 RX ADMIN — POLYETHYLENE GLYCOL 3350 17 G: 17 POWDER, FOR SOLUTION ORAL at 18:41

## 2023-06-10 RX ADMIN — PIPERACILLIN AND TAZOBACTAM 3375 MG: 3; .375 INJECTION, POWDER, LYOPHILIZED, FOR SOLUTION INTRAVENOUS at 18:51

## 2023-06-10 RX ADMIN — DICLOFENAC SODIUM 2 G: 10 GEL TOPICAL at 09:25

## 2023-06-10 RX ADMIN — ACETAMINOPHEN 650 MG: 325 TABLET ORAL at 11:34

## 2023-06-10 RX ADMIN — SODIUM CHLORIDE, PRESERVATIVE FREE 10 ML: 5 INJECTION INTRAVENOUS at 09:21

## 2023-06-10 ASSESSMENT — PAIN SCALES - GENERAL
PAINLEVEL_OUTOF10: 0
PAINLEVEL_OUTOF10: 4
PAINLEVEL_OUTOF10: 7
PAINLEVEL_OUTOF10: 6
PAINLEVEL_OUTOF10: 3
PAINLEVEL_OUTOF10: 7

## 2023-06-10 ASSESSMENT — PAIN DESCRIPTION - ONSET
ONSET: GRADUAL

## 2023-06-10 ASSESSMENT — PAIN DESCRIPTION - PAIN TYPE
TYPE: CHRONIC PAIN

## 2023-06-10 ASSESSMENT — PAIN DESCRIPTION - FREQUENCY
FREQUENCY: CONTINUOUS

## 2023-06-10 ASSESSMENT — PAIN DESCRIPTION - ORIENTATION
ORIENTATION: RIGHT;LEFT

## 2023-06-10 ASSESSMENT — PAIN DESCRIPTION - DESCRIPTORS
DESCRIPTORS: ACHING;DISCOMFORT

## 2023-06-10 ASSESSMENT — PAIN DESCRIPTION - LOCATION
LOCATION: SHOULDER

## 2023-06-10 ASSESSMENT — PAIN - FUNCTIONAL ASSESSMENT
PAIN_FUNCTIONAL_ASSESSMENT: PREVENTS OR INTERFERES SOME ACTIVE ACTIVITIES AND ADLS

## 2023-06-11 LAB
ALBUMIN SERPL-MCNC: 3 G/DL (ref 3.4–5)
ALBUMIN/GLOB SERPL: 0.9 {RATIO} (ref 1.1–2.2)
ALP SERPL-CCNC: 62 U/L (ref 40–129)
ALT SERPL-CCNC: 17 U/L (ref 10–40)
ANION GAP SERPL CALCULATED.3IONS-SCNC: 16 MMOL/L (ref 3–16)
AST SERPL-CCNC: 20 U/L (ref 15–37)
BACTERIA SPEC RESP CULT: NORMAL
BASOPHILS # BLD: 0 K/UL (ref 0–0.2)
BASOPHILS NFR BLD: 0.2 %
BILIRUB SERPL-MCNC: <0.2 MG/DL (ref 0–1)
BUN SERPL-MCNC: 53 MG/DL (ref 7–20)
CALCIUM SERPL-MCNC: 8.8 MG/DL (ref 8.3–10.6)
CHLORIDE SERPL-SCNC: 100 MMOL/L (ref 99–110)
CO2 SERPL-SCNC: 22 MMOL/L (ref 21–32)
CREAT SERPL-MCNC: 6.3 MG/DL (ref 0.6–1.1)
DEPRECATED RDW RBC AUTO: 16.3 % (ref 12.4–15.4)
EOSINOPHIL # BLD: 0.7 K/UL (ref 0–0.6)
EOSINOPHIL NFR BLD: 6.7 %
GFR SERPLBLD CREATININE-BSD FMLA CKD-EPI: 7 ML/MIN/{1.73_M2}
GLUCOSE BLD-MCNC: 208 MG/DL (ref 70–99)
GLUCOSE SERPL-MCNC: 194 MG/DL (ref 70–99)
GRAM STN SPEC: NORMAL
HCT VFR BLD AUTO: 22.8 % (ref 36–48)
HGB BLD-MCNC: 7.4 G/DL (ref 12–16)
LYMPHOCYTES # BLD: 1.3 K/UL (ref 1–5.1)
LYMPHOCYTES NFR BLD: 12 %
MCH RBC QN AUTO: 28.1 PG (ref 26–34)
MCHC RBC AUTO-ENTMCNC: 32.3 G/DL (ref 31–36)
MCV RBC AUTO: 87 FL (ref 80–100)
MONOCYTES # BLD: 1 K/UL (ref 0–1.3)
MONOCYTES NFR BLD: 8.6 %
NEUTROPHILS # BLD: 8.1 K/UL (ref 1.7–7.7)
NEUTROPHILS NFR BLD: 72.5 %
PERFORMED ON: ABNORMAL
PHOSPHATE SERPL-MCNC: 6.6 MG/DL (ref 2.5–4.9)
PLATELET # BLD AUTO: 258 K/UL (ref 135–450)
PMV BLD AUTO: 7 FL (ref 5–10.5)
POTASSIUM SERPL-SCNC: 3.8 MMOL/L (ref 3.5–5.1)
POTASSIUM SERPL-SCNC: 3.8 MMOL/L (ref 3.5–5.1)
PROT SERPL-MCNC: 6.5 G/DL (ref 6.4–8.2)
RBC # BLD AUTO: 2.62 M/UL (ref 4–5.2)
SODIUM SERPL-SCNC: 138 MMOL/L (ref 136–145)
VANCOMYCIN SERPL-MCNC: 7.1 UG/ML
WBC # BLD AUTO: 11.1 K/UL (ref 4–11)

## 2023-06-11 PROCEDURE — 2700000000 HC OXYGEN THERAPY PER DAY

## 2023-06-11 PROCEDURE — 2580000003 HC RX 258: Performed by: INTERNAL MEDICINE

## 2023-06-11 PROCEDURE — 51798 US URINE CAPACITY MEASURE: CPT

## 2023-06-11 PROCEDURE — 6370000000 HC RX 637 (ALT 250 FOR IP): Performed by: INTERNAL MEDICINE

## 2023-06-11 PROCEDURE — 6360000002 HC RX W HCPCS: Performed by: INTERNAL MEDICINE

## 2023-06-11 PROCEDURE — 80202 ASSAY OF VANCOMYCIN: CPT

## 2023-06-11 PROCEDURE — 85025 COMPLETE CBC W/AUTO DIFF WBC: CPT

## 2023-06-11 PROCEDURE — 80053 COMPREHEN METABOLIC PANEL: CPT

## 2023-06-11 PROCEDURE — 90945 DIALYSIS ONE EVALUATION: CPT

## 2023-06-11 PROCEDURE — 51701 INSERT BLADDER CATHETER: CPT

## 2023-06-11 PROCEDURE — 89050 BODY FLUID CELL COUNT: CPT

## 2023-06-11 PROCEDURE — 87641 MR-STAPH DNA AMP PROBE: CPT

## 2023-06-11 PROCEDURE — 94660 CPAP INITIATION&MGMT: CPT

## 2023-06-11 PROCEDURE — 36415 COLL VENOUS BLD VENIPUNCTURE: CPT

## 2023-06-11 PROCEDURE — 2060000000 HC ICU INTERMEDIATE R&B

## 2023-06-11 PROCEDURE — 94761 N-INVAS EAR/PLS OXIMETRY MLT: CPT

## 2023-06-11 RX ORDER — FUROSEMIDE 10 MG/ML
40 INJECTION INTRAMUSCULAR; INTRAVENOUS ONCE
Status: COMPLETED | OUTPATIENT
Start: 2023-06-11 | End: 2023-06-11

## 2023-06-11 RX ORDER — MECLIZINE HYDROCHLORIDE 25 MG/1
12.5 TABLET ORAL 3 TIMES DAILY PRN
Status: DISCONTINUED | OUTPATIENT
Start: 2023-06-11 | End: 2023-06-15 | Stop reason: HOSPADM

## 2023-06-11 RX ORDER — ASPIRIN 81 MG/1
81 TABLET, CHEWABLE ORAL DAILY
Status: DISCONTINUED | OUTPATIENT
Start: 2023-06-11 | End: 2023-06-15 | Stop reason: HOSPADM

## 2023-06-11 RX ORDER — BUPROPION HYDROCHLORIDE 150 MG/1
150 TABLET, EXTENDED RELEASE ORAL DAILY
Status: DISCONTINUED | OUTPATIENT
Start: 2023-06-11 | End: 2023-06-15 | Stop reason: HOSPADM

## 2023-06-11 RX ORDER — BENZTROPINE MESYLATE 1 MG/1
1 TABLET ORAL NIGHTLY
Status: DISCONTINUED | OUTPATIENT
Start: 2023-06-11 | End: 2023-06-15 | Stop reason: HOSPADM

## 2023-06-11 RX ORDER — MIRTAZAPINE 15 MG/1
7.5 TABLET, FILM COATED ORAL NIGHTLY
Status: DISCONTINUED | OUTPATIENT
Start: 2023-06-11 | End: 2023-06-15 | Stop reason: HOSPADM

## 2023-06-11 RX ORDER — ROSUVASTATIN CALCIUM 20 MG/1
40 TABLET, COATED ORAL NIGHTLY
Status: DISCONTINUED | OUTPATIENT
Start: 2023-06-11 | End: 2023-06-15 | Stop reason: HOSPADM

## 2023-06-11 RX ORDER — MECOBALAMIN 5000 MCG
5 TABLET,DISINTEGRATING ORAL NIGHTLY
Status: DISCONTINUED | OUTPATIENT
Start: 2023-06-11 | End: 2023-06-15 | Stop reason: HOSPADM

## 2023-06-11 RX ORDER — OXYCODONE HYDROCHLORIDE 5 MG/1
5 TABLET ORAL EVERY 6 HOURS PRN
Status: DISCONTINUED | OUTPATIENT
Start: 2023-06-11 | End: 2023-06-15 | Stop reason: HOSPADM

## 2023-06-11 RX ADMIN — HEPARIN SODIUM 5000 UNITS: 5000 INJECTION INTRAVENOUS; SUBCUTANEOUS at 06:05

## 2023-06-11 RX ADMIN — SODIUM CHLORIDE, PRESERVATIVE FREE 10 ML: 5 INJECTION INTRAVENOUS at 21:20

## 2023-06-11 RX ADMIN — HEPARIN SODIUM 5000 UNITS: 5000 INJECTION INTRAVENOUS; SUBCUTANEOUS at 21:19

## 2023-06-11 RX ADMIN — MIRTAZAPINE 7.5 MG: 15 TABLET, FILM COATED ORAL at 21:20

## 2023-06-11 RX ADMIN — BUPROPION HYDROCHLORIDE 150 MG: 150 TABLET, EXTENDED RELEASE ORAL at 11:31

## 2023-06-11 RX ADMIN — ROSUVASTATIN CALCIUM 40 MG: 20 TABLET, COATED ORAL at 21:19

## 2023-06-11 RX ADMIN — PIPERACILLIN AND TAZOBACTAM 3375 MG: 3; .375 INJECTION, POWDER, LYOPHILIZED, FOR SOLUTION INTRAVENOUS at 04:51

## 2023-06-11 RX ADMIN — FUROSEMIDE 40 MG: 10 INJECTION, SOLUTION INTRAMUSCULAR; INTRAVENOUS at 08:57

## 2023-06-11 RX ADMIN — ASPIRIN 81 MG 81 MG: 81 TABLET ORAL at 11:31

## 2023-06-11 RX ADMIN — GENTAMICIN SULFATE: 1 CREAM TOPICAL at 09:02

## 2023-06-11 RX ADMIN — PIPERACILLIN AND TAZOBACTAM 3375 MG: 3; .375 INJECTION, POWDER, LYOPHILIZED, FOR SOLUTION INTRAVENOUS at 17:24

## 2023-06-11 RX ADMIN — GENTAMICIN SULFATE: 1 CREAM TOPICAL at 16:47

## 2023-06-11 RX ADMIN — SODIUM CHLORIDE 20 ML: 9 INJECTION, SOLUTION INTRAVENOUS at 16:34

## 2023-06-11 RX ADMIN — HEPARIN SODIUM 5000 UNITS: 5000 INJECTION INTRAVENOUS; SUBCUTANEOUS at 14:23

## 2023-06-11 RX ADMIN — BENZTROPINE MESYLATE 1 MG: 1 TABLET ORAL at 21:20

## 2023-06-11 RX ADMIN — VANCOMYCIN HYDROCHLORIDE 1500 MG: 10 INJECTION, POWDER, LYOPHILIZED, FOR SOLUTION INTRAVENOUS at 10:12

## 2023-06-11 RX ADMIN — SODIUM CHLORIDE, PRESERVATIVE FREE 10 ML: 5 INJECTION INTRAVENOUS at 09:02

## 2023-06-11 RX ADMIN — Medication 5 MG: at 21:19

## 2023-06-11 ASSESSMENT — PAIN SCALES - GENERAL
PAINLEVEL_OUTOF10: 0

## 2023-06-12 LAB
ALBUMIN SERPL-MCNC: 2.7 G/DL (ref 3.4–5)
ALBUMIN/GLOB SERPL: 0.8 {RATIO} (ref 1.1–2.2)
ALP SERPL-CCNC: 47 U/L (ref 40–129)
ALT SERPL-CCNC: 14 U/L (ref 10–40)
ANION GAP SERPL CALCULATED.3IONS-SCNC: 16 MMOL/L (ref 3–16)
AST SERPL-CCNC: 18 U/L (ref 15–37)
BACTERIA BLD CULT ORG #2: NORMAL
BACTERIA BLD CULT: NORMAL
BACTERIA FLD AEROBE CULT: NORMAL
BASOPHILS # BLD: 0 K/UL (ref 0–0.2)
BASOPHILS NFR BLD: 0.5 %
BILIRUB SERPL-MCNC: <0.2 MG/DL (ref 0–1)
BUN SERPL-MCNC: 47 MG/DL (ref 7–20)
CALCIUM SERPL-MCNC: 8.8 MG/DL (ref 8.3–10.6)
CHLORIDE SERPL-SCNC: 99 MMOL/L (ref 99–110)
CO2 SERPL-SCNC: 22 MMOL/L (ref 21–32)
CREAT SERPL-MCNC: 5.9 MG/DL (ref 0.6–1.1)
DEPRECATED RDW RBC AUTO: 16 % (ref 12.4–15.4)
EOSINOPHIL # BLD: 0.9 K/UL (ref 0–0.6)
EOSINOPHIL NFR BLD: 10 %
GFR SERPLBLD CREATININE-BSD FMLA CKD-EPI: 8 ML/MIN/{1.73_M2}
GLUCOSE BLD-MCNC: 185 MG/DL (ref 70–99)
GLUCOSE SERPL-MCNC: 150 MG/DL (ref 70–99)
GRAM STN SPEC: NORMAL
HCT VFR BLD AUTO: 21.4 % (ref 36–48)
HGB BLD-MCNC: 7.1 G/DL (ref 12–16)
LYMPHOCYTES # BLD: 1.5 K/UL (ref 1–5.1)
LYMPHOCYTES NFR BLD: 17.4 %
MCH RBC QN AUTO: 28.6 PG (ref 26–34)
MCHC RBC AUTO-ENTMCNC: 33.2 G/DL (ref 31–36)
MCV RBC AUTO: 86.2 FL (ref 80–100)
MONOCYTES # BLD: 0.7 K/UL (ref 0–1.3)
MONOCYTES NFR BLD: 7.9 %
NEUTROPHILS # BLD: 5.6 K/UL (ref 1.7–7.7)
NEUTROPHILS NFR BLD: 64.2 %
PERFORMED ON: ABNORMAL
PHOSPHATE SERPL-MCNC: 7.1 MG/DL (ref 2.5–4.9)
PLATELET # BLD AUTO: 280 K/UL (ref 135–450)
PMV BLD AUTO: 6.8 FL (ref 5–10.5)
POTASSIUM SERPL-SCNC: 3.6 MMOL/L (ref 3.5–5.1)
POTASSIUM SERPL-SCNC: 3.6 MMOL/L (ref 3.5–5.1)
PROT SERPL-MCNC: 6 G/DL (ref 6.4–8.2)
RBC # BLD AUTO: 2.49 M/UL (ref 4–5.2)
SODIUM SERPL-SCNC: 137 MMOL/L (ref 136–145)
WBC # BLD AUTO: 8.7 K/UL (ref 4–11)

## 2023-06-12 PROCEDURE — 97116 GAIT TRAINING THERAPY: CPT

## 2023-06-12 PROCEDURE — 2700000000 HC OXYGEN THERAPY PER DAY

## 2023-06-12 PROCEDURE — 90945 DIALYSIS ONE EVALUATION: CPT

## 2023-06-12 PROCEDURE — 2580000003 HC RX 258: Performed by: INTERNAL MEDICINE

## 2023-06-12 PROCEDURE — 94660 CPAP INITIATION&MGMT: CPT

## 2023-06-12 PROCEDURE — 6360000002 HC RX W HCPCS

## 2023-06-12 PROCEDURE — 97535 SELF CARE MNGMENT TRAINING: CPT

## 2023-06-12 PROCEDURE — 6370000000 HC RX 637 (ALT 250 FOR IP): Performed by: INTERNAL MEDICINE

## 2023-06-12 PROCEDURE — 2060000000 HC ICU INTERMEDIATE R&B

## 2023-06-12 PROCEDURE — 6360000002 HC RX W HCPCS: Performed by: INTERNAL MEDICINE

## 2023-06-12 PROCEDURE — 97166 OT EVAL MOD COMPLEX 45 MIN: CPT

## 2023-06-12 PROCEDURE — 97530 THERAPEUTIC ACTIVITIES: CPT

## 2023-06-12 PROCEDURE — 99233 SBSQ HOSP IP/OBS HIGH 50: CPT | Performed by: INTERNAL MEDICINE

## 2023-06-12 PROCEDURE — 36415 COLL VENOUS BLD VENIPUNCTURE: CPT

## 2023-06-12 PROCEDURE — 94761 N-INVAS EAR/PLS OXIMETRY MLT: CPT

## 2023-06-12 PROCEDURE — 85025 COMPLETE CBC W/AUTO DIFF WBC: CPT

## 2023-06-12 PROCEDURE — 6370000000 HC RX 637 (ALT 250 FOR IP): Performed by: NURSE PRACTITIONER

## 2023-06-12 PROCEDURE — 97162 PT EVAL MOD COMPLEX 30 MIN: CPT

## 2023-06-12 PROCEDURE — 80053 COMPREHEN METABOLIC PANEL: CPT

## 2023-06-12 RX ORDER — TORSEMIDE 100 MG/1
100 TABLET ORAL DAILY
Status: DISCONTINUED | OUTPATIENT
Start: 2023-06-12 | End: 2023-06-15 | Stop reason: HOSPADM

## 2023-06-12 RX ADMIN — BUPROPION HYDROCHLORIDE 150 MG: 150 TABLET, EXTENDED RELEASE ORAL at 08:39

## 2023-06-12 RX ADMIN — TORSEMIDE 100 MG: 100 TABLET ORAL at 12:20

## 2023-06-12 RX ADMIN — SODIUM CHLORIDE, PRESERVATIVE FREE 10 ML: 5 INJECTION INTRAVENOUS at 20:44

## 2023-06-12 RX ADMIN — GENTAMICIN SULFATE: 1 CREAM TOPICAL at 08:41

## 2023-06-12 RX ADMIN — PIPERACILLIN AND TAZOBACTAM 3375 MG: 3; .375 INJECTION, POWDER, LYOPHILIZED, FOR SOLUTION INTRAVENOUS at 17:50

## 2023-06-12 RX ADMIN — DIAZEPAM 5 MG: 5 TABLET ORAL at 23:01

## 2023-06-12 RX ADMIN — HEPARIN SODIUM 5000 UNITS: 5000 INJECTION INTRAVENOUS; SUBCUTANEOUS at 06:05

## 2023-06-12 RX ADMIN — OXYCODONE HYDROCHLORIDE 5 MG: 5 TABLET ORAL at 08:39

## 2023-06-12 RX ADMIN — SODIUM CHLORIDE, PRESERVATIVE FREE 10 ML: 5 INJECTION INTRAVENOUS at 08:41

## 2023-06-12 RX ADMIN — BENZTROPINE MESYLATE 1 MG: 1 TABLET ORAL at 20:44

## 2023-06-12 RX ADMIN — Medication 5 MG: at 23:01

## 2023-06-12 RX ADMIN — EPOETIN ALFA-EPBX 10000 UNITS: 10000 INJECTION, SOLUTION INTRAVENOUS; SUBCUTANEOUS at 10:08

## 2023-06-12 RX ADMIN — PIPERACILLIN AND TAZOBACTAM 3375 MG: 3; .375 INJECTION, POWDER, LYOPHILIZED, FOR SOLUTION INTRAVENOUS at 05:04

## 2023-06-12 RX ADMIN — OXYCODONE HYDROCHLORIDE 5 MG: 5 TABLET ORAL at 20:55

## 2023-06-12 RX ADMIN — ASPIRIN 81 MG 81 MG: 81 TABLET ORAL at 08:39

## 2023-06-12 RX ADMIN — HEPARIN SODIUM 5000 UNITS: 5000 INJECTION INTRAVENOUS; SUBCUTANEOUS at 21:53

## 2023-06-12 RX ADMIN — MIRTAZAPINE 7.5 MG: 15 TABLET, FILM COATED ORAL at 20:44

## 2023-06-12 RX ADMIN — ROSUVASTATIN CALCIUM 40 MG: 20 TABLET, COATED ORAL at 20:44

## 2023-06-12 RX ADMIN — HEPARIN SODIUM 5000 UNITS: 5000 INJECTION INTRAVENOUS; SUBCUTANEOUS at 14:46

## 2023-06-12 ASSESSMENT — PAIN SCALES - GENERAL
PAINLEVEL_OUTOF10: 2
PAINLEVEL_OUTOF10: 8
PAINLEVEL_OUTOF10: 0
PAINLEVEL_OUTOF10: 9

## 2023-06-12 ASSESSMENT — PAIN DESCRIPTION - LOCATION
LOCATION: BACK;SHOULDER
LOCATION: BACK;SHOULDER
LOCATION: ABDOMEN

## 2023-06-12 ASSESSMENT — PAIN DESCRIPTION - DESCRIPTORS
DESCRIPTORS: ACHING
DESCRIPTORS: ACHING
DESCRIPTORS: DISCOMFORT

## 2023-06-12 ASSESSMENT — PAIN - FUNCTIONAL ASSESSMENT
PAIN_FUNCTIONAL_ASSESSMENT: PREVENTS OR INTERFERES SOME ACTIVE ACTIVITIES AND ADLS
PAIN_FUNCTIONAL_ASSESSMENT: ACTIVITIES ARE NOT PREVENTED
PAIN_FUNCTIONAL_ASSESSMENT: PREVENTS OR INTERFERES WITH MANY ACTIVE NOT PASSIVE ACTIVITIES

## 2023-06-12 ASSESSMENT — PAIN SCALES - WONG BAKER
WONGBAKER_NUMERICALRESPONSE: 0
WONGBAKER_NUMERICALRESPONSE: 0

## 2023-06-12 ASSESSMENT — PAIN DESCRIPTION - ONSET: ONSET: GRADUAL

## 2023-06-12 ASSESSMENT — PAIN DESCRIPTION - PAIN TYPE
TYPE: ACUTE PAIN
TYPE: ACUTE PAIN

## 2023-06-12 ASSESSMENT — PAIN DESCRIPTION - ORIENTATION
ORIENTATION: RIGHT;LEFT;POSTERIOR

## 2023-06-12 ASSESSMENT — PAIN DESCRIPTION - FREQUENCY: FREQUENCY: INTERMITTENT

## 2023-06-12 NOTE — DIALYSIS
Disconnected from [facility] per protocol. Effluent: Clear, light yellow, no fibrin present. Total time: 10 hr 42 min   Total UF:  1150 ml. Total Volume:  17667 ml. Dwell time gained:  1 hr 15 min. Pt Tolerated procedure: Tolerated well. No complications noted.   Report given to: Beverly Nayak  Last BM: 6/12/2023

## 2023-06-12 NOTE — DIALYSIS
Treatment initiated without complications or complaints from patient. Patient resting comfortably with VSS upon dialysis RN exit from room. Itzel Orozco RN notified of start of treatment and hotline number located on top of machine for any questions about troubleshooting during after hours.

## 2023-06-12 NOTE — PLAN OF CARE
Problem: Respiratory - Adult  Goal: Achieves optimal ventilation and oxygenation  6/12/2023 1510 by Dmitry Urias RN  Outcome: Progressing     Problem: Metabolic/Fluid and Electrolytes - Adult  Goal: Electrolytes maintained within normal limits  6/12/2023 1510 by Dmitry Urias RN  Outcome: Progressing     Problem: Metabolic/Fluid and Electrolytes - Adult  Goal: Hemodynamic stability and optimal renal function maintained  Outcome: Progressing     Problem: Discharge Planning  Goal: Discharge to home or other facility with appropriate resources  Outcome: Progressing

## 2023-06-12 NOTE — PLAN OF CARE
Problem: Respiratory - Adult  Goal: Achieves optimal ventilation and oxygenation  6/12/2023 0143 by Jose Oreilly RN  Outcome: Progressing  Flowsheets (Taken 6/12/2023 0143)  Achieves optimal ventilation and oxygenation:   Assess for changes in respiratory status   Assess for changes in mentation and behavior   Position to facilitate oxygenation and minimize respiratory effort   Oxygen supplementation based on oxygen saturation or arterial blood gases   Encourage broncho-pulmonary hygiene including cough, deep breathe, incentive spirometry   Assess the need for suctioning and aspirate as needed   Assess and instruct to report shortness of breath or any respiratory difficulty   Respiratory therapy support as indicated     Problem: Metabolic/Fluid and Electrolytes - Adult  Goal: Electrolytes maintained within normal limits  Outcome: Progressing  Flowsheets (Taken 6/12/2023 0143)  Electrolytes maintained within normal limits:   Monitor labs and assess patient for signs and symptoms of electrolyte imbalances   Administer electrolyte replacement as ordered   Monitor response to electrolyte replacements, including repeat lab results as appropriate     Problem: Pain  Goal: Verbalizes/displays adequate comfort level or baseline comfort level  6/11/2023 1840 by Broderick Mast RN  Flowsheets (Taken 6/11/2023 1840)  Verbalizes/displays adequate comfort level or baseline comfort level:   Encourage patient to monitor pain and request assistance   Administer analgesics based on type and severity of pain and evaluate response   Consider cultural and social influences on pain and pain management   Assess pain using appropriate pain scale   Implement non-pharmacological measures as appropriate and evaluate response   Notify Licensed Independent Practitioner if interventions unsuccessful or patient reports new pain

## 2023-06-13 LAB
ALBUMIN SERPL-MCNC: 2.7 G/DL (ref 3.4–5)
ANION GAP SERPL CALCULATED.3IONS-SCNC: 17 MMOL/L (ref 3–16)
APPEARANCE FLUID: CLEAR
BASOPHILS # BLD: 0.1 K/UL (ref 0–0.2)
BASOPHILS NFR BLD: 0.7 %
BDY FLUID QUALITY: NORMAL
BUN SERPL-MCNC: 51 MG/DL (ref 7–20)
CALCIUM SERPL-MCNC: 8.9 MG/DL (ref 8.3–10.6)
CELL COUNT FLUID TYPE: NORMAL
CHLORIDE SERPL-SCNC: 100 MMOL/L (ref 99–110)
CO2 SERPL-SCNC: 23 MMOL/L (ref 21–32)
COLOR FLUID: COLORLESS
CREAT SERPL-MCNC: 6.2 MG/DL (ref 0.6–1.1)
DEPRECATED RDW RBC AUTO: 15.9 % (ref 12.4–15.4)
DIFF PNL FLD: NORMAL
EOSINOPHIL # BLD: 0.9 K/UL (ref 0–0.6)
EOSINOPHIL NFR BLD: 12.2 %
GFR SERPLBLD CREATININE-BSD FMLA CKD-EPI: 7 ML/MIN/{1.73_M2}
GLUCOSE SERPL-MCNC: 115 MG/DL (ref 70–99)
HCT VFR BLD AUTO: 22.2 % (ref 36–48)
HGB BLD-MCNC: 7.3 G/DL (ref 12–16)
LYMPHOCYTES # BLD: 2 K/UL (ref 1–5.1)
LYMPHOCYTES NFR BLD: 26.7 %
MCH RBC QN AUTO: 28 PG (ref 26–34)
MCHC RBC AUTO-ENTMCNC: 32.9 G/DL (ref 31–36)
MCV RBC AUTO: 85.1 FL (ref 80–100)
MONOCYTES # BLD: 0.7 K/UL (ref 0–1.3)
MONOCYTES NFR BLD: 9.4 %
NEUTROPHILS # BLD: 3.7 K/UL (ref 1.7–7.7)
NEUTROPHILS NFR BLD: 51 %
NUC CELL # FLD: 5 /CUMM
PHOSPHATE SERPL-MCNC: 7.7 MG/DL (ref 2.5–4.9)
PLATELET # BLD AUTO: 323 K/UL (ref 135–450)
PMV BLD AUTO: 6.7 FL (ref 5–10.5)
POTASSIUM SERPL-SCNC: 3.6 MMOL/L (ref 3.5–5.1)
RBC # BLD AUTO: 2.61 M/UL (ref 4–5.2)
RBC FLUID: <1000 /CUMM
SODIUM SERPL-SCNC: 140 MMOL/L (ref 136–145)
VANCOMYCIN SERPL-MCNC: 16.3 UG/ML
WBC # BLD AUTO: 7.3 K/UL (ref 4–11)

## 2023-06-13 PROCEDURE — 97530 THERAPEUTIC ACTIVITIES: CPT

## 2023-06-13 PROCEDURE — 6370000000 HC RX 637 (ALT 250 FOR IP): Performed by: INTERNAL MEDICINE

## 2023-06-13 PROCEDURE — 6370000000 HC RX 637 (ALT 250 FOR IP): Performed by: NURSE PRACTITIONER

## 2023-06-13 PROCEDURE — 2700000000 HC OXYGEN THERAPY PER DAY

## 2023-06-13 PROCEDURE — 80053 COMPREHEN METABOLIC PANEL: CPT

## 2023-06-13 PROCEDURE — 97535 SELF CARE MNGMENT TRAINING: CPT

## 2023-06-13 PROCEDURE — 85025 COMPLETE CBC W/AUTO DIFF WBC: CPT

## 2023-06-13 PROCEDURE — 97116 GAIT TRAINING THERAPY: CPT

## 2023-06-13 PROCEDURE — 36415 COLL VENOUS BLD VENIPUNCTURE: CPT

## 2023-06-13 PROCEDURE — 6370000000 HC RX 637 (ALT 250 FOR IP): Performed by: STUDENT IN AN ORGANIZED HEALTH CARE EDUCATION/TRAINING PROGRAM

## 2023-06-13 PROCEDURE — 2580000003 HC RX 258: Performed by: INTERNAL MEDICINE

## 2023-06-13 PROCEDURE — 94761 N-INVAS EAR/PLS OXIMETRY MLT: CPT

## 2023-06-13 PROCEDURE — 80202 ASSAY OF VANCOMYCIN: CPT

## 2023-06-13 PROCEDURE — 99232 SBSQ HOSP IP/OBS MODERATE 35: CPT | Performed by: INTERNAL MEDICINE

## 2023-06-13 PROCEDURE — 6360000002 HC RX W HCPCS: Performed by: INTERNAL MEDICINE

## 2023-06-13 PROCEDURE — 2060000000 HC ICU INTERMEDIATE R&B

## 2023-06-13 RX ORDER — SEVELAMER CARBONATE 800 MG/1
800 TABLET, FILM COATED ORAL
Status: DISCONTINUED | OUTPATIENT
Start: 2023-06-13 | End: 2023-06-15

## 2023-06-13 RX ORDER — AMLODIPINE BESYLATE 5 MG/1
5 TABLET ORAL DAILY
Status: DISCONTINUED | OUTPATIENT
Start: 2023-06-13 | End: 2023-06-13

## 2023-06-13 RX ORDER — FERROUS SULFATE 325(65) MG
325 TABLET ORAL
Status: DISCONTINUED | OUTPATIENT
Start: 2023-06-14 | End: 2023-06-15 | Stop reason: HOSPADM

## 2023-06-13 RX ADMIN — BENZTROPINE MESYLATE 1 MG: 1 TABLET ORAL at 21:24

## 2023-06-13 RX ADMIN — DICLOFENAC SODIUM 2 G: 10 GEL TOPICAL at 09:31

## 2023-06-13 RX ADMIN — SEVELAMER CARBONATE 800 MG: 800 TABLET, FILM COATED ORAL at 17:33

## 2023-06-13 RX ADMIN — DIAZEPAM 5 MG: 5 TABLET ORAL at 21:24

## 2023-06-13 RX ADMIN — Medication 5 MG: at 21:23

## 2023-06-13 RX ADMIN — ASPIRIN 81 MG 81 MG: 81 TABLET ORAL at 09:31

## 2023-06-13 RX ADMIN — PIPERACILLIN AND TAZOBACTAM 3375 MG: 3; .375 INJECTION, POWDER, LYOPHILIZED, FOR SOLUTION INTRAVENOUS at 17:17

## 2023-06-13 RX ADMIN — OXYCODONE HYDROCHLORIDE 5 MG: 5 TABLET ORAL at 21:29

## 2023-06-13 RX ADMIN — GENTAMICIN SULFATE: 1 CREAM TOPICAL at 10:39

## 2023-06-13 RX ADMIN — SODIUM CHLORIDE 25 ML: 9 INJECTION, SOLUTION INTRAVENOUS at 09:56

## 2023-06-13 RX ADMIN — SODIUM CHLORIDE, PRESERVATIVE FREE 10 ML: 5 INJECTION INTRAVENOUS at 09:34

## 2023-06-13 RX ADMIN — BUPROPION HYDROCHLORIDE 150 MG: 150 TABLET, EXTENDED RELEASE ORAL at 09:31

## 2023-06-13 RX ADMIN — NYSTATIN 500000 UNITS: 100000 SUSPENSION ORAL at 21:29

## 2023-06-13 RX ADMIN — PIPERACILLIN AND TAZOBACTAM 3375 MG: 3; .375 INJECTION, POWDER, LYOPHILIZED, FOR SOLUTION INTRAVENOUS at 04:54

## 2023-06-13 RX ADMIN — ROSUVASTATIN CALCIUM 40 MG: 20 TABLET, COATED ORAL at 21:23

## 2023-06-13 RX ADMIN — NYSTATIN 500000 UNITS: 100000 SUSPENSION ORAL at 14:28

## 2023-06-13 RX ADMIN — TORSEMIDE 100 MG: 100 TABLET ORAL at 09:31

## 2023-06-13 RX ADMIN — SEVELAMER CARBONATE 800 MG: 800 TABLET, FILM COATED ORAL at 12:02

## 2023-06-13 RX ADMIN — OXYCODONE HYDROCHLORIDE 5 MG: 5 TABLET ORAL at 09:37

## 2023-06-13 RX ADMIN — HEPARIN SODIUM 5000 UNITS: 5000 INJECTION INTRAVENOUS; SUBCUTANEOUS at 05:43

## 2023-06-13 RX ADMIN — MIRTAZAPINE 7.5 MG: 15 TABLET, FILM COATED ORAL at 21:23

## 2023-06-13 RX ADMIN — SODIUM CHLORIDE, PRESERVATIVE FREE 10 ML: 5 INJECTION INTRAVENOUS at 21:24

## 2023-06-13 RX ADMIN — HEPARIN SODIUM 5000 UNITS: 5000 INJECTION INTRAVENOUS; SUBCUTANEOUS at 14:28

## 2023-06-13 RX ADMIN — SODIUM CHLORIDE 25 ML: 9 INJECTION, SOLUTION INTRAVENOUS at 17:16

## 2023-06-13 RX ADMIN — SODIUM CHLORIDE 25 ML: 9 INJECTION, SOLUTION INTRAVENOUS at 04:54

## 2023-06-13 RX ADMIN — VANCOMYCIN HYDROCHLORIDE 1000 MG: 10 INJECTION, POWDER, LYOPHILIZED, FOR SOLUTION INTRAVENOUS at 09:57

## 2023-06-13 RX ADMIN — HEPARIN SODIUM 5000 UNITS: 5000 INJECTION INTRAVENOUS; SUBCUTANEOUS at 21:24

## 2023-06-13 ASSESSMENT — PAIN DESCRIPTION - ORIENTATION
ORIENTATION: RIGHT;LEFT
ORIENTATION: UPPER

## 2023-06-13 ASSESSMENT — PAIN DESCRIPTION - LOCATION
LOCATION: SHOULDER;ABDOMEN
LOCATION: ABDOMEN;SHOULDER

## 2023-06-13 ASSESSMENT — PAIN SCALES - WONG BAKER
WONGBAKER_NUMERICALRESPONSE: 0
WONGBAKER_NUMERICALRESPONSE: 4
WONGBAKER_NUMERICALRESPONSE: 0

## 2023-06-13 ASSESSMENT — PAIN SCALES - GENERAL
PAINLEVEL_OUTOF10: 9
PAINLEVEL_OUTOF10: 8
PAINLEVEL_OUTOF10: 8
PAINLEVEL_OUTOF10: 0
PAINLEVEL_OUTOF10: 2
PAINLEVEL_OUTOF10: 9
PAINLEVEL_OUTOF10: 6
PAINLEVEL_OUTOF10: 1

## 2023-06-13 ASSESSMENT — PAIN DESCRIPTION - FREQUENCY
FREQUENCY: CONTINUOUS
FREQUENCY: CONTINUOUS
FREQUENCY: INTERMITTENT
FREQUENCY: CONTINUOUS

## 2023-06-13 ASSESSMENT — PAIN DESCRIPTION - PAIN TYPE
TYPE: ACUTE PAIN

## 2023-06-13 ASSESSMENT — PAIN DESCRIPTION - DESCRIPTORS
DESCRIPTORS: SORE
DESCRIPTORS: DISCOMFORT
DESCRIPTORS: SORE
DESCRIPTORS: ACHING;DULL

## 2023-06-13 ASSESSMENT — PAIN - FUNCTIONAL ASSESSMENT
PAIN_FUNCTIONAL_ASSESSMENT: ACTIVITIES ARE NOT PREVENTED
PAIN_FUNCTIONAL_ASSESSMENT: PREVENTS OR INTERFERES SOME ACTIVE ACTIVITIES AND ADLS
PAIN_FUNCTIONAL_ASSESSMENT: ACTIVITIES ARE NOT PREVENTED
PAIN_FUNCTIONAL_ASSESSMENT: ACTIVITIES ARE NOT PREVENTED

## 2023-06-13 ASSESSMENT — PAIN DESCRIPTION - ONSET
ONSET: ON-GOING
ONSET: ON-GOING
ONSET: GRADUAL
ONSET: ON-GOING

## 2023-06-13 NOTE — PLAN OF CARE
Problem: Respiratory - Adult  Goal: Achieves optimal ventilation and oxygenation  6/12/2023 2225 by Ana Hanson RN  Outcome: Progressing  Flowsheets (Taken 6/12/2023 2225)  Achieves optimal ventilation and oxygenation:   Assess for changes in mentation and behavior   Assess for changes in respiratory status   Position to facilitate oxygenation and minimize respiratory effort   Oxygen supplementation based on oxygen saturation or arterial blood gases   Initiate smoking cessation protocol as indicated   Encourage broncho-pulmonary hygiene including cough, deep breathe, incentive spirometry   Assess the need for suctioning and aspirate as needed   Assess and instruct to report shortness of breath or any respiratory difficulty   Respiratory therapy support as indicated     Problem: Metabolic/Fluid and Electrolytes - Adult  Goal: Electrolytes maintained within normal limits  6/12/2023 2225 by Ana Hanson RN  Outcome: Progressing  Flowsheets (Taken 6/12/2023 2225)  Electrolytes maintained within normal limits:   Administer electrolyte replacement as ordered   Monitor response to electrolyte replacements, including repeat lab results as appropriate   Monitor labs and assess patient for signs and symptoms of electrolyte imbalances   Fluid restriction as ordered   Instruct patient on fluid and nutrition restrictions as appropriate     Problem: Metabolic/Fluid and Electrolytes - Adult  Goal: Hemodynamic stability and optimal renal function maintained  6/12/2023 2225 by Ana Hanson RN  Outcome: Progressing  Flowsheets (Taken 6/12/2023 2225)  Hemodynamic stability and optimal renal function maintained:   Monitor labs and assess for signs and symptoms of volume excess or deficit   Monitor intake, output and patient weight   Monitor urine specific gravity, serum osmolarity and serum sodium as indicated or ordered   Monitor response to interventions for patient's volume status, including labs, urine output, blood pressure

## 2023-06-13 NOTE — DISCHARGE INSTR - COC
Continuity of Care Form    Patient Name: Hoang Kaba   :  1965  MRN:  [id number]    Admit date:  2023  Discharge date:  ***    Code Status Order: Full Code   Advance Directives:     Admitting Physician:  Noe Pires MD  PCP: Sonia Weiner DO    Discharging Nurse: [facility] Unit/Room#: 4563/9296-04  Discharging Unit Phone Number: ***    Emergency Contact:   Extended Emergency Contact Information  Primary Emergency Contact: [facility] Phone: [phone]  Mobile Phone: [phone]  Relation: Child   needed? No  Secondary Emergency Contact: [place] [address] Phone: [phone]  Mobile Phone: [phone]  Relation: Child    Past Surgical History:  Past Surgical History:   Procedure Laterality Date    ENDOMETRIAL ABLATION      INNER EAR SURGERY      TUBAL LIGATION         Immunization History: There is no immunization history on file for this patient.     Active Problems:  Patient Active Problem List   Diagnosis Code    Atypical chest pain R07.89    DM II (diabetes mellitus, type II), controlled ([address] E11.9    HTN (hypertension) I10    JONATHAN (acute kidney injury) ([address] N17.9    Asthma J45.909    History of anemia due to CKD N18.9, Z86.2    Peritonitis ([address] K65.9    ESRD on peritoneal dialysis ([address] D08.1, H26.6    Complication of peritoneal dialysis T80.90XA    Peritonitis associated with peritoneal dialysis ([address] T85.71XA    Chronic idiopathic constipation K59.04    Lower abdominal pain R10.30    AMS (altered mental status) R41.82    Recurrent falls R29.6    Vertigo R42    Respiratory failure ([address] J96.90       Isolation/Infection:   Isolation            No Isolation          Patient Infection Status       Infection Onset Added Last Indicated Last Indicated By Review Planned Expiration Resolved Resolved By    None active    Resolved    COVID-19 (Rule Out) 23 COVID-19, Rapid (Ordered)   23 Rule-Out Test Resulted    COVID-19 (Rule

## 2023-06-14 PROBLEM — J18.9 MULTIFOCAL PNEUMONIA: Status: ACTIVE | Noted: 2023-06-14

## 2023-06-14 PROBLEM — D64.9 ANEMIA: Status: ACTIVE | Noted: 2023-06-14

## 2023-06-14 LAB
ALBUMIN SERPL-MCNC: 2.9 G/DL (ref 3.4–5)
ANION GAP SERPL CALCULATED.3IONS-SCNC: 18 MMOL/L (ref 3–16)
BASOPHILS # BLD: 0.1 K/UL (ref 0–0.2)
BASOPHILS NFR BLD: 1 %
BUN SERPL-MCNC: 49 MG/DL (ref 7–20)
CALCIUM SERPL-MCNC: 8.9 MG/DL (ref 8.3–10.6)
CHLORIDE SERPL-SCNC: 99 MMOL/L (ref 99–110)
CO2 SERPL-SCNC: 24 MMOL/L (ref 21–32)
CREAT SERPL-MCNC: 6.1 MG/DL (ref 0.6–1.1)
DEPRECATED RDW RBC AUTO: 15.8 % (ref 12.4–15.4)
EOSINOPHIL # BLD: 0.8 K/UL (ref 0–0.6)
EOSINOPHIL NFR BLD: 11.9 %
GFR SERPLBLD CREATININE-BSD FMLA CKD-EPI: 7 ML/MIN/{1.73_M2}
GLUCOSE SERPL-MCNC: 158 MG/DL (ref 70–99)
HCT VFR BLD AUTO: 22.6 % (ref 36–48)
HGB BLD-MCNC: 7.3 G/DL (ref 12–16)
LYMPHOCYTES # BLD: 2 K/UL (ref 1–5.1)
LYMPHOCYTES NFR BLD: 29.2 %
MCH RBC QN AUTO: 28.1 PG (ref 26–34)
MCHC RBC AUTO-ENTMCNC: 32.4 G/DL (ref 31–36)
MCV RBC AUTO: 86.7 FL (ref 80–100)
MONOCYTES # BLD: 0.7 K/UL (ref 0–1.3)
MONOCYTES NFR BLD: 10 %
NEUTROPHILS # BLD: 3.3 K/UL (ref 1.7–7.7)
NEUTROPHILS NFR BLD: 47.9 %
PHOSPHATE SERPL-MCNC: 6.4 MG/DL (ref 2.5–4.9)
PLATELET # BLD AUTO: 370 K/UL (ref 135–450)
PMV BLD AUTO: 6.7 FL (ref 5–10.5)
POTASSIUM SERPL-SCNC: 3.4 MMOL/L (ref 3.5–5.1)
RBC # BLD AUTO: 2.6 M/UL (ref 4–5.2)
SODIUM SERPL-SCNC: 141 MMOL/L (ref 136–145)
WBC # BLD AUTO: 6.8 K/UL (ref 4–11)

## 2023-06-14 PROCEDURE — 36415 COLL VENOUS BLD VENIPUNCTURE: CPT

## 2023-06-14 PROCEDURE — 6370000000 HC RX 637 (ALT 250 FOR IP): Performed by: INTERNAL MEDICINE

## 2023-06-14 PROCEDURE — 2060000000 HC ICU INTERMEDIATE R&B

## 2023-06-14 PROCEDURE — 2580000003 HC RX 258: Performed by: INTERNAL MEDICINE

## 2023-06-14 PROCEDURE — 6360000002 HC RX W HCPCS

## 2023-06-14 PROCEDURE — 6360000002 HC RX W HCPCS: Performed by: INTERNAL MEDICINE

## 2023-06-14 PROCEDURE — 80069 RENAL FUNCTION PANEL: CPT

## 2023-06-14 PROCEDURE — 3E1M39Z IRRIGATION OF PERITONEAL CAVITY USING DIALYSATE, PERCUTANEOUS APPROACH: ICD-10-PCS | Performed by: INTERNAL MEDICINE

## 2023-06-14 PROCEDURE — 5A1D70Z PERFORMANCE OF URINARY FILTRATION, INTERMITTENT, LESS THAN 6 HOURS PER DAY: ICD-10-PCS | Performed by: INTERNAL MEDICINE

## 2023-06-14 PROCEDURE — 6370000000 HC RX 637 (ALT 250 FOR IP): Performed by: STUDENT IN AN ORGANIZED HEALTH CARE EDUCATION/TRAINING PROGRAM

## 2023-06-14 PROCEDURE — 85025 COMPLETE CBC W/AUTO DIFF WBC: CPT

## 2023-06-14 PROCEDURE — 8040000000 HC CCPD INPATIENT

## 2023-06-14 RX ORDER — POTASSIUM CHLORIDE 20 MEQ/1
20 TABLET, EXTENDED RELEASE ORAL
Status: DISCONTINUED | OUTPATIENT
Start: 2023-06-15 | End: 2023-06-15 | Stop reason: HOSPADM

## 2023-06-14 RX ORDER — POTASSIUM CHLORIDE 20 MEQ/1
20 TABLET, EXTENDED RELEASE ORAL ONCE
Status: COMPLETED | OUTPATIENT
Start: 2023-06-14 | End: 2023-06-14

## 2023-06-14 RX ORDER — AMLODIPINE BESYLATE 10 MG/1
10 TABLET ORAL DAILY
Status: DISCONTINUED | OUTPATIENT
Start: 2023-06-14 | End: 2023-06-15 | Stop reason: HOSPADM

## 2023-06-14 RX ADMIN — NYSTATIN 500000 UNITS: 100000 SUSPENSION ORAL at 14:57

## 2023-06-14 RX ADMIN — SODIUM CHLORIDE, PRESERVATIVE FREE 10 ML: 5 INJECTION INTRAVENOUS at 10:04

## 2023-06-14 RX ADMIN — HEPARIN SODIUM 5000 UNITS: 5000 INJECTION INTRAVENOUS; SUBCUTANEOUS at 14:57

## 2023-06-14 RX ADMIN — AMLODIPINE BESYLATE 10 MG: 10 TABLET ORAL at 14:55

## 2023-06-14 RX ADMIN — HEPARIN SODIUM 5000 UNITS: 5000 INJECTION INTRAVENOUS; SUBCUTANEOUS at 05:30

## 2023-06-14 RX ADMIN — ROSUVASTATIN CALCIUM 40 MG: 20 TABLET, COATED ORAL at 21:59

## 2023-06-14 RX ADMIN — SODIUM CHLORIDE 25 ML: 9 INJECTION, SOLUTION INTRAVENOUS at 04:39

## 2023-06-14 RX ADMIN — BENZTROPINE MESYLATE 1 MG: 1 TABLET ORAL at 21:58

## 2023-06-14 RX ADMIN — SEVELAMER CARBONATE 800 MG: 800 TABLET, FILM COATED ORAL at 12:18

## 2023-06-14 RX ADMIN — POTASSIUM CHLORIDE 20 MEQ: 1500 TABLET, EXTENDED RELEASE ORAL at 14:55

## 2023-06-14 RX ADMIN — ASPIRIN 81 MG 81 MG: 81 TABLET ORAL at 09:58

## 2023-06-14 RX ADMIN — NYSTATIN 500000 UNITS: 100000 SUSPENSION ORAL at 09:57

## 2023-06-14 RX ADMIN — IRON SUCROSE 200 MG: 20 INJECTION, SOLUTION INTRAVENOUS at 15:02

## 2023-06-14 RX ADMIN — Medication 5 MG: at 21:58

## 2023-06-14 RX ADMIN — TORSEMIDE 100 MG: 100 TABLET ORAL at 09:58

## 2023-06-14 RX ADMIN — MIRTAZAPINE 7.5 MG: 15 TABLET, FILM COATED ORAL at 21:58

## 2023-06-14 RX ADMIN — SEVELAMER CARBONATE 800 MG: 800 TABLET, FILM COATED ORAL at 17:48

## 2023-06-14 RX ADMIN — SODIUM CHLORIDE 25 ML: 9 INJECTION, SOLUTION INTRAVENOUS at 15:01

## 2023-06-14 RX ADMIN — HEPARIN SODIUM 5000 UNITS: 5000 INJECTION INTRAVENOUS; SUBCUTANEOUS at 21:58

## 2023-06-14 RX ADMIN — SODIUM CHLORIDE, PRESERVATIVE FREE 10 ML: 5 INJECTION INTRAVENOUS at 21:58

## 2023-06-14 RX ADMIN — OXYCODONE HYDROCHLORIDE 5 MG: 5 TABLET ORAL at 09:58

## 2023-06-14 RX ADMIN — GENTAMICIN SULFATE: 1 CREAM TOPICAL at 10:20

## 2023-06-14 RX ADMIN — OXYCODONE HYDROCHLORIDE 5 MG: 5 TABLET ORAL at 17:48

## 2023-06-14 RX ADMIN — FERROUS SULFATE TAB 325 MG (65 MG ELEMENTAL FE) 325 MG: 325 (65 FE) TAB at 09:58

## 2023-06-14 RX ADMIN — NYSTATIN 500000 UNITS: 100000 SUSPENSION ORAL at 21:58

## 2023-06-14 RX ADMIN — SEVELAMER CARBONATE 800 MG: 800 TABLET, FILM COATED ORAL at 09:58

## 2023-06-14 RX ADMIN — BUPROPION HYDROCHLORIDE 150 MG: 150 TABLET, EXTENDED RELEASE ORAL at 09:57

## 2023-06-14 RX ADMIN — EPOETIN ALFA-EPBX 10000 UNITS: 10000 INJECTION, SOLUTION INTRAVENOUS; SUBCUTANEOUS at 09:57

## 2023-06-14 RX ADMIN — PIPERACILLIN AND TAZOBACTAM 3375 MG: 3; .375 INJECTION, POWDER, LYOPHILIZED, FOR SOLUTION INTRAVENOUS at 04:40

## 2023-06-14 ASSESSMENT — PAIN DESCRIPTION - FREQUENCY
FREQUENCY: CONTINUOUS

## 2023-06-14 ASSESSMENT — PAIN DESCRIPTION - LOCATION
LOCATION: SHOULDER

## 2023-06-14 ASSESSMENT — PAIN DESCRIPTION - ONSET
ONSET: ON-GOING

## 2023-06-14 ASSESSMENT — PAIN SCALES - GENERAL
PAINLEVEL_OUTOF10: 9
PAINLEVEL_OUTOF10: 7
PAINLEVEL_OUTOF10: 9
PAINLEVEL_OUTOF10: 8
PAINLEVEL_OUTOF10: 10

## 2023-06-14 ASSESSMENT — PAIN DESCRIPTION - DESCRIPTORS
DESCRIPTORS: ACHING
DESCRIPTORS: ACHING;DISCOMFORT
DESCRIPTORS: ACHING;DISCOMFORT
DESCRIPTORS: ACHING

## 2023-06-14 ASSESSMENT — PAIN SCALES - WONG BAKER
WONGBAKER_NUMERICALRESPONSE: 2
WONGBAKER_NUMERICALRESPONSE: 2
WONGBAKER_NUMERICALRESPONSE: 0

## 2023-06-14 ASSESSMENT — PAIN DESCRIPTION - PAIN TYPE
TYPE: CHRONIC PAIN

## 2023-06-14 ASSESSMENT — PAIN DESCRIPTION - ORIENTATION
ORIENTATION: RIGHT;LEFT

## 2023-06-14 ASSESSMENT — PAIN - FUNCTIONAL ASSESSMENT
PAIN_FUNCTIONAL_ASSESSMENT: PREVENTS OR INTERFERES SOME ACTIVE ACTIVITIES AND ADLS
PAIN_FUNCTIONAL_ASSESSMENT: PREVENTS OR INTERFERES SOME ACTIVE ACTIVITIES AND ADLS
PAIN_FUNCTIONAL_ASSESSMENT: ACTIVITIES ARE NOT PREVENTED
PAIN_FUNCTIONAL_ASSESSMENT: ACTIVITIES ARE NOT PREVENTED

## 2023-06-14 NOTE — PLAN OF CARE
Problem: Respiratory - Adult  Goal: Achieves optimal ventilation and oxygenation  6/14/2023 1743 by Franca Guerra RN  Outcome: Progressing  Flowsheets (Taken 6/14/2023 1743)  Achieves optimal ventilation and oxygenation:   Assess for changes in respiratory status   Assess for changes in mentation and behavior   Position to facilitate oxygenation and minimize respiratory effort   Oxygen supplementation based on oxygen saturation or arterial blood gases   Encourage broncho-pulmonary hygiene including cough, deep breathe, incentive spirometry   Assess the need for suctioning and aspirate as needed   Assess and instruct to report shortness of breath or any respiratory difficulty   Respiratory therapy support as indicated     Problem: Metabolic/Fluid and Electrolytes - Adult  Goal: Hemodynamic stability and optimal renal function maintained  6/14/2023 1743 by Franca Guerra RN  Outcome: Progressing  Flowsheets (Taken 6/14/2023 1743)  Hemodynamic stability and optimal renal function maintained:   Monitor labs and assess for signs and symptoms of volume excess or deficit   Monitor intake, output and patient weight   Monitor urine specific gravity, serum osmolarity and serum sodium as indicated or ordered   Monitor response to interventions for patient's volume status, including labs, urine output, blood pressure (other measures as available)   Encourage oral intake as appropriate   Instruct patient on fluid and nutrition restrictions as appropriate     Problem: Discharge Planning  Goal: Discharge to home or other facility with appropriate resources  6/14/2023 1743 by Franca Guerra RN  Outcome: Progressing  Flowsheets (Taken 6/14/2023 1743)  Discharge to home or other facility with appropriate resources:   Identify barriers to discharge with patient and caregiver   Arrange for needed discharge resources and transportation as appropriate   Identify discharge learning needs (meds, wound care, etc)     Problem: Pain  Goal:

## 2023-06-14 NOTE — PLAN OF CARE
Problem: Respiratory - Adult  Goal: Achieves optimal ventilation and oxygenation  6/14/2023 0503 by Alvino Garcia RN  Outcome: Progressing  Flowsheets (Taken 6/14/2023 0503)  Achieves optimal ventilation and oxygenation:   Assess for changes in respiratory status   Assess for changes in mentation and behavior   Position to facilitate oxygenation and minimize respiratory effort   Oxygen supplementation based on oxygen saturation or arterial blood gases   Initiate smoking cessation protocol as indicated   Encourage broncho-pulmonary hygiene including cough, deep breathe, incentive spirometry   Assess the need for suctioning and aspirate as needed   Assess and instruct to report shortness of breath or any respiratory difficulty   Respiratory therapy support as indicated     Problem: Metabolic/Fluid and Electrolytes - Adult  Goal: Electrolytes maintained within normal limits  Outcome: Progressing  Flowsheets (Taken 6/14/2023 0503)  Electrolytes maintained within normal limits:   Monitor labs and assess patient for signs and symptoms of electrolyte imbalances   Administer electrolyte replacement as ordered   Monitor response to electrolyte replacements, including repeat lab results as appropriate   Fluid restriction as ordered   Instruct patient on fluid and nutrition restrictions as appropriate     Problem: Metabolic/Fluid and Electrolytes - Adult  Goal: Hemodynamic stability and optimal renal function maintained  6/14/2023 0503 by Alvino Garcia RN  Outcome: Progressing  Flowsheets (Taken 6/14/2023 0503)  Hemodynamic stability and optimal renal function maintained:   Monitor labs and assess for signs and symptoms of volume excess or deficit   Monitor intake, output and patient weight   Monitor urine specific gravity, serum osmolarity and serum sodium as indicated or ordered   Monitor response to interventions for patient's volume status, including labs, urine output, blood pressure (other measures as available)

## 2023-06-14 NOTE — DISCHARGE SUMMARY
30 tablet, Refills: 2      torsemide (DEMADEX) 100 MG tablet Take 1 tablet by mouth daily  Qty: 30 tablet, Refills: 1      oxyCODONE (ROXICODONE) 5 MG immediate release tablet Take 1 tablet by mouth every 6 hours as needed for Pain for up to 3 days. Max Daily Amount: 20 mg  Qty: 12 tablet, Refills: 0    Comments: Reduce doses taken as pain becomes manageable  Associated Diagnoses: Chronic pain syndrome                Details   mirtazapine (REMERON) 7.5 MG tablet Take 1 tablet by mouth nightly  Qty: 30 tablet, Refills: 3      sevelamer (RENVELA) 800 MG tablet Take 2 tablets by mouth 3 times daily (with meals)  Qty: 90 tablet, Refills: 3      bisacodyl (DULCOLAX) 10 MG suppository Place 10 mg rectally daily as needed for Constipation      buPROPion (WELLBUTRIN SR) 150 MG extended release tablet Take 1 tablet by mouth daily  Qty: 30 tablet, Refills: 0      metoprolol succinate (TOPROL XL) 25 MG extended release tablet Take 1 tablet by mouth daily  Qty: 30 tablet, Refills: 0      aspirin 81 MG chewable tablet Take 1 tablet by mouth daily  Qty: 30 tablet, Refills: 3      melatonin 5 MG TABS tablet Take 1 tablet by mouth nightly      meclizine (ANTIVERT) 12.5 MG tablet Take 1 tablet by mouth 3 times daily as needed for Dizziness      sennosides-docusate sodium (SENOKOT-S) 8.6-50 MG tablet Take 1 tablet by mouth in the morning. Qty: 30 tablet, Refills: 0      rosuvastatin (CRESTOR) 40 MG tablet Take 40 mg by mouth at bedtime      calcitRIOL (ROCALTROL) 0.25 MCG capsule Take 0.25 mcg by mouth in the morning. benztropine (COGENTIN) 1 MG tablet Take 1 mg by mouth at bedtime             Time Spent on discharge is more than 30 minutes in the examination, evaluation, counseling and review of medications and discharge plan.       Signed:  Jabari Guerrero MD  Hospitalist  Attending Physician

## 2023-06-14 NOTE — CONSULTS
Clinical Pharmacy Progress Note    Vancomycin has been discontinued. Pharmacy will sign off. Please re-consult pharmacy if vancomycin dosing is wanted in the future.     Please call with questions--  Moriah Fierro PharmD, BCPS  Wireless: [id number]   6/14/2023 9:28 AM

## 2023-06-15 VITALS
HEART RATE: 90 BPM | HEIGHT: 68 IN | DIASTOLIC BLOOD PRESSURE: 75 MMHG | RESPIRATION RATE: 18 BRPM | SYSTOLIC BLOOD PRESSURE: 146 MMHG | TEMPERATURE: 97.6 F | BODY MASS INDEX: 23.26 KG/M2 | WEIGHT: 153.44 LBS | OXYGEN SATURATION: 96 %

## 2023-06-15 LAB
ALBUMIN SERPL-MCNC: 3 G/DL (ref 3.4–5)
ALBUMIN/GLOB SERPL: 0.8 {RATIO} (ref 1.1–2.2)
ALP SERPL-CCNC: 48 U/L (ref 40–129)
ALT SERPL-CCNC: 14 U/L (ref 10–40)
ANION GAP SERPL CALCULATED.3IONS-SCNC: 16 MMOL/L (ref 3–16)
AST SERPL-CCNC: 16 U/L (ref 15–37)
BILIRUB SERPL-MCNC: <0.2 MG/DL (ref 0–1)
BUN SERPL-MCNC: 40 MG/DL (ref 7–20)
CALCIUM SERPL-MCNC: 9 MG/DL (ref 8.3–10.6)
CHLORIDE SERPL-SCNC: 99 MMOL/L (ref 99–110)
CO2 SERPL-SCNC: 25 MMOL/L (ref 21–32)
CREAT SERPL-MCNC: 5.6 MG/DL (ref 0.6–1.1)
GFR SERPLBLD CREATININE-BSD FMLA CKD-EPI: 8 ML/MIN/{1.73_M2}
GLUCOSE SERPL-MCNC: 149 MG/DL (ref 70–99)
PHOSPHATE SERPL-MCNC: 5.3 MG/DL (ref 2.5–4.9)
POTASSIUM SERPL-SCNC: 3.1 MMOL/L (ref 3.5–5.1)
POTASSIUM SERPL-SCNC: 3.6 MMOL/L (ref 3.5–5.1)
PROT SERPL-MCNC: 6 G/DL (ref 6.4–8.2)
SODIUM SERPL-SCNC: 140 MMOL/L (ref 136–145)

## 2023-06-15 PROCEDURE — 6370000000 HC RX 637 (ALT 250 FOR IP): Performed by: INTERNAL MEDICINE

## 2023-06-15 PROCEDURE — 6370000000 HC RX 637 (ALT 250 FOR IP): Performed by: STUDENT IN AN ORGANIZED HEALTH CARE EDUCATION/TRAINING PROGRAM

## 2023-06-15 PROCEDURE — 99231 SBSQ HOSP IP/OBS SF/LOW 25: CPT | Performed by: INTERNAL MEDICINE

## 2023-06-15 PROCEDURE — 6370000000 HC RX 637 (ALT 250 FOR IP): Performed by: NURSE PRACTITIONER

## 2023-06-15 PROCEDURE — 6360000002 HC RX W HCPCS: Performed by: INTERNAL MEDICINE

## 2023-06-15 PROCEDURE — 97530 THERAPEUTIC ACTIVITIES: CPT

## 2023-06-15 PROCEDURE — 2580000003 HC RX 258: Performed by: INTERNAL MEDICINE

## 2023-06-15 PROCEDURE — 80069 RENAL FUNCTION PANEL: CPT

## 2023-06-15 PROCEDURE — 97535 SELF CARE MNGMENT TRAINING: CPT

## 2023-06-15 PROCEDURE — 36415 COLL VENOUS BLD VENIPUNCTURE: CPT

## 2023-06-15 PROCEDURE — 97116 GAIT TRAINING THERAPY: CPT

## 2023-06-15 RX ORDER — POTASSIUM CHLORIDE 20 MEQ/1
20 TABLET, EXTENDED RELEASE ORAL
Qty: 7 TABLET | Refills: 0 | Status: SHIPPED | OUTPATIENT
Start: 2023-06-15 | End: 2023-06-22

## 2023-06-15 RX ORDER — POTASSIUM CHLORIDE 20 MEQ/1
40 TABLET, EXTENDED RELEASE ORAL ONCE
Status: COMPLETED | OUTPATIENT
Start: 2023-06-15 | End: 2023-06-15

## 2023-06-15 RX ORDER — NALOXONE HYDROCHLORIDE 2 MG/.4ML
2 INJECTION, SOLUTION INTRAMUSCULAR; SUBCUTANEOUS
Qty: 0.4 ML | Refills: 2 | Status: SHIPPED | OUTPATIENT
Start: 2023-06-15 | End: 2023-06-15

## 2023-06-15 RX ORDER — DIAZEPAM 5 MG/1
5 TABLET ORAL NIGHTLY PRN
Qty: 3 TABLET | Refills: 0 | Status: SHIPPED | OUTPATIENT
Start: 2023-06-15 | End: 2023-06-18

## 2023-06-15 RX ORDER — OXYCODONE HYDROCHLORIDE 5 MG/1
5 TABLET ORAL EVERY 6 HOURS PRN
Qty: 12 TABLET | Refills: 0 | Status: SHIPPED | OUTPATIENT
Start: 2023-06-15 | End: 2023-06-18

## 2023-06-15 RX ORDER — FERROUS SULFATE 325(65) MG
325 TABLET ORAL
Qty: 30 TABLET | Refills: 3 | Status: SHIPPED | OUTPATIENT
Start: 2023-06-16

## 2023-06-15 RX ORDER — AMLODIPINE BESYLATE 10 MG/1
10 TABLET ORAL DAILY
Qty: 30 TABLET | Refills: 2 | Status: SHIPPED | OUTPATIENT
Start: 2023-06-15

## 2023-06-15 RX ORDER — TORSEMIDE 100 MG/1
100 TABLET ORAL DAILY
Qty: 30 TABLET | Refills: 1 | Status: SHIPPED | OUTPATIENT
Start: 2023-06-15

## 2023-06-15 RX ORDER — SEVELAMER CARBONATE 800 MG/1
800 TABLET, FILM COATED ORAL
Status: DISCONTINUED | OUTPATIENT
Start: 2023-06-15 | End: 2023-06-15 | Stop reason: HOSPADM

## 2023-06-15 RX ADMIN — TORSEMIDE 100 MG: 100 TABLET ORAL at 08:07

## 2023-06-15 RX ADMIN — IRON SUCROSE 200 MG: 20 INJECTION, SOLUTION INTRAVENOUS at 06:34

## 2023-06-15 RX ADMIN — HEPARIN SODIUM 5000 UNITS: 5000 INJECTION INTRAVENOUS; SUBCUTANEOUS at 06:30

## 2023-06-15 RX ADMIN — BUPROPION HYDROCHLORIDE 150 MG: 150 TABLET, EXTENDED RELEASE ORAL at 08:06

## 2023-06-15 RX ADMIN — POTASSIUM CHLORIDE 20 MEQ: 1500 TABLET, EXTENDED RELEASE ORAL at 08:06

## 2023-06-15 RX ADMIN — ASPIRIN 81 MG 81 MG: 81 TABLET ORAL at 08:06

## 2023-06-15 RX ADMIN — SODIUM CHLORIDE, PRESERVATIVE FREE 10 ML: 5 INJECTION INTRAVENOUS at 08:07

## 2023-06-15 RX ADMIN — NYSTATIN 500000 UNITS: 100000 SUSPENSION ORAL at 14:48

## 2023-06-15 RX ADMIN — SEVELAMER CARBONATE 800 MG: 800 TABLET, FILM COATED ORAL at 08:07

## 2023-06-15 RX ADMIN — DIAZEPAM 5 MG: 5 TABLET ORAL at 08:13

## 2023-06-15 RX ADMIN — NYSTATIN 500000 UNITS: 100000 SUSPENSION ORAL at 08:06

## 2023-06-15 RX ADMIN — SEVELAMER CARBONATE 800 MG: 800 TABLET, FILM COATED ORAL at 14:48

## 2023-06-15 RX ADMIN — GENTAMICIN SULFATE: 1 CREAM TOPICAL at 08:07

## 2023-06-15 RX ADMIN — AMLODIPINE BESYLATE 10 MG: 10 TABLET ORAL at 08:06

## 2023-06-15 RX ADMIN — POTASSIUM CHLORIDE 40 MEQ: 1500 TABLET, EXTENDED RELEASE ORAL at 14:48

## 2023-06-15 RX ADMIN — HEPARIN SODIUM 5000 UNITS: 5000 INJECTION INTRAVENOUS; SUBCUTANEOUS at 14:48

## 2023-06-15 NOTE — DIALYSIS
Disconnected from [facility] per protocol. Effluent: Clear, light yellow, no fibrin present. Total time: 12 hr 23 min   Total UF:  1650 ml. Total Volume:  9600 ml. Dwell time gained:  1 hr 57 min. Pt Tolerated procedure: Patient tolerated treatment well. No complications noted. Treatment completed in entirety.    Report given to: Primary RN

## 2023-06-15 NOTE — PLAN OF CARE
Problem: Respiratory - Adult  Goal: Achieves optimal ventilation and oxygenation  Outcome: Adequate for Discharge     Problem: Metabolic/Fluid and Electrolytes - Adult  Goal: Electrolytes maintained within normal limits  Outcome: Adequate for Discharge  Goal: Hemodynamic stability and optimal renal function maintained  Outcome: Adequate for Discharge     Problem: Discharge Planning  Goal: Discharge to home or other facility with appropriate resources  Outcome: Adequate for Discharge     Problem: Pain  Goal: Verbalizes/displays adequate comfort level or baseline comfort level  Outcome: Adequate for Discharge     Problem: Safety - Adult  Goal: Free from fall injury  Outcome: Adequate for Discharge     Problem: Skin/Tissue Integrity  Goal: Absence of new skin breakdown  Description: 1. Monitor for areas of redness and/or skin breakdown  2. Assess vascular access sites hourly  3. Every 4-6 hours minimum:  Change oxygen saturation probe site  4. Every 4-6 hours:  If on nasal continuous positive airway pressure, respiratory therapy assess nares and determine need for appliance change or resting period.   Outcome: Adequate for Discharge     Problem: Chronic Conditions and Co-morbidities  Goal: Patient's chronic conditions and co-morbidity symptoms are monitored and maintained or improved  Outcome: Adequate for Discharge     Problem: ABCDS Injury Assessment  Goal: Absence of physical injury  Outcome: Adequate for Discharge

## 2023-06-15 NOTE — PROGRESS NOTES
06/15/23 1604   Encounter Summary   Encounter Overview/Reason  Initial Encounter;Spiritual/Emotional Needs   Service Provided For: Patient   Referral/Consult From: [address] Children;Family members   Last Encounter  06/15/23  (Listening, support. [facility])   Complexity of Encounter Moderate   Begin Time 1545   End Time  1605   Total Time Calculated 20 min   Encounter    Type Initial Screen/Assessment   Spiritual/Emotional needs   Type Spiritual Support;Emotional Distress   Assessment/Intervention/Outcome   Assessment Interrupted family processes; Loneliness; Powerlessness;Sad;Social isolation;Stress overload;Tearful   Intervention Active listening;Discussed belief system/Episcopal practices/jesenia;Discussed illness injury and its impact; Explored/Affirmed feelings, thoughts, concerns;Explored Coping Skills/Resources;Sustaining Presence/Ministry of presence   Outcome Comfort; Connection/Belonging;Engaged in conversation; Less anxious, Less agitated;New perspective/awareness;Receptive      listened and supported patient as she shared her struggle with living alone and caring for herself with little help. Patient lamented that her sons have not visited her and she has been disconnected from her [facility]) for a few years. Writer provided patient with phone number for her [facility] so she can request transportation to services and access spiritual support. Patient expressed gratitude for this.
Clinical Pharmacy Progress Note    Vancomycin  - Management by Pharmacy    Consult Date(s): 06/11/23  Consulting Provider(s): Dr. Eddy Robbins / Plan  [facility] - Vancomycin  Concurrent Antimicrobials:   Zosyn - Day #4  Day of Vanc Therapy / Ordered Duration: Day #2 (restarted 6/11)  Current Dosing Method: Intermittent Dosing by Levels  Therapeutic Goal: Trough ~ 15 mg/L  Current Dose / Plan:   Patient is ESRD on PD, so will dose based on levels. Have entered placeholder onto [facility] Received vancomycin 1500mg IV x1 yesterday. Plan to check vancomycin level tomorrow AM - Tues 6/13. Anticipate pt to have therapeutic levels through at least then. Will continue to monitor clinical condition and make adjustments to regimen as appropriate. Please call with questions--  Amy TangD, [facility]  Wireless: [phone]   6/12/2023 8:27 AM        Interval update:  Remains on Vapotherm at 15L. Tolerated PD overnight. Subjective/Objective:   Hoang Kaba is a 63 y.o. female with a PMHx significant for anxiety, asthma, bipolar disorder, depression, T2DM, and HTN who is admitted with respiratory failure. Pharmacy is consulted to dose vancomycin.     Ht Readings from Last 1 Encounters:   06/08/23 5' 8\" (1.727 m)     Wt Readings from Last 1 Encounters:   06/12/23 149 lb 7.6 oz (67.8 kg)     Current & Prior Antimicrobial Regimen(s):  Zosyn  (6/9-current)  Vancomycin - Pharmacy to dose  Intermittent dosing (6/8-current)  Date Dose Vanc Level   6/8 1000mg IV     6/9 -- --   6/10 -- --   6/11 1500mg IV 7.1 mg/L   6/12 -- --     Cultures & Sensitivities:    Date Site Micro Susceptibility / Result   6/8 Blood Cx 2/2 NGTD    6/8 Resp Cx 4+ GPC resembling Strep  4+ Gram + rods Preliminary   6/8 PD Fluid Cx NGTD      Recent Labs     06/10/23  1841 06/11/23  0519 06/12/23  0415   CREATININE 6.5* 6.3* 5.9*   BUN 59* 53* 47*   WBC 13.1* 11.1* 8.7         CrCl not calculated given patient is ESRD on
Clinical Pharmacy Progress Note    Vancomycin  - Management by Pharmacy    Consult Date(s): 06/11/23  Consulting Provider(s): Dr. Vladislav Ponce / Plan  [facility] - Vancomycin  Concurrent Antimicrobials:   Zosyn - Day #5  Day of Vanc Therapy / Ordered Duration: Day #3 (restarted 6/11)  Current Dosing Method: Intermittent Dosing by Levels  Therapeutic Goal: Trough ~ 15 mg/L  Current Dose / Plan:   Patient is ESRD on PD, so will dose based on levels. Have entered placeholder onto [email or web address] Last dose given was 1500mg IV x1 on 6/11. Level today = 16.3 mg/L. Will give vancomycin 1000mg IV x1 today. Will f/u plan for duration of Abx -- may not need a repeat level if total duration of vancomycin is for 7 days (was initially started on 6/8, then restarted 6/11). Will continue to monitor clinical condition and make adjustments to regimen as appropriate. Please call with questions--  Janelle Vivas PharmD, BCPS  Wireless: [phone]   6/13/2023 7:58 AM        Interval update:  Pulmonology following - recommending 7 day course of ABx. Subjective/Objective:   Dorota Hamilton is a 63 y.o. female with a PMHx significant for anxiety, asthma, bipolar disorder, depression, T2DM, and HTN who is admitted with respiratory failure. Pharmacy is consulted to dose vancomycin.     Ht Readings from Last 1 Encounters:   06/12/23 5' 8\" (1.727 m)     Wt Readings from Last 1 Encounters:   06/12/23 149 lb 7.6 oz (67.8 kg)     Current & Prior Antimicrobial Regimen(s):  Zosyn  (6/9-current)  Vancomycin - Pharmacy to dose  Intermittent dosing (6/8-current)  Date Dose Vanc Level   6/8 1000mg IV     6/9 -- --   6/10 -- --   6/11 1500mg IV 7.1 mg/L   6/12 -- --   6/13  16.3 mg/L     Cultures & Sensitivities:    Date Site Micro Susceptibility / Result   6/8 Blood Cx 2/2 NGTD    6/8 Resp Cx 4+ GPC resembling Strep  4+ Gram + rods  Normal respiratory denise Preliminary   6/8 PD Fluid Cx NGTD      Recent Labs     06/11/23  0519
Informed the Day Ns in charge to inform  the Dialysis Nurse to collect body fluid as ordered.
Interval History and plan:      Patient seen at bedside  Oxygen requirement is improving and pt feel better breathing wise  No issues with PD  ? Abdominal discomfort /Bloating but PD effluent negative for peritonitis  K, bicarbonate stable but phos is high  Hb low but stable  /73 - 155/88        Continue current PD  Add sevelamer for phos control  Continue Torsemide   On antibiotics for pneumonia  Add oral nystatin swish and swallow to decrease risk of fungal peritonitis and continue till one week after patient finishes antibiotics. Continue Epogen. Rest of anemia workup per primary team  If BP remain elevated, will add low dose amlodipine      D/W patient and she agree with plan. D/W Dr Daly Thomason                         Assessment :        ESRD  On PD at home  Abdominal PD catheter site site look clean without any discharge      Hypertension                BP mild elevated  Anemia  On Epogen  Acute hypoxic respiratory failure                Due to pneumonia  Hyperlipidemia               Rosuvastatin at home         [facility] would like to thank Luz Maria Gallegos MD   for opportunity to serve this patient      Please call with questions at-   24 Hrs Answering service [phone] or  7 am- 5 pm via Perfect serve or cell phone  Dr. Tari Ndiaye for consult :     Shortness of Breath/ Peritoneal Dialysis     HPI :     Fidel Olmedo is a 63 y.o. female presented to   the hospital on 6/8/2023 with shortness of breath. Patient has a past medical history including ESRD on home peritoneal dialysis,, depression, diabetes type 2, NSTEMI in 2022, asthma, COPD, hypertension. Patient presents with shortness of breath stating that she has felt short of breath with phlegm production for the past 3 days. She has not done her peritoneal dialysis for the past couple of days because she has joint discomfort when doing it. She is unaware of any recent fevers.   On admission, anion gap
Interval History and plan:      She is on high flow oxygen but comfortable at rest and able to tolerate PD tube feeds that abdomen is distended  Seen in room with respiratory therapist and nurse  Electrolyte status stable  Blood pressure coming down  On 2.5%  Feels bloated abdomen and I think that may also cause respiratory compromise which is already affected by her pneumonia  Decrease the fluid to 1.7 L  bags each dialysis and prolong the dialysis treatment hopefully that will make her feel comfortable  She is also making urine 600 mL yesterday  Since she is able to make urine at torsemide to help her urinate more                      Assessment :        ESRD  On PD at home  Last PD 3 days ago on the 5th at home  Abdominal PD catheter site covered with gauze and palpation allows appreciation of distension. Presented in March due to PD machine at home malfunction. Will start PD   I/O's  Daily weights  Gentamycin topical cream to PD site  Hypertension  Blood pressure readings with systolic in 975'A over 46'P. Per last note from March at [facility], patient takes nifedipine for blood pressure control at home. Likely volume overloaded on admission, re-assess after PD. Anemia  Hemoglobin of 8.2 on admission  F/u w/u including Iron, TIBC, Folate, Ferritin, B12  Retacrit 10,000 units three times a week. Acute hypoxic respiratory failure  SpO2 70% and placed on NRB before transitioning to 15L HFNC and subsequently BiPAP. CXR shows bilateral opacities possible PNA vs. Edema. S/p Cefepime and vanc in ED.   Managed by primary team  Hyperlipidemia  Rosuvastatin at home         [facility] would like to thank Merced Joyce MD   for opportunity to serve this patient      Please call with questions at-   24 Hrs Answering service [phone] or  7 am- 5 pm via Perfect serve or cell phone  Dr. Lee Ann Bahena for consult :     Shortness of Breath/ Peritoneal Dialysis     HPI :     Patrice Crain is a
Occupational Therapy  Facility/Department: [place] [address] [facility]  Occupational Therapy Daily Treatment    Name: Jermaine Galvan  : 1965  MRN: [id number]  Date of Service: 6/15/2023    Discharge Recommendations:  Jermaine Galvan scored a 16/24 on the AM-PAC ADL Inpatient form. Current research shows that an AM-PAC score of 17 or less is typically not associated with a discharge to the patient's home setting. Based on the patient's AM-PAC score and their current ADL deficits, it is recommended that the patient have 3-5 sessions per week of Occupational Therapy at d/c to increase the patient's independence. Please see assessment section for further patient specific details. If patient discharges prior to next session this note will serve as a discharge summary. Please see below for the latest assessment towards goals. OT Equipment Recommendations  Equipment Needed: No  Other: defer to next level of care. If dc home, would benefit from shower chair vs TTB       Patient Diagnosis(es): The encounter diagnosis was Acute respiratory failure with hypoxia ([address] Past Medical History:  has a past medical history of JONATHAN (acute kidney injury) ([address] Anxiety, Asthma, Bipolar disorder ([address] Bronchitis, Chronic back pain, Depression, Diabetes mellitus ([address] DM II (diabetes mellitus, type II), controlled ([address] and Hypertension. Past Surgical History:  has a past surgical history that includes Inner ear surgery; Tubal ligation; and Endometrial ablation. Treatment Diagnosis: impaired ADLs and functional mobility/transfers      Assessment   Performance deficits / Impairments: Decreased functional mobility ; Decreased endurance;Decreased ADL status; Decreased balance;Decreased ROM; Decreased strength;Decreased safe awareness  Assessment: Pt tolerated OT session well this date. Pt demo functional mobility to/from bathroom w/o AD w/ CGA-Min A. Pt also demo LE dressing w/ CGA.  Pt cont to present below functional baseline and does
Patient d/c via lyft no s/s of acute distress noted on room air . Iv removed hemostasis achieved.
Physical Therapy  Facility/Department: [place] [address] [facility]  Physical Therapy Treatment    Name: Shelly Martell  : 1965  MRN: [id number]  Date of Service: 6/15/2023    Discharge Recommendations: Shelly Martell scored a 18/24 on the AM-PAC short mobility form. Current research shows that an AM-PAC score of 17 or less is typically not associated with a discharge to the patient's home setting. Based on the patient's AM-PAC score and their current functional mobility deficits, it is recommended that the patient have 3-5 sessions per week of Physical Therapy at d/c to increase the patient's independence. Please see assessment section for further patient specific details. If patient discharges prior to next session this note will serve as a discharge summary. Please see below for the latest assessment towards goals. PT Equipment Recommendations  Equipment Needed: No      Patient Diagnosis(es): The encounter diagnosis was Acute respiratory failure with hypoxia ([address] Past Medical History:  has a past medical history of JONATHAN (acute kidney injury) ([address] Anxiety, Asthma, Bipolar disorder ([address] Bronchitis, Chronic back pain, Depression, Diabetes mellitus ([address] DM II (diabetes mellitus, type II), controlled ([address] and Hypertension. Past Surgical History:  has a past surgical history that includes Inner ear surgery; Tubal ligation; and Endometrial ablation. Assessment   Assessment: Demo improved gait today & was able to ambulate 170' in casas & go up/down stairs. Pt insistent on going home rather than SNF. Educated in using rolling walker at all times & working with home PT. Feel pt could benefit from alert button, shower chair, MOW since does live alone and has fallen in past.  At risk for further falls if not using walker. Will follow until dc. Feel pt would benefit from SNF but is refusing.   Treatment Diagnosis: mobility impairment due to acute respiratory failure  Requires PT Follow-Up: Yes  Activity
Physical Therapy  Facility/Department: [place] [address] [facility]  Physical Therapy Initial Assessment    Name: Reena Pitts  : 1965  MRN: [id number]  Date of Service: 2023    Discharge Recommendations:Cindi Mitchell scored a 17/24 on the AM-PAC short mobility form. Current research shows that an AM-PAC score of 17 or less is typically not associated with a discharge to the patient's home setting. Based on the patient's AM-PAC score and their current functional mobility deficits, it is recommended that the patient have 3-5 sessions per week of Physical Therapy at d/c to increase the patient's independence. Please see assessment section for further patient specific details. If patient discharges prior to next session this note will serve as a discharge summary. Please see below for the latest assessment towards goals. PT Equipment Recommendations  Equipment Needed: No      Patient Diagnosis(es): The encounter diagnosis was Acute respiratory failure with hypoxia ([address] Past Medical History:  has a past medical history of JONATHAN (acute kidney injury) ([address] Anxiety, Asthma, Bipolar disorder ([address] Bronchitis, Chronic back pain, Depression, Diabetes mellitus ([address] DM II (diabetes mellitus, type II), controlled ([address] and Hypertension. Past Surgical History:  has a past surgical history that includes Inner ear surgery; Tubal ligation; and Endometrial ablation. Assessment   Assessment: 64 yo admitted 24 for acute respiratory failure. Pt currently on 15 L vapotherm (typically not 02 dependent) and required CGA for ambulation with RW to chair. Pt demo mobility well below her reported baseline of independent at home without AD; pt reports she struggles every day at home to manage due to her B rotator cuff issues. Pt tearful about going home at discharge and reports she has no support but she adamently declined going to skilled IP stay. Pt with decreased insight into her current deficits.   If home, recommend
Pulmonary & Critical Care Medicine    Admit Date: 2023  PCP: Carlitos Jean DO    CC:  pneumonia  Events of Last 24 hours: At the time of evaluation she was sitting on recliner. Breathing is not labored. She was on vapotherm 15L and 75%. She has cough. Largely non productive. There is no fever or chills. Vitals:  Tmax:  VITALS:  /73   Pulse 77   Temp 97.7 °F (36.5 °C) (Oral)   Resp 18   Ht 5' 8\" (1.727 m)   Wt 149 lb 7.6 oz (67.8 kg)   SpO2 99%   BMI 22.73 kg/m²   24HR INTAKE/OUTPUT:    Intake/Output Summary (Last 24 hours) at 2023 1439  Last data filed at 2023 1226  Gross per 24 hour   Intake 870 ml   Output 875 ml   Net -5 ml     CURRENT PULSE OXIMETRY:  SpO2: 99 %  24HR PULSE OXIMETRY RANGE:  SpO2  Av.4 %  Min: 93 %  Max: 100 %    EXAM:  General: No distress. Alert  Eyes: PERRL. No sclera icterus. No conjunctival injection. ENT: No discharge. Moist oral mucosa   Neck: Trachea midline. Neck is supple   Resp: No accessory muscle use. Bilateral crackles. CV: Regular rate. Regular rhythm. No mumur or rub. No edema. GI: Non-tender. Non-distended. Normal bowel sounds. Neuro: Awake. Speech is clear. Psych: No anxiety or agitation.      Medications:    IV:   sodium chloride 20 mL (23 1634)    dianeal lo-josh 2.5%      dianeal lo-josh 2.5%           Scheduled Meds:   torsemide  100 mg Oral Daily    vancomycin (VANCOCIN) intermittent dosing (placeholder)   Other RX Placeholder    benztropine  1 mg Oral Nightly    buPROPion  150 mg Oral Daily    rosuvastatin  40 mg Oral Nightly    mirtazapine  7.5 mg Oral Nightly    melatonin  5 mg Oral Nightly    aspirin  81 mg Oral Daily    zolpidem  5 mg Oral Once    piperacillin-tazobactam  3,375 mg IntraVENous Q12H    sodium chloride flush  5-40 mL IntraVENous 2 times per day    heparin (porcine)  5,000 Units SubCUTAneous 3 times per day    gentamicin   Topical Daily    epoetin samanta-epbx  10,000 Units SubCUTAneous Once
Pulmonary & Critical Care Medicine    Admit Date: 2023  PCP: Gerardo Perea DO    CC:  pneumonia  Events of Last 24 hours:   She is currently on room air. She is asking if she can go home. She denies any SOB at this time. Vitals:  Tmax:  VITALS:  BP (!) 146/75   Pulse 90   Temp 97.6 °F (36.4 °C) (Oral)   Resp 18   Ht 5' 8\" (1.727 m)   Wt 153 lb 7 oz (69.6 kg)   SpO2 96%   BMI 23.33 kg/m²   24HR INTAKE/OUTPUT:    Intake/Output Summary (Last 24 hours) at 6/15/2023 1355  Last data filed at 6/15/2023 0600  Gross per 24 hour   Intake 120 ml   Output --   Net 120 ml     CURRENT PULSE OXIMETRY:  SpO2: 96 %  24HR PULSE OXIMETRY RANGE:  SpO2  Av.9 %  Min: 95 %  Max: 100 %    EXAM:  General: No distress. Alert  Eyes: PERRL. No sclera icterus. No conjunctival injection. ENT: No discharge. Moist oral mucosa   Neck: Trachea midline. Neck is supple   Resp: No accessory muscle use. No crackles   CV: Regular rate. Regular rhythm. No mumur or rub. No edema. GI: Non-tender. Non-distended. Normal bowel sounds. Neuro: Awake. Speech is clear. Psych: No anxiety or agitation.      Medications:    IV:   sodium chloride 25 mL (23 1501)    dianeal lo-josh 2.5%      dianeal lo-josh 2.5%           Scheduled Meds:   potassium chloride  40 mEq Oral Once    sevelamer  800 mg Oral TID WC    amLODIPine  10 mg Oral Daily    potassium chloride  20 mEq Oral Daily with breakfast    nystatin  5 mL Oral TID    ferrous sulfate  325 mg Oral Once per day on     torsemide  100 mg Oral Daily    benztropine  1 mg Oral Nightly    buPROPion  150 mg Oral Daily    rosuvastatin  40 mg Oral Nightly    mirtazapine  7.5 mg Oral Nightly    melatonin  5 mg Oral Nightly    aspirin  81 mg Oral Daily    zolpidem  5 mg Oral Once    sodium chloride flush  5-40 mL IntraVENous 2 times per day    heparin (porcine)  5,000 Units SubCUTAneous 3 times per day    gentamicin   Topical Daily    epoetin samanta-epbx  10,000 Units
Pulmonary & Critical Care Medicine    Admit Date: 2023  PCP: Samantha Car DO    CC:  pneumonia  Events of Last 24 hours:   Overall hypoxia is improving. Currently on 4 liters O2. She has mild abdominal pain. No tenderness. No nausea. Appetite is OK    Vitals:  Tmax:  VITALS:  BP (!) 155/88   Pulse 81   Temp 97.8 °F (36.6 °C) (Oral)   Resp 16   Ht 5' 8\" (1.727 m)   Wt 149 lb 14.6 oz (68 kg)   SpO2 96%   BMI 22.79 kg/m²   24HR INTAKE/OUTPUT:    Intake/Output Summary (Last 24 hours) at 2023 1510  Last data filed at 2023 0758  Gross per 24 hour   Intake 120 ml   Output 94 ml   Net 26 ml     CURRENT PULSE OXIMETRY:  SpO2: 96 %  24HR PULSE OXIMETRY RANGE:  SpO2  Av.6 %  Min: 94 %  Max: 99 %    EXAM:  General: No distress. Alert  Eyes: PERRL. No sclera icterus. No conjunctival injection. ENT: No discharge. Moist oral mucosa   Neck: Trachea midline. Neck is supple   Resp: No accessory muscle use. Bilateral crackles. CV: Regular rate. Regular rhythm. No mumur or rub. No edema. GI: Non-tender. Non-distended. Normal bowel sounds. Neuro: Awake. Speech is clear. Psych: No anxiety or agitation.      Medications:    IV:   sodium chloride 25 mL (23 0956)    dianeal lo-josh 2.5%      dianeal lo-josh 2.5%           Scheduled Meds:   sevelamer  800 mg Oral TID WC    nystatin  5 mL Oral TID    [START ON 2023] ferrous sulfate  325 mg Oral Once per day on     torsemide  100 mg Oral Daily    vancomycin (VANCOCIN) intermittent dosing (placeholder)   Other RX Placeholder    benztropine  1 mg Oral Nightly    buPROPion  150 mg Oral Daily    rosuvastatin  40 mg Oral Nightly    mirtazapine  7.5 mg Oral Nightly    melatonin  5 mg Oral Nightly    aspirin  81 mg Oral Daily    zolpidem  5 mg Oral Once    piperacillin-tazobactam  3,375 mg IntraVENous Q12H    sodium chloride flush  5-40 mL IntraVENous 2 times per day    heparin (porcine)  5,000 Units SubCUTAneous 3 times per day
Treatment initiated without complications or complaints from patient. Patient resting comfortably with VSS upon dialysis RN exit from room. Ivett Hector RN notified of start of treatment and hotline number located on top of machine for any questions about troubleshooting during after hours.
Treatment initiated without complications or complaints from patient. Patient resting comfortably with VSS upon dialysis RN exit from room. [facility] chen notified of start of treatment and hotline number located on top of machine for any questions about troubleshooting during after hours.
Treatment time: 10 Hours 22 minutes  Net UF: 174 ml  Dwell Time: 1hr 23 minutes Gained    Treatment completed without complications or complaints from patient. Effluent clear and lines taped to patient per protocol. Patient resting comfortably with VSS upon exiting room. Copy of dialysis treatment record placed in chart, to be scanned into EMR and report given to Neri Junior, RN.
Treatment time: 4 Hours 56 minutes  Net UF: 44 ml  Dwell Time: 227 minutes lost    Treatment completed without complications or complaints from patient. Effluent clear/yellow and lines taped to patient per protocol. Patient resting comfortably with VSS upon exiting room. Copy of dialysis treatment record placed in chart, to be scanned into EMR and report given to [address], RN. tx ended via aseptic technique. upon entering room, pt's machine was noted to be turned off and her pd lines were clamped. when this nurse inquired with her floor nurse, she stated that there was an issue overnight and the night nurse was instructed by the 800 number to discontinue pt's tx.  pt was noted to be lying face down upon entering.
V2.0    [facility] [place]      Name:  Mera Nielsen /Age/Sex: 1965  (63 y.o. female)   MRN & CSN:  [id number] & [id number] Encounter Date/Time: 2023 8:28 AM EDT   Location:  [id number]- PCP: DO Lori Tererll 27, MD       Hospital Day: 6    Assessment and Recommendations   Mera Nielsen is a 63 y.o. female with pmh of esrd on pd who presents with increased sob. Assessment/Plan:   Acute hypoxic respiratory failure  -Multifactorial: volume overload and multifocal pneumonia  -Volume overload 2/2 missed PD; patient receiving PD per Nephro  -On Antibiotics for Pneumonia  -Oxygen requirement continuing to improve. Previously on BiPAP, then vapotherm. Currently on 5L NC. Baseline is on Room Air. Continue oxygen weaning process. -Will add incentive spirometry today; may have developed some atelectasis after 6 days of hospitalization  Sepsis due to pneumonia-improving  -Leukocytosis improved. Afebrile since .  -Cultures have been negative.  -On Zosyn. Pulm following, recommending 7 total days of Antibiotics. Today Day 67. (1 day cefepime, 5 days zosyn)  Depression  -Patient mood is good today  -She is hopeful to leave the hospital soon  General medical deconditioning  -PT/OT scores 17/16  -Patient may not be safe to discharge home directly. With improving O2 requirement favoring D/C to SNF. Will need continued therapy, still has oxygen requirement, and assistance with peritoneal dialysis.  -Case management following    Diet ADULT DIET; Regular; Low Potassium (Less than 3000 mg/day);  Low Phosphorus (Less than 1000 mg)   DVT Prophylaxis [] Lovenox, [x]  Heparin, [] SCDs, [] Ambulation,  [] Eliquis, [] Xarelto  [] Coumadin   Code Status Full Code   Disposition From: home  Expected Disposition: SNF  Estimated Date of Discharge: 23  Patient requires continued admission due to need for abx iv and oxygen requirement   Surrogate Decision Maker/ POA       Personally
V2.0    [facility] [place]      Name:  Nohelia Gardiner /Age/Sex: 1965  (63 y.o. female)   MRN & CSN:  [id number] & [id number] Encounter Date/Time: 2023 8:28 AM EDT   Location:  [facility]- PCP: Kalyn Hebert, DO Jessica 68, 29 Angie Cardona Day: 5    Assessment and Recommendations   Haven Better is a 63 y.o. female with pmh of esrd on pd who presents with increased sob. Assessment/Plan:   Acute hypoxic respiratory failure-due to volume overload from missed PD and now I suspect pneumonia. Continue PD. Improved oxygen requirement now off of BiPAP only on Vapotherm. Continue oxygen weaning process. Sepsis due to pneumonia-improving. No white count no fever and decreasing oxygen requirements suggest improvement of clinical symptoms cultures have been negative. Will likely de-escalate to oral antibiotics in the next 24 to 48 hours. Depression-much improved today after resumption of her antidepressants. General medical deconditioning-I do not think patient is going to be able to go home. PT OT consulted and have seen the patient. Looking at possible LTAC could go as soon as Wednesday, 2023. LTAC will be able to offer multidisciplinary care including peritoneal dialysis, high oxygen requirement and even parenteral antibiotics if necessary. I would prefer LTAC over a skilled nursing facility      Diet No diet orders on file   DVT Prophylaxis [] Lovenox, [x]  Heparin, [] SCDs, [] Ambulation,  [] Eliquis, [] Xarelto  [] Coumadin   Code Status Full Code   Disposition From: home  Expected Disposition: snf  Estimated Date of Discharge: 23  Patient requires continued admission due to need for abx iv and respiratory distress   Surrogate Decision Maker/ POA       Personally reviewed Lab Studies and Imaging     Discussed management of the case with nephrology who recommended continued PD.   Case also discussed with  on rounds and they recommended LTAC    EKG
V2.0    [facility] [place]      Name:  Patrice Crain /Age/Sex: 1965  (63 y.o. female)   MRN & CSN:  [id number] & [id number] Encounter Date/Time: 2023 8:28 AM EDT   Location:  [facility]- PCP: DO Lori Nicole , MD       Hospital Day: 7    Assessment and Recommendations   Patrice Crain is a 63 y.o. female with pmh of esrd on pd who presents with increased sob. Assessment/Plan:   Acute hypoxic respiratory failure  -Multifactorial: volume overload and multifocal pneumonia  -Volume overload 2/2 missed PD; patient receiving PD per Nephro  -On Antibiotics for Pneumonia  -Oxygen requirement continuing to improve. Previously on BiPAP, then vapotherm. Currently on 4L NC. Baseline is on Room Air. Continue oxygen weaning process. -Incentive spirometry due to concern for atelectasis  Sepsis due to pneumonia-improving  -Leukocytosis improved. Afebrile since .  -Cultures have been negative  -On Zosyn. Pulm following, recommending 7 total days of Antibiotics. Today Day . (1 day cefepime, 6 days zosyn, 7 days intermittent dose Vancomycin)  -MRSA probe pending;  Depression  -Patient mood is good today  -She is hopeful to leave the hospital soon  ESRD on Peritoneal Dialysis  -Nephrology on boad; Continuing PD  General medical deconditioning  -PT/OT scores 17/16  -Patient may not be safe to discharge home directly. With improving O2 requirement favoring D/C to SNF. Will need continued therapy, still has oxygen requirement, and assistance with peritoneal dialysis.  -Case management following; [facility]. Patient is medically ready for discharge. Pending placement. Diet ADULT DIET; Regular; Low Potassium (Less than 3000 mg/day);  Low Phosphorus (Less than 1000 mg)   DVT Prophylaxis [] Lovenox, [x]  Heparin, [] SCDs, [] Ambulation,  [] Eliquis, [] Xarelto  [] Coumadin   Code Status Full Code   Disposition From: home  Expected Disposition: SNF  Estimated
also required CGA for LE dressing this date. Pt demo slow progress but still well below her reported baseline of independent at home without AD; pt reports she struggles every day at home to manage due to her BL rotator cuff issues. Pt reports she has no support from family/friends/agencies at home. Pt with decreased insight into her current deficits. If home, recommend 24 hour close, capable assist, use of RW and home OT. Safety concerns for pt to return home alone. Pt would benefit from continued skilled IP OT at discharge to maximize her safety and functional independence prior to d/c home if she is agreeable. Discussed with CM. Treatment Diagnosis: impaired ADLs and functional mobility/transfers  Activity Tolerance  Activity Tolerance: Patient Tolerated treatment well;Patient limited by fatigue        Plan   Occupational Therapy Plan  Times Per Week: 2-5  Current Treatment Recommendations: Strengthening, Balance training, Functional mobility training, Safety education & training, Self-Care / ADL, Patient/Caregiver education & training, Endurance training     Restrictions  Position Activity Restriction  Other position/activity restrictions: up as tolerated    Subjective   General  Chart Reviewed: Yes  Patient assessed for rehabilitation services?: Yes  Additional Pertinent Hx: Admit 6/8; Pt is 63 y.o female who p/w shortness of breath. Pt reports missing recent peritoneal dialysis tx. 6/10 chest xray - bilateral airspace opacities; 6/10 Xray abdomen - negative;  PMHx: JONATHAN, Anxiety, asthma, bipolar disorder, bronchitis, depression, T2DM, HTN  Family / Caregiver Present: No  Referring Practitioner: Verlyn Mcburney, MD  Diagnosis: Respiratory failure  Subjective  Subjective: Pt in bed upon arrival and agreeable to OT session     Social/Functional History  Social/Functional History  Lives With: Alone  Type of Home: Apartment  Home Access: Stairs to enter with rails  Entrance Stairs - Number of Steps: 4ste outside and 5
Oral, Nightly  mirtazapine (REMERON) tablet 7.5 mg, 7.5 mg, Oral, Nightly  melatonin disintegrating tablet 5 mg, 5 mg, Oral, Nightly  aspirin chewable tablet 81 mg, 81 mg, Oral, Daily  melatonin tablet 3 mg, 3 mg, Oral, Nightly PRN  zolpidem (AMBIEN) tablet 5 mg, 5 mg, Oral, Once  acetaminophen (TYLENOL) tablet 650 mg, 650 mg, Oral, Q6H PRN **OR** acetaminophen (TYLENOL) suppository 650 mg, 650 mg, Rectal, Q6H PRN  diclofenac sodium (VOLTAREN) 1 % gel 2 g, 2 g, Topical, 4x Daily PRN  sodium chloride flush 0.9 % injection 5-40 mL, 5-40 mL, IntraVENous, 2 times per day  sodium chloride flush 0.9 % injection 5-40 mL, 5-40 mL, IntraVENous, PRN  0.9 % sodium chloride infusion, , IntraVENous, PRN  heparin (porcine) injection 5,000 Units, 5,000 Units, SubCUTAneous, 3 times per day  ondansetron (ZOFRAN-ODT) disintegrating tablet 4 mg, 4 mg, Oral, Q8H PRN **OR** ondansetron (ZOFRAN) injection 4 mg, 4 mg, IntraVENous, Q6H PRN  polyethylene glycol (GLYCOLAX) packet 17 g, 17 g, Oral, Daily PRN  dianeal lo-josh 2.5% 2,500 mL, 2,500 mL, IntraPERitoneal, Continuous  dianeal lo-josh 2.5% 2,500 mL, 2,500 mL, IntraPERitoneal, Continuous  gentamicin (GARAMYCIN) 0.1 % cream, , Topical, Daily  epoetin samanta-epbx (RETACRIT) injection 10,000 Units, 10,000 Units, SubCUTAneous, Once per day on Mon Wed Fri  diazePAM (VALIUM) tablet 5 mg, 5 mg, Oral, Q8H PRN       Vitals :     Vitals:    06/14/23 1212   BP: (!) 154/81   Pulse: 80   Resp: 16   Temp: 97.8 °F (36.6 °C)   SpO2: 94%       I & O :       Intake/Output Summary (Last 24 hours) at 6/14/2023 1313  Last data filed at 6/14/2023 0912  Gross per 24 hour   Intake 340 ml   Output --   Net 340 ml          Physical Examination :     General appearance: Lethargic but awake and oriented x 3   Respiratory: No wheezing. No RD on oxygen  Cardiovascular: S1, S 2   Abdomen: -  soft, non distended and non tender. Extremities:  Trace edema.           LABS:     Recent Labs     06/12/23  0415 06/13/23  9205
PRN  oxyCODONE (ROXICODONE) immediate release tablet 5 mg, 5 mg, Oral, Q6H PRN  rosuvastatin (CRESTOR) tablet 40 mg, 40 mg, Oral, Nightly  mirtazapine (REMERON) tablet 7.5 mg, 7.5 mg, Oral, Nightly  melatonin disintegrating tablet 5 mg, 5 mg, Oral, Nightly  aspirin chewable tablet 81 mg, 81 mg, Oral, Daily  melatonin tablet 3 mg, 3 mg, Oral, Nightly PRN  zolpidem (AMBIEN) tablet 5 mg, 5 mg, Oral, Once  acetaminophen (TYLENOL) tablet 650 mg, 650 mg, Oral, Q6H PRN **OR** acetaminophen (TYLENOL) suppository 650 mg, 650 mg, Rectal, Q6H PRN  diclofenac sodium (VOLTAREN) 1 % gel 2 g, 2 g, Topical, 4x Daily PRN  sodium chloride flush 0.9 % injection 5-40 mL, 5-40 mL, IntraVENous, 2 times per day  sodium chloride flush 0.9 % injection 5-40 mL, 5-40 mL, IntraVENous, PRN  0.9 % sodium chloride infusion, , IntraVENous, PRN  heparin (porcine) injection 5,000 Units, 5,000 Units, SubCUTAneous, 3 times per day  ondansetron (ZOFRAN-ODT) disintegrating tablet 4 mg, 4 mg, Oral, Q8H PRN **OR** ondansetron (ZOFRAN) injection 4 mg, 4 mg, IntraVENous, Q6H PRN  polyethylene glycol (GLYCOLAX) packet 17 g, 17 g, Oral, Daily PRN  dianeal lo-josh 2.5% 2,500 mL, 2,500 mL, IntraPERitoneal, Continuous  dianeal lo-josh 2.5% 2,500 mL, 2,500 mL, IntraPERitoneal, Continuous  gentamicin (GARAMYCIN) 0.1 % cream, , Topical, Daily  epoetin samanta-epbx (RETACRIT) injection 10,000 Units, 10,000 Units, SubCUTAneous, Once per day on Mon Wed Fri  diazePAM (VALIUM) tablet 5 mg, 5 mg, Oral, Q8H PRN       Vitals :     Vitals:    06/15/23 1112   BP: (!) 146/75   Pulse: 90   Resp: 18   Temp: 97.6 °F (36.4 °C)   SpO2: 96%       I & O :       Intake/Output Summary (Last 24 hours) at 6/15/2023 1231  Last data filed at 6/15/2023 0600  Gross per 24 hour   Intake 120 ml   Output --   Net 120 ml          Physical Examination :     General appearance: Lethargic but awake and oriented x 3   Respiratory: No wheezing.  No RD on oxygen  Cardiovascular: S1, S 2   Abdomen: -  soft,
Term Goal 3: ambulate 100 ft with or without AD mod I   ongoing  Patient Goals   Patient Goals : return home       Education  Patient Education  Education Given To: Patient  Education Provided: Role of Therapy (importance of OOB mobility)  Education Method: Verbal  Education Outcome: Verbalized understanding;Continued education needed      Therapy Time   Individual Concurrent Group Co-treatment   Time In 1125         Time Out 1203         Minutes 38          Timed Code Treatment Minutes:38       Total Treatment Minutes:   [address] PT
increased difficulty w/ ADLs d/t BL torn rotator cuffs, and decreased balance/endurance     Activity Tolerance  Activity Tolerance: Patient tolerated treatment well;Patient tolerated evaluation without incident;Patient limited by endurance; Patient limited by pain  Bed mobility  Supine to Sit: Stand by assistance (HOB up)  Scooting: Supervision (to EOB)  Transfers  Sit to stand: Contact guard assistance  Stand to sit: Contact guard assistance  Transfer Comments: VCs for hand placement  Vision  Vision: Impaired  Vision Exceptions: Wears glasses for reading  Hearing  Hearing: Exceptions to [facility]  Hearing Exceptions: Hard of hearing/hearing concerns (states needs hearing aids but has not had time to get them)  Cognition  Overall Cognitive Status: Exceptions  Arousal/Alertness: Appropriate responses to stimuli  Following Commands:  Follows all commands without difficulty  Attention Span: Attends with cues to redirect  Memory: Appears intact  Safety Judgement: Decreased awareness of need for safety  Problem Solving: Assistance required to generate solutions;Assistance required to implement solutions  Insights: Decreased awareness of deficits  Initiation: Does not require cues  Sequencing: Requires cues for some  Cognition Comment: decreased insight into deficits  Orientation  Overall Orientation Status: Within Functional Limits  Orientation Level: Oriented X4                  Education Given To: Patient  Education Provided: Role of Therapy;Plan of Care;ADL Adaptive Strategies;Transfer Training  Education Method: Demonstration;Verbal  Education Outcome: Verbalized understanding;Continued education needed           Hand Dominance  Hand Dominance: Right            G-Code     OutComes Score                                                  AM-PAC Score        AM-[facility] Inpatient Daily Activity Raw Score: 16 (06/12/23 1001)  AM-PAC Inpatient ADL T-Scale Score : 35.96 (06/12/23 1001)  ADL Inpatient CMS 0-100% Score: 53.32 (06/12/23

## 2023-06-15 NOTE — PLAN OF CARE
Problem: Respiratory - Adult  Goal: Achieves optimal ventilation and oxygenation  6/15/2023 0327 by Emmy Mera RN  Outcome: Progressing  Flowsheets (Taken 6/14/2023 1743 by Daniela Vale RN)  Achieves optimal ventilation and oxygenation:   Assess for changes in respiratory status   Assess for changes in mentation and behavior   Position to facilitate oxygenation and minimize respiratory effort   Oxygen supplementation based on oxygen saturation or arterial blood gases   Encourage broncho-pulmonary hygiene including cough, deep breathe, incentive spirometry   Assess the need for suctioning and aspirate as needed   Assess and instruct to report shortness of breath or any respiratory difficulty   Respiratory therapy support as indicated  6/14/2023 1743 by Daniela Vale RN  Outcome: Progressing  Flowsheets (Taken 6/14/2023 1743)  Achieves optimal ventilation and oxygenation:   Assess for changes in respiratory status   Assess for changes in mentation and behavior   Position to facilitate oxygenation and minimize respiratory effort   Oxygen supplementation based on oxygen saturation or arterial blood gases   Encourage broncho-pulmonary hygiene including cough, deep breathe, incentive spirometry   Assess the need for suctioning and aspirate as needed   Assess and instruct to report shortness of breath or any respiratory difficulty   Respiratory therapy support as indicated     Problem: Metabolic/Fluid and Electrolytes - Adult  Goal: Electrolytes maintained within normal limits  Outcome: Progressing  Flowsheets (Taken 6/14/2023 0503 by Alyssa Cardenas RN)  Electrolytes maintained within normal limits:   Monitor labs and assess patient for signs and symptoms of electrolyte imbalances   Administer electrolyte replacement as ordered   Monitor response to electrolyte replacements, including repeat lab results as appropriate   Fluid restriction as ordered   Instruct patient on fluid and nutrition restrictions as

## 2023-06-16 NOTE — CARE COORDINATION
CM continues to follow for DC planning and needs. Patient spoke with MD who would like to review possible LTACH admission for patient. She is on 15L Vapotherm at 75% FiO2, and slow to improve respiratory status. Patient reports to MD agreeable to placement at DC, just not back to the facility she has been in the past, unable to provide name, will review old chart to determine prior facility. Referrals sent to [facility] and [facility] to review for admission. Patient will need pre-cert for [facility] admission. Awaiting clinical decision for placement and will follow up with patient once accepting facility known and have pre-cert started. Per MD, tentative plan for DC on Wednesday. 4:41 PM  CM spoke with [address] ([phone]) at [facility], they are able to accept the patient, they are starting pre-cert for [facility] admission, patient continues on 15L vapotherm currently at 75% FiO2. Patient can admit to [facility] with current O2 requirements. CM received call from [place]er at [facility], they are not able to accept PD patients on their LTACH unit at this time, so they will not be able to accept the patient for admission. CM will continue to follow for DC planning and needs.     Thank you,  Eric BREAUX, RN,   4th Floor Progressive Care Unit  [phone]
CM following for discharge planning. Precert remains pending for [facility]. In [place]. When precert obtained, pt will need transport.     Khushi Sorenson RN, BSN, [address]  Case Management Department  [phone]
CM following for discharge planning. Pt is off vapotherm and on 4 liters O2, and has one day left of IV abx, therefore no longer qualifies for LTACH. Precert pulled and CM met with patient regarding possible rehab before return home. Pt in agreement. Referral sent to [facility]. They are reviewing. Pt will need precert. MD aware of plan. Addendum: 17:00pm  CM received phone call from [facility] with [facility], they can accept patient and precert was started.      Melissa Mcintyre RN, BSN, [address]  Case Management Department  [phone]
CTN contacted morgan with [facility] [address] [phone]. They have accepted this patient and will pull referral from [facility].  They will contact patient and make arrangements for [facility] by 6/18  Electronically signed by Madeleine Closs, LPN on 6/15/7708 at 9:44 PM
Patient remains on 1L O2. Patient precert denied by [facility] stating she does not need 2 therapies. Determined that she was SOB was only because patient missed her nightly PD treatment. Patient remains deconditioned and PT=17, OT=16 as of 6/13/23 eval.   If doctor wants to do peer to peer, must call    [phone], CALL BETWEEN 8:30- 5:00 M-F and have 2 options of time and day available from physician with physician's phone number. Contacted Dr. Coty Leyva with information for P2P by [employer]. CM will continue to follow patient until discharge. Electronically signed by Dung Crenshaw RN on 6/15/2023 at 11:27 AM    Addendum:   Dr. Coty Leyva stated that pateint is not interested in going to a SNF. He wants a re-eval from OT/PT to see if patient may be able to go home with home care. Nurse, Doreen Murphy, calling therapy to re-eval. Awaiting results of eval. CM will continue to follow patient until discharge. Electronically signed by Dung Crenshaw RN on 6/15/2023 at 12:17 PM      Addendum: Discharge note  Patient needs home care and this CM contacted home care liaison, Alexys Clemente LPN to see what home care company takes patient's insurance and send home care orders. Patient to discharge today. In the mean time, this CM  called [employer] and patient does not qualify for any [employer] benefits. Called [facility] and gave patient the information. [facility] can deliver patient meals for Kidney diet at $11.86 a meal and suggested plan for 5 days a week. Also, spoke to patient about calling her insurance and asking if the [facility] could help offset the cost of the meals. PT/OT asked for order for shower chair. Ordered and given to patient to fill and have delivered to her residence. Our onsite DME ([employer]) stated they have no shower chairs left and they cannot deliver a shower chair to a residence. Home orders are in.  Electronically signed by Dung Crenshaw RN on 6/15/2023 at 4:48 PM
prescription(s)  4. Cost of Rx cannot be added to hospital bill. If financial assistance is needed, please contact unit  or ;  or  CANNOT provide pharmacy voucher for patients co-pays  5. Patients can then  the prescription on their way out of the hospital at discharge, or pharmacy can deliver to the bedside if staff is available. (payment due at time of pick-up or delivery - cash, check, or card accepted)     Able to afford home medications/ co-pay costs: Yes    ADLS:  Current PT AM-PAC Score: 18 /24  Current OT AM-PAC Score: 16 /24      DISCHARGE Disposition: Home with [address]: TBD     LOC at discharge: Not Applicable  MARGARITA Completed: Not Indicated    Notification completed in HENS/PAS?:  Not Applicable    IMM Completed:   Not Indicated due to: medicaid         Transportation:  Transportation PLAN for discharge:  LYFT    Mode of Transport: [place]:  [address] ordered at discharge: Yes  [address]: TBD---see notes     Durable Medical Equipment:  DME Provider: TBD  Equipment obtained during hospitalization: bath bench    Dialysis:  Dialysis patient: Yes    Dialysis Center:[facility]---Home PD        Additional CM Notes: see discharge notes. COVID Result:    Lab Results   Component Value Date/Time    COVID19 Not Detected 06/08/2023 07:16 AM       The Plan for Transition of Care is related to the following treatment goals of Respiratory failure ([address] [J96.90]  Acute respiratory failure with hypoxia ([address] [J96.01]    The Patient and/or patient representative [place] and her family were provided with a choice of provider and agrees with the discharge plan Yes    Freedom of choice list was provided with basic dialogue that supports the patient's individualized plan of care/goals and shares the quality data associated with the providers.  Yes      Randal Crabtree RN  [facility]  Case Management Department  Ph:
weaning O2 requirement    Additional Case Management Notes: Patient is from home alone, has needed DME, does not wear O2 at baseline, perform home PD prior to admission. Patient spoke with MD and is agreeable to consider placement at DC, MD request possible LTACH admission due to high O2 requirement at this time. COVID Result:    Lab Results   Component Value Date/Time    COVID19 Not Detected 06/08/2023 07:16 AM       The Plan for Transition of Care is related to the following treatment goals of Respiratory failure ([address] [J96.90]  Acute respiratory failure with hypoxia ([address] [U88.03]    IF APPLICABLE: The Patient and/or patient representative [place] and her family were provided with a choice of provider and agrees with the discharge plan. Freedom of choice list with basic dialogue that supports the patient's individualized plan of care/goals and shares the quality data associated with the providers was provided to: Patient   Patient Representative Name:       The Patient and/or Patient Representative Agree with the Discharge Plan?  Yes    Care Transition Patient: No    IMM Status:      Lindsay Murray RN  Case Management Department  Ph: [phone] Fax: [phone]

## 2024-08-22 ENCOUNTER — APPOINTMENT (OUTPATIENT)
Dept: CT IMAGING | Age: 59
End: 2024-08-22
Payer: MEDICAID

## 2024-08-22 ENCOUNTER — APPOINTMENT (OUTPATIENT)
Dept: GENERAL RADIOLOGY | Age: 59
End: 2024-08-22
Payer: MEDICAID

## 2024-08-22 ENCOUNTER — HOSPITAL ENCOUNTER (INPATIENT)
Age: 59
LOS: 7 days | Discharge: NURSING, REHAB OR LONG-TERM CARE | End: 2024-08-29
Attending: STUDENT IN AN ORGANIZED HEALTH CARE EDUCATION/TRAINING PROGRAM | Admitting: INTERNAL MEDICINE
Payer: MEDICAID

## 2024-08-22 DIAGNOSIS — R19.7 DIARRHEA, UNSPECIFIED TYPE: ICD-10-CM

## 2024-08-22 DIAGNOSIS — Z99.2 PERITONEAL DIALYSIS CATHETER IN PLACE (HCC): ICD-10-CM

## 2024-08-22 DIAGNOSIS — E87.70 HYPERVOLEMIA, UNSPECIFIED HYPERVOLEMIA TYPE: Primary | ICD-10-CM

## 2024-08-22 DIAGNOSIS — Z99.2 HEMODIALYSIS PATIENT (HCC): ICD-10-CM

## 2024-08-22 PROBLEM — G93.41 METABOLIC ENCEPHALOPATHY: Status: ACTIVE | Noted: 2024-08-22

## 2024-08-22 LAB
ALBUMIN SERPL-MCNC: 3.8 G/DL (ref 3.4–5)
ALP SERPL-CCNC: 188 U/L (ref 40–129)
ALT SERPL-CCNC: 24 U/L (ref 10–40)
AMMONIA PLAS-SCNC: 25 UMOL/L (ref 11–51)
AMPHETAMINES UR QL SCN>1000 NG/ML: ABNORMAL
ANION GAP SERPL CALCULATED.3IONS-SCNC: 31 MMOL/L (ref 3–16)
AST SERPL-CCNC: 32 U/L (ref 15–37)
BACTERIA URNS QL MICRO: ABNORMAL /HPF
BARBITURATES UR QL SCN>200 NG/ML: ABNORMAL
BASE EXCESS BLDV CALC-SCNC: 14 MMOL/L (ref -2–3)
BASOPHILS # BLD: 0 K/UL (ref 0–0.2)
BASOPHILS NFR BLD: 0.3 %
BENZODIAZ UR QL SCN>200 NG/ML: ABNORMAL
BILIRUB DIRECT SERPL-MCNC: 0.5 MG/DL (ref 0–0.3)
BILIRUB INDIRECT SERPL-MCNC: 0.7 MG/DL (ref 0–1)
BILIRUB SERPL-MCNC: 1.2 MG/DL (ref 0–1)
BILIRUB UR QL STRIP.AUTO: ABNORMAL
BUN SERPL-MCNC: 102 MG/DL (ref 7–20)
CALCIUM SERPL-MCNC: 8.5 MG/DL (ref 8.3–10.6)
CANNABINOIDS UR QL SCN>50 NG/ML: ABNORMAL
CHLORIDE SERPL-SCNC: 79 MMOL/L (ref 99–110)
CLARITY UR: CLEAR
CO2 BLDV-SCNC: 42 MMOL/L
CO2 SERPL-SCNC: 34 MMOL/L (ref 21–32)
COCAINE UR QL SCN: POSITIVE
COHGB MFR BLDV: 1.5 % (ref 0–1.5)
COLOR UR: YELLOW
CREAT SERPL-MCNC: 10.9 MG/DL (ref 0.6–1.1)
DEPRECATED RDW RBC AUTO: 18.5 % (ref 12.4–15.4)
DO-HGB MFR BLDV: 70.6 %
DRUG SCREEN COMMENT UR-IMP: ABNORMAL
EKG ATRIAL RATE: 75 BPM
EKG ATRIAL RATE: 93 BPM
EKG DIAGNOSIS: NORMAL
EKG DIAGNOSIS: NORMAL
EKG P AXIS: 49 DEGREES
EKG P AXIS: 66 DEGREES
EKG P-R INTERVAL: 156 MS
EKG P-R INTERVAL: 182 MS
EKG Q-T INTERVAL: 446 MS
EKG Q-T INTERVAL: 454 MS
EKG QRS DURATION: 80 MS
EKG QRS DURATION: 98 MS
EKG QTC CALCULATION (BAZETT): 498 MS
EKG QTC CALCULATION (BAZETT): 564 MS
EKG R AXIS: -50 DEGREES
EKG R AXIS: -54 DEGREES
EKG T AXIS: -74 DEGREES
EKG T AXIS: 258 DEGREES
EKG VENTRICULAR RATE: 75 BPM
EKG VENTRICULAR RATE: 93 BPM
EOSINOPHIL # BLD: 0 K/UL (ref 0–0.6)
EOSINOPHIL NFR BLD: 0.2 %
EPI CELLS #/AREA URNS HPF: ABNORMAL /HPF (ref 0–5)
FENTANYL SCREEN, URINE: ABNORMAL
GFR SERPLBLD CREATININE-BSD FMLA CKD-EPI: 4 ML/MIN/{1.73_M2}
GLUCOSE SERPL-MCNC: 177 MG/DL (ref 70–99)
GLUCOSE UR STRIP.AUTO-MCNC: NEGATIVE MG/DL
HCO3 BLDV-SCNC: 40.5 MMOL/L (ref 24–28)
HCT VFR BLD AUTO: 36.9 % (ref 36–48)
HGB BLD-MCNC: 11.8 G/DL (ref 12–16)
HGB UR QL STRIP.AUTO: ABNORMAL
HYALINE CASTS #/AREA URNS LPF: ABNORMAL /LPF (ref 0–2)
KETONES UR STRIP.AUTO-MCNC: 15 MG/DL
LACTATE BLDV-SCNC: 2.6 MMOL/L (ref 0.4–1.9)
LACTATE BLDV-SCNC: 3.6 MMOL/L (ref 0.4–1.9)
LEUKOCYTE ESTERASE UR QL STRIP.AUTO: NEGATIVE
LIPASE SERPL-CCNC: 55 U/L (ref 13–60)
LYMPHOCYTES # BLD: 0.9 K/UL (ref 1–5.1)
LYMPHOCYTES NFR BLD: 11.9 %
MCH RBC QN AUTO: 28.2 PG (ref 26–34)
MCHC RBC AUTO-ENTMCNC: 32.1 G/DL (ref 31–36)
MCV RBC AUTO: 87.8 FL (ref 80–100)
METHADONE UR QL SCN>300 NG/ML: ABNORMAL
METHGB MFR BLDV: 0.6 % (ref 0–1.5)
MONOCYTES # BLD: 0.2 K/UL (ref 0–1.3)
MONOCYTES NFR BLD: 2.5 %
NEUTROPHILS # BLD: 6.6 K/UL (ref 1.7–7.7)
NEUTROPHILS NFR BLD: 85.1 %
NITRITE UR QL STRIP.AUTO: NEGATIVE
NT-PROBNP SERPL-MCNC: ABNORMAL PG/ML (ref 0–124)
OPIATES UR QL SCN>300 NG/ML: ABNORMAL
OXYCODONE UR QL SCN: ABNORMAL
PCO2 BLDV: 58.7 MMHG (ref 41–51)
PCP UR QL SCN>25 NG/ML: ABNORMAL
PH BLDV: 7.45 [PH] (ref 7.35–7.45)
PH UR STRIP.AUTO: 8 [PH] (ref 5–8)
PH UR STRIP: 8 [PH]
PLATELET # BLD AUTO: 178 K/UL (ref 135–450)
PMV BLD AUTO: 8.9 FL (ref 5–10.5)
PO2 BLDV: <30 MMHG (ref 25–40)
POTASSIUM SERPL-SCNC: 4.1 MMOL/L (ref 3.5–5.1)
PROCALCITONIN SERPL IA-MCNC: 2.34 NG/ML (ref 0–0.15)
PROT SERPL-MCNC: 7.3 G/DL (ref 6.4–8.2)
PROT UR STRIP.AUTO-MCNC: 100 MG/DL
RBC # BLD AUTO: 4.2 M/UL (ref 4–5.2)
RBC #/AREA URNS HPF: ABNORMAL /HPF (ref 0–4)
RENAL EPI CELLS #/AREA UR COMP ASSIST: ABNORMAL /HPF (ref 0–1)
SAO2 % BLDV: 28 %
SODIUM SERPL-SCNC: 144 MMOL/L (ref 136–145)
SP GR UR STRIP.AUTO: 1.02 (ref 1–1.03)
TROPONIN, HIGH SENSITIVITY: 203 NG/L (ref 0–14)
TROPONIN, HIGH SENSITIVITY: 236 NG/L (ref 0–14)
UA COMPLETE W REFLEX CULTURE PNL UR: ABNORMAL
UA DIPSTICK W REFLEX MICRO PNL UR: YES
URN SPEC COLLECT METH UR: ABNORMAL
UROBILINOGEN UR STRIP-ACNC: 0.2 E.U./DL
WBC # BLD AUTO: 7.8 K/UL (ref 4–11)
WBC #/AREA URNS HPF: ABNORMAL /HPF (ref 0–5)

## 2024-08-22 PROCEDURE — 96365 THER/PROPH/DIAG IV INF INIT: CPT

## 2024-08-22 PROCEDURE — 6360000002 HC RX W HCPCS

## 2024-08-22 PROCEDURE — 90935 HEMODIALYSIS ONE EVALUATION: CPT

## 2024-08-22 PROCEDURE — 96375 TX/PRO/DX INJ NEW DRUG ADDON: CPT

## 2024-08-22 PROCEDURE — 80307 DRUG TEST PRSMV CHEM ANLYZR: CPT

## 2024-08-22 PROCEDURE — 80076 HEPATIC FUNCTION PANEL: CPT

## 2024-08-22 PROCEDURE — 6360000002 HC RX W HCPCS: Performed by: STUDENT IN AN ORGANIZED HEALTH CARE EDUCATION/TRAINING PROGRAM

## 2024-08-22 PROCEDURE — 99285 EMERGENCY DEPT VISIT HI MDM: CPT

## 2024-08-22 PROCEDURE — 85025 COMPLETE CBC W/AUTO DIFF WBC: CPT

## 2024-08-22 PROCEDURE — 84145 PROCALCITONIN (PCT): CPT

## 2024-08-22 PROCEDURE — 2580000003 HC RX 258: Performed by: INTERNAL MEDICINE

## 2024-08-22 PROCEDURE — 82140 ASSAY OF AMMONIA: CPT

## 2024-08-22 PROCEDURE — 83880 ASSAY OF NATRIURETIC PEPTIDE: CPT

## 2024-08-22 PROCEDURE — 81001 URINALYSIS AUTO W/SCOPE: CPT

## 2024-08-22 PROCEDURE — 71045 X-RAY EXAM CHEST 1 VIEW: CPT

## 2024-08-22 PROCEDURE — 87040 BLOOD CULTURE FOR BACTERIA: CPT

## 2024-08-22 PROCEDURE — 5A1D70Z PERFORMANCE OF URINARY FILTRATION, INTERMITTENT, LESS THAN 6 HOURS PER DAY: ICD-10-PCS | Performed by: STUDENT IN AN ORGANIZED HEALTH CARE EDUCATION/TRAINING PROGRAM

## 2024-08-22 PROCEDURE — 80048 BASIC METABOLIC PNL TOTAL CA: CPT

## 2024-08-22 PROCEDURE — 6360000002 HC RX W HCPCS: Performed by: INTERNAL MEDICINE

## 2024-08-22 PROCEDURE — 2580000003 HC RX 258: Performed by: STUDENT IN AN ORGANIZED HEALTH CARE EDUCATION/TRAINING PROGRAM

## 2024-08-22 PROCEDURE — 6370000000 HC RX 637 (ALT 250 FOR IP): Performed by: STUDENT IN AN ORGANIZED HEALTH CARE EDUCATION/TRAINING PROGRAM

## 2024-08-22 PROCEDURE — 93005 ELECTROCARDIOGRAM TRACING: CPT | Performed by: STUDENT IN AN ORGANIZED HEALTH CARE EDUCATION/TRAINING PROGRAM

## 2024-08-22 PROCEDURE — 6370000000 HC RX 637 (ALT 250 FOR IP): Performed by: INTERNAL MEDICINE

## 2024-08-22 PROCEDURE — 83605 ASSAY OF LACTIC ACID: CPT

## 2024-08-22 PROCEDURE — 84484 ASSAY OF TROPONIN QUANT: CPT

## 2024-08-22 PROCEDURE — 82803 BLOOD GASES ANY COMBINATION: CPT

## 2024-08-22 PROCEDURE — 1200000000 HC SEMI PRIVATE

## 2024-08-22 PROCEDURE — 2580000003 HC RX 258

## 2024-08-22 PROCEDURE — 83690 ASSAY OF LIPASE: CPT

## 2024-08-22 PROCEDURE — 96376 TX/PRO/DX INJ SAME DRUG ADON: CPT

## 2024-08-22 PROCEDURE — 93005 ELECTROCARDIOGRAM TRACING: CPT

## 2024-08-22 PROCEDURE — 89051 BODY FLUID CELL COUNT: CPT

## 2024-08-22 PROCEDURE — 74176 CT ABD & PELVIS W/O CONTRAST: CPT

## 2024-08-22 RX ORDER — HYDROXYZINE HYDROCHLORIDE 25 MG/1
25 TABLET, FILM COATED ORAL 3 TIMES DAILY PRN
Status: DISCONTINUED | OUTPATIENT
Start: 2024-08-22 | End: 2024-08-29 | Stop reason: HOSPADM

## 2024-08-22 RX ORDER — ONDANSETRON 4 MG/1
4 TABLET, ORALLY DISINTEGRATING ORAL EVERY 8 HOURS PRN
Status: DISCONTINUED | OUTPATIENT
Start: 2024-08-22 | End: 2024-08-22 | Stop reason: ALTCHOICE

## 2024-08-22 RX ORDER — DIPHENHYDRAMINE HYDROCHLORIDE 50 MG/ML
50 INJECTION INTRAMUSCULAR; INTRAVENOUS ONCE
Status: COMPLETED | OUTPATIENT
Start: 2024-08-22 | End: 2024-08-22

## 2024-08-22 RX ORDER — ACETAMINOPHEN 325 MG/1
650 TABLET ORAL EVERY 6 HOURS PRN
Status: DISCONTINUED | OUTPATIENT
Start: 2024-08-22 | End: 2024-08-29 | Stop reason: HOSPADM

## 2024-08-22 RX ORDER — SODIUM CHLORIDE, SODIUM LACTATE, POTASSIUM CHLORIDE, AND CALCIUM CHLORIDE .6; .31; .03; .02 G/100ML; G/100ML; G/100ML; G/100ML
1000 INJECTION, SOLUTION INTRAVENOUS ONCE
Status: COMPLETED | OUTPATIENT
Start: 2024-08-22 | End: 2024-08-22

## 2024-08-22 RX ORDER — ACETAMINOPHEN 650 MG/1
650 SUPPOSITORY RECTAL EVERY 6 HOURS PRN
Status: DISCONTINUED | OUTPATIENT
Start: 2024-08-22 | End: 2024-08-29 | Stop reason: HOSPADM

## 2024-08-22 RX ORDER — METOPROLOL SUCCINATE 25 MG/1
25 TABLET, EXTENDED RELEASE ORAL DAILY
Status: DISCONTINUED | OUTPATIENT
Start: 2024-08-22 | End: 2024-08-27

## 2024-08-22 RX ORDER — HYDROMORPHONE HYDROCHLORIDE 1 MG/ML
0.5 INJECTION, SOLUTION INTRAMUSCULAR; INTRAVENOUS; SUBCUTANEOUS ONCE
Status: COMPLETED | OUTPATIENT
Start: 2024-08-22 | End: 2024-08-22

## 2024-08-22 RX ORDER — ROSUVASTATIN CALCIUM 20 MG/1
40 TABLET, COATED ORAL NIGHTLY
Status: DISCONTINUED | OUTPATIENT
Start: 2024-08-22 | End: 2024-08-29 | Stop reason: HOSPADM

## 2024-08-22 RX ORDER — MAGNESIUM SULFATE IN WATER 40 MG/ML
2000 INJECTION, SOLUTION INTRAVENOUS PRN
Status: DISCONTINUED | OUTPATIENT
Start: 2024-08-22 | End: 2024-08-29 | Stop reason: HOSPADM

## 2024-08-22 RX ORDER — POTASSIUM CHLORIDE 7.45 MG/ML
10 INJECTION INTRAVENOUS PRN
Status: DISCONTINUED | OUTPATIENT
Start: 2024-08-22 | End: 2024-08-29 | Stop reason: HOSPADM

## 2024-08-22 RX ORDER — SODIUM CHLORIDE 9 MG/ML
INJECTION, SOLUTION INTRAVENOUS PRN
Status: DISCONTINUED | OUTPATIENT
Start: 2024-08-22 | End: 2024-08-29 | Stop reason: HOSPADM

## 2024-08-22 RX ORDER — ASPIRIN 81 MG/1
81 TABLET, CHEWABLE ORAL DAILY
Status: DISCONTINUED | OUTPATIENT
Start: 2024-08-22 | End: 2024-08-29 | Stop reason: HOSPADM

## 2024-08-22 RX ORDER — ASPIRIN 81 MG/1
324 TABLET, CHEWABLE ORAL ONCE
Status: COMPLETED | OUTPATIENT
Start: 2024-08-22 | End: 2024-08-22

## 2024-08-22 RX ORDER — HEPARIN SODIUM 5000 [USP'U]/ML
5000 INJECTION, SOLUTION INTRAVENOUS; SUBCUTANEOUS EVERY 8 HOURS SCHEDULED
Status: DISCONTINUED | OUTPATIENT
Start: 2024-08-22 | End: 2024-08-29 | Stop reason: HOSPADM

## 2024-08-22 RX ORDER — PROCHLORPERAZINE EDISYLATE 5 MG/ML
10 INJECTION INTRAMUSCULAR; INTRAVENOUS EVERY 6 HOURS PRN
Status: DISCONTINUED | OUTPATIENT
Start: 2024-08-22 | End: 2024-08-29 | Stop reason: HOSPADM

## 2024-08-22 RX ORDER — POLYETHYLENE GLYCOL 3350 17 G/17G
17 POWDER, FOR SOLUTION ORAL DAILY PRN
Status: DISCONTINUED | OUTPATIENT
Start: 2024-08-22 | End: 2024-08-29 | Stop reason: HOSPADM

## 2024-08-22 RX ORDER — SODIUM CHLORIDE 0.9 % (FLUSH) 0.9 %
5-40 SYRINGE (ML) INJECTION PRN
Status: DISCONTINUED | OUTPATIENT
Start: 2024-08-22 | End: 2024-08-29 | Stop reason: HOSPADM

## 2024-08-22 RX ORDER — SEVELAMER CARBONATE 800 MG/1
1600 TABLET, FILM COATED ORAL
Status: DISCONTINUED | OUTPATIENT
Start: 2024-08-22 | End: 2024-08-26

## 2024-08-22 RX ORDER — ONDANSETRON 2 MG/ML
4 INJECTION INTRAMUSCULAR; INTRAVENOUS EVERY 6 HOURS PRN
Status: DISCONTINUED | OUTPATIENT
Start: 2024-08-22 | End: 2024-08-22 | Stop reason: ALTCHOICE

## 2024-08-22 RX ORDER — BUPROPION HYDROCHLORIDE 150 MG/1
150 TABLET, EXTENDED RELEASE ORAL DAILY
Status: DISCONTINUED | OUTPATIENT
Start: 2024-08-22 | End: 2024-08-29 | Stop reason: HOSPADM

## 2024-08-22 RX ORDER — POTASSIUM CHLORIDE 1500 MG/1
40 TABLET, EXTENDED RELEASE ORAL PRN
Status: DISCONTINUED | OUTPATIENT
Start: 2024-08-22 | End: 2024-08-29 | Stop reason: HOSPADM

## 2024-08-22 RX ORDER — MIRTAZAPINE 15 MG/1
7.5 TABLET, FILM COATED ORAL NIGHTLY
Status: DISCONTINUED | OUTPATIENT
Start: 2024-08-22 | End: 2024-08-29 | Stop reason: HOSPADM

## 2024-08-22 RX ORDER — DIPHENHYDRAMINE HYDROCHLORIDE 50 MG/ML
25 INJECTION INTRAMUSCULAR; INTRAVENOUS ONCE
Status: COMPLETED | OUTPATIENT
Start: 2024-08-22 | End: 2024-08-22

## 2024-08-22 RX ORDER — SODIUM CHLORIDE 0.9 % (FLUSH) 0.9 %
5-40 SYRINGE (ML) INJECTION EVERY 12 HOURS SCHEDULED
Status: DISCONTINUED | OUTPATIENT
Start: 2024-08-22 | End: 2024-08-29 | Stop reason: HOSPADM

## 2024-08-22 RX ORDER — HYDROMORPHONE HYDROCHLORIDE 1 MG/ML
1 INJECTION, SOLUTION INTRAMUSCULAR; INTRAVENOUS; SUBCUTANEOUS ONCE
Status: COMPLETED | OUTPATIENT
Start: 2024-08-22 | End: 2024-08-22

## 2024-08-22 RX ADMIN — DIPHENHYDRAMINE HYDROCHLORIDE 25 MG: 50 INJECTION INTRAMUSCULAR; INTRAVENOUS at 16:32

## 2024-08-22 RX ADMIN — HYDROMORPHONE HYDROCHLORIDE 1 MG: 1 INJECTION, SOLUTION INTRAMUSCULAR; INTRAVENOUS; SUBCUTANEOUS at 12:45

## 2024-08-22 RX ADMIN — CEFEPIME 2000 MG: 2 INJECTION, POWDER, FOR SOLUTION INTRAVENOUS at 11:54

## 2024-08-22 RX ADMIN — HYDROXYZINE HYDROCHLORIDE 25 MG: 25 TABLET ORAL at 22:03

## 2024-08-22 RX ADMIN — SODIUM CHLORIDE, POTASSIUM CHLORIDE, SODIUM LACTATE AND CALCIUM CHLORIDE 1000 ML: 600; 310; 30; 20 INJECTION, SOLUTION INTRAVENOUS at 11:27

## 2024-08-22 RX ADMIN — HYDROMORPHONE HYDROCHLORIDE 0.5 MG: 1 INJECTION, SOLUTION INTRAMUSCULAR; INTRAVENOUS; SUBCUTANEOUS at 11:28

## 2024-08-22 RX ADMIN — ASPIRIN 324 MG: 81 TABLET, CHEWABLE ORAL at 11:36

## 2024-08-22 RX ADMIN — Medication 2 MG: at 18:11

## 2024-08-22 RX ADMIN — METOPROLOL SUCCINATE 25 MG: 25 TABLET, EXTENDED RELEASE ORAL at 22:16

## 2024-08-22 RX ADMIN — ASPIRIN 81 MG: 81 TABLET, CHEWABLE ORAL at 22:05

## 2024-08-22 RX ADMIN — SODIUM CHLORIDE, PRESERVATIVE FREE 10 ML: 5 INJECTION INTRAVENOUS at 22:06

## 2024-08-22 RX ADMIN — ROSUVASTATIN CALCIUM 40 MG: 20 TABLET, COATED ORAL at 22:04

## 2024-08-22 RX ADMIN — MIRTAZAPINE 7.5 MG: 15 TABLET, FILM COATED ORAL at 22:04

## 2024-08-22 RX ADMIN — BUPROPION HYDROCHLORIDE 150 MG: 150 TABLET, EXTENDED RELEASE ORAL at 22:05

## 2024-08-22 RX ADMIN — DIPHENHYDRAMINE HYDROCHLORIDE 50 MG: 50 INJECTION INTRAMUSCULAR; INTRAVENOUS at 13:07

## 2024-08-22 RX ADMIN — HEPARIN SODIUM 5000 UNITS: 5000 INJECTION INTRAVENOUS; SUBCUTANEOUS at 22:06

## 2024-08-22 RX ADMIN — VANCOMYCIN HYDROCHLORIDE 1750 MG: 10 INJECTION, POWDER, LYOPHILIZED, FOR SOLUTION INTRAVENOUS at 13:59

## 2024-08-22 ASSESSMENT — PAIN SCALES - GENERAL
PAINLEVEL_OUTOF10: 10
PAINLEVEL_OUTOF10: 9
PAINLEVEL_OUTOF10: 10
PAINLEVEL_OUTOF10: 10

## 2024-08-22 ASSESSMENT — PAIN DESCRIPTION - DESCRIPTORS
DESCRIPTORS: BURNING

## 2024-08-22 ASSESSMENT — PAIN DESCRIPTION - LOCATION
LOCATION: RECTUM

## 2024-08-22 ASSESSMENT — LIFESTYLE VARIABLES
HOW MANY STANDARD DRINKS CONTAINING ALCOHOL DO YOU HAVE ON A TYPICAL DAY: PATIENT DOES NOT DRINK
HOW OFTEN DO YOU HAVE A DRINK CONTAINING ALCOHOL: NEVER

## 2024-08-22 ASSESSMENT — PAIN DESCRIPTION - PAIN TYPE
TYPE: ACUTE PAIN

## 2024-08-22 ASSESSMENT — PAIN DESCRIPTION - FREQUENCY
FREQUENCY: CONTINUOUS
FREQUENCY: CONTINUOUS

## 2024-08-22 ASSESSMENT — PAIN - FUNCTIONAL ASSESSMENT: PAIN_FUNCTIONAL_ASSESSMENT: 0-10

## 2024-08-22 NOTE — ED TRIAGE NOTES
Pt co diarrhea for 3+ days. Pt states she was in the hospital recently but unsure of where/when. Pt has an old wristband with 8/19/24 date. Pt has PD catheter she states they are switching to HD 3x a day. Pt has HD catheter to chest on R side.

## 2024-08-22 NOTE — CARE COORDINATION
CM  following for  d/c planning needs:    Per Chart Review:    Patient  from Home  -  admitted  with  Metabolic encephalopathy      Nephrology consulted: ESRD  Was on PD and plan was to transition to iHD as patient failed PD but non compliant and did not have HD for last 2 weeks or so per [facility] RN.     Access: TDC and also has PD catheter.    Patient  off unit  for  HD at this time  will follow up tomorrow  when patient  back on the unit.   -  PTOT  pending .     Electronically signed by Maryanne Anaya RN on 8/22/2024 at 5:42 PM         Maryanne Anaya RN Case Manager  [facility]  [address]  [facility] [id number] [phone]  Fax [phone]

## 2024-08-22 NOTE — PROGRESS NOTES
Pt arrived from ER to room 6301. Pt oriented to room and call light, all fall precautions in place. Pt educated to call for assistance, pt verbalizes understanding. 4 eyes skin assessment complete. Tele monitor applied.

## 2024-08-22 NOTE — CONSULTS
Ph: [phone], Fax: [phone]                                     [facility]                                                   [address] [place] [address]           Reason for admission:             AMS    Brief Summary:     Cindi Rodriguez is being seen by nephrology for ESRD.     Interval History and Plan:   Patient seen at bedside  Comfortable  /82  On room air  Labs reviewed.         HD today for 2 hours and then tomorrow again  CT showed diffuse anasarca with B/L pleural effusion. On room air and try to remove 1 liter UF as tolerated.   Will do PD with cycler overnight and send PD fluid for cell counts and culture after PD and discussed with ER team to do not send PD fluid from dry abdomen.   Patient started on Vancomycin and Cefepime and further sepsis workup per primary team  Monitor Hb and lytes.   Follow BP   Dose medications according to GFR  Will plan to remove PD catheter after confirming with primary nephrologist          D/W dialysis nurse and orders placed  D/W ER and primary service.             Thank you for allowing us to participate in this patient's care  In case of any question please call us at our 24 hour answering service [phone] or from 7 AM to 5 PM via [employer], [employer], Epic chat or cell phone.   Dr Brittney Castaneda MD      Assessment:     ESRD  Was on PD and plan was to transition to iHD as patient failed PD but non compliant and did not have HD for last 2 weeks or so per [facility] RN.   Access: TDC and also has PD catheter.  Volume status  Has pleural effusion and anasarca on CT but on room and no urgency for UF      Appear septic  Rule out peritonitis        Hypertension        Anemia        Encephalopathy.   Likely due to uremia and may be sepsis          HPI      Patient is a 59 y.o. female  with PMH significant for ESRD on PD, HTN is being admitted with confusion. BUN is very high and patient is agitated and unable

## 2024-08-22 NOTE — CONSULTS
[facility] -  Clinical Pharmacy Note    Vancomycin - Management by Pharmacy    Consult Date(s): 8/22/24  Consulting Provider(s): Dr. Ham    Assessment / Plan  1)  Sepsis - Vancomycin  Concurrent Antimicrobials: Cefepime   Day of Vanc Therapy / Ordered Duration:  day 1 of 7  Current Dosing Method: Intermittent Dosing by Levels  Therapeutic Goal: Trough ~ 15 mg/L  Current Dose / Plan:   Pt is ESRD, currently supposed to be on iHD Tue-Thur-Sat per notes.  H&P states pt has not had HD in 2 weeks.  Will dose Vancomycin intermittently based on levels.  Pt received loading dose of 1750mg IV x1 today.  Will check random level in AM tomorrow and f/u on further dialysis plans to determine timing of next dose.  Will continue to monitor clinical condition and make adjustments to regimen as appropriate.    Please call with questions--  Thanks--  Janey Pandey, PharmD, BCPS, BCGP  [id number] ([facility])   8/22/2024 3:50 PM      Interval update:  Planning for 2 hrs HD today.  Planning to run CCPD overnight to obtain PD fluid for cell counts / culture.    Subjective/Objective:   Cindi Rodriguez is a 59 y.o. female with a PMHx significant for ESRD on dialysis, anxiety, bipolar disorder, depression, DM, an HTN.  Pt was previously on PD with plans to switch to HD, but pt was non-compliant and has not had HD for the last 2 weeks or so.  Pt is now admitted with sepsis - ruling out peritonitis.     Pharmacy is consulted to dose Vancomycin.    Ht Readings from Last 1 Encounters:   06/12/23 1.727 m (5' 8\")     Wt Readings from Last 1 Encounters:   06/15/23 69.6 kg (153 lb 7 oz)     Current & Prior Antimicrobial Regimen(s):  Cefepime (8/22-current)  Vancomycin - Pharmacy to dose  Intermittent dosing (8/22-current)    Date Vanc Level Vanc Dose   8/22  1750 mg   8/23 ordered           Cultures & Sensitivities:    Date Site Micro Susceptibility / Result   8/22 Blood x2 sent    8/22 PD fluid ordered      Recent Labs

## 2024-08-22 NOTE — ED NOTES
ED TO INPATIENT SBAR HANDOFF    Patient Name: Cindi Rodriguez   :  1965  59 y.o.   MRN:  [id number]  Preferred Name  Cindi  ED Room #:  A05/A05-05  Family/Caregiver Present no   Restraints no   Sitter no   Sepsis Risk Score      Situation  Code Status: Prior No additional code details.    Allergies: Latex, Peanuts [peanut oil], Tomato, Shellfish-derived products, and Potato  Weight: No data found.  Arrived from: home  Chief Complaint:   Chief Complaint   Patient presents with    Abdominal Pain     Hospital Problem/Diagnosis:  Active Problems:    * No active hospital problems. *  Resolved Problems:    * No resolved hospital problems. *    Imaging:   CT ABDOMEN PELVIS WO CONTRAST Additional Contrast? None   Final Result   Moderate burden of diffuse ascites, with peritoneal dialysis catheter in place.       Moderate right and small left pleural effusions with patchy bibasilar airspace disease and/or atelectasis.      Diffuse anasarca.      No CT evidence of an acute intra-abdominal process.      Electronically signed by Carlie Alas DO      XR CHEST PORTABLE   Final Result      Mild pulmonary edema.      Small bilateral pleural effusions.      Cardiomegaly.      Right jugular dialysis catheter in standard position.      Electronically signed by Urban Russell        Abnormal labs:   Abnormal Labs Reviewed   CBC WITH AUTO DIFFERENTIAL - Abnormal; Notable for the following components:       Result Value    Hemoglobin 11.8 (*)     RDW 18.5 (*)     Lymphocytes Absolute 0.9 (*)     All other components within normal limits   BASIC METABOLIC PANEL W/ REFLEX TO MG FOR LOW K - Abnormal; Notable for the following components:    Chloride 79 (*)     CO2 34 (*)     Anion Gap 31 (*)     Glucose 177 (*)      (*)     Creatinine 10.9 (*)     Est, Glom Filt Rate 4 (*)     All other components within normal limits    Narrative:     CALL  Garvin  S[facility] tel. [phone],  Previous panic on this admission - call not needed per

## 2024-08-22 NOTE — PROGRESS NOTES
[facility] - Clinical Pharmacy Note - Renal Dosing    Cefepime ordered for treatment of sepsis. Per [facility] Renal Dose Adjustment Policy, cefepime 2000 mg q12h will be changed to 1000 mg q24h.     Estimated Creatinine Clearance: CrCl cannot be calculated (Unknown ideal weight.).  Dialysis Status, JONATHAN, CKD: HD  BMI: There is no height or weight on file to calculate BMI.    Rationale for Adjustment: Agent is renally eliminated.    Pharmacy will continue to monitor renal function and adjust dose as necessary.      Please call with any questions.  Cristina Alonso, PharmD, BCPS  Main pharmacy: [phone]  8/22/2024 2:54 PM

## 2024-08-22 NOTE — PROGRESS NOTES
Treatment time: 2 hours  Net UF: 1000 ml     Pre weight: 55.2 kg  Post weight:54.2 kg      Access used: r tunneled cvc    Access function: good with  ml/min  Venous side placed with 1 mg of cathflo and dwelled  1 hour after initial try could not get blood return.  Flowed well after cathflo     Medications or blood products given: cathflo       Summary of response to treatment: Patient tolerated treatment well and without any complications. Patient remained stable throughout entire treatment and upon the exiting the dialysis suite via transport.     Report given to PEACE perry and copy of dialysis treatment record placed in chart, to be scanned into EMR.

## 2024-08-22 NOTE — ED PROVIDER NOTES
THE [facility]  EMERGENCY DEPARTMENT ENCOUNTER          EM RESIDENT NOTE       Date of evaluation: 8/22/2024    Chief Complaint     Abdominal Pain      History of Present Illness     Cindi Rodriguez is a 59 y.o. female with history of ESRD on peritoneal dialysis, DM type 2, hypertension, and asthma who presents for abdominal pain    Seen at [facility] on 8/19 for generalized weakness and not feeling well.  Had been at least 2 weeks and she went to dialysis at that point time.  They discussed with the patient's nephrology Dr. Vizcaino who stated the patient is supposed to dialyze [facility].  There is no indication for emergent dialysis at that time, and patient was instructed to go to her dialysis appointment the next morning.    Patient states for the past 2 to 3 days she is experienced abdominal pain and numerous episodes of watery diarrhea.  Associated rectal burning.  She is unsure when she last had dialysis, stating she is supposed to have hemodialysis and not peritoneal dialysis anymore.  She thinks she had dialysis when she was at the hospital last time, still wearing the 8/19 wristband.     Also noting shortness of breath.  No chest pain.  No recent cough or cold symptoms.  She lives by herself, ambulates independently normally.  Had a fall 2 days ago.  Does not feel like she can care for herself at home right now.     Further history limited secondary to patient being a poor historian      MEDICAL DECISION MAKING / ASSESSMENT / PLAN     INITIAL VITALS: BP: (!) 137/96, Temp: (!) 96.3 °F (35.7 °C), Pulse: 79, Respirations: 18, SpO2: 98 %    Cindi Rodriguez is a 59 y.o. female chronically ill-appearing with history of ESRD currently supposed to be on hemodialysis [facility] presenting for abdominal pain and diarrhea.  Patient is a poor historian, and is unable to state when she last had either peritoneal or hemodialysis.  Complaining of generalized abdominal pain, diarrhea for the past 2 to 3 days, and rectal  CT evidence of an acute intra-abdominal process.      Electronically signed by Carlie Alas DO      XR CHEST PORTABLE   Final Result      Mild pulmonary edema.      Small bilateral pleural effusions.      Cardiomegaly.      Right jugular dialysis catheter in standard position.      Electronically signed by Urban Russell          LABS:   Results for orders placed or performed during the hospital encounter of 08/22/24   CBC with Auto Differential   Result Value Ref Range    WBC 7.8 4.0 - 11.0 K/uL    RBC 4.20 4.00 - 5.20 M/uL    Hemoglobin 11.8 (L) 12.0 - 16.0 g/dL    Hematocrit 36.9 36.0 - 48.0 %    MCV 87.8 80.0 - 100.0 fL    MCH 28.2 26.0 - 34.0 pg    MCHC 32.1 31.0 - 36.0 g/dL    RDW 18.5 (H) 12.4 - 15.4 %    Platelets 178 135 - 450 K/uL    MPV 8.9 5.0 - 10.5 fL    Neutrophils % 85.1 %    Lymphocytes % 11.9 %    Monocytes % 2.5 %    Eosinophils % 0.2 %    Basophils % 0.3 %    Neutrophils Absolute 6.6 1.7 - 7.7 K/uL    Lymphocytes Absolute 0.9 (L) 1.0 - 5.1 K/uL    Monocytes Absolute 0.2 0.0 - 1.3 K/uL    Eosinophils Absolute 0.0 0.0 - 0.6 K/uL    Basophils Absolute 0.0 0.0 - 0.2 K/uL   BMP w/ Reflex to MG   Result Value Ref Range    Sodium 144 136 - 145 mmol/L    Potassium reflex Magnesium 4.1 3.5 - 5.1 mmol/L    Chloride 79 (L) 99 - 110 mmol/L    CO2 34 (H) 21 - 32 mmol/L    Anion Gap 31 (H) 3 - 16    Glucose 177 (H) 70 - 99 mg/dL     (HH) 7 - 20 mg/dL    Creatinine 10.9 (HH) 0.6 - 1.1 mg/dL    Est, Glom Filt Rate 4 (A) >60    Calcium 8.5 8.3 - 10.6 mg/dL   Hepatic Function Panel   Result Value Ref Range    Total Protein 7.3 6.4 - 8.2 g/dL    Albumin 3.8 3.4 - 5.0 g/dL    Alkaline Phosphatase 188 (H) 40 - 129 U/L    ALT 24 10 - 40 U/L    AST 32 15 - 37 U/L    Total Bilirubin 1.2 (H) 0.0 - 1.0 mg/dL    Bilirubin, Direct 0.5 (H) 0.0 - 0.3 mg/dL    Bilirubin, Indirect 0.7 0.0 - 1.0 mg/dL   Lipase   Result Value Ref Range    Lipase 55.0 13.0 - 60.0 U/L   Lactate, Sepsis   Result Value Ref Range    Lactic

## 2024-08-22 NOTE — ED PROVIDER NOTES
ED Attending Attestation Note     Date of evaluation: 8/22/2024    This patient was seen by the resident.  I have seen and examined the patient, agree with the workup, evaluation, management and diagnosis. The care plan has been discussed.  My assessment reveals a chronically unwell 59-year-old female with history of ESRD, currently on peritoneal dialysis but with a recently placed hemodialysis catheter in the right IJ for plans to initiate hemodialysis, and presents today from home via EMS with abdominal pain, diarrhea and generalized weakness.  Patient was recently seen at [facility] 3 days ago, arrives still wearing the wrist band from that visit, and seems frankly confused and unable to provide much history.  Patient is yelling in pain, primarily endorsing rectal pain and burning where she is having frequent diarrhea.  Patient was hypothermic on arrival to 96.3, intermittently tachycardic and catheter sites appear poorly cared for.  Her abdomen is soft and nontender.  No evidence of significant sacral wound or perineum infection on visual inspection.  Lab work appears to show she has not received dialysis sometime, BUN greater than 100 with an anion gap of 31.  Lactate is mildly elevated at 2.6 and Pro-Sukhi is elevated.  Troponin also elevated in the 200s, increased from baseline, with new EKG changes with ST depressions in the lateral leads, but on repeat troponin is downtrending and suspect this is more stress-induced.  Will prophylactically treat for sepsis of unknown etiology, possible peritonitis.  Plan to speak with renal and admit.    I reviewed EKG which shows normal sinus rhythm at 75 bpm.  , QRS 80, QTc 498.  There are deep T wave inversions and ST depressions in V5-V6.  No acute ST elevations.  T wave inversions appear new from prior EKG.    Critical Care:  Due to the immediate potential for life-threatening deterioration due to altered mental status, renal failure, NSTEMI, I spent 36 minutes

## 2024-08-22 NOTE — ED NOTES
Pt desat to 88% on RA, pt placed on 1L NC and recovered to 94% on 1L NC. Pt resting and responds to voice.     Ama Evans RN  08/22/24 1329

## 2024-08-22 NOTE — H&P
3 times daily (with meals) 9/8/22   Timo Mcgarry MD   bisacodyl (DULCOLAX) 10 MG suppository Place 10 mg rectally daily as needed for Constipation    Parveen Fang MD   buPROPion (WELLBUTRIN SR) 150 MG extended release tablet Take 1 tablet by mouth daily 8/24/22   John Taylor MD   metoprolol succinate (TOPROL XL) 25 MG extended release tablet Take 1 tablet by mouth daily 8/24/22   John Taylor MD   aspirin 81 MG chewable tablet Take 1 tablet by mouth daily 8/25/22   John Taylor MD   melatonin 5 MG TABS tablet Take 1 tablet by mouth nightly 8/24/22   John Taylor MD   meclizine (ANTIVERT) 12.5 MG tablet Take 1 tablet by mouth 3 times daily as needed for Dizziness    Parveen Fang MD   sennosides-docusate sodium (SENOKOT-S) 8.6-50 MG tablet Take 1 tablet by mouth in the morning. 8/15/22   Gabby Bell PA   rosuvastatin (CRESTOR) 40 MG tablet Take 40 mg by mouth at bedtime    Parveen Fang MD   calcitRIOL (ROCALTROL) 0.25 MCG capsule Take 0.25 mcg by mouth in the morning.    Parveen Fang MD   benztropine (COGENTIN) 1 MG tablet Take 1 mg by mouth at bedtime    Parveen Fang MD   doxepin (SINEQUAN) 50 MG capsule Take 50 mg by mouth nightly One hour before bedtime  9/8/22  Parveen Fang MD   NIFEdipine (ADALAT CC) 30 MG extended release tablet Take 30 mg by mouth in the morning and 30 mg before bedtime.  7/27/22  Parveen Fang MD       Physical Exam:    Physical Exam     General: Chronically ill looking, confused  Eyes: EOMI  ENT: neck supple  Cardiovascular: Regular rate and rhythm, no murmur noted.  Respiratory: Clear to auscultation, no wheeze no rhonchi  Gastrointestinal: Soft, non tender, bowel sound present, PD catheter present  Genitourinary: no suprapubic tenderness  Musculoskeletal: No edema  Skin: warm, dry  Neuro: Alert oriented x 2, no focal neurodeficit noted.  Psych: Mood appropriate.   Dialysis catheter on right side of  erosive changes at epicentered at L5-S1 with vacuum disc phenomenon present.     Moderate burden of diffuse ascites, with peritoneal dialysis catheter in place. Moderate right and small left pleural effusions with patchy bibasilar airspace disease and/or atelectasis. Diffuse anasarca. No CT evidence of an acute intra-abdominal process. Electronically signed by Carlie Alas DO    XR CHEST PORTABLE    Result Date: 8/22/2024  Chest History: Dyspnea Number of views: 1     Mild pulmonary edema. Small bilateral pleural effusions. Cardiomegaly. Right jugular dialysis catheter in standard position. Electronically signed by Urban Russell        Electronically signed by Priscila Ham MD on 8/22/2024 at 2:45 PM          Comment: Please note this report has been produced using speech recognition software and may contain errors related to that system including errors in grammar, punctuation, and spelling, as well as words and phrases that may be inappropriate. If there are any questions or concerns, please feel free to contact the dictating provider for clarification.

## 2024-08-22 NOTE — CONSULTS
Pharmacy Consult Note - ED Vancomycin          Pharmacy consulted by Dr. Huff in ED to order first dose of vancomycin.    Indication :   Sepsis  (pt ESRD on PD)     Patient Data:    (Ht wt obtained from ED encounter [facility] on 8/19/24)--  Height:  67\"    Weight: 66.7 kg       Plan:    Will order vancomycin 1750 mg   ( 26 mg/ kg) x 1 dose, to be administered in ED.      Please note this consult covers ED vancomycin dose only.  If admitting provider would like further vancomycin dose management by pharmacy, please place additional consult order.    Please call with any questions    Maritza Rivero  Pharm.D. BCPS  [phone] (Main Pharmacy)

## 2024-08-23 LAB
ANION GAP SERPL CALCULATED.3IONS-SCNC: 22 MMOL/L (ref 3–16)
APPEARANCE FLUID: CLEAR
BDY FLUID QUALITY: NORMAL
BUN SERPL-MCNC: 57 MG/DL (ref 7–20)
CALCIUM SERPL-MCNC: 8.1 MG/DL (ref 8.3–10.6)
CELL COUNT FLUID TYPE: NORMAL
CHLORIDE SERPL-SCNC: 92 MMOL/L (ref 99–110)
CO2 SERPL-SCNC: 26 MMOL/L (ref 21–32)
COLOR FLUID: YELLOW
CREAT SERPL-MCNC: 7.2 MG/DL (ref 0.6–1.1)
DEPRECATED RDW RBC AUTO: 18.7 % (ref 12.4–15.4)
GFR SERPLBLD CREATININE-BSD FMLA CKD-EPI: 6 ML/MIN/{1.73_M2}
GLUCOSE BLD-MCNC: 117 MG/DL (ref 70–99)
GLUCOSE BLD-MCNC: 132 MG/DL (ref 70–99)
GLUCOSE BLD-MCNC: 145 MG/DL (ref 70–99)
GLUCOSE SERPL-MCNC: 162 MG/DL (ref 70–99)
HCT VFR BLD AUTO: 34.9 % (ref 36–48)
HGB BLD-MCNC: 10.7 G/DL (ref 12–16)
LYMPHOCYTES NFR FLD: 92 %
MCH RBC QN AUTO: 27.6 PG (ref 26–34)
MCHC RBC AUTO-ENTMCNC: 30.7 G/DL (ref 31–36)
MCV RBC AUTO: 89.9 FL (ref 80–100)
MONOCYTES NFR FLD: 5 %
NEUTROPHIL, FLUID: 3 %
NUC CELL # FLD: 30 /CUMM
PERFORMED ON: ABNORMAL
PLATELET # BLD AUTO: 148 K/UL (ref 135–450)
PMV BLD AUTO: 8.9 FL (ref 5–10.5)
POTASSIUM SERPL-SCNC: 4.1 MMOL/L (ref 3.5–5.1)
RBC # BLD AUTO: 3.89 M/UL (ref 4–5.2)
RBC FLUID: <1000 /CUMM
SODIUM SERPL-SCNC: 140 MMOL/L (ref 136–145)
TOTAL CELLS COUNTED FLD: 100
VANCOMYCIN SERPL-MCNC: 6.6 UG/ML
WBC # BLD AUTO: 7 K/UL (ref 4–11)

## 2024-08-23 PROCEDURE — 2580000003 HC RX 258: Performed by: INTERNAL MEDICINE

## 2024-08-23 PROCEDURE — 80202 ASSAY OF VANCOMYCIN: CPT

## 2024-08-23 PROCEDURE — 97162 PT EVAL MOD COMPLEX 30 MIN: CPT

## 2024-08-23 PROCEDURE — 6360000002 HC RX W HCPCS: Performed by: INTERNAL MEDICINE

## 2024-08-23 PROCEDURE — 90945 DIALYSIS ONE EVALUATION: CPT

## 2024-08-23 PROCEDURE — 85027 COMPLETE CBC AUTOMATED: CPT

## 2024-08-23 PROCEDURE — 80048 BASIC METABOLIC PNL TOTAL CA: CPT

## 2024-08-23 PROCEDURE — 97530 THERAPEUTIC ACTIVITIES: CPT

## 2024-08-23 PROCEDURE — 1200000000 HC SEMI PRIVATE

## 2024-08-23 PROCEDURE — 87205 SMEAR GRAM STAIN: CPT

## 2024-08-23 PROCEDURE — 97166 OT EVAL MOD COMPLEX 45 MIN: CPT

## 2024-08-23 PROCEDURE — 97535 SELF CARE MNGMENT TRAINING: CPT

## 2024-08-23 PROCEDURE — 87070 CULTURE OTHR SPECIMN AEROBIC: CPT

## 2024-08-23 PROCEDURE — 36415 COLL VENOUS BLD VENIPUNCTURE: CPT

## 2024-08-23 PROCEDURE — 6370000000 HC RX 637 (ALT 250 FOR IP): Performed by: INTERNAL MEDICINE

## 2024-08-23 PROCEDURE — 90935 HEMODIALYSIS ONE EVALUATION: CPT

## 2024-08-23 RX ORDER — MELATONIN 10 MG
10 CAPSULE ORAL NIGHTLY
COMMUNITY

## 2024-08-23 RX ORDER — GLUCAGON 1 MG/ML
1 KIT INJECTION PRN
Status: DISCONTINUED | OUTPATIENT
Start: 2024-08-23 | End: 2024-08-29 | Stop reason: HOSPADM

## 2024-08-23 RX ORDER — PANTOPRAZOLE SODIUM 40 MG/1
40 TABLET, DELAYED RELEASE ORAL DAILY
COMMUNITY

## 2024-08-23 RX ORDER — VALSARTAN 40 MG/1
40 TABLET ORAL DAILY
COMMUNITY

## 2024-08-23 RX ORDER — HEPARIN SODIUM 1000 [USP'U]/ML
3200 INJECTION, SOLUTION INTRAVENOUS; SUBCUTANEOUS PRN
Status: DISCONTINUED | OUTPATIENT
Start: 2024-08-23 | End: 2024-08-29 | Stop reason: HOSPADM

## 2024-08-23 RX ORDER — NIFEDIPINE 60 MG/1
60 TABLET, EXTENDED RELEASE ORAL 2 TIMES DAILY
COMMUNITY

## 2024-08-23 RX ORDER — ARIPIPRAZOLE 10 MG/1
10 TABLET ORAL DAILY
COMMUNITY

## 2024-08-23 RX ORDER — HYDROXYZINE PAMOATE 50 MG/1
50 CAPSULE ORAL 2 TIMES DAILY
COMMUNITY

## 2024-08-23 RX ORDER — ACETAMINOPHEN 500 MG
500 TABLET ORAL 2 TIMES DAILY
COMMUNITY

## 2024-08-23 RX ORDER — CLOPIDOGREL BISULFATE 75 MG/1
75 TABLET ORAL DAILY
COMMUNITY

## 2024-08-23 RX ORDER — SENNOSIDES A AND B 8.6 MG/1
1 TABLET, FILM COATED ORAL DAILY
COMMUNITY

## 2024-08-23 RX ORDER — LORAZEPAM 2 MG/ML
1 INJECTION INTRAMUSCULAR
Status: ACTIVE | OUTPATIENT
Start: 2024-08-23 | End: 2024-08-24

## 2024-08-23 RX ORDER — INSULIN LISPRO 100 [IU]/ML
0-8 INJECTION, SOLUTION INTRAVENOUS; SUBCUTANEOUS
Status: DISCONTINUED | OUTPATIENT
Start: 2024-08-23 | End: 2024-08-29 | Stop reason: HOSPADM

## 2024-08-23 RX ORDER — POTASSIUM CHLORIDE 1500 MG/1
20 TABLET, EXTENDED RELEASE ORAL DAILY
COMMUNITY

## 2024-08-23 RX ORDER — VIT B COMP NO.3/FOLIC/C/BIOTIN 1 MG-60 MG
1 TABLET ORAL DAILY
COMMUNITY

## 2024-08-23 RX ORDER — INSULIN LISPRO 100 [IU]/ML
0-4 INJECTION, SOLUTION INTRAVENOUS; SUBCUTANEOUS NIGHTLY
Status: DISCONTINUED | OUTPATIENT
Start: 2024-08-23 | End: 2024-08-29 | Stop reason: HOSPADM

## 2024-08-23 RX ORDER — DEXTROSE MONOHYDRATE 100 MG/ML
INJECTION, SOLUTION INTRAVENOUS CONTINUOUS PRN
Status: DISCONTINUED | OUTPATIENT
Start: 2024-08-23 | End: 2024-08-29 | Stop reason: HOSPADM

## 2024-08-23 RX ORDER — SUCROFERRIC OXYHYDROXIDE 500 MG/1
500 TABLET, CHEWABLE ORAL
COMMUNITY

## 2024-08-23 RX ADMIN — HYDROXYZINE HYDROCHLORIDE 25 MG: 25 TABLET ORAL at 10:45

## 2024-08-23 RX ADMIN — MIRTAZAPINE 7.5 MG: 15 TABLET, FILM COATED ORAL at 20:05

## 2024-08-23 RX ADMIN — SEVELAMER CARBONATE 1600 MG: 800 TABLET, FILM COATED ORAL at 17:31

## 2024-08-23 RX ADMIN — ASPIRIN 81 MG: 81 TABLET, CHEWABLE ORAL at 10:45

## 2024-08-23 RX ADMIN — SODIUM CHLORIDE: 9 INJECTION, SOLUTION INTRAVENOUS at 12:45

## 2024-08-23 RX ADMIN — SODIUM CHLORIDE 1250 MG: 9 INJECTION, SOLUTION INTRAVENOUS at 17:39

## 2024-08-23 RX ADMIN — SODIUM CHLORIDE, PRESERVATIVE FREE 10 ML: 5 INJECTION INTRAVENOUS at 20:10

## 2024-08-23 RX ADMIN — BUPROPION HYDROCHLORIDE 150 MG: 150 TABLET, EXTENDED RELEASE ORAL at 10:44

## 2024-08-23 RX ADMIN — SEVELAMER CARBONATE 1600 MG: 800 TABLET, FILM COATED ORAL at 12:52

## 2024-08-23 RX ADMIN — ROSUVASTATIN CALCIUM 40 MG: 20 TABLET, COATED ORAL at 20:04

## 2024-08-23 RX ADMIN — HEPARIN SODIUM 5000 UNITS: 5000 INJECTION INTRAVENOUS; SUBCUTANEOUS at 14:00

## 2024-08-23 RX ADMIN — HEPARIN SODIUM 5000 UNITS: 5000 INJECTION INTRAVENOUS; SUBCUTANEOUS at 07:35

## 2024-08-23 RX ADMIN — SODIUM CHLORIDE, PRESERVATIVE FREE 10 ML: 5 INJECTION INTRAVENOUS at 10:45

## 2024-08-23 RX ADMIN — METOPROLOL SUCCINATE 25 MG: 25 TABLET, EXTENDED RELEASE ORAL at 10:44

## 2024-08-23 RX ADMIN — HYDROXYZINE HYDROCHLORIDE 25 MG: 25 TABLET ORAL at 20:05

## 2024-08-23 RX ADMIN — SEVELAMER CARBONATE 1600 MG: 800 TABLET, FILM COATED ORAL at 08:00

## 2024-08-23 RX ADMIN — CEFEPIME 1000 MG: 1 INJECTION, POWDER, FOR SOLUTION INTRAMUSCULAR; INTRAVENOUS at 12:46

## 2024-08-23 NOTE — PROGRESS NOTES
Treatment initiated without complications or complaints from patient. Patient resting comfortably with VSS upon dialysis RN exit from room. PEACE Rico notified of start of treatment and hotline number located on top of machine for any questions about troubleshooting during after hours.

## 2024-08-23 NOTE — PROGRESS NOTES
Raw Score : 13  AM-PAC Inpatient T-Scale Score : 36.74  Mobility Inpatient CMS 0-100% Score: 64.91  Mobility Inpatient CMS G-Code Modifier : CL         Tinneti Score       Goals  Short Term Goals  Time Frame for Short Term Goals: By discharge  Short Term Goal 1: Sup to sit modified independent  Short Term Goal 2: Pt will transfer sit to stand supervision  Short Term Goal 3: Pt will amb >50' with LRAD supervision       Education  Patient Education  Education Given To: Patient  Education Provided: Role of Therapy;Plan of Care  Education Provided Comments: need for assist, importance of OOB, use of call light  Education Method: Verbal  Education Outcome: Verbalized understanding;Continued education needed      Therapy Time   Individual Concurrent Group Co-treatment   Time In 0858         Time Out 0943         Minutes 45             Timed Code Treatment Minutes: 30      Total Treatment Minutes:  45      Lucila Schrader PT

## 2024-08-23 NOTE — PLAN OF CARE
Problem: Discharge Planning  Goal: Discharge to home or other facility with appropriate resources  Outcome: Progressing  Flowsheets (Taken 8/23/2024 0213)  Discharge to home or other facility with appropriate resources:   Arrange for needed discharge resources and transportation as appropriate   Identify barriers to discharge with patient and caregiver   Identify discharge learning needs (meds, wound care, etc)   Refer to discharge planning if patient needs post-hospital services based on physician order or complex needs related to functional status, cognitive ability or social support system     Problem: Chronic Conditions and Co-morbidities  Goal: Patient's chronic conditions and co-morbidity symptoms are monitored and maintained or improved  Outcome: Progressing  Flowsheets (Taken 8/23/2024 0213)  Care Plan - Patient's Chronic Conditions and Co-Morbidity Symptoms are Monitored and Maintained or Improved:   Monitor and assess patient's chronic conditions and comorbid symptoms for stability, deterioration, or improvement   Collaborate with multidisciplinary team to address chronic and comorbid conditions and prevent exacerbation or deterioration   Update acute care plan with appropriate goals if chronic or comorbid symptoms are exacerbated and prevent overall improvement and discharge     Problem: Pain  Goal: Verbalizes/displays adequate comfort level or baseline comfort level  Outcome: Progressing  Flowsheets (Taken 8/23/2024 0213)  Verbalizes/displays adequate comfort level or baseline comfort level:   Encourage patient to monitor pain and request assistance   Assess pain using appropriate pain scale   Administer analgesics based on type and severity of pain and evaluate response   Implement non-pharmacological measures as appropriate and evaluate response   Consider cultural and social influences on pain and pain management   Notify Licensed Independent Practitioner if interventions unsuccessful or patient

## 2024-08-23 NOTE — PROGRESS NOTES
Treatment time: 6 Hours 42 minutes  Net UF: 547 ml  Dwell Time: 1 hour 29 minutes Gained  Treatment completed without complications or complaints from patient. Effluent clear yellow and lines taped to patient per protocol. Patient resting comfortably with VSS upon exiting room.      Copy of dialysis treatment record placed in chart, to be scanned into EMR.

## 2024-08-23 NOTE — PROGRESS NOTES
Treatment time: 2 hours    Net UF: 1 liter    Pre weight: 55.2 kg  Post weight: 54.1 kg    Access used: Right CVC  Access function: Access site had clean and dry exit site. No signs of infection noted. Dressing changed.    Medications or blood products given: none      Regular outpatient schedule: On pD    Summary of response to treatment: 14:11:Treatment set at 1 liter within 2 liters as ordered via right CVC. Access site had good bruit and thrill on both ports. Cleaned site aseptically. Hooked to Hd machine.  16:11: Treatment completed. Returned Blood aseptically. Hd tolerated well.  Copy of dialysis treatment record placed in chart, to be scanned into EMR.

## 2024-08-23 NOTE — PROGRESS NOTES
increased time / ongoing cues for tech.  Vision  Vision:  (appears WFL)  Hearing  Hearing:  (appears WFL)  Cognition  Overall Cognitive Status: Exceptions  Arousal/Alertness: Impaired  Following Commands: Follows one step commands with increased time  Attention Span: Attends with cues to redirect  Memory: Decreased recall of recent events  Safety Judgement: Decreased awareness of need for assistance  Problem Solving: Impaired  Insights: Decreased awareness of deficits  Initiation: Requires cues for all  Sequencing: Requires cues for all  Cognition Comment: Slow processing, sleepy throughout session  Orientation  Overall Orientation Status: Within Functional Limits (slow to respond to questions)  Orientation Level: Disoriented to time      Education Given To: Patient  Education Provided: Role of Therapy;Transfer Training  Education Provided Comments: Pt educated on the importance of mobility to gain strength and function.  Education Method: Demonstration  Barriers to Learning: Cognition;Lack of Family Support  Education Outcome: Continued education needed            AM-PAC - ADL  AM-PAC Daily Activity - Inpatient   How much help is needed for putting on and taking off regular lower body clothing?: Total  How much help is needed for bathing (which includes washing, rinsing, drying)?: A Lot  How much help is needed for toileting (which includes using toilet, bedpan, or urinal)?: A Lot  How much help is needed for putting on and taking off regular upper body clothing?: A Lot  How much help is needed for taking care of personal grooming?: A Lot  How much help for eating meals?: A Little  AM-PAC Inpatient Daily Activity Raw Score: 12  AM-PAC Inpatient ADL T-Scale Score : 30.6  ADL Inpatient CMS 0-100% Score: 66.57  ADL Inpatient CMS G-Code Modifier : CL      Goals  Short Term Goals  Time Frame for Short Term Goals: at d/c  Short Term Goal 1: Feeding with supervision  Short Term Goal 2: Sit EOB x 10 with supervision for  ADLs/ IADLs  Short Term Goal 3: Functional transfers with CGA  Short Term Goal 4: Toileting with CGA  Patient Goals   Patient goals : unable to state      Therapy Time   Individual Concurrent Group Co-treatment   Time In 0858         Time Out 0945         Minutes 47             Timed Code Treatment Minutes:   27 mins     Total Treatment Minutes:  47 mins       Maritza Sánchez, OT

## 2024-08-23 NOTE — CONSULTS
Clinical Pharmacy Progress Note  Medication History     Pharmacy consulted to verify home medication list by Dr. Ham.    List of of current medications patient is taking is complete. Home Medication list in EPIC updated to reflect changes noted below.    Source of information: [facility] ([phone]), pharmacy fills via [employer]     Changes made to medication list:   Medications removed:   Amlodipine  Bisacodyl  Ferrous sulfate  Meclizine  Senna-S  Sevelamer    Medications added:   Acetaminophen 500mg po BID  Aripiprazole 10mg po daily  Nephrocaps 1 mg po daily  Clopidogrel 75mg po daily  Hydroxyzine pamoate 50mg po BID  Nifedipine ER 60mg po BID  Pantoprazole 40mg po daily  Senna 1 tablet po daily  Velphoro 500mg po TID with meals  Valsartan 40mg po daily    Medication doses adjusted / updated:   Benztropine - takes 0.5mg po qhs  Melatonin - takes 10mg po qhs    Please call with questions--  Thanks--  Janey Pandey, PharmD, BCPS, BCGP  [id number] ([facility])   8/23/2024 7:44 AM      Current Outpatient Medications   Medication Instructions    acetaminophen (TYLENOL) 500 mg, Oral, 2 times daily    ARIPiprazole (ABILIFY) 10 mg, Oral, DAILY    aspirin 81 mg, Oral, DAILY    B complex-vitamin C-folic acid (NEPHRO-JESUS) 1 MG tablet 1 tablet, Oral, DAILY    benztropine (COGENTIN) 0.5 mg, Oral, Nightly    buPROPion (WELLBUTRIN SR) 150 mg, Oral, DAILY    calcitRIOL (ROCALTROL) 0.25 mcg, Oral, DAILY    clopidogrel (PLAVIX) 75 mg, Oral, DAILY    hydrOXYzine pamoate (VISTARIL) 50 mg, Oral, 2 times daily    melatonin 10 mg, Oral, NIGHTLY    metoprolol succinate (TOPROL XL) 25 mg, Oral, DAILY    mirtazapine (REMERON) 7.5 mg, Oral, NIGHTLY    NIFEdipine (PROCARDIA XL) 60 mg, Oral, 2 times daily    pantoprazole (PROTONIX) 40 mg, Oral, DAILY    potassium chloride (KLOR-CON M) 20 MEQ extended release tablet 20 mEq, Oral, DAILY    rosuvastatin (CRESTOR) 40 mg, Oral, Nightly    senna (SENOKOT) 8.6 MG tablet 1 tablet,

## 2024-08-23 NOTE — PROGRESS NOTES
Ph: [phone], Fax: [phone]                                     [facility]                                                   [address] [place] [address]           Reason for admission:             AMS    Brief Summary:     Cindi Rodriguez is being seen by nephrology for ESRD.     Interval History and Plan:   Patient seen at bedside  Comfortable  More awake and comfortable  Not agitated  Patient has PD and 2 hour HD yesterday.   /87  On 1 - 2 LPM oxygen  Labs reviewed. Lytes stable.   BUN much better  Hb 10.7  Urine toxicology was + for cocaine.   Blood Cx neg so far        HD today for 2 hours today with UF as tolerated.   Advised dialysis nurse to send PD effluent for cell count and culture to rule out peritonitis.   Continue antibiotics for now.   Monitor Hb and lytes.   Start Epogen once Hb < 10  Do not use beta blocker given + cocaine on screening. BP is stable so far.   Local PD and TDC care.   Dose medications according to GFR  Plan to remove PD catheter this admission. But prior to this want to check PD fluid cell and culture.        D/W dialysis nurse and orders placed  D/W  primary service.   D/W RN            Thank you for allowing us to participate in this patient's care  In case of any question please call us at our 24 hour answering service [phone] or from 7 AM to 5 PM via [employer], [employer], Epic chat or cell phone.   Dr Brittney Castaneda MD      Assessment:     ESRD  Was on PD and plan was to transition to iHD as patient failed PD but non compliant and did not have HD for last 2 weeks or so per [facility] RN.   Access: TDC and also has PD catheter.   PD catheter was examined upon admission  and no obvious discharge noted but its not well kept and appear dirty. TDC appear clean and no obvious discharge  Volume status: Volume overloaded overall but no respiratory distress .       ? Abdominal pain  Rule out

## 2024-08-23 NOTE — PROGRESS NOTES
[facility] -  Clinical Pharmacy Note    Vancomycin - Management by Pharmacy    Consult Date(s): 8/22/24  Consulting Provider(s): Dr. Ham    Assessment / Plan  1)  Sepsis, r/o peritonitis - Vancomycin  Concurrent Antimicrobials: Cefepime   Day of Vanc Therapy / Ordered Duration:  day 2 of 7  Current Dosing Method: Intermittent Dosing by Levels  Therapeutic Goal: Trough ~ 15 mg/L  Current Dose / Plan:   Pt is ESRD, currently supposed to be on iHD Tue-Thur-Sat as outpt, but has not had HD in ~2 weeks prior to admission per notes due to noncompliance.  Previously was on PD.  Given ESRD, will dose Vancomycin intermittently based on levels.  Pt had HD x2 hrs last evening, and CCPD overnight.   Random level this AM = 6.6 mcg/mL.  Planning for HD x2 hrs today per nephro.  Will re-load with 1250mg IV x1 with / after HD today given low level.  Will check random level in AM tomorrow and f/u on further dialysis (HD vs PD) plans to determine timing of next dose.  Will continue to monitor clinical condition and make adjustments to regimen as appropriate.    Please call with questions--  Thanks--  Janey Pandey, PharmD, BCPS, BCGP  [id number] ([facility])   8/23/2024 8:55 AM      Interval update:  Pt had 2 hrs HD last evening, and CCPD overnight.  PD fluid culture ordered (sent?).  Planning for HD x2 hrs today.    Subjective/Objective:   Cindi Rodriguez is a 59 y.o. female with a PMHx significant for ESRD on dialysis, anxiety, bipolar disorder, depression, DM, an HTN.  Pt was previously on PD with plans to switch to iHD, but pt was non-compliant and has not had HD for the last 2 weeks or so per notes.  Pt is now admitted with sepsis - ruling out peritonitis.     Pharmacy is consulted to dose Vancomycin.    Ht Readings from Last 1 Encounters:   08/23/24 1.727 m (5' 8\")     Wt Readings from Last 1 Encounters:   08/23/24 55.2 kg (121 lb 11.1 oz)     Current & Prior Antimicrobial Regimen(s):  Cefepime

## 2024-08-24 ENCOUNTER — APPOINTMENT (OUTPATIENT)
Dept: GENERAL RADIOLOGY | Age: 59
End: 2024-08-24
Payer: MEDICAID

## 2024-08-24 ENCOUNTER — APPOINTMENT (OUTPATIENT)
Dept: CT IMAGING | Age: 59
End: 2024-08-24
Payer: MEDICAID

## 2024-08-24 LAB
ANION GAP SERPL CALCULATED.3IONS-SCNC: 19 MMOL/L (ref 3–16)
BUN SERPL-MCNC: 34 MG/DL (ref 7–20)
CALCIUM SERPL-MCNC: 8.2 MG/DL (ref 8.3–10.6)
CHLORIDE SERPL-SCNC: 96 MMOL/L (ref 99–110)
CO2 SERPL-SCNC: 25 MMOL/L (ref 21–32)
CREAT SERPL-MCNC: 5.2 MG/DL (ref 0.6–1.1)
DEPRECATED RDW RBC AUTO: 18.6 % (ref 12.4–15.4)
GFR SERPLBLD CREATININE-BSD FMLA CKD-EPI: 9 ML/MIN/{1.73_M2}
GLUCOSE BLD-MCNC: 141 MG/DL (ref 70–99)
GLUCOSE BLD-MCNC: 203 MG/DL (ref 70–99)
GLUCOSE BLD-MCNC: 60 MG/DL (ref 70–99)
GLUCOSE BLD-MCNC: 72 MG/DL (ref 70–99)
GLUCOSE BLD-MCNC: 92 MG/DL (ref 70–99)
GLUCOSE SERPL-MCNC: 91 MG/DL (ref 70–99)
HCT VFR BLD AUTO: 33.8 % (ref 36–48)
HGB BLD-MCNC: 10.6 G/DL (ref 12–16)
MCH RBC QN AUTO: 27.9 PG (ref 26–34)
MCHC RBC AUTO-ENTMCNC: 31.4 G/DL (ref 31–36)
MCV RBC AUTO: 88.9 FL (ref 80–100)
PERFORMED ON: ABNORMAL
PERFORMED ON: NORMAL
PERFORMED ON: NORMAL
PHOSPHATE SERPL-MCNC: 4.7 MG/DL (ref 2.5–4.9)
PLATELET # BLD AUTO: 98 K/UL (ref 135–450)
PMV BLD AUTO: 8.8 FL (ref 5–10.5)
POTASSIUM SERPL-SCNC: 4.1 MMOL/L (ref 3.5–5.1)
RBC # BLD AUTO: 3.8 M/UL (ref 4–5.2)
SODIUM SERPL-SCNC: 140 MMOL/L (ref 136–145)
VANCOMYCIN SERPL-MCNC: 21 UG/ML
WBC # BLD AUTO: 7.6 K/UL (ref 4–11)

## 2024-08-24 PROCEDURE — 6360000002 HC RX W HCPCS: Performed by: INTERNAL MEDICINE

## 2024-08-24 PROCEDURE — 80202 ASSAY OF VANCOMYCIN: CPT

## 2024-08-24 PROCEDURE — 99223 1ST HOSP IP/OBS HIGH 75: CPT | Performed by: PSYCHIATRY & NEUROLOGY

## 2024-08-24 PROCEDURE — 36415 COLL VENOUS BLD VENIPUNCTURE: CPT

## 2024-08-24 PROCEDURE — 80048 BASIC METABOLIC PNL TOTAL CA: CPT

## 2024-08-24 PROCEDURE — 6370000000 HC RX 637 (ALT 250 FOR IP): Performed by: NURSE PRACTITIONER

## 2024-08-24 PROCEDURE — 71046 X-RAY EXAM CHEST 2 VIEWS: CPT

## 2024-08-24 PROCEDURE — 90935 HEMODIALYSIS ONE EVALUATION: CPT

## 2024-08-24 PROCEDURE — 6370000000 HC RX 637 (ALT 250 FOR IP): Performed by: INTERNAL MEDICINE

## 2024-08-24 PROCEDURE — 84100 ASSAY OF PHOSPHORUS: CPT

## 2024-08-24 PROCEDURE — 85027 COMPLETE CBC AUTOMATED: CPT

## 2024-08-24 PROCEDURE — 2700000000 HC OXYGEN THERAPY PER DAY

## 2024-08-24 PROCEDURE — 70450 CT HEAD/BRAIN W/O DYE: CPT

## 2024-08-24 PROCEDURE — 1200000000 HC SEMI PRIVATE

## 2024-08-24 PROCEDURE — 94761 N-INVAS EAR/PLS OXIMETRY MLT: CPT

## 2024-08-24 PROCEDURE — 6360000002 HC RX W HCPCS: Performed by: NURSE PRACTITIONER

## 2024-08-24 PROCEDURE — 94640 AIRWAY INHALATION TREATMENT: CPT

## 2024-08-24 PROCEDURE — 2580000003 HC RX 258: Performed by: INTERNAL MEDICINE

## 2024-08-24 RX ORDER — ALBUTEROL SULFATE 0.83 MG/ML
2.5 SOLUTION RESPIRATORY (INHALATION) EVERY 6 HOURS PRN
Status: DISCONTINUED | OUTPATIENT
Start: 2024-08-24 | End: 2024-08-29 | Stop reason: HOSPADM

## 2024-08-24 RX ORDER — LORAZEPAM 0.5 MG/1
0.5 TABLET ORAL EVERY 4 HOURS PRN
Status: DISCONTINUED | OUTPATIENT
Start: 2024-08-24 | End: 2024-08-29 | Stop reason: HOSPADM

## 2024-08-24 RX ADMIN — Medication 16 G: at 11:53

## 2024-08-24 RX ADMIN — SEVELAMER CARBONATE 1600 MG: 800 TABLET, FILM COATED ORAL at 11:52

## 2024-08-24 RX ADMIN — METOPROLOL SUCCINATE 25 MG: 25 TABLET, EXTENDED RELEASE ORAL at 13:16

## 2024-08-24 RX ADMIN — SODIUM CHLORIDE, PRESERVATIVE FREE 10 ML: 5 INJECTION INTRAVENOUS at 13:16

## 2024-08-24 RX ADMIN — HEPARIN SODIUM 5000 UNITS: 5000 INJECTION INTRAVENOUS; SUBCUTANEOUS at 05:29

## 2024-08-24 RX ADMIN — ALBUTEROL SULFATE 2.5 MG: 2.5 SOLUTION RESPIRATORY (INHALATION) at 17:54

## 2024-08-24 RX ADMIN — HEPARIN SODIUM 3200 UNITS: 1000 INJECTION INTRAVENOUS; SUBCUTANEOUS at 10:18

## 2024-08-24 RX ADMIN — PROCHLORPERAZINE EDISYLATE 10 MG: 5 INJECTION INTRAMUSCULAR; INTRAVENOUS at 17:44

## 2024-08-24 RX ADMIN — BUPROPION HYDROCHLORIDE 150 MG: 150 TABLET, EXTENDED RELEASE ORAL at 11:52

## 2024-08-24 RX ADMIN — ASPIRIN 81 MG: 81 TABLET, CHEWABLE ORAL at 11:52

## 2024-08-24 RX ADMIN — SODIUM CHLORIDE, PRESERVATIVE FREE 10 ML: 5 INJECTION INTRAVENOUS at 23:30

## 2024-08-24 RX ADMIN — HEPARIN SODIUM 5000 UNITS: 5000 INJECTION INTRAVENOUS; SUBCUTANEOUS at 15:13

## 2024-08-24 ASSESSMENT — PAIN SCALES - PAIN ASSESSMENT IN ADVANCED DEMENTIA (PAINAD)
CONSOLABILITY: NO NEED TO CONSOLE
TOTALSCORE: 0
CONSOLABILITY: NO NEED TO CONSOLE
BODYLANGUAGE: RELAXED
BODYLANGUAGE: RELAXED
BREATHING: NORMAL
CONSOLABILITY: NO NEED TO CONSOLE
FACIALEXPRESSION: SMILING OR INEXPRESSIVE
BREATHING: NORMAL
BREATHING: NORMAL
BODYLANGUAGE: RELAXED
FACIALEXPRESSION: SMILING OR INEXPRESSIVE
TOTALSCORE: 0
BREATHING: NORMAL
TOTALSCORE: 0
BODYLANGUAGE: RELAXED
CONSOLABILITY: NO NEED TO CONSOLE
FACIALEXPRESSION: SMILING OR INEXPRESSIVE
FACIALEXPRESSION: SMILING OR INEXPRESSIVE
TOTALSCORE: 0

## 2024-08-24 ASSESSMENT — PAIN SCALES - GENERAL
PAINLEVEL_OUTOF10: 0
PAINLEVEL_OUTOF10: 0

## 2024-08-24 NOTE — PLAN OF CARE
Problem: Discharge Planning  Goal: Discharge to home or other facility with appropriate resources  Outcome: Progressing  Flowsheets (Taken 8/24/2024 0014)  Discharge to home or other facility with appropriate resources:   Identify barriers to discharge with patient and caregiver   Arrange for needed discharge resources and transportation as appropriate   Identify discharge learning needs (meds, wound care, etc)   Refer to discharge planning if patient needs post-hospital services based on physician order or complex needs related to functional status, cognitive ability or social support system     Problem: Chronic Conditions and Co-morbidities  Goal: Patient's chronic conditions and co-morbidity symptoms are monitored and maintained or improved  Outcome: Progressing  Flowsheets (Taken 8/24/2024 0014)  Care Plan - Patient's Chronic Conditions and Co-Morbidity Symptoms are Monitored and Maintained or Improved:   Monitor and assess patient's chronic conditions and comorbid symptoms for stability, deterioration, or improvement   Collaborate with multidisciplinary team to address chronic and comorbid conditions and prevent exacerbation or deterioration   Update acute care plan with appropriate goals if chronic or comorbid symptoms are exacerbated and prevent overall improvement and discharge     Problem: Pain  Goal: Verbalizes/displays adequate comfort level or baseline comfort level  Outcome: Progressing  Flowsheets (Taken 8/24/2024 0014)  Verbalizes/displays adequate comfort level or baseline comfort level:   Encourage patient to monitor pain and request assistance   Assess pain using appropriate pain scale   Administer analgesics based on type and severity of pain and evaluate response   Implement non-pharmacological measures as appropriate and evaluate response   Notify Licensed Independent Practitioner if interventions unsuccessful or patient reports new pain     Problem: Skin/Tissue Integrity  Goal: Absence of new

## 2024-08-24 NOTE — CONSULTS
mg/dL   POCT Glucose    Collection Time: 08/24/24  7:37 AM   Result Value Ref Range    POC Glucose 72 70 - 99 mg/dl    Performed on ACCU-CHEK    POCT Glucose    Collection Time: 08/24/24 11:42 AM   Result Value Ref Range    POC Glucose 60 (L) 70 - 99 mg/dl    Performed on ACCU-CHEK    POCT Glucose    Collection Time: 08/24/24 12:27 PM   Result Value Ref Range    POC Glucose 92 70 - 99 mg/dl    Performed on ACCU-CHEK        Scheduled Meds:   insulin lispro  0-8 Units SubCUTAneous TID WC    insulin lispro  0-4 Units SubCUTAneous Nightly    dianeal lo-josh 1.5%  6,000 mL IntraPERitoneal Once    aspirin  81 mg Oral Daily    buPROPion  150 mg Oral Daily    metoprolol succinate  25 mg Oral Daily    mirtazapine  7.5 mg Oral Nightly    rosuvastatin  40 mg Oral Nightly    sevelamer  1,600 mg Oral TID WC    sodium chloride flush  5-40 mL IntraVENous 2 times per day    heparin (porcine)  5,000 Units SubCUTAneous 3 times per day       Continuous Infusions:  dextrose  sodium chloride, Last Rate: 25 mL/hr at 08/23/24 1245         PRN Meds:  glucose, 4 tablet, PRN  dextrose bolus, 125 mL, PRN   Or  dextrose bolus, 250 mL, PRN  glucagon (rDNA), 1 mg, PRN  dextrose, , Continuous PRN  heparin (porcine), 3,200 Units, PRN  LORazepam, 1 mg, Once PRN  sodium chloride flush, 5-40 mL, PRN  sodium chloride, , PRN  potassium chloride, 40 mEq, PRN   Or  potassium alternative oral replacement, 40 mEq, PRN   Or  potassium chloride, 10 mEq, PRN  magnesium sulfate, 2,000 mg, PRN  polyethylene glycol, 17 g, Daily PRN  acetaminophen, 650 mg, Q6H PRN   Or  acetaminophen, 650 mg, Q6H PRN  prochlorperazine, 10 mg, Q6H PRN  hydrOXYzine HCl, 25 mg, TID PRN              Discussed at length with patient; nursing; Dr. Potter; and SAY Vela MD  Neurology/Neurocritical Care  August 24, 2024

## 2024-08-24 NOTE — PROGRESS NOTES
Treatment time: 2 hours  Net UF: 1500 ml     Pre weight: 56.2 kg  Post weight:54.7 kg    Access used: Rtdc    Access function: Good with  ml/min     Medications or blood products given: heparin dwell     Regular outpatient schedule: PD     Summary of response to treatment: Patient tolerated treatment well and without any complications. Patient remained stable throughout entire treatment and upon the exiting the dialysis suite via transport.     Copy of dialysis treatment record placed in chart, to be scanned into EMR.

## 2024-08-24 NOTE — PROGRESS NOTES
Pt stated she was feeling short of breath, O2 sat 100% on 2L of supplemental oxygen. Pedro Griggs notified, advised to just monitor pt at this time.   Electronically signed by Marquis Simon RN on 8/24/2024 at 3:37 PM

## 2024-08-24 NOTE — PROGRESS NOTES
Ph: [phone], Fax: [phone]                                     [facility]                                                   [address] [place] [address]           Reason for admission:             AMS    Brief Summary:     Cindi Rodriguez is being seen by nephrology for ESRD.     Interval History and Plan:   Patient seen during   Comfortable  Confused.   /88  On 2 LPM oxygen  Labs reviewed. Lytes stable.   Hb 10.6  Urine toxicology was + for cocaine.   Blood Cx neg so far  PD fluid cell count is neg for peritonitis.             HD today for 2 hours today with UF as tolerated.   Can stop antibiotics from nephrology perspective if not indicated otherwise. No evidence of peritonitis.   Monitor Hb and lytes.   Start Epogen once Hb < 10  Do not use beta blocker given + cocaine on screening. BP is stable so far.   Local PD and TDC care.   Dose medications according to GFR  Plan to remove PD catheter. Surgery consult.         D/W dialysis nurs  D/W  Dr Potter.         Thank you for allowing us to participate in this patient's care  In case of any question please call us at our 24 hour answering service [phone] or from 7 AM to 5 PM via [employer], [email or web address], Epic chat or cell phone.   Dr Brittney Castaneda MD      Assessment:     ESRD  Was on PD and plan was to transition to iHD as patient failed PD but non compliant and did not have HD for last 2 weeks or so per [facility] RN.   Access: TDC and also has PD catheter.   PD catheter was examined upon admission  and no obvious discharge noted but its not well kept and appear dirty. TDC appear clean and no obvious discharge  Volume status: Volume overloaded overall but no respiratory distress .       ? Abdominal pain  Ruled out peritonitis        Hypertension        Anemia        Encephalopathy.   Likely due to uremia   Rule out infection.           HPI      Patient is a 59 y.o. female  with PMH significant for  9/3/2014    Anxiety     Asthma     Bipolar disorder (HCC)     Bronchitis     Chronic back pain     Depression     Diabetes mellitus (HCC)     DM II (diabetes mellitus, type II), controlled ([facility]) 9/3/2014    Hypertension        Past Surgical History:     Past Surgical History:   Procedure Laterality Date    ENDOMETRIAL ABLATION      INNER EAR SURGERY      TUBAL LIGATION

## 2024-08-24 NOTE — CONSULTS
vancomycin has been discontinued. Pharmacy will sign off consult. If medication dosing is resumed, please re-consult pharmacy.    Tobin Lopez, PharmD  PGY1 Pharmacy Resident   Wireless: [phone]  08/24/24

## 2024-08-24 NOTE — PLAN OF CARE
Problem: Pain  Goal: Verbalizes/displays adequate comfort level or baseline comfort level  Outcome: Progressing   Pt had no complaints of pain on this shift   Problem: Skin/Tissue Integrity    Goal: Absence of new skin breakdown  Description: 1.  Monitor for areas of redness and/or skin breakdown  2.  Assess vascular access sites hourly  3.  Every 4-6 hours minimum:  Change oxygen saturation probe site  4.  Every 4-6 hours:  If on nasal continuous positive airway pressure, respiratory therapy assess nares and determine need for appliance change or resting period.  Outcome: Progressing   Pt noted new skin injuries during this shift     Problem: Safety - Adult  Goal: Free from fall injury  Outcome: Progressing   Pt free of falls during this shift, bed alarm on locked and low call light within reach

## 2024-08-24 NOTE — PROGRESS NOTES
V2.0    [facility] Progress Note      Name:  Cindi Rodriguez /Age/Sex: 1965  (59 y.o. female)   MRN & CSN:  [id number] & [id number] Encounter Date/Time: 2024 10:37 AM EDT   Location:  6301/6301-01 PCP: Alessandra Anderson DO     Attending:Wilberto Potter MD       Hospital Day: 3    Assessment and Recommendations   Cindi Rodriguez is a 59 y.o. female with pmh of end-stage renal disease, type 2 diabetes, hypertension and asthma who presented to the emergency room with complaints of abdominal pain.      Patient had been seen at [facility] on  for generalized weakness.  Patient had not attended dialysis for the past 2 weeks.      Plan:   Acute metabolic encephalopathy-patient with multiple missed dialysis sessions.  Peritonitis ruled out blood cultures pending.  Urine drug screen demonstrated cocaine.   ESRD- recently transitioned from PD to HD  Abdominal pain- CT imaging without acute process.  Await cell count from PD fluid  Type 2 diabetes- Medium dose sliding scale with hypoglycemia    Patient with continued encephalopathy.  No evidence of peritonitis.  Antibiotics discontinued.      I discussed the patient with Dr. David from nephrology.  Dr. David requested that I consult neurology as the patient's mental status was not improving despite dialysis and now that peritonitis has been ruled out.  Diet ADULT DIET; Regular; Low Fat/Low Chol/High Fiber/2 gm Na   DVT Prophylaxis [] Lovenox, []  Heparin, [] SCDs, [] Ambulation,  [] Eliquis, [] Xarelto  [] Coumadin   Code Status Full Code   Disposition From: home  Expected Disposition: TBD   Estimated Date of Discharge: 2-3 days  Patient requires continued admission due to acute encephalopathy   Surrogate Decision Maker/ POA  Per EMR     Personally reviewed Lab Studies and Imaging   2024  Phosphorus, CBC, BMP, point-of-care glucose reviewed      On 2024 drugs that require monitoring for toxicity include insulin and the method of monitoring was POCT  CLARITYU Clear 08/22/2024 02:54 PM    LEUKOCYTESUR Negative 08/22/2024 02:54 PM    UROBILINOGEN 0.2 08/22/2024 02:54 PM    BILIRUBINUR SMALL 08/22/2024 02:54 PM    BLOODU SMALL 08/22/2024 02:54 PM    GLUCOSEU Negative 08/22/2024 02:54 PM    KETUA 15 08/22/2024 02:54 PM    AMORPHOUS 2+ 08/20/2022 11:35 PM     Urine Cultures:   Lab Results   Component Value Date/Time    LABURIN No growth at 18 to 36 hours 09/07/2022 03:40 AM     Blood Cultures:   Lab Results   Component Value Date/Time    BC  08/22/2024 10:42 AM     No Growth to date.  Any change in status will be called.     Lab Results   Component Value Date/Time    BLOODCULT2  08/22/2024 11:03 AM     No Growth to date.  Any change in status will be called.     Organism: No results found for: \"ORG\"      Electronically signed by JUSTYN Davis CNP on 8/24/2024 at 12:00 PM

## 2024-08-25 LAB
GLUCOSE BLD-MCNC: 149 MG/DL (ref 70–99)
GLUCOSE BLD-MCNC: 150 MG/DL (ref 70–99)
GLUCOSE BLD-MCNC: 231 MG/DL (ref 70–99)
GLUCOSE BLD-MCNC: 294 MG/DL (ref 70–99)
PERFORMED ON: ABNORMAL

## 2024-08-25 PROCEDURE — 6370000000 HC RX 637 (ALT 250 FOR IP): Performed by: NURSE PRACTITIONER

## 2024-08-25 PROCEDURE — 99223 1ST HOSP IP/OBS HIGH 75: CPT | Performed by: SURGERY

## 2024-08-25 PROCEDURE — 2580000003 HC RX 258: Performed by: INTERNAL MEDICINE

## 2024-08-25 PROCEDURE — 6360000002 HC RX W HCPCS: Performed by: INTERNAL MEDICINE

## 2024-08-25 PROCEDURE — 1200000000 HC SEMI PRIVATE

## 2024-08-25 PROCEDURE — 6370000000 HC RX 637 (ALT 250 FOR IP): Performed by: INTERNAL MEDICINE

## 2024-08-25 RX ADMIN — SEVELAMER CARBONATE 1600 MG: 800 TABLET, FILM COATED ORAL at 17:00

## 2024-08-25 RX ADMIN — HEPARIN SODIUM 5000 UNITS: 5000 INJECTION INTRAVENOUS; SUBCUTANEOUS at 14:25

## 2024-08-25 RX ADMIN — BUPROPION HYDROCHLORIDE 150 MG: 150 TABLET, EXTENDED RELEASE ORAL at 09:29

## 2024-08-25 RX ADMIN — MIRTAZAPINE 7.5 MG: 15 TABLET, FILM COATED ORAL at 20:06

## 2024-08-25 RX ADMIN — SODIUM CHLORIDE, PRESERVATIVE FREE 10 ML: 5 INJECTION INTRAVENOUS at 20:08

## 2024-08-25 RX ADMIN — ROSUVASTATIN CALCIUM 40 MG: 20 TABLET, COATED ORAL at 20:06

## 2024-08-25 RX ADMIN — LORAZEPAM 0.5 MG: 0.5 TABLET ORAL at 09:31

## 2024-08-25 RX ADMIN — SEVELAMER CARBONATE 1600 MG: 800 TABLET, FILM COATED ORAL at 09:29

## 2024-08-25 RX ADMIN — LORAZEPAM 0.5 MG: 0.5 TABLET ORAL at 22:15

## 2024-08-25 RX ADMIN — SEVELAMER CARBONATE 1600 MG: 800 TABLET, FILM COATED ORAL at 12:14

## 2024-08-25 RX ADMIN — INSULIN LISPRO 2 UNITS: 100 INJECTION, SOLUTION INTRAVENOUS; SUBCUTANEOUS at 09:29

## 2024-08-25 RX ADMIN — METOPROLOL SUCCINATE 25 MG: 25 TABLET, EXTENDED RELEASE ORAL at 09:29

## 2024-08-25 ASSESSMENT — PAIN SCALES - PAIN ASSESSMENT IN ADVANCED DEMENTIA (PAINAD)
FACIALEXPRESSION: SMILING OR INEXPRESSIVE
BREATHING: NORMAL
CONSOLABILITY: NO NEED TO CONSOLE
BREATHING: NORMAL
CONSOLABILITY: NO NEED TO CONSOLE
BREATHING: NORMAL
BREATHING: NORMAL
CONSOLABILITY: NO NEED TO CONSOLE
BODYLANGUAGE: RELAXED
BREATHING: NORMAL
FACIALEXPRESSION: SMILING OR INEXPRESSIVE
FACIALEXPRESSION: SMILING OR INEXPRESSIVE
TOTALSCORE: 0
FACIALEXPRESSION: SMILING OR INEXPRESSIVE
BODYLANGUAGE: RELAXED
TOTALSCORE: 0
CONSOLABILITY: NO NEED TO CONSOLE
TOTALSCORE: 0
BODYLANGUAGE: RELAXED
TOTALSCORE: 0
CONSOLABILITY: NO NEED TO CONSOLE
FACIALEXPRESSION: SMILING OR INEXPRESSIVE
FACIALEXPRESSION: SMILING OR INEXPRESSIVE
BREATHING: NORMAL
FACIALEXPRESSION: SMILING OR INEXPRESSIVE
BODYLANGUAGE: RELAXED
BREATHING: NORMAL
BODYLANGUAGE: RELAXED
BODYLANGUAGE: RELAXED
TOTALSCORE: 0
FACIALEXPRESSION: SMILING OR INEXPRESSIVE
CONSOLABILITY: NO NEED TO CONSOLE
BREATHING: NORMAL
FACIALEXPRESSION: SMILING OR INEXPRESSIVE
BODYLANGUAGE: RELAXED
BODYLANGUAGE: RELAXED
BREATHING: NORMAL
BODYLANGUAGE: RELAXED
TOTALSCORE: 0
FACIALEXPRESSION: SMILING OR INEXPRESSIVE
BREATHING: NORMAL
BODYLANGUAGE: RELAXED
CONSOLABILITY: NO NEED TO CONSOLE
TOTALSCORE: 0
CONSOLABILITY: NO NEED TO CONSOLE
CONSOLABILITY: NO NEED TO CONSOLE
TOTALSCORE: 0
BREATHING: NORMAL
BREATHING: NORMAL
BODYLANGUAGE: RELAXED
TOTALSCORE: 0
TOTALSCORE: 0
BREATHING: NORMAL
CONSOLABILITY: NO NEED TO CONSOLE
TOTALSCORE: 0
CONSOLABILITY: NO NEED TO CONSOLE
TOTALSCORE: 0
TOTALSCORE: 0
BREATHING: NORMAL
TOTALSCORE: 0
CONSOLABILITY: NO NEED TO CONSOLE
FACIALEXPRESSION: SMILING OR INEXPRESSIVE
BODYLANGUAGE: RELAXED
FACIALEXPRESSION: SMILING OR INEXPRESSIVE

## 2024-08-25 NOTE — PLAN OF CARE
Problem: Chronic Conditions and Co-morbidities  Goal: Patient's chronic conditions and co-morbidity symptoms are monitored and maintained or improved  Outcome: Progressing  Flowsheets (Taken 8/24/2024 0014 by Trisha Harris, RN)  Care Plan - Patient's Chronic Conditions and Co-Morbidity Symptoms are Monitored and Maintained or Improved:   Monitor and assess patient's chronic conditions and comorbid symptoms for stability, deterioration, or improvement   Collaborate with multidisciplinary team to address chronic and comorbid conditions and prevent exacerbation or deterioration   Update acute care plan with appropriate goals if chronic or comorbid symptoms are exacerbated and prevent overall improvement and discharge     Problem: Safety - Adult  Goal: Free from fall injury  8/24/2024 2219 by Chino Kiser, RN  Outcome: Progressing  Flowsheets (Taken 8/24/2024 2219)  Free From Fall Injury:   Instruct family/caregiver on patient safety   Based on caregiver fall risk screen, instruct family/caregiver to ask for assistance with transferring infant if caregiver noted to have fall risk factors

## 2024-08-25 NOTE — PROGRESS NOTES
Pt refused night medications. Offered meds a couple of times but still refused. Informed Hospitalist Trisha Mancuso, NP via [employer]. Plan of care ongoing.

## 2024-08-25 NOTE — CARE COORDINATION
Case Management Assessment  Initial Evaluation    Date/Time of Evaluation: 8/25/2024 4:05 PM  Assessment Completed by: Brianna Mcguire RN    If patient is discharged prior to next notation, then this note serves as note for discharge by case management.    Patient Name: Cindi Rodriguez                   YOB: 1965  Diagnosis: Metabolic encephalopathy [G93.41]  Hemodialysis patient (HCC) [Z99.2]  Hypervolemia, unspecified hypervolemia type [E87.70]  Diarrhea, unspecified type [R19.7]                   Date / Time: 8/22/2024  9:59 AM    Patient Admission Status: Inpatient   Readmission Risk (Low < 19, Mod (19-27), High > 27): Readmission Risk Score: 18.9    Current PCP: Alessandra Anderson, DO  PCP verified by CM? Yes    Chart Reviewed: Yes      History Provided by: Patient  Patient Orientation: Alert and Oriented, Person, Place, Situation, Self    Patient Cognition: Alert    Hospitalization in the last 30 days (Readmission):  No    If yes, Readmission Assessment in CM Navigator will be completed.    Advance Directives:      Code Status: Full Code   Patient's Primary Decision Maker is: Legal Next of Kin      Discharge Planning:    Patient lives with: Alone Type of Home: Apartment  Primary Care Giver: Self  Patient Support Systems include: Children   Current Financial resources: Medicaid  Current community resources: None  Current services prior to admission: None            Current DME:              Type of Home Care services:  None    ADLS  Prior functional level: Housework, Shopping, Independent in ADLs/IADLs  Current functional level: Housework, Shopping, Assistance with the following:, Cooking, Mobility    PT AM-PAC: 13 /24  OT AM-PAC: 12 /24    Family can provide assistance at DC: No  Would you like Case Management to discuss the discharge plan with any other family members/significant others, and if so, who? Yes (sons)  Plans to Return to Present Housing: Unknown at present  Other Identified Issues/Barriers  patient ([facility]) [Z99.2]  Hypervolemia, unspecified hypervolemia type [E87.70]  Diarrhea, unspecified type [R19.7]    IF APPLICABLE: The Patient and/or patient representative Cindi and her family were provided with a choice of provider and agrees with the discharge plan. Freedom of choice list with basic dialogue that supports the patient's individualized plan of care/goals and shares the quality data associated with the providers was provided to: Patient   Patient Representative Name:       The Patient and/or Patient Representative Agree with the Discharge Plan? Yes    Brianna Mcguire RN  Case Management Department  Ph: [phone]

## 2024-08-25 NOTE — PLAN OF CARE
Head CT unremarkable. Continue to monitor for improvement. If has not made much improvement by tomorrow, may need to obtain MRI and broaden workup, although overall clinical picture does not suggest primary neurologic etiology.    Wilberto Waite MD  Neurology/Neurocritical Care  8/25/2024

## 2024-08-25 NOTE — PROGRESS NOTES
Ph: [phone], Fax: [phone]                                     [facility]                                                   [address] [place] [address]           Reason for admission:             AMS    Brief Summary:     Cindi Rodriguez is being seen by nephrology for ESRD.     Interval History and Plan:   Patient seen at bedside with nurse  Comfortable  Awake but remain confused and anxious    /88  On  2 LPM oxygen and does not appear volume overloaded.   Urine toxicology was + for cocaine.   Labs pending.   Had dialysis yesterday.   PD fluid Cx neg so far          Plan for HD tomorrow vs Tuesday depending on labs. Avoid aggressive HD.   Follow today's labs.   No evidence of peritonitis and no need for antibiotics from nephrology standpoint.   Monitor Hb and lytes.   Start Epogen once Hb < 10  Do not use beta blocker given + cocaine on screening. BP is stable so far.   Local PD and TDC care.   Dose medications according to GFR  Surgery consulted to remove PD catheter.   Neurology seen for persistent AMS despite HD.           Thank you for allowing us to participate in this patient's care  In case of any question please call us at our 24 hour answering service [phone] or from 7 AM to 5 PM via [employer], Voalte, Epic chat or cell phone.   Dr Brittney Castaneda MD      Assessment:     ESRD  Was on PD and plan was to transition to iHD as patient failed PD but non compliant and did not have HD for last 2 weeks or so per [facility] RN.   Access: TDC and also has PD catheter.   PD catheter was examined upon admission  and no obvious discharge noted but its not well kept and appear dirty. TDC appear clean and no obvious discharge  Volume status: Volume overloaded overall but no respiratory distress .       ? Abdominal pain  Rule out peritonitis        Hypertension        Anemia        Encephalopathy.   Likely due to uremia   Rule out infection.

## 2024-08-25 NOTE — PROGRESS NOTES
Pt refused blood draw as per lab. Tried to explain the importance of getting blood draw but to no avail. Informed Hospitalist Trisha Mancuso NP via [employer] Plan of care ongoing.

## 2024-08-25 NOTE — PLAN OF CARE
Problem: Pain  Goal: Verbalizes/displays adequate comfort level or baseline comfort level  Outcome: Progressing   Pt did not c/o pain during this shift   Problem: Skin/Tissue Integrity  Goal: Absence of new skin breakdown  Description: 1.  Monitor for areas of redness and/or skin breakdown  2.  Assess vascular access sites hourly  3.  Every 4-6 hours minimum:  Change oxygen saturation probe site  4.  Every 4-6 hours:  If on nasal continuous positive airway pressure, respiratory therapy assess nares and determine need for appliance change or resting period.  Outcome: Progressing  Pt noted no new skin issues during this shift      Problem: Safety - Adult  Goal: Free from fall injury  Outcome: Progressing   Pt free on falls during this shift, bed alarm on and locked and low call light within reach

## 2024-08-25 NOTE — PROGRESS NOTES
PRE-OP NOTE  Department of Surgery      Chief Complaint or Reason for Surgery: PD catheter in place    Procedure: Peritoneal dialysis catheter removal  Expected time: , add-on    Plan  1. Diet: NPO at midnight  2. IVF: Per nephrology, patient on HD  3. Antibiotics: Vancomycin on call to OR  4. Labs to be drawn: Will follow up morning renal and CBC  5. Access: HD line in right subclavian, right forearm IV placed   6. Anesthesia: to see patient  7. Consent: Obtained from son and will place in patient's chart.  8. Pulmonary: CXR: 24, WNL  9. Cardiac: EK24, NSR  10. Pregnancy test: s/p tubal ligation    Kaylin Dunaway DO  24  10:48 AM

## 2024-08-25 NOTE — CONSULTS
BP: 113/75 127/88  121/82   Pulse: 60 70  76   Resp: 17 20  20   Temp: 97.2 °F (36.2 °C) 97.2 °F (36.2 °C)  97 °F (36.1 °C)   TempSrc: Oral Axillary  Axillary   SpO2: 97% 100%  100%   Weight:   54 kg (119 lb 0.8 oz)    Height:           General appearance: alert, confused  Eyes: PERRLA, no scleral icterus  Neck: trachea midline, neck supple  Respiratory: Normal effort with no accessory muscle use on RA  Cardiovascular: RRR  Chest: Vas cath to Right IJ. Dressings c/d/i  Abdomen: soft, non-tender, non-distended, PD catheter noted at Q with no erythema, swelling, or drainage.   Skin: warm and dry, no rashes  Extremities: no edema, no cyanosis  Neuro: Alert and oriented to person only. No focal deficits    Labs:    CBC:   Recent Labs     08/22/24  1042 08/23/24  1144 08/24/24  0327   WBC 7.8 7.0 7.6   HGB 11.8* 10.7* 10.6*   HCT 36.9 34.9* 33.8*   MCV 87.8 89.9 88.9    148 98*     BMP:   Recent Labs     08/22/24  1042 08/23/24  1144 08/24/24  0327    140 140   K 4.1 4.1 4.1   CL 79* 92* 96*   CO2 34* 26 25   PHOS  --   --  4.7   * 57* 34*   CREATININE 10.9* 7.2* 5.2*     PT/INR: No results for input(s): \"PROTIME\", \"INR\" in the last 72 hours.  APTT: No results for input(s): \"APTT\" in the last 72 hours.  Liver Profile:   Lab Results   Component Value Date/Time    AST 32 08/22/2024 10:42 AM    ALT 24 08/22/2024 10:42 AM    BILIDIR 0.5 08/22/2024 10:42 AM    BILITOT 1.2 08/22/2024 10:42 AM    ALKPHOS 188 08/22/2024 10:42 AM    PROTEIN 7.3 08/22/2024 10:42 AM    PROTEIN 6.9 05/18/2011 10:15 AM     Lab Results   Component Value Date/Time    CHOL 132 09/03/2014 04:34 AM    HDL 42 09/03/2014 04:34 AM    HDL 48 02/18/2011 10:17 AM    TRIG 207 09/03/2014 04:34 AM     UA:   Lab Results   Component Value Date/Time    COLORU Yellow 08/22/2024 02:54 PM    PHUR 8.0 08/22/2024 02:54 PM    PHUR 8.0 08/22/2024 02:54 PM    PHUR 6.5 06/08/2023 11:40 PM    LABCAST 1-3 Hyaline 08/03/2015 02:00 PM    WBCUA 6-9  PD catheter removal.  - Will place pre-op orders.  - Rest of care per primary team.     Patient seen and discussed with senior resident and Dr. Tonya Alas DO  PGY1, General Surgery  08/25/24  8:51 AM  [phone]

## 2024-08-26 ENCOUNTER — ANESTHESIA EVENT (OUTPATIENT)
Dept: OPERATING ROOM | Age: 59
End: 2024-08-26
Payer: MEDICAID

## 2024-08-26 ENCOUNTER — ANESTHESIA (OUTPATIENT)
Dept: OPERATING ROOM | Age: 59
End: 2024-08-26
Payer: MEDICAID

## 2024-08-26 PROBLEM — L02.211 ABDOMINAL WALL ABSCESS: Status: ACTIVE | Noted: 2024-08-26

## 2024-08-26 PROBLEM — G93.40 ENCEPHALOPATHY: Status: ACTIVE | Noted: 2024-08-26

## 2024-08-26 PROBLEM — Z99.2 PERITONEAL DIALYSIS CATHETER IN PLACE (HCC): Status: ACTIVE | Noted: 2024-08-26

## 2024-08-26 LAB
ALBUMIN SERPL-MCNC: 3.1 G/DL (ref 3.4–5)
ANION GAP SERPL CALCULATED.3IONS-SCNC: 25 MMOL/L (ref 3–16)
BACTERIA BLD CULT ORG #2: NORMAL
BACTERIA BLD CULT: NORMAL
BACTERIA FLD AEROBE CULT: NORMAL
BUN SERPL-MCNC: 36 MG/DL (ref 7–20)
CALCIUM SERPL-MCNC: 7.8 MG/DL (ref 8.3–10.6)
CHLORIDE SERPL-SCNC: 92 MMOL/L (ref 99–110)
CO2 SERPL-SCNC: 16 MMOL/L (ref 21–32)
CREAT SERPL-MCNC: 5.7 MG/DL (ref 0.6–1.1)
DEPRECATED RDW RBC AUTO: 18.9 % (ref 12.4–15.4)
GFR SERPLBLD CREATININE-BSD FMLA CKD-EPI: 8 ML/MIN/{1.73_M2}
GLUCOSE BLD-MCNC: 126 MG/DL (ref 70–99)
GLUCOSE BLD-MCNC: 143 MG/DL (ref 70–99)
GLUCOSE BLD-MCNC: 146 MG/DL (ref 70–99)
GLUCOSE BLD-MCNC: 184 MG/DL (ref 70–99)
GLUCOSE BLD-MCNC: 71 MG/DL (ref 70–99)
GLUCOSE SERPL-MCNC: 82 MG/DL (ref 70–99)
GRAM STN SPEC: NORMAL
HCT VFR BLD AUTO: 33.2 % (ref 36–48)
HGB BLD-MCNC: 10.5 G/DL (ref 12–16)
MCH RBC QN AUTO: 27.9 PG (ref 26–34)
MCHC RBC AUTO-ENTMCNC: 31.7 G/DL (ref 31–36)
MCV RBC AUTO: 88.2 FL (ref 80–100)
PERFORMED ON: ABNORMAL
PERFORMED ON: NORMAL
PHOSPHATE SERPL-MCNC: 4 MG/DL (ref 2.5–4.9)
PHOSPHATE SERPL-MCNC: 4 MG/DL (ref 2.5–4.9)
PLATELET # BLD AUTO: 90 K/UL (ref 135–450)
PMV BLD AUTO: 9.5 FL (ref 5–10.5)
POTASSIUM SERPL-SCNC: 4.5 MMOL/L (ref 3.5–5.1)
RBC # BLD AUTO: 3.76 M/UL (ref 4–5.2)
SODIUM SERPL-SCNC: 133 MMOL/L (ref 136–145)
WBC # BLD AUTO: 8.6 K/UL (ref 4–11)

## 2024-08-26 PROCEDURE — 3700000000 HC ANESTHESIA ATTENDED CARE: Performed by: SURGERY

## 2024-08-26 PROCEDURE — 36415 COLL VENOUS BLD VENIPUNCTURE: CPT

## 2024-08-26 PROCEDURE — 6360000002 HC RX W HCPCS: Performed by: STUDENT IN AN ORGANIZED HEALTH CARE EDUCATION/TRAINING PROGRAM

## 2024-08-26 PROCEDURE — 86706 HEP B SURFACE ANTIBODY: CPT

## 2024-08-26 PROCEDURE — 0WPG03Z REMOVAL OF INFUSION DEVICE FROM PERITONEAL CAVITY, OPEN APPROACH: ICD-10-PCS | Performed by: SURGERY

## 2024-08-26 PROCEDURE — 0J980ZZ DRAINAGE OF ABDOMEN SUBCUTANEOUS TISSUE AND FASCIA, OPEN APPROACH: ICD-10-PCS | Performed by: SURGERY

## 2024-08-26 PROCEDURE — 6370000000 HC RX 637 (ALT 250 FOR IP): Performed by: NURSE PRACTITIONER

## 2024-08-26 PROCEDURE — 84100 ASSAY OF PHOSPHORUS: CPT

## 2024-08-26 PROCEDURE — 3700000001 HC ADD 15 MINUTES (ANESTHESIA): Performed by: SURGERY

## 2024-08-26 PROCEDURE — 7100000000 HC PACU RECOVERY - FIRST 15 MIN: Performed by: SURGERY

## 2024-08-26 PROCEDURE — 3600000002 HC SURGERY LEVEL 2 BASE: Performed by: SURGERY

## 2024-08-26 PROCEDURE — 6360000002 HC RX W HCPCS

## 2024-08-26 PROCEDURE — 1200000000 HC SEMI PRIVATE

## 2024-08-26 PROCEDURE — 87205 SMEAR GRAM STAIN: CPT

## 2024-08-26 PROCEDURE — 2580000003 HC RX 258: Performed by: STUDENT IN AN ORGANIZED HEALTH CARE EDUCATION/TRAINING PROGRAM

## 2024-08-26 PROCEDURE — 87077 CULTURE AEROBIC IDENTIFY: CPT

## 2024-08-26 PROCEDURE — 99232 SBSQ HOSP IP/OBS MODERATE 35: CPT

## 2024-08-26 PROCEDURE — 87102 FUNGUS ISOLATION CULTURE: CPT

## 2024-08-26 PROCEDURE — 7100000001 HC PACU RECOVERY - ADDTL 15 MIN: Performed by: SURGERY

## 2024-08-26 PROCEDURE — 6370000000 HC RX 637 (ALT 250 FOR IP): Performed by: INTERNAL MEDICINE

## 2024-08-26 PROCEDURE — 86704 HEP B CORE ANTIBODY TOTAL: CPT

## 2024-08-26 PROCEDURE — 6370000000 HC RX 637 (ALT 250 FOR IP): Performed by: STUDENT IN AN ORGANIZED HEALTH CARE EDUCATION/TRAINING PROGRAM

## 2024-08-26 PROCEDURE — 2580000003 HC RX 258: Performed by: INTERNAL MEDICINE

## 2024-08-26 PROCEDURE — 85027 COMPLETE CBC AUTOMATED: CPT

## 2024-08-26 PROCEDURE — 2580000003 HC RX 258

## 2024-08-26 PROCEDURE — 49422 REMOVE TUNNELED IP CATH: CPT | Performed by: SURGERY

## 2024-08-26 PROCEDURE — 87070 CULTURE OTHR SPECIMN AEROBIC: CPT

## 2024-08-26 PROCEDURE — 2709999900 HC NON-CHARGEABLE SUPPLY: Performed by: SURGERY

## 2024-08-26 PROCEDURE — 2500000003 HC RX 250 WO HCPCS

## 2024-08-26 PROCEDURE — 80074 ACUTE HEPATITIS PANEL: CPT

## 2024-08-26 PROCEDURE — 2500000003 HC RX 250 WO HCPCS: Performed by: SURGERY

## 2024-08-26 PROCEDURE — 10061 I&D ABSCESS COMP/MULTIPLE: CPT | Performed by: SURGERY

## 2024-08-26 PROCEDURE — 80069 RENAL FUNCTION PANEL: CPT

## 2024-08-26 PROCEDURE — 3600000012 HC SURGERY LEVEL 2 ADDTL 15MIN: Performed by: SURGERY

## 2024-08-26 RX ORDER — PROPOFOL 10 MG/ML
INJECTION, EMULSION INTRAVENOUS PRN
Status: DISCONTINUED | OUTPATIENT
Start: 2024-08-26 | End: 2024-08-26 | Stop reason: SDUPTHER

## 2024-08-26 RX ORDER — NALOXONE HYDROCHLORIDE 0.4 MG/ML
INJECTION, SOLUTION INTRAMUSCULAR; INTRAVENOUS; SUBCUTANEOUS PRN
Status: DISCONTINUED | OUTPATIENT
Start: 2024-08-26 | End: 2024-08-26 | Stop reason: HOSPADM

## 2024-08-26 RX ORDER — PROCHLORPERAZINE EDISYLATE 5 MG/ML
5 INJECTION INTRAMUSCULAR; INTRAVENOUS
Status: DISCONTINUED | OUTPATIENT
Start: 2024-08-26 | End: 2024-08-26 | Stop reason: HOSPADM

## 2024-08-26 RX ORDER — LABETALOL HYDROCHLORIDE 5 MG/ML
10 INJECTION, SOLUTION INTRAVENOUS
Status: DISCONTINUED | OUTPATIENT
Start: 2024-08-26 | End: 2024-08-26 | Stop reason: HOSPADM

## 2024-08-26 RX ORDER — HYDROMORPHONE HYDROCHLORIDE 1 MG/ML
0.5 INJECTION, SOLUTION INTRAMUSCULAR; INTRAVENOUS; SUBCUTANEOUS EVERY 5 MIN PRN
Status: DISCONTINUED | OUTPATIENT
Start: 2024-08-26 | End: 2024-08-26 | Stop reason: HOSPADM

## 2024-08-26 RX ORDER — FENTANYL CITRATE 50 UG/ML
25 INJECTION, SOLUTION INTRAMUSCULAR; INTRAVENOUS EVERY 5 MIN PRN
Status: DISCONTINUED | OUTPATIENT
Start: 2024-08-26 | End: 2024-08-26 | Stop reason: HOSPADM

## 2024-08-26 RX ORDER — SODIUM CHLORIDE 9 MG/ML
INJECTION, SOLUTION INTRAVENOUS PRN
Status: DISCONTINUED | OUTPATIENT
Start: 2024-08-26 | End: 2024-08-26 | Stop reason: HOSPADM

## 2024-08-26 RX ORDER — DIPHENHYDRAMINE HYDROCHLORIDE 50 MG/ML
12.5 INJECTION INTRAMUSCULAR; INTRAVENOUS
Status: DISCONTINUED | OUTPATIENT
Start: 2024-08-26 | End: 2024-08-26 | Stop reason: HOSPADM

## 2024-08-26 RX ORDER — EPINEPHRINE 1 MG/ML
INJECTION, SOLUTION, CONCENTRATE INTRAVENOUS PRN
Status: DISCONTINUED | OUTPATIENT
Start: 2024-08-26 | End: 2024-08-26 | Stop reason: SDUPTHER

## 2024-08-26 RX ORDER — SEVELAMER CARBONATE 800 MG/1
800 TABLET, FILM COATED ORAL
Status: DISCONTINUED | OUTPATIENT
Start: 2024-08-27 | End: 2024-08-29 | Stop reason: HOSPADM

## 2024-08-26 RX ORDER — LIDOCAINE HYDROCHLORIDE 10 MG/ML
INJECTION, SOLUTION EPIDURAL; INFILTRATION; INTRACAUDAL; PERINEURAL PRN
Status: DISCONTINUED | OUTPATIENT
Start: 2024-08-26 | End: 2024-08-26 | Stop reason: HOSPADM

## 2024-08-26 RX ORDER — SODIUM CHLORIDE 0.9 % (FLUSH) 0.9 %
5-40 SYRINGE (ML) INJECTION PRN
Status: DISCONTINUED | OUTPATIENT
Start: 2024-08-26 | End: 2024-08-26 | Stop reason: HOSPADM

## 2024-08-26 RX ORDER — LIDOCAINE HYDROCHLORIDE 20 MG/ML
INJECTION, SOLUTION EPIDURAL; INFILTRATION; INTRACAUDAL; PERINEURAL PRN
Status: DISCONTINUED | OUTPATIENT
Start: 2024-08-26 | End: 2024-08-26 | Stop reason: SDUPTHER

## 2024-08-26 RX ORDER — DEXAMETHASONE SODIUM PHOSPHATE 4 MG/ML
INJECTION, SOLUTION INTRA-ARTICULAR; INTRALESIONAL; INTRAMUSCULAR; INTRAVENOUS; SOFT TISSUE PRN
Status: DISCONTINUED | OUTPATIENT
Start: 2024-08-26 | End: 2024-08-26 | Stop reason: SDUPTHER

## 2024-08-26 RX ORDER — CITRIC ACID/SODIUM CITRATE 334-500MG
30 SOLUTION, ORAL ORAL ONCE
Status: COMPLETED | OUTPATIENT
Start: 2024-08-26 | End: 2024-08-26

## 2024-08-26 RX ORDER — GLYCOPYRROLATE 0.2 MG/ML
INJECTION INTRAMUSCULAR; INTRAVENOUS PRN
Status: DISCONTINUED | OUTPATIENT
Start: 2024-08-26 | End: 2024-08-26 | Stop reason: SDUPTHER

## 2024-08-26 RX ORDER — LORAZEPAM 2 MG/ML
0.5 INJECTION INTRAMUSCULAR
Status: DISCONTINUED | OUTPATIENT
Start: 2024-08-26 | End: 2024-08-26 | Stop reason: HOSPADM

## 2024-08-26 RX ORDER — IPRATROPIUM BROMIDE AND ALBUTEROL SULFATE 2.5; .5 MG/3ML; MG/3ML
1 SOLUTION RESPIRATORY (INHALATION)
Status: DISCONTINUED | OUTPATIENT
Start: 2024-08-26 | End: 2024-08-26 | Stop reason: HOSPADM

## 2024-08-26 RX ORDER — SODIUM CHLORIDE 0.9 % (FLUSH) 0.9 %
5-40 SYRINGE (ML) INJECTION EVERY 12 HOURS SCHEDULED
Status: DISCONTINUED | OUTPATIENT
Start: 2024-08-26 | End: 2024-08-26 | Stop reason: HOSPADM

## 2024-08-26 RX ORDER — ONDANSETRON 2 MG/ML
4 INJECTION INTRAMUSCULAR; INTRAVENOUS
Status: DISCONTINUED | OUTPATIENT
Start: 2024-08-26 | End: 2024-08-26 | Stop reason: HOSPADM

## 2024-08-26 RX ADMIN — LIDOCAINE HYDROCHLORIDE 50 MG: 20 INJECTION, SOLUTION EPIDURAL; INFILTRATION; INTRACAUDAL; PERINEURAL at 13:49

## 2024-08-26 RX ADMIN — VANCOMYCIN HYDROCHLORIDE 1000 MG: 1 INJECTION, POWDER, LYOPHILIZED, FOR SOLUTION INTRAVENOUS at 13:45

## 2024-08-26 RX ADMIN — EPINEPHRINE 10 MCG: 1 INJECTION, SOLUTION, CONCENTRATE INTRAVENOUS at 14:15

## 2024-08-26 RX ADMIN — PROPOFOL 50 MG: 10 INJECTION, EMULSION INTRAVENOUS at 13:49

## 2024-08-26 RX ADMIN — SEVELAMER CARBONATE 1600 MG: 800 TABLET, FILM COATED ORAL at 08:27

## 2024-08-26 RX ADMIN — SODIUM CHLORIDE, PRESERVATIVE FREE 10 ML: 5 INJECTION INTRAVENOUS at 19:42

## 2024-08-26 RX ADMIN — HEPARIN SODIUM 5000 UNITS: 5000 INJECTION INTRAVENOUS; SUBCUTANEOUS at 13:57

## 2024-08-26 RX ADMIN — PROPOFOL 50 MG: 10 INJECTION, EMULSION INTRAVENOUS at 13:55

## 2024-08-26 RX ADMIN — METOPROLOL SUCCINATE 25 MG: 25 TABLET, EXTENDED RELEASE ORAL at 08:27

## 2024-08-26 RX ADMIN — DEXMEDETOMIDINE HYDROCHLORIDE 4 MCG: 100 INJECTION, SOLUTION INTRAVENOUS at 13:42

## 2024-08-26 RX ADMIN — GLYCOPYRROLATE 0.4 MG: 0.2 INJECTION INTRAMUSCULAR; INTRAVENOUS at 14:10

## 2024-08-26 RX ADMIN — HEPARIN SODIUM 5000 UNITS: 5000 INJECTION INTRAVENOUS; SUBCUTANEOUS at 20:18

## 2024-08-26 RX ADMIN — BUPROPION HYDROCHLORIDE 150 MG: 150 TABLET, EXTENDED RELEASE ORAL at 08:26

## 2024-08-26 RX ADMIN — SODIUM CITRATE AND CITRIC ACID MONOHYDRATE 30 ML: 500; 334 SOLUTION ORAL at 21:32

## 2024-08-26 RX ADMIN — DEXAMETHASONE SODIUM PHOSPHATE 4 MG: 4 INJECTION INTRA-ARTICULAR; INTRALESIONAL; INTRAMUSCULAR; INTRAVENOUS; SOFT TISSUE at 14:37

## 2024-08-26 RX ADMIN — MIRTAZAPINE 7.5 MG: 15 TABLET, FILM COATED ORAL at 19:42

## 2024-08-26 RX ADMIN — SODIUM CHLORIDE, PRESERVATIVE FREE 10 ML: 5 INJECTION INTRAVENOUS at 08:30

## 2024-08-26 RX ADMIN — LORAZEPAM 0.5 MG: 0.5 TABLET ORAL at 19:42

## 2024-08-26 RX ADMIN — EPINEPHRINE 10 MCG: 1 INJECTION, SOLUTION, CONCENTRATE INTRAVENOUS at 14:18

## 2024-08-26 RX ADMIN — ROSUVASTATIN CALCIUM 40 MG: 20 TABLET, COATED ORAL at 19:42

## 2024-08-26 ASSESSMENT — PAIN SCALES - PAIN ASSESSMENT IN ADVANCED DEMENTIA (PAINAD)
TOTALSCORE: 0
BODYLANGUAGE: RELAXED
FACIALEXPRESSION: SMILING OR INEXPRESSIVE
TOTALSCORE: 0
CONSOLABILITY: NO NEED TO CONSOLE
CONSOLABILITY: NO NEED TO CONSOLE
BREATHING: NORMAL
CONSOLABILITY: NO NEED TO CONSOLE
TOTALSCORE: 0
BREATHING: NORMAL
CONSOLABILITY: NO NEED TO CONSOLE
BREATHING: NORMAL
BODYLANGUAGE: RELAXED
CONSOLABILITY: NO NEED TO CONSOLE
TOTALSCORE: 0
CONSOLABILITY: NO NEED TO CONSOLE
FACIALEXPRESSION: SMILING OR INEXPRESSIVE
TOTALSCORE: 0
TOTALSCORE: 0
CONSOLABILITY: NO NEED TO CONSOLE
BODYLANGUAGE: RELAXED
FACIALEXPRESSION: SMILING OR INEXPRESSIVE
BREATHING: NORMAL
TOTALSCORE: 0
CONSOLABILITY: NO NEED TO CONSOLE
BODYLANGUAGE: RELAXED
BREATHING: NORMAL
BODYLANGUAGE: RELAXED
BREATHING: NORMAL
FACIALEXPRESSION: SMILING OR INEXPRESSIVE
BODYLANGUAGE: RELAXED
CONSOLABILITY: NO NEED TO CONSOLE
BREATHING: NORMAL
TOTALSCORE: 0
BODYLANGUAGE: RELAXED
BODYLANGUAGE: RELAXED
FACIALEXPRESSION: SMILING OR INEXPRESSIVE
BREATHING: NORMAL
TOTALSCORE: 0
FACIALEXPRESSION: SMILING OR INEXPRESSIVE
TOTALSCORE: 0
CONSOLABILITY: NO NEED TO CONSOLE
FACIALEXPRESSION: SMILING OR INEXPRESSIVE
TOTALSCORE: 0
BODYLANGUAGE: RELAXED
FACIALEXPRESSION: SMILING OR INEXPRESSIVE
BODYLANGUAGE: RELAXED
TOTALSCORE: 0
BODYLANGUAGE: RELAXED
BREATHING: NORMAL
BREATHING: NORMAL
CONSOLABILITY: NO NEED TO CONSOLE
CONSOLABILITY: NO NEED TO CONSOLE
BODYLANGUAGE: RELAXED
FACIALEXPRESSION: SMILING OR INEXPRESSIVE
FACIALEXPRESSION: SMILING OR INEXPRESSIVE

## 2024-08-26 NOTE — BRIEF OP NOTE
Brief Postoperative Note      Patient: Cindi Rodriguez  YOB: 1965  MRN: [id number]    Date of Procedure: 8/26/2024    Pre-Op Diagnosis Codes:      * Peritoneal dialysis catheter in place (HCC) [Z99.2]    Post-Op Diagnosis: Post-Op Diagnosis Codes:     * Peritoneal dialysis catheter in place (HCC) [Z99.2]       Procedure(s):  PERITONEAL DIAYSIS CATHETER REMOVAL, INCISION AND DRAINAGE OF ABDOMINAL WALL ABCESS    Surgeon(s):  Santos Castaneda DO    Assistant:  Surgical Assistant: Dom Plaza  Resident: John Casper DO    Anesthesia: Monitor Anesthesia Care    Estimated Blood Loss (mL): Minimal    Complications: None    Specimens:   ID Type Source Tests Collected by Time Destination   1 : CATHETER TIP Hardware Hardware CULTURE, FUNGUS, CULTURE, ANAEROBIC AND AEROBIC (Canceled) Santos Castaneda DO 8/26/2024 1416        Implants:  * No implants in log *      Drains: * No LDAs found *    Findings:  Infection Present At Time Of Surgery (PATOS) (choose all levels that have infection present):  - Superficial Infection (skin/subcutaneous) present as evidenced by pus  Other Findings: Purulent fluid along subcutaneous tunneling tract. PD catheter cuff dissected out. Tract packed with iodoform gauze.     Electronically signed by John Casper DO on 8/26/2024 at 2:41 PM

## 2024-08-26 NOTE — ANESTHESIA POSTPROCEDURE EVALUATION
Department of Anesthesiology  Postprocedure Note    Patient: Cindi Rodriguez  MRN: [id number]  YOB: 1965  Date of evaluation: 8/26/2024    Procedure Summary       Date: 08/26/24 Room / Location: [place] [address] / [facility]    Anesthesia Start: 1340 Anesthesia Stop: 1443    Procedure: PERITONEAL DIAYSIS CATHETER REMOVAL, INCISION AND DRAINAGE OF ABDOMINAL WALL ABCESS (Abdomen) Diagnosis:       Peritoneal dialysis catheter in place (HCC)      (Peritoneal dialysis catheter in place (HCC) [Z99.2])    Surgeons: Santos Castaneda DO Responsible Provider: Caden Segundo MD    Anesthesia Type: MAC ASA Status: 3            Anesthesia Type: No value filed.    Daljit Phase I: Daljit Score: 8    Daljit Phase II:      Anesthesia Post Evaluation    Patient location during evaluation: PACU  Patient participation: complete - patient participated  Level of consciousness: awake  Airway patency: patent  Nausea & Vomiting: no nausea and no vomiting  Cardiovascular status: blood pressure returned to baseline and hemodynamically stable  Respiratory status: acceptable  Hydration status: euvolemic  Multimodal analgesia pain management approach  Pain management: adequate    No notable events documented.

## 2024-08-26 NOTE — PROGRESS NOTES
Ph: [phone], Fax: [phone]                                     [employer]                                                   [address] [place], [place] [address]           Reason for admission:    AMS    Brief Summary:     Cindi Rodriguez is being seen by nephrology for ESRD.     Interval History and Plan:   Patient seen at bedside with nurse  Comfortable  Awake and appear more oriented today.   /95  On room air and does not appear volume overloaded.   Urine toxicology was + for cocaine.   Labs pending.   PD fluid Cx neg.         Check labs ASAP  Plan for HD tomorrow unless any urgency on labs. Trying to give break to avoid cerebral dysequilibrium given lack of HD for ~ 2 week  No evidence of peritonitis and Abx stopped.   Monitor Hb and lytes.   Start Epogen once Hb < 10  Do not use beta blocker given + cocaine on screening. BP is stable so far.   Local PD and TDC care.   Dose medications according to GFR  Surgery consulted to remove PD catheter.   Neurology seen for persistent AMS despite HD.     D/W RN      Thank you for allowing us to participate in this patient's care  In case of any question please call us at our 24 hour answering service [phone] or from 7 AM to 5 PM via [employer], [employer], Epic chat or cell phone.   Dr Brtitney Castaneda MD      Assessment:     ESRD  Was on PD and plan was to transition to iHD as patient failed PD but non compliant and did not have HD for last 2 weeks or so per [facility] RN.   Access: TDC and also has PD catheter.   PD catheter was examined upon admission  and no obvious discharge noted but its not well kept and appear dirty. TDC appear clean and no obvious discharge  Volume status: Volume overloaded overall but no respiratory distress .       ? Abdominal pain  Rule out peritonitis        Hypertension        Anemia        Encephalopathy.   Likely due to uremia   Rule out infection.           HPI      Patient is a 59

## 2024-08-26 NOTE — PROGRESS NOTES
Patent received from the OR to PACU #5 post PERITONEAL DIAYSIS CATHETER REMOVAL, INCISION AND DRAINAGE OF ABDOMINAL WALL ABCESS of Dr. Castaneda. Placed on PACU monitoring equipment. Report given per CRNA, Dr. Casper and OR RN. Per report, patient was treated for hypotension intra op, had difficulty obtaining decent pleth for spo2 with variable sensors, ear, fingers and nares. Same noted on arrival to PACU. Denies pain. Finally able to get ear ox to , when pleth reliable, runs 95-99%

## 2024-08-26 NOTE — PROGRESS NOTES
NEUROCRITICAL CARE PROGRESS NOTE       Patient Name: Cindi Rodriguez YOB: 1965   Sex: Female Age: 59 yrs     CC / Reason for Consult: encephalopathy- not improving with dialysis and negative infectious workup    Changes over last 24 hours:   -Had peritoneal dialysis cathter removed today  -BUN down to 36  -Cr 5.7  -Exam seems improved today, does not seem overtly encephalopathic     ROS: No acute complaints, does not offer much    ASSESSMENT & RECOMMENDATIONS   Assessment:  -59 y.o. woman with ESRD who has been on PD and transitioning to HD who, it seems, did not receive any dialysis of any kind for at least two weeks and presented with encephalopathy and uremia.    -There is always concern for dialysis dysequilibrium syndrome, especially in someone with a very high BUN at the time of dialysis and in someone who is receiving their first HD treatment in a while. However, her CT head on 8/24 does not show any evidence of cerebral edema so suspicion for this low.    -No suggestion of subclinical seizures.    -Otherwise, feel she just needs time to continues to recover    Plan:  -Continue cautious dialysis per nephrology   -Limit nephro toxic mediations  -We will sign off at this time. Please do not hesitate to reach out with any questions or concerns.    Pranay Jimenez, JUSTYN - CNP   Neurocritical Care   8/26/2024 1:57 PM  [employer]: [facility] Neurocritical [facility]  [phone]  HISTORY     [facility] Past Medical History:   Diagnosis Date    JONATHAN (acute kidney injury) ([facility]) 9/3/2014    Anxiety     Asthma     Bipolar disorder ([facility])     Bronchitis     Chronic back pain     Depression     Diabetes mellitus ([facility])     DM II (diabetes mellitus, type II), controlled ([facility]) 9/3/2014    Hypertension       Allergies Allergies   Allergen Reactions    Latex Other (See Comments)     Added based on information entered during case entry, please review and add reactions, type, and severity as needed    Peanuts [Peanut Oil]

## 2024-08-26 NOTE — PROGRESS NOTES
PACU Transfer Note    Vitals:    08/26/24 1515   BP: 113/80   Pulse: 67   Resp: 16   Temp: 97.9 °F (36.6 °C)   SpO2: 99%       In: 245 [I.V.:245]  Out: -     Pain assessment:  none  Pain Level: 0  Reports cold. Fresh warm blankets applied.  Report given to Receiving unit RN.  Waiting for transporter.  8/26/2024 3:32 PM

## 2024-08-26 NOTE — PLAN OF CARE
Problem: Chronic Conditions and Co-morbidities  Goal: Patient's chronic conditions and co-morbidity symptoms are monitored and maintained or improved  Outcome: Progressing  Flowsheets (Taken 8/24/2024 0014 by Trisha Harris, RN)  Care Plan - Patient's Chronic Conditions and Co-Morbidity Symptoms are Monitored and Maintained or Improved:   Monitor and assess patient's chronic conditions and comorbid symptoms for stability, deterioration, or improvement   Collaborate with multidisciplinary team to address chronic and comorbid conditions and prevent exacerbation or deterioration   Update acute care plan with appropriate goals if chronic or comorbid symptoms are exacerbated and prevent overall improvement and discharge     Problem: Pain  Goal: Verbalizes/displays adequate comfort level or baseline comfort level  Outcome: Progressing  Flowsheets (Taken 8/24/2024 0014 by Trisha Harris, RN)  Verbalizes/displays adequate comfort level or baseline comfort level:   Encourage patient to monitor pain and request assistance   Assess pain using appropriate pain scale   Administer analgesics based on type and severity of pain and evaluate response   Implement non-pharmacological measures as appropriate and evaluate response   Notify Licensed Independent Practitioner if interventions unsuccessful or patient reports new pain     Problem: Skin/Tissue Integrity  Goal: Absence of new skin breakdown  Description: 1.  Monitor for areas of redness and/or skin breakdown  2.  Assess vascular access sites hourly  3.  Every 4-6 hours minimum:  Change oxygen saturation probe site  4.  Every 4-6 hours:  If on nasal continuous positive airway pressure, respiratory therapy assess nares and determine need for appliance change or resting period.  Outcome: Progressing

## 2024-08-26 NOTE — PROGRESS NOTES
Moderate burden of diffuse ascites, with peritoneal dialysis catheter in place. Moderate right and small left pleural effusions with patchy bibasilar airspace disease and/or atelectasis. Diffuse anasarca. No CT evidence of an acute intra-abdominal process. Electronically signed by Carlie Alas DO    XR CHEST PORTABLE    Result Date: 8/22/2024  Chest History: Dyspnea Number of views: 1     Mild pulmonary edema. Small bilateral pleural effusions. Cardiomegaly. Right jugular dialysis catheter in standard position. Electronically signed by Urban Russell      CBC:   Recent Labs     08/23/24  1144 08/24/24  0327   WBC 7.0 7.6   HGB 10.7* 10.6*    98*     BMP:    Recent Labs     08/23/24  1144 08/24/24  0327    140   K 4.1 4.1   CL 92* 96*   CO2 26 25   BUN 57* 34*   CREATININE 7.2* 5.2*   GLUCOSE 162* 91     Hepatic:   No results for input(s): \"AST\", \"ALT\", \"BILITOT\", \"ALKPHOS\" in the last 72 hours.    Invalid input(s): \"ALB\"    Lipids:   Lab Results   Component Value Date/Time    CHOL 132 09/03/2014 04:34 AM    HDL 42 09/03/2014 04:34 AM    HDL 48 02/18/2011 10:17 AM    TRIG 207 09/03/2014 04:34 AM     Hemoglobin A1C:   Lab Results   Component Value Date/Time    LABA1C 6.4 07/22/2022 11:45 PM     TSH: No results found for: \"TSH\"  Troponin: No results found for: \"TROPONINT\"  Lactic Acid: No results for input(s): \"LACTA\" in the last 72 hours.  BNP:   No results for input(s): \"PROBNP\" in the last 72 hours.    UA:  Lab Results   Component Value Date/Time    NITRU Negative 08/22/2024 02:54 PM    COLORU Yellow 08/22/2024 02:54 PM    PHUR 8.0 08/22/2024 02:54 PM    PHUR 8.0 08/22/2024 02:54 PM    PHUR 6.5 06/08/2023 11:40 PM    LABCAST 1-3 Hyaline 08/03/2015 02:00 PM    WBCUA 6-9 08/22/2024 02:54 PM    RBCUA 3-4 08/22/2024 02:54 PM    MUCUS 1+ 08/20/2022 11:35 PM    BACTERIA 1+ 08/22/2024 02:54 PM    CLARITYU Clear 08/22/2024 02:54 PM    LEUKOCYTESUR Negative 08/22/2024 02:54 PM    UROBILINOGEN 0.2 08/22/2024

## 2024-08-26 NOTE — ANESTHESIA PRE PROCEDURE
Department of Anesthesiology  Preprocedure Note       Name:  Cindi Rodriguez   Age:  59 y.o.  :  1965                                          MRN:  [id number]         Date:  2024      Surgeon: Surgeon(s):  Santos Castaneda DO    Procedure: Procedure(s):  CATHETER REMOVAL PERITONEAL DIALYSIS    Medications prior to admission:   Prior to Admission medications    Medication Sig Start Date End Date Taking? Authorizing Provider   melatonin 10 MG CAPS capsule Take 1 capsule by mouth nightly   Yes ProviderParveen MD   acetaminophen (TYLENOL) 500 MG tablet Take 1 tablet by mouth in the morning and at bedtime   Yes Provider, MD Parveen   ARIPiprazole (ABILIFY) 10 MG tablet Take 1 tablet by mouth daily   Yes Provider, MD Parveen   B complex-vitamin C-folic acid (NEPHRO-JESUS) 1 MG tablet Take 1 tablet by mouth daily   Yes ProviderParveen MD   clopidogrel (PLAVIX) 75 MG tablet Take 1 tablet by mouth daily   Yes ProviderParveen MD   hydrOXYzine pamoate (VISTARIL) 50 MG capsule Take 1 capsule by mouth in the morning and at bedtime   Yes Provider, MD Parveen   NIFEdipine (PROCARDIA XL) 60 MG extended release tablet Take 1 tablet by mouth in the morning and at bedtime   Yes Provider, MD Parveen   pantoprazole (PROTONIX) 40 MG tablet Take 1 tablet by mouth daily   Yes Provider, MD Parveen   senna (SENOKOT) 8.6 MG tablet Take 1 tablet by mouth daily   Yes Provider, MD Parveen   sucroferric oxyhydroxide (VELPHORO) 500 MG CHEW chewable tablet Take 1 tablet by mouth 3 times daily (with meals)   Yes ProviderParveen MD   valsartan (DIOVAN) 40 MG tablet Take 1 tablet by mouth daily   Yes Provider, MD Parveen   potassium chloride (KLOR-CON M) 20 MEQ extended release tablet Take 1 tablet by mouth daily   Yes ProviderParveen MD   torsemide (DEMADEX) 100 MG tablet Take 1 tablet by mouth daily 6/15/23  Yes John Taylor MD   mirtazapine (REMERON) 7.5 MG tablet Take 1 tablet  07:11 AM    GFRAA >60 07/06/2011 01:05 AM    AGRATIO 0.8 06/13/2023 04:12 AM    LABGLOM 8 08/26/2024 12:23 PM    LABGLOM 8 06/15/2023 04:44 AM    GLUCOSE 82 08/26/2024 12:23 PM    CALCIUM 7.8 08/26/2024 12:23 PM    BILITOT 1.2 08/22/2024 10:42 AM    ALKPHOS 188 08/22/2024 10:42 AM    AST 32 08/22/2024 10:42 AM    ALT 24 08/22/2024 10:42 AM       POC Tests:   Recent Labs     08/26/24  1131   POCGLU 143*       Coags:   Lab Results   Component Value Date/Time    PROTIME 10.2 09/02/2014 10:25 PM    INR 0.95 09/02/2014 10:25 PM    APTT 31.9 09/02/2014 10:25 PM       HCG (If Applicable):   Lab Results   Component Value Date    PREGTESTUR Negative 09/14/2015        ABGs:   Lab Results   Component Value Date/Time    PHART 7.305 06/08/2023 08:03 PM    PO2ART 54.4 06/08/2023 08:03 PM    LXR0PCQ 39.3 06/08/2023 08:03 PM    WCF5TQI 20 06/08/2023 08:03 PM    BEART -6.3 06/08/2023 08:03 PM    G7BSAHJO 88 06/08/2023 08:03 PM        Type & Screen (If Applicable):  No results found for: \"LABABO\"    Drug/Infectious Status (If Applicable):  No results found for: \"HIV\", \"HEPCAB\"    COVID-19 Screening (If Applicable):   Lab Results   Component Value Date/Time    COVID19 Not Detected 06/08/2023 07:16 AM           Anesthesia Evaluation    Airway: Mallampati: II  TM distance: >3 FB   Neck ROM: full  Mouth opening: > = 3 FB   Dental:          Pulmonary:   (+)           asthma:                            Cardiovascular:    (+) hypertension:        Rhythm: regular  Rate: normal                    Neuro/Psych:               GI/Hepatic/Renal:             Endo/Other:    (+) Diabetes.                 Abdominal:             Vascular:          Other Findings:             Anesthesia Plan      MAC     ASA 3       Induction: intravenous.      Anesthetic plan and risks discussed with patient.      Plan discussed with CRNA.                    BRIGETTE TORRES MD   8/26/2024

## 2024-08-26 NOTE — PLAN OF CARE
Problem: Chronic Conditions and Co-morbidities  Goal: Patient's chronic conditions and co-morbidity symptoms are monitored and maintained or improved  Outcome: Progressing  Flowsheets (Taken 8/24/2024 0014 by Trisha Harris, RN)  Care Plan - Patient's Chronic Conditions and Co-Morbidity Symptoms are Monitored and Maintained or Improved:   Monitor and assess patient's chronic conditions and comorbid symptoms for stability, deterioration, or improvement   Collaborate with multidisciplinary team to address chronic and comorbid conditions and prevent exacerbation or deterioration   Update acute care plan with appropriate goals if chronic or comorbid symptoms are exacerbated and prevent overall improvement and discharge     Problem: Safety - Adult  Goal: Free from fall injury  8/25/2024 2248 by Chino Kiser, RN  Outcome: Progressing  Flowsheets (Taken 8/24/2024 2219)  Free From Fall Injury:   Instruct family/caregiver on patient safety   Based on caregiver fall risk screen, instruct family/caregiver to ask for assistance with transferring infant if caregiver noted to have fall risk factors  Note: Camera in place.

## 2024-08-26 NOTE — PROGRESS NOTES
Bariatric Surgery   Daily Progress Note  Patient: Cindi Rodriguez      CC: PD catheter removal    SUBJECTIVE:   No acute events overnight. Mentation has been slowly improving. Denies abdominal pain.     ROS:   A 14 point review of systems was conducted, significant findings as noted above. All other systems negative.    OBJECTIVE:    PHYSICAL EXAM:    Vitals:    08/25/24 2144 08/26/24 0020 08/26/24 0443 08/26/24 0444   BP: 103/70 (!) 142/94  122/85   Pulse: 71 62  69   Resp: 18 17  18   Temp: 97 °F (36.1 °C) 98.3 °F (36.8 °C)  97 °F (36.1 °C)   TempSrc: Axillary Rectal  Oral   SpO2: 99% 94%  99%   Weight:   54.9 kg (121 lb 0.5 oz)    Height:           General appearance: alert, confused  Eyes: PERRLA, no scleral icterus  Neck: trachea midline, neck supple  Respiratory: Normal effort with no accessory muscle use on RA  Cardiovascular: RRR  Chest: Vas cath to Right IJ. Dressings c/d/i  Abdomen: soft, non-tender, non-distended, PD catheter noted at LLQ with no erythema, swelling, or drainage.   Skin: warm and dry, no rashes  Extremities: no edema, no cyanosis  Neuro: Alert and oriented to person only. No focal deficits    ASSESSMENT & PLAN:   This is a 59 y.o. female with Hx of TN, DM, bipolar disorder, and ESRD on HD who is admitted to [facility] with acute metabolic encephalopathy. She is currently receiving dialysis through her HD to R IJ with successfully complete dialysis treatment. Surgery has been consulted for removal of PD catheter.       - Patient will benefit from PD catheter removal.  - Called patient's son (Dimitri Santo). RBA discussed. All questions and concerns addressed. Son expresses understanding and consents for procedure. Consent form signed and in chart.  - Will take patient to surgery today for PD catheter removal.  - NPO  - Rest of care per primary team.     Esvin Valentine DO  PGY-1, General Surgery Resident  08/26/24 7:23 AM  [employer]  [phone]

## 2024-08-26 NOTE — PROGRESS NOTES
Physical Therapy/Occupational Therapy  HOLD    Came to see pt for PT/OT.  Pt presently off floor in procedure.  Will return as schedule permits or per POC.    Lore Niño PT [phone]  Cher Nichols, VIRA, OTR/L [phone]

## 2024-08-26 NOTE — OP NOTE
DATE OF PROCEDURE:  08/26/24     PREOPERATIVE DIAGNOSES:  1.  End-stage renal disease.     POSTOPERATIVE DIAGNOSES:  1.  End-stage renal disease.     PROCEDURES PERFORMED:  1.  Removal of tunneled intraperitoneal catheter.  2. Incision and drainage of abdominal wall abscess     SURGEON:  Santos Castaneda DO     ASSISTANT:  Tremayne     ANESTHESIA:  Local with sedation.     ESTIMATED BLOOD LOSS:  10 mL.     INDICATION FOR PROCEDURE:  The patient is a 59 year old who's PD catheter needed removal as they no longer use it     DESCRIPTION OF PROCEDURE:  The patient was brought to the operating suite, laid in the supine position with arms outstretched, and after sedation was administered, the patient was prepped and draped in the usual sterile manner.     Local anesthetic was injected around the deep cuff of the catheter, This was carried down to the subcutaneous tissue, and Bovie electrocautery was used to completely circumferentially dissect this free.    At that point, dissection was done to free up the superficial cuff of the peritoneal dialysis catheter and catheter ti[ sent for culture    An abdominal wall abscess was noted at exit site. This was then completely incised and drained to remove all purulent material. 1/4 iodoform packing used to pack exit site.    Irrigation was performed.Closure of the wound was performed with a tension-free closure and then closing the wound in multiple layers using 3-0 Vicryl, and 4-0 Vicryl suture.  Skin glue was used on top. The patient tolerated the procedure well and was brought to the postanesthesia care unit in stable condition.     All sponge, needle, and instrument counts were correct x2.

## 2024-08-26 NOTE — CARE COORDINATION
CM  following for  d/c planning:    Patient  was  leaving unit  for  Procedure when attempting to round with her:  CM  followed up after  she returned to room  and discussed  d/c planning disposition with her  :    CM  following up on  SNF  with On site HD accommodations. For her  preference and choice  ,  she  also Suggested  I speak with  rachel  , CM  called  Rachel while at  bedside  and he was  in agreement as well and  preferred  somewhere  close so he  could  visit . [place] stated  no preference     1.)  [facility] albertina Chavira:  Renata can accept, plans to do  bedside  visit tomorrow  .   2.)  [facility]: [facility]  can accept  need  HD  auth    3.) [facility]  : [facility] can  accept    4.) [facility]: pending    Patient  will need  Cross  pre cert.       Electronically signed by Maryanne Anaya RN on 8/26/2024 at 5:21 PM       Maryanne Anaya RN Case Manager  [facility]  [address]  [facility] [address]  [phone]  Fax [phone]

## 2024-08-26 NOTE — PROGRESS NOTES
Bariatric Surgery  Post-operative Note    Procedure(s) Performed:     Procedure(s):  PERITONEAL DIAYSIS CATHETER REMOVAL, INCISION AND DRAINAGE OF ABDOMINAL WALL ABCESS     Subjective:     Doing well at post operative check. Denies chest pain, SOB, n, v. Incisional pain well controlled with medications.      Objective:  Date 08/25/24 0701 - 08/26/24 0700 08/26/24 0701 - 08/27/24 0700   Shift 5454-0047 6745-6289 24 Hour Total 1263-5771 1682-1171 24 Hour Total   INTAKE   P.O.  150 150      I.V.  5 5 245  245   Shift Total  155 155 245  245   OUTPUT   Urine    0  0   Shift Total    0  0   NET  155 155 245  245        Vitals:   Vitals:    08/26/24 1502 08/26/24 1505 08/26/24 1515 08/26/24 1602   BP:   113/80 120/78   Pulse: 66 67 67 68   Resp: 14 15 16 18   Temp:   97.9 °F (36.6 °C) (!) 96.5 °F (35.8 °C)   TempSrc:   Axillary Oral   SpO2: 94% 94% 99% 98%   Weight:       Height:           Physical Exam:  General appearance: NAD  Respiratory: Normal effort with no accessory muscle use on RA  Cardiovascular: R  Chest: Vas cath to Right IJ. Dressings c/d/i  Abdomen: soft, non-tender, non-distended. Incision site CDI, covered with dermabond. Packing in place with serous drainage at prior PD site.   Skin: warm and dry, no rashes  Extremities: no edema, no cyanosis      Scheduled Meds:   insulin lispro  0-8 Units SubCUTAneous TID WC    insulin lispro  0-4 Units SubCUTAneous Nightly    dianeal lo-josh 1.5%  6,000 mL IntraPERitoneal Once    [Held by provider] aspirin  81 mg Oral Daily    buPROPion  150 mg Oral Daily    metoprolol succinate  25 mg Oral Daily    mirtazapine  7.5 mg Oral Nightly    rosuvastatin  40 mg Oral Nightly    sevelamer  1,600 mg Oral TID WC    sodium chloride flush  5-40 mL IntraVENous 2 times per day    heparin (porcine)  5,000 Units SubCUTAneous 3 times per day     Continuous Infusions:   dextrose      sodium chloride Stopped (08/26/24 1437)     PRN Meds:.tiZANidine, albuterol, LORazepam, glucose, dextrose

## 2024-08-27 LAB
ALBUMIN SERPL-MCNC: 3.1 G/DL (ref 3.4–5)
ANION GAP SERPL CALCULATED.3IONS-SCNC: 23 MMOL/L (ref 3–16)
BUN SERPL-MCNC: 42 MG/DL (ref 7–20)
CALCIUM SERPL-MCNC: 7.9 MG/DL (ref 8.3–10.6)
CHLORIDE SERPL-SCNC: 95 MMOL/L (ref 99–110)
CO2 SERPL-SCNC: 19 MMOL/L (ref 21–32)
CREAT SERPL-MCNC: 6.3 MG/DL (ref 0.6–1.1)
DEPRECATED RDW RBC AUTO: 19.3 % (ref 12.4–15.4)
GFR SERPLBLD CREATININE-BSD FMLA CKD-EPI: 7 ML/MIN/{1.73_M2}
GLUCOSE BLD-MCNC: 140 MG/DL (ref 70–99)
GLUCOSE BLD-MCNC: 165 MG/DL (ref 70–99)
GLUCOSE BLD-MCNC: 75 MG/DL (ref 70–99)
GLUCOSE SERPL-MCNC: 85 MG/DL (ref 70–99)
HAV IGM SERPL QL IA: NORMAL
HBV CORE IGM SERPL QL IA: NORMAL
HBV SURFACE AB SERPL IA-ACNC: 1000 MIU/ML
HBV SURFACE AG SERPL QL IA: NORMAL
HBV SURFACE AG SERPL QL IA: NORMAL
HCT VFR BLD AUTO: 34.9 % (ref 36–48)
HCV AB SERPL QL IA: NORMAL
HGB BLD-MCNC: 11.1 G/DL (ref 12–16)
LOEFFLER MB STN SPEC: NORMAL
MCH RBC QN AUTO: 28.2 PG (ref 26–34)
MCHC RBC AUTO-ENTMCNC: 31.8 G/DL (ref 31–36)
MCV RBC AUTO: 88.7 FL (ref 80–100)
PERFORMED ON: ABNORMAL
PERFORMED ON: ABNORMAL
PERFORMED ON: NORMAL
PHOSPHATE SERPL-MCNC: 5.1 MG/DL (ref 2.5–4.9)
PLATELET # BLD AUTO: 100 K/UL (ref 135–450)
PMV BLD AUTO: 10.2 FL (ref 5–10.5)
POTASSIUM SERPL-SCNC: 5.1 MMOL/L (ref 3.5–5.1)
RBC # BLD AUTO: 3.93 M/UL (ref 4–5.2)
SODIUM SERPL-SCNC: 137 MMOL/L (ref 136–145)
WBC # BLD AUTO: 8.2 K/UL (ref 4–11)

## 2024-08-27 PROCEDURE — 6370000000 HC RX 637 (ALT 250 FOR IP): Performed by: INTERNAL MEDICINE

## 2024-08-27 PROCEDURE — 6360000002 HC RX W HCPCS

## 2024-08-27 PROCEDURE — 97530 THERAPEUTIC ACTIVITIES: CPT

## 2024-08-27 PROCEDURE — 90935 HEMODIALYSIS ONE EVALUATION: CPT

## 2024-08-27 PROCEDURE — 1200000000 HC SEMI PRIVATE

## 2024-08-27 PROCEDURE — 97110 THERAPEUTIC EXERCISES: CPT

## 2024-08-27 PROCEDURE — 6370000000 HC RX 637 (ALT 250 FOR IP): Performed by: NURSE PRACTITIONER

## 2024-08-27 PROCEDURE — 80069 RENAL FUNCTION PANEL: CPT

## 2024-08-27 PROCEDURE — 36415 COLL VENOUS BLD VENIPUNCTURE: CPT

## 2024-08-27 PROCEDURE — 85027 COMPLETE CBC AUTOMATED: CPT

## 2024-08-27 PROCEDURE — 6370000000 HC RX 637 (ALT 250 FOR IP): Performed by: STUDENT IN AN ORGANIZED HEALTH CARE EDUCATION/TRAINING PROGRAM

## 2024-08-27 PROCEDURE — 2580000003 HC RX 258: Performed by: INTERNAL MEDICINE

## 2024-08-27 RX ORDER — CARVEDILOL 3.12 MG/1
3.12 TABLET ORAL 2 TIMES DAILY WITH MEALS
Qty: 60 TABLET | Refills: 3 | Status: SHIPPED | OUTPATIENT
Start: 2024-08-27

## 2024-08-27 RX ORDER — CARVEDILOL 3.12 MG/1
3.12 TABLET ORAL 2 TIMES DAILY WITH MEALS
Status: DISCONTINUED | OUTPATIENT
Start: 2024-08-27 | End: 2024-08-29 | Stop reason: HOSPADM

## 2024-08-27 RX ORDER — SEVELAMER CARBONATE 800 MG/1
800 TABLET, FILM COATED ORAL
Qty: 90 TABLET | Refills: 3 | Status: SHIPPED | OUTPATIENT
Start: 2024-08-27

## 2024-08-27 RX ADMIN — SEVELAMER CARBONATE 800 MG: 800 TABLET, FILM COATED ORAL at 16:53

## 2024-08-27 RX ADMIN — HEPARIN SODIUM 5000 UNITS: 5000 INJECTION INTRAVENOUS; SUBCUTANEOUS at 04:37

## 2024-08-27 RX ADMIN — METOPROLOL SUCCINATE 25 MG: 25 TABLET, EXTENDED RELEASE ORAL at 12:30

## 2024-08-27 RX ADMIN — SODIUM CHLORIDE, PRESERVATIVE FREE 10 ML: 5 INJECTION INTRAVENOUS at 20:40

## 2024-08-27 RX ADMIN — MIRTAZAPINE 7.5 MG: 15 TABLET, FILM COATED ORAL at 20:39

## 2024-08-27 RX ADMIN — HEPARIN SODIUM 5000 UNITS: 5000 INJECTION INTRAVENOUS; SUBCUTANEOUS at 21:44

## 2024-08-27 RX ADMIN — SODIUM CHLORIDE, PRESERVATIVE FREE 10 ML: 5 INJECTION INTRAVENOUS at 12:31

## 2024-08-27 RX ADMIN — SEVELAMER CARBONATE 800 MG: 800 TABLET, FILM COATED ORAL at 12:30

## 2024-08-27 RX ADMIN — LORAZEPAM 0.5 MG: 0.5 TABLET ORAL at 01:10

## 2024-08-27 RX ADMIN — LORAZEPAM 0.5 MG: 0.5 TABLET ORAL at 06:41

## 2024-08-27 RX ADMIN — CARVEDILOL 3.12 MG: 3.12 TABLET, FILM COATED ORAL at 16:54

## 2024-08-27 RX ADMIN — ROSUVASTATIN CALCIUM 40 MG: 20 TABLET, COATED ORAL at 20:40

## 2024-08-27 RX ADMIN — BUPROPION HYDROCHLORIDE 150 MG: 150 TABLET, EXTENDED RELEASE ORAL at 12:30

## 2024-08-27 RX ADMIN — ACETAMINOPHEN 650 MG: 325 TABLET ORAL at 20:40

## 2024-08-27 ASSESSMENT — PAIN SCALES - PAIN ASSESSMENT IN ADVANCED DEMENTIA (PAINAD)
FACIALEXPRESSION: SMILING OR INEXPRESSIVE
TOTALSCORE: 0
BREATHING: NORMAL
FACIALEXPRESSION: SMILING OR INEXPRESSIVE
BREATHING: NORMAL
FACIALEXPRESSION: SMILING OR INEXPRESSIVE
BREATHING: NORMAL
BODYLANGUAGE: RELAXED
BODYLANGUAGE: RELAXED
FACIALEXPRESSION: SMILING OR INEXPRESSIVE
TOTALSCORE: 0
BODYLANGUAGE: RELAXED
TOTALSCORE: 0
CONSOLABILITY: NO NEED TO CONSOLE
FACIALEXPRESSION: SMILING OR INEXPRESSIVE
TOTALSCORE: 0
CONSOLABILITY: NO NEED TO CONSOLE
BODYLANGUAGE: RELAXED
BODYLANGUAGE: RELAXED
BREATHING: NORMAL
CONSOLABILITY: NO NEED TO CONSOLE
BREATHING: NORMAL
TOTALSCORE: 0
CONSOLABILITY: NO NEED TO CONSOLE
FACIALEXPRESSION: SMILING OR INEXPRESSIVE
BREATHING: NORMAL
CONSOLABILITY: NO NEED TO CONSOLE
BODYLANGUAGE: RELAXED
CONSOLABILITY: NO NEED TO CONSOLE
TOTALSCORE: 0

## 2024-08-27 ASSESSMENT — PAIN SCALES - GENERAL: PAINLEVEL_OUTOF10: 0

## 2024-08-27 NOTE — PROGRESS NOTES
This writer attempting to replace pt.s Tele leads, pt. Became combative kicking and swinging at this writer to stop touching her. Provider aware of non compliance and to document

## 2024-08-27 NOTE — RT PROTOCOL NOTE
RT Nebulizer Bronchodilator Protocol Note    There is a bronchodilator order in the chart from a provider indicating to follow the RT Bronchodilator Protocol and there is an “Initiate RT Bronchodilator Protocol” order as well (see protocol at bottom of note).    CXR Findings:  No results found.    The findings from the last RT Protocol Assessment were as follows:  Smoking: Chronic pulmonary disease  Respiratory Pattern: Regular pattern and RR 12-20 bpm  Breath Sounds: Slightly diminished and/or crackles  Cough: Strong, spontaneous, non-productive  Indication for Bronchodilator Therapy: On home bronchodilators  Bronchodilator Assessment Score: 4    Aerosolized bronchodilator medication orders have been revised according to the RT Nebulizer Bronchodilator Protocol below.    Respiratory Therapist to perform RT Therapy Protocol Assessment initially then follow the protocol.  Repeat RT Therapy Protocol Assessment PRN for score 0-3 or on second treatment, BID, and PRN for scores above 3.    No Indications - adjust the frequency to every 6 hours PRN wheezing or bronchospasm, if no treatments needed after 48 hours then discontinue using Per Protocol order mode.     If indication present, adjust the RT bronchodilator orders based on the Bronchodilator Assessment Score as indicated below.  If a patient is on this medication at home then do not decrease Frequency below that used at home.    0-3 - enter or revise RT bronchodilator order(s) to equivalent RT Bronchodilator order with Frequency of every 4 hours PRN for wheezing or increased work of breathing using Per Protocol order mode.       4-6 - enter or revise RT Bronchodilator order(s) to two equivalent RT bronchodilator orders with one order with BID Frequency and one order with Frequency of every 4 hours PRN wheezing or increased work of breathing using Per Protocol order mode.         7-10 - enter or revise RT Bronchodilator order(s) to two equivalent RT bronchodilator  orders with one order with TID Frequency and one order with Frequency of every 4 hours PRN wheezing or increased work of breathing using Per Protocol order mode.       11-13 - enter or revise RT Bronchodilator order(s) to one equivalent RT bronchodilator order with QID Frequency and an Albuterol order with Frequency of every 4 hours PRN wheezing or increased work of breathing using Per Protocol order mode.      Greater than 13 - enter or revise RT Bronchodilator order(s) to one equivalent RT bronchodilator order with every 4 hours Frequency and an Albuterol order with Frequency of every 2 hours PRN wheezing or increased work of breathing using Per Protocol order mode.     RT to enter RT Home Evaluation for COPD & MDI Assessment order using Per Protocol order mode.    Electronically signed by Danny Keating RCP on 8/27/2024 at 12:01 [facility]    There is a bronchodilator order in the chart from a provider indicating to follow the RT Bronchodilator Protocol and there is an “Initiate RT Bronchodilator Protocol” order as well (see protocol at bottom of note).    CXR Findings:  No results found.    The findings from the last RT Protocol Assessment were as follows:  Smoking: Chronic pulmonary disease  Respiratory Pattern: Regular pattern and RR 12-20 bpm  Breath Sounds: Slightly diminished and/or crackles  Cough: Strong, spontaneous, non-productive  Indication for Bronchodilator Therapy: On home bronchodilators  Bronchodilator Assessment Score: 4    Aerosolized bronchodilator medication orders have been revised according to the RT Nebulizer Bronchodilator Protocol below.    Respiratory Therapist to perform RT Therapy Protocol Assessment initially then follow the protocol.  Repeat RT Therapy Protocol Assessment PRN for score 0-3 or on second treatment, BID, and PRN for scores above 3.    No Indications - adjust the frequency to every 6 hours PRN wheezing or bronchospasm, if no

## 2024-08-27 NOTE — PLAN OF CARE
Problem: Skin/Tissue Integrity  Goal: Absence of new skin breakdown  Description: 1.  Monitor for areas of redness and/or skin breakdown  2.  Assess vascular access sites hourly  3.  Every 4-6 hours minimum:  Change oxygen saturation probe site  4.  Every 4-6 hours:  If on nasal continuous positive airway pressure, respiratory therapy assess nares and determine need for appliance change or resting period.  Outcome: Progressing     Problem: Safety - Adult  Goal: Free from fall injury  Outcome: Progressing  Flowsheets (Taken 8/24/2024 2219 by Chino Kiser RN)  Free From Fall Injury:   Instruct family/caregiver on patient safety   Based on caregiver fall risk screen, instruct family/caregiver to ask for assistance with transferring infant if caregiver noted to have fall risk factors

## 2024-08-27 NOTE — PROGRESS NOTES
Ph: [phone], Fax: [phone]                                     [facility]                                                   [address] [place], [place] [id number]           Reason for admission:    AMS    Brief Summary:     Cindi Rodriguez is being seen by nephrology for ESRD.     Interval History and Plan:   Patient seen at bedside with nurse.   Comfortable  Had dialysis done today for 3.5 hours with 1.5 liter UF  Awake and alert and I think her mental status is back to her baseline as per my last evaluation in hospital and she said she is feeling hungry and wan to eat.   /77  On room air and does not appear volume overloaded.   Urine toxicology was + for cocaine.   Bicarbonate 19  K 5.1  Hb 11.1  PD catheter removed          HD done today per TTS schedule.   Discussed with Winnie Yuen Henefer RN Althea De Souza and patient already has dialysis time over there TTS 1: 30 PM time and patient can be discharged from nephrology perspective but patient will be going to rehab and CM working on it.   Monitor Hb and lytes.   Epogen once Hb < 10  Will avoid beta blocker as cocaine was positive on screening upon admission. Will change metoprolol to low dose Coreg and adjust according to BP.   Local TDC care.   Dose medications according to GFR  Advised compliance with HD as it can be life threatening.   I called patient's son to update but he said he was sleeping and will try to call later      D/W dialysis RN      Thank you for allowing us to participate in this patient's care  In case of any question please call us at our 24 hour answering service [phone] or from 7 AM to 5 PM via Perfect Serve, Voalte, Epic chat or cell phone.   Dr Brittney Castaneda MD      Assessment:     ESRD  Was on PD and plan was to transition to iHD as patient failed PD but non compliant and did not have HD for last 2 weeks or so per Alpa Henefer RN.   Access: TDC and also has PD catheter.   PD

## 2024-08-27 NOTE — PROGRESS NOTES
Occupational Therapy           No treatment Note    Pt currently off the floor in dialysis this am.  Will follow up later this date as schedule allows.  RN aware.    Maritza Sánchez  MS, OTR/L #[id number]

## 2024-08-27 NOTE — DISCHARGE SUMMARY
chronic small vessel ischemic white matter disease. The ventricles and extra-axial spaces are normal in size and configuration. The globes and orbits are normal. The paranasal sinuses are clear. No abnormality of the calvarium.     No evidence of intracranial hemorrhage, mass, or CT evidence of acute stroke. Electronically signed by Kristian Velazquez MD    CT ABDOMEN PELVIS WO CONTRAST Additional Contrast? None    Result Date: 8/22/2024  EXAM: CT ABDOMEN PELVIS WO CONTRAST INDICATION: Abdominal pain. Peritoneal dialysis. COMPARISON: 12/12/2022. TECHNIQUE: Standard per department protocol. Coronal and sagittal reconstructions were obtained. Up-to-date CT equipment and radiation dose reduction techniques were employed. CONTRAST: None FINDINGS: : No additional findings. ABDOMEN/PELVIS: Lung bases: Moderate right and mild left pleural effusions. Patchy parenchymal opacities throughout the visualized lung bases.. Mediastinum: Moderate cardiomegaly. Extensive atherosclerotic calcification of the coronary artery stents. No pericardial effusion. Liver and biliary system: Normal. Cholecystectomy. Pancreas: Normal. Spleen: Normal. Adrenal glands: Normal. Genitourinary: No hydronephrosis or nephrolithiasis. Decompressed urinary bladder. Atrophic uterus, which is age-appropriate. Bowel: No abnormally dilated loops of bowel. Abdominal wall: Peritoneal dialysis catheter enters the left lower quadrant of the abdomen. Diffuse anasarca. Peritoneum/retroperitoneum: Moderate diffuse ascites. Peritoneal dialysis catheter terminates within the lower central pelvis. Vasculature: Extensive atherosclerotic calcification of the aorta, celiac axis, SMA, and iliac arteries without aneurysm. Lymph nodes: No lymphadenopathy is appreciated. Osseous structures: No suspicious abnormality. Endplate erosive changes at epicentered at L5-S1 with vacuum disc phenomenon present.     Moderate burden of diffuse ascites, with peritoneal dialysis  08/22/2024 02:54 PM    GLUCOSEU Negative 08/22/2024 02:54 PM    KETUA 15 08/22/2024 02:54 PM    AMORPHOUS 2+ 08/20/2022 11:35 PM     Urine Cultures:   Lab Results   Component Value Date/Time    LABURIN No growth at 18 to 36 hours 09/07/2022 03:40 AM     Blood Cultures:   Lab Results   Component Value Date/Time    BC No Growth after 4 days of incubation. 08/22/2024 10:42 AM     Lab Results   Component Value Date/Time    BLOODCULT2 No Growth after 4 days of incubation. 08/22/2024 11:03 AM     Organism: No results found for: \"ORG\"    Time Spent Discharging patient >31 minutes    Electronically signed by JUSTYN Ascencio CNP on 8/27/2024 at 1:11 PM

## 2024-08-27 NOTE — PLAN OF CARE
Problem: Discharge Planning  Goal: Discharge to home or other facility with appropriate resources  Outcome: Progressing     Problem: Chronic Conditions and Co-morbidities  Goal: Patient's chronic conditions and co-morbidity symptoms are monitored and maintained or improved  8/26/2024 2032 by Barbara Smith RN  Outcome: Progressing  8/26/2024 1834 by Carmita Kraft RN  Outcome: Progressing  Flowsheets (Taken 8/24/2024 0014 by Trisha Harris RN)  Care Plan - Patient's Chronic Conditions and Co-Morbidity Symptoms are Monitored and Maintained or Improved:   Monitor and assess patient's chronic conditions and comorbid symptoms for stability, deterioration, or improvement   Collaborate with multidisciplinary team to address chronic and comorbid conditions and prevent exacerbation or deterioration   Update acute care plan with appropriate goals if chronic or comorbid symptoms are exacerbated and prevent overall improvement and discharge     Problem: Pain  Goal: Verbalizes/displays adequate comfort level or baseline comfort level  8/26/2024 2032 by Barbara Smith RN  Outcome: Progressing  8/26/2024 1834 by Carmita Kraft RN  Outcome: Progressing  Flowsheets (Taken 8/24/2024 0014 by Trisha Harris RN)  Verbalizes/displays adequate comfort level or baseline comfort level:   Encourage patient to monitor pain and request assistance   Assess pain using appropriate pain scale   Administer analgesics based on type and severity of pain and evaluate response   Implement non-pharmacological measures as appropriate and evaluate response   Notify Licensed Independent Practitioner if interventions unsuccessful or patient reports new pain     Problem: Skin/Tissue Integrity  Goal: Absence of new skin breakdown  Description: 1.  Monitor for areas of redness and/or skin breakdown  2.  Assess vascular access sites hourly  3.  Every 4-6 hours minimum:  Change oxygen saturation probe site  4.  Every 4-6 hours:  If on nasal continuous positive airway

## 2024-08-27 NOTE — PROGRESS NOTES
Bariatric Surgery   Daily Progress Note  Patient: Cindi Rodriguez      CC: PD catheter removal    SUBJECTIVE:   No acute events overnight. Having some incisional pain this morning. No other issues.     ROS:   A 14 point review of systems was conducted, significant findings as noted above. All other systems negative.    OBJECTIVE:    PHYSICAL EXAM:    Vitals:    08/26/24 1602 08/26/24 1924 08/26/24 2257 08/27/24 0435   BP: 120/78 137/88 108/74 120/81   Pulse: 68 72 71 70   Resp: 18 18 18 18   Temp: (!) 96.5 °F (35.8 °C) 96.9 °F (36.1 °C) 96.9 °F (36.1 °C) 96.9 °F (36.1 °C)   TempSrc: Oral Oral Oral Oral   SpO2: 98% 95% 96% 96%   Weight:       Height:           General appearance: alert, confused  Eyes: PERRLA, no scleral icterus  Neck: trachea midline, neck supple  Respiratory: Normal effort with no accessory muscle use on RA  Cardiovascular: RRR  Chest: Vas cath to Right IJ. Dressings c/d/i  Abdomen: soft, non-tender, non-distended, Incision site CDI, covered with dermabond. Packing in place with serous drainage at prior PD site.   Skin: warm and dry, no rashes  Extremities: no edema, no cyanosis  Neuro: Alert and oriented to person only. No focal deficits    ASSESSMENT & PLAN:   This is a 59 y.o. female with Hx of TN, DM, bipolar disorder, and ESRD on HD who is admitted to [facility] with acute metabolic encephalopathy. She is currently receiving dialysis through her HD to R IJ with successfully complete dialysis treatment. S/p PD catheter removal on 8/26     - Will pull out packing dressing at PD site and place gauze dressing over site.   - Okay for diet  - Surgery will sign off  - Rest of care per primary team.     Please call surgery with any questions or concerns. Thank you.     Melonie Alas DO  PGY1, General Surgery  08/27/24  8:00 AM  [phone]

## 2024-08-27 NOTE — PROGRESS NOTES
Physical Therapy  No Treatment    Pt currently off the unit for dialysis. Not available for PT at this time.   Will continue per plan of care as pt available.     Raven Mckeon, PTA #[id number]

## 2024-08-27 NOTE — PROGRESS NOTES
Treatment time: 3.5 hours  Net UF: 1500 ml     Pre weight: 57.7 kg  Post weight: 56.2 kg      Access used: RTDC    Access function: well with  ml/min     Medications or blood products given: NA     Regular outpatient schedule: TTS     Summary of response to treatment: Patient tolerated treatment well and without any complications. Patient remained stable throughout entire treatment.

## 2024-08-27 NOTE — PROGRESS NOTES
Physical Therapy  Daily Treatment Note    Discharge Recommendation:  Skilled Nursing Facility  Equipment Needs:  Defer to next level of care    Assessment:  Pt needing encouragement to mobilize and increase activity. Mobility effortful and weak. Pt slow to respond to questions/cues/conversation. She is currently needing up to Mod assist for safe mobility. Only tolerating a few steps with walker. Would benefit from continued IP PT at D/C to maximize function and independence. Plan is for SNF.     Chart Reviewed: Yes     Other position/activity restrictions: up as tolerated   Additional Pertinent Hx: Pt is a 59 y.o. female adm 8/22 with metabolic encephalopathy.  Pt presented for abdominal pain and numerous episodes of watery diarrhea .  CXR:Mild pulmonary edema.  Small bilateral pleural effusions.  CT abd:Moderate right and small left pleural effusions with patchy bibasilar airspace disease and/or atelectasis.   PMH: ESRD on peritoneal dialysis, DM type 2, hypertension, and asthma      Diagnosis: metabolic encephalopathy   Treatment Diagnosis: impaired functional mobility 2/2 decreased strength and endurance    Subjective: Pt in bed initially. Agreeable to working with PT after some encouragement.   Reports feeling tired today. Often delayed with responses.   \"I need a cigarette.\" When therapist told pt that cigarettes aren't allowed in the hospital, she stated \"I saw one in here earlier.\"    Pain: No c/o voiced    Objective:    Bed mobility  Supine to sit: Mod assist x 1, HOB up partially. Tactile + verbal cues.  Scooting: Min assist to EOB    Transfers  Sit to stand: Mod assist x 1 from bed to walker. Cues for hand placement.   Stand to sit: Min assist x 1 into chair. Cues for hand placement and controlled descent.     Ambulation  Assistance Level: Min assist x 1  Assistive device: Rolling walker  Distance: 3 ft (bed to chair)  Quality of gait: Weak; decreased step length/height; mildly unsteady  Other: Pt declined  further ambulation at this time.     Exercises  Sitting EOB: 10 reps B LAQ (cues for full knee extension)  Reclined in chair: 10 reps B ankle pumps (tactile cues). 5 reps B hip abd/add (min to mod assist)    Balance  Sat EOB ~10 minutes with SBA (statically) to CGA (performing LE ex)  Static stance with walker Min assist x 1  Taking steps with walker Min assist x 1    Patient Education  Role of PT. Importance of mobility. Questionable understanding.   Hand placement with transfers when using walker. Needs continued education.   Calling for assist with needs. Use of call button. Expressed understanding.     Safety Devices  Pt left with needs in reach. In chair (reclined) with chair alarm on. RN updated.     AM-PAC score  AM-PAC Inpatient Mobility Raw Score : 13  AM-PAC Inpatient T-Scale Score : 36.74  Mobility Inpatient CMS 0-100% Score: 64.91  Mobility Inpatient CMS G-Code Modifier : CL    Goals: (as determined and assessed by primary PT)  Time Frame for Short Term Goals: By discharge  Short Term Goal 1: Sup to sit modified independent   Short Term Goal 2: Pt will transfer sit to stand supervision   Short Term Goal 3: Pt will amb >50' with LRAD supervision          Plan:  Plan: 2-5x/week  Current Treatment Recommendations: Strengthening, Balance training, Functional mobility training, Endurance training, Gait training, Safety education & training, Patient/Caregiver education & training    Therapy Time    Individual  Concurrent  Group  Co-treatment    Time In  1405            Time Out  1445            Minutes  40              Timed Code Treatment Minutes: 40  Total Treatment Minutes: 40    Will continue per plan of care.   If patient is discharged prior to next treatment, this note will serve as the discharge summary.    Raven Mckeon, PTA #[id number]

## 2024-08-27 NOTE — DISCHARGE INSTR - COC
Continuity of Care Form    Patient Name: Cindi Rodriguez   :  1965  MRN:  [id number]    Admit date:  2024  Discharge date:  2024      Code Status Order: Full Code   Advance Directives:   Advance Care Flowsheet Documentation             Admitting Physician:  Priscila Ham MD  PCP: Alessandra Anderson DO    Discharging Nurse: Yu Merritt  Discharging Hospital Unit/Room#: 6301/6301-01  Discharging Unit Phone Number: [phone]      Emergency Contact:   Extended Emergency Contact Information  Primary Emergency Contact: Dimitri Snato  Home Phone: [phone]  Mobile Phone: [phone]  Relation: Child   needed? No  Secondary Emergency Contact: Buster Santo  Home Phone: [phone]  Mobile Phone: [phone]  Relation: Child    Past Surgical History:  Past Surgical History:   Procedure Laterality Date    DIALYSIS CATHETER REMOVAL N/A 2024    PERITONEAL DIAYSIS CATHETER REMOVAL, INCISION AND DRAINAGE OF ABDOMINAL WALL ABCESS performed by Santos Castaneda DO at [facility] OR    ENDOMETRIAL ABLATION      INNER EAR SURGERY      TUBAL LIGATION         Immunization History:     There is no immunization history on file for this patient.    Active Problems:  Patient Active Problem List   Diagnosis Code    Atypical chest pain R07.89    DM II (diabetes mellitus, type II), controlled ([facility]) E11.9    HTN (hypertension) I10    JONATHAN (acute kidney injury) ([facility]) N17.9    Asthma J45.909    History of anemia due to CKD N18.9, Z86.2    Peritonitis ([facility]) K65.9    ESRD on peritoneal dialysis ([facility]) N18.6, Z99.2    Complication of peritoneal dialysis T80.90XA    Peritonitis associated with peritoneal dialysis ([facility]) T85.71XA    Chronic idiopathic constipation K59.04    Lower abdominal pain R10.30    AMS (altered mental status) R41.82    Recurrent falls R29.6    Vertigo R42    Respiratory failure ([facility]) J96.90    Anemia D64.9    Multifocal pneumonia J18.9    Metabolic encephalopathy G93.41    Peritoneal dialysis catheter  unknown      Intake/Output Summary (Last 24 hours) at 8/27/2024 1518  Last data filed at 8/27/2024 1352  Gross per 24 hour   Intake 325 ml   Output 0 ml   Net 325 ml     I/O last 3 completed shifts:  In: 400 [P.O.:150; I.V.:250]  Out: 0     Safety Concerns:     None    Impairments/Disabilities:      None    Nutrition Therapy:  Current Nutrition Therapy:   - Oral Diet:  General    Routes of Feeding: Oral  Liquids: No Restrictions  Daily Fluid Restriction: no  Last Modified Barium Swallow with Video (Video Swallowing Test): not done    Treatments at the Time of Hospital Discharge:   Respiratory Treatments:     Oxygen Therapy:  is not on home oxygen therapy.  Ventilator:    - No ventilator support    Rehab Therapies: Physical Therapy and Occupational Therapy  Weight Bearing Status/Restrictions: No weight bearing restrictions  Other Medical Equipment (for information only, NOT a DME order):  walker  Other Treatments:       Patient's personal belongings (please select all that are sent with patient):  None    RN SIGNATURE:  Electronically signed by JOLYNN PASTRANA RN on 8/29/24 at 3:07 PM EDT    CASE MANAGEMENT/SOCIAL WORK SECTION    Inpatient Status Date: 8/22/2024    Readmission Risk Assessment Score:  Readmission Risk              Risk of Unplanned Readmission:  31           Discharging to Facility/ Agency       [facility]  Services: Skilled Nursing  [facility] [place] [id number]  [phone]      REPORT:  [phone]  FAX:  [phone]    Dialysis Facility (if applicable)   On site  HD  through [facility]   Name:  Address:  Dialysis Schedule:  Phone:  Fax:    / signature: Electronically signed by Maryanne Anaya RN on 8/27/24 at 3:21 PM EDT    PHYSICIAN SECTION    Prognosis: Fair    Condition at Discharge: Stable    Rehab Potential (if transferring to Rehab): Good    Recommended Labs or Other Treatments After Discharge:   Physical and occupational

## 2024-08-28 LAB
ALBUMIN SERPL-MCNC: 3.2 G/DL (ref 3.4–5)
ANION GAP SERPL CALCULATED.3IONS-SCNC: 16 MMOL/L (ref 3–16)
BACTERIA CATH TIP CULT: NORMAL
BUN SERPL-MCNC: 22 MG/DL (ref 7–20)
CALCIUM SERPL-MCNC: 8 MG/DL (ref 8.3–10.6)
CHLORIDE SERPL-SCNC: 98 MMOL/L (ref 99–110)
CO2 SERPL-SCNC: 24 MMOL/L (ref 21–32)
CREAT SERPL-MCNC: 4.1 MG/DL (ref 0.6–1.1)
DEPRECATED RDW RBC AUTO: 18.9 % (ref 12.4–15.4)
GFR SERPLBLD CREATININE-BSD FMLA CKD-EPI: 12 ML/MIN/{1.73_M2}
GLUCOSE BLD-MCNC: 137 MG/DL (ref 70–99)
GLUCOSE BLD-MCNC: 161 MG/DL (ref 70–99)
GLUCOSE BLD-MCNC: 195 MG/DL (ref 70–99)
GLUCOSE BLD-MCNC: 98 MG/DL (ref 70–99)
GLUCOSE SERPL-MCNC: 103 MG/DL (ref 70–99)
HBV CORE AB SERPL QL IA: NEGATIVE
HCT VFR BLD AUTO: 32.4 % (ref 36–48)
HGB BLD-MCNC: 10.1 G/DL (ref 12–16)
MCH RBC QN AUTO: 27.6 PG (ref 26–34)
MCHC RBC AUTO-ENTMCNC: 31.2 G/DL (ref 31–36)
MCV RBC AUTO: 88.3 FL (ref 80–100)
PERFORMED ON: ABNORMAL
PERFORMED ON: NORMAL
PHOSPHATE SERPL-MCNC: 3.4 MG/DL (ref 2.5–4.9)
PLATELET # BLD AUTO: 84 K/UL (ref 135–450)
PMV BLD AUTO: 9.9 FL (ref 5–10.5)
POTASSIUM SERPL-SCNC: 4.1 MMOL/L (ref 3.5–5.1)
RBC # BLD AUTO: 3.67 M/UL (ref 4–5.2)
SODIUM SERPL-SCNC: 138 MMOL/L (ref 136–145)
WBC # BLD AUTO: 7.5 K/UL (ref 4–11)

## 2024-08-28 PROCEDURE — 2580000003 HC RX 258: Performed by: INTERNAL MEDICINE

## 2024-08-28 PROCEDURE — 97530 THERAPEUTIC ACTIVITIES: CPT

## 2024-08-28 PROCEDURE — 6370000000 HC RX 637 (ALT 250 FOR IP): Performed by: NURSE PRACTITIONER

## 2024-08-28 PROCEDURE — 97535 SELF CARE MNGMENT TRAINING: CPT

## 2024-08-28 PROCEDURE — 36415 COLL VENOUS BLD VENIPUNCTURE: CPT

## 2024-08-28 PROCEDURE — 80069 RENAL FUNCTION PANEL: CPT

## 2024-08-28 PROCEDURE — 85027 COMPLETE CBC AUTOMATED: CPT

## 2024-08-28 PROCEDURE — 6370000000 HC RX 637 (ALT 250 FOR IP): Performed by: STUDENT IN AN ORGANIZED HEALTH CARE EDUCATION/TRAINING PROGRAM

## 2024-08-28 PROCEDURE — 97116 GAIT TRAINING THERAPY: CPT

## 2024-08-28 PROCEDURE — 6370000000 HC RX 637 (ALT 250 FOR IP): Performed by: INTERNAL MEDICINE

## 2024-08-28 PROCEDURE — 1200000000 HC SEMI PRIVATE

## 2024-08-28 RX ORDER — NICOTINE 21 MG/24HR
1 PATCH, TRANSDERMAL 24 HOURS TRANSDERMAL DAILY
Status: DISCONTINUED | OUTPATIENT
Start: 2024-08-28 | End: 2024-08-29 | Stop reason: HOSPADM

## 2024-08-28 RX ORDER — AMLODIPINE BESYLATE 5 MG/1
5 TABLET ORAL DAILY
Qty: 30 TABLET | Refills: 3 | Status: SHIPPED | OUTPATIENT
Start: 2024-08-28 | End: 2024-08-29 | Stop reason: HOSPADM

## 2024-08-28 RX ORDER — AMLODIPINE BESYLATE 5 MG/1
5 TABLET ORAL DAILY
Status: DISCONTINUED | OUTPATIENT
Start: 2024-08-28 | End: 2024-08-29

## 2024-08-28 RX ADMIN — AMLODIPINE BESYLATE 5 MG: 5 TABLET ORAL at 12:21

## 2024-08-28 RX ADMIN — SODIUM CHLORIDE, PRESERVATIVE FREE 10 ML: 5 INJECTION INTRAVENOUS at 09:31

## 2024-08-28 RX ADMIN — LORAZEPAM 0.5 MG: 0.5 TABLET ORAL at 13:56

## 2024-08-28 RX ADMIN — BUPROPION HYDROCHLORIDE 150 MG: 150 TABLET, EXTENDED RELEASE ORAL at 09:31

## 2024-08-28 RX ADMIN — ROSUVASTATIN CALCIUM 40 MG: 20 TABLET, COATED ORAL at 20:31

## 2024-08-28 RX ADMIN — SODIUM CHLORIDE, PRESERVATIVE FREE 10 ML: 5 INJECTION INTRAVENOUS at 20:35

## 2024-08-28 RX ADMIN — CARVEDILOL 3.12 MG: 3.12 TABLET, FILM COATED ORAL at 16:12

## 2024-08-28 RX ADMIN — TIZANIDINE 4 MG: 4 TABLET ORAL at 20:31

## 2024-08-28 RX ADMIN — SEVELAMER CARBONATE 800 MG: 800 TABLET, FILM COATED ORAL at 09:26

## 2024-08-28 RX ADMIN — MIRTAZAPINE 7.5 MG: 15 TABLET, FILM COATED ORAL at 20:31

## 2024-08-28 RX ADMIN — ACETAMINOPHEN 650 MG: 325 TABLET ORAL at 16:11

## 2024-08-28 RX ADMIN — CARVEDILOL 3.12 MG: 3.12 TABLET, FILM COATED ORAL at 09:25

## 2024-08-28 ASSESSMENT — PAIN SCALES - PAIN ASSESSMENT IN ADVANCED DEMENTIA (PAINAD)
TOTALSCORE: 0
CONSOLABILITY: NO NEED TO CONSOLE
FACIALEXPRESSION: SMILING OR INEXPRESSIVE
FACIALEXPRESSION: SMILING OR INEXPRESSIVE
BREATHING: NORMAL
TOTALSCORE: 0
BREATHING: NORMAL
BODYLANGUAGE: RELAXED
BODYLANGUAGE: RELAXED
BREATHING: NORMAL
FACIALEXPRESSION: SMILING OR INEXPRESSIVE
BREATHING: NORMAL
FACIALEXPRESSION: SMILING OR INEXPRESSIVE
BODYLANGUAGE: RELAXED
CONSOLABILITY: NO NEED TO CONSOLE
FACIALEXPRESSION: SMILING OR INEXPRESSIVE
BODYLANGUAGE: RELAXED
BREATHING: NORMAL
FACIALEXPRESSION: SMILING OR INEXPRESSIVE
CONSOLABILITY: NO NEED TO CONSOLE
CONSOLABILITY: NO NEED TO CONSOLE
BODYLANGUAGE: RELAXED
BREATHING: NORMAL
BODYLANGUAGE: RELAXED
BREATHING: NORMAL
CONSOLABILITY: NO NEED TO CONSOLE
BODYLANGUAGE: RELAXED
BODYLANGUAGE: TENSE, DISTRESSED PACING, FIDGETING
TOTALSCORE: 0
CONSOLABILITY: NO NEED TO CONSOLE
FACIALEXPRESSION: SMILING OR INEXPRESSIVE
FACIALEXPRESSION: SMILING OR INEXPRESSIVE
CONSOLABILITY: NO NEED TO CONSOLE
BODYLANGUAGE: RELAXED
BODYLANGUAGE: RELAXED
FACIALEXPRESSION: SMILING OR INEXPRESSIVE
TOTALSCORE: 3
BREATHING: NORMAL
BODYLANGUAGE: RELAXED
TOTALSCORE: 0
BREATHING: NORMAL
TOTALSCORE: 0
BODYLANGUAGE: RELAXED
FACIALEXPRESSION: SMILING OR INEXPRESSIVE
TOTALSCORE: 0
FACIALEXPRESSION: SAD, FRIGHTENED, FROWN
TOTALSCORE: 0
FACIALEXPRESSION: SMILING OR INEXPRESSIVE
FACIALEXPRESSION: SMILING OR INEXPRESSIVE
BREATHING: NORMAL
TOTALSCORE: 0
BODYLANGUAGE: RELAXED
BREATHING: NORMAL
CONSOLABILITY: NO NEED TO CONSOLE
BODYLANGUAGE: RELAXED
CONSOLABILITY: NO NEED TO CONSOLE
CONSOLABILITY: NO NEED TO CONSOLE
NEGVOCALIZATION: OCCASIONAL MOAN/GROAN, LOW SPEECH, NEGATIVE/DISAPPROVING QUALITY
CONSOLABILITY: NO NEED TO CONSOLE
BREATHING: NORMAL
BREATHING: NORMAL
CONSOLABILITY: NO NEED TO CONSOLE
CONSOLABILITY: NO NEED TO CONSOLE
TOTALSCORE: 0
BODYLANGUAGE: RELAXED
BREATHING: NORMAL
FACIALEXPRESSION: SMILING OR INEXPRESSIVE
CONSOLABILITY: NO NEED TO CONSOLE
BODYLANGUAGE: RELAXED
FACIALEXPRESSION: SMILING OR INEXPRESSIVE
FACIALEXPRESSION: SMILING OR INEXPRESSIVE
TOTALSCORE: 0
BREATHING: NORMAL
BREATHING: NORMAL
TOTALSCORE: 0

## 2024-08-28 ASSESSMENT — PAIN DESCRIPTION - ONSET: ONSET: ON-GOING

## 2024-08-28 ASSESSMENT — PAIN DESCRIPTION - ORIENTATION: ORIENTATION: LOWER

## 2024-08-28 ASSESSMENT — PAIN DESCRIPTION - DESCRIPTORS: DESCRIPTORS: ACHING

## 2024-08-28 ASSESSMENT — PAIN SCALES - GENERAL
PAINLEVEL_OUTOF10: 9
PAINLEVEL_OUTOF10: 0

## 2024-08-28 ASSESSMENT — PAIN DESCRIPTION - FREQUENCY: FREQUENCY: CONTINUOUS

## 2024-08-28 ASSESSMENT — PAIN DESCRIPTION - PAIN TYPE: TYPE: CHRONIC PAIN

## 2024-08-28 ASSESSMENT — PAIN - FUNCTIONAL ASSESSMENT: PAIN_FUNCTIONAL_ASSESSMENT: PREVENTS OR INTERFERES SOME ACTIVE ACTIVITIES AND ADLS

## 2024-08-28 ASSESSMENT — PAIN SCALES - WONG BAKER: WONGBAKER_NUMERICALRESPONSE: NO HURT

## 2024-08-28 ASSESSMENT — PAIN DESCRIPTION - LOCATION: LOCATION: ABDOMEN;BACK

## 2024-08-28 NOTE — PLAN OF CARE
Problem: Discharge Planning  Goal: Discharge to home or other facility with appropriate resources  8/28/2024 1041 by Yu Merritt RN  Outcome: Progressing  Flowsheets (Taken 8/24/2024 0014 by Trisha Harris, RN)  Discharge to home or other facility with appropriate resources:   Identify barriers to discharge with patient and caregiver   Arrange for needed discharge resources and transportation as appropriate   Identify discharge learning needs (meds, wound care, etc)   Refer to discharge planning if patient needs post-hospital services based on physician order or complex needs related to functional status, cognitive ability or social support system     Problem: Chronic Conditions and Co-morbidities  Goal: Patient's chronic conditions and co-morbidity symptoms are monitored and maintained or improved  8/28/2024 1041 by Yu Merritt RN  Outcome: Progressing  Flowsheets (Taken 8/24/2024 0014 by Trisha Harris, RN)  Care Plan - Patient's Chronic Conditions and Co-Morbidity Symptoms are Monitored and Maintained or Improved:   Monitor and assess patient's chronic conditions and comorbid symptoms for stability, deterioration, or improvement   Collaborate with multidisciplinary team to address chronic and comorbid conditions and prevent exacerbation or deterioration   Update acute care plan with appropriate goals if chronic or comorbid symptoms are exacerbated and prevent overall improvement and discharge     Problem: Pain  Goal: Verbalizes/displays adequate comfort level or baseline comfort level  Outcome: Progressing  Flowsheets (Taken 8/28/2024 1041)  Verbalizes/displays adequate comfort level or baseline comfort level:   Encourage patient to monitor pain and request assistance   Assess pain using appropriate pain scale   Implement non-pharmacological measures as appropriate and evaluate response     Problem: Skin/Tissue Integrity  Goal: Absence of new skin breakdown  Description: 1.  Monitor for areas of redness and/or skin

## 2024-08-28 NOTE — PROGRESS NOTES
59-year-old female presented with end-stage renal disease type 2 diabetes hypertension asthma with acute metabolic encephalopathy secondary to missed dialysis.  Her PD catheter was removed and HD is placed patient is discharged as of 8/27 though she is refusing to go to nursing facility today  PT OT saw her earlier yesterday and gave her a score of 12.  I discussed this with case management today and they are looking into options of her going to home versus nursing home.  At this time she is stable    Vitals:    08/28/24 0921   BP: (!) 140/98   Pulse: 78   Resp: 18   Temp: 97.4 °F (36.3 °C)   SpO2: 99%     JUSTYN Ascencio - CNP  8/28/2024  10:45 AM

## 2024-08-28 NOTE — PROGRESS NOTES
Occupational Therapy  [address]  Occupational Therapy Treatment Note     Name: Cindi Rodriguez  : 1965  MRN: [id number]  Date of Service: 2024    Discharge Recommendations:  Subacute/Skilled Nursing Facility  OT Equipment Recommendations  Equipment Needed: No  Other: defer to next care facility       Patient Diagnosis(es): The primary encounter diagnosis was Hypervolemia, unspecified hypervolemia type. Diagnoses of Hemodialysis patient (HCC), Diarrhea, unspecified type, and Peritoneal dialysis catheter in place (HCC) were also pertinent to this visit.  Past Medical History:  has a past medical history of JONATHAN (acute kidney injury) (HCC), Anxiety, Asthma, Bipolar disorder (HCC), Bronchitis, Chronic back pain, Depression, Diabetes mellitus (HCC), DM II (diabetes mellitus, type II), controlled (HCC), and Hypertension.  Past Surgical History:  has a past surgical history that includes Inner ear surgery; Tubal ligation; Endometrial ablation; and Dialysis Catheter Removal (N/A, 2024).    Treatment Diagnosis: decreased ADLs,decreased functional mobility, decreased cognition 2/2 encephalopathy      Assessment  Performance deficits / Impairments: Decreased functional mobility ;Decreased ADL status;Decreased endurance;Decreased safe awareness;Decreased cognition  Assessment: Pt continues with ongoing deficits.  Pt limited by poor processing/ safety awareness and insight into deficits.  Pt requires assist with all ADLs/ mobility for safety and initiation. Pt unable to care for self and lives alone.   Recommend ongoing inpt OT at SNF to maximize functional level. If pt were to return home - would need 24hr caregivers / assist / RW and BSC.   Will follow as inpt.  Treatment Diagnosis: decreased ADLs,decreased functional mobility, decreased cognition 2/2 encephalopathy  REQUIRES OT FOLLOW-UP: Yes  Activity Tolerance  Activity Tolerance: Patient limited by fatigue;Treatment limited  secondary to decreased cognition  Activity Tolerance Comments: Pt c/o extreme fatigue with minimal activity - requires several seated rest breaks between activity . Pt demo heavy signing / breathing - O2 sat on RA 94-98%.     Plan  Occupational Therapy Plan  Times Per Week: 2-5x  Times Per Day: Once a day  Current Treatment Recommendations: Functional mobility training, Self-Care / ADL, Safety education & training, Endurance training, Patient/Caregiver education & training    Restrictions  Position Activity Restriction  Other position/activity restrictions: up as tolerated    Subjective  General  Chart Reviewed: Yes  Additional Pertinent Hx: Pt presented to ED on 8/22 for abdominal pain, diarrhea, and generalized weakness. She is currently on peritoneal dialysis but with a recently placed hemodialysis catheter in the right IJ for plans to initiate hemodialysis. She presents with metabolic encephalopathy and is being treated for sepsis. Chest x-ray revealed mild pulmonary edema, small bilateral pleural effusions, and cardiomegaly. CT of abdomen revealed plural effusions, moderate cardiomegaly, moderate burden of diffuse ascites, and anasarca.    PMHx: ESRD,  DM type 2, hypertension, and asthma  Response to previous treatment: Patient unable to report, no changes reported from family or staff  Family / Caregiver Present: No  Diagnosis: Metabolic encephalopathy  Subjective  Subjective: Asked to see pt this am per CM 2/2 pt's desire to go home at d/c. Pt found resting / sleeping in bed  needing 5-6 mins to wake up and respond well to therapy request.  Pt agreeable for OOB /OT tx with encouragement  Pt reports no pain -just feel cold and exhausted    Social/Functional History  Social/Functional History  Lives With: Alone  Type of Home: Apartment (first floor)  Home Layout: One level  Home Access: Stairs to enter with rails (3 CHARAN)  Bathroom Shower/Tub: Tub/Shower unit  Bathroom Toilet: Standard (next to sink)  Home

## 2024-08-28 NOTE — PROGRESS NOTES
Patient keep removing her telemetry and now she refused to put it back, she also removed her IV canula and refused to be stick. Dwayne Blanchard MD  informed awaiting for any orders.

## 2024-08-28 NOTE — PROGRESS NOTES
Ph: [phone], Fax: [phone]                                     [employer]                                                   [address] [place] [id number]           Reason for admission:    AMS    Brief Summary:     Cindi Rodriguez is being seen by nephrology for ESRD.     Interval History and Plan:   Patient seen at bedside   Comfortable on room air.   Sleeping and woke up. Per RN pt was alert in AM and had breakfast and now taking a nap  Had dialysis done yesterday for 3.5 hours with 1.5 liter UF  Awake and alert   /98  Does not appear volume overloaded.   Urine toxicology was + for cocaine.   Lytes stable  Hb 10.1          HD tomorrow per TTS schedule in her clinic.   Patient is not going to rehab and I again confirmed with Winnie Yuen clinic manager and she confirmed that patient has TTS spot for dialysis but she is non compliant and patient /family are aware about it.   Will avoid beta blocker as cocaine was positive on screening upon admission.  Continue low dose Coreg and also add low dose amlodipine as BP is elevated side.   Dose medications according to GFR  Advised compliance with HD as it can be life threatening.   Anemia management in clinic  Plan for D/C noted and advised compliance with dialysis and I again tried to call patient's son today but he did not  phone.   D/C patient on current medications   Patient is non compliant and high risk of adverse cardiac event and readmission.         Discussed with NP Griselda Vasquez and patient's nurse      Thank you for allowing us to participate in this patient's care  In case of any question please call us at our 24 hour answering service [phone] or from 7 AM to 5 PM via Perfect Serve, [employer], Epic chat or cell phone.   Dr Brittney Castaneda MD      Assessment:     ESRD  Was on PD and plan was to transition to iHD as patient failed PD but non compliant and did not have HD for last 2 weeks

## 2024-08-28 NOTE — PROGRESS NOTES
Spiritual Health Assessment/Progress Note  [facility]    (P) Follow-up,  ,  ,      Name: Cindi Rodriguez MRN: [id number]    Age: 59 y.o.     Sex: female   Language: English   Druze: Latter day   Metabolic encephalopathy     Date: 8/28/2024            Total Time Calculated: (P) 16 min              Spiritual Assessment continued in [address] TELEMETRY        Referral/Consult From: Nurse   Encounter Overview/Reason: (P) Follow-up  Service Provided For: (P) Patient    Anahi, Belief, Meaning:   Patient Other: Doesn't identify with Latter-day on spirituality  Family/Friends No family/friends present      Importance and Influence:  Patient has no beliefs influential to healthcare decision-making identified during this visit    Community:  Patient feels well-supported. Support system includes: Extended family and Other: Grandchildren, unknown if they come into hospital.    Assessment and Plan of Care:   Patient Interventions include: Facilitated expression of thoughts and feelings and Explored spiritual coping/struggle/distress and theological reflection    Patient Plan of Care: Other: Pt will be seen as needed for emotional support.    Electronically signed by MARIE Perry on 8/28/2024 at 9:18 AM

## 2024-08-28 NOTE — PROGRESS NOTES
Physician Progress Note      PATIENT:               ZULMA KING  CSN #:                  [id number]  :                       1965  ADMIT DATE:       2024 9:59 AM  DISCH DATE:  RESPONDING  PROVIDER #:        HARRY WYNN          QUERY TEXT:    Pt admitted with AMS. Pt noted to have abscess, altered mental status, low   temp, elevated lactic acid, elevated procal. If possible, please document in   the progress notes and discharge summary if you are evaluating and /or   treating any of the following:  The medical record reflects the following:    Risk Factors: 59 year old female w/ abdominal wall abscess  Clinical Indicators:  T 96.3, LA 3.6, procal 2.34.  ED provider   note-  Patient was recently seen at [facility] 3 days ago, arrives still wearing the   wrist band from that visit, and seems frankly confused and unable to provide   much history.  Patient is yelling in pain, primarily endorsing rectal pain and   burning where she is having frequent diarrhea.  Patient was hypothermic on   arrival to 96.3, intermittently tachycardic and catheter sites appear poorly   cared for... Will prophylactically treat for sepsis of unknown etiology,   possible peritonitis.  H&P- #Acute metaboli  Treatment: Labs, I&D, Maxipime, Vancomycin, LR 1,000 mL bolus  Options provided:  -- Sepsis due to abdominal wall abscess, present on admission  -- Abdominal wall abscess without Sepsis  -- Other - I will add my own diagnosis  -- Disagree - Not applicable / Not valid  -- Disagree - Clinically unable to determine / Unknown  -- Refer to Clinical Documentation Reviewer    PROVIDER RESPONSE TEXT:    This patient has abdominal Karina abscess without Sepsis.    Query created by: Ana Saucedo on 2024 12:34 PM      Electronically signed by:  HARRY WYNN 2024 1:59 PM

## 2024-08-28 NOTE — PLAN OF CARE
Problem: Chronic Conditions and Co-morbidities  Goal: Patient's chronic conditions and co-morbidity symptoms are monitored and maintained or improved  Outcome: Progressing     Problem: Skin/Tissue Integrity  Goal: Absence of new skin breakdown  Description: 1.  Monitor for areas of redness and/or skin breakdown  2.  Assess vascular access sites hourly  3.  Every 4-6 hours minimum:  Change oxygen saturation probe site  4.  Every 4-6 hours:  If on nasal continuous positive airway pressure, respiratory therapy assess nares and determine need for appliance change or resting period.  8/27/2024 2221 by Jacki Otoole RN  Outcome: Progressing     Problem: Safety - Adult  Goal: Free from fall injury  8/27/2024 2221 by Jacki Otoole RN  Outcome: Progressing     Problem: Discharge Planning  Goal: Discharge to home or other facility with appropriate resources  Outcome: Progressing

## 2024-08-28 NOTE — CARE COORDINATION
CM following. WALLY submitted to finalized precert. Document ID : [id number]     Precert remains pending.     CM received phone call from Renata with [facility], their HD can not accept pt due to her insurance. Pt already set up outpt for HD at [facility]. Renata secured transport for patient to [facility] at 1:30pm through her insurance.   CM spoke with Kam, nurse at [facility] to let him know pt would likely start there on Saturday.  CM to follow up with [facility] if start date changes based on precert and when pt discharges.     [facility] phone # [phone]    Addendum: CM attempted to talk with patient several times today regarding discharge plan but pt was sound asleep and difficult to wake. CM went back to room at 3:15pm and pt was lethargic but awake. CM discussed with pt her therapy session and how she is not safe to return home at this time, pt disappointed but agreeable to go to [facility] with transport to HD at [facility].     Karon Thompson RN, BSN,   Case Management Department  [phone]

## 2024-08-28 NOTE — PROGRESS NOTES
Physical Therapy  Facility/Department: [address]  Physical Therapy Treatment    Name: Cindi Rodriguez  : 1965  MRN: [id number]  Date of Service: 2024    Discharge Recommendations:  Subacute/Skilled Nursing Facility   PT Equipment Recommendations  Equipment Needed:  (defer)      Patient Diagnosis(es): The primary encounter diagnosis was Hypervolemia, unspecified hypervolemia type. Diagnoses of Hemodialysis patient (HCC), Diarrhea, unspecified type, and Peritoneal dialysis catheter in place ([facility]) were also pertinent to this visit.  Past Medical History:  has a past medical history of JONATHAN (acute kidney injury) ([facility]), Anxiety, Asthma, Bipolar disorder (HCC), Bronchitis, Chronic back pain, Depression, Diabetes mellitus ([facility]), DM II (diabetes mellitus, type II), controlled ([facility]), and Hypertension.  Past Surgical History:  has a past surgical history that includes Inner ear surgery; Tubal ligation; Endometrial ablation; and Dialysis Catheter Removal (N/A, 2024).    Assessment  Assessment: Able to walk slightly further today (15') and less A needed for bed mobility.  Pt lethargic at times during session & demo impaired processing.  Required mod A to stand from toilet & mod A to lower to chair.  Pt lives alone & does not have anyone capable of helping her.  Pt was struggling to manage prior to admit & do not feel pt has improved enough where she could safely manage on her own based on therapy sessions.  Appears at high risk for falls at this time and is not capable of doing stairs or walking functional household distances.  Recommend SNF at [place].  Treatment Diagnosis: impaired functional mobility 2/2 decreased strength and endurance  Therapy Prognosis: Good  Requires PT Follow-Up: Yes  Activity Tolerance  Activity Tolerance: Patient limited by fatigue;Patient limited by endurance  Activity Tolerance Comments: shallow breathing noted at times; O2 sats 94-97% on RA during session    Plan  Physical

## 2024-08-29 VITALS
BODY MASS INDEX: 17.17 KG/M2 | TEMPERATURE: 98.2 F | HEART RATE: 84 BPM | HEIGHT: 68 IN | RESPIRATION RATE: 20 BRPM | SYSTOLIC BLOOD PRESSURE: 129 MMHG | DIASTOLIC BLOOD PRESSURE: 74 MMHG | OXYGEN SATURATION: 95 % | WEIGHT: 113.32 LBS

## 2024-08-29 LAB
ALBUMIN SERPL-MCNC: 3 G/DL (ref 3.4–5)
ANION GAP SERPL CALCULATED.3IONS-SCNC: 13 MMOL/L (ref 3–16)
BUN SERPL-MCNC: 30 MG/DL (ref 7–20)
CALCIUM SERPL-MCNC: 7.7 MG/DL (ref 8.3–10.6)
CHLORIDE SERPL-SCNC: 100 MMOL/L (ref 99–110)
CO2 SERPL-SCNC: 24 MMOL/L (ref 21–32)
CREAT SERPL-MCNC: 6 MG/DL (ref 0.6–1.1)
DEPRECATED RDW RBC AUTO: 18.7 % (ref 12.4–15.4)
GFR SERPLBLD CREATININE-BSD FMLA CKD-EPI: 8 ML/MIN/{1.73_M2}
GLUCOSE BLD-MCNC: 113 MG/DL (ref 70–99)
GLUCOSE BLD-MCNC: 167 MG/DL (ref 70–99)
GLUCOSE BLD-MCNC: 92 MG/DL (ref 70–99)
GLUCOSE SERPL-MCNC: 98 MG/DL (ref 70–99)
HCT VFR BLD AUTO: 31.8 % (ref 36–48)
HGB BLD-MCNC: 10 G/DL (ref 12–16)
MCH RBC QN AUTO: 27.8 PG (ref 26–34)
MCHC RBC AUTO-ENTMCNC: 31.5 G/DL (ref 31–36)
MCV RBC AUTO: 88.2 FL (ref 80–100)
PERFORMED ON: ABNORMAL
PERFORMED ON: ABNORMAL
PERFORMED ON: NORMAL
PHOSPHATE SERPL-MCNC: 3.5 MG/DL (ref 2.5–4.9)
PLATELET # BLD AUTO: 88 K/UL (ref 135–450)
PMV BLD AUTO: 9.7 FL (ref 5–10.5)
POTASSIUM SERPL-SCNC: 3.9 MMOL/L (ref 3.5–5.1)
RBC # BLD AUTO: 3.6 M/UL (ref 4–5.2)
SODIUM SERPL-SCNC: 137 MMOL/L (ref 136–145)
WBC # BLD AUTO: 6.4 K/UL (ref 4–11)

## 2024-08-29 PROCEDURE — 85027 COMPLETE CBC AUTOMATED: CPT

## 2024-08-29 PROCEDURE — 6370000000 HC RX 637 (ALT 250 FOR IP): Performed by: NURSE PRACTITIONER

## 2024-08-29 PROCEDURE — 80069 RENAL FUNCTION PANEL: CPT

## 2024-08-29 PROCEDURE — 90935 HEMODIALYSIS ONE EVALUATION: CPT

## 2024-08-29 PROCEDURE — 36415 COLL VENOUS BLD VENIPUNCTURE: CPT

## 2024-08-29 PROCEDURE — 6370000000 HC RX 637 (ALT 250 FOR IP): Performed by: INTERNAL MEDICINE

## 2024-08-29 PROCEDURE — 2580000003 HC RX 258: Performed by: INTERNAL MEDICINE

## 2024-08-29 PROCEDURE — 94150 VITAL CAPACITY TEST: CPT

## 2024-08-29 PROCEDURE — 6370000000 HC RX 637 (ALT 250 FOR IP): Performed by: STUDENT IN AN ORGANIZED HEALTH CARE EDUCATION/TRAINING PROGRAM

## 2024-08-29 RX ADMIN — SEVELAMER CARBONATE 800 MG: 800 TABLET, FILM COATED ORAL at 13:03

## 2024-08-29 RX ADMIN — CARVEDILOL 3.12 MG: 3.12 TABLET, FILM COATED ORAL at 13:04

## 2024-08-29 RX ADMIN — SODIUM CHLORIDE, PRESERVATIVE FREE 10 ML: 5 INJECTION INTRAVENOUS at 13:08

## 2024-08-29 RX ADMIN — BUPROPION HYDROCHLORIDE 150 MG: 150 TABLET, EXTENDED RELEASE ORAL at 13:04

## 2024-08-29 RX ADMIN — CARVEDILOL 3.12 MG: 3.12 TABLET, FILM COATED ORAL at 17:12

## 2024-08-29 RX ADMIN — SEVELAMER CARBONATE 800 MG: 800 TABLET, FILM COATED ORAL at 17:12

## 2024-08-29 ASSESSMENT — PAIN SCALES - PAIN ASSESSMENT IN ADVANCED DEMENTIA (PAINAD)
BODYLANGUAGE: RELAXED
CONSOLABILITY: NO NEED TO CONSOLE
CONSOLABILITY: NO NEED TO CONSOLE
TOTALSCORE: 0
CONSOLABILITY: NO NEED TO CONSOLE
BREATHING: NORMAL
BODYLANGUAGE: RELAXED
TOTALSCORE: 0
FACIALEXPRESSION: SMILING OR INEXPRESSIVE
TOTALSCORE: 0
BREATHING: NORMAL
TOTALSCORE: 0
CONSOLABILITY: NO NEED TO CONSOLE
FACIALEXPRESSION: SMILING OR INEXPRESSIVE
TOTALSCORE: 0
BODYLANGUAGE: RELAXED
CONSOLABILITY: NO NEED TO CONSOLE
FACIALEXPRESSION: SMILING OR INEXPRESSIVE
FACIALEXPRESSION: SMILING OR INEXPRESSIVE
BREATHING: NORMAL
TOTALSCORE: 0
BREATHING: NORMAL
BREATHING: NORMAL
FACIALEXPRESSION: SMILING OR INEXPRESSIVE
FACIALEXPRESSION: SMILING OR INEXPRESSIVE
CONSOLABILITY: NO NEED TO CONSOLE
BREATHING: NORMAL
FACIALEXPRESSION: SMILING OR INEXPRESSIVE
FACIALEXPRESSION: SMILING OR INEXPRESSIVE
BODYLANGUAGE: RELAXED
TOTALSCORE: 0
FACIALEXPRESSION: SMILING OR INEXPRESSIVE
BODYLANGUAGE: RELAXED
BREATHING: NORMAL
BODYLANGUAGE: RELAXED
TOTALSCORE: 0
CONSOLABILITY: NO NEED TO CONSOLE
TOTALSCORE: 0
BODYLANGUAGE: RELAXED
CONSOLABILITY: NO NEED TO CONSOLE
TOTALSCORE: 0
BODYLANGUAGE: RELAXED
BODYLANGUAGE: RELAXED
BREATHING: NORMAL
FACIALEXPRESSION: SMILING OR INEXPRESSIVE
BREATHING: NORMAL
BREATHING: NORMAL
TOTALSCORE: 0
BODYLANGUAGE: RELAXED
CONSOLABILITY: NO NEED TO CONSOLE
FACIALEXPRESSION: SMILING OR INEXPRESSIVE
CONSOLABILITY: NO NEED TO CONSOLE
BODYLANGUAGE: RELAXED
BREATHING: NORMAL
CONSOLABILITY: NO NEED TO CONSOLE

## 2024-08-29 NOTE — PROGRESS NOTES
Ph: [phone], Fax: [phone]                                     [employer]                                                   [address] [place] [address]           Reason for admission:    AMS    Brief Summary:     Cindi Rodriguez is being seen by nephrology for ESRD.     Interval History and Plan:   Patient seen during dialysis  Comfortable on room air.   Awake and alert   BP relatively soft 106/74  Urine toxicology was + for cocaine.   Lytes stable  Hb 10.0          HD today per TTS schedule with UF ~ 1- 2 Liter as tolerated.   Will avoid beta blocker as cocaine was positive on screening upon admission.  Continue low dose Coreg and follow BP. Amlodipine was started but will hold as BP is relatively softer side. Monitor.   Epogen with HD.   Dose medications according to GFR  Advised compliance with HD  Patient is non compliant and high risk of adverse cardiac event and readmission.   Patient has outpatient HD arranged at [facility] TTS schedule. CM following.       Discussed with dialysis nurse.   D/W NP Griselda      Thank you for allowing us to participate in this patient's care  In case of any question please call us at our 24 hour answering service [phone] or from 7 AM to 5 PM via Perfect Serve, [email or web address], Epic chat or cell phone.   Dr Brittney Castaneda MD      Assessment:     ESRD  Was on PD and plan was to transition to iHD as patient failed PD but non compliant and did not have HD for last 2 weeks or so per [facility] RN.   Access: TDC. Clean dressing in place.   PD catheter removed  Volume status: On room air and no edema.       ? Abdominal pain  Ruled out peritonitis      Hypertension      Anemia      Encephalopathy.   Likely due to uremia   Ruled out infection.   Better           HPI      Patient is a 59 y.o. female  with PMH significant for ESRD on PD, HTN is being admitted with confusion. BUN is very high and patient is agitated and unable to

## 2024-08-29 NOTE — PROGRESS NOTES
Pt discharged to [facility]. Report given to Alannah. IV removed. Discharge paperwork given to transport. Belongings packed and sent with patient.

## 2024-08-29 NOTE — PROGRESS NOTES
Treatment time: 3.5 hrs    Net UF: 1600 ml     Pre weight: 53 kg  Post weight: 51.4 kg     Access used: Rtdc  Access function:  tolerated well,   ml/min     Medications or blood products given: heparin dwells     Summary of response to treatment: Pt tolerated well. Pt remained stable throughout entire treatment and upon exiting the hemodialysis suite.      Copy of dialysis treatment record placed in chart, to be scanned into EMR.

## 2024-08-29 NOTE — CARE COORDINATION
Case Management Assessment            Discharge Note                    Date / Time of Note: 8/29/2024 12:26 PM                  Discharge Note Completed by: Maryanne Anaya RN    Patient Name: Cindi Rodriguez   YOB: 1965  Diagnosis: Metabolic encephalopathy [G93.41]  Hemodialysis patient (HCC) [Z99.2]  Hypervolemia, unspecified hypervolemia type [E87.70]  Diarrhea, unspecified type [R19.7]   Date / Time: 8/22/2024  9:59 AM    Current PCP: Alessandra Anderson DO  Clinic patient: No    Hospitalization in the last 30 days: No       Advance Directives:  Code Status: Full Code  [place] DNR form completed and on chart: No    Financial:  Payor: [facility] MEDICAID / Plan: [facility] / Product Type: *No Product type* /      Pharmacy:    [facility] [id number] - [place], [place] - [address] -  [phone] - F [phone]  [address] [facility] [address]  Phone: [phone] Fax: [phone]    [facility] [id number] - [facility] [address] [facility] - P [phone] - F [phone]  [address] [facility] [address]  Phone: [phone] Fax: [phone]    [facility] [id number] - [place], [place] - [address] [facility] [address] - P [phone] - F [phone]  [address] [facility] [address]  [facility] [address]  Phone: [phone] Fax: [phone]    [facility] [id number] - [place], [place] - [address] [facility] - P [phone] - F [phone]  [address] [facility] [address]  Phone: [phone] Fax: [phone]      Assistance purchasing medications?: Potential Assistance Purchasing Medications: No  Assistance provided by Case Management: None at this time    Does patient want to participate in local refill/ meds to beds program?: Yes    Meds To Beds General Rules:  1. Can ONLY be done Monday- Friday between 8:30am-5pm  2. Prescription(s) must be in pharmacy by 3pm to be filled same day  3.Copy of patient's insurance/ prescription drug card and  patient face sheet must be sent along with the prescription(s)  4. Cost of Rx cannot be added to hospital bill. If financial assistance is needed, please contact unit  or ;  or  CANNOT provide pharmacy voucher for patients co-pays  5. Patients can then  the prescription on their way out of the hospital at discharge, or pharmacy can deliver to the bedside if staff is available. (payment due at time of pick-up or delivery - cash, check, or card accepted)     Able to afford home medications/ co-pay costs: Yes    ADLS:  Current PT AM-PAC Score: 14 /24  Current OT AM-PAC Score: 15 /24    DISCHARGE Disposition:     [facility]    Services Available   Skilled Nursing      Address   [address] [place] [address]             Contact Information    [phone]          REPORT:  [phone]  FAX:  [phone]      LOC at discharge: Skilled  MARGARITA Completed: Yes    Notification completed in HENS/PAS?:  Yes : CM has completed HENS online through secure website for SNF admission at [facility].   Document ID #:  [id number]     IMM Completed:   No         Transportation:  Transportation PLAN for discharge: EMS transportation   Mode of Transport: Ambulance stretcher - BLS  Reason for medical transport: Bed confined: Meets the following criteria 1) unable to get out of bed without assistance or ambulate, 2) unable to safely sit up in a wheelchair, 3) unable to maintain erect seating position in a chair for time needed for transport  Name of Transport Company: [employer]  Phone: [phone]  Time of Transport: 1600    Transport form completed: Yes    Home Care:  Home Care ordered at discharge: Not Indicated  Home Care Agency: Not Applicable  Orders faxed: No    Durable Medical Equipment:  DME Provider: defer  Equipment obtained during hospitalization: defer    Home Oxygen and Respiratory Equipment:  Oxygen needed

## 2024-08-29 NOTE — PROGRESS NOTES
59-year-old female presented with end-stage renal disease type 2 diabetes hypertension asthma with acute metabolic encephalopathy secondary to missed dialysis.  Her PD catheter was removed and HD is placed patient is discharged as of 8/27 though she is refusing to go to nursing facility today  PT OT recommended SNF for rehab patient will be going to SNF later today agreeable with the plan.    Vitals:    08/29/24 0900   BP: 110/78   Pulse: 63   Resp: 18   Temp: 97.8 °F (36.6 °C)   SpO2:      JUSTYN Ascencio CNP  8/29/2024  11:49 AM

## 2024-08-29 NOTE — PLAN OF CARE
Problem: Chronic Conditions and Co-morbidities  Goal: Patient's chronic conditions and co-morbidity symptoms are monitored and maintained or improved  8/28/2024 2357 by Jacki Otoole RN  Outcome: Progressing  Flowsheets (Taken 8/28/2024 2357)  Care Plan - Patient's Chronic Conditions and Co-Morbidity Symptoms are Monitored and Maintained or Improved:   Monitor and assess patient's chronic conditions and comorbid symptoms for stability, deterioration, or improvement   Collaborate with multidisciplinary team to address chronic and comorbid conditions and prevent exacerbation or deterioration   Update acute care plan with appropriate goals if chronic or comorbid symptoms are exacerbated and prevent overall improvement and discharge     Problem: Pain  Goal: Verbalizes/displays adequate comfort level or baseline comfort level  8/28/2024 2357 by Jacki Otoole RN  Outcome: Progressing  Flowsheets (Taken 8/28/2024 2357)  Verbalizes/displays adequate comfort level or baseline comfort level:   Encourage patient to monitor pain and request assistance   Assess pain using appropriate pain scale   Administer analgesics based on type and severity of pain and evaluate response   Implement non-pharmacological measures as appropriate and evaluate response   Consider cultural and social influences on pain and pain management   Notify Licensed Independent Practitioner if interventions unsuccessful or patient reports new pain     Problem: Skin/Tissue Integrity  Goal: Absence of new skin breakdown  Description: 1.  Monitor for areas of redness and/or skin breakdown  2.  Assess vascular access sites hourly  3.  Every 4-6 hours minimum:  Change oxygen saturation probe site  4.  Every 4-6 hours:  If on nasal continuous positive airway pressure, respiratory therapy assess nares and determine need for appliance change or resting period.  8/28/2024 2357 by Jacki Otoole RN  Outcome: Progressing      Problem: Safety - Adult  Goal: Free from fall injury  8/28/2024 2357 by Jacki Otoole, RN  Outcome: Progressing  Flowsheets (Taken 8/28/2024 2357)  Free From Fall Injury: Based on caregiver fall risk screen, instruct family/caregiver to ask for assistance with transferring infant if caregiver noted to have fall risk factors     Problem: Discharge Planning  Goal: Discharge to home or other facility with appropriate resources  8/28/2024 2357 by Jacki Otoole, RN  Outcome: Progressing

## 2024-08-29 NOTE — PLAN OF CARE
Problem: Discharge Planning  Goal: Discharge to home or other facility with appropriate resources  8/29/2024 1025 by Yu Merritt RN  Outcome: Progressing  Flowsheets (Taken 8/24/2024 0014 by Trisha Harris RN)  Discharge to home or other facility with appropriate resources:   Identify barriers to discharge with patient and caregiver   Arrange for needed discharge resources and transportation as appropriate   Identify discharge learning needs (meds, wound care, etc)   Refer to discharge planning if patient needs post-hospital services based on physician order or complex needs related to functional status, cognitive ability or social support system     Problem: Chronic Conditions and Co-morbidities  Goal: Patient's chronic conditions and co-morbidity symptoms are monitored and maintained or improved  8/29/2024 1025 by Yu Merritt RN  Outcome: Progressing  Flowsheets (Taken 8/28/2024 2357 by Jacki Otoole RN)  Care Plan - Patient's Chronic Conditions and Co-Morbidity Symptoms are Monitored and Maintained or Improved:   Monitor and assess patient's chronic conditions and comorbid symptoms for stability, deterioration, or improvement   Collaborate with multidisciplinary team to address chronic and comorbid conditions and prevent exacerbation or deterioration   Update acute care plan with appropriate goals if chronic or comorbid symptoms are exacerbated and prevent overall improvement and discharge     Problem: Pain  Goal: Verbalizes/displays adequate comfort level or baseline comfort level  8/29/2024 1025 by Yu Merritt RN  Outcome: Progressing  Flowsheets (Taken 8/29/2024 1025)  Verbalizes/displays adequate comfort level or baseline comfort level:   Encourage patient to monitor pain and request assistance   Assess pain using appropriate pain scale   Implement non-pharmacological measures as appropriate and evaluate response     Problem: Skin/Tissue Integrity  Goal: Absence of new skin

## 2024-09-03 NOTE — PROGRESS NOTES
Physician Progress Note      PATIENT:               ZULMA KING  CSN #:                  [id number]  :                       1965  ADMIT DATE:       2024 9:59 AM  DISCH DATE:        2024 6:35 PM  RESPONDING  PROVIDER #:        HARRY WYNN          QUERY TEXT:    Pt admitted with altered mental status.  Pt noted to have abdominal wall   abscess, peritoneal dialysis catheter, and metabolic encephalopathy that   improved with removal of catheter. If possible, please document in the   progress notes and discharge summary if you are evaluating and / or treating   any of the following:    The medical record reflects the following:  Risk Factors: 59 year old female w/ peritoneal dialysis catheter, abdominal   abscess  Clinical Indicators:  Op Note- Removal of tunneled intraperitoneal   catheter... An abdominal wall abscess was noted at exit site. This was then   completely incised and drained to remove all purulent material.  d/c   summary- Acute metabolic encephalopathy-patient with multiple missed dialysis   sessions.  Peritonitis ruled out blood cultures pending.  Urine drug screen   demonstrated cocaine.  Her PD catheter was removed temporary HD catheter   placed continuation of HD mental status improved... Patient will have PD cath   removed today... Mentally improved back to baseline cl  Treatment: I&D abscess, removal of PD catheter, labs, Maxipime, Vancomycin  Options provided:  -- Abdominal abscess related to peritoneal dialysis catheter  -- Abdominal abscess unrelated to peritoneal dialysis catheter  -- Other - I will add my own diagnosis  -- Disagree - Not applicable / Not valid  -- Disagree - Clinically unable to determine / Unknown  -- Refer to Clinical Documentation Reviewer    PROVIDER RESPONSE TEXT:    This patient has abdominal abscess related to peritoneal dialysis catheter.    Query created by: Ana Saucedo on 9/3/2024 6:20 AM      Electronically signed by:  HARRY

## 2024-09-09 LAB
FUNGUS SPEC CULT: NORMAL
LOEFFLER MB STN SPEC: NORMAL

## 2024-09-16 LAB
FUNGUS SPEC CULT: NORMAL
LOEFFLER MB STN SPEC: NORMAL

## 2024-09-23 LAB
FUNGUS SPEC CULT: NORMAL
LOEFFLER MB STN SPEC: NORMAL

## 2024-09-30 LAB
FUNGUS SPEC CULT: NORMAL
LOEFFLER MB STN SPEC: NORMAL

## 2024-10-25 DIAGNOSIS — N18.6 END STAGE RENAL DISEASE (HCC): Primary | ICD-10-CM

## 2025-01-17 PROBLEM — R52 PAIN, UNSPECIFIED: Status: ACTIVE | Noted: 2020-09-04

## 2025-01-17 PROBLEM — I70.0 ATHEROSCLEROSIS OF AORTA (HCC): Status: ACTIVE | Noted: 2023-10-13

## 2025-01-17 PROBLEM — N18.4 STAGE 4 CHRONIC KIDNEY DISEASE (HCC): Chronic | Status: ACTIVE | Noted: 2017-09-22

## 2025-01-17 PROBLEM — E11.42 DIABETIC PERIPHERAL NEUROPATHY (HCC): Status: ACTIVE | Noted: 2018-07-02

## 2025-01-17 PROBLEM — H25.11 AGE-RELATED NUCLEAR CATARACT OF RIGHT EYE: Status: ACTIVE | Noted: 2017-12-21

## 2025-01-17 PROBLEM — D89.89 SECONDARY IMMUNE DEFICIENCY DISORDER (HCC): Status: ACTIVE | Noted: 2023-07-08

## 2025-01-17 PROBLEM — Z99.2 ESRD ON DIALYSIS (HCC): Status: ACTIVE | Noted: 2020-09-04

## 2025-01-17 PROBLEM — R06.02 SHORTNESS OF BREATH: Status: ACTIVE | Noted: 2023-09-28

## 2025-01-17 PROBLEM — E83.39 OTHER DISORDERS OF PHOSPHORUS METABOLISM: Status: ACTIVE | Noted: 2023-09-04

## 2025-01-17 PROBLEM — F32.89 OTHER SPECIFIED DEPRESSIVE EPISODES: Status: ACTIVE | Noted: 2021-02-23

## 2025-01-17 PROBLEM — U09.9 COVID-19 LONG HAULER MANIFESTING CHRONIC LOSS OF SMELL: Status: ACTIVE | Noted: 2022-02-28

## 2025-01-17 PROBLEM — E11.319 DIABETIC RETINOPATHY ASSOCIATED WITH TYPE 2 DIABETES MELLITUS (HCC): Status: ACTIVE | Noted: 2018-02-22

## 2025-01-17 PROBLEM — L29.9 PRURITUS, UNSPECIFIED: Status: ACTIVE | Noted: 2024-07-25

## 2025-01-17 PROBLEM — E87.5 HYPERKALEMIA: Status: ACTIVE | Noted: 2020-09-04

## 2025-01-17 PROBLEM — H26.9 CATARACT OF RIGHT EYE: Status: ACTIVE | Noted: 2023-10-13

## 2025-01-17 PROBLEM — R11.0 NAUSEA: Status: ACTIVE | Noted: 2024-07-30

## 2025-01-17 PROBLEM — R43.0 COVID-19 LONG HAULER MANIFESTING CHRONIC LOSS OF SMELL: Status: ACTIVE | Noted: 2022-02-28

## 2025-01-17 PROBLEM — R63.4 WEIGHT LOSS: Status: ACTIVE | Noted: 2022-02-28

## 2025-01-17 PROBLEM — T78.2XXA ANAPHYLACTIC SHOCK, UNSPECIFIED, INITIAL ENCOUNTER: Status: ACTIVE | Noted: 2020-10-06

## 2025-01-17 PROBLEM — S46.001A INJURY OF RIGHT ROTATOR CUFF: Status: ACTIVE | Noted: 2023-07-08

## 2025-01-17 PROBLEM — R29.898 BILATERAL LEG WEAKNESS: Status: ACTIVE | Noted: 2022-04-22

## 2025-01-17 PROBLEM — S46.002A INJURY OF LEFT ROTATOR CUFF: Status: ACTIVE | Noted: 2023-07-08

## 2025-01-17 PROBLEM — Z59.86 FINANCIAL INSECURITY: Status: ACTIVE | Noted: 2023-11-29

## 2025-01-17 PROBLEM — E56.9 VITAMIN DEFICIENCY, UNSPECIFIED: Status: ACTIVE | Noted: 2021-09-01

## 2025-01-17 PROBLEM — D50.9 IRON DEFICIENCY ANEMIA, UNSPECIFIED: Status: ACTIVE | Noted: 2024-07-19

## 2025-01-17 PROBLEM — T78.40XA ALLERGY, UNSPECIFIED, INITIAL ENCOUNTER: Status: ACTIVE | Noted: 2020-10-06

## 2025-01-17 PROBLEM — F17.200 NICOTINE DEPENDENCE: Status: ACTIVE | Noted: 2023-07-08

## 2025-01-17 PROBLEM — E11.22 TYPE 2 DIABETES MELLITUS WITH CHRONIC KIDNEY DISEASE (HCC): Status: ACTIVE | Noted: 2020-09-04

## 2025-01-17 PROBLEM — G62.9 NEUROPATHY: Status: ACTIVE | Noted: 2024-01-02

## 2025-01-17 PROBLEM — N25.89 OTHER DISORDERS RESULTING FROM IMPAIRED RENAL TUBULAR FUNCTION: Status: ACTIVE | Noted: 2021-05-12

## 2025-01-17 PROBLEM — N19 UREMIA: Status: ACTIVE | Noted: 2020-09-03

## 2025-01-17 PROBLEM — D50.8 OTHER IRON DEFICIENCY ANEMIAS: Status: ACTIVE | Noted: 2020-09-12

## 2025-01-17 PROBLEM — F43.10 POST-TRAUMATIC STRESS DISORDER, UNSPECIFIED: Status: ACTIVE | Noted: 2021-02-23

## 2025-01-17 PROBLEM — J81.1 PULMONARY EDEMA: Status: ACTIVE | Noted: 2024-06-25

## 2025-01-17 PROBLEM — N18.6 ESRD ON DIALYSIS (HCC): Status: ACTIVE | Noted: 2020-09-04

## 2025-01-17 PROBLEM — K80.70 CALCULUS OF GALLBLADDER AND BILE DUCT: Status: ACTIVE | Noted: 2019-10-02

## 2025-01-17 PROBLEM — H25.013 CORTICAL AGE-RELATED CATARACT, BILATERAL: Status: ACTIVE | Noted: 2020-06-30

## 2025-01-17 PROBLEM — I63.9 ACUTE ISCHEMIC STROKE (HCC): Status: ACTIVE | Noted: 2019-05-30

## 2025-01-17 PROBLEM — Z11.2: Status: ACTIVE | Noted: 2024-07-17

## 2025-01-17 PROBLEM — F31.70 BIPOLAR AFFECTIVE DISORDER IN REMISSION (HCC): Status: ACTIVE | Noted: 2019-12-26

## 2025-01-17 PROBLEM — E43 UNSPECIFIED SEVERE PROTEIN-CALORIE MALNUTRITION (HCC): Status: ACTIVE | Noted: 2022-03-08

## 2025-01-17 PROBLEM — F17.200 TOBACCO USE DISORDER: Status: ACTIVE | Noted: 2019-12-26

## 2025-01-17 PROBLEM — B02.9 HERPES ZOSTER WITHOUT COMPLICATION: Status: ACTIVE | Noted: 2022-02-03

## 2025-01-17 PROBLEM — E78.5 HYPERLIPIDEMIA: Status: ACTIVE | Noted: 2019-12-26

## 2025-01-17 PROBLEM — F20.9 SCHIZOPHRENIA, UNSPECIFIED (HCC): Status: ACTIVE | Noted: 2021-02-23

## 2025-01-17 PROBLEM — H26.9 CATARACT: Status: ACTIVE | Noted: 2017-12-18

## 2025-01-17 PROBLEM — N25.81 HYPERPARATHYROIDISM DUE TO RENAL INSUFFICIENCY (HCC): Status: ACTIVE | Noted: 2023-07-08

## 2025-01-17 PROBLEM — F43.10 POSTTRAUMATIC STRESS DISORDER: Status: ACTIVE | Noted: 2023-06-30

## 2025-01-17 PROBLEM — F39 MOOD DISORDER (HCC): Status: ACTIVE | Noted: 2023-10-17

## 2025-01-17 RX ORDER — POTASSIUM CHLORIDE 1.5 G/1.58G
POWDER, FOR SOLUTION ORAL
COMMUNITY
Start: 2024-11-07

## 2025-01-17 RX ORDER — CEPHALEXIN 500 MG/1
CAPSULE ORAL
COMMUNITY
Start: 2024-07-17

## 2025-01-17 RX ORDER — METOPROLOL SUCCINATE 25 MG/1
TABLET, EXTENDED RELEASE ORAL
COMMUNITY
Start: 2024-11-04

## 2025-01-17 RX ORDER — BENZONATATE 100 MG/1
CAPSULE ORAL
COMMUNITY

## 2025-01-27 ENCOUNTER — APPOINTMENT (OUTPATIENT)
Dept: GENERAL RADIOLOGY | Age: 60
End: 2025-01-27
Payer: MEDICAID

## 2025-01-27 ENCOUNTER — HOSPITAL ENCOUNTER (EMERGENCY)
Age: 60
Discharge: HOME | End: 2025-01-27
Attending: EMERGENCY MEDICINE
Payer: MEDICAID

## 2025-01-27 ENCOUNTER — APPOINTMENT (OUTPATIENT)
Dept: CT IMAGING | Age: 60
End: 2025-01-27
Payer: MEDICAID

## 2025-01-27 VITALS
SYSTOLIC BLOOD PRESSURE: 132 MMHG | WEIGHT: 160.1 LBS | TEMPERATURE: 98.3 F | BODY MASS INDEX: 24.26 KG/M2 | RESPIRATION RATE: 20 BRPM | HEIGHT: 68 IN | OXYGEN SATURATION: 92 % | DIASTOLIC BLOOD PRESSURE: 87 MMHG | HEART RATE: 83 BPM
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
## 2025-01-27 DIAGNOSIS — S09.90XA CLOSED HEAD INJURY, INITIAL ENCOUNTER: ICD-10-CM

## 2025-01-27 PROCEDURE — 70450 CT HEAD/BRAIN W/O DYE: CPT

## 2025-01-27 PROCEDURE — 73522 X-RAY EXAM HIPS BI 3-4 VIEWS: CPT

## 2025-01-27 PROCEDURE — 99284 EMERGENCY DEPT VISIT MOD MDM: CPT

## 2025-01-27 RX ORDER — ACETAMINOPHEN 500 MG
1000 TABLET ORAL ONCE
Status: DISCONTINUED | OUTPATIENT
Start: 2025-01-27 | End: 2025-01-27 | Stop reason: HOSPADM

## 2025-01-27 RX ORDER — LIDOCAINE 4 G/G
1 PATCH TOPICAL DAILY
Qty: 30 PATCH | Refills: 0 | Status: SHIPPED | OUTPATIENT
Start: 2025-01-27 | End: 2025-02-26

## 2025-01-27 ASSESSMENT — PAIN DESCRIPTION - DESCRIPTORS: DESCRIPTORS: ACHING;STABBING

## 2025-01-27 ASSESSMENT — PAIN SCALES - GENERAL: PAINLEVEL_OUTOF10: 7

## 2025-01-27 ASSESSMENT — PAIN DESCRIPTION - LOCATION: LOCATION: BACK

## 2025-01-27 ASSESSMENT — PAIN - FUNCTIONAL ASSESSMENT: PAIN_FUNCTIONAL_ASSESSMENT: 0-10

## 2025-01-27 NOTE — ED PROVIDER NOTES
THE [facility]  EMERGENCY DEPARTMENT ENCOUNTER          ATTENDING PHYSICIAN NOTE       Date of evaluation: 1/27/2025    Chief Complaint     Fall (Pt to ED from SNF after a fall after tripping on wheel chair walking into room. Pt states she hit her head and L hip. Pt states they had an episode of dizziness prior to falling. Denies LOC)      History of Present Illness     Cindi Rodriguez is a 59 y.o. female who presents complaint of pain in her head and in her left hip after a fall.  Says that she tripped on a wheelchair, fell striking the back of her head.  No loss of consciousness but does have some headache following this.  Denies chest or abdominal pain.  No neck pain.  Reports this was mechanical fall.  Reports she is dialyzed Tuesday Thursday Saturday, last had her dialysis yesterday    ASSESSMENT / PLAN  (MEDICAL DECISION MAKING)     INITIAL VITALS: BP: 132/87, Temp: 98.3 °F (36.8 °C), Pulse: 83, Respirations: 20, SpO2: 93 %      Cindi Rodriguez is a 59 y.o. female who presents with pain in the left buttock primarily/hip area, and blow to her head.  No neck pain chest or abdominal pain otherwise.  Given acetaminophen for pain.  Radiographs of the hips as well as CT of the head shows no acute abnormality.  Patient is able to transfer and bear weight as she normally would here in the emergency department.  She is GCS 15 and appears at her baseline.  Will give her some Lidoderm patches for use at home as well and recommend acetaminophen for pain at home.  This peers to be mechanical fall today, do not think that further lab workup is necessary, and she will follow-up for dialysis tomorrow as scheduled..    Is this patient to be included in the SEP-1 core measure? No Exclusion criteria - the patient is NOT to be included for SEP-1 Core Measure due to: Infection is not suspected    Medical Decision Making  Amount and/or Complexity of Data Reviewed  Radiology: ordered.    Risk  OTC drugs.          Clinical

## 2025-01-27 NOTE — DISCHARGE INSTRUCTIONS
Return to emergency department for worsening symptoms or other concerns.  Recommend following up with your primary doctor regarding episodes of chronic pain.  For this acute soreness, would recommend acetaminophen, and if you are having some pain in your tailbone or in the buttock area you can try some Lidoderm patches which we have you prescription for which may help with soreness as well.

## 2025-02-20 ENCOUNTER — HOSPITAL ENCOUNTER (INPATIENT)
Age: 60
LOS: 8 days | Discharge: NURSING, REHAB OR LONG-TERM CARE | DRG: 194 | End: 2025-02-28
Attending: EMERGENCY MEDICINE | Admitting: INTERNAL MEDICINE
Payer: MEDICAID

## 2025-02-20 ENCOUNTER — APPOINTMENT (OUTPATIENT)
Dept: GENERAL RADIOLOGY | Age: 60
DRG: 194 | End: 2025-02-20
Payer: MEDICAID

## 2025-02-20 DIAGNOSIS — J81.0 FLASH PULMONARY EDEMA (HCC): Primary | ICD-10-CM

## 2025-02-20 LAB
ALBUMIN SERPL-MCNC: 3.6 G/DL (ref 3.4–5)
ALBUMIN SERPL-MCNC: 3.9 G/DL (ref 3.4–5)
ALBUMIN SERPL-MCNC: 4 G/DL (ref 3.4–5)
ALP SERPL-CCNC: 121 U/L (ref 40–129)
ALT SERPL-CCNC: 17 U/L (ref 10–40)
ANION GAP SERPL CALCULATED.3IONS-SCNC: 16 MMOL/L (ref 3–16)
ANION GAP SERPL CALCULATED.3IONS-SCNC: 28 MMOL/L (ref 3–16)
AST SERPL-CCNC: 38 U/L (ref 15–37)
BASE EXCESS BLDA CALC-SCNC: 2 MMOL/L (ref -3–3)
BASE EXCESS BLDA CALC-SCNC: 3.8 MMOL/L (ref -3–3)
BASE EXCESS BLDV CALC-SCNC: -7.3 MMOL/L (ref -2–3)
BASOPHILS # BLD: 0.1 K/UL (ref 0–0.2)
BASOPHILS NFR BLD: 0.7 %
BILIRUB DIRECT SERPL-MCNC: <0.1 MG/DL (ref 0–0.3)
BILIRUB INDIRECT SERPL-MCNC: 0.3 MG/DL (ref 0–1)
BILIRUB SERPL-MCNC: 0.4 MG/DL (ref 0–1)
BUN SERPL-MCNC: 26 MG/DL (ref 7–20)
BUN SERPL-MCNC: 27 MG/DL (ref 7–20)
CALCIUM SERPL-MCNC: 8.9 MG/DL (ref 8.3–10.6)
CALCIUM SERPL-MCNC: 9.5 MG/DL (ref 8.3–10.6)
CHLORIDE SERPL-SCNC: 95 MMOL/L (ref 99–110)
CHLORIDE SERPL-SCNC: 98 MMOL/L (ref 99–110)
CO2 BLDA-SCNC: 29 MMOL/L
CO2 BLDA-SCNC: 32 MMOL/L
CO2 BLDV-SCNC: 25 MMOL/L
CO2 SERPL-SCNC: 19 MMOL/L (ref 21–32)
CO2 SERPL-SCNC: 29 MMOL/L (ref 21–32)
COHGB MFR BLDA: 2 % (ref 0–1.5)
COHGB MFR BLDV: 1.2 % (ref 0–1.5)
CREAT SERPL-MCNC: 3.8 MG/DL (ref 0.6–1.1)
CREAT SERPL-MCNC: 3.9 MG/DL (ref 0.6–1.1)
DEPRECATED RDW RBC AUTO: 19 % (ref 12.4–15.4)
DO-HGB MFR BLDV: 75.9 %
EKG ATRIAL RATE: 85 BPM
EKG DIAGNOSIS: NORMAL
EKG P AXIS: 33 DEGREES
EKG P-R INTERVAL: 166 MS
EKG Q-T INTERVAL: 432 MS
EKG QRS DURATION: 100 MS
EKG QTC CALCULATION (BAZETT): 514 MS
EKG R AXIS: -17 DEGREES
EKG T AXIS: 183 DEGREES
EKG VENTRICULAR RATE: 85 BPM
EOSINOPHIL # BLD: 0.4 K/UL (ref 0–0.6)
EOSINOPHIL NFR BLD: 3.1 %
GFR SERPLBLD CREATININE-BSD FMLA CKD-EPI: 13 ML/MIN/{1.73_M2}
GFR SERPLBLD CREATININE-BSD FMLA CKD-EPI: 13 ML/MIN/{1.73_M2}
GLUCOSE BLD-MCNC: 107 MG/DL (ref 70–99)
GLUCOSE BLD-MCNC: 115 MG/DL (ref 70–99)
GLUCOSE BLD-MCNC: 130 MG/DL (ref 70–99)
GLUCOSE BLD-MCNC: 133 MG/DL (ref 70–99)
GLUCOSE BLD-MCNC: 145 MG/DL (ref 70–99)
GLUCOSE SERPL-MCNC: 110 MG/DL (ref 70–99)
GLUCOSE SERPL-MCNC: 260 MG/DL (ref 70–99)
HCO3 BLDA-SCNC: 27.6 MMOL/L (ref 21–29)
HCO3 BLDA-SCNC: 30 MMOL/L (ref 21–29)
HCO3 BLDV-SCNC: 22.5 MMOL/L (ref 24–28)
HCT VFR BLD AUTO: 31.2 % (ref 36–48)
HGB BLD-MCNC: 9.4 G/DL (ref 12–16)
HGB BLDA-MCNC: 8.2 G/DL
LYMPHOCYTES # BLD: 2.3 K/UL (ref 1–5.1)
LYMPHOCYTES NFR BLD: 18.3 %
MAGNESIUM SERPL-MCNC: 2.37 MG/DL (ref 1.8–2.4)
MCH RBC QN AUTO: 26.6 PG (ref 26–34)
MCHC RBC AUTO-ENTMCNC: 30.1 G/DL (ref 31–36)
MCV RBC AUTO: 88.3 FL (ref 80–100)
METHGB MFR BLDA: 0.3 % (ref 0–1.4)
METHGB MFR BLDV: 0.5 % (ref 0–1.5)
MONOCYTES # BLD: 0.9 K/UL (ref 0–1.3)
MONOCYTES NFR BLD: 7.2 %
NEUTROPHILS # BLD: 9 K/UL (ref 1.7–7.7)
NEUTROPHILS NFR BLD: 70.7 %
NT-PROBNP SERPL-MCNC: ABNORMAL PG/ML (ref 0–124)
PCO2 BLDA: 47 MM HG (ref 35–45)
PCO2 BLDA: 54.4 MMHG (ref 35–45)
PCO2 BLDV: 69.6 MMHG (ref 41–51)
PERFORMED ON: ABNORMAL
PH BLDA: 7.35 [PH] (ref 7.35–7.45)
PH BLDA: 7.38 [PH] (ref 7.35–7.45)
PH BLDV: 7.12 [PH] (ref 7.35–7.45)
PHOSPHATE SERPL-MCNC: 3.7 MG/DL (ref 2.5–4.9)
PHOSPHATE SERPL-MCNC: 5 MG/DL (ref 2.5–4.9)
PLATELET # BLD AUTO: 286 K/UL (ref 135–450)
PMV BLD AUTO: 7 FL (ref 5–10.5)
PO2 BLDA: 48.6 MMHG (ref 75–108)
PO2 BLDA: 80.3 MM HG (ref 75–108)
PO2 BLDV: <30 MMHG (ref 25–40)
POC SAMPLE TYPE: ABNORMAL
POTASSIUM SERPL-SCNC: 4 MMOL/L (ref 3.5–5.1)
POTASSIUM SERPL-SCNC: 5.1 MMOL/L (ref 3.5–5.1)
PROT SERPL-MCNC: 7.7 G/DL (ref 6.4–8.2)
RBC # BLD AUTO: 3.53 M/UL (ref 4–5.2)
SAO2 % BLDA: 76 % (ref 93–100)
SAO2 % BLDA: 95 % (ref 93–100)
SAO2 % BLDV: 23 %
SODIUM SERPL-SCNC: 142 MMOL/L (ref 136–145)
SODIUM SERPL-SCNC: 143 MMOL/L (ref 136–145)
TROPONIN, HIGH SENSITIVITY: 256 NG/L (ref 0–14)
TROPONIN, HIGH SENSITIVITY: 355 NG/L (ref 0–14)
WBC # BLD AUTO: 12.8 K/UL (ref 4–11)

## 2025-02-20 PROCEDURE — 86704 HEP B CORE ANTIBODY TOTAL: CPT

## 2025-02-20 PROCEDURE — 5A09357 ASSISTANCE WITH RESPIRATORY VENTILATION, LESS THAN 24 CONSECUTIVE HOURS, CONTINUOUS POSITIVE AIRWAY PRESSURE: ICD-10-PCS | Performed by: EMERGENCY MEDICINE

## 2025-02-20 PROCEDURE — 90935 HEMODIALYSIS ONE EVALUATION: CPT

## 2025-02-20 PROCEDURE — 2500000003 HC RX 250 WO HCPCS

## 2025-02-20 PROCEDURE — 6360000002 HC RX W HCPCS

## 2025-02-20 PROCEDURE — 93005 ELECTROCARDIOGRAM TRACING: CPT

## 2025-02-20 PROCEDURE — 5A1D70Z PERFORMANCE OF URINARY FILTRATION, INTERMITTENT, LESS THAN 6 HOURS PER DAY: ICD-10-PCS | Performed by: EMERGENCY MEDICINE

## 2025-02-20 PROCEDURE — 71045 X-RAY EXAM CHEST 1 VIEW: CPT

## 2025-02-20 PROCEDURE — 85025 COMPLETE CBC W/AUTO DIFF WBC: CPT

## 2025-02-20 PROCEDURE — 6370000000 HC RX 637 (ALT 250 FOR IP)

## 2025-02-20 PROCEDURE — 80069 RENAL FUNCTION PANEL: CPT

## 2025-02-20 PROCEDURE — 87040 BLOOD CULTURE FOR BACTERIA: CPT

## 2025-02-20 PROCEDURE — 2700000000 HC OXYGEN THERAPY PER DAY

## 2025-02-20 PROCEDURE — 99291 CRITICAL CARE FIRST HOUR: CPT

## 2025-02-20 PROCEDURE — 86706 HEP B SURFACE ANTIBODY: CPT

## 2025-02-20 PROCEDURE — 84484 ASSAY OF TROPONIN QUANT: CPT

## 2025-02-20 PROCEDURE — 82803 BLOOD GASES ANY COMBINATION: CPT

## 2025-02-20 PROCEDURE — 99291 CRITICAL CARE FIRST HOUR: CPT | Performed by: INTERNAL MEDICINE

## 2025-02-20 PROCEDURE — 87340 HEPATITIS B SURFACE AG IA: CPT

## 2025-02-20 PROCEDURE — 99285 EMERGENCY DEPT VISIT HI MDM: CPT

## 2025-02-20 PROCEDURE — 94761 N-INVAS EAR/PLS OXIMETRY MLT: CPT

## 2025-02-20 PROCEDURE — 83735 ASSAY OF MAGNESIUM: CPT

## 2025-02-20 PROCEDURE — 36415 COLL VENOUS BLD VENIPUNCTURE: CPT

## 2025-02-20 PROCEDURE — 96365 THER/PROPH/DIAG IV INF INIT: CPT

## 2025-02-20 PROCEDURE — 94660 CPAP INITIATION&MGMT: CPT

## 2025-02-20 PROCEDURE — 80076 HEPATIC FUNCTION PANEL: CPT

## 2025-02-20 PROCEDURE — 2000000000 HC ICU R&B

## 2025-02-20 PROCEDURE — 83880 ASSAY OF NATRIURETIC PEPTIDE: CPT

## 2025-02-20 PROCEDURE — 96375 TX/PRO/DX INJ NEW DRUG ADDON: CPT

## 2025-02-20 PROCEDURE — 6360000002 HC RX W HCPCS: Performed by: INTERNAL MEDICINE

## 2025-02-20 RX ORDER — NITROGLYCERIN 20 MG/100ML
5-200 INJECTION INTRAVENOUS CONTINUOUS
Status: DISCONTINUED | OUTPATIENT
Start: 2025-02-20 | End: 2025-02-21

## 2025-02-20 RX ORDER — HEPARIN SODIUM 1000 [USP'U]/ML
3200 INJECTION, SOLUTION INTRAVENOUS; SUBCUTANEOUS PRN
Status: DISCONTINUED | OUTPATIENT
Start: 2025-02-20 | End: 2025-02-25 | Stop reason: HOSPADM

## 2025-02-20 RX ORDER — CARVEDILOL 3.12 MG/1
3.12 TABLET ORAL 2 TIMES DAILY WITH MEALS
Status: DISCONTINUED | OUTPATIENT
Start: 2025-02-20 | End: 2025-02-25 | Stop reason: HOSPADM

## 2025-02-20 RX ORDER — SODIUM CHLORIDE 0.9 % (FLUSH) 0.9 %
5-40 SYRINGE (ML) INJECTION PRN
Status: DISCONTINUED | OUTPATIENT
Start: 2025-02-20 | End: 2025-02-25 | Stop reason: HOSPADM

## 2025-02-20 RX ORDER — NITROGLYCERIN 0.4 MG/1
TABLET SUBLINGUAL
Status: COMPLETED
Start: 2025-02-20 | End: 2025-02-20

## 2025-02-20 RX ORDER — LIDOCAINE 4 G/G
2 PATCH TOPICAL DAILY
COMMUNITY

## 2025-02-20 RX ORDER — TORSEMIDE 100 MG/1
100 TABLET ORAL DAILY
Status: DISCONTINUED | OUTPATIENT
Start: 2025-02-20 | End: 2025-02-25 | Stop reason: HOSPADM

## 2025-02-20 RX ORDER — NALOXONE HYDROCHLORIDE 0.4 MG/ML
0.4 INJECTION, SOLUTION INTRAMUSCULAR; INTRAVENOUS; SUBCUTANEOUS PRN
COMMUNITY

## 2025-02-20 RX ORDER — LIDOCAINE 4 G/G
1 PATCH TOPICAL DAILY PRN
Status: DISCONTINUED | OUTPATIENT
Start: 2025-02-20 | End: 2025-02-25 | Stop reason: HOSPADM

## 2025-02-20 RX ORDER — ARIPIPRAZOLE 5 MG/1
10 TABLET ORAL DAILY
Status: DISCONTINUED | OUTPATIENT
Start: 2025-02-20 | End: 2025-02-25 | Stop reason: HOSPADM

## 2025-02-20 RX ORDER — HYDRALAZINE HYDROCHLORIDE 20 MG/ML
5 INJECTION INTRAMUSCULAR; INTRAVENOUS EVERY 6 HOURS PRN
Status: DISCONTINUED | OUTPATIENT
Start: 2025-02-20 | End: 2025-02-25 | Stop reason: HOSPADM

## 2025-02-20 RX ORDER — CLOPIDOGREL BISULFATE 75 MG/1
75 TABLET ORAL DAILY
Status: DISCONTINUED | OUTPATIENT
Start: 2025-02-20 | End: 2025-02-25 | Stop reason: HOSPADM

## 2025-02-20 RX ORDER — BUPROPION HYDROCHLORIDE 150 MG/1
150 TABLET, EXTENDED RELEASE ORAL EVERY MORNING
COMMUNITY

## 2025-02-20 RX ORDER — ROSUVASTATIN CALCIUM 20 MG/1
40 TABLET, COATED ORAL NIGHTLY
Status: DISCONTINUED | OUTPATIENT
Start: 2025-02-20 | End: 2025-02-20

## 2025-02-20 RX ORDER — NITROGLYCERIN 0.4 MG/1
0.4 TABLET SUBLINGUAL EVERY 5 MIN PRN
Status: DISCONTINUED | OUTPATIENT
Start: 2025-02-20 | End: 2025-02-25 | Stop reason: HOSPADM

## 2025-02-20 RX ORDER — METOPROLOL SUCCINATE 25 MG/1
25 TABLET, EXTENDED RELEASE ORAL DAILY
Status: DISCONTINUED | OUTPATIENT
Start: 2025-02-20 | End: 2025-02-20

## 2025-02-20 RX ORDER — ACETAMINOPHEN 325 MG/1
650 TABLET ORAL EVERY 6 HOURS PRN
Status: DISCONTINUED | OUTPATIENT
Start: 2025-02-20 | End: 2025-02-25 | Stop reason: HOSPADM

## 2025-02-20 RX ORDER — BENZTROPINE MESYLATE 1 MG/1
0.5 TABLET ORAL NIGHTLY
Status: DISCONTINUED | OUTPATIENT
Start: 2025-02-20 | End: 2025-02-25 | Stop reason: HOSPADM

## 2025-02-20 RX ORDER — SEVELAMER CARBONATE 800 MG/1
800 TABLET, FILM COATED ORAL
Status: DISCONTINUED | OUTPATIENT
Start: 2025-02-20 | End: 2025-02-25 | Stop reason: HOSPADM

## 2025-02-20 RX ORDER — ACETAMINOPHEN 325 MG/1
650 TABLET ORAL EVERY 6 HOURS PRN
Status: ON HOLD | COMMUNITY
End: 2025-02-20 | Stop reason: ALTCHOICE

## 2025-02-20 RX ORDER — GLUCAGON 1 MG/ML
1 KIT INJECTION PRN
Status: DISCONTINUED | OUTPATIENT
Start: 2025-02-20 | End: 2025-02-25 | Stop reason: HOSPADM

## 2025-02-20 RX ORDER — BUPROPION HYDROCHLORIDE 150 MG/1
150 TABLET, EXTENDED RELEASE ORAL DAILY
Status: DISCONTINUED | OUTPATIENT
Start: 2025-02-20 | End: 2025-02-25 | Stop reason: HOSPADM

## 2025-02-20 RX ORDER — ONDANSETRON 4 MG/1
4 TABLET, ORALLY DISINTEGRATING ORAL EVERY 8 HOURS PRN
Status: DISCONTINUED | OUTPATIENT
Start: 2025-02-20 | End: 2025-02-25 | Stop reason: HOSPADM

## 2025-02-20 RX ORDER — INSULIN LISPRO 100 [IU]/ML
0-8 INJECTION, SOLUTION INTRAVENOUS; SUBCUTANEOUS EVERY 6 HOURS SCHEDULED
Status: DISCONTINUED | OUTPATIENT
Start: 2025-02-20 | End: 2025-02-25 | Stop reason: HOSPADM

## 2025-02-20 RX ORDER — ALBUTEROL SULFATE 90 UG/1
2 INHALANT RESPIRATORY (INHALATION) EVERY 6 HOURS PRN
COMMUNITY

## 2025-02-20 RX ORDER — MIRTAZAPINE 15 MG/1
15 TABLET, FILM COATED ORAL NIGHTLY
Status: DISCONTINUED | OUTPATIENT
Start: 2025-02-20 | End: 2025-02-25 | Stop reason: HOSPADM

## 2025-02-20 RX ORDER — NITROGLYCERIN 20 MG/100ML
5-200 INJECTION INTRAVENOUS CONTINUOUS
Status: DISCONTINUED | OUTPATIENT
Start: 2025-02-20 | End: 2025-02-20

## 2025-02-20 RX ORDER — SODIUM THIOSULFATE 250 MG/ML
25 INJECTION, SOLUTION INTRAVENOUS
Status: DISCONTINUED | OUTPATIENT
Start: 2025-02-20 | End: 2025-02-25 | Stop reason: HOSPADM

## 2025-02-20 RX ORDER — 0.9 % SODIUM CHLORIDE 0.9 %
100 INTRAVENOUS SOLUTION INTRAVENOUS PRN
Status: DISCONTINUED | OUTPATIENT
Start: 2025-02-20 | End: 2025-02-25 | Stop reason: HOSPADM

## 2025-02-20 RX ORDER — ATORVASTATIN CALCIUM 80 MG/1
80 TABLET, FILM COATED ORAL NIGHTLY
Status: DISCONTINUED | OUTPATIENT
Start: 2025-02-20 | End: 2025-02-25 | Stop reason: HOSPADM

## 2025-02-20 RX ORDER — ALBUTEROL SULFATE 0.83 MG/ML
2.5 SOLUTION RESPIRATORY (INHALATION) EVERY 6 HOURS PRN
Status: DISCONTINUED | OUTPATIENT
Start: 2025-02-20 | End: 2025-02-25 | Stop reason: HOSPADM

## 2025-02-20 RX ORDER — HEPARIN SODIUM 5000 [USP'U]/ML
5000 INJECTION, SOLUTION INTRAVENOUS; SUBCUTANEOUS EVERY 8 HOURS SCHEDULED
Status: DISCONTINUED | OUTPATIENT
Start: 2025-02-20 | End: 2025-02-25 | Stop reason: HOSPADM

## 2025-02-20 RX ORDER — GABAPENTIN 100 MG/1
100 CAPSULE ORAL DAILY
COMMUNITY

## 2025-02-20 RX ORDER — ATORVASTATIN CALCIUM 80 MG/1
80 TABLET, FILM COATED ORAL NIGHTLY
COMMUNITY

## 2025-02-20 RX ORDER — PANTOPRAZOLE SODIUM 40 MG/1
40 TABLET, DELAYED RELEASE ORAL DAILY
Status: DISCONTINUED | OUTPATIENT
Start: 2025-02-20 | End: 2025-02-25 | Stop reason: HOSPADM

## 2025-02-20 RX ORDER — ASPIRIN 81 MG/1
81 TABLET, CHEWABLE ORAL DAILY
Status: DISCONTINUED | OUTPATIENT
Start: 2025-02-20 | End: 2025-02-20 | Stop reason: DRUGHIGH

## 2025-02-20 RX ORDER — MIRTAZAPINE 15 MG/1
15 TABLET, FILM COATED ORAL NIGHTLY
COMMUNITY

## 2025-02-20 RX ORDER — DEXTROSE MONOHYDRATE 100 MG/ML
INJECTION, SOLUTION INTRAVENOUS CONTINUOUS PRN
Status: DISCONTINUED | OUTPATIENT
Start: 2025-02-20 | End: 2025-02-25 | Stop reason: HOSPADM

## 2025-02-20 RX ORDER — HEPARIN SODIUM 1000 [USP'U]/ML
1000 INJECTION, SOLUTION INTRAVENOUS; SUBCUTANEOUS
Status: DISPENSED | OUTPATIENT
Start: 2025-02-20 | End: 2025-02-20

## 2025-02-20 RX ORDER — POLYETHYLENE GLYCOL 3350 17 G/17G
17 POWDER, FOR SOLUTION ORAL DAILY PRN
Status: DISCONTINUED | OUTPATIENT
Start: 2025-02-20 | End: 2025-02-25 | Stop reason: HOSPADM

## 2025-02-20 RX ORDER — ACETAMINOPHEN 650 MG/1
650 SUPPOSITORY RECTAL EVERY 6 HOURS PRN
Status: DISCONTINUED | OUTPATIENT
Start: 2025-02-20 | End: 2025-02-25 | Stop reason: HOSPADM

## 2025-02-20 RX ORDER — FUROSEMIDE 10 MG/ML
80 INJECTION INTRAMUSCULAR; INTRAVENOUS ONCE
Status: COMPLETED | OUTPATIENT
Start: 2025-02-20 | End: 2025-02-20

## 2025-02-20 RX ORDER — HYDROCODONE BITARTRATE AND ACETAMINOPHEN 7.5; 325 MG/1; MG/1
1 TABLET ORAL ONCE
Status: COMPLETED | OUTPATIENT
Start: 2025-02-20 | End: 2025-02-20

## 2025-02-20 RX ORDER — LOSARTAN POTASSIUM 25 MG/1
25 TABLET ORAL NIGHTLY
Status: DISCONTINUED | OUTPATIENT
Start: 2025-02-20 | End: 2025-02-25 | Stop reason: HOSPADM

## 2025-02-20 RX ORDER — NIFEDIPINE 60 MG/1
60 TABLET, EXTENDED RELEASE ORAL 2 TIMES DAILY
Status: DISCONTINUED | OUTPATIENT
Start: 2025-02-20 | End: 2025-02-24

## 2025-02-20 RX ORDER — MIRTAZAPINE 15 MG/1
7.5 TABLET, FILM COATED ORAL NIGHTLY
Status: DISCONTINUED | OUTPATIENT
Start: 2025-02-20 | End: 2025-02-20

## 2025-02-20 RX ORDER — IPRATROPIUM BROMIDE AND ALBUTEROL SULFATE 2.5; .5 MG/3ML; MG/3ML
1 SOLUTION RESPIRATORY (INHALATION) EVERY 6 HOURS PRN
COMMUNITY

## 2025-02-20 RX ORDER — GABAPENTIN 100 MG/1
200 CAPSULE ORAL NIGHTLY
COMMUNITY

## 2025-02-20 RX ORDER — MECOBALAMIN 5000 MCG
5 TABLET,DISINTEGRATING ORAL NIGHTLY
Status: DISCONTINUED | OUTPATIENT
Start: 2025-02-20 | End: 2025-02-25 | Stop reason: HOSPADM

## 2025-02-20 RX ORDER — SEVELAMER CARBONATE 800 MG/1
2 TABLET, FILM COATED ORAL 2 TIMES DAILY WITH MEALS
Status: ON HOLD | COMMUNITY
End: 2025-02-28

## 2025-02-20 RX ORDER — VALSARTAN 80 MG/1
40 TABLET ORAL DAILY
Status: DISCONTINUED | OUTPATIENT
Start: 2025-02-20 | End: 2025-02-20

## 2025-02-20 RX ORDER — SODIUM CHLORIDE 9 MG/ML
INJECTION, SOLUTION INTRAVENOUS PRN
Status: DISCONTINUED | OUTPATIENT
Start: 2025-02-20 | End: 2025-02-25 | Stop reason: HOSPADM

## 2025-02-20 RX ORDER — DIPHENHYDRAMINE HCL 25 MG
25 TABLET ORAL EVERY 4 HOURS PRN
COMMUNITY

## 2025-02-20 RX ORDER — LABETALOL HYDROCHLORIDE 5 MG/ML
10 INJECTION, SOLUTION INTRAVENOUS EVERY 6 HOURS PRN
Status: DISCONTINUED | OUTPATIENT
Start: 2025-02-20 | End: 2025-02-25 | Stop reason: HOSPADM

## 2025-02-20 RX ORDER — MECOBALAMIN 5000 MCG
10 TABLET,DISINTEGRATING ORAL NIGHTLY
Status: DISCONTINUED | OUTPATIENT
Start: 2025-02-20 | End: 2025-02-20

## 2025-02-20 RX ORDER — SODIUM CHLORIDE 0.9 % (FLUSH) 0.9 %
5-40 SYRINGE (ML) INJECTION EVERY 12 HOURS SCHEDULED
Status: DISCONTINUED | OUTPATIENT
Start: 2025-02-20 | End: 2025-02-25 | Stop reason: HOSPADM

## 2025-02-20 RX ORDER — LOSARTAN POTASSIUM 25 MG/1
25 TABLET ORAL NIGHTLY
COMMUNITY

## 2025-02-20 RX ORDER — LIDOCAINE 4 G/G
1 PATCH TOPICAL DAILY PRN
Status: DISCONTINUED | OUTPATIENT
Start: 2025-02-20 | End: 2025-02-20 | Stop reason: SDUPTHER

## 2025-02-20 RX ORDER — HYDROXYZINE PAMOATE 25 MG/1
50 CAPSULE ORAL 2 TIMES DAILY
Status: DISCONTINUED | OUTPATIENT
Start: 2025-02-20 | End: 2025-02-20

## 2025-02-20 RX ORDER — ONDANSETRON 2 MG/ML
4 INJECTION INTRAMUSCULAR; INTRAVENOUS EVERY 6 HOURS PRN
Status: DISCONTINUED | OUTPATIENT
Start: 2025-02-20 | End: 2025-02-25 | Stop reason: HOSPADM

## 2025-02-20 RX ORDER — EPINEPHRINE 0.3 MG/.3ML
0.3 INJECTION SUBCUTANEOUS PRN
COMMUNITY

## 2025-02-20 RX ORDER — CALCITRIOL 0.25 UG/1
0.25 CAPSULE, LIQUID FILLED ORAL DAILY
Status: DISCONTINUED | OUTPATIENT
Start: 2025-02-20 | End: 2025-02-20 | Stop reason: DRUGHIGH

## 2025-02-20 RX ADMIN — LOSARTAN POTASSIUM 25 MG: 25 TABLET, FILM COATED ORAL at 20:18

## 2025-02-20 RX ADMIN — PANTOPRAZOLE SODIUM 40 MG: 40 TABLET, DELAYED RELEASE ORAL at 09:36

## 2025-02-20 RX ADMIN — SODIUM CHLORIDE, PRESERVATIVE FREE 10 ML: 5 INJECTION INTRAVENOUS at 20:18

## 2025-02-20 RX ADMIN — Medication 5 MG: at 20:18

## 2025-02-20 RX ADMIN — NITROGLYCERIN 0.4 MG: 0.4 TABLET SUBLINGUAL at 01:29

## 2025-02-20 RX ADMIN — ACETAMINOPHEN 650 MG: 325 TABLET ORAL at 22:28

## 2025-02-20 RX ADMIN — ATORVASTATIN CALCIUM 80 MG: 80 TABLET, FILM COATED ORAL at 20:18

## 2025-02-20 RX ADMIN — HEPARIN SODIUM 5000 UNITS: 5000 INJECTION INTRAVENOUS; SUBCUTANEOUS at 05:37

## 2025-02-20 RX ADMIN — BUPROPION HYDROCHLORIDE 150 MG: 150 TABLET, FILM COATED, EXTENDED RELEASE ORAL at 13:17

## 2025-02-20 RX ADMIN — TORSEMIDE 100 MG: 100 TABLET ORAL at 09:36

## 2025-02-20 RX ADMIN — MIRTAZAPINE 15 MG: 15 TABLET, FILM COATED ORAL at 20:18

## 2025-02-20 RX ADMIN — SODIUM THIOSULFATE 25 G: 250 INJECTION, SOLUTION INTRAVENOUS at 15:45

## 2025-02-20 RX ADMIN — HEPARIN SODIUM 5000 UNITS: 5000 INJECTION INTRAVENOUS; SUBCUTANEOUS at 17:20

## 2025-02-20 RX ADMIN — NITROGLYCERIN 0.4 MG: 0.4 TABLET SUBLINGUAL at 01:34

## 2025-02-20 RX ADMIN — CARVEDILOL 3.12 MG: 3.12 TABLET, FILM COATED ORAL at 09:36

## 2025-02-20 RX ADMIN — SEVELAMER CARBONATE 800 MG: 800 TABLET, FILM COATED ORAL at 12:47

## 2025-02-20 RX ADMIN — HEPARIN SODIUM 5000 UNITS: 5000 INJECTION INTRAVENOUS; SUBCUTANEOUS at 20:18

## 2025-02-20 RX ADMIN — CALCITRIOL 0.25 MCG: 0.25 CAPSULE ORAL at 09:36

## 2025-02-20 RX ADMIN — HYDROCODONE BITARTRATE AND ACETAMINOPHEN 1 TABLET: 7.5; 325 TABLET ORAL at 11:23

## 2025-02-20 RX ADMIN — SEVELAMER CARBONATE 800 MG: 800 TABLET, FILM COATED ORAL at 17:24

## 2025-02-20 RX ADMIN — FUROSEMIDE 80 MG: 10 INJECTION, SOLUTION INTRAMUSCULAR; INTRAVENOUS at 01:59

## 2025-02-20 RX ADMIN — EPOETIN ALFA-EPBX 10000 UNITS: 10000 INJECTION, SOLUTION INTRAVENOUS; SUBCUTANEOUS at 16:21

## 2025-02-20 RX ADMIN — ARIPIPRAZOLE 10 MG: 5 TABLET ORAL at 09:36

## 2025-02-20 RX ADMIN — ASPIRIN 81 MG: 81 TABLET, CHEWABLE ORAL at 09:36

## 2025-02-20 RX ADMIN — CLOPIDOGREL BISULFATE 75 MG: 75 TABLET ORAL at 09:36

## 2025-02-20 RX ADMIN — NIFEDIPINE 60 MG: 60 TABLET, EXTENDED RELEASE ORAL at 20:18

## 2025-02-20 RX ADMIN — CARVEDILOL 3.12 MG: 3.12 TABLET, FILM COATED ORAL at 17:34

## 2025-02-20 RX ADMIN — BENZTROPINE MESYLATE 0.5 MG: 1 TABLET ORAL at 20:18

## 2025-02-20 RX ADMIN — NITROGLYCERIN 20 MCG/MIN: 20 INJECTION INTRAVENOUS at 02:10

## 2025-02-20 ASSESSMENT — PAIN DESCRIPTION - DESCRIPTORS: DESCRIPTORS: ACHING

## 2025-02-20 ASSESSMENT — PAIN DESCRIPTION - ORIENTATION
ORIENTATION: LEFT
ORIENTATION: LEFT

## 2025-02-20 ASSESSMENT — LIFESTYLE VARIABLES
HOW OFTEN DO YOU HAVE A DRINK CONTAINING ALCOHOL: NEVER
HOW MANY STANDARD DRINKS CONTAINING ALCOHOL DO YOU HAVE ON A TYPICAL DAY: PATIENT DOES NOT DRINK

## 2025-02-20 ASSESSMENT — PAIN DESCRIPTION - LOCATION
LOCATION: LEG
LOCATION: LEG

## 2025-02-20 ASSESSMENT — PAIN SCALES - GENERAL
PAINLEVEL_OUTOF10: 3
PAINLEVEL_OUTOF10: 9
PAINLEVEL_OUTOF10: 3
PAINLEVEL_OUTOF10: 5

## 2025-02-20 ASSESSMENT — PAIN SCALES - WONG BAKER: WONGBAKER_NUMERICALRESPONSE: NO HURT

## 2025-02-20 NOTE — DIALYSIS
Treatment time: 3 hours  Net UF: 2000 ml     Pre weight: 66.5 kg  Post weight:64.5 kg  EDW: 65 kg      Access used: R TDC    Access function: good with  ml/min     Medications or blood products given: retacrit 61519  units IVP  Sodium Thiosulfate 25 grams IVPB     Regular outpatient schedule: Winnie RODRIGUEZ     Summary of response to treatment: Patient tolerated treatment well and without any complications. Patient remained stable throughout entire treatment and upon the exiting the dialysis suite via transport.     Report given to Dwaine Gray RN and copy of dialysis treatment record placed in chart, to be scanned into EMR.

## 2025-02-20 NOTE — DIALYSIS
Received a phone call from Dr. Capps  at 01:56 AM about pt in ED and will be going to ICU for pulmonary edema and she will be needing a 2 hour tx.  Waiting on patient to be admitted so that I can send out my call RN

## 2025-02-20 NOTE — ED TRIAGE NOTES
Patient brought in by ems on cpap with complaints of respiratory distress- ems administers solumedrol 60 mg, x2 breathing treatments

## 2025-02-20 NOTE — ED NOTES
Jimena Mcc[facility] informed of Patients admission- Spoke with Ama Briceno RN, RN  02/20/25 [id number]

## 2025-02-20 NOTE — ED PROVIDER NOTES
ED Attending Attestation Note     Date of evaluation: 2/20/2025    This patient was seen by the resident.  I have seen and examined the patient, agree with the workup, evaluation, management and diagnosis. The care plan has been discussed.  I have reviewed the ECG and concur with the resident's interpretation.  My assessment reveals complaints of acute onset of shortness of breath.  Patient is at a nursing facility and was noted to have increased work of breathing.  When EMS arrived, patient found to be hypoxic on her standard 2 L nasal cannula.  Patient was placed on CPAP and transported to the emergency department.  On arrival, patient was satting in the high 70s and low 80s on her CPAP.  She has rales present bilaterally.  Patient does have a history of end-stage renal disease on dialysis and is able indicates she was dialyzed yesterday.  Patient has an EKG that shows sinus rhythm with no acute ischemic abnormalities.  Chest x-ray does show evidence of diffuse bilateral airspace disease most consistent with pulmonary edema.  VBG shows mixed metabolic and respiratory acidosis.  Renal panel shows no evidence of hyperkalemia.  Patient was placed on BiPAP with transition to settings of 18/5 with 100 send FiO2 to maintain oxygen saturations in the low 90s.  Patient did indicate she made urine so was given dose of Lasix IV.  Nephrology was consulted who recommended ICU admission for emergent dialysis.    Critical Care:  Due to the immediate potential for life-threatening deterioration due to pulmonary edema, hypoxic and hypercarbic respiratory failure, and end-stage renal disease, I spent 35 minutes providing critical care.  This time excludes time spent performing procedures but includes time spent on direct patient care, history retrieval, review of the chart, and discussions with patient, family, and consultant(s).       Wilberto Shi MD  02/20/25 0311

## 2025-02-20 NOTE — H&P
ICU HISTORY AND PHYSICAL       Admit Date:  2/20/2025                            Hospital Day:  ICU Day:     CC: SOB    History obtained from:  the patient    SUBJECTIVE   HPI:    Ms. Cindi Rodriguez is a 59 y.o. female with a medical hx significant for Mood disorders, ESRD on HD (TTS), T2DM, HLD, HTN, CAD s/p stents (2023), AUDRA, Asthma, COPD (no PFTs) and CVA (2019), otherwise as listed in the MHx table below, who presented from SNF to the ED on 2/20/25 with SOB.    She reports that she had sudden and worsening SOB starting around 10:30 or 11:00 PM. She does not recall any inciting cause. Patient did complete her dialysis on Tuesday. She endorses chest tightness, cough, shortness of breath, feeling like her heart is racing, and light headedness. She denies any recent illness, and fever, chills, congestion, rhinorrhea, sore throat.    EMS placed her on CPAP and and administered solumedrol and breathing treatments.    ED Course:  On arrival to the ED, she was found to be ill -appearing, afebrile and hypertensive and in respiratory distress. She was placed on BiPAP, initially saturating in the low 80s, but improved to 90%.  Labs were significant for:  Cl 95  CO2 19  BUN 26  Cr 3.9  AG 28  eGFR 13  Glucose 260  Phos 5.0  NT Pro-BNP >70,000  Troponin 256  WBC 12.8  Hgb 9.4  VBG w/ pH 7.1, pCO2 69.6  Imaging  CXR - Severe diffuse bilateral airspace disease.  While in the ED, she received:  Lasix 80 mg IV  Nitroglycerin 0.4 mg SL x2  Nitroglycerin gtt    She was admitted to ICU for acute pulmonary edema, acute hypercapnic respiratory failure, on bipap, with likely need for urgent dialysis.    CODE STATUS was discussed with the patient, she wishes to be FULL CODE.      Past Medical History:   Diagnosis Date    JONATHAN (acute kidney injury) 9/3/2014    Anxiety     Asthma     Bipolar disorder (HCC)     Bronchitis     Chronic back pain     Depression     Diabetes mellitus (HCC)     DM II (diabetes mellitus, type II),

## 2025-02-20 NOTE — ED PROVIDER NOTES
THE [facility]  EMERGENCY DEPARTMENT ENCOUNTER          EM RESIDENT NOTE       Date of evaluation: 2/20/2025    Chief Complaint     Respiratory Distress (Patient brought in by ems on cpap with complaints of respiratory distress- ems administers solumedrol, x2 breathing treatments)      History of Present Illness     Cindi Rodriguez is a 59 y.o. female with history of DM2, hypertension, hyperlipidemia, ESRD on dialysis last dialyzed yesterday, COPD presenting to the emergency department with acute onset shortness of breath.  Patient was placed on CPAP by EMS.  Reports acute onset shortness of breath this evening.  Reports she was last dialyzed yesterday without incident.  Denies fevers, chills, nausea, vomiting.      Other than stated above, no additional associated symptoms or aggravating or alleviating factors are noted    MEDICAL DECISION MAKING / ASSESSMENT / PLAN     INITIAL VITALS: BP: (!) 160/107, Temp: 97.9 °F (36.6 °C), Pulse: (!) 124, Respirations: 28, SpO2: (!) 79 % (Simultaneous filing. User may not have seen previous data.)    Cindi Rodriguez is a 59 y.o. female with a history and presentation as described above in HPI.  The patient was evaluated by myself and the ED Attending Physician, Dr. Shi. All management and disposition plans were discussed and agreed upon.    Reviewed chart and discharge summary from recent admission  Collateral history obtained from EMS    Upon presentation, the patient was  ill -appearing, afebrile and hypertensive and respiratory distress.    Prior to arrival received duoneb and albuterol x2, as well as 60 mg of prednisone from EMS.    Pt was seen and evaluated in room trauma 2, my initial evaluation reveals 59-year-old female with CHF with an EF of about 25%, ESRD sitting upright, tripoding on CPAP.  On bedside ultrasound there are B-lines bilaterally.  She was switched over to BiPAP placed on 15/5 with sats in the low 80s was increased to 18/10 and sats improved

## 2025-02-21 LAB
ALBUMIN SERPL-MCNC: 3.5 G/DL (ref 3.4–5)
ANION GAP SERPL CALCULATED.3IONS-SCNC: 11 MMOL/L (ref 3–16)
BASOPHILS # BLD: 0.1 K/UL (ref 0–0.2)
BASOPHILS NFR BLD: 0.7 %
BUN SERPL-MCNC: 24 MG/DL (ref 7–20)
CALCIUM SERPL-MCNC: 9.3 MG/DL (ref 8.3–10.6)
CHLORIDE SERPL-SCNC: 100 MMOL/L (ref 99–110)
CO2 SERPL-SCNC: 28 MMOL/L (ref 21–32)
CREAT SERPL-MCNC: 3.3 MG/DL (ref 0.6–1.1)
DEPRECATED RDW RBC AUTO: 18.4 % (ref 12.4–15.4)
EKG ATRIAL RATE: 91 BPM
EKG DIAGNOSIS: NORMAL
EKG P AXIS: 92 DEGREES
EKG P-R INTERVAL: 170 MS
EKG Q-T INTERVAL: 408 MS
EKG QRS DURATION: 102 MS
EKG QTC CALCULATION (BAZETT): 501 MS
EKG R AXIS: -32 DEGREES
EKG T AXIS: 108 DEGREES
EKG VENTRICULAR RATE: 91 BPM
EOSINOPHIL # BLD: 0.2 K/UL (ref 0–0.6)
EOSINOPHIL NFR BLD: 2.3 %
EST. AVERAGE GLUCOSE BLD GHB EST-MCNC: 99.7 MG/DL
GFR SERPLBLD CREATININE-BSD FMLA CKD-EPI: 15 ML/MIN/{1.73_M2}
GLUCOSE BLD-MCNC: 130 MG/DL (ref 70–99)
GLUCOSE BLD-MCNC: 135 MG/DL (ref 70–99)
GLUCOSE BLD-MCNC: 157 MG/DL (ref 70–99)
GLUCOSE BLD-MCNC: 220 MG/DL (ref 70–99)
GLUCOSE SERPL-MCNC: 112 MG/DL (ref 70–99)
HBA1C MFR BLD: 5.1 %
HBV SURFACE AB SERPL IA-ACNC: >1000 MIU/ML
HBV SURFACE AG SERPL QL IA: NORMAL
HCT VFR BLD AUTO: 25.6 % (ref 36–48)
HGB BLD-MCNC: 8 G/DL (ref 12–16)
LYMPHOCYTES # BLD: 1.4 K/UL (ref 1–5.1)
LYMPHOCYTES NFR BLD: 15.2 %
MCH RBC QN AUTO: 26.8 PG (ref 26–34)
MCHC RBC AUTO-ENTMCNC: 31.2 G/DL (ref 31–36)
MCV RBC AUTO: 85.9 FL (ref 80–100)
MONOCYTES # BLD: 0.7 K/UL (ref 0–1.3)
MONOCYTES NFR BLD: 7.5 %
NEUTROPHILS # BLD: 6.9 K/UL (ref 1.7–7.7)
NEUTROPHILS NFR BLD: 74.3 %
PERFORMED ON: ABNORMAL
PHOSPHATE SERPL-MCNC: 2.9 MG/DL (ref 2.5–4.9)
PLATELET # BLD AUTO: 199 K/UL (ref 135–450)
PMV BLD AUTO: 6.9 FL (ref 5–10.5)
POTASSIUM SERPL-SCNC: 3.9 MMOL/L (ref 3.5–5.1)
RBC # BLD AUTO: 2.99 M/UL (ref 4–5.2)
SODIUM SERPL-SCNC: 139 MMOL/L (ref 136–145)
WBC # BLD AUTO: 9.2 K/UL (ref 4–11)

## 2025-02-21 PROCEDURE — 80069 RENAL FUNCTION PANEL: CPT

## 2025-02-21 PROCEDURE — 36415 COLL VENOUS BLD VENIPUNCTURE: CPT

## 2025-02-21 PROCEDURE — 1200000000 HC SEMI PRIVATE

## 2025-02-21 PROCEDURE — 2500000003 HC RX 250 WO HCPCS

## 2025-02-21 PROCEDURE — 6360000002 HC RX W HCPCS

## 2025-02-21 PROCEDURE — 99232 SBSQ HOSP IP/OBS MODERATE 35: CPT | Performed by: INTERNAL MEDICINE

## 2025-02-21 PROCEDURE — 94761 N-INVAS EAR/PLS OXIMETRY MLT: CPT

## 2025-02-21 PROCEDURE — 93010 ELECTROCARDIOGRAM REPORT: CPT | Performed by: INTERNAL MEDICINE

## 2025-02-21 PROCEDURE — 85025 COMPLETE CBC W/AUTO DIFF WBC: CPT

## 2025-02-21 PROCEDURE — 6370000000 HC RX 637 (ALT 250 FOR IP)

## 2025-02-21 PROCEDURE — 83036 HEMOGLOBIN GLYCOSYLATED A1C: CPT

## 2025-02-21 PROCEDURE — 87040 BLOOD CULTURE FOR BACTERIA: CPT

## 2025-02-21 PROCEDURE — 2700000000 HC OXYGEN THERAPY PER DAY

## 2025-02-21 RX ORDER — HYDROMORPHONE HYDROCHLORIDE 1 MG/ML
0.25 INJECTION, SOLUTION INTRAMUSCULAR; INTRAVENOUS; SUBCUTANEOUS EVERY 6 HOURS PRN
Status: DISCONTINUED | OUTPATIENT
Start: 2025-02-21 | End: 2025-02-25 | Stop reason: HOSPADM

## 2025-02-21 RX ORDER — NALOXONE HYDROCHLORIDE 0.4 MG/ML
0.4 INJECTION, SOLUTION INTRAMUSCULAR; INTRAVENOUS; SUBCUTANEOUS PRN
Status: DISCONTINUED | OUTPATIENT
Start: 2025-02-21 | End: 2025-02-25 | Stop reason: HOSPADM

## 2025-02-21 RX ORDER — HYDROCODONE BITARTRATE AND ACETAMINOPHEN 5; 325 MG/1; MG/1
1 TABLET ORAL EVERY 12 HOURS PRN
Status: DISCONTINUED | OUTPATIENT
Start: 2025-02-21 | End: 2025-02-22

## 2025-02-21 RX ADMIN — NIFEDIPINE 60 MG: 60 TABLET, EXTENDED RELEASE ORAL at 20:32

## 2025-02-21 RX ADMIN — SEVELAMER CARBONATE 800 MG: 800 TABLET, FILM COATED ORAL at 12:47

## 2025-02-21 RX ADMIN — HEPARIN SODIUM 5000 UNITS: 5000 INJECTION INTRAVENOUS; SUBCUTANEOUS at 13:51

## 2025-02-21 RX ADMIN — CLOPIDOGREL BISULFATE 75 MG: 75 TABLET ORAL at 08:13

## 2025-02-21 RX ADMIN — CARVEDILOL 3.12 MG: 3.12 TABLET, FILM COATED ORAL at 17:45

## 2025-02-21 RX ADMIN — PANTOPRAZOLE SODIUM 40 MG: 40 TABLET, DELAYED RELEASE ORAL at 08:13

## 2025-02-21 RX ADMIN — BUPROPION HYDROCHLORIDE 150 MG: 150 TABLET, FILM COATED, EXTENDED RELEASE ORAL at 08:14

## 2025-02-21 RX ADMIN — SODIUM CHLORIDE, PRESERVATIVE FREE 10 ML: 5 INJECTION INTRAVENOUS at 20:34

## 2025-02-21 RX ADMIN — LOSARTAN POTASSIUM 25 MG: 25 TABLET, FILM COATED ORAL at 20:32

## 2025-02-21 RX ADMIN — Medication 5 MG: at 20:33

## 2025-02-21 RX ADMIN — CARVEDILOL 3.12 MG: 3.12 TABLET, FILM COATED ORAL at 08:13

## 2025-02-21 RX ADMIN — SEVELAMER CARBONATE 800 MG: 800 TABLET, FILM COATED ORAL at 08:13

## 2025-02-21 RX ADMIN — SEVELAMER CARBONATE 800 MG: 800 TABLET, FILM COATED ORAL at 17:45

## 2025-02-21 RX ADMIN — NIFEDIPINE 60 MG: 60 TABLET, EXTENDED RELEASE ORAL at 08:14

## 2025-02-21 RX ADMIN — BENZTROPINE MESYLATE 0.5 MG: 1 TABLET ORAL at 20:32

## 2025-02-21 RX ADMIN — SODIUM CHLORIDE, PRESERVATIVE FREE 10 ML: 5 INJECTION INTRAVENOUS at 08:13

## 2025-02-21 RX ADMIN — ATORVASTATIN CALCIUM 80 MG: 80 TABLET, FILM COATED ORAL at 20:32

## 2025-02-21 RX ADMIN — TORSEMIDE 100 MG: 100 TABLET ORAL at 08:14

## 2025-02-21 RX ADMIN — MIRTAZAPINE 15 MG: 15 TABLET, FILM COATED ORAL at 20:32

## 2025-02-21 RX ADMIN — ARIPIPRAZOLE 10 MG: 5 TABLET ORAL at 08:14

## 2025-02-22 LAB
ALBUMIN SERPL-MCNC: 3.4 G/DL (ref 3.4–5)
ANION GAP SERPL CALCULATED.3IONS-SCNC: 13 MMOL/L (ref 3–16)
BASOPHILS # BLD: 0 K/UL (ref 0–0.2)
BASOPHILS NFR BLD: 0.7 %
BUN SERPL-MCNC: 35 MG/DL (ref 7–20)
CALCIUM SERPL-MCNC: 9.2 MG/DL (ref 8.3–10.6)
CHLORIDE SERPL-SCNC: 98 MMOL/L (ref 99–110)
CO2 SERPL-SCNC: 26 MMOL/L (ref 21–32)
CREAT SERPL-MCNC: 4.5 MG/DL (ref 0.6–1.1)
DEPRECATED RDW RBC AUTO: 18.7 % (ref 12.4–15.4)
EOSINOPHIL # BLD: 0.4 K/UL (ref 0–0.6)
EOSINOPHIL NFR BLD: 6.1 %
GFR SERPLBLD CREATININE-BSD FMLA CKD-EPI: 11 ML/MIN/{1.73_M2}
GLUCOSE BLD-MCNC: 118 MG/DL (ref 70–99)
GLUCOSE BLD-MCNC: 119 MG/DL (ref 70–99)
GLUCOSE BLD-MCNC: 163 MG/DL (ref 70–99)
GLUCOSE BLD-MCNC: 175 MG/DL (ref 70–99)
GLUCOSE SERPL-MCNC: 158 MG/DL (ref 70–99)
HBV CORE AB SERPL QL IA: NEGATIVE
HCT VFR BLD AUTO: 24.3 % (ref 36–48)
HGB BLD-MCNC: 7.7 G/DL (ref 12–16)
LYMPHOCYTES # BLD: 1.2 K/UL (ref 1–5.1)
LYMPHOCYTES NFR BLD: 16.7 %
MAGNESIUM SERPL-MCNC: 2.17 MG/DL (ref 1.8–2.4)
MCH RBC QN AUTO: 27.1 PG (ref 26–34)
MCHC RBC AUTO-ENTMCNC: 31.7 G/DL (ref 31–36)
MCV RBC AUTO: 85.3 FL (ref 80–100)
MONOCYTES # BLD: 0.6 K/UL (ref 0–1.3)
MONOCYTES NFR BLD: 9 %
NEUTROPHILS # BLD: 4.7 K/UL (ref 1.7–7.7)
NEUTROPHILS NFR BLD: 67.5 %
PERFORMED ON: ABNORMAL
PHOSPHATE SERPL-MCNC: 3.3 MG/DL (ref 2.5–4.9)
PLATELET # BLD AUTO: 197 K/UL (ref 135–450)
PMV BLD AUTO: 6.6 FL (ref 5–10.5)
POTASSIUM SERPL-SCNC: 4.1 MMOL/L (ref 3.5–5.1)
RBC # BLD AUTO: 2.85 M/UL (ref 4–5.2)
SODIUM SERPL-SCNC: 137 MMOL/L (ref 136–145)
WBC # BLD AUTO: 7 K/UL (ref 4–11)

## 2025-02-22 PROCEDURE — 6360000002 HC RX W HCPCS

## 2025-02-22 PROCEDURE — 1200000000 HC SEMI PRIVATE

## 2025-02-22 PROCEDURE — 80069 RENAL FUNCTION PANEL: CPT

## 2025-02-22 PROCEDURE — 2700000000 HC OXYGEN THERAPY PER DAY

## 2025-02-22 PROCEDURE — 90935 HEMODIALYSIS ONE EVALUATION: CPT

## 2025-02-22 PROCEDURE — 6370000000 HC RX 637 (ALT 250 FOR IP)

## 2025-02-22 PROCEDURE — 6370000000 HC RX 637 (ALT 250 FOR IP): Performed by: INTERNAL MEDICINE

## 2025-02-22 PROCEDURE — 36415 COLL VENOUS BLD VENIPUNCTURE: CPT

## 2025-02-22 PROCEDURE — 94761 N-INVAS EAR/PLS OXIMETRY MLT: CPT

## 2025-02-22 PROCEDURE — 83735 ASSAY OF MAGNESIUM: CPT

## 2025-02-22 PROCEDURE — 6360000002 HC RX W HCPCS: Performed by: INTERNAL MEDICINE

## 2025-02-22 PROCEDURE — 2500000003 HC RX 250 WO HCPCS

## 2025-02-22 PROCEDURE — 85025 COMPLETE CBC W/AUTO DIFF WBC: CPT

## 2025-02-22 RX ORDER — HYDROCODONE BITARTRATE AND ACETAMINOPHEN 5; 325 MG/1; MG/1
1 TABLET ORAL EVERY 8 HOURS PRN
Status: DISCONTINUED | OUTPATIENT
Start: 2025-02-22 | End: 2025-02-25 | Stop reason: HOSPADM

## 2025-02-22 RX ADMIN — HEPARIN SODIUM 5000 UNITS: 5000 INJECTION INTRAVENOUS; SUBCUTANEOUS at 06:12

## 2025-02-22 RX ADMIN — BENZTROPINE MESYLATE 0.5 MG: 1 TABLET ORAL at 19:53

## 2025-02-22 RX ADMIN — HYDROCODONE BITARTRATE AND ACETAMINOPHEN 1 TABLET: 5; 325 TABLET ORAL at 17:51

## 2025-02-22 RX ADMIN — SEVELAMER CARBONATE 800 MG: 800 TABLET, FILM COATED ORAL at 08:42

## 2025-02-22 RX ADMIN — POLYETHYLENE GLYCOL 3350 17 G: 17 POWDER, FOR SOLUTION ORAL at 20:02

## 2025-02-22 RX ADMIN — MIRTAZAPINE 15 MG: 15 TABLET, FILM COATED ORAL at 19:53

## 2025-02-22 RX ADMIN — LOSARTAN POTASSIUM 25 MG: 25 TABLET, FILM COATED ORAL at 19:53

## 2025-02-22 RX ADMIN — Medication 5 MG: at 19:53

## 2025-02-22 RX ADMIN — CLOPIDOGREL BISULFATE 75 MG: 75 TABLET ORAL at 08:42

## 2025-02-22 RX ADMIN — SODIUM CHLORIDE, PRESERVATIVE FREE 10 ML: 5 INJECTION INTRAVENOUS at 08:43

## 2025-02-22 RX ADMIN — SODIUM CHLORIDE, PRESERVATIVE FREE 10 ML: 5 INJECTION INTRAVENOUS at 19:54

## 2025-02-22 RX ADMIN — ARIPIPRAZOLE 10 MG: 5 TABLET ORAL at 08:42

## 2025-02-22 RX ADMIN — NIFEDIPINE 60 MG: 60 TABLET, EXTENDED RELEASE ORAL at 19:53

## 2025-02-22 RX ADMIN — TORSEMIDE 100 MG: 100 TABLET ORAL at 08:43

## 2025-02-22 RX ADMIN — NIFEDIPINE 60 MG: 60 TABLET, EXTENDED RELEASE ORAL at 08:42

## 2025-02-22 RX ADMIN — PANTOPRAZOLE SODIUM 40 MG: 40 TABLET, DELAYED RELEASE ORAL at 08:42

## 2025-02-22 RX ADMIN — SEVELAMER CARBONATE 800 MG: 800 TABLET, FILM COATED ORAL at 17:46

## 2025-02-22 RX ADMIN — EPOETIN ALFA-EPBX 10000 UNITS: 10000 INJECTION, SOLUTION INTRAVENOUS; SUBCUTANEOUS at 17:21

## 2025-02-22 RX ADMIN — BUPROPION HYDROCHLORIDE 150 MG: 150 TABLET, FILM COATED, EXTENDED RELEASE ORAL at 08:42

## 2025-02-22 RX ADMIN — CARVEDILOL 3.12 MG: 3.12 TABLET, FILM COATED ORAL at 17:46

## 2025-02-22 RX ADMIN — ATORVASTATIN CALCIUM 80 MG: 80 TABLET, FILM COATED ORAL at 19:53

## 2025-02-22 RX ADMIN — EPOETIN ALFA-EPBX 10000 UNITS: 10000 INJECTION, SOLUTION INTRAVENOUS; SUBCUTANEOUS at 17:20

## 2025-02-22 RX ADMIN — CARVEDILOL 3.12 MG: 3.12 TABLET, FILM COATED ORAL at 08:42

## 2025-02-22 RX ADMIN — SODIUM CHLORIDE, PRESERVATIVE FREE 10 ML: 5 INJECTION INTRAVENOUS at 08:42

## 2025-02-23 LAB
GLUCOSE BLD-MCNC: 124 MG/DL (ref 70–99)
GLUCOSE BLD-MCNC: 126 MG/DL (ref 70–99)
GLUCOSE BLD-MCNC: 137 MG/DL (ref 70–99)
GLUCOSE BLD-MCNC: 149 MG/DL (ref 70–99)
GLUCOSE BLD-MCNC: 162 MG/DL (ref 70–99)
GLUCOSE BLD-MCNC: 175 MG/DL (ref 70–99)
PERFORMED ON: ABNORMAL

## 2025-02-23 PROCEDURE — 2500000003 HC RX 250 WO HCPCS

## 2025-02-23 PROCEDURE — 6360000002 HC RX W HCPCS

## 2025-02-23 PROCEDURE — 6370000000 HC RX 637 (ALT 250 FOR IP): Performed by: INTERNAL MEDICINE

## 2025-02-23 PROCEDURE — 1200000000 HC SEMI PRIVATE

## 2025-02-23 PROCEDURE — 97162 PT EVAL MOD COMPLEX 30 MIN: CPT

## 2025-02-23 PROCEDURE — 97116 GAIT TRAINING THERAPY: CPT

## 2025-02-23 PROCEDURE — 97535 SELF CARE MNGMENT TRAINING: CPT

## 2025-02-23 PROCEDURE — 6360000002 HC RX W HCPCS: Performed by: INTERNAL MEDICINE

## 2025-02-23 PROCEDURE — 2700000000 HC OXYGEN THERAPY PER DAY

## 2025-02-23 PROCEDURE — 97166 OT EVAL MOD COMPLEX 45 MIN: CPT

## 2025-02-23 PROCEDURE — 94761 N-INVAS EAR/PLS OXIMETRY MLT: CPT

## 2025-02-23 PROCEDURE — 6370000000 HC RX 637 (ALT 250 FOR IP)

## 2025-02-23 RX ADMIN — BUPROPION HYDROCHLORIDE 150 MG: 150 TABLET, FILM COATED, EXTENDED RELEASE ORAL at 08:04

## 2025-02-23 RX ADMIN — SEVELAMER CARBONATE 800 MG: 800 TABLET, FILM COATED ORAL at 08:04

## 2025-02-23 RX ADMIN — CARVEDILOL 3.12 MG: 3.12 TABLET, FILM COATED ORAL at 16:57

## 2025-02-23 RX ADMIN — CLOPIDOGREL BISULFATE 75 MG: 75 TABLET ORAL at 08:05

## 2025-02-23 RX ADMIN — CARVEDILOL 3.12 MG: 3.12 TABLET, FILM COATED ORAL at 08:04

## 2025-02-23 RX ADMIN — HYDROMORPHONE HYDROCHLORIDE 0.25 MG: 1 INJECTION, SOLUTION INTRAMUSCULAR; INTRAVENOUS; SUBCUTANEOUS at 20:48

## 2025-02-23 RX ADMIN — MIRTAZAPINE 15 MG: 15 TABLET, FILM COATED ORAL at 20:47

## 2025-02-23 RX ADMIN — NIFEDIPINE 60 MG: 60 TABLET, EXTENDED RELEASE ORAL at 20:47

## 2025-02-23 RX ADMIN — ATORVASTATIN CALCIUM 80 MG: 80 TABLET, FILM COATED ORAL at 20:47

## 2025-02-23 RX ADMIN — HYDROCODONE BITARTRATE AND ACETAMINOPHEN 1 TABLET: 5; 325 TABLET ORAL at 16:57

## 2025-02-23 RX ADMIN — PANTOPRAZOLE SODIUM 40 MG: 40 TABLET, DELAYED RELEASE ORAL at 08:04

## 2025-02-23 RX ADMIN — HEPARIN SODIUM 5000 UNITS: 5000 INJECTION INTRAVENOUS; SUBCUTANEOUS at 21:35

## 2025-02-23 RX ADMIN — ARIPIPRAZOLE 10 MG: 5 TABLET ORAL at 08:04

## 2025-02-23 RX ADMIN — BENZTROPINE MESYLATE 0.5 MG: 1 TABLET ORAL at 20:47

## 2025-02-23 RX ADMIN — Medication 5 MG: at 20:46

## 2025-02-23 RX ADMIN — HYDROCODONE BITARTRATE AND ACETAMINOPHEN 1 TABLET: 5; 325 TABLET ORAL at 08:09

## 2025-02-23 RX ADMIN — SODIUM CHLORIDE, PRESERVATIVE FREE 10 ML: 5 INJECTION INTRAVENOUS at 20:49

## 2025-02-23 RX ADMIN — NIFEDIPINE 60 MG: 60 TABLET, EXTENDED RELEASE ORAL at 08:04

## 2025-02-23 RX ADMIN — SEVELAMER CARBONATE 800 MG: 800 TABLET, FILM COATED ORAL at 16:57

## 2025-02-23 RX ADMIN — LOSARTAN POTASSIUM 25 MG: 25 TABLET, FILM COATED ORAL at 20:46

## 2025-02-23 RX ADMIN — TORSEMIDE 100 MG: 100 TABLET ORAL at 08:04

## 2025-02-23 RX ADMIN — SODIUM CHLORIDE, PRESERVATIVE FREE 10 ML: 5 INJECTION INTRAVENOUS at 08:05

## 2025-02-23 ASSESSMENT — PAIN - FUNCTIONAL ASSESSMENT
PAIN_FUNCTIONAL_ASSESSMENT: ACTIVITIES ARE NOT PREVENTED
PAIN_FUNCTIONAL_ASSESSMENT: ACTIVITIES ARE NOT PREVENTED

## 2025-02-23 ASSESSMENT — PAIN SCALES - GENERAL
PAINLEVEL_OUTOF10: 7
PAINLEVEL_OUTOF10: 8
PAINLEVEL_OUTOF10: 9

## 2025-02-23 ASSESSMENT — PAIN DESCRIPTION - LOCATION
LOCATION: LEG
LOCATION: LEG;ABDOMEN

## 2025-02-23 ASSESSMENT — PAIN DESCRIPTION - DESCRIPTORS
DESCRIPTORS: ACHING
DESCRIPTORS: ACHING

## 2025-02-23 ASSESSMENT — PAIN DESCRIPTION - ORIENTATION
ORIENTATION: LEFT
ORIENTATION: LEFT

## 2025-02-24 LAB
ALBUMIN SERPL-MCNC: 3.6 G/DL (ref 3.4–5)
ANION GAP SERPL CALCULATED.3IONS-SCNC: 11 MMOL/L (ref 3–16)
BACTERIA BLD CULT: NORMAL
BASOPHILS # BLD: 0 K/UL (ref 0–0.2)
BASOPHILS NFR BLD: 0.5 %
BUN SERPL-MCNC: 29 MG/DL (ref 7–20)
CALCIUM SERPL-MCNC: 9.6 MG/DL (ref 8.3–10.6)
CHLORIDE SERPL-SCNC: 99 MMOL/L (ref 99–110)
CO2 SERPL-SCNC: 25 MMOL/L (ref 21–32)
CREAT SERPL-MCNC: 4.1 MG/DL (ref 0.6–1.1)
DEPRECATED RDW RBC AUTO: 18.6 % (ref 12.4–15.4)
EOSINOPHIL # BLD: 0.5 K/UL (ref 0–0.6)
EOSINOPHIL NFR BLD: 8.3 %
FERRITIN SERPL IA-MCNC: 1106 NG/ML (ref 15–150)
FOLATE SERPL-MCNC: 13.9 NG/ML (ref 4.78–24.2)
GFR SERPLBLD CREATININE-BSD FMLA CKD-EPI: 12 ML/MIN/{1.73_M2}
GLUCOSE BLD-MCNC: 103 MG/DL (ref 70–99)
GLUCOSE BLD-MCNC: 142 MG/DL (ref 70–99)
GLUCOSE BLD-MCNC: 145 MG/DL (ref 70–99)
GLUCOSE BLD-MCNC: 236 MG/DL (ref 70–99)
GLUCOSE SERPL-MCNC: 140 MG/DL (ref 70–99)
HCT VFR BLD AUTO: 26.7 % (ref 36–48)
HGB BLD-MCNC: 8.3 G/DL (ref 12–16)
IRON SATN MFR SERPL: 22 % (ref 15–50)
IRON SERPL-MCNC: 38 UG/DL (ref 37–145)
LYMPHOCYTES # BLD: 1.1 K/UL (ref 1–5.1)
LYMPHOCYTES NFR BLD: 17 %
MAGNESIUM SERPL-MCNC: 2.25 MG/DL (ref 1.8–2.4)
MCH RBC QN AUTO: 26.7 PG (ref 26–34)
MCHC RBC AUTO-ENTMCNC: 31.2 G/DL (ref 31–36)
MCV RBC AUTO: 85.6 FL (ref 80–100)
MONOCYTES # BLD: 0.5 K/UL (ref 0–1.3)
MONOCYTES NFR BLD: 8.2 %
NEUTROPHILS # BLD: 4.3 K/UL (ref 1.7–7.7)
NEUTROPHILS NFR BLD: 66 %
PERFORMED ON: ABNORMAL
PHOSPHATE SERPL-MCNC: 3.4 MG/DL (ref 2.5–4.9)
PLATELET # BLD AUTO: 222 K/UL (ref 135–450)
PMV BLD AUTO: 6.6 FL (ref 5–10.5)
POTASSIUM SERPL-SCNC: 4.7 MMOL/L (ref 3.5–5.1)
RBC # BLD AUTO: 3.12 M/UL (ref 4–5.2)
SODIUM SERPL-SCNC: 135 MMOL/L (ref 136–145)
TIBC SERPL-MCNC: 176 UG/DL (ref 260–445)
WBC # BLD AUTO: 6.6 K/UL (ref 4–11)

## 2025-02-24 PROCEDURE — 80069 RENAL FUNCTION PANEL: CPT

## 2025-02-24 PROCEDURE — 83735 ASSAY OF MAGNESIUM: CPT

## 2025-02-24 PROCEDURE — 6360000002 HC RX W HCPCS

## 2025-02-24 PROCEDURE — 6370000000 HC RX 637 (ALT 250 FOR IP): Performed by: INTERNAL MEDICINE

## 2025-02-24 PROCEDURE — 82728 ASSAY OF FERRITIN: CPT

## 2025-02-24 PROCEDURE — 6370000000 HC RX 637 (ALT 250 FOR IP)

## 2025-02-24 PROCEDURE — 2700000000 HC OXYGEN THERAPY PER DAY

## 2025-02-24 PROCEDURE — 1200000000 HC SEMI PRIVATE

## 2025-02-24 PROCEDURE — 36415 COLL VENOUS BLD VENIPUNCTURE: CPT

## 2025-02-24 PROCEDURE — 94761 N-INVAS EAR/PLS OXIMETRY MLT: CPT

## 2025-02-24 PROCEDURE — 2500000003 HC RX 250 WO HCPCS

## 2025-02-24 PROCEDURE — 85025 COMPLETE CBC W/AUTO DIFF WBC: CPT

## 2025-02-24 PROCEDURE — 83550 IRON BINDING TEST: CPT

## 2025-02-24 PROCEDURE — 36556 INSERT NON-TUNNEL CV CATH: CPT

## 2025-02-24 PROCEDURE — 90935 HEMODIALYSIS ONE EVALUATION: CPT

## 2025-02-24 PROCEDURE — 83540 ASSAY OF IRON: CPT

## 2025-02-24 PROCEDURE — 82746 ASSAY OF FOLIC ACID SERUM: CPT

## 2025-02-24 RX ORDER — NIFEDIPINE 60 MG/1
60 TABLET, EXTENDED RELEASE ORAL DAILY
Status: DISCONTINUED | OUTPATIENT
Start: 2025-02-25 | End: 2025-02-25 | Stop reason: HOSPADM

## 2025-02-24 RX ORDER — EPINEPHRINE 1 MG/ML
0.1 INJECTION, SOLUTION, CONCENTRATE INTRAVENOUS
Status: DISCONTINUED | OUTPATIENT
Start: 2025-02-24 | End: 2025-02-25 | Stop reason: HOSPADM

## 2025-02-24 RX ORDER — DIPHENHYDRAMINE HYDROCHLORIDE 50 MG/ML
25 INJECTION INTRAMUSCULAR; INTRAVENOUS
Status: DISCONTINUED | OUTPATIENT
Start: 2025-02-24 | End: 2025-02-25 | Stop reason: HOSPADM

## 2025-02-24 RX ADMIN — BUPROPION HYDROCHLORIDE 150 MG: 150 TABLET, FILM COATED, EXTENDED RELEASE ORAL at 08:00

## 2025-02-24 RX ADMIN — ARIPIPRAZOLE 10 MG: 5 TABLET ORAL at 08:01

## 2025-02-24 RX ADMIN — NIFEDIPINE 60 MG: 60 TABLET, EXTENDED RELEASE ORAL at 08:01

## 2025-02-24 RX ADMIN — SEVELAMER CARBONATE 800 MG: 800 TABLET, FILM COATED ORAL at 08:01

## 2025-02-24 RX ADMIN — SODIUM CHLORIDE, PRESERVATIVE FREE 10 ML: 5 INJECTION INTRAVENOUS at 08:02

## 2025-02-24 RX ADMIN — PANTOPRAZOLE SODIUM 40 MG: 40 TABLET, DELAYED RELEASE ORAL at 08:01

## 2025-02-24 RX ADMIN — SEVELAMER CARBONATE 800 MG: 800 TABLET, FILM COATED ORAL at 15:57

## 2025-02-24 RX ADMIN — CLOPIDOGREL BISULFATE 75 MG: 75 TABLET ORAL at 08:01

## 2025-02-24 RX ADMIN — HEPARIN SODIUM 5000 UNITS: 5000 INJECTION INTRAVENOUS; SUBCUTANEOUS at 22:19

## 2025-02-24 RX ADMIN — HYDROCODONE BITARTRATE AND ACETAMINOPHEN 1 TABLET: 5; 325 TABLET ORAL at 02:07

## 2025-02-24 RX ADMIN — MIRTAZAPINE 15 MG: 15 TABLET, FILM COATED ORAL at 22:19

## 2025-02-24 RX ADMIN — LOSARTAN POTASSIUM 25 MG: 25 TABLET, FILM COATED ORAL at 22:19

## 2025-02-24 RX ADMIN — HYDROCODONE BITARTRATE AND ACETAMINOPHEN 1 TABLET: 5; 325 TABLET ORAL at 10:12

## 2025-02-24 RX ADMIN — CARVEDILOL 3.12 MG: 3.12 TABLET, FILM COATED ORAL at 15:57

## 2025-02-24 RX ADMIN — BENZTROPINE MESYLATE 0.5 MG: 1 TABLET ORAL at 22:19

## 2025-02-24 RX ADMIN — TORSEMIDE 100 MG: 100 TABLET ORAL at 08:01

## 2025-02-24 RX ADMIN — ATORVASTATIN CALCIUM 80 MG: 80 TABLET, FILM COATED ORAL at 22:19

## 2025-02-24 RX ADMIN — HEPARIN SODIUM 5000 UNITS: 5000 INJECTION INTRAVENOUS; SUBCUTANEOUS at 06:19

## 2025-02-24 RX ADMIN — SEVELAMER CARBONATE 800 MG: 800 TABLET, FILM COATED ORAL at 13:32

## 2025-02-24 RX ADMIN — Medication 5 MG: at 22:19

## 2025-02-24 RX ADMIN — INSULIN LISPRO 2 UNITS: 100 INJECTION, SOLUTION INTRAVENOUS; SUBCUTANEOUS at 22:25

## 2025-02-24 RX ADMIN — CARVEDILOL 3.12 MG: 3.12 TABLET, FILM COATED ORAL at 08:01

## 2025-02-24 RX ADMIN — HEPARIN SODIUM 5000 UNITS: 5000 INJECTION INTRAVENOUS; SUBCUTANEOUS at 13:25

## 2025-02-24 ASSESSMENT — PAIN DESCRIPTION - DESCRIPTORS
DESCRIPTORS: ACHING
DESCRIPTORS: ACHING;THROBBING
DESCRIPTORS: ACHING

## 2025-02-24 ASSESSMENT — PAIN DESCRIPTION - PAIN TYPE
TYPE: CHRONIC PAIN
TYPE: CHRONIC PAIN

## 2025-02-24 ASSESSMENT — PAIN DESCRIPTION - FREQUENCY
FREQUENCY: CONTINUOUS
FREQUENCY: CONTINUOUS

## 2025-02-24 ASSESSMENT — PAIN DESCRIPTION - ORIENTATION
ORIENTATION: LEFT

## 2025-02-24 ASSESSMENT — PAIN SCALES - GENERAL
PAINLEVEL_OUTOF10: 9
PAINLEVEL_OUTOF10: 8
PAINLEVEL_OUTOF10: 9
PAINLEVEL_OUTOF10: 0
PAINLEVEL_OUTOF10: 7

## 2025-02-24 ASSESSMENT — PAIN - FUNCTIONAL ASSESSMENT
PAIN_FUNCTIONAL_ASSESSMENT: PREVENTS OR INTERFERES SOME ACTIVE ACTIVITIES AND ADLS

## 2025-02-24 ASSESSMENT — PAIN DESCRIPTION - ONSET
ONSET: ON-GOING
ONSET: ON-GOING

## 2025-02-24 ASSESSMENT — PAIN DESCRIPTION - LOCATION
LOCATION: LEG

## 2025-02-25 ENCOUNTER — HOSPITAL ENCOUNTER (INPATIENT)
Age: 60
LOS: 1 days | Discharge: STILL ADMITTED OR ADMINISTRATIVE | End: 2025-02-25
Attending: STUDENT IN AN ORGANIZED HEALTH CARE EDUCATION/TRAINING PROGRAM | Admitting: STUDENT IN AN ORGANIZED HEALTH CARE EDUCATION/TRAINING PROGRAM
Payer: MEDICAID

## 2025-02-25 VITALS
HEART RATE: 87 BPM | RESPIRATION RATE: 17 BRPM | OXYGEN SATURATION: 93 % | TEMPERATURE: 98.1 F | DIASTOLIC BLOOD PRESSURE: 70 MMHG | SYSTOLIC BLOOD PRESSURE: 121 MMHG

## 2025-02-25 LAB
ALBUMIN SERPL-MCNC: 3.7 G/DL (ref 3.4–5)
ANION GAP SERPL CALCULATED.3IONS-SCNC: 13 MMOL/L (ref 3–16)
BACTERIA BLD CULT ORG #2: NORMAL
BASOPHILS # BLD: 0 K/UL (ref 0–0.2)
BASOPHILS NFR BLD: 0.6 %
BUN SERPL-MCNC: 44 MG/DL (ref 7–20)
CALCIUM SERPL-MCNC: 9.5 MG/DL (ref 8.3–10.6)
CHLORIDE SERPL-SCNC: 101 MMOL/L (ref 99–110)
CO2 SERPL-SCNC: 22 MMOL/L (ref 21–32)
CREAT SERPL-MCNC: 5.2 MG/DL (ref 0.6–1.1)
DEPRECATED RDW RBC AUTO: 19.4 % (ref 12.4–15.4)
EOSINOPHIL # BLD: 0.5 K/UL (ref 0–0.6)
EOSINOPHIL NFR BLD: 7.2 %
GFR SERPLBLD CREATININE-BSD FMLA CKD-EPI: 9 ML/MIN/{1.73_M2}
GLUCOSE BLD-MCNC: 139 MG/DL (ref 70–99)
GLUCOSE BLD-MCNC: 142 MG/DL (ref 70–99)
GLUCOSE BLD-MCNC: 151 MG/DL (ref 70–99)
GLUCOSE SERPL-MCNC: 130 MG/DL (ref 70–99)
HCT VFR BLD AUTO: 30.3 % (ref 36–48)
HGB BLD-MCNC: 9.2 G/DL (ref 12–16)
LYMPHOCYTES # BLD: 1 K/UL (ref 1–5.1)
LYMPHOCYTES NFR BLD: 13.8 %
MAGNESIUM SERPL-MCNC: 2.37 MG/DL (ref 1.8–2.4)
MCH RBC QN AUTO: 26.8 PG (ref 26–34)
MCHC RBC AUTO-ENTMCNC: 30.3 G/DL (ref 31–36)
MCV RBC AUTO: 88.4 FL (ref 80–100)
MONOCYTES # BLD: 0.5 K/UL (ref 0–1.3)
MONOCYTES NFR BLD: 6.4 %
NEUTROPHILS # BLD: 5.4 K/UL (ref 1.7–7.7)
NEUTROPHILS NFR BLD: 72 %
PERFORMED ON: ABNORMAL
PHOSPHATE SERPL-MCNC: 3.4 MG/DL (ref 2.5–4.9)
PLATELET # BLD AUTO: 197 K/UL (ref 135–450)
PMV BLD AUTO: 6.8 FL (ref 5–10.5)
POTASSIUM SERPL-SCNC: 4.9 MMOL/L (ref 3.5–5.1)
RBC # BLD AUTO: 3.43 M/UL (ref 4–5.2)
SODIUM SERPL-SCNC: 136 MMOL/L (ref 136–145)
WBC # BLD AUTO: 7.6 K/UL (ref 4–11)

## 2025-02-25 PROCEDURE — 6370000000 HC RX 637 (ALT 250 FOR IP)

## 2025-02-25 PROCEDURE — 2700000000 HC OXYGEN THERAPY PER DAY

## 2025-02-25 PROCEDURE — 6370000000 HC RX 637 (ALT 250 FOR IP): Performed by: INTERNAL MEDICINE

## 2025-02-25 PROCEDURE — 2000000000 HC ICU R&B

## 2025-02-25 PROCEDURE — 6370000000 HC RX 637 (ALT 250 FOR IP): Performed by: NURSE PRACTITIONER

## 2025-02-25 PROCEDURE — 94761 N-INVAS EAR/PLS OXIMETRY MLT: CPT

## 2025-02-25 PROCEDURE — 85025 COMPLETE CBC W/AUTO DIFF WBC: CPT

## 2025-02-25 PROCEDURE — 1200000000 HC SEMI PRIVATE

## 2025-02-25 PROCEDURE — 6360000002 HC RX W HCPCS: Performed by: NURSE PRACTITIONER

## 2025-02-25 PROCEDURE — 6360000002 HC RX W HCPCS

## 2025-02-25 PROCEDURE — 2500000003 HC RX 250 WO HCPCS: Performed by: NURSE PRACTITIONER

## 2025-02-25 PROCEDURE — 80069 RENAL FUNCTION PANEL: CPT

## 2025-02-25 PROCEDURE — 6360000002 HC RX W HCPCS: Performed by: INTERNAL MEDICINE

## 2025-02-25 PROCEDURE — 2500000003 HC RX 250 WO HCPCS

## 2025-02-25 PROCEDURE — 36415 COLL VENOUS BLD VENIPUNCTURE: CPT

## 2025-02-25 PROCEDURE — 83735 ASSAY OF MAGNESIUM: CPT

## 2025-02-25 PROCEDURE — 90935 HEMODIALYSIS ONE EVALUATION: CPT

## 2025-02-25 RX ORDER — SODIUM CHLORIDE 9 MG/ML
INJECTION, SOLUTION INTRAVENOUS PRN
Status: DISCONTINUED | OUTPATIENT
Start: 2025-02-25 | End: 2025-03-03 | Stop reason: HOSPADM

## 2025-02-25 RX ORDER — HEPARIN SODIUM 5000 [USP'U]/ML
5000 INJECTION, SOLUTION INTRAVENOUS; SUBCUTANEOUS EVERY 8 HOURS SCHEDULED
Status: DISCONTINUED | OUTPATIENT
Start: 2025-02-25 | End: 2025-03-03 | Stop reason: HOSPADM

## 2025-02-25 RX ORDER — SODIUM THIOSULFATE 250 MG/ML
25 INJECTION, SOLUTION INTRAVENOUS
Status: DISCONTINUED | OUTPATIENT
Start: 2025-02-27 | End: 2025-03-03 | Stop reason: HOSPADM

## 2025-02-25 RX ORDER — ATORVASTATIN CALCIUM 80 MG/1
80 TABLET, FILM COATED ORAL NIGHTLY
Status: DISCONTINUED | OUTPATIENT
Start: 2025-02-25 | End: 2025-03-03 | Stop reason: HOSPADM

## 2025-02-25 RX ORDER — ONDANSETRON 4 MG/1
4 TABLET, ORALLY DISINTEGRATING ORAL EVERY 8 HOURS PRN
Status: DISCONTINUED | OUTPATIENT
Start: 2025-02-25 | End: 2025-03-03 | Stop reason: HOSPADM

## 2025-02-25 RX ORDER — LOSARTAN POTASSIUM 25 MG/1
25 TABLET ORAL NIGHTLY
Status: DISCONTINUED | OUTPATIENT
Start: 2025-02-25 | End: 2025-03-03 | Stop reason: HOSPADM

## 2025-02-25 RX ORDER — INSULIN LISPRO 100 [IU]/ML
0-8 INJECTION, SOLUTION INTRAVENOUS; SUBCUTANEOUS
Status: DISCONTINUED | OUTPATIENT
Start: 2025-02-25 | End: 2025-03-03 | Stop reason: HOSPADM

## 2025-02-25 RX ORDER — BUPROPION HYDROCHLORIDE 150 MG/1
150 TABLET, EXTENDED RELEASE ORAL EVERY MORNING
Status: DISCONTINUED | OUTPATIENT
Start: 2025-02-26 | End: 2025-02-25 | Stop reason: SDUPTHER

## 2025-02-25 RX ORDER — GLUCAGON 1 MG/ML
1 KIT INJECTION PRN
Status: DISCONTINUED | OUTPATIENT
Start: 2025-02-25 | End: 2025-03-03 | Stop reason: HOSPADM

## 2025-02-25 RX ORDER — DIPHENHYDRAMINE HYDROCHLORIDE 50 MG/ML
25 INJECTION INTRAMUSCULAR; INTRAVENOUS
Status: ACTIVE | OUTPATIENT
Start: 2025-02-25 | End: 2025-02-26

## 2025-02-25 RX ORDER — LABETALOL HYDROCHLORIDE 5 MG/ML
10 INJECTION, SOLUTION INTRAVENOUS EVERY 6 HOURS PRN
Status: DISCONTINUED | OUTPATIENT
Start: 2025-02-25 | End: 2025-03-03 | Stop reason: HOSPADM

## 2025-02-25 RX ORDER — MECOBALAMIN 5000 MCG
5 TABLET,DISINTEGRATING ORAL NIGHTLY
Status: DISCONTINUED | OUTPATIENT
Start: 2025-02-25 | End: 2025-02-27

## 2025-02-25 RX ORDER — LIDOCAINE 4 G/G
1 PATCH TOPICAL DAILY PRN
Status: DISCONTINUED | OUTPATIENT
Start: 2025-02-25 | End: 2025-02-25 | Stop reason: SDUPTHER

## 2025-02-25 RX ORDER — CLOPIDOGREL BISULFATE 75 MG/1
75 TABLET ORAL DAILY
Status: DISCONTINUED | OUTPATIENT
Start: 2025-02-26 | End: 2025-03-03 | Stop reason: HOSPADM

## 2025-02-25 RX ORDER — HYDROCODONE BITARTRATE AND ACETAMINOPHEN 5; 325 MG/1; MG/1
1 TABLET ORAL EVERY 8 HOURS PRN
Status: DISCONTINUED | OUTPATIENT
Start: 2025-02-25 | End: 2025-03-03 | Stop reason: HOSPADM

## 2025-02-25 RX ORDER — HYDROXYZINE PAMOATE 25 MG/1
50 CAPSULE ORAL 2 TIMES DAILY
Status: DISCONTINUED | OUTPATIENT
Start: 2025-02-25 | End: 2025-02-25 | Stop reason: ALTCHOICE

## 2025-02-25 RX ORDER — NALOXONE HYDROCHLORIDE 0.4 MG/ML
0.4 INJECTION, SOLUTION INTRAMUSCULAR; INTRAVENOUS; SUBCUTANEOUS PRN
Status: DISCONTINUED | OUTPATIENT
Start: 2025-02-25 | End: 2025-03-03 | Stop reason: HOSPADM

## 2025-02-25 RX ORDER — HYDRALAZINE HYDROCHLORIDE 20 MG/ML
5 INJECTION INTRAMUSCULAR; INTRAVENOUS EVERY 6 HOURS PRN
Status: DISCONTINUED | OUTPATIENT
Start: 2025-02-25 | End: 2025-03-03 | Stop reason: HOSPADM

## 2025-02-25 RX ORDER — BUPROPION HYDROCHLORIDE 150 MG/1
150 TABLET, EXTENDED RELEASE ORAL DAILY
Status: DISCONTINUED | OUTPATIENT
Start: 2025-02-26 | End: 2025-03-03 | Stop reason: HOSPADM

## 2025-02-25 RX ORDER — MIRTAZAPINE 15 MG/1
15 TABLET, FILM COATED ORAL NIGHTLY
Status: DISCONTINUED | OUTPATIENT
Start: 2025-02-25 | End: 2025-02-25 | Stop reason: SDUPTHER

## 2025-02-25 RX ORDER — NIFEDIPINE 60 MG/1
60 TABLET, EXTENDED RELEASE ORAL DAILY
Status: DISCONTINUED | OUTPATIENT
Start: 2025-02-26 | End: 2025-03-03 | Stop reason: HOSPADM

## 2025-02-25 RX ORDER — GABAPENTIN 100 MG/1
100 CAPSULE ORAL DAILY
Status: DISCONTINUED | OUTPATIENT
Start: 2025-02-26 | End: 2025-03-03 | Stop reason: HOSPADM

## 2025-02-25 RX ORDER — CALCITRIOL 0.25 UG/1
0.25 CAPSULE, LIQUID FILLED ORAL DAILY
Status: DISCONTINUED | OUTPATIENT
Start: 2025-02-26 | End: 2025-02-25 | Stop reason: ALTCHOICE

## 2025-02-25 RX ORDER — LIDOCAINE 4 G/G
2 PATCH TOPICAL DAILY
Status: DISCONTINUED | OUTPATIENT
Start: 2025-02-26 | End: 2025-03-03 | Stop reason: HOSPADM

## 2025-02-25 RX ORDER — METOPROLOL SUCCINATE 25 MG/1
25 TABLET, EXTENDED RELEASE ORAL DAILY
Status: DISCONTINUED | OUTPATIENT
Start: 2025-02-26 | End: 2025-02-25 | Stop reason: ALTCHOICE

## 2025-02-25 RX ORDER — PANTOPRAZOLE SODIUM 40 MG/1
40 TABLET, DELAYED RELEASE ORAL DAILY
Status: DISCONTINUED | OUTPATIENT
Start: 2025-02-26 | End: 2025-03-03 | Stop reason: HOSPADM

## 2025-02-25 RX ORDER — POLYETHYLENE GLYCOL 3350 17 G/17G
17 POWDER, FOR SOLUTION ORAL DAILY PRN
Status: DISCONTINUED | OUTPATIENT
Start: 2025-02-25 | End: 2025-03-03 | Stop reason: HOSPADM

## 2025-02-25 RX ORDER — SEVELAMER CARBONATE 800 MG/1
800 TABLET, FILM COATED ORAL
Status: DISCONTINUED | OUTPATIENT
Start: 2025-02-26 | End: 2025-03-03 | Stop reason: HOSPADM

## 2025-02-25 RX ORDER — ONDANSETRON 2 MG/ML
4 INJECTION INTRAMUSCULAR; INTRAVENOUS EVERY 6 HOURS PRN
Status: DISCONTINUED | OUTPATIENT
Start: 2025-02-25 | End: 2025-03-03 | Stop reason: HOSPADM

## 2025-02-25 RX ORDER — ALBUTEROL SULFATE 90 UG/1
2 INHALANT RESPIRATORY (INHALATION) EVERY 6 HOURS PRN
Status: DISCONTINUED | OUTPATIENT
Start: 2025-02-25 | End: 2025-02-25 | Stop reason: SDUPTHER

## 2025-02-25 RX ORDER — NITROGLYCERIN 0.4 MG/1
0.4 TABLET SUBLINGUAL EVERY 5 MIN PRN
Status: DISCONTINUED | OUTPATIENT
Start: 2025-02-25 | End: 2025-03-03 | Stop reason: HOSPADM

## 2025-02-25 RX ORDER — ACETAMINOPHEN 650 MG/1
650 SUPPOSITORY RECTAL EVERY 6 HOURS PRN
Status: DISCONTINUED | OUTPATIENT
Start: 2025-02-25 | End: 2025-03-03 | Stop reason: HOSPADM

## 2025-02-25 RX ORDER — ARIPIPRAZOLE 5 MG/1
10 TABLET ORAL DAILY
Status: DISCONTINUED | OUTPATIENT
Start: 2025-02-26 | End: 2025-03-03 | Stop reason: HOSPADM

## 2025-02-25 RX ORDER — INSULIN LISPRO 100 [IU]/ML
0-8 INJECTION, SOLUTION INTRAVENOUS; SUBCUTANEOUS EVERY 6 HOURS SCHEDULED
Status: DISCONTINUED | OUTPATIENT
Start: 2025-02-26 | End: 2025-02-25 | Stop reason: ALTCHOICE

## 2025-02-25 RX ORDER — GABAPENTIN 100 MG/1
200 CAPSULE ORAL NIGHTLY
Status: DISCONTINUED | OUTPATIENT
Start: 2025-02-25 | End: 2025-03-03 | Stop reason: HOSPADM

## 2025-02-25 RX ORDER — DEXTROSE MONOHYDRATE 100 MG/ML
INJECTION, SOLUTION INTRAVENOUS CONTINUOUS PRN
Status: DISCONTINUED | OUTPATIENT
Start: 2025-02-25 | End: 2025-03-03 | Stop reason: HOSPADM

## 2025-02-25 RX ORDER — ACETAMINOPHEN 325 MG/1
650 TABLET ORAL EVERY 6 HOURS PRN
Status: DISCONTINUED | OUTPATIENT
Start: 2025-02-25 | End: 2025-03-03 | Stop reason: HOSPADM

## 2025-02-25 RX ORDER — MIRTAZAPINE 15 MG/1
15 TABLET, FILM COATED ORAL NIGHTLY
Status: DISCONTINUED | OUTPATIENT
Start: 2025-02-25 | End: 2025-03-03 | Stop reason: HOSPADM

## 2025-02-25 RX ORDER — BENZTROPINE MESYLATE 1 MG/1
0.5 TABLET ORAL NIGHTLY
Status: DISCONTINUED | OUTPATIENT
Start: 2025-02-25 | End: 2025-03-03 | Stop reason: HOSPADM

## 2025-02-25 RX ORDER — ALBUTEROL SULFATE 0.83 MG/ML
2.5 SOLUTION RESPIRATORY (INHALATION) EVERY 6 HOURS PRN
Status: DISCONTINUED | OUTPATIENT
Start: 2025-02-25 | End: 2025-03-03 | Stop reason: HOSPADM

## 2025-02-25 RX ORDER — SEVELAMER CARBONATE 800 MG/1
1600 TABLET, FILM COATED ORAL 2 TIMES DAILY WITH MEALS
Status: DISCONTINUED | OUTPATIENT
Start: 2025-02-26 | End: 2025-02-25 | Stop reason: SDUPTHER

## 2025-02-25 RX ORDER — HYDROMORPHONE HYDROCHLORIDE 1 MG/ML
0.25 INJECTION, SOLUTION INTRAMUSCULAR; INTRAVENOUS; SUBCUTANEOUS EVERY 6 HOURS PRN
Status: DISPENSED | OUTPATIENT
Start: 2025-02-25 | End: 2025-02-27

## 2025-02-25 RX ORDER — TORSEMIDE 100 MG/1
100 TABLET ORAL DAILY
Status: DISCONTINUED | OUTPATIENT
Start: 2025-02-26 | End: 2025-03-03 | Stop reason: HOSPADM

## 2025-02-25 RX ORDER — VALSARTAN 40 MG/1
40 TABLET ORAL DAILY
Status: DISCONTINUED | OUTPATIENT
Start: 2025-02-26 | End: 2025-02-25 | Stop reason: ALTCHOICE

## 2025-02-25 RX ORDER — ASPIRIN 81 MG/1
81 TABLET, CHEWABLE ORAL DAILY
Status: DISCONTINUED | OUTPATIENT
Start: 2025-02-26 | End: 2025-02-25 | Stop reason: ALTCHOICE

## 2025-02-25 RX ORDER — SODIUM CHLORIDE 0.9 % (FLUSH) 0.9 %
5-40 SYRINGE (ML) INJECTION EVERY 12 HOURS SCHEDULED
Status: DISCONTINUED | OUTPATIENT
Start: 2025-02-25 | End: 2025-03-03 | Stop reason: HOSPADM

## 2025-02-25 RX ADMIN — HYDROCODONE BITARTRATE AND ACETAMINOPHEN 1 TABLET: 5; 325 TABLET ORAL at 22:49

## 2025-02-25 RX ADMIN — SODIUM CHLORIDE, PRESERVATIVE FREE 10 ML: 5 INJECTION INTRAVENOUS at 22:25

## 2025-02-25 RX ADMIN — SEVELAMER CARBONATE 800 MG: 800 TABLET, FILM COATED ORAL at 08:46

## 2025-02-25 RX ADMIN — GABAPENTIN 200 MG: 100 CAPSULE ORAL at 22:12

## 2025-02-25 RX ADMIN — NIFEDIPINE 60 MG: 60 TABLET, EXTENDED RELEASE ORAL at 08:46

## 2025-02-25 RX ADMIN — TORSEMIDE 100 MG: 100 TABLET ORAL at 08:47

## 2025-02-25 RX ADMIN — BUPROPION HYDROCHLORIDE 150 MG: 150 TABLET, FILM COATED, EXTENDED RELEASE ORAL at 08:46

## 2025-02-25 RX ADMIN — ATORVASTATIN CALCIUM 80 MG: 80 TABLET, FILM COATED ORAL at 22:12

## 2025-02-25 RX ADMIN — CARVEDILOL 3.12 MG: 3.12 TABLET, FILM COATED ORAL at 08:46

## 2025-02-25 RX ADMIN — HEPARIN SODIUM 5000 UNITS: 5000 INJECTION INTRAVENOUS; SUBCUTANEOUS at 05:57

## 2025-02-25 RX ADMIN — MIRTAZAPINE 15 MG: 15 TABLET, FILM COATED ORAL at 22:12

## 2025-02-25 RX ADMIN — EPOETIN ALFA-EPBX 10000 UNITS: 10000 INJECTION, SOLUTION INTRAVENOUS; SUBCUTANEOUS at 17:00

## 2025-02-25 RX ADMIN — SODIUM CHLORIDE, PRESERVATIVE FREE 10 ML: 5 INJECTION INTRAVENOUS at 08:46

## 2025-02-25 RX ADMIN — HEPARIN SODIUM 5000 UNITS: 5000 INJECTION INTRAVENOUS; SUBCUTANEOUS at 22:12

## 2025-02-25 RX ADMIN — SEVELAMER CARBONATE 800 MG: 800 TABLET, FILM COATED ORAL at 14:02

## 2025-02-25 RX ADMIN — ARIPIPRAZOLE 10 MG: 5 TABLET ORAL at 08:46

## 2025-02-25 RX ADMIN — CLOPIDOGREL BISULFATE 75 MG: 75 TABLET ORAL at 08:46

## 2025-02-25 RX ADMIN — PANTOPRAZOLE SODIUM 40 MG: 40 TABLET, DELAYED RELEASE ORAL at 08:46

## 2025-02-25 RX ADMIN — HEPARIN SODIUM 5000 UNITS: 5000 INJECTION INTRAVENOUS; SUBCUTANEOUS at 14:01

## 2025-02-25 RX ADMIN — LOSARTAN POTASSIUM 25 MG: 25 TABLET, FILM COATED ORAL at 22:11

## 2025-02-25 RX ADMIN — BENZTROPINE MESYLATE 0.5 MG: 1 TABLET ORAL at 22:12

## 2025-02-25 RX ADMIN — HYDROCODONE BITARTRATE AND ACETAMINOPHEN 1 TABLET: 5; 325 TABLET ORAL at 08:47

## 2025-02-25 RX ADMIN — Medication 5 MG: at 22:12

## 2025-02-25 ASSESSMENT — PAIN DESCRIPTION - ORIENTATION: ORIENTATION: LEFT

## 2025-02-25 ASSESSMENT — PAIN SCALES - GENERAL
PAINLEVEL_OUTOF10: 3
PAINLEVEL_OUTOF10: 7
PAINLEVEL_OUTOF10: 3
PAINLEVEL_OUTOF10: 9
PAINLEVEL_OUTOF10: 3
PAINLEVEL_OUTOF10: 0

## 2025-02-25 ASSESSMENT — PAIN DESCRIPTION - FREQUENCY: FREQUENCY: INTERMITTENT

## 2025-02-25 ASSESSMENT — PAIN DESCRIPTION - DESCRIPTORS: DESCRIPTORS: ACHING

## 2025-02-25 ASSESSMENT — PAIN DESCRIPTION - LOCATION: LOCATION: LEG

## 2025-02-25 ASSESSMENT — PAIN DESCRIPTION - PAIN TYPE: TYPE: ACUTE PAIN;CHRONIC PAIN

## 2025-02-25 ASSESSMENT — PAIN DESCRIPTION - ONSET: ONSET: ON-GOING

## 2025-02-25 ASSESSMENT — PAIN - FUNCTIONAL ASSESSMENT: PAIN_FUNCTIONAL_ASSESSMENT: PREVENTS OR INTERFERES SOME ACTIVE ACTIVITIES AND ADLS

## 2025-02-26 LAB
ALBUMIN SERPL-MCNC: 3.7 G/DL (ref 3.4–5)
ANION GAP SERPL CALCULATED.3IONS-SCNC: 8 MMOL/L (ref 3–16)
BASOPHILS # BLD: 0 K/UL (ref 0–0.2)
BASOPHILS NFR BLD: 0.7 %
BUN SERPL-MCNC: 25 MG/DL (ref 7–20)
CALCIUM SERPL-MCNC: 9.5 MG/DL (ref 8.3–10.6)
CHLORIDE SERPL-SCNC: 100 MMOL/L (ref 99–110)
CO2 SERPL-SCNC: 29 MMOL/L (ref 21–32)
CREAT SERPL-MCNC: 3.8 MG/DL (ref 0.6–1.1)
DEPRECATED RDW RBC AUTO: 18.5 % (ref 12.4–15.4)
EOSINOPHIL # BLD: 0.5 K/UL (ref 0–0.6)
EOSINOPHIL NFR BLD: 8.3 %
GFR SERPLBLD CREATININE-BSD FMLA CKD-EPI: 13 ML/MIN/{1.73_M2}
GLUCOSE BLD-MCNC: 103 MG/DL (ref 70–99)
GLUCOSE BLD-MCNC: 110 MG/DL (ref 70–99)
GLUCOSE BLD-MCNC: 183 MG/DL (ref 70–99)
GLUCOSE BLD-MCNC: 218 MG/DL (ref 70–99)
GLUCOSE SERPL-MCNC: 97 MG/DL (ref 70–99)
HCT VFR BLD AUTO: 29.3 % (ref 36–48)
HGB BLD-MCNC: 9.1 G/DL (ref 12–16)
LYMPHOCYTES # BLD: 1.1 K/UL (ref 1–5.1)
LYMPHOCYTES NFR BLD: 18 %
MAGNESIUM SERPL-MCNC: 2.35 MG/DL (ref 1.8–2.4)
MCH RBC QN AUTO: 27 PG (ref 26–34)
MCHC RBC AUTO-ENTMCNC: 31.1 G/DL (ref 31–36)
MCV RBC AUTO: 86.9 FL (ref 80–100)
MONOCYTES # BLD: 0.6 K/UL (ref 0–1.3)
MONOCYTES NFR BLD: 9.5 %
NEUTROPHILS # BLD: 3.8 K/UL (ref 1.7–7.7)
NEUTROPHILS NFR BLD: 63.5 %
PERFORMED ON: ABNORMAL
PHOSPHATE SERPL-MCNC: 3.5 MG/DL (ref 2.5–4.9)
PLATELET # BLD AUTO: 194 K/UL (ref 135–450)
PMV BLD AUTO: 6.8 FL (ref 5–10.5)
POTASSIUM SERPL-SCNC: 4.4 MMOL/L (ref 3.5–5.1)
RBC # BLD AUTO: 3.37 M/UL (ref 4–5.2)
SODIUM SERPL-SCNC: 137 MMOL/L (ref 136–145)
WBC # BLD AUTO: 6 K/UL (ref 4–11)

## 2025-02-26 PROCEDURE — 83735 ASSAY OF MAGNESIUM: CPT

## 2025-02-26 PROCEDURE — 2700000000 HC OXYGEN THERAPY PER DAY

## 2025-02-26 PROCEDURE — 6370000000 HC RX 637 (ALT 250 FOR IP): Performed by: NURSE PRACTITIONER

## 2025-02-26 PROCEDURE — 2500000003 HC RX 250 WO HCPCS: Performed by: NURSE PRACTITIONER

## 2025-02-26 PROCEDURE — 85025 COMPLETE CBC W/AUTO DIFF WBC: CPT

## 2025-02-26 PROCEDURE — 36415 COLL VENOUS BLD VENIPUNCTURE: CPT

## 2025-02-26 PROCEDURE — 6360000002 HC RX W HCPCS: Performed by: NURSE PRACTITIONER

## 2025-02-26 PROCEDURE — 90935 HEMODIALYSIS ONE EVALUATION: CPT

## 2025-02-26 PROCEDURE — 80069 RENAL FUNCTION PANEL: CPT

## 2025-02-26 PROCEDURE — 94761 N-INVAS EAR/PLS OXIMETRY MLT: CPT

## 2025-02-26 PROCEDURE — 94640 AIRWAY INHALATION TREATMENT: CPT

## 2025-02-26 PROCEDURE — 1200000000 HC SEMI PRIVATE

## 2025-02-26 RX ORDER — HEPARIN SODIUM 1000 [USP'U]/ML
3200 INJECTION, SOLUTION INTRAVENOUS; SUBCUTANEOUS PRN
Status: DISCONTINUED | OUTPATIENT
Start: 2025-02-26 | End: 2025-03-03 | Stop reason: HOSPADM

## 2025-02-26 RX ADMIN — SODIUM CHLORIDE, PRESERVATIVE FREE 10 ML: 5 INJECTION INTRAVENOUS at 08:40

## 2025-02-26 RX ADMIN — Medication 5 MG: at 20:12

## 2025-02-26 RX ADMIN — MIRTAZAPINE 15 MG: 15 TABLET, FILM COATED ORAL at 20:15

## 2025-02-26 RX ADMIN — HYDROCODONE BITARTRATE AND ACETAMINOPHEN 1 TABLET: 5; 325 TABLET ORAL at 21:03

## 2025-02-26 RX ADMIN — ATORVASTATIN CALCIUM 80 MG: 80 TABLET, FILM COATED ORAL at 20:13

## 2025-02-26 RX ADMIN — HEPARIN SODIUM 5000 UNITS: 5000 INJECTION INTRAVENOUS; SUBCUTANEOUS at 07:17

## 2025-02-26 RX ADMIN — HEPARIN SODIUM 5000 UNITS: 5000 INJECTION INTRAVENOUS; SUBCUTANEOUS at 21:03

## 2025-02-26 RX ADMIN — SEVELAMER CARBONATE 800 MG: 800 TABLET, FILM COATED ORAL at 16:29

## 2025-02-26 RX ADMIN — BUPROPION HYDROCHLORIDE 150 MG: 150 TABLET, FILM COATED, EXTENDED RELEASE ORAL at 08:37

## 2025-02-26 RX ADMIN — SODIUM CHLORIDE, PRESERVATIVE FREE 10 ML: 5 INJECTION INTRAVENOUS at 20:13

## 2025-02-26 RX ADMIN — GABAPENTIN 200 MG: 100 CAPSULE ORAL at 20:13

## 2025-02-26 RX ADMIN — LOSARTAN POTASSIUM 25 MG: 25 TABLET, FILM COATED ORAL at 20:11

## 2025-02-26 RX ADMIN — ARIPIPRAZOLE 10 MG: 5 TABLET ORAL at 08:35

## 2025-02-26 RX ADMIN — PANTOPRAZOLE SODIUM 40 MG: 40 TABLET, DELAYED RELEASE ORAL at 08:37

## 2025-02-26 RX ADMIN — GABAPENTIN 100 MG: 100 CAPSULE ORAL at 08:37

## 2025-02-26 RX ADMIN — CLOPIDOGREL BISULFATE 75 MG: 75 TABLET ORAL at 08:37

## 2025-02-26 RX ADMIN — SEVELAMER CARBONATE 800 MG: 800 TABLET, FILM COATED ORAL at 11:50

## 2025-02-26 RX ADMIN — SEVELAMER CARBONATE 800 MG: 800 TABLET, FILM COATED ORAL at 08:36

## 2025-02-26 RX ADMIN — INSULIN LISPRO 2 UNITS: 100 INJECTION, SOLUTION INTRAVENOUS; SUBCUTANEOUS at 21:03

## 2025-02-26 RX ADMIN — NIFEDIPINE 60 MG: 60 TABLET, EXTENDED RELEASE ORAL at 08:37

## 2025-02-26 RX ADMIN — BENZTROPINE MESYLATE 0.5 MG: 1 TABLET ORAL at 20:11

## 2025-02-26 RX ADMIN — INSULIN LISPRO 2 UNITS: 100 INJECTION, SOLUTION INTRAVENOUS; SUBCUTANEOUS at 11:50

## 2025-02-26 RX ADMIN — ALBUTEROL SULFATE 2.5 MG: 2.5 SOLUTION RESPIRATORY (INHALATION) at 00:21

## 2025-02-26 RX ADMIN — HEPARIN SODIUM 5000 UNITS: 5000 INJECTION INTRAVENOUS; SUBCUTANEOUS at 16:29

## 2025-02-26 RX ADMIN — TORSEMIDE 100 MG: 100 TABLET ORAL at 08:37

## 2025-02-26 RX ADMIN — HYDROCODONE BITARTRATE AND ACETAMINOPHEN 1 TABLET: 5; 325 TABLET ORAL at 08:42

## 2025-02-26 ASSESSMENT — PAIN DESCRIPTION - DESCRIPTORS
DESCRIPTORS: ACHING

## 2025-02-26 ASSESSMENT — PAIN SCALES - GENERAL
PAINLEVEL_OUTOF10: 6
PAINLEVEL_OUTOF10: 10
PAINLEVEL_OUTOF10: 9
PAINLEVEL_OUTOF10: 9
PAINLEVEL_OUTOF10: 7

## 2025-02-26 ASSESSMENT — PAIN DESCRIPTION - PAIN TYPE
TYPE: ACUTE PAIN

## 2025-02-26 ASSESSMENT — PAIN - FUNCTIONAL ASSESSMENT
PAIN_FUNCTIONAL_ASSESSMENT: ACTIVITIES ARE NOT PREVENTED

## 2025-02-26 ASSESSMENT — PAIN DESCRIPTION - ONSET
ONSET: ON-GOING

## 2025-02-26 ASSESSMENT — PAIN DESCRIPTION - LOCATION
LOCATION: LEG

## 2025-02-26 ASSESSMENT — PAIN DESCRIPTION - FREQUENCY
FREQUENCY: INTERMITTENT

## 2025-02-26 ASSESSMENT — PAIN DESCRIPTION - ORIENTATION
ORIENTATION: LEFT
ORIENTATION: LEFT
ORIENTATION: RIGHT

## 2025-02-26 NOTE — DIALYSIS
Treatment time: 2 hours  Net UF: 3000 ml     Pre weight: 59.6 kg  Post weight:56.6 kg      Access used: RCVC    Access function: well with  ml/min     Medications or blood products given: heparin dwells      Summary of response to treatment: Patient tolerated treatment well and without any complications. Patient remained stable throughout entire treatment and upon the exiting the dialysis suite via transport.     Report given to Julia Le RN and copy of dialysis treatment record placed in chart, to be scanned into EMR.   Statement Selected

## 2025-02-27 ENCOUNTER — APPOINTMENT (OUTPATIENT)
Dept: GENERAL RADIOLOGY | Age: 60
DRG: 194 | End: 2025-02-27
Payer: MEDICAID

## 2025-02-27 LAB
ALBUMIN SERPL-MCNC: 3.6 G/DL (ref 3.4–5)
ANION GAP SERPL CALCULATED.3IONS-SCNC: 12 MMOL/L (ref 3–16)
BASOPHILS # BLD: 0.1 K/UL (ref 0–0.2)
BASOPHILS NFR BLD: 0.8 %
BUN SERPL-MCNC: 43 MG/DL (ref 7–20)
CALCIUM SERPL-MCNC: 9.5 MG/DL (ref 8.3–10.6)
CHLORIDE SERPL-SCNC: 98 MMOL/L (ref 99–110)
CO2 SERPL-SCNC: 25 MMOL/L (ref 21–32)
CREAT SERPL-MCNC: 5.1 MG/DL (ref 0.6–1.1)
DEPRECATED RDW RBC AUTO: 19.4 % (ref 12.4–15.4)
EOSINOPHIL # BLD: 0.6 K/UL (ref 0–0.6)
EOSINOPHIL NFR BLD: 8.9 %
GFR SERPLBLD CREATININE-BSD FMLA CKD-EPI: 9 ML/MIN/{1.73_M2}
GLUCOSE BLD-MCNC: 113 MG/DL (ref 70–99)
GLUCOSE BLD-MCNC: 133 MG/DL (ref 70–99)
GLUCOSE BLD-MCNC: 283 MG/DL (ref 70–99)
GLUCOSE BLD-MCNC: 526 MG/DL (ref 70–99)
GLUCOSE SERPL-MCNC: 178 MG/DL (ref 70–99)
HCT VFR BLD AUTO: 31.8 % (ref 36–48)
HGB BLD-MCNC: 9.7 G/DL (ref 12–16)
LYMPHOCYTES # BLD: 1.2 K/UL (ref 1–5.1)
LYMPHOCYTES NFR BLD: 18.5 %
MAGNESIUM SERPL-MCNC: 2.42 MG/DL (ref 1.8–2.4)
MCH RBC QN AUTO: 26.7 PG (ref 26–34)
MCHC RBC AUTO-ENTMCNC: 30.5 G/DL (ref 31–36)
MCV RBC AUTO: 87.5 FL (ref 80–100)
MONOCYTES # BLD: 0.6 K/UL (ref 0–1.3)
MONOCYTES NFR BLD: 9.9 %
NEUTROPHILS # BLD: 4 K/UL (ref 1.7–7.7)
NEUTROPHILS NFR BLD: 61.9 %
PERFORMED ON: ABNORMAL
PHOSPHATE SERPL-MCNC: 4 MG/DL (ref 2.5–4.9)
PLATELET # BLD AUTO: 185 K/UL (ref 135–450)
PMV BLD AUTO: 6.8 FL (ref 5–10.5)
POTASSIUM SERPL-SCNC: 4.8 MMOL/L (ref 3.5–5.1)
RBC # BLD AUTO: 3.63 M/UL (ref 4–5.2)
SODIUM SERPL-SCNC: 135 MMOL/L (ref 136–145)
WBC # BLD AUTO: 6.4 K/UL (ref 4–11)

## 2025-02-27 PROCEDURE — 71046 X-RAY EXAM CHEST 2 VIEWS: CPT

## 2025-02-27 PROCEDURE — 97530 THERAPEUTIC ACTIVITIES: CPT

## 2025-02-27 PROCEDURE — 2500000003 HC RX 250 WO HCPCS: Performed by: NURSE PRACTITIONER

## 2025-02-27 PROCEDURE — 97110 THERAPEUTIC EXERCISES: CPT

## 2025-02-27 PROCEDURE — 6370000000 HC RX 637 (ALT 250 FOR IP): Performed by: NURSE PRACTITIONER

## 2025-02-27 PROCEDURE — 90935 HEMODIALYSIS ONE EVALUATION: CPT

## 2025-02-27 PROCEDURE — 6360000002 HC RX W HCPCS: Performed by: NURSE PRACTITIONER

## 2025-02-27 PROCEDURE — 83735 ASSAY OF MAGNESIUM: CPT

## 2025-02-27 PROCEDURE — 80069 RENAL FUNCTION PANEL: CPT

## 2025-02-27 PROCEDURE — 97116 GAIT TRAINING THERAPY: CPT

## 2025-02-27 PROCEDURE — 36415 COLL VENOUS BLD VENIPUNCTURE: CPT

## 2025-02-27 PROCEDURE — 6360000002 HC RX W HCPCS

## 2025-02-27 PROCEDURE — 1200000000 HC SEMI PRIVATE

## 2025-02-27 PROCEDURE — 97535 SELF CARE MNGMENT TRAINING: CPT

## 2025-02-27 PROCEDURE — 6370000000 HC RX 637 (ALT 250 FOR IP): Performed by: INTERNAL MEDICINE

## 2025-02-27 PROCEDURE — 85025 COMPLETE CBC W/AUTO DIFF WBC: CPT

## 2025-02-27 PROCEDURE — 99222 1ST HOSP IP/OBS MODERATE 55: CPT | Performed by: INTERNAL MEDICINE

## 2025-02-27 RX ORDER — MECOBALAMIN 5000 MCG
10 TABLET,DISINTEGRATING ORAL NIGHTLY
Status: DISCONTINUED | OUTPATIENT
Start: 2025-02-27 | End: 2025-03-03 | Stop reason: HOSPADM

## 2025-02-27 RX ADMIN — TORSEMIDE 100 MG: 100 TABLET ORAL at 08:00

## 2025-02-27 RX ADMIN — PANTOPRAZOLE SODIUM 40 MG: 40 TABLET, DELAYED RELEASE ORAL at 08:00

## 2025-02-27 RX ADMIN — CLOPIDOGREL BISULFATE 75 MG: 75 TABLET ORAL at 08:00

## 2025-02-27 RX ADMIN — HYDROCODONE BITARTRATE AND ACETAMINOPHEN 1 TABLET: 5; 325 TABLET ORAL at 20:42

## 2025-02-27 RX ADMIN — Medication 10 MG: at 20:41

## 2025-02-27 RX ADMIN — SODIUM CHLORIDE, PRESERVATIVE FREE 10 ML: 5 INJECTION INTRAVENOUS at 08:01

## 2025-02-27 RX ADMIN — INSULIN LISPRO 4 UNITS: 100 INJECTION, SOLUTION INTRAVENOUS; SUBCUTANEOUS at 20:51

## 2025-02-27 RX ADMIN — NIFEDIPINE 60 MG: 60 TABLET, EXTENDED RELEASE ORAL at 08:00

## 2025-02-27 RX ADMIN — SODIUM CHLORIDE, PRESERVATIVE FREE 10 ML: 5 INJECTION INTRAVENOUS at 20:34

## 2025-02-27 RX ADMIN — GABAPENTIN 200 MG: 100 CAPSULE ORAL at 20:41

## 2025-02-27 RX ADMIN — HEPARIN SODIUM 5000 UNITS: 5000 INJECTION INTRAVENOUS; SUBCUTANEOUS at 20:34

## 2025-02-27 RX ADMIN — HEPARIN SODIUM 5000 UNITS: 5000 INJECTION INTRAVENOUS; SUBCUTANEOUS at 16:33

## 2025-02-27 RX ADMIN — GABAPENTIN 100 MG: 100 CAPSULE ORAL at 08:00

## 2025-02-27 RX ADMIN — SEVELAMER CARBONATE 800 MG: 800 TABLET, FILM COATED ORAL at 16:33

## 2025-02-27 RX ADMIN — ATORVASTATIN CALCIUM 80 MG: 80 TABLET, FILM COATED ORAL at 20:42

## 2025-02-27 RX ADMIN — ARIPIPRAZOLE 10 MG: 5 TABLET ORAL at 07:59

## 2025-02-27 RX ADMIN — SEVELAMER CARBONATE 800 MG: 800 TABLET, FILM COATED ORAL at 11:55

## 2025-02-27 RX ADMIN — MIRTAZAPINE 15 MG: 15 TABLET, FILM COATED ORAL at 20:42

## 2025-02-27 RX ADMIN — HEPARIN SODIUM 5000 UNITS: 5000 INJECTION INTRAVENOUS; SUBCUTANEOUS at 05:09

## 2025-02-27 RX ADMIN — LOSARTAN POTASSIUM 25 MG: 25 TABLET, FILM COATED ORAL at 20:42

## 2025-02-27 RX ADMIN — EPOETIN ALFA-EPBX 10000 UNITS: 10000 INJECTION, SOLUTION INTRAVENOUS; SUBCUTANEOUS at 18:55

## 2025-02-27 RX ADMIN — BENZTROPINE MESYLATE 0.5 MG: 1 TABLET ORAL at 20:43

## 2025-02-27 RX ADMIN — SEVELAMER CARBONATE 800 MG: 800 TABLET, FILM COATED ORAL at 08:00

## 2025-02-27 RX ADMIN — BUPROPION HYDROCHLORIDE 150 MG: 150 TABLET, FILM COATED, EXTENDED RELEASE ORAL at 08:00

## 2025-02-27 ASSESSMENT — PAIN DESCRIPTION - DESCRIPTORS
DESCRIPTORS: ACHING
DESCRIPTORS: ACHING

## 2025-02-27 ASSESSMENT — PAIN DESCRIPTION - LOCATION
LOCATION: LEG
LOCATION: LEG

## 2025-02-27 ASSESSMENT — PAIN DESCRIPTION - FREQUENCY
FREQUENCY: INTERMITTENT
FREQUENCY: INTERMITTENT

## 2025-02-27 ASSESSMENT — PAIN DESCRIPTION - ONSET
ONSET: ON-GOING
ONSET: ON-GOING

## 2025-02-27 ASSESSMENT — PAIN DESCRIPTION - ORIENTATION
ORIENTATION: LEFT
ORIENTATION: LEFT

## 2025-02-27 ASSESSMENT — PAIN DESCRIPTION - PAIN TYPE
TYPE: ACUTE PAIN
TYPE: ACUTE PAIN

## 2025-02-27 ASSESSMENT — PAIN SCALES - GENERAL
PAINLEVEL_OUTOF10: 9
PAINLEVEL_OUTOF10: 9
PAINLEVEL_OUTOF10: 0
PAINLEVEL_OUTOF10: 5
PAINLEVEL_OUTOF10: 3

## 2025-02-27 NOTE — DIALYSIS
Treatment time: 2 hours  Net UF: 1000 ml     Pre weight: 57 kg  Post weight:56 kg  EDW: 56 kg    Access used: R TDC    Access function: Well with  ml/min     Medications or blood products given: Retacrit     Regular outpatient schedule: MICHAEL Yuen     Summary of response to treatment: Patient tolerated treatment well and without any complications. Patient remained stable throughout entire treatment and upon the exiting the dialysis suite via transport.     Report given to Olya Perez RN and copy of dialysis treatment record placed in chart, to be scanned into EMR.

## 2025-02-27 NOTE — CONSULTS
Ph: [phone], Fax: [phone]           [facility]               Reason for admission:                 Shortness of breath and admitted to ICU with flash pulmonary edema.    Brief Summary :     Cindi Rodriguez is being seen by nephrology for ESRD on hemodialysis.      Interval History and plan:      Blood pressure is elevated.    Plan:    Resume antihypertensives and oral medication when she is able to take them.  She had urgent hemodialysis this morning.  I will arrange for full dialysis treatment as she gets dialysis on Tuesday, Thursday and Saturday.  Will continue Procrit with dialysis.  Daily renal panel.                   Assessment :       1.  ESRD:  Will plan HD per schedule.  She gets dialysis on Tuesday, Thursday and Saturday at [facility].  Total dialysis time is to 40 minutes with QB of 350 and QD of 700  Access: Hemodialysis catheter on right subclavian.  Daily weights: 65 kg is estimated dry weight.  Fluid restriction: 1 L  Nephrocap 1 tab PO daily    2.  Anemia:  Erythropoetin dose: She gets EPO for anemia.  Will continue Retacrit with dialysis to keep hemoglobin between 10-11.    3.  Osteodystrophy:  Phosphate Binder: She is on Renvela 2 tab p.o. 3 times daily with meals.    4.  She is on sodium thiosulfate 3 times a week for calciphylaxis.    5.  She is on torsemide 100 mg daily for history of hypertension fluid overload.  She will continue with losartan, carvedilol.    Echocardiogram in the past showed EF of 2025%.             [facility] would like to thank Paulina Barnes MD   for opportunity to serve this patient      Please call with questions at-   24 Hrs Answering service [phone] or  7 am- 5 pm via Perfect [employer] or cell phone  Dr.Muhammad Salvador Castaneda MD       HPI :     Cindi Rodriguez is a 59 y.o. female presented to   the hospital on 2/20/2025 with pulm edema with shortness of breath.    She has past medical history of ESRD on hemodialysis at 
Clinical Pharmacy Progress Note  Medication History     Admit Date: 2/20/2025    Pharmacy consulted to verify home medication list by Dr Marques.    List of of current medications patient is taking is complete. Home Medication list in EPIC updated to reflect changes noted below.    Source of information: Jimena Bedolla Edd paperwork    Changes made to medication list:   Medications removed:   APAP 500mg BID scheduled  ASA 81mg chew daily   Benzonatate   Calcitriol 0.25mcg daily   Cephalexin 500mg  Retacrit three times weekly  Hydroxyzine pamoate 50mg BID  Metoprolol ER 25mg  KCl 20mEq packet  Rosuvastatin 40mg QHS    Senna daily  Velphoro 500mg TID with meals  Medications added:   APAP prn  Albuterol MDI prn  Atorvastatin   Urea-Lactic acid cr  Lidocaine 4% patches  Diphenhydramine & EpiPen prn anaphylaxis  Gabapentin  Duoneb prn  Losartan   Melatonin   Naloxone prn  Medication doses adjusted:   Melatonin - changed from 10mg to 5mg QHS  Mirtazepine - changed from 7.5mg to 15mg QHS  Renvela - changed from one tablet TID with meals to Two tabs BID with meals  Other notes:   Will discuss with ICU team.    Current Outpatient Medications   Medication Instructions    acetaminophen (TYLENOL) 500 mg, 2 times daily    albuterol sulfate HFA (VENTOLIN HFA) 108 (90 Base) MCG/ACT inhaler 2 puffs, Inhalation, EVERY 6 HOURS PRN    ARIPiprazole (ABILIFY) 10 mg, Oral, DAILY    atorvastatin (LIPITOR) 80 mg, Oral, Nightly    B complex-vitamin C-folic acid (NEPHRO-JESUS) 1 MG tablet 1 tablet, Oral, DAILY    benztropine (COGENTIN) 0.5 mg, Oral, Nightly    buPROPion (WELLBUTRIN SR) 150 mg, Oral, EVERY MORNING    carvedilol (COREG) 3.125 mg, Oral, 2 TIMES DAILY WITH MEALS    clopidogrel (PLAVIX) 75 mg, Oral, DAILY    diphenhydrAMINE (BENADRYL) 25 mg, Oral, EVERY 4 HOURS PRN    EPINEPHrine (EPIPEN 2-CLIFTON) 0.3 mg, IntraMUSCular, PRN, Use as directed for allergic reaction    gabapentin (NEURONTIN) 100 mg, Oral, DAILY    gabapentin 
muscle as needed (anaphylaxis) Use as directed for allergic reaction      gabapentin (NEURONTIN) 100 MG capsule Take 1 capsule by mouth daily.      gabapentin (NEURONTIN) 100 MG capsule Take 2 capsules by mouth at bedtime.      ipratropium 0.5 mg-albuterol 2.5 mg (DUONEB) 0.5-2.5 (3) MG/3ML SOLN nebulizer solution Inhale 3 mLs into the lungs every 6 hours as needed for Shortness of Breath      losartan (COZAAR) 25 MG tablet Take 1 tablet by mouth at bedtime      Melatonin 5 MG CAPS Take 5 mg by mouth at bedtime      mirtazapine (REMERON) 15 MG tablet Take 1 tablet by mouth nightly      naloxone (NARCAN) 0.4 MG/ML injection Infuse 1 mL intravenously as needed      sevelamer (RENVELA) 800 MG tablet Take 2 tablets by mouth with breakfast and with evening meal      carvedilol (COREG) 3.125 MG tablet Take 1 tablet by mouth 2 times daily (with meals) 60 tablet 3    ARIPiprazole (ABILIFY) 10 MG tablet Take 1 tablet by mouth daily      B complex-vitamin C-folic acid (NEPHRO-JESUS) 1 MG tablet Take 1 tablet by mouth daily      clopidogrel (PLAVIX) 75 MG tablet Take 1 tablet by mouth daily      NIFEdipine (PROCARDIA XL) 60 MG extended release tablet Take 1 tablet by mouth in the morning and at bedtime      pantoprazole (PROTONIX) 40 MG tablet Take 1 tablet by mouth daily      valsartan (DIOVAN) 40 MG tablet Take 1 tablet by mouth daily      torsemide (DEMADEX) 100 MG tablet Take 1 tablet by mouth daily 30 tablet 1    benztropine (COGENTIN) 0.5 MG tablet Take 1 tablet by mouth at bedtime      acetaminophen (TYLENOL) 500 MG tablet Take 1 tablet by mouth in the morning and at bedtime (Patient not taking: Reported on 2/20/2025)      [DISCONTINUED] doxepin (SINEQUAN) 50 MG capsule Take 50 mg by mouth nightly One hour before bedtime         Objective    /88   Pulse 87   Temp 98.1 °F (36.7 °C) (Temporal)   Resp 17   Ht 1.727 m (5' 8\")   Wt 65.2 kg (143 lb 11.8 oz)   SpO2 94%   BMI 21.86 kg/m²     LABS:  WBC:    Lab 
following:  XR CHEST (2 VW)   Final Result   1.  Right greater than left pleural effusions and bibasilar airspace opacities with interval improvement compared to radiograph from 2/20/2025.      Electronically signed by Rene Nieves      XR CHEST PORTABLE   Final Result      Moderate pulmonary edema with improvement since 2/20/2025 at 0136 hours.      Small-moderate loculated right pleural effusion.      Cardiomegaly.      Right jugular dialysis catheter in standard position.      Atelectasis in the right lung base.      Electronically signed by Urban Russell      XR CHEST PORTABLE   Final Result      1. Severe diffuse bilateral airspace disease.      Electronically signed by Asad Quintana MD         Other diagnostic test:      PFTs:none on file      Echo:12/2024  Study Conclusions     - Left ventricle: The cavity size is normal. Wall thickness is normal.     Systolic function is severely reduced. The estimated ejection fraction is     20-25%. There is diffuse hypokinesis. Abnormal relaxation with increased     filling pressures.   - Aortic valve: There is mild regurgitation.   - Mitral valve: There is moderate to severe regurgitation.   - Left atrium: The atrium is severely dilated.   - Right ventricle: The cavity size is mildly dilated. Wall thickness is     normal. Systolic function is reduced by objective interpretation.   - Right atrium: The atrium is severely dilated.   - Atrial septum: Agitated saline contrast study at baseline or with     provocation, shows no right-to-left atrial level shunt. There is a septal     aneurysm.   - Tricuspid valve: There is severe regurgitation.   - Pulmonary arteries: Systolic pressure was moderately increased, estimated     to be 55mm Hg assuming that the right atrial pressure was 15 mmHg.   - Inferior vena cava: The IVC is dilated. Respirophasic diameter changes are     blunted (< 50%), consistent with elevated central venous pressure.   - Pericardium, extracardiac: There 
Remeron while NPO  Hold home Vistaril while NPO and in setting of prolonged QT  CAD  HTN  HLD  HFrEF  Hold home ASA while NPO  Hold home Plavix while NPO  Hold home Coreg while NPO  Hold home Toprol XL while NPO  Hold home Procardia while NPO  Hold home Crestor while NPO  Hold home Demadex while NPO  Hold home Diovan while NPO  T2DM  NPO Medium Dose Sliding Scale  Calciphylaxis  Lidocaine patch     Glycemic goal:  140-180 mg/dL  LDAs:  HD fistula, PIV x2  Infusions:  Nitroglycerin  Abx: N/A   Diet:  Diet NPO  GI PPx:  Protonix 40 mg IV qd  Bowel Regimen: N/A  DVT PPx: on UFH gtt        Code Status:  Full Code  Disposition:   Prior to admission:  From SNF  Current:  ICU  At discharge:  PARAMJIT Michael MD, PGY-1  Internal Medicine Resident  Contact via [employer]  02/20/25  8:28 AM     This patient has been staffed and discussed with Dr. Finney.     Patient seen, examined and discussed with the resident and I agree with the assessment and plan.  Briefly, this is a 59 y.o. female with ESRD, acute hypoxemic respiratory failure secondary to hypertensive emergency and flash pulmonary edema.      Recent Labs     02/20/25  0436 02/20/25  1058   PHART 7.352 7.376   EBD3JMF 54.4* 47.0*   PO2ART 48.6* 80.3     Patient reports that she is compliant with dialysis, and her diet, although she says she is trying to gain weight.  She presented yesterday because she had worsening shortness of breath through the day and then it worsened even more so abruptly and she had to come to the emergency room.  She was hypoxic on arrival, despite CPAP and was transitioned to BiPAP in the emergency department.  Chest x-ray was reviewed by me and reveals bilateral diffuse airspace disease right greater than left.  She reports only being a pound or 2 over her dry weight from dialysis on Tuesday, and does not endorse any fluid retention.  Her exam was significant for a lack of peripheral edema, but she does have 2 out of 4 systolic

## 2025-02-28 VITALS
OXYGEN SATURATION: 98 % | RESPIRATION RATE: 16 BRPM | SYSTOLIC BLOOD PRESSURE: 139 MMHG | HEART RATE: 85 BPM | BODY MASS INDEX: 18.71 KG/M2 | WEIGHT: 123.46 LBS | DIASTOLIC BLOOD PRESSURE: 80 MMHG | HEIGHT: 68 IN | TEMPERATURE: 97.8 F

## 2025-02-28 PROBLEM — J81.0 FLASH PULMONARY EDEMA (HCC): Status: RESOLVED | Noted: 2025-02-20 | Resolved: 2025-02-28

## 2025-02-28 PROBLEM — J81.0 PULMONARY EDEMA, ACUTE (HCC): Status: RESOLVED | Noted: 2025-02-20 | Resolved: 2025-02-28

## 2025-02-28 LAB
ALBUMIN SERPL-MCNC: 3.5 G/DL (ref 3.4–5)
ANION GAP SERPL CALCULATED.3IONS-SCNC: 9 MMOL/L (ref 3–16)
BASOPHILS # BLD: 0.1 K/UL (ref 0–0.2)
BASOPHILS NFR BLD: 0.9 %
BUN SERPL-MCNC: 34 MG/DL (ref 7–20)
CALCIUM SERPL-MCNC: 9.4 MG/DL (ref 8.3–10.6)
CHLORIDE SERPL-SCNC: 98 MMOL/L (ref 99–110)
CO2 SERPL-SCNC: 26 MMOL/L (ref 21–32)
CREAT SERPL-MCNC: 4.3 MG/DL (ref 0.6–1.1)
DEPRECATED RDW RBC AUTO: 19.1 % (ref 12.4–15.4)
EOSINOPHIL # BLD: 0.6 K/UL (ref 0–0.6)
EOSINOPHIL NFR BLD: 9.8 %
GFR SERPLBLD CREATININE-BSD FMLA CKD-EPI: 11 ML/MIN/{1.73_M2}
GLUCOSE BLD-MCNC: 219 MG/DL (ref 70–99)
GLUCOSE SERPL-MCNC: 146 MG/DL (ref 70–99)
HCT VFR BLD AUTO: 30 % (ref 36–48)
HGB BLD-MCNC: 9.2 G/DL (ref 12–16)
LYMPHOCYTES # BLD: 1.2 K/UL (ref 1–5.1)
LYMPHOCYTES NFR BLD: 18.5 %
MAGNESIUM SERPL-MCNC: 2.33 MG/DL (ref 1.8–2.4)
MCH RBC QN AUTO: 26.8 PG (ref 26–34)
MCHC RBC AUTO-ENTMCNC: 30.8 G/DL (ref 31–36)
MCV RBC AUTO: 86.8 FL (ref 80–100)
MONOCYTES # BLD: 0.8 K/UL (ref 0–1.3)
MONOCYTES NFR BLD: 13.1 %
NEUTROPHILS # BLD: 3.6 K/UL (ref 1.7–7.7)
NEUTROPHILS NFR BLD: 57.7 %
PERFORMED ON: ABNORMAL
PHOSPHATE SERPL-MCNC: 3.2 MG/DL (ref 2.5–4.9)
PLATELET # BLD AUTO: 169 K/UL (ref 135–450)
PMV BLD AUTO: 7.1 FL (ref 5–10.5)
POTASSIUM SERPL-SCNC: 4.5 MMOL/L (ref 3.5–5.1)
RBC # BLD AUTO: 3.45 M/UL (ref 4–5.2)
SODIUM SERPL-SCNC: 133 MMOL/L (ref 136–145)
WBC # BLD AUTO: 6.2 K/UL (ref 4–11)

## 2025-02-28 PROCEDURE — 85025 COMPLETE CBC W/AUTO DIFF WBC: CPT

## 2025-02-28 PROCEDURE — 80069 RENAL FUNCTION PANEL: CPT

## 2025-02-28 PROCEDURE — 1200000000 HC SEMI PRIVATE

## 2025-02-28 PROCEDURE — 83735 ASSAY OF MAGNESIUM: CPT

## 2025-02-28 PROCEDURE — 2500000003 HC RX 250 WO HCPCS: Performed by: NURSE PRACTITIONER

## 2025-02-28 PROCEDURE — 36415 COLL VENOUS BLD VENIPUNCTURE: CPT

## 2025-02-28 PROCEDURE — 6360000002 HC RX W HCPCS: Performed by: NURSE PRACTITIONER

## 2025-02-28 PROCEDURE — 6370000000 HC RX 637 (ALT 250 FOR IP): Performed by: NURSE PRACTITIONER

## 2025-02-28 RX ORDER — MELATONIN 10 MG
10 CAPSULE ORAL NIGHTLY
DISCHARGE
Start: 2025-02-28

## 2025-02-28 RX ORDER — SEVELAMER CARBONATE 800 MG/1
2 TABLET, FILM COATED ORAL 3 TIMES DAILY
DISCHARGE
Start: 2025-02-28

## 2025-02-28 RX ADMIN — GABAPENTIN 100 MG: 100 CAPSULE ORAL at 09:16

## 2025-02-28 RX ADMIN — HYDROCODONE BITARTRATE AND ACETAMINOPHEN 1 TABLET: 5; 325 TABLET ORAL at 05:09

## 2025-02-28 RX ADMIN — TORSEMIDE 100 MG: 100 TABLET ORAL at 09:16

## 2025-02-28 RX ADMIN — BUPROPION HYDROCHLORIDE 150 MG: 150 TABLET, FILM COATED, EXTENDED RELEASE ORAL at 09:16

## 2025-02-28 RX ADMIN — ACETAMINOPHEN 650 MG: 325 TABLET ORAL at 09:15

## 2025-02-28 RX ADMIN — CLOPIDOGREL BISULFATE 75 MG: 75 TABLET ORAL at 09:16

## 2025-02-28 RX ADMIN — SEVELAMER CARBONATE 800 MG: 800 TABLET, FILM COATED ORAL at 09:16

## 2025-02-28 RX ADMIN — ARIPIPRAZOLE 10 MG: 5 TABLET ORAL at 09:16

## 2025-02-28 RX ADMIN — SODIUM CHLORIDE, PRESERVATIVE FREE 10 ML: 5 INJECTION INTRAVENOUS at 09:16

## 2025-02-28 RX ADMIN — NIFEDIPINE 60 MG: 60 TABLET, EXTENDED RELEASE ORAL at 09:15

## 2025-02-28 RX ADMIN — HEPARIN SODIUM 5000 UNITS: 5000 INJECTION INTRAVENOUS; SUBCUTANEOUS at 05:04

## 2025-02-28 RX ADMIN — PANTOPRAZOLE SODIUM 40 MG: 40 TABLET, DELAYED RELEASE ORAL at 09:15

## 2025-02-28 ASSESSMENT — PAIN DESCRIPTION - PAIN TYPE
TYPE: ACUTE PAIN
TYPE: ACUTE PAIN

## 2025-02-28 ASSESSMENT — PAIN SCALES - GENERAL
PAINLEVEL_OUTOF10: 0
PAINLEVEL_OUTOF10: 0
PAINLEVEL_OUTOF10: 9
PAINLEVEL_OUTOF10: 10
PAINLEVEL_OUTOF10: 9
PAINLEVEL_OUTOF10: 8

## 2025-02-28 ASSESSMENT — PAIN DESCRIPTION - ORIENTATION
ORIENTATION: RIGHT

## 2025-02-28 ASSESSMENT — PAIN DESCRIPTION - LOCATION
LOCATION: SHOULDER
LOCATION: ARM
LOCATION: SHOULDER

## 2025-02-28 ASSESSMENT — PAIN DESCRIPTION - DESCRIPTORS
DESCRIPTORS: ACHING
DESCRIPTORS: ACHING

## 2025-02-28 ASSESSMENT — PAIN DESCRIPTION - FREQUENCY
FREQUENCY: INTERMITTENT
FREQUENCY: INTERMITTENT

## 2025-02-28 ASSESSMENT — PAIN DESCRIPTION - ONSET
ONSET: ON-GOING
ONSET: ON-GOING

## 2025-02-28 NOTE — DISCHARGE INSTR - COC
Information    [phone]   [phone]          / signature: Electronically signed by CLAUDIO Carlisle, LSW on 2/28/25 at 12:23 PM EST    PHYSICIAN SECTION    Prognosis: Fair    Condition at Discharge: Stable    Rehab Potential (if transferring to Rehab): Good    Recommended Labs or Other Treatments After Discharge:     - as needed for dialysis or at dialysis   - not discharging on opiate medication as requirement is too low       Physician Certification: I certify the above information and transfer of Cindi Rodriguez  is necessary for the continuing treatment of the diagnosis listed and that she requires Skilled Nursing Facility/LTC for greater 30 days.     Update Admission H&P: No change in H&P    PHYSICIAN SIGNATURE:  Electronically signed by Deandre Hernandez MD on 2/28/25 at 10:30 AM EST

## 2025-02-28 NOTE — DISCHARGE SUMMARY
however still has significant shortness of breath and crackles - true dry weight can be consider her current weight at discharge which is 56kg (123lbs).  Patient did undergo daily dialysis to receive this.  She tolerated it well.  Pulmonology team were consulted and due to concern for persistent hypoxia.  Patient does have heart failure reduced ejection fraction which is likely provoking pulmonary edema despite aggressive diuresis.  More clearance of lung field may not be achievable.  Patient will weaned off of oxygen completely by the time of discharge.  Discussed with nephrology team who agreed to discharge plan and continuing outpatient dialysis Tuesday Thursday and Saturday.     Acute on chronic systolic CHF, CAD status post complex PCI  HFrEF 20-25%  Continue GDMT as tolerated and ordered.  Currently on beta-blocker and ARB.  Titrate dose as tolerated. Outpatient follow-up with cardiology as planned. Continue aspirin, Plavix and statin.     Hypertensive emergency, resolved  -Blood pressures improved with oral medications, ultrafiltered.  Currently acceptable and further adjustment as per nephrology.     COPD  -Not in exacerbation.     SIRS  -Low suspicion for infection at this time.  Likely reactive in the setting of above severe problems.  Continue without antibiotics     Type 2 diabetes mellitus  -Managed with sliding scale insulin.  Blood glucose checks and hypoglycemia protocol.        Discharge Exam  /80   Pulse 85   Temp 97.8 °F (36.6 °C) (Oral)   Resp 16   Ht 1.727 m (5' 8\")   Wt 56 kg (123 lb 7.3 oz)   SpO2 98%   BMI 18.77 kg/m²     Physical Exam  Constitutional:       General: She is not in acute distress.     Appearance: She is well-developed.   HENT:      Head: Normocephalic and atraumatic.      Right Ear: External ear normal.      Left Ear: External ear normal.      Nose: Nose normal.   Eyes:      General: No scleral icterus.        Right eye: No discharge.         Left eye: No

## 2025-02-28 NOTE — CARE COORDINATION
CM following: CM met with pt at bedside. Information reported by pt to CM indicative of pt being a poor historian.     Pt reports she has been to [facility] in the past and [facility] reports they are unable to accept the pt back.    CM able to verify that the pt is from [facility] at [place] for LTC and that she does not need a precert to return per Lizeth at [facility]. [facility] is providing transportation to pt's HD appointments at [place] in [place] and Lizeth reports they will continue to do so for pt's T/TH/S schedule. Lizeth reports pt does have and utilizes smoking privileges at [facility].    Pt will need medical transport at discharge. CM to reach out to pt's son at discharge to update on plan.  Electronically signed by CLAUDIO Ribeiro on 2/24/2025 at 12:26 PM  [phone]    
Pt is from LTC at [facility] and can return at [place] without pre-cert. [facility] is providing transportation to pt's HD appointments at [facility] in [place] and Lizeth reports they will continue to do so for pt's T/TH/S schedule    SW following.   Electronically signed by CLAUDIO Carlisle, YSABEL, LUCINDA on 2/26/2025 at 3:59 PM  [phone]    
Pt is from LTC at [facility] and can return at [place] without pre-cert. [facility] is providing transportation to pt's HD appointments at [place] in [place] and Lizeth reports they will continue to do so for pt's T/TH/S schedule Electronically signed by Cydney Cole RN on 2/27/2025 at 12:40 PM    
Pt is from LTC at [facility] and can return at [place] without pre-cert. [facility] is providing transportation to pt's HD appointments at [facility] in [place] and Lizeth reports they will continue to do so for pt's T/TH/S schedule    SW following.   Electronically signed by CLAUDIO Carlisle, YSABEL, LUCINDA on 2/25/2025 at 3:40 PM  [phone]    
Result:    Lab Results   Component Value Date/Time    COVID19 Not Detected 06/08/2023 07:16 AM       The Plan for Transition of Care is related to the following treatment goals of Flash pulmonary edema (HCC) [J81.0]  Pulmonary edema, acute (HCC) [J81.0]    The Patient and/or patient representative Cindi and her family were provided with a choice of provider and agrees with the discharge plan Yes    Freedom of choice list was provided with basic dialogue that supports the patient's individualized plan of care/goals and shares the quality data associated with the providers. Yes    Care Transitions patient: No    Ana Gillis, CLAUDIO, LSW  [facility]  Case Management Department  Ph: [phone]      
[J81.0]    IF APPLICABLE: The Patient and/or patient representative [place] and her family were provided with a choice of provider and agrees with the discharge plan. Freedom of choice list with basic dialogue that supports the patient's individualized plan of care/goals and shares the quality data associated with the providers was provided to: Patient   Patient Representative Name:       The Patient and/or Patient Representative Agree with the Discharge Plan? Yes    Thank you  Cat Pedro HAND, BSN,   ICU   [phone]

## 2025-02-28 NOTE — PLAN OF CARE
Problem: Chronic Conditions and Co-morbidities  Goal: Patient's chronic conditions and co-morbidity symptoms are monitored and maintained or improved  Outcome: Progressing     Problem: Discharge Planning  Goal: Discharge to home or other facility with appropriate resources  2/22/2025 2139 by Margarita Dubon, RN  Outcome: Progressing     
  Problem: Chronic Conditions and Co-morbidities  Goal: Patient's chronic conditions and co-morbidity symptoms are monitored and maintained or improved  Outcome: Progressing     Problem: Discharge Planning  Goal: Discharge to home or other facility with appropriate resources  Outcome: Progressing     Problem: Skin/Tissue Integrity  Goal: Skin integrity remains intact  Description: 1.  Monitor for areas of redness and/or skin breakdown  2.  Assess vascular access sites hourly  3.  Every 4-6 hours minimum:  Change oxygen saturation probe site  4.  Every 4-6 hours:  If on nasal continuous positive airway pressure, respiratory therapy assess nares and determine need for appliance change or resting period  Outcome: Progressing     Problem: Safety - Adult  Goal: Free from fall injury  Outcome: Progressing     Problem: ABCDS Injury Assessment  Goal: Absence of physical injury  Outcome: Progressing     
  Problem: Chronic Conditions and Co-morbidities  Goal: Patient's chronic conditions and co-morbidity symptoms are monitored and maintained or improved  Outcome: Progressing     Problem: Discharge Planning  Goal: Discharge to home or other facility with appropriate resources  Outcome: Progressing     Problem: Skin/Tissue Integrity  Goal: Skin integrity remains intact  Description: 1.  Monitor for areas of redness and/or skin breakdown  2.  Assess vascular access sites hourly  3.  Every 4-6 hours minimum:  Change oxygen saturation probe site  4.  Every 4-6 hours:  If on nasal continuous positive airway pressure, respiratory therapy assess nares and determine need for appliance change or resting period  Outcome: Progressing  Flowsheets  Taken 2/25/2025 0012  Skin Integrity Remains Intact:   Monitor for areas of redness and/or skin breakdown   Every 4-6 hours minimum: Change oxygen saturation probe site   Assess vascular access sites hourly   Every 4-6 hours: If on nasal continuous positive airway pressure, respiratory therapy assesses nares and determine need for appliance change or resting period  Taken 2/24/2025 3829  Skin Integrity Remains Intact:   Monitor for areas of redness and/or skin breakdown   Every 4-6 hours minimum: Change oxygen saturation probe site   Assess vascular access sites hourly   Every 4-6 hours: If on nasal continuous positive airway pressure, respiratory therapy assesses nares and determine need for appliance change or resting period     Problem: Safety - Adult  Goal: Free from fall injury  Outcome: Progressing     Problem: ABCDS Injury Assessment  Goal: Absence of physical injury  Outcome: Progressing     Problem: Pain  Goal: Verbalizes/displays adequate comfort level or baseline comfort level  Outcome: Progressing     
  Problem: Chronic Conditions and Co-morbidities  Goal: Patient's chronic conditions and co-morbidity symptoms are monitored and maintained or improved  Outcome: Progressing     Problem: Discharge Planning  Goal: Discharge to home or other facility with appropriate resources  Outcome: Progressing     Problem: Skin/Tissue Integrity  Goal: Skin integrity remains intact  Description: 1.  Monitor for areas of redness and/or skin breakdown  2.  Assess vascular access sites hourly  3.  Every 4-6 hours minimum:  Change oxygen saturation probe site  4.  Every 4-6 hours:  If on nasal continuous positive airway pressure, respiratory therapy assess nares and determine need for appliance change or resting period  Outcome: Progressing  Flowsheets (Taken 2/20/2025 1909)  Skin Integrity Remains Intact:   Monitor for areas of redness and/or skin breakdown   Assess vascular access sites hourly   Every 4-6 hours minimum: Change oxygen saturation probe site   Every 4-6 hours: If on nasal continuous positive airway pressure, respiratory therapy assesses nares and determine need for appliance change or resting period     Problem: Safety - Adult  Goal: Free from fall injury  Outcome: Progressing  Flowsheets (Taken 2/20/2025 1909)  Free From Fall Injury:   Instruct family/caregiver on patient safety   Based on caregiver fall risk screen, instruct family/caregiver to ask for assistance with transferring infant if caregiver noted to have fall risk factors     Problem: ABCDS Injury Assessment  Goal: Absence of physical injury  Outcome: Progressing  Flowsheets (Taken 2/20/2025 1909)  Absence of Physical Injury: Implement safety measures based on patient assessment     
  Problem: Discharge Planning  Goal: Discharge to home or other facility with appropriate resources  2/23/2025 1032 by Verito Marie, RN  Flowsheets (Taken 2/22/2025 1505)  Discharge to home or other facility with appropriate resources:   Identify barriers to discharge with patient and caregiver   Arrange for needed discharge resources and transportation as appropriate  2/22/2025 2139 by Margarita Dubon, RN  Outcome: Progressing     
  Problem: Discharge Planning  Goal: Discharge to home or other facility with appropriate resources  2/24/2025 1001 by Zuri Badillo, RN  Outcome: Progressing  Pt involved in discharge planning. Barriers to discharge discussed with pt. Discharge needs identified.      Problem: Safety - Adult  Goal: Free from fall injury  2/24/2025 1001 by Zuri Badillo, RN  Outcome: Progressing  All fall precautions in place. Bed locked and in lowest position with alarm on. Overbed table and personal belonings within reach. Call light within reach and patient instructed to use call light for assistance. Non-skid socks on.      Problem: Pain  Goal: Verbalizes/displays adequate comfort level or baseline comfort level  2/24/2025 1001 by Zuri Badillo RN  Outcome: Progressing  Pt endorsing pain to left leg. Being treated with PRN pain medication, rest, and frequent repositioning with pillow support for comfort and pressure relief. Pt reports some relief from pain with above interventions.           
  Problem: Discharge Planning  Goal: Discharge to home or other facility with appropriate resources  Flowsheets (Taken 2/22/2025 6701)  Discharge to home or other facility with appropriate resources:   Identify barriers to discharge with patient and caregiver   Arrange for needed discharge resources and transportation as appropriate     
  Problem: Safety - Adult  Goal: Free from fall injury  Outcome: Progressing   Patient has remained free of falls. 2/4 bed rails up, bed locked and in lowest position, call light within reach. Patient instructed on use of call light and uses appropriately. Bed alarm on. Non-skid footwear and fall band on.       Problem: Pain  Goal: Verbalizes/displays adequate comfort level or baseline comfort level  Outcome: Progressing   Patient denies pain at this time.   
Pt free from falls this shift. Fall precautions in place at all times. Call light always withinreach. Pt able and agreeable to contact for safety appropriately. Bed alarm on.    Pain/discomfort being managed with PRN analgesics per MD orders. Pt able to express presence and absence of pain and rate pain appropriately using numerical scale.    
based on patient assessment     Problem: Pain  Goal: Verbalizes/displays adequate comfort level or baseline comfort level  Outcome: Progressing  Flowsheets (Taken 2/23/2025 2317)  Verbalizes/displays adequate comfort level or baseline comfort level:   Encourage patient to monitor pain and request assistance   Assess pain using appropriate pain scale   Administer analgesics based on type and severity of pain and evaluate response   Implement non-pharmacological measures as appropriate and evaluate response   Consider cultural and social influences on pain and pain management   Notify Licensed Independent Practitioner if interventions unsuccessful or patient reports new pain     
by Dwaine Gray, RN  Outcome: Progressing  2/20/2025 1911 by Megan Peres, RN  Outcome: Progressing  Flowsheets (Taken 2/20/2025 1909)  Absence of Physical Injury: Implement safety measures based on patient assessment     
determine need for appliance change or resting period     Problem: Safety - Adult  Goal: Free from fall injury  2/21/2025 0824 by Parisa Petty, RN  Outcome: Progressing  2/20/2025 1933 by Dwaine Gray, RN  Outcome: Progressing  2/20/2025 1911 by Megan Peres, RN  Outcome: Progressing  Flowsheets (Taken 2/20/2025 1909)  Free From Fall Injury:   Instruct family/caregiver on patient safety   Based on caregiver fall risk screen, instruct family/caregiver to ask for assistance with transferring infant if caregiver noted to have fall risk factors     Problem: ABCDS Injury Assessment  Goal: Absence of physical injury  2/21/2025 0824 by Parisa Petty, RN  Outcome: Progressing  2/20/2025 1933 by Dwaine Gray, RN  Outcome: Progressing  2/20/2025 1911 by Megan Peres, RN  Outcome: Progressing  Flowsheets (Taken 2/20/2025 1909)  Absence of Physical Injury: Implement safety measures based on patient assessment     Problem: Pain  Goal: Verbalizes/displays adequate comfort level or baseline comfort level  Outcome: Progressing

## 2025-02-28 NOTE — PROGRESS NOTES
Ph: [phone], Fax: [phone]           [facility]               Reason for admission:                 Shortness of breath and admitted to ICU with flash pulmonary edema.    Brief Summary :     Cindi Rodriguez is being seen by nephrology for ESRD on hemodialysis.      Interval History and plan:     Patient seen at bedside  BP is relatively stable   On  3 l pm oxygen  UF yesterday with 3 liters removal    Labs are pending      HD per schedule TTS. Next HD is today . We brought DW down to 56.6 from 65 kg   Continue sodium thiosulfate with HD  Will continue Procrit with dialysis.  Continue current antihypertensives   Daily renal panel  Dialysis today                        Assessment :       1.  ESRD:  Will plan HD per schedule.  She gets dialysis on Tuesday, Thursday and Saturday at [facility].  Total dialysis time is to 40 minutes with QB of 350 and QD of 700  Access: Hemodialysis catheter on right subclavian.  Daily weights: 65 kg is estimated dry weight.  Fluid restriction: 1 L  Nephrocap 1 tab PO daily    2.  Anemia:  Erythropoetin dose: She gets EPO for anemia.  Will continue Retacrit with dialysis to keep hemoglobin between 10-11.    3.  Osteodystrophy:  Phosphate Binder: She is on Renvela 2 tab p.o. 3 times daily with meals.    4.  She is on sodium thiosulfate 3 times a week for calciphylaxis.    5.  She is on torsemide 100 mg daily for history of hypertension fluid overload.  She will continue with losartan, carvedilol.    Echocardiogram in the past showed EF of 2025%.             [facility] would like to thank Deandre Hernandez MD   for opportunity to serve this patient      Please call with questions at-   24 Hrs Answering service [phone] or  7 am- 5 pm via Perfect serve or cell phone  Dr.Muhammad Salvador Castaneda MD       HPI :     Cindi Rodriguez is a 59 y.o. female presented to   the hospital on 2/20/2025 with pulm edema with shortness of breath.    She has past 
       Ph: [phone], Fax: [phone]           [facility]               Reason for admission:                 Shortness of breath and admitted to ICU with flash pulmonary edema.    Brief Summary :     Cindi Rodriguez is being seen by nephrology for ESRD on hemodialysis.      Interval History and plan:   Patient seen at bedside  Comfortable  /70  On  4 lpm oxygen  Lytes stable.   Hb 7.7            HD today per schedule TTS.  Will continue Procrit with dialysis.  Daily renal panel      D/W [facility] nurse                       Assessment :       1.  ESRD:  Will plan HD per schedule.  She gets dialysis on Tuesday, Thursday and Saturday at [facility].  Total dialysis time is to 40 minutes with QB of 350 and QD of 700  Access: Hemodialysis catheter on right subclavian.  Daily weights: 65 kg is estimated dry weight.  Fluid restriction: 1 L  Nephrocap 1 tab PO daily    2.  Anemia:  Erythropoetin dose: She gets EPO for anemia.  Will continue Retacrit with dialysis to keep hemoglobin between 10-11.    3.  Osteodystrophy:  Phosphate Binder: She is on Renvela 2 tab p.o. 3 times daily with meals.    4.  She is on sodium thiosulfate 3 times a week for calciphylaxis.    5.  She is on torsemide 100 mg daily for history of hypertension fluid overload.  She will continue with losartan, carvedilol.    Echocardiogram in the past showed EF of 2025%.             [facility] would like to thank Clifford Wayne MD   for opportunity to serve this patient      Please call with questions at-   24 Hrs Answering service [phone] or  7 am- 5 pm via Perfect serve or cell phone  Dr.Muhammad SELVIN Castaneda MD       HPI :     Cindi Rodriguez is a 59 y.o. female presented to   the hospital on 2/20/2025 with pulm edema with shortness of breath.    She has past medical history of ESRD on hemodialysis at [facility].  She gets dialysis Tuesday, Thursday and Saturday.  She also history of obstructive 
       Ph: [phone], Fax: [phone]           [facility]               Reason for admission:                 Shortness of breath and admitted to ICU with flash pulmonary edema.    Brief Summary :     Cindi Rodriguez is being seen by nephrology for ESRD on hemodialysis.      Interval History and plan:      Blood pressure is better controlled   She is off IV NTG  Labs reviewed   HD yesterday with 3 liters removed     Plan:    Resume antihypertensives.  Continue HD per schedule TTS. No acute need for dialysis today   Will continue Procrit with dialysis.  Daily renal panel.                   Assessment :       1.  ESRD:  Will plan HD per schedule.  She gets dialysis on Tuesday, Thursday and Saturday at [facility].  Total dialysis time is to 40 minutes with QB of 350 and QD of 700  Access: Hemodialysis catheter on right subclavian.  Daily weights: 65 kg is estimated dry weight.  Fluid restriction: 1 L  Nephrocap 1 tab PO daily    2.  Anemia:  Erythropoetin dose: She gets EPO for anemia.  Will continue Retacrit with dialysis to keep hemoglobin between 10-11.    3.  Osteodystrophy:  Phosphate Binder: She is on Renvela 2 tab p.o. 3 times daily with meals.    4.  She is on sodium thiosulfate 3 times a week for calciphylaxis.    5.  She is on torsemide 100 mg daily for history of hypertension fluid overload.  She will continue with losartan, carvedilol.    Echocardiogram in the past showed EF of 2025%.             [facility] would like to thank Clifford Wayne MD   for opportunity to serve this patient      Please call with questions at-   24 Hrs Answering service [phone] or  7 am- 5 pm via Perfect serve or cell phone  Dr.Muhammad Salvador Castaneda MD       HPI :     Cindi Rodriguez is a 59 y.o. female presented to   the hospital on 2/20/2025 with pulm edema with shortness of breath.    She has past medical history of ESRD on hemodialysis at [facility].  She gets dialysis Tuesday, 
   Progress Note  Admit Date: 2/20/2025           CC: F/U for dyspnea     59 y.o. female with mood disorders, ESRD on HD (TTS), T2DM, HLD, HTN, CAD s/p stents (2023), AUDRA, Asthma, COPD (no PFTs) and CVA (2019),  who presented from SNF to the ED on 2/20/25 with SOB.     She reports that she had sudden and worsening SOB in the evening. She does not recall any inciting cause. Patient did complete her previous dialysis. She endorsed chest tightness, cough, shortness of breath, feeling like her heart is racing, and light headedness. She denied any recent illness, and fever, chills, congestion, rhinorrhea, sore throat.     EMS placed her on CPAP and and administered solumedrol and breathing treatments.     ED Course:  On arrival to the ED, she was found to be ill -appearing, afebrile and hypertensive, tachycardic and in respiratory distress with oxygen saturation 79%. She was placed on BiPAP, initially saturating in the low 80s, but improved to 90%.  Labs were significant for:  Cl 95  CO2 19  BUN 26  Cr 3.9  AG 28  eGFR 13  Glucose 260  Phos 5.0  NT Pro-BNP >70,000  Troponin 256  WBC 12.8  Hgb 9.4  VBG w/ pH 7.1, pCO2 69.6  Imaging  CXR - Severe diffuse bilateral airspace disease.  While in the ED, she received:  Lasix 80 mg IV  Nitroglycerin 0.4 mg SL x2  Nitroglycerin gtt      Interval History: Doing better    HD TTS schedule.      Hemoglobin appeared lower yesterday than previous few days, daily checks ordered.  No lab results today    She is upset about the fact that she is not allowed to eat pancakes.  Appears kitchen is unable to give her pancake because it is prepared with peanut oil which she is listed as being allergic to.    Denies chest pain    No fever/chills      Review of Systems - Negative except as in HPI.     Scheduled Medications:    sodium chloride flush  5-40 mL IntraVENous 2 times per day    heparin (porcine)  5,000 Units SubCUTAneous 3 times per day    benztropine  0.5 mg Oral Nightly    buPROPion  
   Progress Note  Admit Date: 2/20/2025           CC: F/U for dyspnea     59 y.o. female with mood disorders, ESRD on HD (TTS), T2DM, HLD, HTN, CAD s/p stents (2023), AUDRA, Asthma, COPD (no PFTs) and CVA (2019),  who presented from SNF to the ED on 2/20/25 with SOB.     She reports that she had sudden and worsening SOB in the evening. She does not recall any inciting cause. Patient did complete her previous dialysis. She endorsed chest tightness, cough, shortness of breath, feeling like her heart is racing, and light headedness. She denied any recent illness, and fever, chills, congestion, rhinorrhea, sore throat.     EMS placed her on CPAP and and administered solumedrol and breathing treatments.     ED Course:  On arrival to the ED, she was found to be ill -appearing, afebrile and hypertensive, tachycardic and in respiratory distress with oxygen saturation 79%. She was placed on BiPAP, initially saturating in the low 80s, but improved to 90%.  Labs were significant for:  Cl 95  CO2 19  BUN 26  Cr 3.9  AG 28  eGFR 13  Glucose 260  Phos 5.0  NT Pro-BNP >70,000  Troponin 256  WBC 12.8  Hgb 9.4  VBG w/ pH 7.1, pCO2 69.6  Imaging  CXR - Severe diffuse bilateral airspace disease.  While in the ED, she received:  Lasix 80 mg IV  Nitroglycerin 0.4 mg SL x2  Nitroglycerin gtt    Interval History: Doing better  Sitting out in bed    Planned scheduled HD today: TTS schedule.      Denies chest pain    No fever/chills      Review of Systems - Negative except as in HPI.     Scheduled Medications:    sodium chloride flush  5-40 mL IntraVENous 2 times per day    heparin (porcine)  5,000 Units SubCUTAneous 3 times per day    benztropine  0.5 mg Oral Nightly    buPROPion  150 mg Oral Daily    carvedilol  3.125 mg Oral BID WC    clopidogrel  75 mg Oral Daily    NIFEdipine  60 mg Oral BID    pantoprazole  40 mg Oral Daily    sevelamer  800 mg Oral TID WC    torsemide  100 mg Oral Daily    ARIPiprazole  10 mg Oral Daily    insulin 
   Progress Note  Admit Date: 2/20/2025           CC: F/U for dyspnea     59 y.o. female with mood disorders, ESRD on HD (TTS), T2DM, HLD, HTN, CAD s/p stents (2023), AUDRA, Asthma, COPD (no PFTs) and CVA (2019),  who presented from SNF to the ED on 2/20/25 with SOB.     She reports that she had sudden and worsening SOB in the evening. She does not recall any inciting cause. Patient did complete her previous dialysis. She endorsed chest tightness, cough, shortness of breath, feeling like her heart is racing, and light headedness. She denied any recent illness, and fever, chills, congestion, rhinorrhea, sore throat.     EMS placed her on CPAP and and administered solumedrol and breathing treatments.     ED Course:  On arrival to the ED, she was found to be ill -appearing, afebrile and hypertensive, tachycardic and in respiratory distress with oxygen saturation 79%. She was placed on BiPAP, initially saturating in the low 80s, but improved to 90%.  Labs were significant for:  Cl 95  CO2 19  BUN 26  Cr 3.9  AG 28  eGFR 13  Glucose 260  Phos 5.0  NT Pro-BNP >70,000  Troponin 256  WBC 12.8  Hgb 9.4  VBG w/ pH 7.1, pCO2 69.6  Imaging  CXR - Severe diffuse bilateral airspace disease.  While in the ED, she received:  Lasix 80 mg IV  Nitroglycerin 0.4 mg SL x2  Nitroglycerin gtt    Interval History: Remains in ICU; on BiPAP    Review of Systems - Negative except as in HPI.     Scheduled Medications:    sodium chloride flush  5-40 mL IntraVENous 2 times per day    heparin (porcine)  5,000 Units SubCUTAneous 3 times per day    benztropine  0.5 mg Oral Nightly    buPROPion  150 mg Oral Daily    carvedilol  3.125 mg Oral BID WC    clopidogrel  75 mg Oral Daily    NIFEdipine  60 mg Oral BID    pantoprazole  40 mg Oral Daily    sevelamer  800 mg Oral TID WC    torsemide  100 mg Oral Daily    ARIPiprazole  10 mg Oral Daily    insulin lispro  0-8 Units SubCUTAneous 4 times per day    heparin (porcine)  1,000 Units IntraVENous Once in 
  Occupational Therapy Daily Treatment Note   Attempted Session   [[id number]]    Pt declined OT intervention sharing she just got back from Dialysis, just wanted to eat and relax.     Pt meal tray came, Pt requested ketchup and salt packet; Therapist provided.     Attempted intervention terminated.     YOVANA Rayo/TENZIN     
 Latest Reference Range & Units 02/20/25 10:58   pH, Arterial 7.350 - 7.450  7.376   pCO2, Arterial 35.0 - 45.0 mm Hg 47.0 (H)   pO2, Arterial 75.0 - 108.0 mm Hg 80.3   HCO3, Arterial 21.0 - 29.0 mmol/L 27.6   TCO2 (calc), Art Not Established mmol/L 29   Base Excess, Arterial -3 - 3  2   O2 Sat, Arterial 93 - 100 % 95   (H): Data is abnormally high    Obtained above ABG to determine whether patient was ready to come off BIPAP. Patient also upset that she was hungry and hasn't been able to eat. Showed results of this ABG to the residents who discussed with the respiratory therapist that it was okay to trial with nasal cannula.     Patient placed on high flow NC after saturating in the high 80s on standard NC. Currently on 10L/min, placed by the RT. Will wean as tolerated/continue to monitor.     1330: Patient eating, tolerating 10L HFNC well.   
 Pt refused lab drawn, Vitals checked, heparin shot. Dr. Jerome Parsons made aware.  
4 Eyes Admission Assessment     I agree as the admission nurse that 2 RN's have performed a thorough Head to Toe Skin Assessment on the patient. ALL assessment sites listed below have been assessed on admission.       Areas assessed by both nurses:   [x]   Head, Face, and Ears   [x]   Shoulders, Back, and Chest  [x]   Arms, Elbows, and Hands   [x]   Coccyx, Sacrum, and Ischium  [x]   Legs, Feet, and Heels    Patient has scattered scarring across BLE. Patient has noted calciphylaxis wound on LLE covered with foam dressing. Electronically signed by Audelia Allan RN on 2/21/2025 at 4:34 PM   Does the Patient have Skin Breakdown?  YES         Papa Prevention initiated:  NA   Wound Care Orders initiated:  NA      WOC nurse consulted for Pressure Injury (Stage 3,4, Unstageable, DTI, NWPT, and Complex wounds) or Papa score 18 or lower:  NA      Nurse 1 eSignature: Electronically signed by Audelia Allan RN on 2/21/25 at 4:34 PM EST    **SHARE this note so that the co-signing nurse is able to place an eSignature**    Nurse 2 eSignature: {Esignature:[id number]}   
4 Eyes Skin Assessment     NAME:  Cindi Rodriguez  YOB: 1965  MEDICAL RECORD NUMBER:  [id number]    The patient is being assessed for  Admission    I agree that at least one RN has performed a thorough Head to Toe Skin Assessment on the patient. ALL assessment sites listed below have been assessed.      Areas assessed by both nurses:    Head, Face, Ears, Shoulders, Back, Chest, Arms, Elbows, Hands, Sacrum. Buttock, Coccyx, Ischium, Legs. Feet and Heels, Under Medical Devices , and Other ***        Does the Patient have a Wound? Yes wound(s) were present on assessment. LDA wound assessment was Initiated and completed by RN       Papa Prevention initiated by RN: Yes  Wound Care Orders initiated by RN: Yes    Pressure Injury (Stage 3,4, Unstageable, DTI, NWPT, and Complex wounds) if present, place Wound referral order by RN under : Yes  Venous ulcers to back left lower leg and left upper shin     New Ostomies, if present place, Ostomy referral order under : No     Nurse 1 eSignature: Electronically signed by Megan Peres RN on 2/20/25 at 4:51 AM EST    **SHARE this note so that the co-signing nurse can place an eSignature**    Nurse 2 eSignature: {Esignature:[id number]}   
Called to bedside by critical care MD's to increase IPAP in response to latest patient ABG. Residents requested to increase pressure gradient to assist with blowing off retained CO2 and correct acidemia. Patient verbalized that she was beginning to feel better with current BiPAP settings. Patient demonstrates ability to protect her airway.      02/20/25 0221   Assessment   Pulse 85   Respirations 27   SpO2 99 %   Settings/Measurements   IPAP (S)  20 cmH20   CPAP/EPAP (S)  10 cmH2O   Vt (Measured) 604 mL   FiO2  100 %   Minute Volume (L/min) 14.6 Liters   Patient's Home Machine No       
Consult sent via [employer] serve to pulmonology intensivist.  
Patient Cindi Rodriguez to room 5311 from ICU. Patient is A&O x 4. VSS. Patient oriented to the room all safety measures in place. Patient given IS and SCDs at this time. Admission orders released and patient 4 eyes completed. Admission documentation completed. No other needs are noted at this time.    [x] Bed alarm on and cord plugged into wall  [x] Bed in lowest position  [x] Call light and bedside table within reach  [x] Patient educated on all safety measures  [x]Oxygen connected to wall (if applicable)     Nurse 1 Esignature: Electronically signed by Audelia Allan RN on 2/21/25 at 4:35 PM EST  Nurse 2 Esignature: {Esignature:[id number]}   
Patient is A&O x4.  O2 3L, sat 100%.  Oxygen decreased to 2L.  No complaints of pain or SOB.  Vital signs stable as charted.  Respirations appear to easy and unlabored.  Lungs clear.  Respirations easy with no complaints of cough.  No complaints of nausea/vomiting/diarrhea.  Up with assist/walker to the bathroom or chair as needed.  Tolerating regular diet.  Right chest wall vascath intact.    Plan of care and safety measures reviewed with the patient.  Call light in reach and chair alarm in place.  Chair attached to wall for alarm purposes.  Will continue to monitor.  Electronically signed by Trisha Buchanan RN on 2/27/2025 at 10:28 PM    
Patient is alert and oriented x4, VSS. Denies pain at this time. Tolerating ambulation well. Lungs clear, no shortness of breath and on baseline 3L oxygen. Tolerating diet. Vascath dressing is CDI. Bowel sounds active. Fall precautions in place.   
Patient is alert and oriented x4. VSS on room air. Patient ambulating x1 with gait belt. Pain endorsed to left leg, continuing to monitor and manage per MAR. Voiding well via BRP.   Patient is currently resting in bed with bed alarm on for safety. Call light within reach and all fall precautions in place. Plan of care continues.  
Patient to dialysis.   
Physical Therapy  Facility/Department: [facility]  Physical Therapy Initial Assessment and Treatment     Name: Cindi Rodriguez  : 1965  MRN: [id number]  Date of Service: 2025    Discharge Recommendations:  Subacute/Skilled Nursing Facility   PT Equipment Recommendations  Equipment Needed: Yes (--defer to facility to obtain)  Mobility Devices: Walker  Walker: Rolling      Patient Diagnosis(es): The encounter diagnosis was Flash pulmonary edema (HCC).  Past Medical History:  has a past medical history of JONATHAN (acute kidney injury), Anxiety, Asthma, Bipolar disorder (HCC), Bronchitis, Chronic back pain, Depression, Diabetes mellitus (HCC), DM II (diabetes mellitus, type II), controlled (HCC), Hyperlipidemia, Hypertension, Neuropathy, Obstructive sleep apnea, and Restless legs syndrome (RLS).  Past Surgical History:  has a past surgical history that includes Inner ear surgery; Tubal ligation; Endometrial ablation; and Dialysis Catheter Removal (N/A, 2024).    Assessment  Body Structures, Functions, Activity Limitations Requiring Skilled Therapeutic Intervention: Decreased functional mobility ;Decreased strength;Decreased safe awareness;Decreased balance;Decreased endurance  Assessment: Pt presents to hospital with SOB, found to have pulmonary edema and requiring supplemental O2. Pt was agreeable to PT assessment with encouragement. Pt demo'd the ability to complete basic functional transfers and to ambulate short distances with CGA and use of a walker. Pt fatigues quickly with activity and requires inc'd time, cues for slow deep breathing and education on energy consevation techniques. Pt will benefit from continued PT services during her hospital stay and upon medical clearance to address the above stated deficits and to maximize her functional independence. Pt with questionable insight to her deficits and if declines additional inpatient PT therapy she would require inc'd support at home, 
Physical Therapy Attempt    PT attempted treatment but pt out of the room/off the floor. PT to try back at later time/date as able per POC.     Nadia Quevedo, MATILDET  
Pt admitted into room 4519 from ED. Pt remains on bipap and nitroglycerin drip. CHG bath given and connected to monitor. Pt is alert and oriented x4 and complaining of pain in lower extremities, residents made aware.   Dialysis RN currently at bedside for HD.   
Pt alert and oriented. VSS. Pt tolerating diet, denies n/v. Pt denies pain. Pt remains on 4L of oxygen. Pt ambulated in room with SBA and walker. Pt NSR on tele. Pt has call light within reach, bed in lowest position with wheels locked, 2/4 side rails up, and bed alarm on.  
Pt ao4, vss on 3L NC. LLE pain is controlled w prn norco, dressing CDI on L leg. Pt denied chest pain, n/v, bm this shift. Had a period of sob but shortly resolved as pt was instructed sitting upward as well as having resp treatment. Vas cath in place, CHG wipe performed. Pt calls out appropriately for assistance. Bed alarm remains active for pt's safety.  
Pt got agitated being waken up this morning by phlebotomist, refused lab drawn. Hospitalist on call  was made aware. Will try again later.   
Report called to [facility] [place]. Pt transported via stretcher by EMS. Pt sent with after visit summary, follow up instructions and continuity of care forms. PIV removed with no complications.   
Rn walked in to patient eating a bbq sandwich patient's allergies state she is severely allergic to tomatoes. RN asked patient about tomatoes allergy and she said she is not allergic and eats ketchup and bbq sauce. Patient denied any symptoms of sob, tingling of the tongue, or any swelling. Dr. DASILVA notified of situation. RN requesting EPI and benadryl prn for allergies. Ordered placed per MD.   
Shift Summary    Admission Diagnosis: Pulmonary edema, acute (HCC)    Shift Events:  Patient rested well overnight   Remains GASCA   LLE wound dressing changed     Vitals:  Vitals:    02/24/25 2110 02/24/25 2215 02/25/25 0603 02/25/25 0631   BP: 139/86 122/70 (!) 176/99 (!) 159/93   Pulse: 75 82 94    Resp: 16 18 16    Temp: 97.8 °F (36.6 °C) 98 °F (36.7 °C) 98.2 °F (36.8 °C)    TempSrc:  Oral Oral    SpO2:  91% 91%    Weight:   62.1 kg (136 lb 14.5 oz)    Height:            WEIGHT: Admit Weight - Scale: 72 kg (158 lb 11.2 oz)      Today  Weight - Scale: 62.1 kg (136 lb 14.5 oz)    Tele:  Non-Tele     IV/Line:  Peripheral IV 02/20/25 Right Forearm (Active)   Site Assessment Clean, dry & intact 02/25/25 0600   Line Status Normal saline locked 02/25/25 0600   Line Care Connections checked and tightened 02/25/25 0600   Phlebitis Assessment No symptoms 02/25/25 0600   Infiltration Assessment 0 02/24/25 1556   Alcohol Cap Used Yes 02/25/25 0600   Dressing Status Clean, dry & intact 02/25/25 0600   Dressing Type Transparent 02/25/25 0600     Neuro:   oriented to time, place, person and situation - intermittent forgetfulness      I/O:   I/O this shift:  In: 240 [P.O.:240]  Out: -                
Treatment time: 2 hours  Net UF: 2500 ml     Pre weight: 64.3 kg  Post weight:61.8 kg       Access used: RTDC    Access function: well with  ml/min     Medications or blood products given: NA     Regular outpatient schedule: TTS     Summary of response to treatment: Patient tolerated treatment well and without any complications. Patient remained stable throughout entire treatment.  
Treatment time: 2 hrs    Net UF:  2500 ml     Pre weight: 72 kg  Post weight: 69.5 kg     Access used: Rtdc  Access function:  tolerated well,  BFR 350ml/min     Medications or blood products given: heparin dwells,      Regular outpatient schedule: MWF     Summary of response to treatment: Pt tolerated well. Pt remained stable throughout entire treatment and upon exiting the hemodialysis suite.      Copy of dialysis treatment record placed in chart, to be scanned into EMR.      
Treatment time: 3 hrs    Net UF: 2000 ml     Pre weight: 61.7 kg  Post weight: 59.7 kg     Access used: Rtdc  Access function:  tolerated well,  BFR 350ml/min     Medications or blood products given: heparin dwells     Regular outpatient schedule: TTS     Summary of response to treatment: Pt tolerated well. Pt remained stable throughout entire treatment and upon exiting the hemodialysis suite.      Copy of dialysis treatment record placed in chart, to be scanned into EMR.      
Treatment time: 3.5 hours    Net UF: 1300 ml    Pre weight: 65.2 kg  Post weight: 63.9 kg  EDW: 65 kg    Access used:   Rt, chest wall tunneled CVC    Access function:   Well; maintained  cc/min    Medications or blood products given:   EPO 10,000 units    Regular outpatient schedule:   [facility]    Summary of response to treatment: Patient has completed 3.5 hour hemodialysis treatment. She tolerated treatment well and was able to remove 1300 ml of fluid. Patient returned to her room per hospital bed.    Copy of dialysis treatment record placed in chart, to be scanned into EMR.  
Daily    carvedilol  3.125 mg Oral BID WC    clopidogrel  75 mg Oral Daily    pantoprazole  40 mg Oral Daily    sevelamer  800 mg Oral TID WC    torsemide  100 mg Oral Daily    ARIPiprazole  10 mg Oral Daily    insulin lispro  0-8 Units SubCUTAneous 4 times per day    epoetin samanta-epbx  10,000 Units IntraVENous Once per day on Tuesday Thursday Saturday    sodium thiosulfate  25 g IntraVENous Once per day on Tuesday Thursday Saturday    melatonin  5 mg Oral Nightly    mirtazapine  15 mg Oral Nightly    atorvastatin  80 mg Oral Nightly    losartan  25 mg Oral Nightly      PRN Medications: HYDROcodone 5 mg - acetaminophen, HYDROmorphone, naloxone, nitroGLYCERIN, albuterol, sodium chloride flush, sodium chloride, ondansetron **OR** ondansetron, polyethylene glycol, acetaminophen **OR** acetaminophen, labetalol, hydrALAZINE, glucose, dextrose bolus **OR** dextrose bolus, glucagon (rDNA), dextrose, sodium chloride, heparin (porcine), lidocaine  Diet: ADULT DIET; Regular; Low Potassium (Less than 3000 mg/day); 1000 ml    Continuous Infusions:   sodium chloride      dextrose         PHYSICAL EXAM:  /78   Pulse 80   Temp 97 °F (36.1 °C) (Oral)   Resp 16   Ht 1.727 m (5' 8\")   Wt 63.9 kg (140 lb 14 oz)   SpO2 92%   BMI 21.42 kg/m²   Recent Labs     02/23/25  1723 02/23/25  2027 02/23/25  2354 02/24/25  0623 02/24/25  1140   POCGLU 137* 149* 162* 142* 145*       Intake/Output Summary (Last 24 hours) at 2/24/2025 1440  Last data filed at 2/24/2025 1340  Gross per 24 hour   Intake 600 ml   Output --   Net 600 ml     Constitutional:       General: She is not in acute distress.     Appearance: Normal appearance. She is not ill-appearing.   HENT:      Head: Normocephalic and atraumatic.   Cardiovascular:      Rate and Rhythm: Normal rate and regular rhythm.      Pulses: Normal pulses.      Heart sounds: Normal heart sounds.   Pulmonary:      Effort: Pulmonary effort is normal.      Breath sounds: Clear to auscultation. 
Thursday and Saturday.  She also history of obstructive sleep apnea, type 2 diabetes, hypertension, anxiety disorder, restless leg syndrome, coronary disease status post stenting and COPD.  She presented with shortness of breath which was sudden onset.  She denies any fever or chills.  She had her last dialysis on Tuesday which was uneventful.  She denies any chest pain but has chest tightness.  When she arrived in the ER proBNP was more than 70,000.  Chest x-ray was suggestive of CHF.  She was given nitroglycerin and Lasix.  She was transferred to ICU for diagnosis of flash pulmonary edema.    She had urgent hemodialysis this a.m. with  2.5 L removed.    We are called for further management of ESRD.      PMH/PSH/SH/Family History:     Past Medical History:   Diagnosis Date    JONATHAN (acute kidney injury) 9/3/2014    Anxiety     Asthma     Bipolar disorder ([facility])     Bronchitis     Chronic back pain     Depression     Diabetes mellitus ([facility])     DM II (diabetes mellitus, type II), controlled ([facility]) 9/3/2014    Hyperlipidemia 12/26/2019    Hypertension     Neuropathy 1/2/2024    Obstructive sleep apnea 8/22/2011    Restless legs syndrome (RLS) 8/22/2011          Medication:     Current Facility-Administered Medications: HYDROcodone-acetaminophen (NORCO) 5-325 MG per tablet 1 tablet, 1 tablet, Oral, Q8H PRN  HYDROmorphone HCl PF (DILAUDID) injection 0.25 mg, 0.25 mg, IntraVENous, Q6H PRN  naloxone (NARCAN) injection 0.4 mg, 0.4 mg, IntraVENous, PRN  nitroGLYCERIN (NITROSTAT) SL tablet 0.4 mg, 0.4 mg, SubLINGual, Q5 Min PRN  albuterol (PROVENTIL) (2.5 MG/3ML) 0.083% nebulizer solution 2.5 mg, 2.5 mg, Nebulization, Q6H PRN  sodium chloride flush 0.9 % injection 5-40 mL, 5-40 mL, IntraVENous, 2 times per day  sodium chloride flush 0.9 % injection 5-40 mL, 5-40 mL, IntraVENous, PRN  0.9 % sodium chloride infusion, , IntraVENous, PRN  ondansetron (ZOFRAN-ODT) disintegrating tablet 4 mg, 4 mg, Oral, Q8H PRN **OR** ondansetron 
bipap, with likely need for urgent dialysis.     CODE STATUS was discussed with the patient, she wishes to be FULL CODE.\"    Past Medical History:   Diagnosis Date    JONATHAN (acute kidney injury) 9/3/2014    Anxiety     Asthma     Bipolar disorder ([facility])     Bronchitis     Chronic back pain     Depression     Diabetes mellitus ([facility])     DM II (diabetes mellitus, type II), controlled ([facility]) 9/3/2014    Hyperlipidemia 12/26/2019    Hypertension     Neuropathy 1/2/2024    Obstructive sleep apnea 8/22/2011    Restless legs syndrome (RLS) 8/22/2011       Past Surgical History:   Procedure Laterality Date    DIALYSIS CATHETER REMOVAL N/A 8/26/2024    PERITONEAL DIAYSIS CATHETER REMOVAL, INCISION AND DRAINAGE OF ABDOMINAL WALL ABCESS performed by Santos Castaneda DO at [facility] OR    ENDOMETRIAL ABLATION      INNER EAR SURGERY      TUBAL LIGATION           Family History   Problem Relation Age of Onset    Diabetes Mother     Cancer Mother     Cancer Maternal Grandmother     Heart Disease Maternal Grandfather          Allergies:   Allergies   Allergen Reactions    Latex Other (See Comments)     Added based on information entered during case entry, please review and add reactions, type, and severity as needed    Peanuts [Peanut Oil] Hives, Swelling and Angioedema     Swelling of tongue/face    Pecan Extract Hives, Itching, Other (See Comments) and Swelling    Tomato Hives, Swelling and Angioedema     Swelling of tongue/face    Shellfish-Derived Products Hives, Itching and Swelling    Potato Itching       Review of Systems   Respiratory:  Negative for choking and shortness of breath.    Cardiovascular:  Negative for chest pain.       Respiratory:  Positive for shortness of breath. Negative for chest tightness.    Cardiovascular:  Negative for chest pain.     OBJECTIVE     Vitals:    02/21/25 0530 02/21/25 0533 02/21/25 0545 02/21/25 0600   BP: 121/87 121/87  124/86   Pulse: 87 85 84 88   Resp: 13 22 14 20   Temp:       TempSrc:     
within 2 minutes.    Patient Education  Hand placement with transfers when using walker. Needs continued reinforcement.   Calling for assist with needs. Not getting up without assist. Expressed understanding.     Safety Devices  Pt left with needs in reach. In chair (reclined) with chair alarm on. RN updated.     AM-PAC score  AM-PAC Inpatient Mobility Raw Score : 17  AM-PAC Inpatient T-Scale Score : 42.13  Mobility Inpatient CMS 0-100% Score: 50.57  Mobility Inpatient CMS G-Code Modifier : CK    Goals: (as determined and assessed by primary PT)  Time Frame for Short Term Goals: DC  Short Term Goal 1: Pt will complete functional transfers independently.   Short Term Goal 2: Pt will ambulate 50' with supervision, LRAD and maintain SpO2 > 90% on home O2 requirements.   Short Term Goal 3: Pt will ascend/descend 3 stairs with hand railing and supervision.          Plan:  Plan: 2-5x/week    Current Treatment Recommendations: Strengthening, Balance training, Gait training, Stair training, Functional mobility training, Transfer training, Endurance training, Safety education & training, Equipment evaluation, education, & procurement, Therapeutic activities, Home exercise program, Patient/Caregiver education & training    Therapy Time    Individual  Concurrent  Group  Co-treatment    Time In  842            Time Out  922            Minutes  40              Timed Code Treatment Minutes: 40  Total Treatment Minutes: 40    Will continue per plan of care.   If patient is discharged prior to next treatment, this note will serve as the discharge summary.    Raven Mckeon, PTA #[id number]              
IntraVENous, Continuous PRN  dextrose bolus 10% 125 mL, 125 mL, IntraVENous, PRN **OR** dextrose bolus 10% 250 mL, 250 mL, IntraVENous, PRN  diphenhydrAMINE (BENADRYL) injection 25 mg, 25 mg, IntraVENous, Once PRN  [START ON 2/27/2025] epoetin samanta-epbx (RETACRIT) injection 10,000 Units, 10,000 Units, IntraVENous, Once per day on Tuesday Thursday Saturday  glucagon injection 1 mg, 1 mg, SubCUTAneous, PRN  glucose chewable tablet 16 g, 4 tablet, Oral, PRN  heparin (porcine) injection 5,000 Units, 5,000 Units, SubCUTAneous, 3 times per day  hydrALAZINE (APRESOLINE) injection 5 mg, 5 mg, IntraVENous, Q6H PRN  HYDROcodone-acetaminophen (NORCO) 5-325 MG per tablet 1 tablet, 1 tablet, Oral, Q8H PRN  HYDROmorphone HCl PF (DILAUDID) injection 0.25 mg, 0.25 mg, IntraVENous, Q6H PRN  labetalol (NORMODYNE;TRANDATE) injection 10 mg, 10 mg, IntraVENous, Q6H PRN  losartan (COZAAR) tablet 25 mg, 25 mg, Oral, Nightly  melatonin disintegrating tablet 5 mg, 5 mg, Oral, Nightly  mirtazapine (REMERON) tablet 15 mg, 15 mg, Oral, Nightly  naloxone (NARCAN) injection 0.4 mg, 0.4 mg, IntraVENous, PRN  NIFEdipine (PROCARDIA XL) extended release tablet 60 mg, 60 mg, Oral, Daily  nitroGLYCERIN (NITROSTAT) SL tablet 0.4 mg, 0.4 mg, SubLINGual, Q5 Min PRN  ondansetron (ZOFRAN-ODT) disintegrating tablet 4 mg, 4 mg, Oral, Q8H PRN **OR** ondansetron (ZOFRAN) injection 4 mg, 4 mg, IntraVENous, Q6H PRN  pantoprazole (PROTONIX) tablet 40 mg, 40 mg, Oral, Daily  polyethylene glycol (GLYCOLAX) packet 17 g, 17 g, Oral, Daily PRN  sevelamer (RENVELA) tablet 800 mg, 800 mg, Oral, TID WC  sodium chloride flush 0.9 % injection 5-40 mL, 5-40 mL, IntraVENous, 2 times per day  [START ON 2/27/2025] sodium thiosulfate 250 MG/ML injection 25 g, 25 g, IntraVENous, Once per day on Tuesday Thursday Saturday  torsemide (DEMADEX) tablet 100 mg, 100 mg, Oral, Daily  gabapentin (NEURONTIN) capsule 100 mg, 100 mg, Oral, Daily  gabapentin (NEURONTIN) capsule 200 mg, 200 
IntraVENous, Q6H PRN  polyethylene glycol (GLYCOLAX) packet 17 g, 17 g, Oral, Daily PRN  acetaminophen (TYLENOL) tablet 650 mg, 650 mg, Oral, Q6H PRN **OR** acetaminophen (TYLENOL) suppository 650 mg, 650 mg, Rectal, Q6H PRN  heparin (porcine) injection 5,000 Units, 5,000 Units, SubCUTAneous, 3 times per day  benztropine (COGENTIN) tablet 0.5 mg, 0.5 mg, Oral, Nightly  buPROPion (WELLBUTRIN SR) extended release tablet 150 mg, 150 mg, Oral, Daily  carvedilol (COREG) tablet 3.125 mg, 3.125 mg, Oral, BID WC  clopidogrel (PLAVIX) tablet 75 mg, 75 mg, Oral, Daily  NIFEdipine (PROCARDIA XL) extended release tablet 60 mg, 60 mg, Oral, BID  pantoprazole (PROTONIX) tablet 40 mg, 40 mg, Oral, Daily  sevelamer (RENVELA) tablet 800 mg, 800 mg, Oral, TID WC  torsemide (DEMADEX) tablet 100 mg, 100 mg, Oral, Daily  ARIPiprazole (ABILIFY) tablet 10 mg, 10 mg, Oral, Daily  labetalol (NORMODYNE;TRANDATE) injection 10 mg, 10 mg, IntraVENous, Q6H PRN  hydrALAZINE (APRESOLINE) injection 5 mg, 5 mg, IntraVENous, Q6H PRN  glucose chewable tablet 16 g, 4 tablet, Oral, PRN  dextrose bolus 10% 125 mL, 125 mL, IntraVENous, PRN **OR** dextrose bolus 10% 250 mL, 250 mL, IntraVENous, PRN  glucagon injection 1 mg, 1 mg, SubCUTAneous, PRN  dextrose 10 % infusion, , IntraVENous, Continuous PRN  insulin lispro (HUMALOG,ADMELOG) injection vial 0-8 Units, 0-8 Units, SubCUTAneous, 4 times per day  epoetin samanta-epbx (RETACRIT) injection 10,000 Units, 10,000 Units, IntraVENous, Once per day on Tuesday Thursday Saturday  sodium chloride 0.9 % bolus 100 mL, 100 mL, IntraVENous, PRN  sodium thiosulfate 250 MG/ML injection 25 g, 25 g, IntraVENous, Once per day on Tuesday Thursday Saturday  melatonin disintegrating tablet 5 mg, 5 mg, Oral, Nightly  mirtazapine (REMERON) tablet 15 mg, 15 mg, Oral, Nightly  atorvastatin (LIPITOR) tablet 80 mg, 80 mg, Oral, Nightly  losartan (COZAAR) tablet 25 mg, 25 mg, Oral, Nightly  heparin (porcine) injection 3,200 Units, 
progressing  Patient Goals   Patient goals : go home    AM-PAC - ADL  AM-PAC Daily Activity - Inpatient   How much help is needed for putting on and taking off regular lower body clothing?: A Lot  How much help is needed for bathing (which includes washing, rinsing, drying)?: A Little  How much help is needed for toileting (which includes using toilet, bedpan, or urinal)?: A Little  How much help is needed for putting on and taking off regular upper body clothing?: A Little  How much help is needed for taking care of personal grooming?: A Little  How much help for eating meals?: None  AM-PAC Inpatient Daily Activity Raw Score: 18  AM-PAC Inpatient ADL T-Scale Score : 38.66  ADL Inpatient CMS 0-100% Score: 46.65  ADL Inpatient CMS G-Code Modifier : CK    Therapy Time   Individual Concurrent Group Co-treatment   Time In 1505         Time Out 1528         Minutes 23         Timed Code Treatment Minutes: 23 Minutes   Total Treatment Minutes: 23 Minutes    Shaheed Monae, OT     
rub or gallop  Abdomen: soft, non-tender; bowel sounds normal; no masses,  no organomegaly  Neurologic: Alert and oriented to time place and person.  Strength marginally diminished in bilateral upper and lower extremities.  Sensation to light touch is intact and equal in bilateral upper and lower extremities.           Medications:   Medications:    ARIPiprazole  10 mg Oral Daily    atorvastatin  80 mg Oral Nightly    benztropine  0.5 mg Oral Nightly    buPROPion  150 mg Oral Daily    clopidogrel  75 mg Oral Daily    epoetin samanta-epbx  10,000 Units IntraVENous Once per day on Tuesday Thursday Saturday    heparin (porcine)  5,000 Units SubCUTAneous 3 times per day    losartan  25 mg Oral Nightly    melatonin  5 mg Oral Nightly    mirtazapine  15 mg Oral Nightly    NIFEdipine  60 mg Oral Daily    pantoprazole  40 mg Oral Daily    sevelamer  800 mg Oral TID WC    sodium chloride flush  5-40 mL IntraVENous 2 times per day    sodium thiosulfate  25 g IntraVENous Once per day on Tuesday Thursday Saturday    torsemide  100 mg Oral Daily    gabapentin  100 mg Oral Daily    gabapentin  200 mg Oral Nightly    lidocaine  2 patch TransDERmal Daily    insulin lispro  0-8 Units SubCUTAneous 4x Daily AC & HS      Infusions:    sodium chloride      dextrose       PRN Meds: heparin (porcine), 3,200 Units, PRN  acetaminophen, 650 mg, Q6H PRN   Or  acetaminophen, 650 mg, Q6H PRN  sodium chloride, , PRN  albuterol, 2.5 mg, Q6H PRN  dextrose, , Continuous PRN  dextrose bolus, 125 mL, PRN   Or  dextrose bolus, 250 mL, PRN  glucagon (rDNA), 1 mg, PRN  glucose, 4 tablet, PRN  hydrALAZINE, 5 mg, Q6H PRN  HYDROcodone 5 mg - acetaminophen, 1 tablet, Q8H PRN  HYDROmorphone, 0.25 mg, Q6H PRN  labetalol, 10 mg, Q6H PRN  naloxone, 0.4 mg, PRN  nitroGLYCERIN, 0.4 mg, Q5 Min PRN  ondansetron, 4 mg, Q8H PRN   Or  ondansetron, 4 mg, Q6H PRN  polyethylene glycol, 17 g, Daily PRN        Labs and Imaging   XR CHEST PORTABLE    Result Date: 
0138 -- -- -- (!) 109 30 90 % -- --   02/20/25 0135 (!) 150/107 -- -- (!) 112 23 (!) 83 % -- --   02/20/25 0134 (!) 150/107 -- -- (!) 112 -- -- -- --   02/20/25 0130 (!) 166/118 -- -- (!) 118 28 (!) 79 % -- --   02/20/25 0122 (!) 160/107 97.9 °F (36.6 °C) Axillary (!) 124 28 -- 1.727 m (5' 8\") --       Intake/Output Summary (Last 24 hours) at 2/20/2025 0828  Last data filed at 2/20/2025 0600  Gross per 24 hour   Intake 8.7 ml   Output 0 ml   Net 8.7 ml       Review of Systems   Respiratory:  Positive for shortness of breath. Negative for chest tightness.    Cardiovascular:  Negative for chest pain.       Physical Exam  Constitutional:       General: She is not in acute distress.     Appearance: Normal appearance. She is not ill-appearing.   HENT:      Head: Normocephalic and atraumatic.      Mouth/Throat:      Comments: On BiPAP  Eyes:      Extraocular Movements: Extraocular movements intact.      Pupils: Pupils are equal, round, and reactive to light.   Cardiovascular:      Rate and Rhythm: Normal rate and regular rhythm.      Pulses: Normal pulses.      Heart sounds: Normal heart sounds.   Pulmonary:      Effort: Pulmonary effort is normal.      Breath sounds: Rales present. No wheezing.      Comments: On BiPAP  Abdominal:      General: Abdomen is flat.      Palpations: Abdomen is soft.   Musculoskeletal:         General: Tenderness (LLE tenderness likely due to bandaged wounds on extremities. No swelling, warmth, asymmetry or erythema) present. No swelling.      Right lower leg: No edema.      Left lower leg: No edema.   Skin:     General: Skin is warm.   Neurological:      Mental Status: She is alert.       LABS:    CBC:   Recent Labs     02/20/25 0128   WBC 12.8*   HGB 9.4*   HCT 31.2*      MCV 88.3     Renal:    Recent Labs     02/20/25  0128 02/20/25  0500    143   K 5.1 4.0   CL 95* 98*   CO2 19* 29   BUN 26* 27*   CREATININE 3.9* 3.8*   GLUCOSE 260* 110*   CALCIUM 9.5 8.9   MG 2.37  --    PHOS 
Daily  insulin lispro (HUMALOG,ADMELOG) injection vial 0-8 Units, 0-8 Units, SubCUTAneous, 4x Daily AC & HS    Vitals :     Vitals:    02/28/25 0539   BP:    Pulse:    Resp: 18   Temp:    SpO2:           Physical Examination :     appearance: Alert, orientated  Respiratory: No RD. On 4 liter oxygen. No wheezing.   Cardiovascular: Edema none  Abdomen: -  soft  Other relevant findings:      Labs :     CBC:   Recent Labs     02/26/25  0733 02/27/25  0829 02/28/25  0555   WBC 6.0 6.4 6.2   HGB 9.1* 9.7* 9.2*   HCT 29.3* 31.8* 30.0*    185 169     BMP:    Recent Labs     02/26/25  0733 02/27/25  0829 02/28/25  0555    135* 133*   K 4.4 4.8 4.5    98* 98*   CO2 29 25 26   BUN 25* 43* 34*   CREATININE 3.8* 5.1* 4.3*   GLUCOSE 97 178* 146*   MG 2.35 2.42* 2.33   PHOS 3.5 4.0 3.2     Lab Results   Component Value Date/Time    COLORU Yellow 08/22/2024 02:54 PM    NITRU Negative 08/22/2024 02:54 PM    GLUCOSEU Negative 08/22/2024 02:54 PM    KETUA 15 08/22/2024 02:54 PM    UROBILINOGEN 0.2 08/22/2024 02:54 PM    BILIRUBINUR SMALL 08/22/2024 02:54 PM        ----------------------------------------------------------  Please call with questions at      24 Hrs Answering service [phone]  Perfect serve, or cell phone 7 am - 5pm  Brittney Castaneda MD  [facility]      
Oral, Nightly  atorvastatin (LIPITOR) tablet 80 mg, 80 mg, Oral, Nightly  losartan (COZAAR) tablet 25 mg, 25 mg, Oral, Nightly  heparin (porcine) injection 3,200 Units, 3,200 Units, IntraCATHeter, PRN  lidocaine 4 % external patch 1 patch, 1 patch, TransDERmal, Daily PRN    Vitals :     Vitals:    02/25/25 0631   BP: (!) 159/93   Pulse:    Resp:    Temp:    SpO2:           Physical Examination :     appearance: Alert, orientated  Respiratory: No RD. On 4 liter oxygen. No wheezing.   Cardiovascular: Edema none  Abdomen: -  soft  Other relevant findings:      Labs :     CBC:   Recent Labs     02/24/25  0832 02/25/25  0626   WBC 6.6 7.6   HGB 8.3* 9.2*   HCT 26.7* 30.3*    197     BMP:    Recent Labs     02/24/25  0832 02/25/25  0626   * 136   K 4.7 4.9   CL 99 101   CO2 25 22   BUN 29* 44*   CREATININE 4.1* 5.2*   GLUCOSE 140* 130*   MG 2.25 2.37   PHOS 3.4 3.4     Lab Results   Component Value Date/Time    COLORU Yellow 08/22/2024 02:54 PM    NITRU Negative 08/22/2024 02:54 PM    GLUCOSEU Negative 08/22/2024 02:54 PM    KETUA 15 08/22/2024 02:54 PM    UROBILINOGEN 0.2 08/22/2024 02:54 PM    BILIRUBINUR SMALL 08/22/2024 02:54 PM        ----------------------------------------------------------  Please call with questions at      24 Hrs Answering service [phone]  Perfect serve, or cell phone 7 am - 5pm  Brittney Castaneda MD  [facility]      
   BILIRUBINUR SMALL 08/22/2024 02:54 PM    BLOODU SMALL 08/22/2024 02:54 PM    GLUCOSEU Negative 08/22/2024 02:54 PM    KETUA 15 08/22/2024 02:54 PM    AMORPHOUS 2+ 08/20/2022 11:35 PM     Urine Cultures:   Lab Results   Component Value Date/Time    LABURIN No growth at 18 to 36 hours 09/07/2022 03:40 AM     Blood Cultures:   Lab Results   Component Value Date/Time    BC No Growth after 4 days of incubation. 02/20/2025 07:55 AM     Lab Results   Component Value Date/Time    BLOODCULT2 No Growth after 4 days of incubation. 02/21/2025 04:51 AM     Organism: No results found for: \"ORG\"      Electronically signed by Jerome Parsons MD on 2/26/2025 at 12:40 PM  
02:54 PM    GLUCOSEU Negative 08/22/2024 02:54 PM    KETUA 15 08/22/2024 02:54 PM    AMORPHOUS 2+ 08/20/2022 11:35 PM     Urine Cultures:   Lab Results   Component Value Date/Time    LABURIN No growth at 18 to 36 hours 09/07/2022 03:40 AM     Blood Cultures:   Lab Results   Component Value Date/Time    BC No Growth after 4 days of incubation. 02/20/2025 07:55 AM     Lab Results   Component Value Date/Time    BLOODCULT2  02/21/2025 04:51 AM     No Growth to date.  Any change in status will be called.     Organism: No results found for: \"ORG\"      Electronically signed by Jerome Parsons MD on 2/25/2025 at 1:17 PM  
likely noninfectious and reactive secondary to acute pulmonary edema.   -Check urinalysis if redevelops features of infection  -Monitor vitals and lab    Chronic normocytic anemia, probable anemia of CKD  History of iron deficiency anemia  -Monitor hemoglobin    Diabetes mellitus type 2  -Corrective insulin, monitoring for hypoglycemia secondary to insulin, with point-of-care blood glucose checks    ESRD on iHD TRS via R TDC   Calciphylaxis  - Patient previously on PD c/b abscess formation. Dry weight was 51.4 kg on 08/29/2024. Last available dry weight was 58.6 on 12/17/2024.  Admission weight was 72 kg. Patient still make urine.   - Nephrology consulted, s/p emergent HD and UF; continue HD   - Torsemide 80 mg daily    Bipolar Disorder - Mirtazapine, aripiprazole  Depression - Bupropion  GERD - Pantoprazole  RLS - Benztropine  Anxiety - Hydroxyzine        The patient and / or the family were informed of the results of any tests, a time was given to answer questions, a plan was proposed and they agreed with plan.    Full Code      Disposition:   Transfer from  ICU  PT/OT eval to [facility] disposition  Possible DC in about 2 days        Clifford Wayne MD  
needed for taking care of personal grooming?: A Little  How much help for eating meals?: None  AM-PAC Inpatient Daily Activity Raw Score: 17  AM-PAC Inpatient ADL T-Scale Score : 37.26  ADL Inpatient CMS 0-100% Score: 50.11  ADL Inpatient CMS G-Code Modifier : CK    Tinneti Score       Goals  Short Term Goals  Time Frame for Short Term Goals: By D/C  Short Term Goal 1: Pt will complete toilet transfer I'ly  Short Term Goal 2: Pt will complete LE dressing I'ly  Short Term Goal 3: Pt will complete functional mobility to/from bathroom I'ly while maintaining WFL SpO2  Patient Goals   Patient goals : go home      Therapy Time   Individual Concurrent Group Co-treatment   Time In 0805         Time Out 0830         Minutes 25         Timed Code Treatment Minutes: 10 Minutes       Vee Galdamez, OT

## 2025-03-01 PROCEDURE — 1200000000 HC SEMI PRIVATE

## 2025-03-02 ENCOUNTER — APPOINTMENT (OUTPATIENT)
Dept: GENERAL RADIOLOGY | Age: 60
DRG: 425 | End: 2025-03-02
Payer: MEDICAID

## 2025-03-02 ENCOUNTER — APPOINTMENT (OUTPATIENT)
Dept: CT IMAGING | Age: 60
DRG: 425 | End: 2025-03-02
Payer: MEDICAID

## 2025-03-02 ENCOUNTER — HOSPITAL ENCOUNTER (INPATIENT)
Age: 60
LOS: 2 days | Discharge: NURSING, REHAB OR LONG-TERM CARE | DRG: 425 | End: 2025-03-04
Attending: STUDENT IN AN ORGANIZED HEALTH CARE EDUCATION/TRAINING PROGRAM | Admitting: INTERNAL MEDICINE
Payer: MEDICAID

## 2025-03-02 DIAGNOSIS — R29.898 RIGHT ARM WEAKNESS: Primary | ICD-10-CM

## 2025-03-02 PROBLEM — G45.9 TIA (TRANSIENT ISCHEMIC ATTACK): Status: ACTIVE | Noted: 2025-03-02

## 2025-03-02 LAB
ANION GAP SERPL CALCULATED.3IONS-SCNC: 13 MMOL/L (ref 3–16)
BASOPHILS # BLD: 0 K/UL (ref 0–0.2)
BASOPHILS NFR BLD: 0.5 %
BUN SERPL-MCNC: 66 MG/DL (ref 7–20)
CALCIUM SERPL-MCNC: 9.7 MG/DL (ref 8.3–10.6)
CHLORIDE SERPL-SCNC: 97 MMOL/L (ref 99–110)
CO2 SERPL-SCNC: 24 MMOL/L (ref 21–32)
CREAT SERPL-MCNC: 7.1 MG/DL (ref 0.9–1.3)
DEPRECATED RDW RBC AUTO: 19.9 % (ref 12.4–15.4)
EOSINOPHIL # BLD: 0.6 K/UL (ref 0–0.6)
EOSINOPHIL NFR BLD: 6.9 %
FLUAV RNA RESP QL NAA+PROBE: NOT DETECTED
FLUBV RNA RESP QL NAA+PROBE: NOT DETECTED
GFR SERPLBLD CREATININE-BSD FMLA CKD-EPI: 8 ML/MIN/{1.73_M2}
GLUCOSE SERPL-MCNC: 121 MG/DL (ref 70–99)
HCT VFR BLD AUTO: 33.5 % (ref 40.5–52.5)
HGB BLD-MCNC: 10.3 G/DL (ref 13.5–17.5)
INR PPP: 1.13 (ref 0.85–1.15)
LYMPHOCYTES # BLD: 1.1 K/UL (ref 1–5.1)
LYMPHOCYTES NFR BLD: 11.7 %
MCH RBC QN AUTO: 26.7 PG (ref 26–34)
MCHC RBC AUTO-ENTMCNC: 30.8 G/DL (ref 31–36)
MCV RBC AUTO: 86.6 FL (ref 80–100)
MONOCYTES # BLD: 0.8 K/UL (ref 0–1.3)
MONOCYTES NFR BLD: 8.4 %
NEUTROPHILS # BLD: 6.6 K/UL (ref 1.7–7.7)
NEUTROPHILS NFR BLD: 72.5 %
PLATELET # BLD AUTO: 238 K/UL (ref 135–450)
PMV BLD AUTO: 7.1 FL (ref 5–10.5)
POTASSIUM SERPL-SCNC: 5.3 MMOL/L (ref 3.5–5.1)
PROTHROMBIN TIME: 14.7 SEC (ref 11.9–14.9)
RBC # BLD AUTO: 3.87 M/UL (ref 4.2–5.9)
SARS-COV-2 RNA RESP QL NAA+PROBE: NOT DETECTED
SODIUM SERPL-SCNC: 134 MMOL/L (ref 136–145)
TROPONIN, HIGH SENSITIVITY: 211 NG/L (ref 0–22)
TROPONIN, HIGH SENSITIVITY: 249 NG/L (ref 0–22)
WBC # BLD AUTO: 9.1 K/UL (ref 4–11)

## 2025-03-02 PROCEDURE — 02HV33Z INSERTION OF INFUSION DEVICE INTO SUPERIOR VENA CAVA, PERCUTANEOUS APPROACH: ICD-10-PCS | Performed by: INTERNAL MEDICINE

## 2025-03-02 PROCEDURE — 94761 N-INVAS EAR/PLS OXIMETRY MLT: CPT

## 2025-03-02 PROCEDURE — 2700000000 HC OXYGEN THERAPY PER DAY

## 2025-03-02 PROCEDURE — 80048 BASIC METABOLIC PNL TOTAL CA: CPT

## 2025-03-02 PROCEDURE — 1200000000 HC SEMI PRIVATE

## 2025-03-02 PROCEDURE — 70496 CT ANGIOGRAPHY HEAD: CPT

## 2025-03-02 PROCEDURE — 85610 PROTHROMBIN TIME: CPT

## 2025-03-02 PROCEDURE — 87636 SARSCOV2 & INF A&B AMP PRB: CPT

## 2025-03-02 PROCEDURE — 84484 ASSAY OF TROPONIN QUANT: CPT

## 2025-03-02 PROCEDURE — 99285 EMERGENCY DEPT VISIT HI MDM: CPT

## 2025-03-02 PROCEDURE — 6360000004 HC RX CONTRAST MEDICATION: Performed by: STUDENT IN AN ORGANIZED HEALTH CARE EDUCATION/TRAINING PROGRAM

## 2025-03-02 PROCEDURE — 71046 X-RAY EXAM CHEST 2 VIEWS: CPT

## 2025-03-02 PROCEDURE — 70450 CT HEAD/BRAIN W/O DYE: CPT

## 2025-03-02 PROCEDURE — 93005 ELECTROCARDIOGRAM TRACING: CPT | Performed by: STUDENT IN AN ORGANIZED HEALTH CARE EDUCATION/TRAINING PROGRAM

## 2025-03-02 PROCEDURE — 0JHD3XZ INSERTION OF TUNNELED VASCULAR ACCESS DEVICE INTO RIGHT UPPER ARM SUBCUTANEOUS TISSUE AND FASCIA, PERCUTANEOUS APPROACH: ICD-10-PCS | Performed by: INTERNAL MEDICINE

## 2025-03-02 PROCEDURE — 85025 COMPLETE CBC W/AUTO DIFF WBC: CPT

## 2025-03-02 RX ORDER — ACETAMINOPHEN 325 MG/1
650 TABLET ORAL EVERY 6 HOURS PRN
Status: DISCONTINUED | OUTPATIENT
Start: 2025-03-02 | End: 2025-03-04 | Stop reason: HOSPADM

## 2025-03-02 RX ORDER — MELATONIN 10 MG
10 CAPSULE ORAL NIGHTLY
COMMUNITY

## 2025-03-02 RX ORDER — CARVEDILOL 3.12 MG/1
3.12 TABLET ORAL 2 TIMES DAILY WITH MEALS
COMMUNITY

## 2025-03-02 RX ORDER — ATORVASTATIN CALCIUM 80 MG/1
80 TABLET, FILM COATED ORAL NIGHTLY
Status: DISCONTINUED | OUTPATIENT
Start: 2025-03-03 | End: 2025-03-04 | Stop reason: HOSPADM

## 2025-03-02 RX ORDER — ACETAMINOPHEN 650 MG/1
650 SUPPOSITORY RECTAL EVERY 6 HOURS PRN
Status: DISCONTINUED | OUTPATIENT
Start: 2025-03-02 | End: 2025-03-04 | Stop reason: HOSPADM

## 2025-03-02 RX ORDER — VITAMIN B COMPLEX
1 CAPSULE ORAL DAILY
Status: DISCONTINUED | OUTPATIENT
Start: 2025-03-03 | End: 2025-03-04 | Stop reason: HOSPADM

## 2025-03-02 RX ORDER — BUPROPION HYDROCHLORIDE 150 MG/1
150 TABLET, EXTENDED RELEASE ORAL DAILY
Status: DISCONTINUED | OUTPATIENT
Start: 2025-03-03 | End: 2025-03-04 | Stop reason: HOSPADM

## 2025-03-02 RX ORDER — VITAMIN B COMPLEX
1 CAPSULE ORAL DAILY
COMMUNITY

## 2025-03-02 RX ORDER — LIDOCAINE 40 MG/G
4 CREAM TOPICAL PRN
COMMUNITY

## 2025-03-02 RX ORDER — LOSARTAN POTASSIUM 25 MG/1
25 TABLET ORAL NIGHTLY
Status: ON HOLD | COMMUNITY
End: 2025-03-04 | Stop reason: HOSPADM

## 2025-03-02 RX ORDER — ATORVASTATIN CALCIUM 80 MG/1
80 TABLET, FILM COATED ORAL NIGHTLY
COMMUNITY

## 2025-03-02 RX ORDER — SODIUM CHLORIDE 0.9 % (FLUSH) 0.9 %
5-40 SYRINGE (ML) INJECTION EVERY 12 HOURS SCHEDULED
Status: DISCONTINUED | OUTPATIENT
Start: 2025-03-03 | End: 2025-03-04 | Stop reason: HOSPADM

## 2025-03-02 RX ORDER — SEVELAMER CARBONATE 800 MG/1
800 TABLET, FILM COATED ORAL
Status: DISCONTINUED | OUTPATIENT
Start: 2025-03-03 | End: 2025-03-04 | Stop reason: HOSPADM

## 2025-03-02 RX ORDER — DIPHENHYDRAMINE HCL 25 MG
25 TABLET ORAL EVERY 6 HOURS PRN
COMMUNITY

## 2025-03-02 RX ORDER — POLYETHYLENE GLYCOL 3350 17 G/17G
17 POWDER, FOR SOLUTION ORAL DAILY
Status: DISCONTINUED | OUTPATIENT
Start: 2025-03-03 | End: 2025-03-04 | Stop reason: HOSPADM

## 2025-03-02 RX ORDER — NIFEDIPINE 60 MG/1
60 TABLET, EXTENDED RELEASE ORAL 2 TIMES DAILY
Status: ON HOLD | COMMUNITY
End: 2025-03-04 | Stop reason: HOSPADM

## 2025-03-02 RX ORDER — SODIUM CHLORIDE 9 MG/ML
INJECTION, SOLUTION INTRAVENOUS PRN
Status: DISCONTINUED | OUTPATIENT
Start: 2025-03-02 | End: 2025-03-04 | Stop reason: HOSPADM

## 2025-03-02 RX ORDER — ALBUTEROL SULFATE 90 UG/1
2 INHALANT RESPIRATORY (INHALATION) EVERY 6 HOURS PRN
COMMUNITY

## 2025-03-02 RX ORDER — BENZTROPINE MESYLATE 1 MG/1
0.5 TABLET ORAL NIGHTLY
Status: DISCONTINUED | OUTPATIENT
Start: 2025-03-03 | End: 2025-03-04 | Stop reason: HOSPADM

## 2025-03-02 RX ORDER — HEPARIN SODIUM 5000 [USP'U]/ML
5000 INJECTION, SOLUTION INTRAVENOUS; SUBCUTANEOUS EVERY 8 HOURS SCHEDULED
Status: DISCONTINUED | OUTPATIENT
Start: 2025-03-03 | End: 2025-03-04 | Stop reason: HOSPADM

## 2025-03-02 RX ORDER — NIFEDIPINE 60 MG/1
60 TABLET, EXTENDED RELEASE ORAL 2 TIMES DAILY
Status: DISCONTINUED | OUTPATIENT
Start: 2025-03-03 | End: 2025-03-04

## 2025-03-02 RX ORDER — CLOPIDOGREL BISULFATE 75 MG/1
75 TABLET ORAL DAILY
COMMUNITY

## 2025-03-02 RX ORDER — BUPROPION HYDROCHLORIDE 150 MG/1
150 TABLET, EXTENDED RELEASE ORAL DAILY
COMMUNITY

## 2025-03-02 RX ORDER — ONDANSETRON 2 MG/ML
4 INJECTION INTRAMUSCULAR; INTRAVENOUS EVERY 6 HOURS PRN
Status: DISCONTINUED | OUTPATIENT
Start: 2025-03-02 | End: 2025-03-04 | Stop reason: HOSPADM

## 2025-03-02 RX ORDER — PANTOPRAZOLE SODIUM 40 MG/1
40 TABLET, DELAYED RELEASE ORAL DAILY
Status: DISCONTINUED | OUTPATIENT
Start: 2025-03-03 | End: 2025-03-04 | Stop reason: HOSPADM

## 2025-03-02 RX ORDER — UREA 8.5 G/85G
10 CREAM TOPICAL NIGHTLY
COMMUNITY

## 2025-03-02 RX ORDER — TORSEMIDE 100 MG/1
100 TABLET ORAL DAILY
COMMUNITY

## 2025-03-02 RX ORDER — ARIPIPRAZOLE 10 MG/1
10 TABLET ORAL DAILY
COMMUNITY

## 2025-03-02 RX ORDER — BENZTROPINE MESYLATE 0.5 MG/1
0.5 TABLET ORAL NIGHTLY
COMMUNITY

## 2025-03-02 RX ORDER — ONDANSETRON 4 MG/1
4 TABLET, ORALLY DISINTEGRATING ORAL EVERY 8 HOURS PRN
Status: DISCONTINUED | OUTPATIENT
Start: 2025-03-02 | End: 2025-03-04 | Stop reason: HOSPADM

## 2025-03-02 RX ORDER — IPRATROPIUM BROMIDE AND ALBUTEROL SULFATE 2.5; .5 MG/3ML; MG/3ML
1 SOLUTION RESPIRATORY (INHALATION) EVERY 4 HOURS PRN
COMMUNITY

## 2025-03-02 RX ORDER — IOPAMIDOL 755 MG/ML
75 INJECTION, SOLUTION INTRAVASCULAR
Status: COMPLETED | OUTPATIENT
Start: 2025-03-02 | End: 2025-03-02

## 2025-03-02 RX ORDER — GABAPENTIN 100 MG/1
100 CAPSULE ORAL NIGHTLY
Status: DISCONTINUED | OUTPATIENT
Start: 2025-03-03 | End: 2025-03-04 | Stop reason: HOSPADM

## 2025-03-02 RX ORDER — LOSARTAN POTASSIUM 25 MG/1
25 TABLET ORAL NIGHTLY
Status: DISCONTINUED | OUTPATIENT
Start: 2025-03-03 | End: 2025-03-04

## 2025-03-02 RX ORDER — GABAPENTIN 100 MG/1
100 CAPSULE ORAL NIGHTLY
COMMUNITY

## 2025-03-02 RX ORDER — SODIUM CHLORIDE 0.9 % (FLUSH) 0.9 %
5-40 SYRINGE (ML) INJECTION PRN
Status: DISCONTINUED | OUTPATIENT
Start: 2025-03-02 | End: 2025-03-04 | Stop reason: HOSPADM

## 2025-03-02 RX ORDER — SEVELAMER CARBONATE 800 MG/1
1 TABLET, FILM COATED ORAL
COMMUNITY

## 2025-03-02 RX ORDER — PANTOPRAZOLE SODIUM 40 MG/1
40 TABLET, DELAYED RELEASE ORAL DAILY
COMMUNITY

## 2025-03-02 RX ORDER — MECOBALAMIN 5000 MCG
10 TABLET,DISINTEGRATING ORAL NIGHTLY
Status: DISCONTINUED | OUTPATIENT
Start: 2025-03-03 | End: 2025-03-04 | Stop reason: HOSPADM

## 2025-03-02 RX ORDER — CARVEDILOL 3.12 MG/1
3.12 TABLET ORAL 2 TIMES DAILY WITH MEALS
Status: DISCONTINUED | OUTPATIENT
Start: 2025-03-03 | End: 2025-03-04 | Stop reason: HOSPADM

## 2025-03-02 RX ORDER — POLYETHYLENE GLYCOL 3350 17 G/17G
17 POWDER, FOR SOLUTION ORAL DAILY PRN
Status: DISCONTINUED | OUTPATIENT
Start: 2025-03-02 | End: 2025-03-02

## 2025-03-02 RX ADMIN — IOPAMIDOL 75 ML: 755 INJECTION, SOLUTION INTRAVENOUS at 19:02

## 2025-03-02 NOTE — ED PROVIDER NOTES
14.7 11.9 - 14.9 sec    INR 1.13 0.85 - 1.15   EKG 12 Lead   Result Value Ref Range    Ventricular Rate 83 BPM    Atrial Rate 83 BPM    P-R Interval 184 ms    QRS Duration 98 ms    Q-T Interval 410 ms    QTc Calculation (Bazett) 481 ms    P Axis 45 degrees    R Axis -42 degrees    T Axis 253 degrees    Diagnosis       Normal sinus rhythmPossible Left atrial enlargementLeft axis deviationAnterior infarct , age undeterminedST & T wave abnormality, consider lateral ischemiaQTcB >= 480 msecAbnormal ECG       EKG     Rhythm: Normal sinus rhythm  Rate: Normal  Axis: left  Ectopy: None  Conduction: Normal  ST Segments: No ST segment deviation  T Waves: TWI I, avL, V5-6 (unchanged from prior)  Q Waves: None  Clinical Impression: NSR, lateral TWI      ED BEDSIDE ULTRASOUND:  No results found.    MOST RECENT VITALS:  BP: (!) 148/104,Temp: 97.2 °F (36.2 °C), Pulse: 83, Respirations: 16, SpO2: 97 %     Procedures         ED Course     Nursing Notes, Past Medical Hx, Past Surgical Hx, Social Hx,Allergies, and Family Hx were reviewed.         The patient was given the following medications:  Orders Placed This Encounter   Medications    iopamidol (ISOVUE-370) 76 % injection 75 mL       CONSULTS:  None      Past Medical, Surgical, Family, and Social History     Cindi has no past medical history on file.  Cindi has no past surgical history on file.  Cindi's family history is not on file.  Cindi reports that Cindi has never smoked. Cindi has never used smokeless tobacco. Cindi reports that Cindi does not drink alcohol and does not use drugs.    Medications     Previous Medications    No medications on file       Allergies     Cindi has No Known Allergies.    Physical Exam     INITIAL VITALS: BP: (!) 153/99, Temp: 97.2 °F (36.2 °C), Pulse: 90, Respirations: 16, SpO2: 95 %     General:  Well appearing. No acute distress.  Non-toxic appearing  Eyes:  Pupils equally round, reactive, brisk. No discharge from eyes.   ENT:  No discharge

## 2025-03-03 PROBLEM — M25.641 STIFFNESS OF RIGHT HAND JOINT: Status: ACTIVE | Noted: 2025-03-02

## 2025-03-03 PROBLEM — R29.898 RIGHT ARM WEAKNESS: Status: ACTIVE | Noted: 2025-03-03

## 2025-03-03 LAB
ALBUMIN SERPL-MCNC: 3.6 G/DL (ref 3.4–5)
ALBUMIN SERPL-MCNC: 3.9 G/DL (ref 3.4–5)
ANION GAP SERPL CALCULATED.3IONS-SCNC: 13 MMOL/L (ref 3–16)
ANION GAP SERPL CALCULATED.3IONS-SCNC: 15 MMOL/L (ref 3–16)
BASOPHILS # BLD: 0 K/UL (ref 0–0.2)
BASOPHILS # BLD: 0.1 K/UL (ref 0–0.2)
BASOPHILS NFR BLD: 0.6 %
BASOPHILS NFR BLD: 0.7 %
BUN SERPL-MCNC: 71 MG/DL (ref 7–20)
BUN SERPL-MCNC: 72 MG/DL (ref 7–20)
CALCIUM SERPL-MCNC: 9.5 MG/DL (ref 8.3–10.6)
CALCIUM SERPL-MCNC: 9.6 MG/DL (ref 8.3–10.6)
CHLORIDE SERPL-SCNC: 95 MMOL/L (ref 99–110)
CHLORIDE SERPL-SCNC: 97 MMOL/L (ref 99–110)
CO2 SERPL-SCNC: 20 MMOL/L (ref 21–32)
CO2 SERPL-SCNC: 22 MMOL/L (ref 21–32)
CREAT SERPL-MCNC: 7.6 MG/DL (ref 0.6–1.1)
CREAT SERPL-MCNC: 7.6 MG/DL (ref 0.9–1.3)
DEPRECATED RDW RBC AUTO: 19.8 % (ref 12.4–15.4)
DEPRECATED RDW RBC AUTO: 19.9 % (ref 12.4–15.4)
EKG ATRIAL RATE: 83 BPM
EKG DIAGNOSIS: NORMAL
EKG P AXIS: 45 DEGREES
EKG P-R INTERVAL: 184 MS
EKG Q-T INTERVAL: 410 MS
EKG QRS DURATION: 98 MS
EKG QTC CALCULATION (BAZETT): 481 MS
EKG R AXIS: -42 DEGREES
EKG T AXIS: 253 DEGREES
EKG VENTRICULAR RATE: 83 BPM
EOSINOPHIL # BLD: 0.6 K/UL (ref 0–0.6)
EOSINOPHIL # BLD: 0.7 K/UL (ref 0–0.6)
EOSINOPHIL NFR BLD: 8.5 %
EOSINOPHIL NFR BLD: 9.7 %
GFR SERPLBLD CREATININE-BSD FMLA CKD-EPI: 6 ML/MIN/{1.73_M2}
GFR SERPLBLD CREATININE-BSD FMLA CKD-EPI: 8 ML/MIN/{1.73_M2}
GLUCOSE BLD-MCNC: 119 MG/DL (ref 70–99)
GLUCOSE BLD-MCNC: 200 MG/DL (ref 70–99)
GLUCOSE BLD-MCNC: 213 MG/DL (ref 70–99)
GLUCOSE SERPL-MCNC: 127 MG/DL (ref 70–99)
GLUCOSE SERPL-MCNC: 98 MG/DL (ref 70–99)
HCT VFR BLD AUTO: 30.2 % (ref 40.5–52.5)
HCT VFR BLD AUTO: 30.8 % (ref 36–48)
HGB BLD-MCNC: 9.4 G/DL (ref 13.5–17.5)
HGB BLD-MCNC: 9.7 G/DL (ref 12–16)
LYMPHOCYTES # BLD: 1.4 K/UL (ref 1–5.1)
LYMPHOCYTES # BLD: 1.7 K/UL (ref 1–5.1)
LYMPHOCYTES NFR BLD: 18.8 %
LYMPHOCYTES NFR BLD: 22.3 %
MAGNESIUM SERPL-MCNC: 2.85 MG/DL (ref 1.8–2.4)
MCH RBC QN AUTO: 27.1 PG (ref 26–34)
MCH RBC QN AUTO: 27.2 PG (ref 26–34)
MCHC RBC AUTO-ENTMCNC: 31.1 G/DL (ref 31–36)
MCHC RBC AUTO-ENTMCNC: 31.6 G/DL (ref 31–36)
MCV RBC AUTO: 86.1 FL (ref 80–100)
MCV RBC AUTO: 87 FL (ref 80–100)
MONOCYTES # BLD: 0.6 K/UL (ref 0–1.3)
MONOCYTES # BLD: 0.8 K/UL (ref 0–1.3)
MONOCYTES NFR BLD: 10.2 %
MONOCYTES NFR BLD: 7.4 %
NEUTROPHILS # BLD: 4.4 K/UL (ref 1.7–7.7)
NEUTROPHILS # BLD: 4.7 K/UL (ref 1.7–7.7)
NEUTROPHILS NFR BLD: 58.4 %
NEUTROPHILS NFR BLD: 63.4 %
PERFORMED ON: ABNORMAL
PHOSPHATE SERPL-MCNC: 4.9 MG/DL (ref 2.5–4.9)
PHOSPHATE SERPL-MCNC: 5 MG/DL (ref 2.5–4.9)
PLATELET # BLD AUTO: 211 K/UL (ref 135–450)
PLATELET # BLD AUTO: 244 K/UL (ref 135–450)
PMV BLD AUTO: 7.1 FL (ref 5–10.5)
PMV BLD AUTO: 7.2 FL (ref 5–10.5)
POTASSIUM SERPL-SCNC: 5.3 MMOL/L (ref 3.5–5.1)
POTASSIUM SERPL-SCNC: 5.7 MMOL/L (ref 3.5–5.1)
RBC # BLD AUTO: 3.47 M/UL (ref 4.2–5.9)
RBC # BLD AUTO: 3.57 M/UL (ref 4–5.2)
SODIUM SERPL-SCNC: 130 MMOL/L (ref 136–145)
SODIUM SERPL-SCNC: 132 MMOL/L (ref 136–145)
TSH SERPL DL<=0.005 MIU/L-ACNC: 1.43 UIU/ML (ref 0.27–4.2)
WBC # BLD AUTO: 7.5 K/UL (ref 4–11)
WBC # BLD AUTO: 7.5 K/UL (ref 4–11)

## 2025-03-03 PROCEDURE — 2500000003 HC RX 250 WO HCPCS

## 2025-03-03 PROCEDURE — 97166 OT EVAL MOD COMPLEX 45 MIN: CPT

## 2025-03-03 PROCEDURE — APPNB45 APP NON BILLABLE 31-45 MINUTES

## 2025-03-03 PROCEDURE — 90935 HEMODIALYSIS ONE EVALUATION: CPT

## 2025-03-03 PROCEDURE — 6360000002 HC RX W HCPCS

## 2025-03-03 PROCEDURE — 1200000000 HC SEMI PRIVATE

## 2025-03-03 PROCEDURE — 6370000000 HC RX 637 (ALT 250 FOR IP)

## 2025-03-03 PROCEDURE — 5A1D70Z PERFORMANCE OF URINARY FILTRATION, INTERMITTENT, LESS THAN 6 HOURS PER DAY: ICD-10-PCS | Performed by: INTERNAL MEDICINE

## 2025-03-03 PROCEDURE — 99223 1ST HOSP IP/OBS HIGH 75: CPT | Performed by: PSYCHIATRY & NEUROLOGY

## 2025-03-03 PROCEDURE — 6370000000 HC RX 637 (ALT 250 FOR IP): Performed by: STUDENT IN AN ORGANIZED HEALTH CARE EDUCATION/TRAINING PROGRAM

## 2025-03-03 PROCEDURE — 97530 THERAPEUTIC ACTIVITIES: CPT

## 2025-03-03 PROCEDURE — 97161 PT EVAL LOW COMPLEX 20 MIN: CPT

## 2025-03-03 PROCEDURE — 97116 GAIT TRAINING THERAPY: CPT

## 2025-03-03 PROCEDURE — 80061 LIPID PANEL: CPT

## 2025-03-03 PROCEDURE — 85025 COMPLETE CBC W/AUTO DIFF WBC: CPT

## 2025-03-03 PROCEDURE — 83735 ASSAY OF MAGNESIUM: CPT

## 2025-03-03 PROCEDURE — 83036 HEMOGLOBIN GLYCOSYLATED A1C: CPT

## 2025-03-03 PROCEDURE — 97535 SELF CARE MNGMENT TRAINING: CPT

## 2025-03-03 PROCEDURE — 36415 COLL VENOUS BLD VENIPUNCTURE: CPT

## 2025-03-03 PROCEDURE — 80069 RENAL FUNCTION PANEL: CPT

## 2025-03-03 PROCEDURE — 84443 ASSAY THYROID STIM HORMONE: CPT

## 2025-03-03 RX ORDER — CLOPIDOGREL BISULFATE 75 MG/1
75 TABLET ORAL DAILY
Status: DISCONTINUED | OUTPATIENT
Start: 2025-03-03 | End: 2025-03-04 | Stop reason: HOSPADM

## 2025-03-03 RX ORDER — TORSEMIDE 100 MG/1
100 TABLET ORAL DAILY
Status: DISCONTINUED | OUTPATIENT
Start: 2025-03-03 | End: 2025-03-04 | Stop reason: HOSPADM

## 2025-03-03 RX ORDER — INSULIN LISPRO 100 [IU]/ML
0-4 INJECTION, SOLUTION INTRAVENOUS; SUBCUTANEOUS
Status: DISCONTINUED | OUTPATIENT
Start: 2025-03-03 | End: 2025-03-04 | Stop reason: HOSPADM

## 2025-03-03 RX ORDER — GLUCAGON 1 MG/ML
1 KIT INJECTION PRN
Status: DISCONTINUED | OUTPATIENT
Start: 2025-03-03 | End: 2025-03-04 | Stop reason: HOSPADM

## 2025-03-03 RX ORDER — OXYCODONE HYDROCHLORIDE 5 MG/1
2.5 TABLET ORAL ONCE
Status: COMPLETED | OUTPATIENT
Start: 2025-03-03 | End: 2025-03-03

## 2025-03-03 RX ORDER — DEXTROSE MONOHYDRATE 100 MG/ML
INJECTION, SOLUTION INTRAVENOUS CONTINUOUS PRN
Status: DISCONTINUED | OUTPATIENT
Start: 2025-03-03 | End: 2025-03-04 | Stop reason: HOSPADM

## 2025-03-03 RX ORDER — 0.9 % SODIUM CHLORIDE 0.9 %
100 INTRAVENOUS SOLUTION INTRAVENOUS PRN
Status: DISCONTINUED | OUTPATIENT
Start: 2025-03-03 | End: 2025-03-04 | Stop reason: HOSPADM

## 2025-03-03 RX ORDER — ARIPIPRAZOLE 5 MG/1
10 TABLET ORAL DAILY
Status: DISCONTINUED | OUTPATIENT
Start: 2025-03-03 | End: 2025-03-04 | Stop reason: HOSPADM

## 2025-03-03 RX ADMIN — INSULIN LISPRO 1 UNITS: 100 INJECTION, SOLUTION INTRAVENOUS; SUBCUTANEOUS at 21:37

## 2025-03-03 RX ADMIN — NIFEDIPINE 60 MG: 60 TABLET, EXTENDED RELEASE ORAL at 09:30

## 2025-03-03 RX ADMIN — Medication 10 MG: at 00:14

## 2025-03-03 RX ADMIN — SEVELAMER CARBONATE 800 MG: 800 TABLET, FILM COATED ORAL at 17:55

## 2025-03-03 RX ADMIN — HEPARIN SODIUM 5000 UNITS: 5000 INJECTION INTRAVENOUS; SUBCUTANEOUS at 21:38

## 2025-03-03 RX ADMIN — ATORVASTATIN CALCIUM 80 MG: 80 TABLET, FILM COATED ORAL at 00:14

## 2025-03-03 RX ADMIN — NIFEDIPINE 60 MG: 60 TABLET, EXTENDED RELEASE ORAL at 00:14

## 2025-03-03 RX ADMIN — BENZTROPINE MESYLATE 0.5 MG: 1 TABLET ORAL at 00:14

## 2025-03-03 RX ADMIN — LOSARTAN POTASSIUM 25 MG: 25 TABLET, FILM COATED ORAL at 00:14

## 2025-03-03 RX ADMIN — SEVELAMER CARBONATE 800 MG: 800 TABLET, FILM COATED ORAL at 13:24

## 2025-03-03 RX ADMIN — SODIUM CHLORIDE, PRESERVATIVE FREE 10 ML: 5 INJECTION INTRAVENOUS at 09:34

## 2025-03-03 RX ADMIN — CLOPIDOGREL BISULFATE 75 MG: 75 TABLET ORAL at 09:30

## 2025-03-03 RX ADMIN — CARVEDILOL 3.12 MG: 3.12 TABLET, FILM COATED ORAL at 17:54

## 2025-03-03 RX ADMIN — TORSEMIDE 100 MG: 100 TABLET ORAL at 09:34

## 2025-03-03 RX ADMIN — GABAPENTIN 100 MG: 100 CAPSULE ORAL at 21:38

## 2025-03-03 RX ADMIN — PANTOPRAZOLE SODIUM 40 MG: 40 TABLET, DELAYED RELEASE ORAL at 09:31

## 2025-03-03 RX ADMIN — INSULIN LISPRO 1 UNITS: 100 INJECTION, SOLUTION INTRAVENOUS; SUBCUTANEOUS at 13:24

## 2025-03-03 RX ADMIN — GABAPENTIN 100 MG: 100 CAPSULE ORAL at 00:14

## 2025-03-03 RX ADMIN — HEPARIN SODIUM 5000 UNITS: 5000 INJECTION INTRAVENOUS; SUBCUTANEOUS at 06:02

## 2025-03-03 RX ADMIN — BENZTROPINE MESYLATE 0.5 MG: 1 TABLET ORAL at 21:37

## 2025-03-03 RX ADMIN — Medication 1 CAPSULE: at 09:34

## 2025-03-03 RX ADMIN — NIFEDIPINE 60 MG: 60 TABLET, EXTENDED RELEASE ORAL at 21:37

## 2025-03-03 RX ADMIN — POLYETHYLENE GLYCOL 3350 17 G: 17 POWDER, FOR SOLUTION ORAL at 09:32

## 2025-03-03 RX ADMIN — HEPARIN SODIUM 5000 UNITS: 5000 INJECTION INTRAVENOUS; SUBCUTANEOUS at 00:13

## 2025-03-03 RX ADMIN — SODIUM CHLORIDE, PRESERVATIVE FREE 10 ML: 5 INJECTION INTRAVENOUS at 21:00

## 2025-03-03 RX ADMIN — BUPROPION HYDROCHLORIDE 150 MG: 150 TABLET, FILM COATED, EXTENDED RELEASE ORAL at 09:30

## 2025-03-03 RX ADMIN — ARIPIPRAZOLE 10 MG: 5 TABLET ORAL at 09:30

## 2025-03-03 RX ADMIN — OXYCODONE 2.5 MG: 5 TABLET ORAL at 13:23

## 2025-03-03 RX ADMIN — CARVEDILOL 3.12 MG: 3.12 TABLET, FILM COATED ORAL at 09:31

## 2025-03-03 RX ADMIN — Medication 10 MG: at 21:38

## 2025-03-03 RX ADMIN — LOSARTAN POTASSIUM 25 MG: 25 TABLET, FILM COATED ORAL at 21:37

## 2025-03-03 RX ADMIN — ATORVASTATIN CALCIUM 80 MG: 80 TABLET, FILM COATED ORAL at 21:38

## 2025-03-03 RX ADMIN — SODIUM ZIRCONIUM CYCLOSILICATE 5 G: 5 POWDER, FOR SUSPENSION ORAL at 09:34

## 2025-03-03 RX ADMIN — SEVELAMER CARBONATE 800 MG: 800 TABLET, FILM COATED ORAL at 09:30

## 2025-03-03 RX ADMIN — HEPARIN SODIUM 5000 UNITS: 5000 INJECTION INTRAVENOUS; SUBCUTANEOUS at 17:55

## 2025-03-03 ASSESSMENT — PAIN DESCRIPTION - FREQUENCY: FREQUENCY: CONTINUOUS

## 2025-03-03 ASSESSMENT — PAIN DESCRIPTION - PAIN TYPE
TYPE: CHRONIC PAIN
TYPE: CHRONIC PAIN

## 2025-03-03 ASSESSMENT — PAIN DESCRIPTION - ONSET: ONSET: ON-GOING

## 2025-03-03 ASSESSMENT — PAIN DESCRIPTION - DESCRIPTORS
DESCRIPTORS: ACHING
DESCRIPTORS: ACHING

## 2025-03-03 ASSESSMENT — PAIN DESCRIPTION - LOCATION
LOCATION: LEG
LOCATION: LEG;SHOULDER

## 2025-03-03 ASSESSMENT — PAIN - FUNCTIONAL ASSESSMENT: PAIN_FUNCTIONAL_ASSESSMENT: ACTIVITIES ARE NOT PREVENTED

## 2025-03-03 ASSESSMENT — PAIN SCALES - GENERAL
PAINLEVEL_OUTOF10: 10

## 2025-03-03 ASSESSMENT — PAIN DESCRIPTION - ORIENTATION
ORIENTATION: LEFT
ORIENTATION: LEFT;RIGHT

## 2025-03-03 NOTE — DISCHARGE INSTRUCTIONS
[facility] Stroke Program Survey  [facility] [place] values your feedback related to your recent hospital visit and admission. We strive to improve our Neuroscience program to promote better outcomes and recoveries for all our patients.  The anonymous survey below consists of a few questions that are related to your stay and around your Stroke diagnosis, treatment, and recovery. It is anonymous and has only a few questions.  The estimated length of time needed to complete this survey is 3 minutes or less. Thank you for completing this survey!       You were admitted with R arm pain. You were assessed to have transient upper extremity palsy due to compression of nerve likely due to sleep positioning. Your stroke work up was negative  - We DECREASED your Procardia from 60mg twice daily to 60mg once daily. Please take 60mg once daily  - We STOPPED your Cozaar given your blood pressures were low after dialysis. Please stop taking this at home  - Please take your medications as noted below    Follow up:  - Call to schedule an appointment as soon as possible for a visit in 1 week with your nephrologist  - Call to schedule an appointment as soon as possible for a visit with neurology at [facility].  - Call to schedule an appointment as soon as possible for a visit in 1 week with your PCP    You should return to the emergency department if your symptoms recur, worsen, or do not resolve. In addition, return if:  You have a fever (greater than 101 degrees F),  You have chest pain, shortness of breath, abdominal pain, or uncontrollable vomiting,  You are unable to eat or drink,  You pass out,  You have difficulty moving your arms or legs,   You have difficulty speaking or slurred speech,  Or, you have any concern that you feel needs acute physician evaluation.    Thank you for allowing us to take care of you during your stay at [facility]

## 2025-03-03 NOTE — ED NOTES
Patient Name: Cindi Rodriguez  : 1965 59 y.o.  MRN: [id number]  ED Room #: B20/B20-20     Chief complaint:   Chief Complaint   Patient presents with    Extremity Weakness     Pt coming from [facility] for R sided weakness starting when she woke up around 3. States she has weakness in R arm at baseline but when she woke up there was numbness in hands. Has hx of stroke. Was just recently d/c from here      Hospital Problem/Diagnosis:       O2 Flow Rate:O2 Device: Nasal cannula O2 Flow Rate (L/min): 3 L/min (if applicable)  Cardiac Rhythm:   (if applicable)  Active LDA's:   Peripheral IV 25 Right Forearm (Active)   Site Assessment Clean, dry & intact 25   Line Status Blood return noted 25            How does patient ambulate? Wheelchair    2. How does patient take pills? Unknown, no oral medications were given in the Emergency Department    3. Is patient alert? Alert    4. Is patient oriented? To Person, To Place, To Time, To Situation, and Follows Commands    5.   Patient arrived from:  nursing home  Facility Name: ___________________________________________    6. If patient is disoriented or from a Skill Nursing Facility has family been notified of admission? No    7. Patient belongings? Belongings: Cell Phone    Disposition of belongings? Kept with Patient     8. Any specific patient or family belongings/needs/dynamics?   a. none    9. Miscellaneous comments/pending orders?  a. Vas cath to R chest, 20g PIV R forearm, no pending ED orders       If there are any additional questions please reach out to the Emergency Department.       Ama Evans, RN  [phone]

## 2025-03-03 NOTE — NURSE NAVIGATOR
Patient's chart reviewed for Stroke Core Measures and additional needs:    [x]   VTE prophylaxis - SQ Heparin ordered / administered, see eMAR    [x]   Antithrombotic (if applicable) - Plavix ordered / administered, see eMAR    [x]   Swallow screen prior to PO intake - Completed    [x]   Lipids / A1C ordered or resulted - Ordered    [x]   Therapy ordered - PT eval completed; OT ordered    [x]   Care plan and Education template - Added     Patient's personal risk factors specific to stroke/TIA include: ESRD on HD (TTS), type 2 diabetes, hypertension, hyperlipidemia, CAD s/p stents (2023), CVA (2019)    Navigator to continue to follow patient while admitted, to assist with follow up and discharge planning as needed.     Nurse eSignature: Electronically signed by Rolanda Shi RN on 3/3/25 at 2:50 PM EST - Neuroscience Navigator

## 2025-03-03 NOTE — PLAN OF CARE
[facility] Hospitalist brief note  Consult received.  Case reviewed with ER physician- TIA work up, R hand weakness    Full note to follow.    No primary care provider on file.    Thanks  MEGAN NESS MD

## 2025-03-03 NOTE — H&P
Internal Medicine H&P    Date:   3/2/2025   Patient:  Cindi Rodriguez   :   1965     CC:  Extremity Weakness (Pt coming from [facility] for R sided weakness starting when she woke up around 3. States she has weakness in R arm at baseline but when she woke up there was numbness in hands. Has hx of stroke. Was just recently d/c from here )       Source of HPI: Chart review, patient    Subjective     HPI:     Cindi Rodriguez is a 59 y.o. adult with a MHx significant for, ESRD on HD (TTS), type 2 diabetes, hypertension, hyperlipidemia, CAD s/p stents (), COPD (3 L home oxygen), CVA (), who presented to the ED on 3/2 with right hand weakness.    Before going to bed the patient was experiencing no symptoms. She fell asleep on her right side. She woke up with numbness in her right arm and hand that self resolved. She went to sleep again in the same position and woke up with right hand contracted. It was painless but she noticed it when when she was reaching for items on her end table and knocking them over. She says her hand felt tight and explains that she regained function after three hours after messaging her hand. It sounds like her hand, wrist were never flaccid. She never had similar pain in the past but staff at her living facility called EMS concerned given the patients history of stroke. Acute stroke work-up negative in the emergency department and the patient was admitted for TIA work-up.     On examination, patient is not complaining of pain but has been having intermittent headache the last couple days. She denies neck and back pain. She denies change in vision. She has torn rotator cuffs bilaterally limiting her range of motion. Endorses weakness in right upper extremity and left lower leg due to pain. Denies numbness and tingling in all extremities. Endorses sensation change in bilateral lower legs, left more than right. She has nonhealing wound left lower leg and only doppler PT and DP

## 2025-03-03 NOTE — CONSULTS
Ph: [phone], Fax: [phone]           [facility]               Reason for admission:                     Brief Summary :     Cindi Rodriguez is being seen by nephrology for ESRD on hemodialysis      Interval History and plan:      Blood pressure is well-controlled  Labs were reviewed.    Plan:    Estimated dry weight is 56 kg.  I will arrange for ultrafiltration/dialysis today.  Will aim to removal volume with dialysis.  Hold Procrit for now.  Continue sodium thiosulfate on Tuesday, thousand Saturday with dialysis.  Regular schedule is Tuesday, Thursday and Saturday for dialysis.  Continue current antihypertensives.                   Assessment :     1.  ESRD:  Will plan HD per schedule.  She gets dialysis on Tuesday, Thursday and Saturday.  Access: Tunneled hemodialysis catheter.  Daily weights  Fluid restriction: 1 L.  Nephrocap 1 tab PO daily    2.  Anemia:  Erythropoetin dose: I will hold Epogen for now as her hemoglobin is more than 10.  3.  Osteodystrophy:  Phosphate Binder: She will continue with Renvela 1 tab p.o. 3 times daily with meals.  She is also on sodium thiosulfate with dialysis for history of calciphylaxis.    4.  Hypertension: Is better controlled.  She will continue with carvedilol, losartan, Procardia XL twice daily and torsemide.    Echocardiogram showed EF of 25%.    5.  She is on Lokelma for intermittent hyperkalemia.    6.  TIA workup per primary team.             [facility] would like to thank Dwayne Blanchard MD   for opportunity to serve this patient      Please call with questions at-   24 Hrs Answering service [phone] or  7 am- 5 pm via Perfect serve or cell phone  Dr.Muhammad Salvador Castaneda MD       HPI :     Cindi Rodriguez is a 59 y.o. adult presented to   the hospital on 3/2/2025 with right arm numbness with prior history of stroke.    She has past medical history of ESRD on hemodialysis.  She gets dialysis on Tuesday, Thursday and Saturday.  She 
of the carotid bifurcations and severe calcifications of the parasellar internal carotid arteries but the degree of stenosis cannot be evaluated on this study. Recommend repeating the    study when the patient is able to better tolerate the imaging without movement.   2.  No evidence of a large vessel occlusion in the head.   3.  Moderate right pleural effusion with bilateral upper lobe airspace disease suspicious for multifocal pneumonia.   4.  Severe degenerative disc disease with grade 2 anterolisthesis of C4 on C5.      CT Head W/O Contrast   My Read: atrophy; ethel  Final Read:    1. Normal brain   2. Exophthalmos              Discussed at length with patient; nursing; and Levar Arrington, SAY Waite MD  Neurology/Neurocritical Care  March 3, 2025               Neurology Consult Note    Patient: Cindi Rodriguez MRN: [id number]    YOB: 1965  Age: 59 y.o.  Sex: adult   Unit: [facility] 5T ORTHO/NEURO  Room/Bed: 5524/5524-01 Location: [facility]    Date of Consultation: 3/3/2025  Date of Admission: 3/2/2025  5:11 PM ( LOS: 1 day )  Primary Care Physician: No primary care provider on file.   Consult Requested By: Vale Devlin,*    Reason for Consult: \"Resolved right hand weakness - suspect myelopathy rather than TIA\"    IMPRESSION & RECOMMENDATIONS     IMPRESSION:  Cindi Rodriguez is a 58 yo female who presents with resolved R hand stiffness and pain. Patient has know h/o stroke with severe calcifications found on CTA head/neck. CT head unremarkable. Concern for TIA vs musculoskeletal issue.    RECOMMENDATIONS:  Q4h neuro checks  MRI brain w/o contrast  HgbA1c and lipid panel  Continue home plavix and lipitor  Maintain good secondary prevention of stroke measures including daily antiplatelet agent; systolic blood pressure < 130 mm Hg AND diastolic blood pressure < 80 mm Hg; euglycemia with A1c goal < 7%; LDL goal < 70 mg/dL; and smoking cessation.      Management and

## 2025-03-03 NOTE — CARE COORDINATION
Case Management Assessment  Initial Evaluation    Date/Time of Evaluation: 3/3/2025 3:56 PM  Assessment Completed by: Renata Christian RN    If patient is discharged prior to next notation, then this note serves as note for discharge by case management.    Patient Name: Cindi Rodriguez                   YOB: 1965  Diagnosis: TIA (transient ischemic attack) [G45.9]  Right arm weakness [R29.898]                   Date / Time: 3/2/2025  5:11 PM    Patient Admission Status: Inpatient   Readmission Risk (Low < 19, Mod (19-27), High > 27): Readmission Risk Score: 16.2    Current PCP: No primary care provider on file.  PCP verified by CM? No    Chart Reviewed: Yes      History Provided by: Patient  Patient Orientation: Alert and Oriented    Patient Cognition: Alert    Hospitalization in the last 30 days (Readmission):  No    If yes, Readmission Assessment in CM Navigator will be completed.    Advance Directives:      Code Status: Full Code   Patient's Primary Decision Maker is: Legal Next of Kin      Discharge Planning:    Patient lives with: Alone Type of Home: Long-Term Care  Primary Care Giver: Self  Patient Support Systems include: Family Members   Current Financial resources: Medicaid  Current community resources: ECF/Home Care  Current services prior to admission: Other (Comment) ([facility] enciso)            Current DME:              Type of Home Care services:  None    ADLS  Prior functional level: Assistance with the following:, Bathing, Mobility  Current functional level: Assistance with the following:, Bathing, Mobility    PT AM-PAC: 17 /24  OT AM-PAC: 16 /24    Family can provide assistance at DC: No  Would you like Case Management to discuss the discharge plan with any other family members/significant others, and if so, who? No  Plans to Return to Present Housing: Yes  Other Identified Issues/Barriers to RETURNING to current housing: none  Potential Assistance needed at discharge: N/A

## 2025-03-04 ENCOUNTER — APPOINTMENT (OUTPATIENT)
Dept: MRI IMAGING | Age: 60
DRG: 425 | End: 2025-03-04
Payer: MEDICAID

## 2025-03-04 VITALS
DIASTOLIC BLOOD PRESSURE: 94 MMHG | RESPIRATION RATE: 16 BRPM | OXYGEN SATURATION: 97 % | WEIGHT: 132.72 LBS | HEIGHT: 68 IN | SYSTOLIC BLOOD PRESSURE: 147 MMHG | BODY MASS INDEX: 20.11 KG/M2 | HEART RATE: 82 BPM | TEMPERATURE: 97.9 F

## 2025-03-04 LAB
ALBUMIN SERPL-MCNC: 3.5 G/DL (ref 3.4–5)
ANION GAP SERPL CALCULATED.3IONS-SCNC: 10 MMOL/L (ref 3–16)
BASOPHILS # BLD: 0.1 K/UL (ref 0–0.2)
BASOPHILS NFR BLD: 0.7 %
BUN SERPL-MCNC: 36 MG/DL (ref 7–20)
CALCIUM SERPL-MCNC: 9 MG/DL (ref 8.3–10.6)
CHLORIDE SERPL-SCNC: 97 MMOL/L (ref 99–110)
CHOLEST SERPL-MCNC: 92 MG/DL (ref 0–199)
CHOLEST SERPL-MCNC: 96 MG/DL (ref 0–199)
CO2 SERPL-SCNC: 25 MMOL/L (ref 21–32)
CREAT SERPL-MCNC: 5 MG/DL (ref 0.9–1.3)
DEPRECATED RDW RBC AUTO: 19.2 % (ref 12.4–15.4)
EOSINOPHIL # BLD: 0.7 K/UL (ref 0–0.6)
EOSINOPHIL NFR BLD: 9.9 %
EST. AVERAGE GLUCOSE BLD GHB EST-MCNC: 105.4 MG/DL
GFR SERPLBLD CREATININE-BSD FMLA CKD-EPI: 13 ML/MIN/{1.73_M2}
GLUCOSE BLD-MCNC: 131 MG/DL (ref 70–99)
GLUCOSE BLD-MCNC: 134 MG/DL (ref 70–99)
GLUCOSE SERPL-MCNC: 85 MG/DL (ref 70–99)
HBA1C MFR BLD: 5.3 %
HCT VFR BLD AUTO: 28.2 % (ref 40.5–52.5)
HDLC SERPL-MCNC: 38 MG/DL (ref 40–60)
HDLC SERPL-MCNC: 39 MG/DL (ref 40–60)
HGB BLD-MCNC: 9 G/DL (ref 13.5–17.5)
LDLC SERPL CALC-MCNC: 15 MG/DL
LDLC SERPL CALC-MCNC: 22 MG/DL
LYMPHOCYTES # BLD: 1.3 K/UL (ref 1–5.1)
LYMPHOCYTES NFR BLD: 19.2 %
MAGNESIUM SERPL-MCNC: 2.4 MG/DL (ref 1.8–2.4)
MCH RBC QN AUTO: 27.3 PG (ref 26–34)
MCHC RBC AUTO-ENTMCNC: 31.8 G/DL (ref 31–36)
MCV RBC AUTO: 85.9 FL (ref 80–100)
MONOCYTES # BLD: 0.8 K/UL (ref 0–1.3)
MONOCYTES NFR BLD: 11 %
NEUTROPHILS # BLD: 4.1 K/UL (ref 1.7–7.7)
NEUTROPHILS NFR BLD: 59.2 %
PERFORMED ON: ABNORMAL
PERFORMED ON: ABNORMAL
PHOSPHATE SERPL-MCNC: 3.9 MG/DL (ref 2.5–4.9)
PLATELET # BLD AUTO: 177 K/UL (ref 135–450)
PMV BLD AUTO: 6.5 FL (ref 5–10.5)
POTASSIUM SERPL-SCNC: 5 MMOL/L (ref 3.5–5.1)
RBC # BLD AUTO: 3.29 M/UL (ref 4.2–5.9)
SODIUM SERPL-SCNC: 132 MMOL/L (ref 136–145)
TRIGL SERPL-MCNC: 173 MG/DL (ref 0–150)
TRIGL SERPL-MCNC: 193 MG/DL (ref 0–150)
VLDLC SERPL CALC-MCNC: 35 MG/DL
VLDLC SERPL CALC-MCNC: 39 MG/DL
WBC # BLD AUTO: 6.9 K/UL (ref 4–11)

## 2025-03-04 PROCEDURE — 6360000002 HC RX W HCPCS: Performed by: INTERNAL MEDICINE

## 2025-03-04 PROCEDURE — 85025 COMPLETE CBC W/AUTO DIFF WBC: CPT

## 2025-03-04 PROCEDURE — 90935 HEMODIALYSIS ONE EVALUATION: CPT

## 2025-03-04 PROCEDURE — 83735 ASSAY OF MAGNESIUM: CPT

## 2025-03-04 PROCEDURE — 6370000000 HC RX 637 (ALT 250 FOR IP)

## 2025-03-04 PROCEDURE — 86706 HEP B SURFACE ANTIBODY: CPT

## 2025-03-04 PROCEDURE — 87340 HEPATITIS B SURFACE AG IA: CPT

## 2025-03-04 PROCEDURE — 6370000000 HC RX 637 (ALT 250 FOR IP): Performed by: STUDENT IN AN ORGANIZED HEALTH CARE EDUCATION/TRAINING PROGRAM

## 2025-03-04 PROCEDURE — 80069 RENAL FUNCTION PANEL: CPT

## 2025-03-04 PROCEDURE — 70551 MRI BRAIN STEM W/O DYE: CPT

## 2025-03-04 PROCEDURE — 36415 COLL VENOUS BLD VENIPUNCTURE: CPT

## 2025-03-04 PROCEDURE — 80061 LIPID PANEL: CPT

## 2025-03-04 PROCEDURE — 6360000002 HC RX W HCPCS

## 2025-03-04 RX ORDER — HYDROCODONE BITARTRATE AND ACETAMINOPHEN 5; 325 MG/1; MG/1
1 TABLET ORAL ONCE
Status: COMPLETED | OUTPATIENT
Start: 2025-03-04 | End: 2025-03-04

## 2025-03-04 RX ORDER — NIFEDIPINE 60 MG/1
60 TABLET, EXTENDED RELEASE ORAL DAILY
Status: DISCONTINUED | OUTPATIENT
Start: 2025-03-04 | End: 2025-03-04 | Stop reason: HOSPADM

## 2025-03-04 RX ORDER — HEPARIN SODIUM 1000 [USP'U]/ML
3200 INJECTION, SOLUTION INTRAVENOUS; SUBCUTANEOUS PRN
Status: DISCONTINUED | OUTPATIENT
Start: 2025-03-04 | End: 2025-03-04 | Stop reason: HOSPADM

## 2025-03-04 RX ORDER — NIFEDIPINE 60 MG/1
60 TABLET, EXTENDED RELEASE ORAL DAILY
Qty: 30 TABLET | Refills: 3 | Status: SHIPPED | OUTPATIENT
Start: 2025-03-04

## 2025-03-04 RX ORDER — LIDOCAINE 4 G/G
1 PATCH TOPICAL DAILY
Status: DISCONTINUED | OUTPATIENT
Start: 2025-03-04 | End: 2025-03-04 | Stop reason: HOSPADM

## 2025-03-04 RX ORDER — SODIUM THIOSULFATE 250 MG/ML
9 INJECTION, SOLUTION INTRAVENOUS
Status: COMPLETED | OUTPATIENT
Start: 2025-03-04 | End: 2025-03-04

## 2025-03-04 RX ADMIN — ARIPIPRAZOLE 10 MG: 5 TABLET ORAL at 11:44

## 2025-03-04 RX ADMIN — HYDROCODONE BITARTRATE AND ACETAMINOPHEN 1 TABLET: 5; 325 TABLET ORAL at 00:44

## 2025-03-04 RX ADMIN — Medication 1 CAPSULE: at 11:50

## 2025-03-04 RX ADMIN — HEPARIN SODIUM 5000 UNITS: 5000 INJECTION INTRAVENOUS; SUBCUTANEOUS at 05:30

## 2025-03-04 RX ADMIN — HYDROCODONE BITARTRATE AND ACETAMINOPHEN 1 TABLET: 5; 325 TABLET ORAL at 15:49

## 2025-03-04 RX ADMIN — SEVELAMER CARBONATE 800 MG: 800 TABLET, FILM COATED ORAL at 17:01

## 2025-03-04 RX ADMIN — HEPARIN SODIUM 3200 UNITS: 1000 INJECTION INTRAVENOUS; SUBCUTANEOUS at 10:25

## 2025-03-04 RX ADMIN — PANTOPRAZOLE SODIUM 40 MG: 40 TABLET, DELAYED RELEASE ORAL at 11:44

## 2025-03-04 RX ADMIN — SODIUM THIOSULFATE 15.75 G: 250 INJECTION, SOLUTION INTRAVENOUS at 10:03

## 2025-03-04 RX ADMIN — SODIUM ZIRCONIUM CYCLOSILICATE 5 G: 5 POWDER, FOR SUSPENSION ORAL at 11:51

## 2025-03-04 RX ADMIN — CLOPIDOGREL BISULFATE 75 MG: 75 TABLET ORAL at 11:45

## 2025-03-04 RX ADMIN — CARVEDILOL 3.12 MG: 3.12 TABLET, FILM COATED ORAL at 17:01

## 2025-03-04 RX ADMIN — BUPROPION HYDROCHLORIDE 150 MG: 150 TABLET, FILM COATED, EXTENDED RELEASE ORAL at 11:44

## 2025-03-04 RX ADMIN — POLYETHYLENE GLYCOL 3350 17 G: 17 POWDER, FOR SOLUTION ORAL at 11:44

## 2025-03-04 RX ADMIN — HEPARIN SODIUM 5000 UNITS: 5000 INJECTION INTRAVENOUS; SUBCUTANEOUS at 15:50

## 2025-03-04 RX ADMIN — SEVELAMER CARBONATE 800 MG: 800 TABLET, FILM COATED ORAL at 11:45

## 2025-03-04 ASSESSMENT — PAIN DESCRIPTION - DESCRIPTORS
DESCRIPTORS: ACHING
DESCRIPTORS: ACHING

## 2025-03-04 ASSESSMENT — PAIN SCALES - GENERAL
PAINLEVEL_OUTOF10: 7
PAINLEVEL_OUTOF10: 10
PAINLEVEL_OUTOF10: 2
PAINLEVEL_OUTOF10: 2

## 2025-03-04 ASSESSMENT — PAIN DESCRIPTION - FREQUENCY: FREQUENCY: CONTINUOUS

## 2025-03-04 ASSESSMENT — PAIN DESCRIPTION - PAIN TYPE: TYPE: ACUTE PAIN

## 2025-03-04 ASSESSMENT — PAIN DESCRIPTION - ONSET: ONSET: ON-GOING

## 2025-03-04 ASSESSMENT — PAIN DESCRIPTION - LOCATION
LOCATION: LEG

## 2025-03-04 ASSESSMENT — PAIN DESCRIPTION - ORIENTATION
ORIENTATION: LEFT
ORIENTATION: LEFT

## 2025-03-04 ASSESSMENT — PAIN - FUNCTIONAL ASSESSMENT: PAIN_FUNCTIONAL_ASSESSMENT: ACTIVITIES ARE NOT PREVENTED

## 2025-03-04 NOTE — FLOWSHEET NOTE
03/04/25 0709 03/04/25 1021   Vital Signs   BP (!) 92/57 113/66   Temp 97.3 °F (36.3 °C) 97.7 °F (36.5 °C)   Pulse 72 79   Respirations 16 16       Treatment time: 3 hours  Net UF: 1.1 L    Pre weight: 61.3 kg (standing scale)  Post weight: 60.2 kg (standing scale)  EDW: 56 kg    Access used: RIJ HD tunneled cath  Access function: No problems    Medications or blood products given: Na Thiosulfate 15.75 Gm IVP    Regular outpatient schedule: [facility]    Summary of response to treatment: Hypotension at onset and throughout tx, SBP 80's, total 400 ml NS given during tx to maintain SBP >90, Dr. Castaneda aware. Na Thiosulfate 15.75 Gm IVP given.  Net UF 1.1 L.     Copy of dialysis treatment record placed in chart, to be scanned into EMR.    Report called to Ashu Scott RN

## 2025-03-04 NOTE — DISCHARGE SUMMARY
symptoms or laboratory suspicion  - On scheduled HD-TTS: Planned UF today, and next HD in AM; hopefully should be able to wean oxygen after these sessions  - On GDMT: On aspirin, Plavix, as well as beta-blocker, ARB  - Strict I/os; Dly weights  - F/u with Cardiology as planned.      HTN   - Monitor Bps: Adjust meds as needed     COPD/ Asthma  - Patient smoked 7 pack-year smoking history   - Bronchodilators     Chronic normocytic anemia, probable anemia of CKD  History of iron deficiency anemia  -Monitor hemoglobin  -Gets ROXANA with Nephrology     Diabetes mellitus type 2  -Corrective insulin, monitoring for hypoglycemia secondary to insulin, with point-of-care blood glucose checks     ESRD on iHD TTS   Calciphylaxis  - Patient previously on PD c/b abscess formation. Dry weight was 51.4 kg on 08/29/2024. Last available dry weight was 58.6 on 12/17/2024.  Admission weight was 72 kg. Patient still make urine.   - Nephrology consulted, s/p emergent HD and UF; continue HD : Schedule TTS     Bipolar Disorder - Mirtazapine, aripiprazole  Depression - Bupropion  GERD - Pantoprazole  RLS - Benztropine  Anxiety - Hydroxyzine               Disposition:   Pxt is from SNF;DC back to SNF      > 31 minutes spent in the discharge of this patient           Clifford Wayne MD

## 2025-03-04 NOTE — PLAN OF CARE
Problem: Discharge Planning  Goal: Discharge to home or other facility with appropriate resources  Outcome: Progressing  Flowsheets (Taken 3/4/2025 1100)  Discharge to home or other facility with appropriate resources: Identify barriers to discharge with patient and caregiver   Pt will be assessed for readiness to discharge after MRI  Problem: Pain  Goal: Verbalizes/displays adequate comfort level or baseline comfort level  Outcome: Progressing  Flowsheets (Taken 3/4/2025 1052)  Verbalizes/displays adequate comfort level or baseline comfort level: Encourage patient to monitor pain and request assistance   Pt will have adequate pain control this shift  Problem: Safety - Adult  Goal: Free from fall injury  Outcome: Progressing   All fall prevention measures will remain in place

## 2025-03-04 NOTE — PLAN OF CARE
Problem: Discharge Planning  Goal: Discharge to home or other facility with appropriate resources  Outcome: Progressing  Flowsheets (Taken 3/3/2025 0800)  Discharge to home or other facility with appropriate resources: Identify barriers to discharge with patient and caregiver  Note: Pt involved in discharge planning. Barriers to discharge discussed with patient. Discharge learning needs identified. Discuss with patient any additional needed resources and transportation plans. Case management following plan of care.     Problem: Pain  Goal: Verbalizes/displays adequate comfort level or baseline comfort level  Outcome: Progressing  Note: Pt endorsing pain to left leg and right shoulder. Being treated with PRN pain medication, rest, and frequent repositioning with pillow support for comfort and pressure relief. Pt reports some relief from pain with above interventions.     Problem: Safety - Adult  Goal: Free from fall injury  Outcome: Progressing  Flowsheets (Taken 3/3/2025 1409)  Free From Fall Injury: Instruct family/caregiver on patient safety  Note: Fall precautions in place. Bed is in lowest position, wheels locked, bed alarm on, non skid socks on. Call light and bedside table within reach. Pt calls out appropriately. Pt is up x1 gb walker. Will continue to assess and monitor.

## 2025-03-04 NOTE — CARE COORDINATION
Case Management Assessment            Discharge Note                    Date / Time of Note: 3/4/2025 4:48 PM                  Discharge Note Completed by: Renata Christian RN    Patient Name: Cindi Rodriguez   YOB: 1965  Diagnosis: TIA (transient ischemic attack) [G45.9]  Right arm weakness [R29.898]   Date / Time: 3/2/2025  5:11 PM    Current PCP: No primary care provider on file.  Clinic patient: No    Hospitalization in the last 30 days: No       Advance Directives:  Code Status: Full Code  [place] DNR form completed and on chart: Not Indicated    Financial:  Payor: MEDICAID [place] / Plan: MEDICAID [facility] DEPT OF JOB / Product Type: *No Product type* /      Pharmacy:    [id number] - [place], [place] - [address] - P [phone] - F [phone]  [address]  [facility] [address]  Phone: [phone] Fax: [phone]      Assistance purchasing medications?:    Assistance provided by Case Management: None at this time    Does patient want to participate in local refill/ meds to beds program?: No    Meds To Beds General Rules:  1. Can ONLY be done Monday- Friday between 8:30am-5pm  2. Prescription(s) must be in pharmacy by 3pm to be filled same day  3.Copy of patient's insurance/ prescription drug card and patient face sheet must be sent along with the prescription(s)  4. Cost of Rx cannot be added to hospital bill. If financial assistance is needed, please contact unit  or ;  or  CANNOT provide pharmacy voucher for patients co-pays  5. Patients can then  the prescription on their way out of the hospital at discharge, or pharmacy can deliver to the bedside if staff is available. (payment due at time of pick-up or delivery - cash, check, or card accepted)     Able to afford home medications/ co-pay costs: Yes    ADLS:  Current PT AM-PAC Score: 17 /24  Current OT AM-PAC Score: 16 /24    DISCHARGE

## 2025-03-04 NOTE — PLAN OF CARE
Problem: Safety - Adult  Goal: Free from fall injury  3/3/2025 2259 by Kristian Tim RN  Outcome: Progressing  Note: All fall precautions in place. Bed locked and in lowest position with alarm on. Overbed table and personal belonings within reach. Call light within reach and patient instructed to use call light for assistance. Non-skid socks on.       Problem: ABCDS Injury Assessment  Goal: Absence of physical injury  Outcome: Progressing  Note: Patient has remained free of physical injury this shift. Fall and safety precautions in place. Bed exit alarm activated, bed in lowest position with wheels locked, call light and belongings within reach.

## 2025-03-04 NOTE — PROGRESS NOTES
Internal Medicine Progress Note    Patient: Cindi Rodriguez   : 1965   Admit Date: 3/2/2025     Date: 3/4/2025     Interval History     Patient denies chest pain, SOB and abdominal pain. Having persistent intermittent pain to the LLE which she states is chronic. Notes she had RUE pain in the morning. Had HD and MRI done.    Hemodynamically stable. Remains on baseline 3L.       Objective     I/Os:  I/O last 3 completed shifts:  In: 1700 [P.O.:1200]  Out: 2500       Vital Signs:  Patient Vitals for the past 5 hrs:   BP Temp Temp src Pulse Resp SpO2 Weight   25 1052 114/71 98.2 °F (36.8 °C) Oral 80 17 98 % --   25 1021 113/66 97.7 °F (36.5 °C) -- 79 16 -- 60.2 kg (132 lb 11.5 oz)       Physical Exam:    Physical Exam  Vitals reviewed.   Constitutional:       General: no acute distress.     Appearance:  ill-appearing.   HENT:      Head: Normocephalic. Atraumatic  Eyes:      Pupils: Pupils are equal, round, and reactive to light.   Cardiovascular:      Rate and Rhythm: Normal rate.   Pulmonary:      Effort: No respiratory distress.      Breath sounds: No wheezing.   Abdominal:      General: There is distension.      Palpations: Abdomen is soft.      Tenderness: There is no abdominal tenderness. There is no guarding or rebound.   Musculoskeletal:      Comments:   Bilateral lower leg hyperpigmentation. Lower lower leg medial aspect with nonhealing wound with crusting.  Skin:     General: Skin is dry.      Capillary Refill: Capillary refill takes less than 2 seconds.   Neurological:      Comments:   BUE strength 5/5   BLE: strength 5/5    Medications:   [Held by provider] NIFEdipine  60 mg Oral Daily    HYDROcodone 5 mg - acetaminophen  1 tablet Oral Once    ARIPiprazole  10 mg Oral Daily    clopidogrel  75 mg Oral Daily    [Held by provider] torsemide  100 mg Oral Daily    insulin lispro  0-4 Units SubCUTAneous 4x Daily AC & HS    sodium zirconium cyclosilicate  5 g Oral Daily    sodium chloride 
4 Eyes Admission Assessment     I agree as the admission nurse that 2 RN's have performed a thorough Head to Toe Skin Assessment on the patient. ALL assessment sites listed below have been assessed on admission.       Areas assessed by both nurses: ***  []   Head, Face, and Ears   []   Shoulders, Back, and Chest  []   Arms, Elbows, and Hands   []   Coccyx, Sacrum, and Ischium  []   Legs, Feet, and Heels  Stage 2 on L culf      Does the Patient have Skin Breakdown?  No         Papa Prevention initiated:  No   Wound Care Orders initiated:  No      [facility] nurse consulted for Pressure Injury (Stage 3,4, Unstageable, DTI, NWPT, and Complex wounds) or Papa score 18 or lower:  No      Nurse 1 eSignature: Electronically signed by Fernandez Plascencia RN on 3/2/25 at 10:38 PM EST    **SHARE this note so that the co-signing nurse is able to place an eSignature**    Nurse 2 eSignature: {Esignature:[id number]}   
Consulted for left lower leg. Pt currently off the unit. Will attempt to return later today if time allows. If not, will see pt tomorrow.   
Occupational Therapy  Attempt Treatment    Chart reviewed. RN consulted. Pt off floor at this time. Will follow up per POC. Cont with POC      Tc YEN/TENZIN      
Patient A&O x4. Vital signs stable. Medications managed per MAR. Patient is a x1 GB and walker. Safety precautions in place, bedside table and call light within reach  
Patient admitted to room 5524.A&OX4.  Report received from RN (ED). VSS on 3 L of oxygen as baseline. Patient oriented to room and call light. Rights and responsibilities provided to patient, and welcome packet provided to patient. 4 eyes skin assessment completed with 2nd RN.   
Pt A&Ox4. VSS on RA. Pain endorsed to left leg, managed per MAR. Pt ambulating x1 gb walker. Voiding well via BRP. All fall precautions in place. POC continues.   
RN attempted to call report to facility and was hung up on. RN attempted to call [facility] a second time [facility]   
Treatment time: 3 hrs    Net UF: 2000 ml     Pre weight: 64 kg  Post weight: 62 kg     Access used: Rtdc  Access function:  tolerated well,  BFR 350ml/min      Medications or blood products given: heparin dwells     Regular outpatient schedule: MWF     Summary of response to treatment: Pt tolerated well. Pt remained stable throughout entire treatment and upon exiting the hemodialysis suite.      Copy of dialysis treatment record placed in chart, to be scanned into EMR.      
training, Patient/Caregiver education & training, Equipment evaluation, education, & procurement, Therapeutic activities, Pain management  Safety Devices  Type of Devices: Call light within reach, Chair alarm in place, Gait belt, Left in chair, Nurse notified    Restrictions  Restrictions/Precautions  Restrictions/Precautions: Fall Risk (medium fall risk)  Activity Level: Up with Assist     Subjective  General  Chart Reviewed: Yes  Additional Pertinent Hx: ED 3/2 related to right sided weakness; from [facility]; PMH: CVA, ESRD on HD, T2DM, HLD, HTN, CAD s/p stents, AUDRA, asthma, COPD, torn (B) rotator cuffs, non-healing wound left LE  Family/Caregiver Present: No  Referring Practitioner: DO Tim  Referral Date : 03/02/25  Diagnosis: TIA  Follows Commands: Within Functional Limits  General  General Comments: Patient seated on toilet with OT upon arrival - agreeable to PT.  Subjective  Subjective: Patient c/o right shoulder pain, does not rate.  RN aware.         Social/Functional History  Social/Functional History  Lives With: Other (Comment)  Type of Home:  (LTC from [facility]  Simultaneous filing. User may not have seen previous data.)  Prior Level of Assist for ADLs: Independent (Sponge bathes at sink, does not wear brief/depends)  Prior Level of Assist for Ambulation: Independent household ambulator, with or without device (IND w/ RW short distances, uses w/c within facility/outside of room)  Additional Comments: Pt is questionable historian. Information clarified from perivious admission: Pt is LTC from [facility], receiving OT/PT 2x/weekly, pt reports she ambulates w/ therapy using RW and ambulates within room IND w/ RW, but uses w/c outsie of room/within facility; has not been at apartment for 5 mo  Vision/Hearing  Vision  Vision: Within Functional Limits  Hearing  Hearing: Within functional limits    Cognition   Orientation  Overall Orientation Status: Within Functional 
36*   CREATININE 7.1* 7.6* 5.0*   GLUCOSE 121* 98 85   MG  --  2.85* 2.40   PHOS  --  5.0* 3.9     No results found for: \"VOL\", \"APPEARANCE\", \"COLORU\", \"LABSPEC\", \"LABPH\", \"LEUKBLD\", \"NITRU\", \"GLUCOSEU\", \"KETUA\", \"UROBILINOGEN\", \"BILIRUBINUR\", \"OCBU\"     ----------------------------------------------------------  Please call with questions at      24 Hrs Answering service [phone]  Perfect serve, or cell phone 7 am - 5pm  MD [facility]      
intact  Memory: Decreased recall of recent events  Safety Judgement: Decreased awareness of need for assistance;Decreased awareness of need for safety  Orientation  Overall Orientation Status: Within Functional Limits  Orientation Level: Oriented X4                  Education Given To: Patient  Education Provided: Role of Therapy;Plan of Care  Education Method: Verbal  Barriers to Learning: None  Education Outcome: Verbalized understanding;Demonstrated understanding          AM-PAC - ADL  AM-PAC Daily Activity - Inpatient   How much help is needed for putting on and taking off regular lower body clothing?: A Lot  How much help is needed for bathing (which includes washing, rinsing, drying)?: A Lot  How much help is needed for toileting (which includes using toilet, bedpan, or urinal)?: A Lot  How much help is needed for putting on and taking off regular upper body clothing?: A Little  How much help is needed for taking care of personal grooming?: A Little  How much help for eating meals?: None  AM-PAC Inpatient Daily Activity Raw Score: 16  AM-PAC Inpatient ADL T-Scale Score : 35.96  ADL Inpatient CMS 0-100% Score: 53.32  ADL Inpatient CMS G-Code Modifier : CK         Goals  Short Term Goals  Time Frame for Short Term Goals: Discharge  Short Term Goal 1: Pt will perform functional tasks within room using RW w/ supervision.  Short Term Goal 2: Pt will perform LB dressing w/ supervision.  Short Term Goal 3: Pt will engage in sink level ADLs w/ supervision.  Patient Goals   Patient goals : Increase IND      Therapy Time   Individual Concurrent Group Co-treatment   Time In 1117         Time Out 1217         Minutes 60             Timed Code Treatment Minutes:   45    Total Treatment Minutes:   60    Bernadette Martinez S/OT     
Vale Devlin,*.     Susan Zuñiga MD, PGY-1  Internal Medicine Resident  Contact via [employer]

## 2025-03-05 LAB
HBV SURFACE AB SERPL IA-ACNC: >1000 MIU/ML
HBV SURFACE AG SERPL QL IA: NORMAL

## 2025-04-22 ENCOUNTER — APPOINTMENT (OUTPATIENT)
Dept: GENERAL RADIOLOGY | Age: 60
End: 2025-04-22
Payer: MEDICAID

## 2025-04-22 ENCOUNTER — HOSPITAL ENCOUNTER (INPATIENT)
Age: 60
LOS: 4 days | Discharge: NURSING, REHAB OR LONG-TERM CARE | End: 2025-04-26
Attending: EMERGENCY MEDICINE | Admitting: HOSPITALIST
Payer: MEDICAID

## 2025-04-22 ENCOUNTER — APPOINTMENT (OUTPATIENT)
Age: 60
End: 2025-04-22
Attending: HOSPITALIST
Payer: MEDICAID

## 2025-04-22 DIAGNOSIS — J81.0 ACUTE PULMONARY EDEMA (HCC): Primary | ICD-10-CM

## 2025-04-22 DIAGNOSIS — I50.9 ACUTE ON CHRONIC CONGESTIVE HEART FAILURE, UNSPECIFIED HEART FAILURE TYPE (HCC): ICD-10-CM

## 2025-04-22 LAB
ANION GAP SERPL CALCULATED.3IONS-SCNC: 24 MMOL/L (ref 3–16)
BASE EXCESS BLDV CALC-SCNC: -2.4 MMOL/L (ref -2–3)
BASE EXCESS BLDV CALC-SCNC: -6.9 MMOL/L (ref -2–3)
BASOPHILS # BLD: 0 K/UL (ref 0–0.2)
BASOPHILS NFR BLD: 0.4 %
BUN SERPL-MCNC: 53 MG/DL (ref 7–20)
CALCIUM SERPL-MCNC: 9.7 MG/DL (ref 8.3–10.6)
CHLORIDE SERPL-SCNC: 94 MMOL/L (ref 99–110)
CO2 BLDV-SCNC: 24 MMOL/L
CO2 BLDV-SCNC: 28 MMOL/L
CO2 SERPL-SCNC: 22 MMOL/L (ref 21–32)
COHGB MFR BLDV: 1.5 % (ref 0–1.5)
COHGB MFR BLDV: 1.7 % (ref 0–1.5)
CREAT SERPL-MCNC: 6.6 MG/DL (ref 0.6–1.1)
DEPRECATED RDW RBC AUTO: 19.6 % (ref 12.4–15.4)
DO-HGB MFR BLDV: 31.7 %
DO-HGB MFR BLDV: 49.3 %
EKG ATRIAL RATE: 109 BPM
EKG DIAGNOSIS: NORMAL
EKG P AXIS: 81 DEGREES
EKG P-R INTERVAL: 156 MS
EKG Q-T INTERVAL: 378 MS
EKG QRS DURATION: 110 MS
EKG QTC CALCULATION (BAZETT): 509 MS
EKG R AXIS: -42 DEGREES
EKG T AXIS: 93 DEGREES
EKG VENTRICULAR RATE: 109 BPM
EOSINOPHIL # BLD: 0.5 K/UL (ref 0–0.6)
EOSINOPHIL NFR BLD: 4.1 %
GFR SERPLBLD CREATININE-BSD FMLA CKD-EPI: 7 ML/MIN/{1.73_M2}
GLUCOSE BLD-MCNC: 102 MG/DL (ref 70–99)
GLUCOSE BLD-MCNC: 102 MG/DL (ref 70–99)
GLUCOSE SERPL-MCNC: 302 MG/DL (ref 70–99)
HCO3 BLDV-SCNC: 22.2 MMOL/L (ref 24–28)
HCO3 BLDV-SCNC: 25.8 MMOL/L (ref 24–28)
HCT VFR BLD AUTO: 38.1 % (ref 36–48)
HGB BLD-MCNC: 12 G/DL (ref 12–16)
LACTATE BLDV-SCNC: 1.7 MMOL/L (ref 0.4–2)
LACTATE BLDV-SCNC: 3.3 MMOL/L (ref 0.4–2)
LYMPHOCYTES # BLD: 1.9 K/UL (ref 1–5.1)
LYMPHOCYTES NFR BLD: 17.1 %
MCH RBC QN AUTO: 26.9 PG (ref 26–34)
MCHC RBC AUTO-ENTMCNC: 31.4 G/DL (ref 31–36)
MCV RBC AUTO: 85.6 FL (ref 80–100)
METHGB MFR BLDV: 0.1 % (ref 0–1.5)
METHGB MFR BLDV: 0.2 % (ref 0–1.5)
MONOCYTES # BLD: 0.5 K/UL (ref 0–1.3)
MONOCYTES NFR BLD: 4.8 %
NEUTROPHILS # BLD: 8.2 K/UL (ref 1.7–7.7)
NEUTROPHILS NFR BLD: 73.6 %
NT-PROBNP SERPL-MCNC: ABNORMAL PG/ML (ref 0–124)
PCO2 BLDV: 60 MMHG (ref 41–51)
PCO2 BLDV: 60.6 MMHG (ref 41–51)
PERFORMED ON: ABNORMAL
PERFORMED ON: ABNORMAL
PH BLDV: 7.17 [PH] (ref 7.35–7.45)
PH BLDV: 7.24 [PH] (ref 7.35–7.45)
PLATELET # BLD AUTO: 227 K/UL (ref 135–450)
PMV BLD AUTO: 7.5 FL (ref 5–10.5)
PO2 BLDV: 36.1 MMHG (ref 25–40)
PO2 BLDV: 50.7 MMHG (ref 25–40)
POTASSIUM SERPL-SCNC: 5.5 MMOL/L (ref 3.5–5.1)
RBC # BLD AUTO: 4.45 M/UL (ref 4–5.2)
SAO2 % BLDV: 50 %
SAO2 % BLDV: 68 %
SODIUM SERPL-SCNC: 140 MMOL/L (ref 136–145)
TROPONIN, HIGH SENSITIVITY: 208 NG/L (ref 0–14)
TROPONIN, HIGH SENSITIVITY: 209 NG/L (ref 0–14)
WBC # BLD AUTO: 11.2 K/UL (ref 4–11)

## 2025-04-22 PROCEDURE — 87040 BLOOD CULTURE FOR BACTERIA: CPT

## 2025-04-22 PROCEDURE — 96374 THER/PROPH/DIAG INJ IV PUSH: CPT

## 2025-04-22 PROCEDURE — 85025 COMPLETE CBC W/AUTO DIFF WBC: CPT

## 2025-04-22 PROCEDURE — 5A1D70Z PERFORMANCE OF URINARY FILTRATION, INTERMITTENT, LESS THAN 6 HOURS PER DAY: ICD-10-PCS | Performed by: INTERNAL MEDICINE

## 2025-04-22 PROCEDURE — 36415 COLL VENOUS BLD VENIPUNCTURE: CPT

## 2025-04-22 PROCEDURE — 90935 HEMODIALYSIS ONE EVALUATION: CPT

## 2025-04-22 PROCEDURE — 93306 TTE W/DOPPLER COMPLETE: CPT

## 2025-04-22 PROCEDURE — 94640 AIRWAY INHALATION TREATMENT: CPT

## 2025-04-22 PROCEDURE — 2580000003 HC RX 258: Performed by: HOSPITALIST

## 2025-04-22 PROCEDURE — 6370000000 HC RX 637 (ALT 250 FOR IP): Performed by: STUDENT IN AN ORGANIZED HEALTH CARE EDUCATION/TRAINING PROGRAM

## 2025-04-22 PROCEDURE — 93005 ELECTROCARDIOGRAM TRACING: CPT | Performed by: OBSTETRICS & GYNECOLOGY

## 2025-04-22 PROCEDURE — 82803 BLOOD GASES ANY COMBINATION: CPT

## 2025-04-22 PROCEDURE — 83605 ASSAY OF LACTIC ACID: CPT

## 2025-04-22 PROCEDURE — 2700000000 HC OXYGEN THERAPY PER DAY

## 2025-04-22 PROCEDURE — 80048 BASIC METABOLIC PNL TOTAL CA: CPT

## 2025-04-22 PROCEDURE — 96376 TX/PRO/DX INJ SAME DRUG ADON: CPT

## 2025-04-22 PROCEDURE — 94660 CPAP INITIATION&MGMT: CPT

## 2025-04-22 PROCEDURE — 2060000000 HC ICU INTERMEDIATE R&B

## 2025-04-22 PROCEDURE — 6360000002 HC RX W HCPCS: Performed by: STUDENT IN AN ORGANIZED HEALTH CARE EDUCATION/TRAINING PROGRAM

## 2025-04-22 PROCEDURE — 2500000003 HC RX 250 WO HCPCS: Performed by: HOSPITALIST

## 2025-04-22 PROCEDURE — 5A09457 ASSISTANCE WITH RESPIRATORY VENTILATION, 24-96 CONSECUTIVE HOURS, CONTINUOUS POSITIVE AIRWAY PRESSURE: ICD-10-PCS | Performed by: HOSPITALIST

## 2025-04-22 PROCEDURE — 6370000000 HC RX 637 (ALT 250 FOR IP): Performed by: HOSPITALIST

## 2025-04-22 PROCEDURE — 99285 EMERGENCY DEPT VISIT HI MDM: CPT

## 2025-04-22 PROCEDURE — 94761 N-INVAS EAR/PLS OXIMETRY MLT: CPT

## 2025-04-22 PROCEDURE — 6360000002 HC RX W HCPCS: Performed by: INTERNAL MEDICINE

## 2025-04-22 PROCEDURE — 83880 ASSAY OF NATRIURETIC PEPTIDE: CPT

## 2025-04-22 PROCEDURE — 6360000002 HC RX W HCPCS: Performed by: HOSPITALIST

## 2025-04-22 PROCEDURE — 71045 X-RAY EXAM CHEST 1 VIEW: CPT

## 2025-04-22 PROCEDURE — 84484 ASSAY OF TROPONIN QUANT: CPT

## 2025-04-22 RX ORDER — ACETAMINOPHEN 325 MG/1
650 TABLET ORAL EVERY 6 HOURS PRN
Status: DISCONTINUED | OUTPATIENT
Start: 2025-04-22 | End: 2025-04-26 | Stop reason: HOSPADM

## 2025-04-22 RX ORDER — CLOPIDOGREL BISULFATE 75 MG/1
75 TABLET ORAL DAILY
Status: DISCONTINUED | OUTPATIENT
Start: 2025-04-23 | End: 2025-04-26 | Stop reason: HOSPADM

## 2025-04-22 RX ORDER — DEXTROSE MONOHYDRATE 100 MG/ML
INJECTION, SOLUTION INTRAVENOUS CONTINUOUS PRN
Status: DISCONTINUED | OUTPATIENT
Start: 2025-04-22 | End: 2025-04-26 | Stop reason: HOSPADM

## 2025-04-22 RX ORDER — ONDANSETRON 4 MG/1
4 TABLET, ORALLY DISINTEGRATING ORAL EVERY 8 HOURS PRN
Status: DISCONTINUED | OUTPATIENT
Start: 2025-04-22 | End: 2025-04-26 | Stop reason: HOSPADM

## 2025-04-22 RX ORDER — 0.9 % SODIUM CHLORIDE 0.9 %
100 INTRAVENOUS SOLUTION INTRAVENOUS PRN
Status: DISCONTINUED | OUTPATIENT
Start: 2025-04-22 | End: 2025-04-26 | Stop reason: HOSPADM

## 2025-04-22 RX ORDER — HEPARIN SODIUM 1000 [USP'U]/ML
3200 INJECTION, SOLUTION INTRAVENOUS; SUBCUTANEOUS PRN
Status: DISCONTINUED | OUTPATIENT
Start: 2025-04-22 | End: 2025-04-26 | Stop reason: HOSPADM

## 2025-04-22 RX ORDER — ACETAMINOPHEN 650 MG/1
650 SUPPOSITORY RECTAL EVERY 6 HOURS PRN
Status: DISCONTINUED | OUTPATIENT
Start: 2025-04-22 | End: 2025-04-26 | Stop reason: HOSPADM

## 2025-04-22 RX ORDER — BUPROPION HYDROCHLORIDE 150 MG/1
150 TABLET, EXTENDED RELEASE ORAL EVERY MORNING
Status: DISCONTINUED | OUTPATIENT
Start: 2025-04-23 | End: 2025-04-26 | Stop reason: HOSPADM

## 2025-04-22 RX ORDER — ONDANSETRON 2 MG/ML
4 INJECTION INTRAMUSCULAR; INTRAVENOUS EVERY 6 HOURS PRN
Status: DISCONTINUED | OUTPATIENT
Start: 2025-04-22 | End: 2025-04-26 | Stop reason: HOSPADM

## 2025-04-22 RX ORDER — SEVELAMER CARBONATE 800 MG/1
1600 TABLET, FILM COATED ORAL 3 TIMES DAILY
Status: DISCONTINUED | OUTPATIENT
Start: 2025-04-22 | End: 2025-04-26 | Stop reason: HOSPADM

## 2025-04-22 RX ORDER — IPRATROPIUM BROMIDE AND ALBUTEROL SULFATE 2.5; .5 MG/3ML; MG/3ML
1 SOLUTION RESPIRATORY (INHALATION)
Status: DISCONTINUED | OUTPATIENT
Start: 2025-04-22 | End: 2025-04-24

## 2025-04-22 RX ORDER — NITROGLYCERIN 0.4 MG/1
0.4 TABLET SUBLINGUAL ONCE
Status: COMPLETED | OUTPATIENT
Start: 2025-04-22 | End: 2025-04-22

## 2025-04-22 RX ORDER — PREDNISONE 20 MG/1
40 TABLET ORAL DAILY
Status: DISCONTINUED | OUTPATIENT
Start: 2025-04-22 | End: 2025-04-23

## 2025-04-22 RX ORDER — NIFEDIPINE 60 MG/1
60 TABLET, EXTENDED RELEASE ORAL DAILY
Status: DISCONTINUED | OUTPATIENT
Start: 2025-04-23 | End: 2025-04-26 | Stop reason: HOSPADM

## 2025-04-22 RX ORDER — BENZTROPINE MESYLATE 1 MG/1
0.5 TABLET ORAL NIGHTLY
Status: DISCONTINUED | OUTPATIENT
Start: 2025-04-22 | End: 2025-04-26 | Stop reason: HOSPADM

## 2025-04-22 RX ORDER — SODIUM CHLORIDE 9 MG/ML
INJECTION, SOLUTION INTRAVENOUS PRN
Status: DISCONTINUED | OUTPATIENT
Start: 2025-04-22 | End: 2025-04-26 | Stop reason: HOSPADM

## 2025-04-22 RX ORDER — GABAPENTIN 100 MG/1
200 CAPSULE ORAL 2 TIMES DAILY
COMMUNITY

## 2025-04-22 RX ORDER — ALBUTEROL SULFATE 0.83 MG/ML
2.5 SOLUTION RESPIRATORY (INHALATION) EVERY 6 HOURS PRN
Status: DISCONTINUED | OUTPATIENT
Start: 2025-04-22 | End: 2025-04-26 | Stop reason: HOSPADM

## 2025-04-22 RX ORDER — ATORVASTATIN CALCIUM 80 MG/1
80 TABLET, FILM COATED ORAL NIGHTLY
Status: DISCONTINUED | OUTPATIENT
Start: 2025-04-22 | End: 2025-04-26 | Stop reason: HOSPADM

## 2025-04-22 RX ORDER — SODIUM CHLORIDE 0.9 % (FLUSH) 0.9 %
5-40 SYRINGE (ML) INJECTION EVERY 12 HOURS SCHEDULED
Status: DISCONTINUED | OUTPATIENT
Start: 2025-04-22 | End: 2025-04-26 | Stop reason: HOSPADM

## 2025-04-22 RX ORDER — ARIPIPRAZOLE 5 MG/1
10 TABLET ORAL DAILY
Status: DISCONTINUED | OUTPATIENT
Start: 2025-04-23 | End: 2025-04-26 | Stop reason: HOSPADM

## 2025-04-22 RX ORDER — TORSEMIDE 100 MG/1
100 TABLET ORAL DAILY
Status: DISCONTINUED | OUTPATIENT
Start: 2025-04-23 | End: 2025-04-26 | Stop reason: HOSPADM

## 2025-04-22 RX ORDER — GLUCAGON 1 MG/ML
1 KIT INJECTION PRN
Status: DISCONTINUED | OUTPATIENT
Start: 2025-04-22 | End: 2025-04-26 | Stop reason: HOSPADM

## 2025-04-22 RX ORDER — LOSARTAN POTASSIUM 50 MG/1
25 TABLET ORAL NIGHTLY
Status: DISCONTINUED | OUTPATIENT
Start: 2025-04-22 | End: 2025-04-22

## 2025-04-22 RX ORDER — HEPARIN SODIUM 5000 [USP'U]/ML
5000 INJECTION, SOLUTION INTRAVENOUS; SUBCUTANEOUS EVERY 8 HOURS SCHEDULED
Status: DISCONTINUED | OUTPATIENT
Start: 2025-04-22 | End: 2025-04-26 | Stop reason: HOSPADM

## 2025-04-22 RX ORDER — CARVEDILOL 3.12 MG/1
3.12 TABLET ORAL 2 TIMES DAILY WITH MEALS
Status: DISCONTINUED | OUTPATIENT
Start: 2025-04-22 | End: 2025-04-26 | Stop reason: HOSPADM

## 2025-04-22 RX ORDER — FUROSEMIDE 10 MG/ML
100 INJECTION INTRAMUSCULAR; INTRAVENOUS ONCE
Status: COMPLETED | OUTPATIENT
Start: 2025-04-22 | End: 2025-04-22

## 2025-04-22 RX ORDER — POLYETHYLENE GLYCOL 3350 17 G/17G
17 POWDER, FOR SOLUTION ORAL DAILY PRN
Status: DISCONTINUED | OUTPATIENT
Start: 2025-04-22 | End: 2025-04-26 | Stop reason: HOSPADM

## 2025-04-22 RX ORDER — SODIUM CHLORIDE 0.9 % (FLUSH) 0.9 %
5-40 SYRINGE (ML) INJECTION PRN
Status: DISCONTINUED | OUTPATIENT
Start: 2025-04-22 | End: 2025-04-26 | Stop reason: HOSPADM

## 2025-04-22 RX ORDER — INSULIN LISPRO 100 [IU]/ML
0-8 INJECTION, SOLUTION INTRAVENOUS; SUBCUTANEOUS
Status: DISCONTINUED | OUTPATIENT
Start: 2025-04-22 | End: 2025-04-26 | Stop reason: HOSPADM

## 2025-04-22 RX ORDER — MECOBALAMIN 5000 MCG
10 TABLET,DISINTEGRATING ORAL NIGHTLY
Status: DISCONTINUED | OUTPATIENT
Start: 2025-04-22 | End: 2025-04-26 | Stop reason: HOSPADM

## 2025-04-22 RX ORDER — PANTOPRAZOLE SODIUM 40 MG/1
40 TABLET, DELAYED RELEASE ORAL DAILY
Status: DISCONTINUED | OUTPATIENT
Start: 2025-04-23 | End: 2025-04-26 | Stop reason: HOSPADM

## 2025-04-22 RX ADMIN — DOXYCYCLINE 100 MG: 100 INJECTION, POWDER, LYOPHILIZED, FOR SOLUTION INTRAVENOUS at 18:55

## 2025-04-22 RX ADMIN — HEPARIN SODIUM 5000 UNITS: 5000 INJECTION INTRAVENOUS; SUBCUTANEOUS at 17:02

## 2025-04-22 RX ADMIN — FUROSEMIDE 100 MG: 10 INJECTION, SOLUTION INTRAMUSCULAR; INTRAVENOUS at 11:13

## 2025-04-22 RX ADMIN — HEPARIN SODIUM 3200 UNITS: 1000 INJECTION INTRAVENOUS; SUBCUTANEOUS at 23:11

## 2025-04-22 RX ADMIN — FUROSEMIDE 100 MG: 10 INJECTION, SOLUTION INTRAMUSCULAR; INTRAVENOUS at 10:38

## 2025-04-22 RX ADMIN — WATER 1000 MG: 1 INJECTION INTRAMUSCULAR; INTRAVENOUS; SUBCUTANEOUS at 18:49

## 2025-04-22 RX ADMIN — IPRATROPIUM BROMIDE AND ALBUTEROL SULFATE 1 DOSE: .5; 3 SOLUTION RESPIRATORY (INHALATION) at 16:43

## 2025-04-22 RX ADMIN — IPRATROPIUM BROMIDE AND ALBUTEROL SULFATE 1 DOSE: .5; 3 SOLUTION RESPIRATORY (INHALATION) at 21:17

## 2025-04-22 RX ADMIN — NITROGLYCERIN 0.4 MG: 0.4 TABLET SUBLINGUAL at 10:25

## 2025-04-22 NOTE — PROGRESS NOTES
4 Eyes Admission Assessment     I agree as the admission nurse that 2 RN's have performed a thorough Head to Toe Skin Assessment on the patient. ALL assessment sites listed below have been assessed on admission.       Areas assessed by both nurses: Elizabeth & Kourtney  [x]   Head, Face, and Ears   [x]   Shoulders, Back, and Chest  [x]   Arms, Elbows, and Hands   [x]   Coccyx, Sacrum, and Ischium  [x]   Legs, Feet, and Heels    Left leg- wound ulcer     Does the Patient have Skin Breakdown?  Yes a wound was noted on the Admission Assessment and an LDA was Initiated documentation include the Netta-wound, Wound Assessment, Measurements, Dressing Treatment, Drainage, and Color\",         Papa Prevention initiated:  Yes   Wound Care Orders initiated:  Yes      WOC nurse consulted for Pressure Injury (Stage 3,4, Unstageable, DTI, NWPT, and Complex wounds) or Papa score 18 or lower:  Yes      Nurse 1 eSignature: Electronically signed by Elizabeth Keating RN on 4/22/25 at 7:30 PM EDT    **SHARE this note so that the co-signing nurse is able to place an eSignature**    Nurse 2 eSignature: Electronically signed by Kourtney Ray RN on 4/23/25 at 5:32 AM EDT

## 2025-04-22 NOTE — PROGRESS NOTES
Patient admitted from ED to 4 PCU 1552. Telemetry monitor applied. Vitals obtained. Patient on continuous BiPAP and continuous O2 monitoring as ordered. Report received from PEACE Johnson from ED. Per PEACE Johnson BiPAP was placed at 1030 am. Patient on last hourly BiPAP vitals checks then Q2 hours vitals checks.

## 2025-04-22 NOTE — ED NOTES
Patient Name: Cindi Rodriguez  : 1965 60 y.o.  MRN: [id number]  ED Room #: 2TR/2TRA-A2     Chief complaint:   Chief Complaint   Patient presents with    Respiratory Distress     Pt found to have O2 sats 50%, placed on cpap with ems without improvement in sats. Wears 2L O2 and is alert and oriented at baseline     Hospital Problem/Diagnosis:   Hospital Problems           Last Modified POA    * (Principal) Acute pulmonary edema (HCC) 2025 Yes         O2 Flow Rate:O2 Device: PAP (positive airway pressure)   (if applicable)  Cardiac Rhythm:   (if applicable)  Active LDA's:   Peripheral IV 25 Proximal;Right;Anterior Forearm (Active)            How does patient ambulate? Unknown, did not assess in the Emergency Department    2. How does patient take pills? Unknown, Patient currently on BIPAP at this time    3. Is patient alert? Alert    4. Is patient oriented? To Person, To Place, To Time, To Situation, and Follows Commands    5.   Patient arrived from:  nursing home  Facility [facility]: [facility]    6. If patient is disoriented or from a [facility] has family been notified of admission? No    7. Patient belongings? Belongings: With patient    Disposition of belongings? Kept with Patient     8. Any specific patient or family belongings/needs/dynamics?   a. N/A    9. Miscellaneous comments/pending orders?  a. N/A      If there are any additional questions please reach out to the Emergency Department.   -Patient is a 60 year old female who presented to the ED with a chief complaint of Respiratory Distress. Patient was found to have O2 sats in the 50's and placed on CPAP with EMS. Patient did not improve with EMS CPAP. Patient wears 2L of O2. Once patient arrived in the ED, she began to improve with our CPAP. Patient received 2 different doses of lasix, both 100 mg with a total of 200mg. Patient also received 0.4 mg of nitroglycerin via sublingual. Patient has bilateral IV access in the AC's

## 2025-04-22 NOTE — H&P
Worried About Running Out of Food in the Last Year: Sometimes true     Ran Out of Food in the Last Year: Never true   Transportation Needs: No Transportation Needs (3/2/2025)    PRAPARE - Transportation     Lack of Transportation (Medical): No     Lack of Transportation (Non-Medical): No   Recent Concern: Transportation Needs - Unmet Transportation Needs (2/20/2025)    PRAPARE - Transportation     Lack of Transportation (Medical): Yes     Lack of Transportation (Non-Medical): Yes    [facility]    Interpersonal Safety   Housing Stability: Low Risk  (3/2/2025)    Housing Stability Vital Sign     Unable to Pay for Housing in the Last Year: No     Number of Times Moved in the Last Year: 1     Homeless in the Last Year: No       Medications:   Medications:    carvedilol  3.125 mg Oral BID WC    losartan  25 mg Oral Nightly    NIFEdipine  60 mg Oral BID    sevelamer  1,600 mg Oral TID      Infusions:   PRN Meds: sodium chloride, 100 mL, PRN  albuterol, 2.5 mg, Q6H PRN        Labs      CBC:   Recent Labs     04/22/25  1032   WBC 11.2*   HGB 12.0        BMP:    Recent Labs     04/22/25  1032      K 5.5*   CL 94*   CO2 22   BUN 53*   CREATININE 6.6*   GLUCOSE 302*     Hepatic: No results for input(s): \"AST\", \"ALT\", \"BILITOT\", \"ALKPHOS\" in the last 72 hours.    Invalid input(s): \"ALB\"  Lipids:   Lab Results   Component Value Date/Time    CHOL 96 03/04/2025 03:03 AM    HDL 39 03/04/2025 03:03 AM    HDL 48 02/18/2011 10:17 AM    TRIG 173 03/04/2025 03:03 AM     Hemoglobin A1C:   Lab Results   Component Value Date/Time    LABA1C 5.3 03/03/2025 08:52 AM     TSH: No results found for: \"TSH\"  Troponin: No results found for: \"TROPONINT\"  Lactic Acid:   Recent Labs     04/22/25  1032   LACTA 3.3*     BNP: No results for input(s): \"PROBNP\" in the last 72 hours.  UA:  Lab Results   Component Value Date/Time    NITRU Negative 08/22/2024 02:54 PM    COLORU Yellow 08/22/2024

## 2025-04-22 NOTE — ED PROVIDER NOTES
THE [facility]  EMERGENCY DEPARTMENT ENCOUNTER          EM RESIDENT NOTE       Date of evaluation: 4/22/2025    Chief Complaint     Respiratory Distress (Pt found to have O2 sats 50%, placed on cpap with ems without improvement in sats. Wears 2L O2 and is alert and oriented at baseline)      History of Present Illness     Cindi Rodriguez is a 60 y.o. female who presents for evaluation of respiratory distress    Patient brought in with EMS.  When they arrived, noted to have oxygen saturation of 50% on home oxygen.  She was placed on CPAP without significant improvement.  She had significantly decreased aeration on the right and so was given a DuoNeb as well as Solu-Medrol.  She has a history of pulmonary edema secondary to volume overload from her dialysis.  She is ESRD and is due for dialysis today.    MEDICAL DECISION MAKING / ASSESSMENT / PLAN     INITIAL VITALS: BP: (!) 159/122, Temp: 97 °F (36.1 °C), Pulse: (!) 115, Respirations: 22, SpO2: 90 %    Cindi Rodriguez is a 60 y.o. female who presents for evaluation of respiratory distress.  On initial evaluation, patient was placed on BiPAP with FiO2 100% after noted to be hypoxic in the 40s.  Bedside thoracic ultrasound showed diffuse B-lines consistent with likely pulmonary edema.  She was also noted to be hypertensive with systolics of 180s and given concern for flash pulmonary edema, she was given sublingual nitrogen with some improvement of her blood pressure to 150s.  She was also given 200 of Lasix given that she is on 100 torsemide at home.  She did have improvement of her symptoms at this time.  We did discuss with the patient's home nephrologist who recommended admission and they will arrange for urgent dialysis    ED Course as of 04/22/25 1922 Tue Apr 22, 2025   1107 Patient's workup here demonstrates an elevated leukocytosis as well as findings of hypercapnia with a PCO2 of 60.6 with an associated pH of 7.1.  She had diffuse B-lines and so I

## 2025-04-22 NOTE — CONSULTS
Consult received.  Labs and notes were reviewed.  Case was discussed with the staff.  I will arrange for HD today     Full note to follow.    Thanks  Nephrology  [id number]  [place] [address]  Office: [phone]  Cell: [phone]  Fax: [phone]

## 2025-04-22 NOTE — CONSULTS
Ph: [phone], Fax: [phone]           [facility]               Reason for admission:                 SOB    Brief Summary :     Cindi Rodriguez is being seen by nephrology for Need for dialysis.      Interval History and plan:      Pt seen in the ER. She is in respiratory distress this am. She does not report any fever, chills                     Assessment :        Acute hypoxic respiratory failure 2/2 volume overload  ESRD on dialysis Tuesday Thursday and Saturday, dry weight around 58 currently 67.7 kg, pt non compliant with diet  -Plan for dialysis today  -strict sodium control in diet  -strict I/O   -hold Lokelma since it contains sodium, will assess if needs patorimer on discharge  -continue home medications    -s/p 200 mg lasix     Hypertension  -At home patient is on torsemide 100 mg, Procardia XL 60 mg, losartan 25 mg, Coreg 3.125 daily  - Will continue    HFrEF  Echo:20-25%,   -continue GDMT meds as tolerated  -consider cardiology consult for ICD discussion      [facility] would like to thank Jeff Ba MD   for opportunity to serve this patient      Please call with questions at-   24 Hrs Answering service [phone] or  7 am- 5 pm via Perfect serve or cell phone  Dr.Bisma Rosa MD       HPI :     Cindi Rodriguez is a 60 y.o. female with past medical history significant for ESRD on hemodialysis TTS(estimated dry weight 58 kg currently 67.7 kg), type 2 diabetes, CVA, CHF, CAD s/p stents 2023, COPD at home on 3 L oxygen presented to   the hospital on 4/22/2025 with shortness of breath.    The patient was brought in by EMS due to concern for respiratory distress.She reports that she is compliant with all he medications and she has not missed any of her dialysis. She did not take her meds this morning thou. She does not report any leg swelling but has been feeling abdominal distension which she thinks could be from constipation. She reports no nausea, vomiting or      ----------------------------------------------------------  Please call with questions at      24 Hrs Answering service [phone]  Perfect serve, or cell phone 7 am - 5pm  Selina Lee MD  [facility]    Patient was seen and examined and the case was discussed with the resident. He acted as my scribe. I agree with the assessment and plan.    Thanks  Nephrology  [id number]  [place] [address]  Office: [phone]  Cell: [phone]  Fax: [phone]

## 2025-04-22 NOTE — ED PROVIDER NOTES
ED Attending Attestation Note     Date of evaluation: 4/22/2025    This patient was seen by the resident.  I have seen and examined the patient, agree with the workup, evaluation, management and diagnosis. The care plan has been discussed.  My assessment reveals a chronically ill female with a history of end-stage renal disease and congestive heart failure presenting today in acute respiratory distress that started today.  EMS states her saturations were in the 50s on arrival, she came in with CPAP on she was speaking in full sentences and coherent we quickly switch her over to BiPAP with initial oxygen saturations in the 50s but quickly jai to 92%.  She had gross diffuse crackles throughout all lung fields.  Appear consistent with fluid overload so treated for acute pulmonary edema with preload and afterload reduction as well as BiPAP.  We also discussed case with nephrology to set up dialysis.    Critical Care:  Due to the immediate potential for life-threatening deterioration due to acute hypoxic and hypercarbic respiratory failure, I spent 45 minutes providing critical care.  This time excludes time spent performing procedures but includes time spent on direct patient care, history retrieval, review of the chart, and discussions with patient, family, and consultant(s).     Jeff Ba MD  04/22/25 3013

## 2025-04-22 NOTE — PROGRESS NOTES
Clinical Pharmacy Note  Medication History     Admit Date: 4/22/2025       List of current medications patient is taking is complete. Home Medication list in EPIC updated to reflect changes noted below.    Source of Information:     Review of facility papers  (List from [facility])      Patient's home pharmacy: n/A     CHANGES TO HOME MEDICATION LIST:    REMOVED:  (Not on NH med list)  1) Losartan    ADDED:  - none    DOSE or FREQUENCY CHANGE:   1) Gabapentin - takes 200 mg BID  (not just nightly)   2) Sevelamer - takes 1600 mg TID with meals ( mg)    OTHER NOTES:       Current Outpatient Medications   Medication Instructions    albuterol sulfate HFA (VENTOLIN HFA) 108 (90 Base) MCG/ACT inhaler 2 puffs, Inhalation, EVERY 6 HOURS PRN    ARIPiprazole (ABILIFY) 10 mg, Oral, DAILY    atorvastatin (LIPITOR) 80 mg, Oral, Nightly    B complex-vitamin C-folic acid (NEPHRO-JESUS) 1 MG tablet 1 tablet, Oral, DAILY    benztropine (COGENTIN) 0.5 mg, Oral, NIGHTLY    buPROPion (WELLBUTRIN SR) 150 mg, Oral, DAILY    carvedilol (COREG) 3.125 mg, Oral, 2 TIMES DAILY WITH MEALS    clopidogrel (PLAVIX) 75 mg, Oral, DAILY    diphenhydrAMINE (BENADRYL) 25 mg, Oral, EVERY 4 HOURS PRN    EPINEPHrine (EPIPEN 2-CLIFTON) 0.3 mg, IntraMUSCular, PRN, Use as directed for allergic reaction    gabapentin (NEURONTIN) 200 mg, 2 TIMES DAILY    ipratropium 0.5 mg-albuterol 2.5 mg (DUONEB) 0.5-2.5 (3) MG/3ML SOLN nebulizer solution 1 vial, Inhalation, EVERY 6 HOURS PRN    lidocaine 4 % external patch 2 patches, TransDERmal, DAILY, Bilat lower legs    losartan (COZAAR) 25 mg, Oral, Nightly    melatonin 10 mg, Oral, NIGHTLY    mirtazapine (REMERON) 15 mg, Oral, NIGHTLY    naloxone 4 MG/0.1ML LIQD nasal spray 1 spray, Nasal, PRN    NIFEdipine (PROCARDIA XL) 60 mg, Oral, DAILY    pantoprazole (PROTONIX) 40 mg, Oral, DAILY    sevelamer (RENVELA) 1,600 mg, Oral, 3 TIMES DAILY    sodium zirconium cyclosilicate (LOKELMA) 5 g, Oral, DAILY

## 2025-04-23 LAB
ALBUMIN SERPL-MCNC: 3.6 G/DL (ref 3.4–5)
ANION GAP SERPL CALCULATED.3IONS-SCNC: 15 MMOL/L (ref 3–16)
BASE EXCESS BLDV CALC-SCNC: 1.9 MMOL/L (ref -2–3)
BASOPHILS # BLD: 0 K/UL (ref 0–0.2)
BASOPHILS NFR BLD: 0.2 %
BUN SERPL-MCNC: 31 MG/DL (ref 7–20)
CALCIUM SERPL-MCNC: 9.4 MG/DL (ref 8.3–10.6)
CHLORIDE SERPL-SCNC: 98 MMOL/L (ref 99–110)
CO2 BLDV-SCNC: 30 MMOL/L
CO2 SERPL-SCNC: 24 MMOL/L (ref 21–32)
COHGB MFR BLDV: 2 % (ref 0–1.5)
CREAT SERPL-MCNC: 4.2 MG/DL (ref 0.6–1.1)
DEPRECATED RDW RBC AUTO: 18.9 % (ref 12.4–15.4)
DO-HGB MFR BLDV: 41.4 %
ECHO AO ASC DIAM: 3.2 CM
ECHO AO ASCENDING AORTA INDEX: 1.77 CM/M2
ECHO AO ROOT DIAM: 3 CM
ECHO AO ROOT INDEX: 1.66 CM/M2
ECHO AV AREA PEAK VELOCITY: 1.7 CM2
ECHO AV AREA VTI: 1.5 CM2
ECHO AV AREA/BSA PEAK VELOCITY: 0.9 CM2/M2
ECHO AV AREA/BSA VTI: 0.8 CM2/M2
ECHO AV MEAN GRADIENT: 6 MMHG
ECHO AV MEAN VELOCITY: 1.1 M/S
ECHO AV PEAK GRADIENT: 10 MMHG
ECHO AV PEAK VELOCITY: 1.6 M/S
ECHO AV VELOCITY RATIO: 0.56
ECHO AV VTI: 28.9 CM
ECHO BSA: 1.8 M2
ECHO EST RA PRESSURE: 15 MMHG
ECHO IVC PROX: 2.3 CM
ECHO LA AREA 2C: 37.6 CM2
ECHO LA AREA 4C: 30.7 CM2
ECHO LA DIAMETER INDEX: 2.43 CM/M2
ECHO LA DIAMETER: 4.4 CM
ECHO LA MAJOR AXIS: 7.1 CM
ECHO LA MINOR AXIS: 7.4 CM
ECHO LA TO AORTIC ROOT RATIO: 1.47
ECHO LA VOL BP: 133 ML (ref 22–52)
ECHO LA VOL MOD A2C: 158 ML (ref 22–52)
ECHO LA VOL MOD A4C: 109 ML (ref 22–52)
ECHO LA VOL/BSA BIPLANE: 73 ML/M2 (ref 16–34)
ECHO LA VOLUME INDEX MOD A2C: 87 ML/M2 (ref 16–34)
ECHO LA VOLUME INDEX MOD A4C: 60 ML/M2 (ref 16–34)
ECHO LV E' LATERAL VELOCITY: 4.35 CM/S
ECHO LV E' SEPTAL VELOCITY: 2.83 CM/S
ECHO LV EDV A2C: 180 ML
ECHO LV EDV A4C: 189 ML
ECHO LV EDV INDEX A4C: 104 ML/M2
ECHO LV EDV NDEX A2C: 99 ML/M2
ECHO LV EF PHYSICIAN: 20 %
ECHO LV EJECTION FRACTION A2C: 17 %
ECHO LV EJECTION FRACTION A4C: 17 %
ECHO LV EJECTION FRACTION BIPLANE: 18 % (ref 55–100)
ECHO LV ESV A2C: 150 ML
ECHO LV ESV A4C: 157 ML
ECHO LV ESV INDEX A2C: 83 ML/M2
ECHO LV ESV INDEX A4C: 87 ML/M2
ECHO LV FRACTIONAL SHORTENING: 5 % (ref 28–44)
ECHO LV INTERNAL DIMENSION DIASTOLE INDEX: 3.26 CM/M2
ECHO LV INTERNAL DIMENSION DIASTOLIC: 5.9 CM (ref 3.9–5.3)
ECHO LV INTERNAL DIMENSION SYSTOLIC INDEX: 3.09 CM/M2
ECHO LV INTERNAL DIMENSION SYSTOLIC: 5.6 CM
ECHO LV IVSD: 0.8 CM (ref 0.6–0.9)
ECHO LV MASS 2D: 224.6 G (ref 67–162)
ECHO LV MASS INDEX 2D: 124.1 G/M2 (ref 43–95)
ECHO LV POSTERIOR WALL DIASTOLIC: 1.1 CM (ref 0.6–0.9)
ECHO LV RELATIVE WALL THICKNESS RATIO: 0.37
ECHO LVOT AREA: 2.8 CM2
ECHO LVOT AV VTI INDEX: 0.53
ECHO LVOT DIAM: 1.9 CM
ECHO LVOT MEAN GRADIENT: 2 MMHG
ECHO LVOT PEAK GRADIENT: 3 MMHG
ECHO LVOT PEAK VELOCITY: 0.9 M/S
ECHO LVOT STROKE VOLUME INDEX: 24 ML/M2
ECHO LVOT SV: 43.4 ML
ECHO LVOT VTI: 15.3 CM
ECHO MV A VELOCITY: 0.72 M/S
ECHO MV AREA VTI: 1.8 CM2
ECHO MV E DECELERATION TIME (DT): 90 MS
ECHO MV E VELOCITY: 1.49 M/S
ECHO MV E/A RATIO: 2.07
ECHO MV E/E' LATERAL: 34.25
ECHO MV E/E' RATIO (AVERAGED): 43.45
ECHO MV E/E' SEPTAL: 52.65
ECHO MV LVOT VTI INDEX: 1.58
ECHO MV MAX VELOCITY: 1.5 M/S
ECHO MV MEAN GRADIENT: 3 MMHG
ECHO MV MEAN VELOCITY: 0.8 M/S
ECHO MV PEAK GRADIENT: 9 MMHG
ECHO MV VTI: 24.1 CM
ECHO PV MAX VELOCITY: 0.7 M/S
ECHO PV MEAN GRADIENT: 1 MMHG
ECHO PV MEAN VELOCITY: 0.5 M/S
ECHO PV PEAK GRADIENT: 2 MMHG
ECHO PV VTI: 11.9 CM
ECHO RA AREA 4C: 22.7 CM2
ECHO RA END SYSTOLIC VOLUME APICAL 4 CHAMBER INDEX BSA: 41 ML/M2
ECHO RA VOLUME: 74 ML
ECHO RIGHT VENTRICULAR SYSTOLIC PRESSURE (RVSP): 48 MMHG
ECHO RV BASAL DIMENSION: 4.5 CM
ECHO RV FREE WALL PEAK S': 7.7 CM/S
ECHO RV LONGITUDINAL DIMENSION: 8.7 CM
ECHO RV MID DIMENSION: 4.2 CM
ECHO RV TAPSE: 1.3 CM (ref 1.7–?)
ECHO TV REGURGITANT MAX VELOCITY: 2.88 M/S
ECHO TV REGURGITANT PEAK GRADIENT: 33 MMHG
EOSINOPHIL # BLD: 0 K/UL (ref 0–0.6)
EOSINOPHIL NFR BLD: 0.4 %
GFR SERPLBLD CREATININE-BSD FMLA CKD-EPI: 12 ML/MIN/{1.73_M2}
GLUCOSE BLD-MCNC: 124 MG/DL (ref 70–99)
GLUCOSE BLD-MCNC: 86 MG/DL (ref 70–99)
GLUCOSE BLD-MCNC: 94 MG/DL (ref 70–99)
GLUCOSE SERPL-MCNC: 80 MG/DL (ref 70–99)
HCO3 BLDV-SCNC: 28.8 MMOL/L (ref 24–28)
HCT VFR BLD AUTO: 31.8 % (ref 36–48)
HGB BLD-MCNC: 10.3 G/DL (ref 12–16)
LYMPHOCYTES # BLD: 1.1 K/UL (ref 1–5.1)
LYMPHOCYTES NFR BLD: 10.2 %
MCH RBC QN AUTO: 27.5 PG (ref 26–34)
MCHC RBC AUTO-ENTMCNC: 32.4 G/DL (ref 31–36)
MCV RBC AUTO: 85.1 FL (ref 80–100)
METHGB MFR BLDV: 0.3 % (ref 0–1.5)
MONOCYTES # BLD: 0.8 K/UL (ref 0–1.3)
MONOCYTES NFR BLD: 7.3 %
NEUTROPHILS # BLD: 8.7 K/UL (ref 1.7–7.7)
NEUTROPHILS NFR BLD: 81.9 %
PCO2 BLDV: 54.9 MMHG (ref 41–51)
PERFORMED ON: ABNORMAL
PERFORMED ON: NORMAL
PERFORMED ON: NORMAL
PH BLDV: 7.33 [PH] (ref 7.35–7.45)
PHOSPHATE SERPL-MCNC: 5.2 MG/DL (ref 2.5–4.9)
PLATELET # BLD AUTO: 154 K/UL (ref 135–450)
PMV BLD AUTO: 7.1 FL (ref 5–10.5)
PO2 BLDV: 36.3 MMHG (ref 25–40)
POTASSIUM SERPL-SCNC: 4.6 MMOL/L (ref 3.5–5.1)
RBC # BLD AUTO: 3.74 M/UL (ref 4–5.2)
SAO2 % BLDV: 58 %
SODIUM SERPL-SCNC: 137 MMOL/L (ref 136–145)
WBC # BLD AUTO: 10.6 K/UL (ref 4–11)

## 2025-04-23 PROCEDURE — 2060000000 HC ICU INTERMEDIATE R&B

## 2025-04-23 PROCEDURE — 90935 HEMODIALYSIS ONE EVALUATION: CPT

## 2025-04-23 PROCEDURE — 6370000000 HC RX 637 (ALT 250 FOR IP): Performed by: HOSPITALIST

## 2025-04-23 PROCEDURE — 6370000000 HC RX 637 (ALT 250 FOR IP)

## 2025-04-23 PROCEDURE — 6360000002 HC RX W HCPCS: Performed by: HOSPITALIST

## 2025-04-23 PROCEDURE — 94761 N-INVAS EAR/PLS OXIMETRY MLT: CPT

## 2025-04-23 PROCEDURE — 93306 TTE W/DOPPLER COMPLETE: CPT | Performed by: INTERNAL MEDICINE

## 2025-04-23 PROCEDURE — 2700000000 HC OXYGEN THERAPY PER DAY

## 2025-04-23 PROCEDURE — 36415 COLL VENOUS BLD VENIPUNCTURE: CPT

## 2025-04-23 PROCEDURE — 94640 AIRWAY INHALATION TREATMENT: CPT

## 2025-04-23 PROCEDURE — 85025 COMPLETE CBC W/AUTO DIFF WBC: CPT

## 2025-04-23 PROCEDURE — 2580000003 HC RX 258: Performed by: HOSPITALIST

## 2025-04-23 PROCEDURE — 80069 RENAL FUNCTION PANEL: CPT

## 2025-04-23 PROCEDURE — 94660 CPAP INITIATION&MGMT: CPT

## 2025-04-23 PROCEDURE — 82803 BLOOD GASES ANY COMBINATION: CPT

## 2025-04-23 PROCEDURE — 2500000003 HC RX 250 WO HCPCS: Performed by: HOSPITALIST

## 2025-04-23 PROCEDURE — 51798 US URINE CAPACITY MEASURE: CPT

## 2025-04-23 RX ADMIN — SEVELAMER CARBONATE 1600 MG: 800 TABLET, FILM COATED ORAL at 12:57

## 2025-04-23 RX ADMIN — SODIUM CHLORIDE, PRESERVATIVE FREE 10 ML: 5 INJECTION INTRAVENOUS at 08:39

## 2025-04-23 RX ADMIN — SEVELAMER CARBONATE 1600 MG: 800 TABLET, FILM COATED ORAL at 20:44

## 2025-04-23 RX ADMIN — NIFEDIPINE 60 MG: 60 TABLET, EXTENDED RELEASE ORAL at 12:58

## 2025-04-23 RX ADMIN — IPRATROPIUM BROMIDE AND ALBUTEROL SULFATE 1 DOSE: .5; 3 SOLUTION RESPIRATORY (INHALATION) at 11:39

## 2025-04-23 RX ADMIN — HEPARIN SODIUM 5000 UNITS: 5000 INJECTION INTRAVENOUS; SUBCUTANEOUS at 20:44

## 2025-04-23 RX ADMIN — DOXYCYCLINE 100 MG: 100 INJECTION, POWDER, LYOPHILIZED, FOR SOLUTION INTRAVENOUS at 04:22

## 2025-04-23 RX ADMIN — TORSEMIDE 100 MG: 100 TABLET ORAL at 12:59

## 2025-04-23 RX ADMIN — Medication 10 MG: at 20:44

## 2025-04-23 RX ADMIN — ARIPIPRAZOLE 10 MG: 5 TABLET ORAL at 14:53

## 2025-04-23 RX ADMIN — HEPARIN SODIUM 5000 UNITS: 5000 INJECTION INTRAVENOUS; SUBCUTANEOUS at 06:29

## 2025-04-23 RX ADMIN — HEPARIN SODIUM 5000 UNITS: 5000 INJECTION INTRAVENOUS; SUBCUTANEOUS at 14:51

## 2025-04-23 RX ADMIN — IPRATROPIUM BROMIDE AND ALBUTEROL SULFATE 1 DOSE: .5; 3 SOLUTION RESPIRATORY (INHALATION) at 21:37

## 2025-04-23 RX ADMIN — PANTOPRAZOLE SODIUM 40 MG: 40 TABLET, DELAYED RELEASE ORAL at 12:58

## 2025-04-23 RX ADMIN — CLOPIDOGREL BISULFATE 75 MG: 75 TABLET, FILM COATED ORAL at 12:57

## 2025-04-23 RX ADMIN — BUPROPION HYDROCHLORIDE 150 MG: 150 TABLET, FILM COATED, EXTENDED RELEASE ORAL at 12:57

## 2025-04-23 RX ADMIN — IPRATROPIUM BROMIDE AND ALBUTEROL SULFATE 1 DOSE: .5; 3 SOLUTION RESPIRATORY (INHALATION) at 08:28

## 2025-04-23 RX ADMIN — BENZTROPINE MESYLATE 0.5 MG: 1 TABLET ORAL at 20:44

## 2025-04-23 RX ADMIN — ATORVASTATIN CALCIUM 80 MG: 80 TABLET, FILM COATED ORAL at 20:44

## 2025-04-23 RX ADMIN — IPRATROPIUM BROMIDE AND ALBUTEROL SULFATE 1 DOSE: .5; 3 SOLUTION RESPIRATORY (INHALATION) at 15:40

## 2025-04-23 NOTE — PROGRESS NOTES
V2.0    [facility]      Name:  Cindi Rodriguez /Age/Sex: 1965  (60 y.o. female)   MRN & CSN:  [id number] & [id number] Encounter Date/Time: 2025 9:27 AM EDT   Location:  4452/4452-01 PCP: Alessandra Anderson DO     Attending:Salo Knowles MD       Hospital Day: 2    Assessment and Recommendations   59 y.o. adult with ESRD on HD (), type 2 diabetes, hypertension, hyperlipidemia, CAD s/p stents (), COPD (3 L home oxygen), CVA (), who presented to the ED with resp distress.     Patient stated she was feeling well last night but then woke up with sudden shortness of breath.  Is associated with orthopnea and wheezing.  She did think she had some cough yesterday that is productive of sputum that is new.  She also states she did have some URI symptoms recently.  No LE edema. She stated she had 2 puffs of 1 cigarette yesterday which is unusual for her as she quit smoking.  She states she did not miss any of her dialysis.      She was admitted 2025- 2025 after presenting from skilled nursing facility for acute respiratory failure requiring ICU admission, felt due to fluid overload in the setting of end-stage renal disease; improved following ultrafiltration with hemodialysis      She was admitted 3/2-3/4 with Rt hand weakness and numbness that was felt to be  transient brachial plexus injury possibly related to position/probable neuropraxia. Resolved. Stroke ruled out.     In the ED she was in resp distress. She was hypertensive, tachycardic, hypoxic and hypercapnic and was started on BIPAP. CXR showed pulmonary edema . Labs showed ESRD changes with mild hyperkalemia. Glucose 302. Trop 208 which similar to her baseline . BNP >97250. ABG: pH 7.17, pCO2 60.6, pO2 50     Acute hypoxic and hypercapnic resp failure 2/2 acute pulm edema  Acute HFrEF exacerbation in the setting of ESRD  Severe ischemic cardiomyopathy  CAD s/p complex PCI   Severe TR, mod to severe MR   -Echo  showed EF

## 2025-04-23 NOTE — CONSULTS
bilateral H25.013    Cortical senile cataract H25.019    COVID-19 long hauler manifesting chronic loss of smell R43.0, U09.9    Crystalline deposits in vitreous H43.20    Diabetic peripheral neuropathy (HCC) E11.42    Diabetic retinopathy associated with type 2 diabetes mellitus E11.319    Disorder of bursae and tendons in shoulder region M71.9, M67.919    Encounter for screening for other bacterial diseases Z11.2    ESRD on dialysis (HCC) N18.6, Z99.2    Financial insecurity Z59.86    Generalized anxiety disorder F41.1    Post-traumatic stress disorder, unspecified F43.10    Posttraumatic stress disorder F43.10    Glaucoma suspect H40.009    Herpes zoster without complication B02.9    Hyperkalemia E87.5    Hyperlipidemia E78.5    Hyperparathyroidism due to renal insufficiency N25.81    Injury of left rotator cuff S46.002A    Injury of right rotator cuff S46.001A    Mild nonproliferative diabetic retinopathy E11.3299    Mood disorder F39    Other specified depressive episodes F32.89    Myopia H52.10    Nausea R11.0    Neuropathy G62.9    Nicotine dependence F17.200    Obstructive sleep apnea G47.33    Other disorders of phosphorus metabolism E83.39    Other disorders resulting from impaired renal tubular function N25.89    Iron deficiency anemia, unspecified D50.9    Other iron deficiency anemias D50.8    Pain, unspecified R52    Persistent disorder of initiating or maintaining sleep G47.00    Pruritus, unspecified L29.9    Pulmonary edema J81.1    Restless legs syndrome (RLS) G25.81    Schizophrenia, unspecified F20.9    Secondary immune deficiency disorder D89.89    Shortness of breath R06.02    Shoulder pain M25.519    Tobacco use disorder F17.200    Stage 4 chronic kidney disease (HCC) N18.4    Type 2 diabetes mellitus with chronic kidney disease (HCC) E11.22    Unspecified severe protein-calorie malnutrition E43    Uremia N19    Vitamin deficiency, unspecified E56.9    Weight loss R63.4    Stiffness of right  hand joint M25.641    Right arm weakness R29.898    Acute pulmonary edema (HCC) J81.0       Measurements:  Wound 02/20/25 Leg Left;Lower (Active)   Wound Image   04/23/25 0942   Wound Etiology Other 04/23/25 0942   Dressing Status Dry;Intact 04/23/25 0942   Wound Cleansed Not Cleansed 04/23/25 0942   Dressing/Treatment Betadine swabs/povidone iodine;Dry dressing;Roll gauze 04/23/25 0942   Dressing Change Due 04/24/25 04/23/25 0942   Wound Length (cm) 4.2 cm 04/23/25 0942   Wound Width (cm) 2 cm 04/23/25 0942   Wound Depth (cm) 0 cm 04/23/25 0942   Wound Surface Area (cm^2) 8.4 cm^2 04/23/25 0942   Change in Wound Size % (l*w) -60 04/23/25 0942   Wound Volume (cm^3) 0 cm^3 04/23/25 0942   Wound Assessment Eschar dry 04/23/25 0942   Drainage Amount None (dry) 04/23/25 0942   Odor None 04/23/25 0942   Netta-wound Assessment Dry/flaky;Intact 04/23/25 0942   Margins Attached edges;Defined edges 04/23/25 0942   Number of days: 62   L Lower Leg:        Response to treatment:  Well tolerated by patient.     Pain Assessment:  Severity:   / 10  Quality of pain:   Wound Pain Timing/Severity:   Premedicated:     Plan   Plan of Care: Wound 02/20/25 Leg Left;Lower-Dressing/Treatment: Betadine swabs/povidone iodine, Dry dressing, Roll gauze  L Lower Leg Wound Care:  Clean with NS moisten gauze  Cover eschar with betadine moisten gauze, dry gauze and roll gauze  Change daily  Turn pt every 2 hours  Call Wound Care for deterioration [phone]    Specialty Bed Required :  N/A    Current Diet: Diet NPO  Dietician consult:  N/A    Discharge Plan:  Placement for patient upon discharge: To be determined TBD    Patient appropriate for Outpatient Wound Care Center: No    Referrals:      Patient/Caregiver Teaching:  Level of patient/caregiver understanding able to:  N/A      Electronically signed by Maritza Mcintosh RN, CWOCN on 4/23/2025 at 9:50 AM

## 2025-04-23 NOTE — CARE COORDINATION
Case Management Assessment  Initial Evaluation    Date/Time of Evaluation: 4/23/2025 1:42 PM  Assessment Completed by: Brianna Mcguire RN    If patient is discharged prior to next notation, then this note serves as note for discharge by case management.    Patient Name: Cindi Rodriguez                   YOB: 1965  Diagnosis: Acute pulmonary edema (HCC) [J81.0]  Acute on chronic congestive heart failure, unspecified heart failure type (HCC) [I50.9]                   Date / Time: 4/22/2025 10:18 AM    Patient Admission Status: Inpatient   Readmission Risk (Low < 19, Mod (19-27), High > 27): Readmission Risk Score: 30.8    Current PCP: Alessandra Anderson, DO  PCP verified by CM? Yes    Chart Reviewed: Yes      History Provided by: Patient  Patient Orientation: Alert and Oriented, Person, Place, Situation, Self    Patient Cognition: Alert    Hospitalization in the last 30 days (Readmission):  No    If yes, Readmission Assessment in CM Navigator will be completed.    Advance Directives:      Code Status: Full Code   Patient's Primary Decision Maker is: Legal Next of Kin      Discharge Planning:    Patient lives with: Other (Comment) (LTC) Type of Home: Long-Term Care  Primary Care Giver: Other (Comment) (LTC)  Patient Support Systems include: Children   Current Financial resources: Medicaid  Current community resources: None  Current services prior to admission: None            Current DME:              Type of Home Care services:  None    ADLS  Prior functional level: Assistance with the following:, Bathing, Dressing, Toileting, Feeding, Cooking, Housework, Shopping, Mobility  Current functional level: Mobility, Shopping, Housework, Cooking, Feeding, Toileting, Dressing, Bathing, Assistance with the following:    PT AM-PAC:   /24  OT AM-PAC:   /24    Family can provide assistance at DC: No  Would you like Case Management to discuss the discharge plan with any other family members/significant others, and if so,

## 2025-04-23 NOTE — PLAN OF CARE
Problem: Chronic Conditions and Co-morbidities  Goal: Patient's chronic conditions and co-morbidity symptoms are monitored and maintained or improved  Outcome: Progressing  Flowsheets (Taken 4/23/2025 0751)  Care Plan - Patient's Chronic Conditions and Co-Morbidity Symptoms are Monitored and Maintained or Improved:   Monitor and assess patient's chronic conditions and comorbid symptoms for stability, deterioration, or improvement   Collaborate with multidisciplinary team to address chronic and comorbid conditions and prevent exacerbation or deterioration   Update acute care plan with appropriate goals if chronic or comorbid symptoms are exacerbated and prevent overall improvement and discharge     Problem: Discharge Planning  Goal: Discharge to home or other facility with appropriate resources  Outcome: Progressing  Flowsheets (Taken 4/23/2025 0751)  Discharge to home or other facility with appropriate resources:   Identify barriers to discharge with patient and caregiver   Arrange for needed discharge resources and transportation as appropriate   Identify discharge learning needs (meds, wound care, etc)   Refer to discharge planning if patient needs post-hospital services based on physician order or complex needs related to functional status, cognitive ability or social support system     Problem: Safety - Adult  Goal: Free from fall injury  Outcome: Progressing  Flowsheets (Taken 4/23/2025 0751)  Free From Fall Injury: Instruct family/caregiver on patient safety

## 2025-04-23 NOTE — PLAN OF CARE
Problem: Chronic Conditions and Co-morbidities  Goal: Patient's chronic conditions and co-morbidity symptoms are monitored and maintained or improved  4/23/2025 0942 by Elizabeth Keating RN  Outcome: Progressing  Flowsheets (Taken 4/23/2025 0942)  Care Plan - Patient's Chronic Conditions and Co-Morbidity Symptoms are Monitored and Maintained or Improved: Monitor and assess patient's chronic conditions and comorbid symptoms for stability, deterioration, or improvement     Problem: Discharge Planning  Goal: Discharge to home or other facility with appropriate resources  4/23/2025 0942 by Elizabeth Keating RN  Outcome: Progressing  Flowsheets (Taken 4/23/2025 0942)  Discharge to home or other facility with appropriate resources:   Identify barriers to discharge with patient and caregiver   Identify discharge learning needs (meds, wound care, etc)     Problem: Safety - Adult  Goal: Free from fall injury  4/23/2025 0942 by Elizabeth Keating RN  Outcome: Progressing  Flowsheets (Taken 4/23/2025 0942)  Free From Fall Injury: Instruct family/caregiver on patient safety  Note: Patient in lowest position, bed breaks locked, bed alarm in placed, call light within reach, and encouraged to call for assistance.      Problem: Respiratory - Adult  Goal: Achieves optimal ventilation and oxygenation  Outcome: Progressing  Flowsheets (Taken 4/23/2025 0942)  Achieves optimal ventilation and oxygenation:   Assess for changes in respiratory status   Assess for changes in mentation and behavior   Position to facilitate oxygenation and minimize respiratory effort   Oxygen supplementation based on oxygen saturation or arterial blood gases   Encourage broncho-pulmonary hygiene including cough, deep breathe, incentive spirometry   Assess and instruct to report shortness of breath or any respiratory difficulty   Respiratory therapy support as indicated  Note: Patient on continuous BiPAP. Continuous O2 monitoring. Video monitoring in place for

## 2025-04-23 NOTE — PROGRESS NOTES
Treatment time: 3.5 hrs    Net UF: 2000 ml    Pre weight: 65.3 kg  Post weight: 63.3 kg  EDW: TBD    Access used: Rt CW TDC  Access function: Well tolerated, 400 BFR    Medications or blood products given: none    Regular outpatient schedule: MWF    Summary of response to treatment: Pt tolerated well. Pt remained stable throughout entire treatment and upon exiting the hemodialysis suite. Report given to Kourtney Ray RN

## 2025-04-23 NOTE — FLOWSHEET NOTE
04/22/25 1948 04/22/25 2320   Vital Signs   BP (!) 147/103 (!) 165/100   Temp 98.9 °F (37.2 °C) 98.4 °F (36.9 °C)   Pulse 89 (!) 104   Respirations 22 18       Treatment time: 3.5 hours  Net UF: 2 L    Pre weight: 67.7 kg (bed scale)  Post weight: 65.7 kg (bed scale)  EDW: 58 kg    Access used: RIJ HD Tunneled cath  Access function: No problems    Medications or blood products given: None    Regular outpatient schedule: [facility] RODRIGUE[facility]    Summary of response to treatment: 3.5 hour HD tx, hypertension at onset and throughout tx.  Net UF 2 L.  No meds given.  Attempted trial high flow NC, O2 sats dropped to 87%, remained on BiPaP during tx.  C/o legs cramping post tx.  RIJ cath drsg changed per protocol.    Copy of dialysis treatment record placed in chart, to be scanned into EMR.    Report given to Kourtney Ray RN

## 2025-04-23 NOTE — PROGRESS NOTES
Ph: [phone], Fax: [phone]           [facility]               Reason for admission:                 SOB    Brief Summary :     Cindi Rodriguez is being seen by nephrology for Need for dialysis.      Interval History and plan:      She had HD with 2 liters removed yesterday   BP is well controlled  Labs are pending    Continue Torsemide QD  Continue Renvela with meals  Stat renal  HD again today for volume removal . She is above her EDW                     Assessment :        Acute hypoxic respiratory failure 2/2 volume overload  ESRD on dialysis Tuesday Thursday and Saturday, dry weight around 58 currently 67.7 kg, pt non compliant with diet  -Plan for dialysis today  -strict sodium control in diet  -strict I/O   -hold Lokelma since it contains sodium, will assess if needs patorimer on discharge  -continue home medications    -s/p 200 mg lasix     Hypertension  -At home patient is on torsemide 100 mg, Procardia XL 60 mg, losartan 25 mg, Coreg 3.125 daily  - Will continue    HFrEF  Echo:20-25%,   -continue GDMT meds as tolerated  -consider cardiology consult for ICD discussion      [facility] would like to thank Salo Knowles MD   for opportunity to serve this patient      Please call with questions at-   24 Hrs Answering service [phone] or  7 am- 5 pm via Perfect serve or cell phone  Dr.Muhammad Salvador Castaneda MD       HPI :     Cindi Rodriguez is a 60 y.o. female with past medical history significant for ESRD on hemodialysis TTS(estimated dry weight 58 kg currently 67.7 kg), type 2 diabetes, CVA, CHF, CAD s/p stents 2023, COPD at home on 3 L oxygen presented to   the hospital on 4/22/2025 with shortness of breath.    The patient was brought in by EMS due to concern for respiratory distress.She reports that she is compliant with all he medications and she has not missed any of her dialysis. She did not take her meds this morning thou. She does not report any leg swelling but

## 2025-04-24 PROBLEM — I25.5 ISCHEMIC CARDIOMYOPATHY: Status: ACTIVE | Noted: 2025-04-24

## 2025-04-24 LAB
GLUCOSE BLD-MCNC: 102 MG/DL (ref 70–99)
GLUCOSE BLD-MCNC: 131 MG/DL (ref 70–99)
GLUCOSE BLD-MCNC: 207 MG/DL (ref 70–99)
PERFORMED ON: ABNORMAL

## 2025-04-24 PROCEDURE — 6370000000 HC RX 637 (ALT 250 FOR IP): Performed by: HOSPITALIST

## 2025-04-24 PROCEDURE — 99221 1ST HOSP IP/OBS SF/LOW 40: CPT | Performed by: INTERNAL MEDICINE

## 2025-04-24 PROCEDURE — 6360000002 HC RX W HCPCS: Performed by: HOSPITALIST

## 2025-04-24 PROCEDURE — 94660 CPAP INITIATION&MGMT: CPT

## 2025-04-24 PROCEDURE — 2500000003 HC RX 250 WO HCPCS: Performed by: HOSPITALIST

## 2025-04-24 PROCEDURE — 94640 AIRWAY INHALATION TREATMENT: CPT

## 2025-04-24 PROCEDURE — 51798 US URINE CAPACITY MEASURE: CPT

## 2025-04-24 PROCEDURE — 2060000000 HC ICU INTERMEDIATE R&B

## 2025-04-24 PROCEDURE — 2700000000 HC OXYGEN THERAPY PER DAY

## 2025-04-24 PROCEDURE — 94761 N-INVAS EAR/PLS OXIMETRY MLT: CPT

## 2025-04-24 PROCEDURE — 6370000000 HC RX 637 (ALT 250 FOR IP)

## 2025-04-24 PROCEDURE — 90935 HEMODIALYSIS ONE EVALUATION: CPT

## 2025-04-24 RX ORDER — BUPROPION HYDROCHLORIDE 150 MG/1
150 TABLET, EXTENDED RELEASE ORAL DAILY
Status: DISCONTINUED | OUTPATIENT
Start: 2025-04-24 | End: 2025-04-24 | Stop reason: SDUPTHER

## 2025-04-24 RX ORDER — GABAPENTIN 100 MG/1
100 CAPSULE ORAL 2 TIMES DAILY
Status: DISCONTINUED | OUTPATIENT
Start: 2025-04-24 | End: 2025-04-26 | Stop reason: HOSPADM

## 2025-04-24 RX ORDER — MIRTAZAPINE 15 MG/1
15 TABLET, FILM COATED ORAL NIGHTLY
Status: DISCONTINUED | OUTPATIENT
Start: 2025-04-24 | End: 2025-04-26 | Stop reason: HOSPADM

## 2025-04-24 RX ORDER — IPRATROPIUM BROMIDE AND ALBUTEROL SULFATE 2.5; .5 MG/3ML; MG/3ML
1 SOLUTION RESPIRATORY (INHALATION)
Status: DISCONTINUED | OUTPATIENT
Start: 2025-04-24 | End: 2025-04-24

## 2025-04-24 RX ORDER — IPRATROPIUM BROMIDE AND ALBUTEROL SULFATE 2.5; .5 MG/3ML; MG/3ML
1 SOLUTION RESPIRATORY (INHALATION) EVERY 4 HOURS PRN
Status: DISCONTINUED | OUTPATIENT
Start: 2025-04-24 | End: 2025-04-26 | Stop reason: HOSPADM

## 2025-04-24 RX ADMIN — INSULIN LISPRO 2 UNITS: 100 INJECTION, SOLUTION INTRAVENOUS; SUBCUTANEOUS at 20:25

## 2025-04-24 RX ADMIN — HEPARIN SODIUM 5000 UNITS: 5000 INJECTION INTRAVENOUS; SUBCUTANEOUS at 22:20

## 2025-04-24 RX ADMIN — GABAPENTIN 100 MG: 100 CAPSULE ORAL at 20:24

## 2025-04-24 RX ADMIN — TORSEMIDE 100 MG: 100 TABLET ORAL at 08:23

## 2025-04-24 RX ADMIN — GABAPENTIN 100 MG: 100 CAPSULE ORAL at 08:23

## 2025-04-24 RX ADMIN — NIFEDIPINE 60 MG: 60 TABLET, EXTENDED RELEASE ORAL at 08:24

## 2025-04-24 RX ADMIN — ARIPIPRAZOLE 10 MG: 5 TABLET ORAL at 08:24

## 2025-04-24 RX ADMIN — SODIUM CHLORIDE, PRESERVATIVE FREE 10 ML: 5 INJECTION INTRAVENOUS at 20:25

## 2025-04-24 RX ADMIN — CLOPIDOGREL BISULFATE 75 MG: 75 TABLET, FILM COATED ORAL at 08:23

## 2025-04-24 RX ADMIN — SODIUM CHLORIDE, PRESERVATIVE FREE 10 ML: 5 INJECTION INTRAVENOUS at 08:26

## 2025-04-24 RX ADMIN — HEPARIN SODIUM 5000 UNITS: 5000 INJECTION INTRAVENOUS; SUBCUTANEOUS at 07:39

## 2025-04-24 RX ADMIN — MIRTAZAPINE 15 MG: 15 TABLET, FILM COATED ORAL at 20:25

## 2025-04-24 RX ADMIN — ATORVASTATIN CALCIUM 80 MG: 80 TABLET, FILM COATED ORAL at 20:25

## 2025-04-24 RX ADMIN — BUPROPION HYDROCHLORIDE 150 MG: 150 TABLET, FILM COATED, EXTENDED RELEASE ORAL at 08:23

## 2025-04-24 RX ADMIN — SEVELAMER CARBONATE 1600 MG: 800 TABLET, FILM COATED ORAL at 20:25

## 2025-04-24 RX ADMIN — CARVEDILOL 3.12 MG: 3.12 TABLET, FILM COATED ORAL at 08:23

## 2025-04-24 RX ADMIN — BENZTROPINE MESYLATE 0.5 MG: 1 TABLET ORAL at 20:24

## 2025-04-24 RX ADMIN — IPRATROPIUM BROMIDE AND ALBUTEROL SULFATE 1 DOSE: .5; 3 SOLUTION RESPIRATORY (INHALATION) at 08:06

## 2025-04-24 RX ADMIN — SEVELAMER CARBONATE 1600 MG: 800 TABLET, FILM COATED ORAL at 08:23

## 2025-04-24 RX ADMIN — Medication 10 MG: at 20:24

## 2025-04-24 RX ADMIN — PANTOPRAZOLE SODIUM 40 MG: 40 TABLET, DELAYED RELEASE ORAL at 08:23

## 2025-04-24 NOTE — PLAN OF CARE
Problem: Chronic Conditions and Co-morbidities  Goal: Patient's chronic conditions and co-morbidity symptoms are monitored and maintained or improved  Outcome: Progressing  Flowsheets (Taken 4/24/2025 0110)  Care Plan - Patient's Chronic Conditions and Co-Morbidity Symptoms are Monitored and Maintained or Improved:   Monitor and assess patient's chronic conditions and comorbid symptoms for stability, deterioration, or improvement   Collaborate with multidisciplinary team to address chronic and comorbid conditions and prevent exacerbation or deterioration   Update acute care plan with appropriate goals if chronic or comorbid symptoms are exacerbated and prevent overall improvement and discharge     Problem: Discharge Planning  Goal: Discharge to home or other facility with appropriate resources  Outcome: Progressing  Flowsheets (Taken 4/24/2025 0110)  Discharge to home or other facility with appropriate resources:   Identify barriers to discharge with patient and caregiver   Arrange for needed discharge resources and transportation as appropriate   Identify discharge learning needs (meds, wound care, etc)   Refer to discharge planning if patient needs post-hospital services based on physician order or complex needs related to functional status, cognitive ability or social support system     Problem: Safety - Adult  Goal: Free from fall injury  Outcome: Progressing  Flowsheets (Taken 4/23/2025 0942 by Elizabeth Keating, RN)  Free From Fall Injury: Instruct family/caregiver on patient safety     Problem: Respiratory - Adult  Goal: Achieves optimal ventilation and oxygenation  Outcome: Progressing  Flowsheets (Taken 4/24/2025 0110)  Achieves optimal ventilation and oxygenation:   Assess for changes in respiratory status   Assess for changes in mentation and behavior   Position to facilitate oxygenation and minimize respiratory effort   Oxygen supplementation based on oxygen saturation or arterial blood gases   Encourage

## 2025-04-24 NOTE — PROGRESS NOTES
Treatment time: 3 hours  Net UF: 2000 ml     Pre weight: 61.2 kg  Post weight:58 kg  EDW: 58 kg    Access used: CVC    Access function: good with  ml/min     Medications or blood products given: na     Regular outpatient schedule: TTS     Summary of response to treatment: Patient tolerated treatment well and without any complications. Report given to Yahaira.  Copy of dialysis treatment record placed in chart, to be scanned into EMR.

## 2025-04-24 NOTE — PROGRESS NOTES
Overnight, pt refused midnight vitals, labs this morning, and straight cath for urine retention of 358 mL. Pt did urinate after bladder scan and allowed 0400 vitals.

## 2025-04-24 NOTE — PROGRESS NOTES
V2.0    [facility] [place]      Name:  Cindi Rodriguez /Age/Sex: 1965  (60 y.o. female)   MRN & CSN:  [id number] & [id number] Encounter Date/Time: 2025 9:27 AM EDT   Location:  4452/4452-01 PCP: Alessandra Anderson DO     Attending:Salo Knowles MD       Hospital Day: 3    Assessment and Recommendations   59 y.o. adult with ESRD on HD (), type 2 diabetes, hypertension, hyperlipidemia, CAD s/p stents (), COPD (3 L home oxygen), CVA (), who presented to the ED with resp distress.     Patient stated she was feeling well last night but then woke up with sudden shortness of breath.  Is associated with orthopnea and wheezing.  She did think she had some cough yesterday that is productive of sputum that is new.  She also states she did have some URI symptoms recently.  No LE edema. She stated she had 2 puffs of 1 cigarette yesterday which is unusual for her as she quit smoking.  She states she did not miss any of her dialysis.      She was admitted 2025- 2025 after presenting from skilled nursing facility for acute respiratory failure requiring ICU admission, felt due to fluid overload in the setting of end-stage renal disease; improved following ultrafiltration with hemodialysis      She was admitted 3/2-3/4 with Rt hand weakness and numbness that was felt to be  transient brachial plexus injury possibly related to position/probable neuropraxia. Resolved. Stroke ruled out.     In the ED she was in resp distress. She was hypertensive, tachycardic, hypoxic and hypercapnic and was started on BIPAP. CXR showed pulmonary edema . Labs showed ESRD changes with mild hyperkalemia. Glucose 302. Trop 208 which similar to her baseline . BNP >80853. ABG: pH 7.17, pCO2 60.6, pO2 50    Echo  showed EF 30-35%, grade II DD.   Echo  showed EF 20-25%. It also showed severe TR, mod-severe TR     Acute hypoxic and hypercapnic resp failure 2/2 acute pulm edema  Acute HFrEF exacerbation in the setting    Height:             Physical Exam:      General: awake, alert   Eyes: EOMI  ENT: neck supple  Cardiovascular: Regular rate.  Respiratory: b/l crackles but better  Gastrointestinal: Soft, non tender  Genitourinary: no suprapubic tenderness  Musculoskeletal: mild edema. Dry wound on Lt leg  Skin: warm, dry  Neuro: awake, alert   Psych: Mood appropriate.        Medications:   Medications:    gabapentin  100 mg Oral BID    mirtazapine  15 mg Oral Nightly    carvedilol  3.125 mg Oral BID WC    NIFEdipine  60 mg Oral Daily    sevelamer  1,600 mg Oral TID    sodium chloride flush  5-40 mL IntraVENous 2 times per day    heparin (porcine)  5,000 Units SubCUTAneous 3 times per day    ipratropium 0.5 mg-albuterol 2.5 mg  1 Dose Inhalation Q4H WA RT    insulin lispro  0-8 Units SubCUTAneous 4x Daily AC & HS    ARIPiprazole  10 mg Oral Daily    atorvastatin  80 mg Oral Nightly    benztropine  0.5 mg Oral Nightly    buPROPion  150 mg Oral QAM    clopidogrel  75 mg Oral Daily    melatonin  10 mg Oral Nightly    pantoprazole  40 mg Oral Daily    torsemide  100 mg Oral Daily      Infusions:    sodium chloride      dextrose       PRN Meds: sodium chloride, 100 mL, PRN  albuterol, 2.5 mg, Q6H PRN  sodium chloride flush, 5-40 mL, PRN  sodium chloride, , PRN  ondansetron, 4 mg, Q8H PRN   Or  ondansetron, 4 mg, Q6H PRN  polyethylene glycol, 17 g, Daily PRN  acetaminophen, 650 mg, Q6H PRN   Or  acetaminophen, 650 mg, Q6H PRN  sulfur hexafluoride microspheres, 2 mL, ONCE PRN  glucose, 4 tablet, PRN  dextrose bolus, 125 mL, PRN   Or  dextrose bolus, 250 mL, PRN  glucagon (rDNA), 1 mg, PRN  dextrose, , Continuous PRN  heparin (porcine), 3,200 Units, PRN        Labs and Imaging   Echo (TTE) complete (PRN contrast/bubble/strain/3D)  Result Date: 4/23/2025    Left Ventricle: Severely reduced left ventricular systolic function with a visually estimated EF of 15 - 20%. EF by 2D Simpsons Biplane is 18%. Left ventricle is dilated. Normal wall

## 2025-04-24 NOTE — CONSULTS
History and Physical  [facility]   EP Cardiology    Chief Complaint: ? Icd candidate    HPI:     Patient is a 60 y.o. female presents with fluid overload. She has a complex hx with cad, s/p stents 10/23 progressively reduced ef with Q wave development between 6/23 and 8/2024. She has no chest pain. She transitioned from peritoneal dialysis to hemodialysis. She is improving with fluid removal. Because of low ef, ?icd a consideration.        Past Medical History:   Diagnosis Date    JONATHAN (acute kidney injury) 9/3/2014    Anxiety     Asthma     Bipolar disorder ([facility])     Bronchitis     Chronic back pain     Depression     Diabetes mellitus ([facility])     DM II (diabetes mellitus, type II), controlled ([facility]) 9/3/2014    Hyperlipidemia 12/26/2019    Hypertension     Neuropathy 1/2/2024    Obstructive sleep apnea 8/22/2011    Restless legs syndrome (RLS) 8/22/2011      Past Surgical History:   Procedure Laterality Date    DIALYSIS CATHETER REMOVAL N/A 8/26/2024    PERITONEAL DIAYSIS CATHETER REMOVAL, INCISION AND DRAINAGE OF ABDOMINAL WALL ABCESS performed by Santos Castaneda DO at [facility] OR    ENDOMETRIAL ABLATION      INNER EAR SURGERY      TUBAL LIGATION          Medications Prior to Admission: gabapentin (NEURONTIN) 100 MG capsule, Take 2 capsules by mouth 2 times daily.  NIFEdipine (PROCARDIA XL) 60 MG extended release tablet, Take 1 tablet by mouth daily  sodium zirconium cyclosilicate (LOKELMA) 5 g PACK oral suspension, Take 1 packet by mouth daily  melatonin 10 MG CAPS capsule, Take 1 capsule by mouth nightly  urea (CARMOL) 10 % cream, Apply 10 g topically nightly Apply topically as needed.  benztropine (COGENTIN) 0.5 MG tablet, Take 1 tablet by mouth nightly  buPROPion (WELLBUTRIN SR) 150 MG extended release tablet, Take 1 tablet by mouth daily  torsemide (DEMADEX) 100 MG tablet, Take 1 tablet by mouth daily  naloxone 4 MG/0.1ML LIQD nasal spray, 1 spray by Nasal route as needed for Opioid Reversal  sevelamer  Patient was notified letter is ready for  at .

## 2025-04-24 NOTE — CARE COORDINATION
From [facility]. Plans to return. Has OP HD at [facility] on TThSa. Electronically signed by Cydney Cole RN on 4/24/2025 at 11:54 AM    Addendum:  Cardiology following and suggest possible ICD may be planned. O2 down to 3L now. Electronically signed by Cydney Cole RN on 4/24/2025 at 5:19 PM

## 2025-04-24 NOTE — DISCHARGE INSTR - COC
Continuity of Care Form    Patient Name: Cindi Rodriguez   :  1965  MRN:  [id number]    Admit date:  2025  Discharge date:  25      Code Status Order: Full Code   Advance Directives:     Admitting Physician:  Salo Knowles MD  PCP: Alessandra Anderson DO    Discharging Nurse: Judi Thomas, RN  Discharging Hospital Unit/Room#: 4452/4452-01  Discharging Unit Phone Number: [phone]      Emergency Contact:   Extended Emergency Contact Information  Primary Emergency Contact: Dimitri Santo  Home Phone: [phone]  Mobile Phone: [phone]  Relation: Child   needed? No  Secondary Emergency Contact: suraj Santo  Mobile Phone: [phone]  Relation: Child    Past Surgical History:  Past Surgical History:   Procedure Laterality Date    DIALYSIS CATHETER REMOVAL N/A 2024    PERITONEAL DIAYSIS CATHETER REMOVAL, INCISION AND DRAINAGE OF ABDOMINAL WALL ABCESS performed by Santos Castaneda DO at [facility] OR    ENDOMETRIAL ABLATION      INNER EAR SURGERY      TUBAL LIGATION         Immunization History:     There is no immunization history on file for this patient.    Active Problems:  Patient Active Problem List   Diagnosis Code    Atypical chest pain R07.89    DM II (diabetes mellitus, type II), controlled ([facility]) E11.9    HTN (hypertension) I10    JONATHAN (acute kidney injury) N17.9    Asthma J45.909    History of anemia due to CKD N18.9, Z86.2    Peritonitis (HCC) K65.9    ESRD on peritoneal dialysis (HCC) N18.6, Z99.2    Complication of peritoneal dialysis T80.90XA    Peritonitis associated with peritoneal dialysis T85.71XA    Chronic idiopathic constipation K59.04    Lower abdominal pain R10.30    AMS (altered mental status) R41.82    Recurrent falls R29.6    Vertigo R42    Respiratory failure (HCC) J96.90    Anemia D64.9    Multifocal pneumonia J18.9    Metabolic encephalopathy G93.41    Peritoneal dialysis catheter in place Z99.2    Abdominal wall abscess L02.211    Encephalopathy G93.40    Acute  Score:  [facility] RISK OF UNPLANNED READMISSION 2.0             31.1 Total Score        Discharging to Facility/ Agency   Jimena Puga  [phone]    Dialysis Facility (if applicable)   [facility]  TTS  Transport by Jimena Baldwin    / signature: Electronically signed by Cydney Cole RN on 4/24/25 at 1:28 PM EDT    PHYSICIAN SECTION    Prognosis: {Prognosis:5671978561}    Condition at Discharge: { Patient Condition:082331872}    Rehab Potential (if transferring to Rehab): {Prognosis:5254999762}    Recommended Labs or Other Treatments After Discharge: ***    Physician Certification: I certify the above information and transfer of Cindi Rodriguez  is necessary for the continuing treatment of the diagnosis listed and that she requires {Admit to Appropriate Level of Care:25628} for {GREATER/LESS:928912634} 30 days.     Update Admission H&P: {CHP DME Changes in HandP:718806528}    PHYSICIAN SIGNATURE:  {Esignature:822820586}

## 2025-04-24 NOTE — PROGRESS NOTES
Ph: [phone], Fax: [phone]           [facility]               Reason for admission:                 SOB    Brief Summary :     Cindi Rodriguez is being seen by nephrology for Need for dialysis.      Interval History and plan:      She had HD with 2 liters removed yesterday   BP is well controlled  Labs are pending    Continue Torsemide QD  Continue Renvela with meals  HD again today as per TTS schedule                      Assessment :        Acute hypoxic respiratory failure 2/2 volume overload  ESRD on dialysis Tuesday Thursday and Saturday, dry weight around 58 currently 67.7 kg, pt non compliant with diet  -Plan for dialysis today  -strict sodium control in diet  -strict I/O   -hold Lokelma since it contains sodium, will assess if needs patorimer on discharge  -continue home medications    -s/p 200 mg lasix     Hypertension  -At home patient is on torsemide 100 mg, Procardia XL 60 mg, losartan 25 mg, Coreg 3.125 daily  - Will continue    HFrEF  Echo:20-25%,   -continue GDMT meds as tolerated  -consider cardiology consult for ICD discussion      [facility] would like to thank Salo Knowles MD   for opportunity to serve this patient      Please call with questions at-   24 Hrs Answering service [phone] or  7 am- 5 pm via Perfect serve or cell phone  Dr.Muhammad Salvador Castaneda MD       HPI :     Cindi Rodriguez is a 60 y.o. female with past medical history significant for ESRD on hemodialysis TTS(estimated dry weight 58 kg currently 67.7 kg), type 2 diabetes, CVA, CHF, CAD s/p stents 2023, COPD at home on 3 L oxygen presented to   the hospital on 4/22/2025 with shortness of breath.    The patient was brought in by EMS due to concern for respiratory distress.She reports that she is compliant with all he medications and she has not missed any of her dialysis. She did not take her meds this morning thou. She does not report any leg swelling but has been feeling abdominal

## 2025-04-24 NOTE — PLAN OF CARE
Problem: Chronic Conditions and Co-morbidities  Goal: Patient's chronic conditions and co-morbidity symptoms are monitored and maintained or improved  4/24/2025 1021 by Yahaira Morgan RN  Outcome: Progressing  Flowsheets (Taken 4/24/2025 1021)  Care Plan - Patient's Chronic Conditions and Co-Morbidity Symptoms are Monitored and Maintained or Improved:   Monitor and assess patient's chronic conditions and comorbid symptoms for stability, deterioration, or improvement   Collaborate with multidisciplinary team to address chronic and comorbid conditions and prevent exacerbation or deterioration  Note: Education provided to patient/family on co-morbidities as needed throughout shift.        Problem: Discharge Planning  Goal: Discharge to home or other facility with appropriate resources  4/24/2025 1021 by Yahaira Morgan, RN  Outcome: Progressing  Flowsheets (Taken 4/24/2025 1021)  Discharge to home or other facility with appropriate resources:   Identify barriers to discharge with patient and caregiver   Arrange for needed discharge resources and transportation as appropriate   Identify discharge learning needs (meds, wound care, etc)  Note: No plan for discharge this shift. Patient getting dialysis at this time.      Problem: Safety - Adult  Goal: Free from fall injury  4/24/2025 1021 by Yahaira Morgan, RN  Outcome: Progressing  Flowsheets (Taken 4/24/2025 1021)  Free From Fall Injury: Instruct family/caregiver on patient safety  Note: Pt will remain free from accidental injury this shift. Pt has fall risk measures (fall risk bracelet, fall risk sign, fall risk cloth, non-slip socks, dome light on) in place. Pt bed is in low position, bed alarm on, bed wheels locked, call light within reach, bedside table within reach, chair wheels locked, chair alarm on.       Problem: Respiratory - Adult  Goal: Achieves optimal ventilation and oxygenation  4/24/2025 1021 by Yahaira Morgan, RN  Outcome: Progressing  Flowsheets (Taken 4/24/2025

## 2025-04-24 NOTE — PLAN OF CARE
Problem: Chronic Conditions and Co-morbidities  Goal: Patient's chronic conditions and co-morbidity symptoms are monitored and maintained or improved  4/24/2025 1943 by Marielena Mcbride, RN  Outcome: Progressing  Flowsheets (Taken 4/24/2025 1943)  Care Plan - Patient's Chronic Conditions and Co-Morbidity Symptoms are Monitored and Maintained or Improved:   Monitor and assess patient's chronic conditions and comorbid symptoms for stability, deterioration, or improvement   Collaborate with multidisciplinary team to address chronic and comorbid conditions and prevent exacerbation or deterioration   Update acute care plan with appropriate goals if chronic or comorbid symptoms are exacerbated and prevent overall improvement and discharge  4/24/2025 1021 by Yahaira Morgan RN  Outcome: Progressing  Flowsheets (Taken 4/24/2025 1021)  Care Plan - Patient's Chronic Conditions and Co-Morbidity Symptoms are Monitored and Maintained or Improved:   Monitor and assess patient's chronic conditions and comorbid symptoms for stability, deterioration, or improvement   Collaborate with multidisciplinary team to address chronic and comorbid conditions and prevent exacerbation or deterioration  Note: Education provided to patient/family on co-morbidities as needed throughout shift.        Problem: Discharge Planning  Goal: Discharge to home or other facility with appropriate resources  4/24/2025 1943 by Marielena Mcbride, RN  Outcome: Progressing  Flowsheets (Taken 4/24/2025 1943)  Discharge to home or other facility with appropriate resources:   Identify barriers to discharge with patient and caregiver   Arrange for needed discharge resources and transportation as appropriate   Identify discharge learning needs (meds, wound care, etc)   Refer to discharge planning if patient needs post-hospital services based on physician order or complex needs related to functional status, cognitive ability or social support system  4/24/2025 1021 by

## 2025-04-25 ENCOUNTER — APPOINTMENT (OUTPATIENT)
Dept: GENERAL RADIOLOGY | Age: 60
End: 2025-04-25
Payer: MEDICAID

## 2025-04-25 PROBLEM — I50.22 CHRONIC SYSTOLIC CONGESTIVE HEART FAILURE (HCC): Status: ACTIVE | Noted: 2025-04-25

## 2025-04-25 LAB
GLUCOSE BLD-MCNC: 101 MG/DL (ref 70–99)
GLUCOSE BLD-MCNC: 161 MG/DL (ref 70–99)
GLUCOSE BLD-MCNC: 166 MG/DL (ref 70–99)
GLUCOSE BLD-MCNC: 177 MG/DL (ref 70–99)
GLUCOSE BLD-MCNC: 220 MG/DL (ref 70–99)
PERFORMED ON: ABNORMAL

## 2025-04-25 PROCEDURE — 94660 CPAP INITIATION&MGMT: CPT

## 2025-04-25 PROCEDURE — 94761 N-INVAS EAR/PLS OXIMETRY MLT: CPT

## 2025-04-25 PROCEDURE — 71045 X-RAY EXAM CHEST 1 VIEW: CPT

## 2025-04-25 PROCEDURE — 2500000003 HC RX 250 WO HCPCS: Performed by: HOSPITALIST

## 2025-04-25 PROCEDURE — 6370000000 HC RX 637 (ALT 250 FOR IP)

## 2025-04-25 PROCEDURE — 2700000000 HC OXYGEN THERAPY PER DAY

## 2025-04-25 PROCEDURE — 2060000000 HC ICU INTERMEDIATE R&B

## 2025-04-25 PROCEDURE — 6370000000 HC RX 637 (ALT 250 FOR IP): Performed by: HOSPITALIST

## 2025-04-25 PROCEDURE — 99232 SBSQ HOSP IP/OBS MODERATE 35: CPT | Performed by: INTERNAL MEDICINE

## 2025-04-25 PROCEDURE — 6360000002 HC RX W HCPCS: Performed by: HOSPITALIST

## 2025-04-25 RX ADMIN — SODIUM CHLORIDE, PRESERVATIVE FREE 10 ML: 5 INJECTION INTRAVENOUS at 08:35

## 2025-04-25 RX ADMIN — SEVELAMER CARBONATE 1600 MG: 800 TABLET, FILM COATED ORAL at 08:35

## 2025-04-25 RX ADMIN — INSULIN LISPRO 2 UNITS: 100 INJECTION, SOLUTION INTRAVENOUS; SUBCUTANEOUS at 11:20

## 2025-04-25 RX ADMIN — ARIPIPRAZOLE 10 MG: 5 TABLET ORAL at 08:35

## 2025-04-25 RX ADMIN — HEPARIN SODIUM 5000 UNITS: 5000 INJECTION INTRAVENOUS; SUBCUTANEOUS at 20:03

## 2025-04-25 RX ADMIN — Medication 10 MG: at 19:56

## 2025-04-25 RX ADMIN — CARVEDILOL 3.12 MG: 3.12 TABLET, FILM COATED ORAL at 08:35

## 2025-04-25 RX ADMIN — CLOPIDOGREL BISULFATE 75 MG: 75 TABLET, FILM COATED ORAL at 08:35

## 2025-04-25 RX ADMIN — BUPROPION HYDROCHLORIDE 150 MG: 150 TABLET, FILM COATED, EXTENDED RELEASE ORAL at 08:35

## 2025-04-25 RX ADMIN — TORSEMIDE 100 MG: 100 TABLET ORAL at 08:35

## 2025-04-25 RX ADMIN — MIRTAZAPINE 15 MG: 15 TABLET, FILM COATED ORAL at 19:56

## 2025-04-25 RX ADMIN — GABAPENTIN 100 MG: 100 CAPSULE ORAL at 08:35

## 2025-04-25 RX ADMIN — PANTOPRAZOLE SODIUM 40 MG: 40 TABLET, DELAYED RELEASE ORAL at 08:35

## 2025-04-25 RX ADMIN — ATORVASTATIN CALCIUM 80 MG: 80 TABLET, FILM COATED ORAL at 19:57

## 2025-04-25 RX ADMIN — BENZTROPINE MESYLATE 0.5 MG: 1 TABLET ORAL at 19:56

## 2025-04-25 RX ADMIN — NIFEDIPINE 60 MG: 60 TABLET, EXTENDED RELEASE ORAL at 08:35

## 2025-04-25 RX ADMIN — GABAPENTIN 100 MG: 100 CAPSULE ORAL at 19:57

## 2025-04-25 RX ADMIN — SODIUM CHLORIDE, PRESERVATIVE FREE 10 ML: 5 INJECTION INTRAVENOUS at 19:57

## 2025-04-25 RX ADMIN — POLYETHYLENE GLYCOL 3350 17 G: 17 POWDER, FOR SOLUTION ORAL at 18:37

## 2025-04-25 NOTE — PLAN OF CARE
Problem: Chronic Conditions and Co-morbidities  Goal: Patient's chronic conditions and co-morbidity symptoms are monitored and maintained or improved  Outcome: Progressing  Flowsheets (Taken 4/25/2025 0957)  Care Plan - Patient's Chronic Conditions and Co-Morbidity Symptoms are Monitored and Maintained or Improved:   Monitor and assess patient's chronic conditions and comorbid symptoms for stability, deterioration, or improvement   Collaborate with multidisciplinary team to address chronic and comorbid conditions and prevent exacerbation or deterioration   Update acute care plan with appropriate goals if chronic or comorbid symptoms are exacerbated and prevent overall improvement and discharge  Note: Education provided to patient/family on co-morbidities as needed throughout shift.        Problem: Discharge Planning  Goal: Discharge to home or other facility with appropriate resources  Outcome: Progressing  Flowsheets (Taken 4/25/2025 0957)  Discharge to home or other facility with appropriate resources:   Identify barriers to discharge with patient and caregiver   Arrange for needed discharge resources and transportation as appropriate   Identify discharge learning needs (meds, wound care, etc)  Note: Patient plans to return to facility when appropriate     Problem: Safety - Adult  Goal: Free from fall injury  Outcome: Progressing  Flowsheets (Taken 4/25/2025 0957)  Free From Fall Injury: Instruct family/caregiver on patient safety  Note: Pt will remain free from accidental injury this shift. Pt has fall risk measures (fall risk bracelet, fall risk sign, fall risk cloth, non-slip socks, dome light on) in place. Pt bed is in low position, bed alarm on, bed wheels locked, call light within reach, bedside table within reach, chair wheels locked, chair alarm on.       Problem: Respiratory - Adult  Goal: Achieves optimal ventilation and oxygenation  Outcome: Progressing  Flowsheets (Taken 4/25/2025 0957)  Achieves  optimal ventilation and oxygenation:   Assess for changes in respiratory status   Assess for changes in mentation and behavior   Position to facilitate oxygenation and minimize respiratory effort   Oxygen supplementation based on oxygen saturation or arterial blood gases  Note: Patient on baseline O2 of 2L with SpO2 WNL      Problem: ABCDS Injury Assessment  Goal: Absence of physical injury  Outcome: Progressing  Flowsheets (Taken 4/25/2025 3331)  Absence of Physical Injury: Implement safety measures based on patient assessment  Note: Call light within reach and fall precautions in place at this time

## 2025-04-25 NOTE — PROGRESS NOTES
Ph: [phone], Fax: [phone]           [facility]               Reason for admission:                 SOB    Brief Summary :     Cindi Rodriguez is being seen by nephrology for Need for dialysis.      Interval History and plan:        BP is well controlled  Labs are pending from today   She had HD with 2 liters removed yesterday     Continue Torsemide QD  Continue Renvela with meals  HD again as per TTS schedule . Next HD in am   Cardiology note reviewed                     Assessment :        Acute hypoxic respiratory failure 2/2 volume overload  ESRD on dialysis Tuesday Thursday and Saturday, dry weight around 58 currently 67.7 kg, pt non compliant with diet  -Plan for dialysis today  -strict sodium control in diet  -strict I/O   -hold Lokelma since it contains sodium, will assess if needs patorimer on discharge  -continue home medications    -s/p 200 mg lasix     Hypertension  -At home patient is on torsemide 100 mg, Procardia XL 60 mg, losartan 25 mg, Coreg 3.125 daily  - Will continue    HFrEF  Echo:20-25%,   -continue GDMT meds as tolerated  -consider cardiology consult for ICD discussion      [facility] would like to thank Salo Knowles MD   for opportunity to serve this patient      Please call with questions at-   24 Hrs Answering service [phone] or  7 am- 5 pm via Perfect serve or cell phone  Dr.Muhammad Salvador Castaneda MD       HPI :     Cindi Rodriguez is a 60 y.o. female with past medical history significant for ESRD on hemodialysis TTS(estimated dry weight 58 kg currently 67.7 kg), type 2 diabetes, CVA, CHF, CAD s/p stents 2023, COPD at home on 3 L oxygen presented to   the hospital on 4/22/2025 with shortness of breath.    The patient was brought in by EMS due to concern for respiratory distress.She reports that she is compliant with all he medications and she has not missed any of her dialysis. She did not take her meds this morning thou. She does not report any  --  5.2*     Lab Results   Component Value Date/Time    COLORU Yellow 08/22/2024 02:54 PM    NITRU Negative 08/22/2024 02:54 PM    GLUCOSEU Negative 08/22/2024 02:54 PM    KETUA 15 08/22/2024 02:54 PM    UROBILINOGEN 0.2 08/22/2024 02:54 PM    BILIRUBINUR SMALL 08/22/2024 02:54 PM        ----------------------------------------------------------  Please call with questions at      24 Hrs Answering service [phone]  Perfect serve, or cell phone 7 am - 5pm  Brittney Castaneda MD  [email or web address]

## 2025-04-25 NOTE — CARE COORDINATION
10:26 AM  Patient is LTC at [facility]; no cert needed for return.   Cardio following for poss ICD.   Nephro following; HD TTS, next run tomorrow  4/26.  On 2L o2.  Will need transportation at the time of DC     Electronically signed by Ashu Leung RN, CM on 4/25/2025 at 10:28 AM.  Phone: [phone]  Fax: [phone]

## 2025-04-25 NOTE — PROGRESS NOTES
[facility]  Cardiology Inpatient Consult Service  Daily Progress Note        Admit Date:  4/22/2025    Referring Physician: Salo Knowles MD    Reason for Consultation/Chief Complaint: ? ICD candidate    Subjective:   Interval history:  No acute events overnight, vital signs stable. Patient denies chest pain/pressure, but does endorse shortness of breath, which has been improving. She is eager to go home and amenable to continuing further workup as an outpatient.      Medications:   gabapentin  100 mg Oral BID    mirtazapine  15 mg Oral Nightly    carvedilol  3.125 mg Oral BID WC    NIFEdipine  60 mg Oral Daily    sevelamer  1,600 mg Oral TID    sodium chloride flush  5-40 mL IntraVENous 2 times per day    heparin (porcine)  5,000 Units SubCUTAneous 3 times per day    insulin lispro  0-8 Units SubCUTAneous 4x Daily AC & HS    ARIPiprazole  10 mg Oral Daily    atorvastatin  80 mg Oral Nightly    benztropine  0.5 mg Oral Nightly    buPROPion  150 mg Oral QAM    clopidogrel  75 mg Oral Daily    melatonin  10 mg Oral Nightly    pantoprazole  40 mg Oral Daily    torsemide  100 mg Oral Daily       IV drips:   sodium chloride      dextrose         PRN:  ipratropium 0.5 mg-albuterol 2.5 mg, sodium chloride, albuterol, sodium chloride flush, sodium chloride, ondansetron **OR** ondansetron, polyethylene glycol, acetaminophen **OR** acetaminophen, sulfur hexafluoride microspheres, glucose, dextrose bolus **OR** dextrose bolus, glucagon (rDNA), dextrose, heparin (porcine)      Objective:     Vitals:    04/25/25 0326 04/25/25 0736 04/25/25 0811 04/25/25 0950   BP:  118/66     Pulse:  78 79 80   Resp:  17     Temp:  98.4 °F (36.9 °C)     TempSrc:  Oral     SpO2:  99%     Weight: 60.4 kg (133 lb 2.5 oz)      Height:           Intake/Output Summary (Last 24 hours) at 4/25/2025 0952  Last data filed at 4/25/2025 0950  Gross per 24 hour   Intake 1080 ml   Output 0 ml   Net 1080 ml     I/O last 3

## 2025-04-25 NOTE — PROGRESS NOTES
V2.0    [facility] Progress Note      Name:  Cindi Rodriguez /Age/Sex: 1965  (60 y.o. female)   MRN & CSN:  [id number] & [id number] Encounter Date/Time: 2025 9:27 AM EDT   Location:  4452/4452-01 PCP: Alessandra Anderson DO     Attending:Salo Knowles MD       Hospital Day: 4    Assessment and Recommendations   59 y.o. adult with ESRD on HD (), type 2 diabetes, hypertension, hyperlipidemia, CAD s/p stents (), COPD (3 L home oxygen), CVA (), who presented to the ED with resp distress.     Patient stated she was feeling well last night but then woke up with sudden shortness of breath.  Is associated with orthopnea and wheezing.  She did think she had some cough yesterday that is productive of sputum that is new.  She also states she did have some URI symptoms recently.  No LE edema. She stated she had 2 puffs of 1 cigarette yesterday which is unusual for her as she quit smoking.  She states she did not miss any of her dialysis.      She was admitted 2025- 2025 after presenting from skilled nursing facility for acute respiratory failure requiring ICU admission, felt due to fluid overload in the setting of end-stage renal disease; improved following ultrafiltration with hemodialysis      She was admitted 3/2-3/4 with Rt hand weakness and numbness that was felt to be  transient brachial plexus injury possibly related to position/probable neuropraxia. Resolved. Stroke ruled out.     In the ED she was in resp distress. She was hypertensive, tachycardic, hypoxic and hypercapnic and was started on BIPAP. CXR showed pulmonary edema . Labs showed ESRD changes with mild hyperkalemia. Glucose 302. Trop 208 which similar to her baseline . BNP >11629. ABG: pH 7.17, pCO2 60.6, pO2 50    Echo  showed EF 30-35%, grade II DD.   Echo  showed EF 20-25%. It also showed severe TR, mod-severe TR     Acute hypoxic and hypercapnic resp failure 2/2 acute pulm edema  Acute HFrEF exacerbation in the setting    Height:             Physical Exam:      General: awake, alert   Eyes: EOMI  ENT: neck supple  Cardiovascular: Regular rate.  Respiratory: b/l crackles but better  Gastrointestinal: Soft, non tender  Genitourinary: no suprapubic tenderness  Musculoskeletal: mild edema. Dry wound on Lt leg  Skin: warm, dry  Neuro: awake, alert   Psych: Mood appropriate.        Medications:   Medications:    gabapentin  100 mg Oral BID    mirtazapine  15 mg Oral Nightly    carvedilol  3.125 mg Oral BID WC    NIFEdipine  60 mg Oral Daily    sevelamer  1,600 mg Oral TID    sodium chloride flush  5-40 mL IntraVENous 2 times per day    heparin (porcine)  5,000 Units SubCUTAneous 3 times per day    insulin lispro  0-8 Units SubCUTAneous 4x Daily AC & HS    ARIPiprazole  10 mg Oral Daily    atorvastatin  80 mg Oral Nightly    benztropine  0.5 mg Oral Nightly    buPROPion  150 mg Oral QAM    clopidogrel  75 mg Oral Daily    melatonin  10 mg Oral Nightly    pantoprazole  40 mg Oral Daily    torsemide  100 mg Oral Daily      Infusions:    sodium chloride      dextrose       PRN Meds: ipratropium 0.5 mg-albuterol 2.5 mg, 1 Dose, Q4H PRN  sodium chloride, 100 mL, PRN  albuterol, 2.5 mg, Q6H PRN  sodium chloride flush, 5-40 mL, PRN  sodium chloride, , PRN  ondansetron, 4 mg, Q8H PRN   Or  ondansetron, 4 mg, Q6H PRN  polyethylene glycol, 17 g, Daily PRN  acetaminophen, 650 mg, Q6H PRN   Or  acetaminophen, 650 mg, Q6H PRN  sulfur hexafluoride microspheres, 2 mL, ONCE PRN  glucose, 4 tablet, PRN  dextrose bolus, 125 mL, PRN   Or  dextrose bolus, 250 mL, PRN  glucagon (rDNA), 1 mg, PRN  dextrose, , Continuous PRN  heparin (porcine), 3,200 Units, PRN        Labs and Imaging   Echo (TTE) complete (PRN contrast/bubble/strain/3D)  Result Date: 4/23/2025    Left Ventricle: Severely reduced left ventricular systolic function with a visually estimated EF of 15 - 20%. EF by 2D Simpsons Biplane is 18%. Left ventricle is dilated. Normal wall thickness. Severe

## 2025-04-25 NOTE — DISCHARGE INSTRUCTIONS
Extra Heart Failure Education/ Tools/ Resources:     https://[employer].com/publication/?m=702313   --- this is American Heart Association interactive Healthier Living with Heart Failure guidebook.  Please click hyperlink or copy / paste link into search bar. The QR Code is also available below. Use your mouse to scroll through the pages.  Lots of information about weight monitoring, diet tips, activity, meds, etc    Heart Failure Tools and Resources QR Code is below. It includes multiple resources to include symptom tracker, med tracker, further HF info, and access to a HF Support Network online Community    HF [place]  -- this is a free smart phone antonia available for iPhone and Android download.  Use your phone to track sodium / fluid intake, zone tool symptom tracking, weights, medications, etc. Click on this hyperlink  HF [place]   for QR code for easy download or the link is also found in the below HF Tools and Resources.      DASH (Dietary Approach to Stop Hypertension) diet --  https://www.nhlbi.nih.gov/education/dash-eating-plan -- this diet is a flexible eating plan that promotes heart healthy eating style.  Click on hyperlink or copy / paste link into search bar.  Lots of low sodium recipes and tips.    https://www.[employer].Shout/recipes  -- more free recipes

## 2025-04-26 VITALS
DIASTOLIC BLOOD PRESSURE: 71 MMHG | HEIGHT: 68 IN | BODY MASS INDEX: 19.31 KG/M2 | RESPIRATION RATE: 16 BRPM | TEMPERATURE: 97.8 F | WEIGHT: 127.43 LBS | OXYGEN SATURATION: 99 % | SYSTOLIC BLOOD PRESSURE: 109 MMHG | HEART RATE: 81 BPM

## 2025-04-26 LAB
ALBUMIN SERPL-MCNC: 3.3 G/DL (ref 3.4–5)
ANION GAP SERPL CALCULATED.3IONS-SCNC: 10 MMOL/L (ref 3–16)
BACTERIA BLD CULT ORG #2: NORMAL
BACTERIA BLD CULT: NORMAL
BASOPHILS # BLD: 0 K/UL (ref 0–0.2)
BASOPHILS NFR BLD: 0.4 %
BUN SERPL-MCNC: 30 MG/DL (ref 7–20)
CALCIUM SERPL-MCNC: 9.1 MG/DL (ref 8.3–10.6)
CHLORIDE SERPL-SCNC: 96 MMOL/L (ref 99–110)
CO2 SERPL-SCNC: 28 MMOL/L (ref 21–32)
CREAT SERPL-MCNC: 4.1 MG/DL (ref 0.6–1.1)
DEPRECATED RDW RBC AUTO: 17.9 % (ref 12.4–15.4)
EOSINOPHIL # BLD: 0.5 K/UL (ref 0–0.6)
EOSINOPHIL NFR BLD: 9 %
GFR SERPLBLD CREATININE-BSD FMLA CKD-EPI: 12 ML/MIN/{1.73_M2}
GLUCOSE SERPL-MCNC: 89 MG/DL (ref 70–99)
HCT VFR BLD AUTO: 31.1 % (ref 36–48)
HGB BLD-MCNC: 10.3 G/DL (ref 12–16)
LYMPHOCYTES # BLD: 1.2 K/UL (ref 1–5.1)
LYMPHOCYTES NFR BLD: 22.6 %
MCH RBC QN AUTO: 27.6 PG (ref 26–34)
MCHC RBC AUTO-ENTMCNC: 33.1 G/DL (ref 31–36)
MCV RBC AUTO: 83.4 FL (ref 80–100)
MONOCYTES # BLD: 0.6 K/UL (ref 0–1.3)
MONOCYTES NFR BLD: 11 %
NEUTROPHILS # BLD: 3 K/UL (ref 1.7–7.7)
NEUTROPHILS NFR BLD: 57 %
PHOSPHATE SERPL-MCNC: 4.6 MG/DL (ref 2.5–4.9)
PLATELET # BLD AUTO: 176 K/UL (ref 135–450)
PMV BLD AUTO: 7.4 FL (ref 5–10.5)
POTASSIUM SERPL-SCNC: 3.8 MMOL/L (ref 3.5–5.1)
RBC # BLD AUTO: 3.73 M/UL (ref 4–5.2)
SODIUM SERPL-SCNC: 134 MMOL/L (ref 136–145)
WBC # BLD AUTO: 5.3 K/UL (ref 4–11)

## 2025-04-26 PROCEDURE — 6370000000 HC RX 637 (ALT 250 FOR IP)

## 2025-04-26 PROCEDURE — 36415 COLL VENOUS BLD VENIPUNCTURE: CPT

## 2025-04-26 PROCEDURE — 80069 RENAL FUNCTION PANEL: CPT

## 2025-04-26 PROCEDURE — 6360000002 HC RX W HCPCS: Performed by: INTERNAL MEDICINE

## 2025-04-26 PROCEDURE — 6360000002 HC RX W HCPCS: Performed by: HOSPITALIST

## 2025-04-26 PROCEDURE — 6370000000 HC RX 637 (ALT 250 FOR IP): Performed by: HOSPITALIST

## 2025-04-26 PROCEDURE — 85025 COMPLETE CBC W/AUTO DIFF WBC: CPT

## 2025-04-26 PROCEDURE — 90935 HEMODIALYSIS ONE EVALUATION: CPT

## 2025-04-26 PROCEDURE — 2500000003 HC RX 250 WO HCPCS: Performed by: HOSPITALIST

## 2025-04-26 RX ADMIN — GABAPENTIN 100 MG: 100 CAPSULE ORAL at 11:24

## 2025-04-26 RX ADMIN — TORSEMIDE 100 MG: 100 TABLET ORAL at 11:27

## 2025-04-26 RX ADMIN — SODIUM CHLORIDE, PRESERVATIVE FREE 10 ML: 5 INJECTION INTRAVENOUS at 11:32

## 2025-04-26 RX ADMIN — CLOPIDOGREL BISULFATE 75 MG: 75 TABLET, FILM COATED ORAL at 11:24

## 2025-04-26 RX ADMIN — POLYETHYLENE GLYCOL 3350 17 G: 17 POWDER, FOR SOLUTION ORAL at 11:38

## 2025-04-26 RX ADMIN — SODIUM CHLORIDE, PRESERVATIVE FREE 10 ML: 5 INJECTION INTRAVENOUS at 11:24

## 2025-04-26 RX ADMIN — HEPARIN SODIUM 5000 UNITS: 5000 INJECTION INTRAVENOUS; SUBCUTANEOUS at 05:17

## 2025-04-26 RX ADMIN — CARVEDILOL 3.12 MG: 3.12 TABLET, FILM COATED ORAL at 11:24

## 2025-04-26 RX ADMIN — BUPROPION HYDROCHLORIDE 150 MG: 150 TABLET, FILM COATED, EXTENDED RELEASE ORAL at 11:24

## 2025-04-26 RX ADMIN — PANTOPRAZOLE SODIUM 40 MG: 40 TABLET, DELAYED RELEASE ORAL at 11:24

## 2025-04-26 RX ADMIN — ARIPIPRAZOLE 10 MG: 5 TABLET ORAL at 11:38

## 2025-04-26 RX ADMIN — SEVELAMER CARBONATE 1600 MG: 800 TABLET, FILM COATED ORAL at 11:23

## 2025-04-26 RX ADMIN — HEPARIN SODIUM 3200 UNITS: 1000 INJECTION INTRAVENOUS; SUBCUTANEOUS at 10:52

## 2025-04-26 RX ADMIN — NIFEDIPINE 60 MG: 60 TABLET, EXTENDED RELEASE ORAL at 11:27

## 2025-04-26 NOTE — FLOWSHEET NOTE
04/26/25 0707 04/26/25 1042   Vital Signs   BP (!) 139/90 (!) 142/99   Temp 97.6 °F (36.4 °C) 97.4 °F (36.3 °C)   Pulse 76 77   Respirations 16 16       Treatment time: 3.5 hours  Net UF: 3 L    Pre weight: 60.8 kg (standing scale)  Post weight: 57.8 kg (standing scale)  EDW: 58 kg     Access used: RIJ HD tunneled cath.   Access function: no problems    Medications or blood products given: None    Regular outpatient schedule: Winnie RODRIGUEZ    Summary of response to treatment: Tolerated 3.5 hour HD tx without difficulty.  Net UF 3 L.  Vital signs stable throughout tx.  RIJ HD cath dressing changed, site unremarkable.     Copy of dialysis treatment record placed in chart, to be scanned into EMR.    Report called to Judi Thomas RN

## 2025-04-26 NOTE — PROGRESS NOTES
Ph: [phone], Fax: [phone]           [facility]               Reason for admission:                 SOB    Brief Summary :     Cindi Rodriguez is being seen by nephrology for Need for dialysis.      Interval History and plan:   Patient seen during HD  Comfortable  /72  On 3 LPM oxygen  Labs not checked.         HD today per TTS schedule with UF as tolerated toward her EDW  Continue Torsemide QD  Continue Renvela with meals  Cardiology note reviewed                     Assessment :        Acute hypoxic respiratory failure 2/2 volume overload  ESRD on dialysis Tuesday Thursday and Saturday, dry weight around 58 currently 67.7 kg, pt non compliant with diet  -Plan for dialysis today  -strict sodium control in diet  -strict I/O   -hold Lokelma since it contains sodium, will assess if needs patorimer on discharge  -continue home medications    -s/p 200 mg lasix     Hypertension  -At home patient is on torsemide 100 mg, Procardia XL 60 mg, losartan 25 mg, Coreg 3.125 daily  - Will continue    HFrEF  Echo:20-25%,   -continue GDMT meds as tolerated  -consider cardiology consult for ICD discussion      [facility] would like to thank Salo Knowles MD   for opportunity to serve this patient      Please call with questions at-   24 Hrs Answering service [phone] or  7 am- 5 pm via Perfect serve or cell phone  Dr.Muhammad SELVIN Castaneda MD       HPI :     Cindi Rodriguez is a 60 y.o. female with past medical history significant for ESRD on hemodialysis TTS(estimated dry weight 58 kg currently 67.7 kg), type 2 diabetes, CVA, CHF, CAD s/p stents 2023, COPD at home on 3 L oxygen presented to   the hospital on 4/22/2025 with shortness of breath.    The patient was brought in by EMS due to concern for respiratory distress.She reports that she is compliant with all he medications and she has not missed any of her dialysis. She did not take her meds this morning thou. She does not report

## 2025-04-26 NOTE — DISCHARGE SUMMARY
V2.0  Discharge Summary    Name:  Cindi Rodriguez /Age/Sex: 1965 (60 y.o. female)   Admit Date: 2025  Discharge Date: 25    MRN & CSN:  [id number] & [id number] Encounter Date and Time 25 1:48 PM EDT    Attending:  Salo Knolwes MD Discharging Provider: Salo Knowles MD       Hospital Course:     Brief HPI: 59 y.o. adult with ESRD on HD (), type 2 diabetes, hypertension, hyperlipidemia, CAD s/p stents (), COPD (3 L home oxygen), CVA (), who presented to the ED with resp distress.     Patient stated she woke up with sudden shortness of breath on the day of admission. It was associated with orthopnea and wheezing. No cough or URI symptoms .No LE edema. She stated she had 2 puffs of 1 cigarette yesterday which is unusual for her as she quit smoking.  She states she did not miss any of her dialysis.      She was admitted 2025- 2025 after presenting from skilled nursing facility for acute respiratory failure requiring ICU admission, felt due to fluid overload in the setting of end-stage renal disease; improved following ultrafiltration with hemodialysis      She was admitted 3/2-3/4 with Rt hand weakness and numbness that was felt to be  transient brachial plexus injury possibly related to position/probable neuropraxia. Resolved. Stroke ruled out.     In the ED she was in resp distress. She was hypertensive, tachycardic, hypoxic and hypercapnic and was started on BIPAP. CXR showed pulmonary edema . Labs showed ESRD changes with mild hyperkalemia. Glucose 302. Trop 208 which similar to her baseline . BNP >06994. ABG: pH 7.17, pCO2 60.6, pO2 50     Echo  showed EF 30-35%, grade II DD.   Echo  showed EF 20-25%. It also showed severe TR, mod-severe TR     Acute hypoxic and hypercapnic resp failure 2/2 acute pulm edema  Acute HFrEF exacerbation in the setting of ESRD  Severe ischemic cardiomyopathy  CAD s/p complex PCI   Severe TR, mod to severe MR   -Repeat Echo showed

## 2025-04-26 NOTE — DISCHARGE INSTR - DIET

## 2025-04-26 NOTE — PLAN OF CARE
Problem: Chronic Conditions and Co-morbidities  Goal: Patient's chronic conditions and co-morbidity symptoms are monitored and maintained or improved  4/25/2025 2115 by Barbara Smith RN  Outcome: Progressing  4/25/2025 0957 by Yahaira Morgan RN  Outcome: Progressing  Flowsheets (Taken 4/25/2025 0957)  Care Plan - Patient's Chronic Conditions and Co-Morbidity Symptoms are Monitored and Maintained or Improved:   Monitor and assess patient's chronic conditions and comorbid symptoms for stability, deterioration, or improvement   Collaborate with multidisciplinary team to address chronic and comorbid conditions and prevent exacerbation or deterioration   Update acute care plan with appropriate goals if chronic or comorbid symptoms are exacerbated and prevent overall improvement and discharge  Note: Education provided to patient/family on co-morbidities as needed throughout shift.        Problem: Discharge Planning  Goal: Discharge to home or other facility with appropriate resources  4/25/2025 2115 by Barbara Smith RN  Outcome: Progressing  4/25/2025 0957 by Yahaira Morgan RN  Outcome: Progressing  Flowsheets (Taken 4/25/2025 0957)  Discharge to home or other facility with appropriate resources:   Identify barriers to discharge with patient and caregiver   Arrange for needed discharge resources and transportation as appropriate   Identify discharge learning needs (meds, wound care, etc)  Note: Patient plans to return to facility when appropriate     Problem: Safety - Adult  Goal: Free from fall injury  4/25/2025 2115 by Barbara Smith, RN  Outcome: Progressing  4/25/2025 0957 by Yahaira Morgan RN  Outcome: Progressing  Flowsheets (Taken 4/25/2025 0957)  Free From Fall Injury: Instruct family/caregiver on patient safety  Note: Pt will remain free from accidental injury this shift. Pt has fall risk measures (fall risk bracelet, fall risk sign, fall risk cloth, non-slip socks, dome light on) in place. Pt bed is in low

## 2025-04-26 NOTE — PLAN OF CARE
Problem: Chronic Conditions and Co-morbidities  Goal: Patient's chronic conditions and co-morbidity symptoms are monitored and maintained or improved  Outcome: Completed     Problem: Discharge Planning  Goal: Discharge to home or other facility with appropriate resources  Outcome: Completed     Problem: Safety - Adult  Goal: Free from fall injury  Outcome: Completed     Problem: Respiratory - Adult  Goal: Achieves optimal ventilation and oxygenation  Outcome: Completed     Problem: Pain  Goal: Verbalizes/displays adequate comfort level or baseline comfort level  Outcome: Completed     Problem: Skin/Tissue Integrity  Goal: Skin integrity remains intact  Description: 1.  Monitor for areas of redness and/or skin breakdown2.  Assess vascular access sites hourly3.  Every 4-6 hours minimum:  Change oxygen saturation probe site4.  Every 4-6 hours:  If on nasal continuous positive airway pressure, respiratory therapy assess nares and determine need for appliance change or resting period  Outcome: Completed     Problem: ABCDS Injury Assessment  Goal: Absence of physical injury  Outcome: Completed

## 2025-04-26 NOTE — PROGRESS NOTES
NAME:  Cindi Rodriguez  YOB: 1965  MEDICAL RECORD NUMBER:  [id number]       Nurse 1 eSignature: Electronically signed by Judi Thomas RN on 4/26/25 at 3:45 PM EDT       Patient educated. IV's removed, Telemetry box removed, pt transport to SNF with ambulance.   No questions noted.

## 2025-05-13 ENCOUNTER — APPOINTMENT (OUTPATIENT)
Dept: GENERAL RADIOLOGY | Age: 60
DRG: 194 | End: 2025-05-13
Payer: MEDICAID

## 2025-05-13 ENCOUNTER — HOSPITAL ENCOUNTER (INPATIENT)
Age: 60
LOS: 2 days | Discharge: HOME | DRG: 194 | End: 2025-05-15
Attending: STUDENT IN AN ORGANIZED HEALTH CARE EDUCATION/TRAINING PROGRAM | Admitting: SURGERY
Payer: MEDICAID

## 2025-05-13 ENCOUNTER — ANCILLARY PROCEDURE (OUTPATIENT)
Dept: EMERGENCY DEPT | Age: 60
DRG: 194 | End: 2025-05-13
Attending: STUDENT IN AN ORGANIZED HEALTH CARE EDUCATION/TRAINING PROGRAM
Payer: MEDICAID

## 2025-05-13 DIAGNOSIS — I50.22 CHRONIC SYSTOLIC CONGESTIVE HEART FAILURE (HCC): ICD-10-CM

## 2025-05-13 DIAGNOSIS — J81.0 ACUTE PULMONARY EDEMA (HCC): Primary | ICD-10-CM

## 2025-05-13 DIAGNOSIS — I16.1 HYPERTENSIVE EMERGENCY: ICD-10-CM

## 2025-05-13 LAB
ALBUMIN SERPL-MCNC: 3.5 G/DL (ref 3.4–5)
ALBUMIN SERPL-MCNC: 3.9 G/DL (ref 3.4–5)
ALBUMIN SERPL-MCNC: 4.1 G/DL (ref 3.4–5)
ALBUMIN/GLOB SERPL: 1 {RATIO} (ref 1.1–2.2)
ALP SERPL-CCNC: 134 U/L (ref 40–129)
ALT SERPL-CCNC: ABNORMAL U/L (ref 10–40)
ANION GAP SERPL CALCULATED.3IONS-SCNC: 12 MMOL/L (ref 3–16)
ANION GAP SERPL CALCULATED.3IONS-SCNC: 13 MMOL/L (ref 3–16)
ANION GAP SERPL CALCULATED.3IONS-SCNC: 16 MMOL/L (ref 3–16)
AST SERPL-CCNC: 44 U/L (ref 15–37)
BASE EXCESS BLDV CALC-SCNC: 3.4 MMOL/L (ref -2–3)
BASOPHILS # BLD: 0.2 K/UL (ref 0–0.2)
BASOPHILS NFR BLD: 1.9 %
BILIRUB SERPL-MCNC: 0.4 MG/DL (ref 0–1)
BUN SERPL-MCNC: 21 MG/DL (ref 7–20)
BUN SERPL-MCNC: 38 MG/DL (ref 7–20)
BUN SERPL-MCNC: 42 MG/DL (ref 7–20)
CALCIUM SERPL-MCNC: 9.2 MG/DL (ref 8.3–10.6)
CALCIUM SERPL-MCNC: 9.8 MG/DL (ref 8.3–10.6)
CALCIUM SERPL-MCNC: 9.8 MG/DL (ref 8.3–10.6)
CHLORIDE SERPL-SCNC: 95 MMOL/L (ref 99–110)
CHLORIDE SERPL-SCNC: 95 MMOL/L (ref 99–110)
CHLORIDE SERPL-SCNC: 96 MMOL/L (ref 99–110)
CO2 BLDV-SCNC: 31 MMOL/L
CO2 SERPL-SCNC: 24 MMOL/L (ref 21–32)
CO2 SERPL-SCNC: 26 MMOL/L (ref 21–32)
CO2 SERPL-SCNC: 28 MMOL/L (ref 21–32)
COHGB MFR BLDV: 1.5 % (ref 0–1.5)
CREAT SERPL-MCNC: 3.5 MG/DL (ref 0.6–1.1)
CREAT SERPL-MCNC: 5.7 MG/DL (ref 0.6–1.1)
CREAT SERPL-MCNC: 5.9 MG/DL (ref 0.6–1.1)
DEPRECATED RDW RBC AUTO: 18.9 % (ref 12.4–15.4)
DO-HGB MFR BLDV: 42.3 %
EKG ATRIAL RATE: 87 BPM
EKG DIAGNOSIS: NORMAL
EKG P AXIS: 84 DEGREES
EKG P-R INTERVAL: 166 MS
EKG Q-T INTERVAL: 412 MS
EKG QRS DURATION: 102 MS
EKG QTC CALCULATION (BAZETT): 495 MS
EKG R AXIS: -40 DEGREES
EKG T AXIS: 89 DEGREES
EKG VENTRICULAR RATE: 87 BPM
EOSINOPHIL # BLD: 0.7 K/UL (ref 0–0.6)
EOSINOPHIL NFR BLD: 6.4 %
GFR SERPLBLD CREATININE-BSD FMLA CKD-EPI: 14 ML/MIN/{1.73_M2}
GFR SERPLBLD CREATININE-BSD FMLA CKD-EPI: 8 ML/MIN/{1.73_M2}
GFR SERPLBLD CREATININE-BSD FMLA CKD-EPI: 8 ML/MIN/{1.73_M2}
GLUCOSE BLD-MCNC: 132 MG/DL (ref 70–99)
GLUCOSE BLD-MCNC: 153 MG/DL (ref 70–99)
GLUCOSE BLD-MCNC: 97 MG/DL (ref 70–99)
GLUCOSE SERPL-MCNC: 133 MG/DL (ref 70–99)
GLUCOSE SERPL-MCNC: 164 MG/DL (ref 70–99)
GLUCOSE SERPL-MCNC: 177 MG/DL (ref 70–99)
HCO3 BLDV-SCNC: 29.5 MMOL/L (ref 24–28)
HCT VFR BLD AUTO: 38 % (ref 36–48)
HGB BLD-MCNC: 12.1 G/DL (ref 12–16)
LACTATE BLDV-SCNC: 2.3 MMOL/L (ref 0.4–2)
LYMPHOCYTES # BLD: 1.8 K/UL (ref 1–5.1)
LYMPHOCYTES NFR BLD: 16.6 %
MCH RBC QN AUTO: 27.5 PG (ref 26–34)
MCHC RBC AUTO-ENTMCNC: 31.9 G/DL (ref 31–36)
MCV RBC AUTO: 86.1 FL (ref 80–100)
METHGB MFR BLDV: 0.2 % (ref 0–1.5)
MONOCYTES # BLD: 0.5 K/UL (ref 0–1.3)
MONOCYTES NFR BLD: 4.4 %
NEUTROPHILS # BLD: 7.8 K/UL (ref 1.7–7.7)
NEUTROPHILS NFR BLD: 70.7 %
NT-PROBNP SERPL-MCNC: ABNORMAL PG/ML (ref 0–124)
PCO2 BLDV: 50.1 MMHG (ref 41–51)
PERFORMED ON: ABNORMAL
PERFORMED ON: ABNORMAL
PERFORMED ON: NORMAL
PH BLDV: 7.38 [PH] (ref 7.35–7.45)
PHOSPHATE SERPL-MCNC: 3.3 MG/DL (ref 2.5–4.9)
PHOSPHATE SERPL-MCNC: 5.1 MG/DL (ref 2.5–4.9)
PLATELET # BLD AUTO: 248 K/UL (ref 135–450)
PMV BLD AUTO: 7.1 FL (ref 5–10.5)
PO2 BLDV: 33.7 MMHG (ref 25–40)
POTASSIUM SERPL-SCNC: 5.1 MMOL/L (ref 3.5–5.1)
POTASSIUM SERPL-SCNC: 5.9 MMOL/L (ref 3.5–5.1)
POTASSIUM SERPL-SCNC: 6 MMOL/L (ref 3.5–5.1)
POTASSIUM SERPL-SCNC: ABNORMAL MMOL/L (ref 3.5–5.1)
PROT SERPL-MCNC: 7.9 G/DL (ref 6.4–8.2)
RBC # BLD AUTO: 4.42 M/UL (ref 4–5.2)
SAO2 % BLDV: 57 %
SODIUM SERPL-SCNC: 134 MMOL/L (ref 136–145)
SODIUM SERPL-SCNC: 135 MMOL/L (ref 136–145)
SODIUM SERPL-SCNC: 136 MMOL/L (ref 136–145)
TROPONIN, HIGH SENSITIVITY: 342 NG/L (ref 0–14)
TROPONIN, HIGH SENSITIVITY: 387 NG/L (ref 0–14)
WBC # BLD AUTO: 11 K/UL (ref 4–11)

## 2025-05-13 PROCEDURE — 6370000000 HC RX 637 (ALT 250 FOR IP)

## 2025-05-13 PROCEDURE — 6360000002 HC RX W HCPCS: Performed by: STUDENT IN AN ORGANIZED HEALTH CARE EDUCATION/TRAINING PROGRAM

## 2025-05-13 PROCEDURE — 94761 N-INVAS EAR/PLS OXIMETRY MLT: CPT

## 2025-05-13 PROCEDURE — 71045 X-RAY EXAM CHEST 1 VIEW: CPT

## 2025-05-13 PROCEDURE — 99223 1ST HOSP IP/OBS HIGH 75: CPT | Performed by: INTERNAL MEDICINE

## 2025-05-13 PROCEDURE — 96375 TX/PRO/DX INJ NEW DRUG ADDON: CPT

## 2025-05-13 PROCEDURE — 2500000003 HC RX 250 WO HCPCS: Performed by: STUDENT IN AN ORGANIZED HEALTH CARE EDUCATION/TRAINING PROGRAM

## 2025-05-13 PROCEDURE — 90935 HEMODIALYSIS ONE EVALUATION: CPT

## 2025-05-13 PROCEDURE — 84484 ASSAY OF TROPONIN QUANT: CPT

## 2025-05-13 PROCEDURE — 99291 CRITICAL CARE FIRST HOUR: CPT

## 2025-05-13 PROCEDURE — 36415 COLL VENOUS BLD VENIPUNCTURE: CPT

## 2025-05-13 PROCEDURE — 6370000000 HC RX 637 (ALT 250 FOR IP): Performed by: STUDENT IN AN ORGANIZED HEALTH CARE EDUCATION/TRAINING PROGRAM

## 2025-05-13 PROCEDURE — 94640 AIRWAY INHALATION TREATMENT: CPT

## 2025-05-13 PROCEDURE — 2700000000 HC OXYGEN THERAPY PER DAY

## 2025-05-13 PROCEDURE — 94660 CPAP INITIATION&MGMT: CPT

## 2025-05-13 PROCEDURE — 83880 ASSAY OF NATRIURETIC PEPTIDE: CPT

## 2025-05-13 PROCEDURE — 80053 COMPREHEN METABOLIC PANEL: CPT

## 2025-05-13 PROCEDURE — 96365 THER/PROPH/DIAG IV INF INIT: CPT

## 2025-05-13 PROCEDURE — 93308 TTE F-UP OR LMTD: CPT

## 2025-05-13 PROCEDURE — 2060000000 HC ICU INTERMEDIATE R&B

## 2025-05-13 PROCEDURE — 2580000003 HC RX 258

## 2025-05-13 PROCEDURE — 93005 ELECTROCARDIOGRAM TRACING: CPT | Performed by: STUDENT IN AN ORGANIZED HEALTH CARE EDUCATION/TRAINING PROGRAM

## 2025-05-13 PROCEDURE — 6360000002 HC RX W HCPCS

## 2025-05-13 PROCEDURE — 84132 ASSAY OF SERUM POTASSIUM: CPT

## 2025-05-13 PROCEDURE — 6370000000 HC RX 637 (ALT 250 FOR IP): Performed by: NURSE PRACTITIONER

## 2025-05-13 PROCEDURE — 5A09357 ASSISTANCE WITH RESPIRATORY VENTILATION, LESS THAN 24 CONSECUTIVE HOURS, CONTINUOUS POSITIVE AIRWAY PRESSURE: ICD-10-PCS | Performed by: SURGERY

## 2025-05-13 PROCEDURE — 5A1D70Z PERFORMANCE OF URINARY FILTRATION, INTERMITTENT, LESS THAN 6 HOURS PER DAY: ICD-10-PCS | Performed by: SURGERY

## 2025-05-13 PROCEDURE — 83605 ASSAY OF LACTIC ACID: CPT

## 2025-05-13 PROCEDURE — 85025 COMPLETE CBC W/AUTO DIFF WBC: CPT

## 2025-05-13 PROCEDURE — 82803 BLOOD GASES ANY COMBINATION: CPT

## 2025-05-13 RX ORDER — FUROSEMIDE 10 MG/ML
80 INJECTION INTRAMUSCULAR; INTRAVENOUS ONCE
Status: DISCONTINUED | OUTPATIENT
Start: 2025-05-13 | End: 2025-05-13

## 2025-05-13 RX ORDER — GLUCAGON 1 MG/ML
1 KIT INJECTION PRN
Status: DISCONTINUED | OUTPATIENT
Start: 2025-05-13 | End: 2025-05-15 | Stop reason: HOSPADM

## 2025-05-13 RX ORDER — LOSARTAN POTASSIUM 25 MG/1
25 TABLET ORAL NIGHTLY
Status: DISCONTINUED | OUTPATIENT
Start: 2025-05-13 | End: 2025-05-14

## 2025-05-13 RX ORDER — FUROSEMIDE 10 MG/ML
100 INJECTION INTRAMUSCULAR; INTRAVENOUS ONCE
Status: COMPLETED | OUTPATIENT
Start: 2025-05-13 | End: 2025-05-13

## 2025-05-13 RX ORDER — ALBUTEROL SULFATE 0.83 MG/ML
2.5 SOLUTION RESPIRATORY (INHALATION) EVERY 6 HOURS PRN
Status: DISCONTINUED | OUTPATIENT
Start: 2025-05-13 | End: 2025-05-15 | Stop reason: HOSPADM

## 2025-05-13 RX ORDER — ONDANSETRON 4 MG/1
4 TABLET, ORALLY DISINTEGRATING ORAL EVERY 8 HOURS PRN
Status: DISCONTINUED | OUTPATIENT
Start: 2025-05-13 | End: 2025-05-15 | Stop reason: HOSPADM

## 2025-05-13 RX ORDER — ACETAMINOPHEN 650 MG/1
650 SUPPOSITORY RECTAL EVERY 6 HOURS PRN
Status: DISCONTINUED | OUTPATIENT
Start: 2025-05-13 | End: 2025-05-15 | Stop reason: HOSPADM

## 2025-05-13 RX ORDER — HEPARIN SODIUM 5000 [USP'U]/ML
5000 INJECTION, SOLUTION INTRAVENOUS; SUBCUTANEOUS EVERY 8 HOURS SCHEDULED
Status: DISCONTINUED | OUTPATIENT
Start: 2025-05-13 | End: 2025-05-15 | Stop reason: HOSPADM

## 2025-05-13 RX ORDER — ATORVASTATIN CALCIUM 80 MG/1
80 TABLET, FILM COATED ORAL NIGHTLY
Status: DISCONTINUED | OUTPATIENT
Start: 2025-05-13 | End: 2025-05-15 | Stop reason: HOSPADM

## 2025-05-13 RX ORDER — SODIUM CHLORIDE 9 MG/ML
INJECTION, SOLUTION INTRAVENOUS PRN
Status: DISCONTINUED | OUTPATIENT
Start: 2025-05-13 | End: 2025-05-15 | Stop reason: HOSPADM

## 2025-05-13 RX ORDER — HEPARIN SODIUM 1000 [USP'U]/ML
1000 INJECTION, SOLUTION INTRAVENOUS; SUBCUTANEOUS ONCE
Status: DISCONTINUED | OUTPATIENT
Start: 2025-05-13 | End: 2025-05-15 | Stop reason: HOSPADM

## 2025-05-13 RX ORDER — MIRTAZAPINE 15 MG/1
15 TABLET, FILM COATED ORAL NIGHTLY
Status: DISCONTINUED | OUTPATIENT
Start: 2025-05-13 | End: 2025-05-15 | Stop reason: HOSPADM

## 2025-05-13 RX ORDER — NITROGLYCERIN 0.4 MG/1
TABLET SUBLINGUAL
Status: DISPENSED
Start: 2025-05-13 | End: 2025-05-13

## 2025-05-13 RX ORDER — NITROGLYCERIN 20 MG/100ML
5-200 INJECTION INTRAVENOUS CONTINUOUS
Status: DISCONTINUED | OUTPATIENT
Start: 2025-05-13 | End: 2025-05-13

## 2025-05-13 RX ORDER — IPRATROPIUM BROMIDE AND ALBUTEROL SULFATE 2.5; .5 MG/3ML; MG/3ML
1 SOLUTION RESPIRATORY (INHALATION) EVERY 6 HOURS PRN
Status: DISCONTINUED | OUTPATIENT
Start: 2025-05-13 | End: 2025-05-15 | Stop reason: HOSPADM

## 2025-05-13 RX ORDER — CARVEDILOL 3.12 MG/1
3.12 TABLET ORAL 2 TIMES DAILY WITH MEALS
Status: DISCONTINUED | OUTPATIENT
Start: 2025-05-13 | End: 2025-05-15 | Stop reason: HOSPADM

## 2025-05-13 RX ORDER — ONDANSETRON 2 MG/ML
4 INJECTION INTRAMUSCULAR; INTRAVENOUS EVERY 6 HOURS PRN
Status: DISCONTINUED | OUTPATIENT
Start: 2025-05-13 | End: 2025-05-15 | Stop reason: HOSPADM

## 2025-05-13 RX ORDER — SODIUM CHLORIDE 0.9 % (FLUSH) 0.9 %
5-40 SYRINGE (ML) INJECTION PRN
Status: DISCONTINUED | OUTPATIENT
Start: 2025-05-13 | End: 2025-05-15 | Stop reason: HOSPADM

## 2025-05-13 RX ORDER — IPRATROPIUM BROMIDE AND ALBUTEROL SULFATE 2.5; .5 MG/3ML; MG/3ML
1 SOLUTION RESPIRATORY (INHALATION) ONCE
Status: COMPLETED | OUTPATIENT
Start: 2025-05-13 | End: 2025-05-13

## 2025-05-13 RX ORDER — 0.9 % SODIUM CHLORIDE 0.9 %
100 INTRAVENOUS SOLUTION INTRAVENOUS PRN
Status: DISCONTINUED | OUTPATIENT
Start: 2025-05-13 | End: 2025-05-15 | Stop reason: HOSPADM

## 2025-05-13 RX ORDER — BUPROPION HYDROCHLORIDE 150 MG/1
150 TABLET, EXTENDED RELEASE ORAL DAILY
Status: DISCONTINUED | OUTPATIENT
Start: 2025-05-13 | End: 2025-05-15 | Stop reason: HOSPADM

## 2025-05-13 RX ORDER — DEXTROSE MONOHYDRATE 100 MG/ML
INJECTION, SOLUTION INTRAVENOUS CONTINUOUS PRN
Status: DISCONTINUED | OUTPATIENT
Start: 2025-05-13 | End: 2025-05-15 | Stop reason: HOSPADM

## 2025-05-13 RX ORDER — CLOPIDOGREL BISULFATE 75 MG/1
75 TABLET ORAL DAILY
Status: DISCONTINUED | OUTPATIENT
Start: 2025-05-13 | End: 2025-05-15 | Stop reason: HOSPADM

## 2025-05-13 RX ORDER — ACETAMINOPHEN 325 MG/1
650 TABLET ORAL EVERY 6 HOURS PRN
Status: DISCONTINUED | OUTPATIENT
Start: 2025-05-13 | End: 2025-05-15 | Stop reason: HOSPADM

## 2025-05-13 RX ORDER — NIFEDIPINE 60 MG/1
60 TABLET, EXTENDED RELEASE ORAL DAILY
Status: DISCONTINUED | OUTPATIENT
Start: 2025-05-13 | End: 2025-05-15 | Stop reason: HOSPADM

## 2025-05-13 RX ORDER — PANTOPRAZOLE SODIUM 40 MG/1
40 TABLET, DELAYED RELEASE ORAL DAILY
Status: DISCONTINUED | OUTPATIENT
Start: 2025-05-13 | End: 2025-05-13

## 2025-05-13 RX ORDER — POLYETHYLENE GLYCOL 3350 17 G/17G
17 POWDER, FOR SOLUTION ORAL DAILY PRN
Status: DISCONTINUED | OUTPATIENT
Start: 2025-05-13 | End: 2025-05-15 | Stop reason: HOSPADM

## 2025-05-13 RX ORDER — ARIPIPRAZOLE 5 MG/1
10 TABLET ORAL DAILY
Status: DISCONTINUED | OUTPATIENT
Start: 2025-05-14 | End: 2025-05-15 | Stop reason: HOSPADM

## 2025-05-13 RX ORDER — TORSEMIDE 100 MG/1
100 TABLET ORAL DAILY
Status: DISCONTINUED | OUTPATIENT
Start: 2025-05-13 | End: 2025-05-15 | Stop reason: HOSPADM

## 2025-05-13 RX ORDER — NITROGLYCERIN 0.4 MG/1
0.4 TABLET SUBLINGUAL ONCE
Status: COMPLETED | OUTPATIENT
Start: 2025-05-13 | End: 2025-05-13

## 2025-05-13 RX ORDER — SODIUM CHLORIDE 0.9 % (FLUSH) 0.9 %
5-40 SYRINGE (ML) INJECTION EVERY 12 HOURS SCHEDULED
Status: DISCONTINUED | OUTPATIENT
Start: 2025-05-13 | End: 2025-05-15 | Stop reason: HOSPADM

## 2025-05-13 RX ORDER — INSULIN LISPRO 100 [IU]/ML
0-4 INJECTION, SOLUTION INTRAVENOUS; SUBCUTANEOUS
Status: DISCONTINUED | OUTPATIENT
Start: 2025-05-13 | End: 2025-05-15 | Stop reason: HOSPADM

## 2025-05-13 RX ORDER — MECOBALAMIN 5000 MCG
10 TABLET,DISINTEGRATING ORAL NIGHTLY
Status: DISCONTINUED | OUTPATIENT
Start: 2025-05-13 | End: 2025-05-15 | Stop reason: HOSPADM

## 2025-05-13 RX ADMIN — ATORVASTATIN CALCIUM 80 MG: 80 TABLET, FILM COATED ORAL at 21:11

## 2025-05-13 RX ADMIN — IPRATROPIUM BROMIDE AND ALBUTEROL SULFATE 1 DOSE: .5; 2.5 SOLUTION RESPIRATORY (INHALATION) at 08:46

## 2025-05-13 RX ADMIN — NITROGLYCERIN 100 MCG/MIN: 20 INJECTION INTRAVENOUS at 08:58

## 2025-05-13 RX ADMIN — NITROGLYCERIN 0.4 MG: 0.4 TABLET SUBLINGUAL at 08:40

## 2025-05-13 RX ADMIN — CARVEDILOL 3.12 MG: 3.12 TABLET, FILM COATED ORAL at 17:20

## 2025-05-13 RX ADMIN — SODIUM CHLORIDE, PRESERVATIVE FREE 10 ML: 5 INJECTION INTRAVENOUS at 21:15

## 2025-05-13 RX ADMIN — HEPARIN SODIUM 5000 UNITS: 5000 INJECTION INTRAVENOUS; SUBCUTANEOUS at 14:59

## 2025-05-13 RX ADMIN — Medication 10 MG: at 21:34

## 2025-05-13 RX ADMIN — FUROSEMIDE 100 MG: 10 INJECTION, SOLUTION INTRAVENOUS at 08:53

## 2025-05-13 RX ADMIN — MIRTAZAPINE 15 MG: 15 TABLET, FILM COATED ORAL at 21:11

## 2025-05-13 RX ADMIN — PANTOPRAZOLE SODIUM 40 MG: 40 INJECTION, POWDER, FOR SOLUTION INTRAVENOUS at 14:58

## 2025-05-13 RX ADMIN — POLYETHYLENE GLYCOL 3350 17 G: 17 POWDER, FOR SOLUTION ORAL at 21:34

## 2025-05-13 RX ADMIN — HEPARIN SODIUM 5000 UNITS: 5000 INJECTION INTRAVENOUS; SUBCUTANEOUS at 21:11

## 2025-05-13 ASSESSMENT — ENCOUNTER SYMPTOMS
PHOTOPHOBIA: 0
SHORTNESS OF BREATH: 0
GASTROINTESTINAL NEGATIVE: 1
COUGH: 1
EYES NEGATIVE: 1
CHEST TIGHTNESS: 0
WHEEZING: 0
SHORTNESS OF BREATH: 1
COUGH: 0
ALLERGIC/IMMUNOLOGIC NEGATIVE: 1

## 2025-05-13 ASSESSMENT — PAIN SCALES - GENERAL
PAINLEVEL_OUTOF10: 0
PAINLEVEL_OUTOF10: 0

## 2025-05-13 NOTE — PROGRESS NOTES
Pt comfortable and eating on 5L NC. NIV at bedside in event pt needs it.       05/13/25 1800   Oxygen Therapy/Pulse Ox   O2 Therapy Oxygen   O2 Device Nasal cannula   O2 Flow Rate (L/min) 5 L/min   Pulse 92   Respirations 20   SpO2 91 %   Skin Assessment Clean, dry, & intact   Pulse Oximeter Device Mode Continuous   Pulse Oximeter Device Location Finger

## 2025-05-13 NOTE — ED PROVIDER NOTES
[facility]  EMERGENCY DEPARTMENT ENCOUNTER          ATTENDING PHYSICIAN NOTE       Date of evaluation: 5/13/2025    Chief Complaint     Respiratory Distress      History of Present Illness     Cindi Rodriguez is a 60 y.o. female who presents via EMS from home in respiratory distress.  Patient reported shortness of breath worsening throughout the night.  EMS arrived and noted respiratory distress, placed on CPAP, persistent oxygen saturations 60 to 70% despite 100% FiO2.  She arrives on CPAP, is only able to provide limited history due to her distress.  She feels like she has too much fluid on her.  She received dialysis 3 days ago and was scheduled to receive dialysis today.    ASSESSMENT / PLAN  (MEDICAL DECISION MAKING)     INITIAL VITALS: BP: (!) 196/144, Temp: 98 °F (36.7 °C), Pulse: (!) 126, Respirations: 24, SpO2: (!) 70 %      Cindi Rodriguez is a 60 y.o. female with history of ESRD on HD (TTS), type 2 diabetes, hypertension, hyperlipidemia, CAD s/p stents (2023), COPD (3 L home oxygen), CVA (2019) who arrived in acute respiratory distress.    Please see HPI for further details.  Upon arrival, patient was transition to BiPAP and given a nitroglycerin tab given clinical suspicion for acute pulmonary edema given her prior presentations for the same.  She was severely hypertensive on arrival and also initiated on a nitroglycerin drip at 100 mics per minute.  Patient responded well to BiPAP and nitroglycerin.  She was also given 100 mg of IV Lasix.  Patient's blood pressure improved to 120s over 80s and oxygen to 100%.  Lab work returned showing an elevated troponin at 387, likely demand related.  She has a mild respiratory acidosis and a lactate of 2.3.  Low concern for infection.  X-ray confirmed presence of pulmonary edema as did bedside ultrasound.  I discussed with nephrology in the ICU, will admit for dialysis.    Is this patient to be included in the SEP-1 core measure? No Exclusion criteria - the

## 2025-05-13 NOTE — PROGRESS NOTES
Pt started on nitro drip in ED with hypertension and shortness of breath. Hemodialysis was started 30 minutes upon admission to ICU. Blood pressures became stable and nitro was titrated down until it was stopped. Hemodialysis took off 3 liters of fluid. Pt is tolerating food and fluids well. Will continue to monitor.

## 2025-05-13 NOTE — PROGRESS NOTES
Face mash readjusted and Cupralex barrier gel added for pt comfort.        05/13/25 1211   NIV Type   Ventilator ID trilogy   Equipment Type trilogy   Mode Bilevel   Mask Type Full face mask   Mask Size Medium   Assessment   Pulse 91   Respirations 20   SpO2 96 %   Skin Protection for O2 Device Yes   Orientation Bilateral;Middle   Location Nose;Cheek  (BiPAP gel barrier)   Breath Sounds   Breath Sounds Bilateral Diminished;Crackles    Settings/Measurements   PIP Observed 19.7 cm H20   IPAP 20 cmH20   CPAP/EPAP 8 cmH2O   Vt (Measured) 497 mL   Rate Ordered 20   FiO2  60 %   Minute Volume (L/min) 11.8 Liters   Mask Leak (lpm) 26.1 lpm   Alarm Settings   Alarms On Y   RR Low (bpm) 10   RR High (bpm) 40 br/min

## 2025-05-13 NOTE — PROGRESS NOTES
Treatment time: 3.5 hours  Net UF: 3000 ml     Pre weight: 63 kg  Post weight:60 kg    Access used: RCVC    Access function: well with  ml/min        Regular outpatient schedule: TTS   well  Summary of response to treatment: Patient tolerated treatment well and without any complications. Patient remained stable throughout entire treatment .    Report given to Rama Horn RN and copy of dialysis treatment record placed in chart, to be scanned into EMR.

## 2025-05-13 NOTE — CONSULTS
[facility]    [facility]              [phone]                       Plan :        Assessment :     Acute on chronic respiratory failure  Due to pulmonary edema, in context of ESRD and HFrEF  Patient reports having woken up gasping for air, EMS placed her on CPAP.  Patient was noted to be hypoxic on presentation  SpO2 as low as 70% on presentation, improved to 100% on BiPAP with supplemental oxygen with FiO2 100%  No evidence of LE edema    Patient was advised to limit sodium intake and her fluid intake to 1 L/day but reports that she has been having salty food with additional salt, as well as excessive fluid intake on the night prior to presentation.  Denies any recent changes to her medication/noncompliance/missing HD sessions  Of note, she has had similar presentations in the past, which have improved with HD    Body weight as recent as 2/27/25: 123 pounds, today she presented with body weight of 152 lb 14 oz 5/13.  NT proBNP on presentation 68,481    S/p 100 mg Lasix  Home torsemide and losartan held    Hypertensive emergency  Presented with /144 and 192/131. With evidence of pulmonary edema on CXR, was started on nitro drip  Relative hypotension, BP was decreased to 125/95 over 1-hour  Lactic acid 2.3  Troponin 387  -On nitro drip, management per ICU team  -BiPAP and supplemental oxygen per ICU team  -Hemodialysis starting today  -Will discuss diuretics    ESRD, History of calciphylaxis  On HD (TTS) via right chest HD cath   Reports history of HD for the past 4 years  Seen by Dr. Brittney Castaneda on prior admission in April  Was on Renvela with meals  6/28/2023 PTH: 276  1/6/2025 PTH: 210    Presenting labs:  Creatinine 5.7, BUN 38  K hemolyzed  Phos not measured on presentation    -HD on 5/13/2025 and 5/14/2025 with tentative plan to remove 1.5 L over 3 hours each day  - Daily weights  - Strict I's and O's  - Daily RFP  - PTH    History of chronic normocytic

## 2025-05-13 NOTE — PROGRESS NOTES
[facility]                [phone]              NEPHROLOGIST DIALYSIS NOTE:    Seen during dialysis  Vitals and labs as given below.     Please see today's consult note for remaining details    Vitals:    05/13/25 1429   BP:    Pulse: 96   Resp: 20   Temp:    SpO2: 100%     Lab Results   Component Value Date/Time     05/13/2025 10:19 AM    K 6.0 05/13/2025 11:27 AM    K see below 05/13/2025 08:39 AM    CL 95 05/13/2025 10:19 AM    CO2 26 05/13/2025 10:19 AM    BUN 42 05/13/2025 10:19 AM    CREATININE 5.9 05/13/2025 10:19 AM    GLUCOSE 133 05/13/2025 10:19 AM    CALCIUM 9.2 05/13/2025 10:19 AM           Please call with questions at-    24 Hrs Answering service [phone]  Perfect serve, or cell phone 7 am - 5pm  [employer]

## 2025-05-13 NOTE — CONSULTS
Pulmonary Consult    Patient's PCP: Alessandra Anderson DO  Referred by: Jairo Figueroa MD for pulmonary edema     HISTORY OF PRESENT ILLNESS:    This is a  60 y.o. female with PMH of ESRD on HD, CAD and others as listed below presented to the ED with SOB.  Had similar presentation 2 weeks ago and was admitted for 4 days.    BP on presentation was 200s/120s.  In the ED she was found to have pulmonary edema and was started on nitro drip and BiPAP.    At this time she is sleepy but wakes up easily.  She denied any pain.  Breathing is not labored on BiPAP.      Past Medical / Surgical History:    Past Medical History:   Diagnosis Date    JONATHAN (acute kidney injury) 9/3/2014    Anxiety     Asthma     Bipolar disorder ([facility])     Bronchitis     Chronic back pain     Depression     Diabetes mellitus ([facility])     DM II (diabetes mellitus, type II), controlled ([facility]) 9/3/2014    Hyperlipidemia 12/26/2019    Hypertension     Neuropathy 1/2/2024    Obstructive sleep apnea 8/22/2011    Restless legs syndrome (RLS) 8/22/2011     Past Surgical History:   Procedure Laterality Date    DIALYSIS CATHETER REMOVAL N/A 8/26/2024    PERITONEAL DIAYSIS CATHETER REMOVAL, INCISION AND DRAINAGE OF ABDOMINAL WALL ABCESS performed by Santos Castaneda DO at [facility] OR    ENDOMETRIAL ABLATION      INNER EAR SURGERY      TUBAL LIGATION       Prior to Admission:    No current facility-administered medications on file prior to encounter.     Current Outpatient Medications on File Prior to Encounter   Medication Sig Dispense Refill    gabapentin (NEURONTIN) 100 MG capsule Take 2 capsules by mouth 2 times daily.      NIFEdipine (PROCARDIA XL) 60 MG extended release tablet Take 1 tablet by mouth daily 30 tablet 3    sodium zirconium cyclosilicate (LOKELMA) 5 g PACK oral suspension Take 1 packet by mouth daily 7 packet 0    melatonin 10 MG CAPS capsule Take 1 capsule by mouth nightly      urea (CARMOL) 10 % cream Apply 10 g topically nightly Apply topically as

## 2025-05-13 NOTE — CONSULTS
Consult received.  Labs and notes were reviewed.  Case was discussed with the staff.    Full note to follow.    Thanks  Nephrology  [address] [place] [address] [id number]  [place] [address]  Office: [phone]  Cell: [phone]  Fax: [phone]

## 2025-05-13 NOTE — H&P
ICU HISTORY & PHYSICAL       Admit Date:  5/13/2025                            Hospital Day: 1  ICU Day: 1      CC: respiratory distress    History obtained from:  chart review and the patient    SUBJECTIVE   HPI:   59 y.o. adult with ESRD on HD (TTS), type 2 diabetes, hypertension, hyperlipidemia, CAD s/p stents (2023), COPD (3 L home oxygen), CVA (2019), who presented to the ED with resp distress on 5/13/2025.     Patient was recently admitted for 4 days at [facility] (4/22-4/26)  for acute hypoxic and hypercapnic resp failure secondary to acute pulmonary edema. She was discharge to her senior living where she felt she was back to her baseline until this morning when she started complaining of SOB and having difficulty breathing. She also complains of sharp chest pain bilaterally and reports that sh has been coughing without sputum production or wheezing since yesterday. She denies smoking recently. She denies palpitations , lightheadedness, visual disturbance, abdominal pain, diaphoresis or n/v.     She has ESRD and gets hemodialysis 3 times a week. Last session was yesterday without complications. She doesn't know what medications she takes at the facility.     ED course : Patient was conscious alert x 4 on presentation.  Vitals show  (!) 196/144, Temp: 98 °F (36.7 °C), Pulse: (!) 126, Respirations: 24, SpO2: (!) 70 %.  Patient was started on BiPAP and nitroglycerine drip. Received 100mg IV of lasix. Admitted to ICU on nitroglycerine drip for hypertensive emergency and acute respiratory failure    - labs :   -- Sodium 135, chloride 95, BUN 38, creatinine 5.7.  Potassium hemolyzed repeat pending.  -- Lactic acid 2.3, troponin 387-342, NT proBNP 47349  -- WBC 11, hemoglobin 12.1, platelets 248, neutrophils 70%  -- Chest x-ray shows bilateral alveolar and interstitial consolidations  - EKG shows sinus tachycardia with left ventricular hypertrophy.  No signs of acute ischemic changes.    Code status and intubation in

## 2025-05-13 NOTE — PROGRESS NOTES
4 Eyes Skin Assessment     NAME:  Cindi Rodriguez  YOB: 1965  MEDICAL RECORD NUMBER:  [id number]    The patient is being assessed for  Admission    I agree that at least one RN has performed a thorough Head to Toe Skin Assessment on the patient. ALL assessment sites listed below have been assessed.      Areas assessed by both nurses:    Head, Face, Ears, Shoulders, Back, Chest, Arms, Elbows, Hands, Sacrum. Buttock, Coccyx, Ischium, Legs. Feet and Heels, Under Medical Devices , and Other          Does the Patient have a Wound? No noted wound(s)       Papa Prevention initiated by RN: Yes  Wound Care Orders initiated by RN: No    Pressure Injury (Stage 3,4, Unstageable, DTI, NWPT, and Complex wounds) if present, place Wound referral order by RN under : No    New Ostomies, if present place, Ostomy referral order under : No     Nurse 1 eSignature: Electronically signed by Rama Horn RN on 5/13/25 at 5:52 PM EDT    **SHARE this note so that the co-signing nurse can place an eSignature**    Nurse 2 eSignature: Electronically signed by Timothy Rousseau RN on 5/13/25 at 6:31 PM EDT

## 2025-05-14 LAB
ALBUMIN SERPL-MCNC: 3.8 G/DL (ref 3.4–5)
ANION GAP SERPL CALCULATED.3IONS-SCNC: 11 MMOL/L (ref 3–16)
BASOPHILS # BLD: 0 K/UL (ref 0–0.2)
BASOPHILS NFR BLD: 0.4 %
BUN SERPL-MCNC: 30 MG/DL (ref 7–20)
CALCIUM SERPL-MCNC: 9.2 MG/DL (ref 8.3–10.6)
CHLORIDE SERPL-SCNC: 95 MMOL/L (ref 99–110)
CO2 SERPL-SCNC: 28 MMOL/L (ref 21–32)
CREAT SERPL-MCNC: 4 MG/DL (ref 0.6–1.1)
DEPRECATED RDW RBC AUTO: 19.3 % (ref 12.4–15.4)
EOSINOPHIL # BLD: 0.1 K/UL (ref 0–0.6)
EOSINOPHIL NFR BLD: 0.8 %
GFR SERPLBLD CREATININE-BSD FMLA CKD-EPI: 12 ML/MIN/{1.73_M2}
GLUCOSE BLD-MCNC: 113 MG/DL (ref 70–99)
GLUCOSE BLD-MCNC: 119 MG/DL (ref 70–99)
GLUCOSE BLD-MCNC: 166 MG/DL (ref 70–99)
GLUCOSE BLD-MCNC: 258 MG/DL (ref 70–99)
GLUCOSE SERPL-MCNC: 104 MG/DL (ref 70–99)
HCT VFR BLD AUTO: 33.7 % (ref 36–48)
HGB BLD-MCNC: 10.9 G/DL (ref 12–16)
LYMPHOCYTES # BLD: 1.6 K/UL (ref 1–5.1)
LYMPHOCYTES NFR BLD: 13 %
MAGNESIUM SERPL-MCNC: 2.28 MG/DL (ref 1.8–2.4)
MCH RBC QN AUTO: 27.6 PG (ref 26–34)
MCHC RBC AUTO-ENTMCNC: 32.3 G/DL (ref 31–36)
MCV RBC AUTO: 85.4 FL (ref 80–100)
MONOCYTES # BLD: 0.6 K/UL (ref 0–1.3)
MONOCYTES NFR BLD: 4.8 %
NEUTROPHILS # BLD: 10 K/UL (ref 1.7–7.7)
NEUTROPHILS NFR BLD: 81 %
PERFORMED ON: ABNORMAL
PHOSPHATE SERPL-MCNC: 4 MG/DL (ref 2.5–4.9)
PLATELET # BLD AUTO: 203 K/UL (ref 135–450)
PMV BLD AUTO: 7 FL (ref 5–10.5)
POTASSIUM SERPL-SCNC: 5.4 MMOL/L (ref 3.5–5.1)
RBC # BLD AUTO: 3.94 M/UL (ref 4–5.2)
SODIUM SERPL-SCNC: 134 MMOL/L (ref 136–145)
WBC # BLD AUTO: 12.4 K/UL (ref 4–11)

## 2025-05-14 PROCEDURE — 97535 SELF CARE MNGMENT TRAINING: CPT

## 2025-05-14 PROCEDURE — 2060000000 HC ICU INTERMEDIATE R&B

## 2025-05-14 PROCEDURE — 97530 THERAPEUTIC ACTIVITIES: CPT

## 2025-05-14 PROCEDURE — 6370000000 HC RX 637 (ALT 250 FOR IP): Performed by: INTERNAL MEDICINE

## 2025-05-14 PROCEDURE — 2700000000 HC OXYGEN THERAPY PER DAY

## 2025-05-14 PROCEDURE — 99221 1ST HOSP IP/OBS SF/LOW 40: CPT | Performed by: NURSE PRACTITIONER

## 2025-05-14 PROCEDURE — 97166 OT EVAL MOD COMPLEX 45 MIN: CPT

## 2025-05-14 PROCEDURE — 97116 GAIT TRAINING THERAPY: CPT

## 2025-05-14 PROCEDURE — 94761 N-INVAS EAR/PLS OXIMETRY MLT: CPT

## 2025-05-14 PROCEDURE — 6370000000 HC RX 637 (ALT 250 FOR IP): Performed by: STUDENT IN AN ORGANIZED HEALTH CARE EDUCATION/TRAINING PROGRAM

## 2025-05-14 PROCEDURE — 97161 PT EVAL LOW COMPLEX 20 MIN: CPT

## 2025-05-14 PROCEDURE — 6360000002 HC RX W HCPCS: Performed by: STUDENT IN AN ORGANIZED HEALTH CARE EDUCATION/TRAINING PROGRAM

## 2025-05-14 PROCEDURE — 2500000003 HC RX 250 WO HCPCS: Performed by: STUDENT IN AN ORGANIZED HEALTH CARE EDUCATION/TRAINING PROGRAM

## 2025-05-14 PROCEDURE — 85025 COMPLETE CBC W/AUTO DIFF WBC: CPT

## 2025-05-14 PROCEDURE — 2580000003 HC RX 258: Performed by: STUDENT IN AN ORGANIZED HEALTH CARE EDUCATION/TRAINING PROGRAM

## 2025-05-14 PROCEDURE — 6370000000 HC RX 637 (ALT 250 FOR IP): Performed by: NURSE PRACTITIONER

## 2025-05-14 PROCEDURE — 83735 ASSAY OF MAGNESIUM: CPT

## 2025-05-14 PROCEDURE — 90935 HEMODIALYSIS ONE EVALUATION: CPT

## 2025-05-14 PROCEDURE — 36415 COLL VENOUS BLD VENIPUNCTURE: CPT

## 2025-05-14 PROCEDURE — 80069 RENAL FUNCTION PANEL: CPT

## 2025-05-14 PROCEDURE — 6370000000 HC RX 637 (ALT 250 FOR IP): Performed by: SURGERY

## 2025-05-14 RX ORDER — PANTOPRAZOLE SODIUM 40 MG/1
40 TABLET, DELAYED RELEASE ORAL
Status: DISCONTINUED | OUTPATIENT
Start: 2025-05-15 | End: 2025-05-15 | Stop reason: HOSPADM

## 2025-05-14 RX ORDER — HYDROCODONE BITARTRATE AND ACETAMINOPHEN 5; 325 MG/1; MG/1
1 TABLET ORAL EVERY 4 HOURS PRN
Status: DISCONTINUED | OUTPATIENT
Start: 2025-05-14 | End: 2025-05-15 | Stop reason: HOSPADM

## 2025-05-14 RX ADMIN — CLOPIDOGREL BISULFATE 75 MG: 75 TABLET, FILM COATED ORAL at 08:02

## 2025-05-14 RX ADMIN — HEPARIN SODIUM 5000 UNITS: 5000 INJECTION INTRAVENOUS; SUBCUTANEOUS at 06:47

## 2025-05-14 RX ADMIN — MIRTAZAPINE 15 MG: 15 TABLET, FILM COATED ORAL at 20:44

## 2025-05-14 RX ADMIN — HYDROCODONE BITARTRATE AND ACETAMINOPHEN 1 TABLET: 5; 325 TABLET ORAL at 10:18

## 2025-05-14 RX ADMIN — NIFEDIPINE 60 MG: 60 TABLET, EXTENDED RELEASE ORAL at 08:59

## 2025-05-14 RX ADMIN — Medication 10 MG: at 20:43

## 2025-05-14 RX ADMIN — HYDROCODONE BITARTRATE AND ACETAMINOPHEN 1 TABLET: 5; 325 TABLET ORAL at 19:55

## 2025-05-14 RX ADMIN — PANTOPRAZOLE SODIUM 40 MG: 40 INJECTION, POWDER, FOR SOLUTION INTRAVENOUS at 08:09

## 2025-05-14 RX ADMIN — CARVEDILOL 3.12 MG: 3.12 TABLET, FILM COATED ORAL at 08:02

## 2025-05-14 RX ADMIN — INSULIN LISPRO 2 UNITS: 100 INJECTION, SOLUTION INTRAVENOUS; SUBCUTANEOUS at 20:43

## 2025-05-14 RX ADMIN — HEPARIN SODIUM 5000 UNITS: 5000 INJECTION INTRAVENOUS; SUBCUTANEOUS at 22:22

## 2025-05-14 RX ADMIN — BUPROPION HYDROCHLORIDE 150 MG: 150 TABLET, FILM COATED, EXTENDED RELEASE ORAL at 08:59

## 2025-05-14 RX ADMIN — TORSEMIDE 100 MG: 100 TABLET ORAL at 10:03

## 2025-05-14 RX ADMIN — HEPARIN SODIUM 5000 UNITS: 5000 INJECTION INTRAVENOUS; SUBCUTANEOUS at 14:18

## 2025-05-14 RX ADMIN — ARIPIPRAZOLE 10 MG: 5 TABLET ORAL at 08:59

## 2025-05-14 RX ADMIN — SODIUM CHLORIDE, PRESERVATIVE FREE 10 ML: 5 INJECTION INTRAVENOUS at 20:44

## 2025-05-14 RX ADMIN — SODIUM CHLORIDE, PRESERVATIVE FREE 5 ML: 5 INJECTION INTRAVENOUS at 09:25

## 2025-05-14 RX ADMIN — SODIUM ZIRCONIUM CYCLOSILICATE 10 G: 10 POWDER, FOR SUSPENSION ORAL at 12:12

## 2025-05-14 RX ADMIN — ATORVASTATIN CALCIUM 80 MG: 80 TABLET, FILM COATED ORAL at 20:44

## 2025-05-14 ASSESSMENT — PAIN DESCRIPTION - DESCRIPTORS: DESCRIPTORS: CRAMPING

## 2025-05-14 ASSESSMENT — PAIN SCALES - GENERAL
PAINLEVEL_OUTOF10: 2
PAINLEVEL_OUTOF10: 5

## 2025-05-14 ASSESSMENT — PAIN DESCRIPTION - ONSET: ONSET: GRADUAL

## 2025-05-14 ASSESSMENT — PAIN - FUNCTIONAL ASSESSMENT: PAIN_FUNCTIONAL_ASSESSMENT: ACTIVITIES ARE NOT PREVENTED

## 2025-05-14 ASSESSMENT — PAIN DESCRIPTION - PAIN TYPE: TYPE: CHRONIC PAIN

## 2025-05-14 ASSESSMENT — PAIN DESCRIPTION - FREQUENCY: FREQUENCY: CONTINUOUS

## 2025-05-14 ASSESSMENT — ENCOUNTER SYMPTOMS
GASTROINTESTINAL NEGATIVE: 1
SHORTNESS OF BREATH: 1

## 2025-05-14 ASSESSMENT — PAIN DESCRIPTION - ORIENTATION: ORIENTATION: LEFT

## 2025-05-14 ASSESSMENT — PAIN DESCRIPTION - LOCATION: LOCATION: LEG

## 2025-05-14 NOTE — PROGRESS NOTES
Physical Therapy  Facility/Department: [facility] ICU  Physical Therapy Initial Assessment/discharge note    Name: Cindi Rodriguez  : 1965  MRN: [id number]  Date of Service: 2025    Discharge Recommendations:  Long Term Care without PT   PT Equipment Recommendations  Equipment Needed: No      Patient Diagnosis(es): The primary encounter diagnosis was Acute pulmonary edema (HCC). A diagnosis of Hypertensive emergency was also pertinent to this visit.  Past Medical History:  has a past medical history of JONATHAN (acute kidney injury), Anxiety, Asthma, Bipolar disorder (HCC), Bronchitis, Chronic back pain, Depression, Diabetes mellitus (HCC), DM II (diabetes mellitus, type II), controlled (HCC), Hyperlipidemia, Hypertension, Neuropathy, Obstructive sleep apnea, and Restless legs syndrome (RLS).  Past Surgical History:  has a past surgical history that includes Inner ear surgery; Tubal ligation; Endometrial ablation; and Dialysis Catheter Removal (N/A, 2024).    Assessment  Assessment: 59 yo admitted 25 for pulmonary edema. Pt reports she is from LTC and still having PT a few days per week. Pt required CGA for gait and transfers with RW this session but pt reports she is ambulatory with assist at LTC. Per CM notes, pt will return to LTC without precert. No acute PT needs identified so will sign off. Recommend pt return to LTC and will defer any further PT  needs back to LTC. Recommend nursing encourage OOB PRN and often while she remains in hospital. Discussed with RN.  Decision Making: Low Complexity  Requires PT Follow-Up: No  Activity Tolerance  Activity Tolerance: Patient tolerated evaluation without incident;Patient limited by pain;Patient limited by endurance    Plan  Safety Devices  Type of Devices: Nurse notified, Call light within reach, Left in chair (RN to locate chair alarm box/cord)    Restrictions  Position Activity Restriction  Other Position/Activity Restrictions: up with assist

## 2025-05-14 NOTE — CARE COORDINATION
Case Management Assessment  Initial Evaluation    Date/Time of Evaluation: 5/14/2025 9:52 AM  Assessment Completed by: Ashu Leung RN    If patient is discharged prior to next notation, then this note serves as note for discharge by case management.    Patient Name: Cindi Rodriguez                   YOB: 1965  Diagnosis: Acute pulmonary edema (HCC) [J81.0]  Pulmonary edema, acute (HCC) [J81.0]  Hypertensive emergency [I16.1]                   Date / Time: 5/13/2025  8:33 AM    Patient Admission Status: Inpatient   Readmission Risk (Low < 19, Mod (19-27), High > 27): Readmission Risk Score: 32    Current PCP: Alessandra Anderson, DO  PCP verified by CM? Yes    Chart Reviewed: Yes      History Provided by: Patient, Medical Record  Patient Orientation: Alert and Oriented    Patient Cognition: Alert    Hospitalization in the last 30 days (Readmission):  Yes    If yes, Readmission Assessment in CM Navigator will be completed and shown below.  Readmission Assessment  Number of Days since last admission?: 8-30 days  Previous Disposition: Long Term Care  Who is being Interviewed: Patient  What was the patient's/caregiver's perception as to why they think they needed to return back to the hospital?: Other (Comment) (new medical complications)  Did you visit your Primary Care Physician after you left the hospital, before you returned this time?: Yes  Did you see a specialist, such as Cardiac, Pulmonary, Orthopedic Physician, etc. after you left the hospital?: No  Who advised the patient to return to the hospital?: Other (Comment) (LTC)  Does the patient report anything that got in the way of taking their medications?: No  In our efforts to provide the best possible care to you and others like you, can you think of anything that we could have done to help you after you left the hospital the first time, so that you might not have needed to return so soon?: Other (Comment) (NA)       Advance Directives:      Code

## 2025-05-14 NOTE — PROGRESS NOTES
Dear Coumadin clinic,    Patient has a scheduled procedure Upper Endoscopy (EGD) on 3/18/24 and is currently taking a blood thinner prescribed by your office. In order to ensure patient safety, we would like to confirm that the patient can place their blood thinner medication on hold for the procedure. Can he/she discontinue Coumadin/Jantoven (warfarin) for a minimum of 5 days prior to the procedure?     Thank you for your prompt reply.    [facility] Endoscopy Scheduling    Received call from Daija Gonsalez that patient was receptive to speaking with dietitian regarding her current diet. Specifically foods that she could door dash/order that fit her diet, as she frequently orders out stating strong dislike of her facilities food. Patient stated she was too tired to talk and did not want to go over information. Provided patient with handouts for   *Low potassium diet   *Tips for healthy eating with fast food   *Healthy snacking  Instructed patient to call RD back in when she is ready to go over handouts.    Audelia Tiwari RD, LD    [place]: [phone]

## 2025-05-14 NOTE — PROGRESS NOTES
[facility]    [facility]              [phone]                       Plan :     BP is better controlled   She is on O2  Labs reviewed   HD yesterday with 3 liters removed     Extra HD today for volume removal and then TTS schedule  Resume torsemide QD  Stop cozaar  Continue other antihypertensives        Assessment :     Acute on chronic respiratory failure  Due to pulmonary edema, in context of ESRD and HFrEF  Patient reports having woken up gasping for air, EMS placed her on CPAP.  Patient was noted to be hypoxic on presentation  SpO2 as low as 70% on presentation, improved to 100% on BiPAP with supplemental oxygen with FiO2 100%  No evidence of LE edema    Patient was advised to limit sodium intake and her fluid intake to 1 L/day but reports that she has been having salty food with additional salt, as well as excessive fluid intake on the night prior to presentation.  Denies any recent changes to her medication/noncompliance/missing HD sessions  Of note, she has had similar presentations in the past, which have improved with HD    Body weight as recent as 2/27/25: 123 pounds, today she presented with body weight of 152 lb 14 oz 5/13.  NT proBNP on presentation 68,481    S/p 100 mg Lasix  Home torsemide and losartan held    Hypertensive emergency  Presented with /144 and 192/131. With evidence of pulmonary edema on CXR, was started on nitro drip  Relative hypotension, BP was decreased to 125/95 over 1-hour  Lactic acid 2.3  Troponin 387  -On nitro drip, management per ICU team  -BiPAP and supplemental oxygen per ICU team  -Hemodialysis starting today  -Will discuss diuretics    ESRD, History of calciphylaxis  On HD (TTS) via right chest HD cath   Reports history of HD for the past 4 years  Seen by Dr. Brittney Castaneda on prior admission in April  Was on Renvela with meals  6/28/2023 PTH: 276  1/6/2025 PTH: 210    Presenting labs:  Creatinine 5.7, BUN 38  K hemolyzed  Phos not

## 2025-05-14 NOTE — DISCHARGE INSTRUCTIONS
Extra Heart Failure Education/ Tools/ Resources:     https://Nurego.com/publication/?k=936443   --- this is American Heart Association interactive Healthier Living with Heart Failure guidebook.  Please click hyperlink or copy / paste link into search bar. The QR Code is also available below. Use your mouse to scroll through the pages.  Lots of information about weight monitoring, diet tips, activity, meds, etc    Heart Failure Tools and Resources QR Code is below. It includes multiple resources to include symptom tracker, med tracker, further HF info, and access to a HF Support Network online Community    HF [facility] [place]  -- this is a free smart phone antonia available for iPhone and Android download.  Use your phone to track sodium / fluid intake, zone tool symptom tracking, weights, medications, etc. Click on this hyperlink  HF [facility] [place]   for QR code for easy download or the link is also found in the below HF Tools and Resources.      DASH (Dietary Approach to Stop Hypertension) diet --  https://www.nhlbi.nih.gov/education/dash-eating-plan -- this diet is a flexible eating plan that promotes heart healthy eating style.  Click on hyperlink or copy / paste link into search bar.  Lots of low sodium recipes and tips.    https://www.[employer]iles[employer]/recipes  -- more free recipes    '

## 2025-05-14 NOTE — PROGRESS NOTES
Treatment time: 3 hours  Net UF: 0 ml     Pre weight: 61.5 kg  Post weight:61.5 kg    Access used: RTDC    Access function: Well tolerated with  ml/min     Medications or blood products given: Heparin dwells     Regular outpatient schedule: TTS     Summary of response to treatment: Patient tolerated treatment well and without any complications. Patient remained stable throughout entire treatment and upon the exiting the dialysis suite via transport.     Report given to Dwaine Gray RN and copy of dialysis treatment record placed in chart, to be scanned into EMR.

## 2025-05-14 NOTE — PROGRESS NOTES
Heart failure quality guidelines exemption as below:     Why no ACEi? Patient with labile blood pressures, beginning with hypertensive emergency and now wnl. She still requires a second session of HD today. To add BP lowering medications now would cause harm as her BP has already come down more than what guidelines recommend for treatment of hypertensvie emergency. and requiring 2 days of HD in a row this admission. Would not be safe to start GDMT at this time d/t BP lowering effects.     Why no SGLT2? CKD contraindication  Why no MRA? Same as above  Why no hydralazine or nitrates? Same as above    Patient has appointment this Friday for ICD placement planning. Her BP is currently wnl, that too prior to her HD session today. To start the above GDMT therapies would be too aggressive and would likely result in more harm than good at this time. Thus, deferring to outpatient setting for gentle incremental addition of GDMT therapies is a safer option.

## 2025-05-14 NOTE — PROGRESS NOTES
Occupational Therapy  Facility/Department: [facility] ICU  Occupational Therapy Initial Assessment, Treatment, Discharge    Name: Cindi Rodriguez  : 1965  MRN: [id number]  Date of Service: 2025    Discharge Recommendations:  Long Term Care with OT (consult OT at NH if decline in baseline is detected)  OT Equipment Recommendations  Equipment Needed: No       Patient Diagnosis(es): The primary encounter diagnosis was Acute pulmonary edema (HCC). A diagnosis of Hypertensive emergency was also pertinent to this visit.  Past Medical History:  has a past medical history of JONATHAN (acute kidney injury), Anxiety, Asthma, Bipolar disorder (HCC), Bronchitis, Chronic back pain, Depression, Diabetes mellitus (HCC), DM II (diabetes mellitus, type II), controlled (HCC), Hyperlipidemia, Hypertension, Neuropathy, Obstructive sleep apnea, and Restless legs syndrome (RLS).  Past Surgical History:  has a past surgical history that includes Inner ear surgery; Tubal ligation; Endometrial ablation; and Dialysis Catheter Removal (N/A, 2024).           Assessment  Assessment: Pt is presenting from LTC facility w/ c/o SOB. At baseline, pt requires setup for spongebathes and primarily gets around using w/c. Pt does report she gets up and walks in her room at times even if this is not recommended by staff. Pt appears to be functioning near her baseline. Recommend pt return to facility w/ prior level of care. OT can be consulted there as needed if a decline from baseline is noted. Will sign off and defer OOB transfers/mobility to nursing staff.  Decision Making: Medium Complexity  REQUIRES OT FOLLOW-UP: No  Activity Tolerance  Activity Tolerance: Patient Tolerated treatment well  Activity Tolerance Comments: min SOB on baseline 2L O2; O2 sats WFL.    NOTE: only tolerates standing x 3 minutes at baseline.     Plan  Occupational Therapy Plan  Times Per Week: Discharge acute OT. Pt appears to be functioning near her

## 2025-05-14 NOTE — PROGRESS NOTES
Notification of IV to PO Conversion     IV To Po Conversion:   Will convert Pantopraozle to oral based on [facility] IV to PO policy (see below).     Please call with questions.  Felicitas Barney PharmD., [facility]   5/14/2025 8:42 AM  Wireless: [phone]      Criteria for conversion from IV to PO therapy per [facility] IV to PO Protocol:   Patients should meet all of the following inclusion criteria and none of the exclusion criteria    Criteria to initiate medication route switch (Inclusion Criteria)  IV therapy for > 24 hours (antibiotics only)  Tolerating diet more advanced than clear liquids  Tolerating oral (PO) medications  Does not require vasopressor therapy for blood pressure support  No seizures for 72hrs (antiepileptic medications only)      Criteria indicating that the patient is NOT a candidate for IV to PO conversion (Exclusion Criteria)  Infections requiring IV therapy (ie: meningitis, endocarditis, osteomyelitis, pancreatitis)  Nausea and/or vomiting or severe diarrhea within past 24 hours  Has gastrectomy, ileus, gastric outlet or bowel obstruction, or malabsorption syndromes  Has significant, painful oral ulceration  TPN with an NPO order  Active GI bleed  Unable to swallow  Nothing by Mouth (NPO) status  Febrile in the last 24 hours- (antibiotics only)  Clinical deteriorating or unstable - (antibiotics only)  Pediatric patients and patients who are not euthyroid (not on oral levothyroxine/not stabilized on oral levothyroxine) - (Levothyroxine only)  Patients being treated for myxedema coma or during the organ donation process - (Levothyroxine only)    Other notes-   Patients may be given suppositories when available for the product being ordered if no contraindications exist (ie: rectal surgery or infection)   Oral solutions/suspensions may be considered for patients with a functioning enteral tube not on continuous suction (if medication is available in this formulation)

## 2025-05-14 NOTE — CONSULTS
[facility]  Palliative Medicine Consultation Note      Date Of Admission:5/13/2025  Date of consult: 05/14/25  Seen by PC in the past:  No    Recommendations:        Introduced palliative care and had a voluntary discussion about advance care planning. The pt has three children and said she would want her son Dimitri to be her HCPOA. She wanted to d/w him before completing HCPOA. She talks about her life for the past 8 months not being good due to living in a nursing home. She said the food there is worse than shelter, and she knows that because she's been to shelter. She often orders out because the food is not edible, and she knows the restaurant food she orders has excessive salt. We talked about her ordering food from a grocery store that will have less salt, and she is in agreement with talking with a dietitian about ideas for healthy snacks or meals she can try. The pt is hoping to move back home. She has an apartment and a payee through [employer], but has not lived in her apartment in the past 8 months. She hopes that her grandson will move from [place] to live with her in her apartment, because she really does not want to live in the nursing facility any more. She feels her quality of life will be much better if she can return home, but knows she needs assistance at home, which she does not currently have. She said she hates dialysis and has been on it 4 years, but she is trying to change her mindset to \"I love dialysis\" so that she will keep doing it. D/w dietitian who will f/u with pt.     1. Goals of Care/Advanced Care planning/Code status: Full Code, did not discuss since MD just discussed with her yesterday. The pt wants to continue dialysis and prolong her life. She hopes to eventually return home to her apartment.  2. SOB: p/w respiratory distress, initially on BiPAP, now on nasal canula and breathing comfortably. Admitted with acute hypoxemic and hypercapnic respiratory failure with acute on

## 2025-05-14 NOTE — PROGRESS NOTES
Spiritual Health History and Assessment/Progress Note  [facility]    Spiritual/Emotional Needs,  ,  ,  Spiritual care introduced to patient.    Name: Cindi Rodriguez MRN: [id number]    Age: 60 y.o.     Sex: female   Language: English   Mandaeism: Sikh   Pulmonary edema, acute (HCC)     Date: 5/14/2025            Total Time Calculated: 8 min              Spiritual Assessment began in [facility] ICU        Referral/Consult From: Rounding   Encounter Overview/Reason: Spiritual/Emotional Needs  Service Provided For: Patient    Anahi, Belief, Meaning:   Patient is connected with a anahi tradition or spiritual practice  Family/Friends No family/friends present      Importance and Influence:  Patient has no beliefs influential to healthcare decision-making identified during this visit  Family/Friends No family/friends present    Community:  Patient feels well-supported. Support system includes: Children  Family/Friends No family/friends present    Assessment and Plan of Care:     Patient Interventions include: Facilitated expression of thoughts and feelings and Affirmed coping skills/support systems  Family/Friends Interventions include: No family/friends present    Patient Plan of Care: No spiritual needs identified for follow-up and Spiritual Care available upon further referral  Family/Friends Plan of Care: No family/friends present    Electronically signed by MARIE Espinoza on 5/14/2025 at 10:04 AM

## 2025-05-14 NOTE — PROGRESS NOTES
Patient transported to dialysis floor after being deemed \"intermediate care\" status rather than intensive care. This RN oversaw transport to hemodialysis suite where the dialysis RN estimated that the session would run until 7 PM.

## 2025-05-14 NOTE — PLAN OF CARE
Problem: Chronic Conditions and Co-morbidities  Goal: Patient's chronic conditions and co-morbidity symptoms are monitored and maintained or improved  Outcome: Progressing  Flowsheets (Taken 5/14/2025 0726)  Care Plan - Patient's Chronic Conditions and Co-Morbidity Symptoms are Monitored and Maintained or Improved:   Monitor and assess patient's chronic conditions and comorbid symptoms for stability, deterioration, or improvement   Collaborate with multidisciplinary team to address chronic and comorbid conditions and prevent exacerbation or deterioration   Update acute care plan with appropriate goals if chronic or comorbid symptoms are exacerbated and prevent overall improvement and discharge     Problem: Discharge Planning  Goal: Discharge to home or other facility with appropriate resources  Outcome: Progressing  Flowsheets (Taken 5/14/2025 0726)  Discharge to home or other facility with appropriate resources:   Identify barriers to discharge with patient and caregiver   Arrange for needed discharge resources and transportation as appropriate   Identify discharge learning needs (meds, wound care, etc)     Problem: Safety - Adult  Goal: Free from fall injury  Outcome: Progressing  Flowsheets (Taken 5/14/2025 0726)  Free From Fall Injury: Instruct family/caregiver on patient safety     Problem: Skin/Tissue Integrity  Goal: Skin integrity remains intact  Description: 1.  Monitor for areas of redness and/or skin breakdown2.  Assess vascular access sites hourly3.  Every 4-6 hours minimum:  Change oxygen saturation probe site4.  Every 4-6 hours:  If on nasal continuous positive airway pressure, respiratory therapy assess nares and determine need for appliance change or resting period  Outcome: Progressing  Flowsheets (Taken 5/13/2025 5037)  Skin Integrity Remains Intact:   Monitor for areas of redness and/or skin breakdown   Assess vascular access sites hourly   Every 4-6 hours minimum:  Change oxygen saturation

## 2025-05-14 NOTE — DISCHARGE INSTR - COC
Continuity of Care Form    Patient Name: Cindi Rodriguez   :  1965  MRN:  [id number]    Admit date:  2025  Discharge date:  5/15/2025    Code Status Order: Full Code   Advance Directives:     Admitting Physician:  Gilberto Castaneda MD  PCP: Alessandra Anderson DO    Discharging Nurse: Dwaine MONTALVO RN  Discharging Hospital Unit/Room#: 4501/4501-01  Discharging Unit Phone Number: [phone]    Emergency Contact:   Extended Emergency Contact Information  Primary Emergency Contact: Dimitri Santo  Home Phone: [phone]  Mobile Phone: [phone]  Relation: Child   needed? No  Secondary Emergency Contact: suraj Santo  Mobile Phone: [phone]  Relation: Child    Past Surgical History:  Past Surgical History:   Procedure Laterality Date    DIALYSIS CATHETER REMOVAL N/A 2024    PERITONEAL DIAYSIS CATHETER REMOVAL, INCISION AND DRAINAGE OF ABDOMINAL WALL ABCESS performed by Santos Castaneda DO at [facility] OR    ENDOMETRIAL ABLATION      INNER EAR SURGERY      TUBAL LIGATION         Immunization History:     There is no immunization history on file for this patient.    Active Problems:  Patient Active Problem List   Diagnosis Code    Atypical chest pain R07.89    DM II (diabetes mellitus, type II), controlled ([facility]) E11.9    HTN (hypertension) I10    JONATHAN (acute kidney injury) N17.9    Asthma J45.909    History of anemia due to CKD N18.9, Z86.2    Peritonitis (HCC) K65.9    ESRD on peritoneal dialysis (HCC) N18.6, Z99.2    Complication of peritoneal dialysis T80.90XA    Peritonitis associated with peritoneal dialysis T85.71XA    Chronic idiopathic constipation K59.04    Lower abdominal pain R10.30    AMS (altered mental status) R41.82    Recurrent falls R29.6    Vertigo R42    Respiratory failure (HCC) J96.90    Anemia D64.9    Multifocal pneumonia J18.9    Metabolic encephalopathy G93.41    Peritoneal dialysis catheter in place Z99.2    Abdominal wall abscess L02.211    Encephalopathy G93.40    Acute

## 2025-05-14 NOTE — PLAN OF CARE
Problem: Chronic Conditions and Co-morbidities  Goal: Patient's chronic conditions and co-morbidity symptoms are monitored and maintained or improved  5/14/2025 1851 by Dwaine Gray RN  Outcome: Progressing  5/14/2025 0726 by Meagan Fritz RN  Outcome: Progressing  Flowsheets (Taken 5/14/2025 0726)  Care Plan - Patient's Chronic Conditions and Co-Morbidity Symptoms are Monitored and Maintained or Improved:   Monitor and assess patient's chronic conditions and comorbid symptoms for stability, deterioration, or improvement   Collaborate with multidisciplinary team to address chronic and comorbid conditions and prevent exacerbation or deterioration   Update acute care plan with appropriate goals if chronic or comorbid symptoms are exacerbated and prevent overall improvement and discharge     Problem: Discharge Planning  Goal: Discharge to home or other facility with appropriate resources  5/14/2025 1851 by Dwaine Gray RN  Outcome: Progressing  5/14/2025 0726 by Meagan Fritz RN  Outcome: Progressing  Flowsheets (Taken 5/14/2025 0726)  Discharge to home or other facility with appropriate resources:   Identify barriers to discharge with patient and caregiver   Arrange for needed discharge resources and transportation as appropriate   Identify discharge learning needs (meds, wound care, etc)     Problem: Safety - Adult  Goal: Free from fall injury  5/14/2025 1851 by Dwaine Gray RN  Outcome: Progressing  5/14/2025 0726 by Meagan Fritz RN  Outcome: Progressing  Flowsheets (Taken 5/14/2025 0726)  Free From Fall Injury: Instruct family/caregiver on patient safety     Problem: Skin/Tissue Integrity  Goal: Skin integrity remains intact  Description: 1.  Monitor for areas of redness and/or skin breakdown2.  Assess vascular access sites hourly3.  Every 4-6 hours minimum:  Change oxygen saturation probe site4.  Every 4-6 hours:  If on nasal continuous positive airway pressure, respiratory therapy

## 2025-05-15 VITALS
WEIGHT: 136.24 LBS | HEART RATE: 83 BPM | SYSTOLIC BLOOD PRESSURE: 137 MMHG | OXYGEN SATURATION: 96 % | BODY MASS INDEX: 20.65 KG/M2 | TEMPERATURE: 97.2 F | DIASTOLIC BLOOD PRESSURE: 94 MMHG | RESPIRATION RATE: 23 BRPM | HEIGHT: 68 IN

## 2025-05-15 LAB
ALBUMIN SERPL-MCNC: 3.7 G/DL (ref 3.4–5)
ANION GAP SERPL CALCULATED.3IONS-SCNC: 11 MMOL/L (ref 3–16)
BASOPHILS # BLD: 0 K/UL (ref 0–0.2)
BASOPHILS NFR BLD: 0.7 %
BUN SERPL-MCNC: 20 MG/DL (ref 7–20)
CALCIUM SERPL-MCNC: 8.8 MG/DL (ref 8.3–10.6)
CHLORIDE SERPL-SCNC: 97 MMOL/L (ref 99–110)
CO2 SERPL-SCNC: 26 MMOL/L (ref 21–32)
CREAT SERPL-MCNC: 3 MG/DL (ref 0.6–1.1)
DEPRECATED RDW RBC AUTO: 19.4 % (ref 12.4–15.4)
EOSINOPHIL # BLD: 0.4 K/UL (ref 0–0.6)
EOSINOPHIL NFR BLD: 7.4 %
GFR SERPLBLD CREATININE-BSD FMLA CKD-EPI: 17 ML/MIN/{1.73_M2}
GLUCOSE BLD-MCNC: 90 MG/DL (ref 70–99)
GLUCOSE SERPL-MCNC: 101 MG/DL (ref 70–99)
HCT VFR BLD AUTO: 32.9 % (ref 36–48)
HGB BLD-MCNC: 10.7 G/DL (ref 12–16)
LYMPHOCYTES # BLD: 1.5 K/UL (ref 1–5.1)
LYMPHOCYTES NFR BLD: 25.8 %
MAGNESIUM SERPL-MCNC: 2.1 MG/DL (ref 1.8–2.4)
MCH RBC QN AUTO: 27.7 PG (ref 26–34)
MCHC RBC AUTO-ENTMCNC: 32.6 G/DL (ref 31–36)
MCV RBC AUTO: 84.9 FL (ref 80–100)
MONOCYTES # BLD: 0.6 K/UL (ref 0–1.3)
MONOCYTES NFR BLD: 9.6 %
NEUTROPHILS # BLD: 3.3 K/UL (ref 1.7–7.7)
NEUTROPHILS NFR BLD: 56.5 %
PERFORMED ON: NORMAL
PHOSPHATE SERPL-MCNC: 3.3 MG/DL (ref 2.5–4.9)
PLATELET # BLD AUTO: 180 K/UL (ref 135–450)
PMV BLD AUTO: 7.2 FL (ref 5–10.5)
POTASSIUM SERPL-SCNC: 4.2 MMOL/L (ref 3.5–5.1)
RBC # BLD AUTO: 3.87 M/UL (ref 4–5.2)
SODIUM SERPL-SCNC: 134 MMOL/L (ref 136–145)
WBC # BLD AUTO: 5.8 K/UL (ref 4–11)

## 2025-05-15 PROCEDURE — 6370000000 HC RX 637 (ALT 250 FOR IP): Performed by: INTERNAL MEDICINE

## 2025-05-15 PROCEDURE — 80069 RENAL FUNCTION PANEL: CPT

## 2025-05-15 PROCEDURE — 2500000003 HC RX 250 WO HCPCS: Performed by: STUDENT IN AN ORGANIZED HEALTH CARE EDUCATION/TRAINING PROGRAM

## 2025-05-15 PROCEDURE — 6370000000 HC RX 637 (ALT 250 FOR IP): Performed by: STUDENT IN AN ORGANIZED HEALTH CARE EDUCATION/TRAINING PROGRAM

## 2025-05-15 PROCEDURE — 6360000002 HC RX W HCPCS: Performed by: STUDENT IN AN ORGANIZED HEALTH CARE EDUCATION/TRAINING PROGRAM

## 2025-05-15 PROCEDURE — 6370000000 HC RX 637 (ALT 250 FOR IP): Performed by: SURGERY

## 2025-05-15 PROCEDURE — 85025 COMPLETE CBC W/AUTO DIFF WBC: CPT

## 2025-05-15 PROCEDURE — 6370000000 HC RX 637 (ALT 250 FOR IP)

## 2025-05-15 PROCEDURE — 6360000002 HC RX W HCPCS: Performed by: SURGERY

## 2025-05-15 PROCEDURE — 36415 COLL VENOUS BLD VENIPUNCTURE: CPT

## 2025-05-15 PROCEDURE — 83735 ASSAY OF MAGNESIUM: CPT

## 2025-05-15 RX ORDER — HYDROMORPHONE HYDROCHLORIDE 1 MG/ML
0.5 INJECTION, SOLUTION INTRAMUSCULAR; INTRAVENOUS; SUBCUTANEOUS ONCE
Status: COMPLETED | OUTPATIENT
Start: 2025-05-15 | End: 2025-05-15

## 2025-05-15 RX ADMIN — ARIPIPRAZOLE 10 MG: 5 TABLET ORAL at 09:33

## 2025-05-15 RX ADMIN — HYDROCODONE BITARTRATE AND ACETAMINOPHEN 1 TABLET: 5; 325 TABLET ORAL at 05:47

## 2025-05-15 RX ADMIN — HYDROMORPHONE HYDROCHLORIDE 0.5 MG: 1 INJECTION, SOLUTION INTRAMUSCULAR; INTRAVENOUS; SUBCUTANEOUS at 08:19

## 2025-05-15 RX ADMIN — PANTOPRAZOLE SODIUM 40 MG: 40 TABLET, DELAYED RELEASE ORAL at 05:39

## 2025-05-15 RX ADMIN — HYDROCODONE BITARTRATE AND ACETAMINOPHEN 1 TABLET: 5; 325 TABLET ORAL at 00:01

## 2025-05-15 RX ADMIN — TORSEMIDE 100 MG: 100 TABLET ORAL at 09:32

## 2025-05-15 RX ADMIN — SODIUM CHLORIDE, PRESERVATIVE FREE 10 ML: 5 INJECTION INTRAVENOUS at 08:23

## 2025-05-15 RX ADMIN — NIFEDIPINE 60 MG: 60 TABLET, EXTENDED RELEASE ORAL at 09:33

## 2025-05-15 RX ADMIN — BUPROPION HYDROCHLORIDE 150 MG: 150 TABLET, FILM COATED, EXTENDED RELEASE ORAL at 09:33

## 2025-05-15 RX ADMIN — CARVEDILOL 3.12 MG: 3.12 TABLET, FILM COATED ORAL at 09:32

## 2025-05-15 RX ADMIN — CLOPIDOGREL BISULFATE 75 MG: 75 TABLET, FILM COATED ORAL at 09:32

## 2025-05-15 ASSESSMENT — PAIN DESCRIPTION - ORIENTATION
ORIENTATION: LEFT
ORIENTATION: LEFT

## 2025-05-15 ASSESSMENT — PAIN DESCRIPTION - LOCATION
LOCATION: LEG
LOCATION: HEAD
LOCATION: LEG

## 2025-05-15 ASSESSMENT — PAIN SCALES - GENERAL
PAINLEVEL_OUTOF10: 6
PAINLEVEL_OUTOF10: 5
PAINLEVEL_OUTOF10: 10
PAINLEVEL_OUTOF10: 0

## 2025-05-15 ASSESSMENT — PAIN DESCRIPTION - PAIN TYPE
TYPE: CHRONIC PAIN
TYPE: CHRONIC PAIN

## 2025-05-15 ASSESSMENT — PAIN DESCRIPTION - ONSET
ONSET: GRADUAL
ONSET: GRADUAL

## 2025-05-15 ASSESSMENT — PAIN DESCRIPTION - DESCRIPTORS
DESCRIPTORS: CRAMPING
DESCRIPTORS: ACHING
DESCRIPTORS: CRAMPING

## 2025-05-15 ASSESSMENT — PAIN DESCRIPTION - FREQUENCY
FREQUENCY: CONTINUOUS
FREQUENCY: CONTINUOUS

## 2025-05-15 NOTE — CARE COORDINATION
8:38 AM  DC order noted with note of \"DC after HD\"  Patient returned from HD after 1930.  Transportation arranged through round trip.     See full DC note.     Electronically signed by Ashu Leung RN, CM on 5/15/2025 at 8:38 AM.  Phone: [phone]  Fax: [phone]

## 2025-05-15 NOTE — PROGRESS NOTES
[facility]    [facility]              [phone]                       Plan :     BP is better controlled   She is on O2  Labs reviewed   HD yesterday with no removal     HD today per TTS schedule  Resume torsemide QD and stop losartan   Continue other antihypertensives        Assessment :     Acute on chronic respiratory failure  Due to pulmonary edema, in context of ESRD and HFrEF  Patient reports having woken up gasping for air, EMS placed her on CPAP.  Patient was noted to be hypoxic on presentation  SpO2 as low as 70% on presentation, improved to 100% on BiPAP with supplemental oxygen with FiO2 100%  No evidence of LE edema    Patient was advised to limit sodium intake and her fluid intake to 1 L/day but reports that she has been having salty food with additional salt, as well as excessive fluid intake on the night prior to presentation.  Denies any recent changes to her medication/noncompliance/missing HD sessions  Of note, she has had similar presentations in the past, which have improved with HD    Body weight as recent as 2/27/25: 123 pounds, today she presented with body weight of 152 lb 14 oz 5/13.  NT proBNP on presentation 68,481    S/p 100 mg Lasix  Home torsemide and losartan held    Hypertensive emergency  Presented with /144 and 192/131. With evidence of pulmonary edema on CXR, was started on nitro drip  Relative hypotension, BP was decreased to 125/95 over 1-hour  Lactic acid 2.3  Troponin 387  -On nitro drip, management per ICU team  -BiPAP and supplemental oxygen per ICU team  -Hemodialysis starting today  -Will discuss diuretics    ESRD, History of calciphylaxis  On HD (TTS) via right chest HD cath   Reports history of HD for the past 4 years  Seen by Dr. Brittney Castaneda on prior admission in April  Was on Renvela with meals  6/28/2023 PTH: 276  1/6/2025 PTH: 210    Presenting labs:  Creatinine 5.7, BUN 38  K hemolyzed  Phos not measured on

## 2025-05-15 NOTE — PROGRESS NOTES
Palliative Care Chart Review  and Check in Note:     NAME:  Cindi Rodriguez  Admit Date: 5/13/2025  Hospital Day:  Hospital Day: 3   Current Code status: Full Code    Palliative care is continuing to following Ms. Rodriguez for symptom management,  and goals of care discussion as needed. Patient's chart reviewed today 5/15/25.      Met with pt at the bedside. We discussed completing the HCPOA. She spoke with her son Dimitri over the phone yesterday but he told her he wanted to discuss in person before she completed the paperwork.       The following are the currently established goals/code status, and Symptom management.     Goals of care:  The pt wants to continue dialysis and prolong her life. She hopes to eventually return home to her apartment.     Code status: Full Code    Discharge plan: d/c to long term care facility when medically ready       JUSTYN De Leon CNP  05/15/25  8:38 AM

## 2025-05-15 NOTE — PLAN OF CARE
Problem: Chronic Conditions and Co-morbidities  Goal: Patient's chronic conditions and co-morbidity symptoms are monitored and maintained or improved  5/15/2025 0102 by Jordan Talley RN  Outcome: Progressing  5/14/2025 1851 by Dwaine Gray RN  Outcome: Progressing     Problem: Discharge Planning  Goal: Discharge to home or other facility with appropriate resources  5/15/2025 0102 by Jordan Talley RN  Outcome: Progressing  5/14/2025 1851 by Dwaine Gray RN  Outcome: Progressing     Problem: Safety - Adult  Goal: Free from fall injury  5/15/2025 0102 by Jordan Talley RN  Outcome: Progressing  5/14/2025 1851 by Dwaine Gray RN  Outcome: Progressing     Problem: Skin/Tissue Integrity  Goal: Skin integrity remains intact  Description: 1.  Monitor for areas of redness and/or skin breakdown2.  Assess vascular access sites hourly3.  Every 4-6 hours minimum:  Change oxygen saturation probe site4.  Every 4-6 hours:  If on nasal continuous positive airway pressure, respiratory therapy assess nares and determine need for appliance change or resting period  5/15/2025 0102 by Jordan Talley RN  Outcome: Progressing  5/14/2025 1851 by Dwaine Gray RN  Outcome: Progressing     Problem: Respiratory - Adult  Goal: Achieves optimal ventilation and oxygenation  5/15/2025 0102 by Jordan Talley RN  Outcome: Progressing  5/14/2025 1851 by Dwaine Gray RN  Outcome: Progressing     Problem: Cardiovascular - Adult  Goal: Maintains optimal cardiac output and hemodynamic stability  5/15/2025 0102 by Jordan Talley RN  Outcome: Progressing  5/14/2025 1851 by Dwaine Gray RN  Outcome: Progressing  Goal: Absence of cardiac dysrhythmias or at baseline  5/15/2025 0102 by Jordan Talley RN  Outcome: Progressing  5/14/2025 1851 by Dwaine Gray RN  Outcome: Progressing     Problem: Metabolic/Fluid and Electrolytes - Adult  Goal: Electrolytes maintained within normal limits  5/15/2025 0102 by Mayank

## 2025-05-15 NOTE — PROGRESS NOTES
HD session cancelled today for patient's discharge this morning. Dialysis RN stated they would inform Dr. Castaneda of this event.     Patient discharged with EMS personnel with all personal belongings. Said that her earring was missing and thought it was in/around the bed. Looked around for it, couldn't find it. IV access removed. AVS sent with patient and EMS.

## 2025-05-15 NOTE — FLOWSHEET NOTE
Patient received from HD RN. Connected to the cardiac monitor. No sign of distress noted. Patient offered a meal and ate it all. Warm blanket offered too.

## 2025-05-15 NOTE — CARE COORDINATION
Case Management Assessment            Discharge Note                    Date / Time of Note: 5/15/2025 8:45 AM                  Discharge Note Completed by: Ashu Leung RN    Patient Name: Cindi Rodriguez   YOB: 1965  Diagnosis: Acute pulmonary edema (HCC) [J81.0]  Pulmonary edema, acute (HCC) [J81.0]  Hypertensive emergency [I16.1]   Date / Time: 5/13/2025  8:33 AM    Current PCP: Alessandra Anderson DO  Clinic patient: No    Hospitalization in the last 30 days: Yes  Readmission Assessment  Number of Days since last admission?: 8-30 days  Previous Disposition: Long Term Care  Who is being Interviewed: Patient  What was the patient's/caregiver's perception as to why they think they needed to return back to the hospital?: Other (Comment) (new medical complications)  Did you visit your Primary Care Physician after you left the hospital, before you returned this time?: Yes  Did you see a specialist, such as Cardiac, Pulmonary, Orthopedic Physician, etc. after you left the hospital?: No  Who advised the patient to return to the hospital?: Other (Comment) (LTC)  Does the patient report anything that got in the way of taking their medications?: No  In our efforts to provide the best possible care to you and others like you, can you think of anything that we could have done to help you after you left the hospital the first time, so that you might not have needed to return so soon?: Other (Comment) (NA)    Advance Directives:  Code Status: Full Code  [place] DNR form completed and on chart: Not Indicated    Financial:  Payor: MEDICAID [place] / Plan: [place]ICA[place] DEPT OF [place]OB / Product Type: *No Product type* /      Pharmacy:    [facility] [id number] - [place], [place] - [address] - P [phone] - F [phone]  [address] [facility] [address]  Phone: [phone] Fax: [phone]    [facility] [id number] - [place], [place] - [address] - P [phone] - F [phone] [address]

## 2025-05-15 NOTE — PROGRESS NOTES
Hospital Medicine Progress Note      Date of Admission: 5/13/2025  Hospital Day: 3    Chief Admission Complaint:  respiratory distress      Subjective:  Patient is without complaints. No adverse interval events.  Medically discharged.    Presenting Admission History:       59 y.o. adult with ESRD on HD (TTS), type 2 diabetes, hypertension, hyperlipidemia, CAD s/p stents (2023), COPD (3 L home oxygen), CVA (2019), who presented to the ED with resp distress on 5/13/2025.      Patient was recently admitted for 4 days at [facility] (4/22-4/26)  for acute hypoxic and hypercapnic resp failure secondary to acute pulmonary edema. She was discharge to her senior living where she felt she was back to her baseline until this morning when she started complaining of SOB and having difficulty breathing. She also complains of sharp chest pain bilaterally and reports that sh has been coughing without sputum production or wheezing since yesterday. She denies smoking recently. She denies palpitations , lightheadedness, visual disturbance, abdominal pain, diaphoresis or n/v.      She has ESRD and gets hemodialysis 3 times a week. Last session was yesterday without complications. She doesn't know what medications she takes at the facility.     Assessment/Plan:      Current Principal Problem:  Pulmonary edema, acute (HCC)    Acute hypoxemic and hypercapnic respiratory failure   Emergency hypertension  Acute on chronic HFrEF  Likely secondary to flash pulmonary edema   ESRD patient. Last session was on Saturday. Recent admission for acute on chronic HFrEF exacerbation. Questionable compliance on home anti Htn and diet. Recent history of Presented with SOB and severe hypertension. Persistent cough without sputum production. Decreased air entry on right lower lobe zone. No wheezes on auscultation. VBG showed respiratory acidosis. Chest x ray shows bilateral pulmonary edema. EKG without acute ischemic changes. per chart review she was discharged

## 2025-05-20 ENCOUNTER — HOSPITAL ENCOUNTER (INPATIENT)
Age: 60
LOS: 2 days | Discharge: NURSING, REHAB OR LONG-TERM CARE | DRG: 425 | End: 2025-05-22
Attending: EMERGENCY MEDICINE | Admitting: STUDENT IN AN ORGANIZED HEALTH CARE EDUCATION/TRAINING PROGRAM
Payer: MEDICAID

## 2025-05-20 ENCOUNTER — APPOINTMENT (OUTPATIENT)
Dept: GENERAL RADIOLOGY | Age: 60
DRG: 425 | End: 2025-05-20
Payer: MEDICAID

## 2025-05-20 DIAGNOSIS — J96.22 ACUTE ON CHRONIC RESPIRATORY FAILURE WITH HYPOXIA AND HYPERCAPNIA (HCC): Primary | ICD-10-CM

## 2025-05-20 DIAGNOSIS — J96.21 ACUTE ON CHRONIC RESPIRATORY FAILURE WITH HYPOXIA AND HYPERCAPNIA (HCC): Primary | ICD-10-CM

## 2025-05-20 LAB
ANION GAP SERPL CALCULATED.3IONS-SCNC: 21 MMOL/L (ref 3–16)
BASE EXCESS BLDV CALC-SCNC: -4.1 MMOL/L (ref -2–3)
BASE EXCESS BLDV CALC-SCNC: 0.3 MMOL/L (ref -2–3)
BASE EXCESS BLDV CALC-SCNC: 0.5 MMOL/L (ref -2–3)
BASE EXCESS BLDV CALC-SCNC: 3.5 MMOL/L (ref -2–3)
BASOPHILS # BLD: 0 K/UL (ref 0–0.2)
BASOPHILS NFR BLD: 0.5 %
BUN SERPL-MCNC: 41 MG/DL (ref 7–20)
CALCIUM SERPL-MCNC: 9.6 MG/DL (ref 8.3–10.6)
CHLORIDE SERPL-SCNC: 95 MMOL/L (ref 99–110)
CO2 BLDV-SCNC: 24 MMOL/L
CO2 BLDV-SCNC: 29 MMOL/L
CO2 BLDV-SCNC: 30 MMOL/L
CO2 BLDV-SCNC: 31 MMOL/L
CO2 SERPL-SCNC: 24 MMOL/L (ref 21–32)
COHGB MFR BLDV: 1.7 % (ref 0–1.5)
COHGB MFR BLDV: 1.8 % (ref 0–1.5)
COHGB MFR BLDV: 1.8 % (ref 0–1.5)
COHGB MFR BLDV: 1.9 % (ref 0–1.5)
CREAT SERPL-MCNC: 5.8 MG/DL (ref 0.6–1.1)
DEPRECATED RDW RBC AUTO: 19.4 % (ref 12.4–15.4)
DO-HGB MFR BLDV: 16.7 %
DO-HGB MFR BLDV: 19.6 %
DO-HGB MFR BLDV: 27.9 %
DO-HGB MFR BLDV: 47.2 %
EKG ATRIAL RATE: 90 BPM
EKG DIAGNOSIS: NORMAL
EKG P AXIS: 71 DEGREES
EKG P-R INTERVAL: 178 MS
EKG Q-T INTERVAL: 408 MS
EKG QRS DURATION: 106 MS
EKG QTC CALCULATION (BAZETT): 499 MS
EKG R AXIS: -22 DEGREES
EKG T AXIS: 72 DEGREES
EKG VENTRICULAR RATE: 90 BPM
EOSINOPHIL # BLD: 0.5 K/UL (ref 0–0.6)
EOSINOPHIL NFR BLD: 6.8 %
FLUAV RNA RESP QL NAA+PROBE: NOT DETECTED
FLUBV RNA RESP QL NAA+PROBE: NOT DETECTED
GFR SERPLBLD CREATININE-BSD FMLA CKD-EPI: 8 ML/MIN/{1.73_M2}
GLUCOSE SERPL-MCNC: 109 MG/DL (ref 70–99)
HCO3 BLDV-SCNC: 22.3 MMOL/L (ref 24–28)
HCO3 BLDV-SCNC: 27.2 MMOL/L (ref 24–28)
HCO3 BLDV-SCNC: 28 MMOL/L (ref 24–28)
HCO3 BLDV-SCNC: 29.2 MMOL/L (ref 24–28)
HCT VFR BLD AUTO: 35.6 % (ref 36–48)
HGB BLD-MCNC: 11.7 G/DL (ref 12–16)
LYMPHOCYTES # BLD: 1.4 K/UL (ref 1–5.1)
LYMPHOCYTES NFR BLD: 18.6 %
MCH RBC QN AUTO: 27.9 PG (ref 26–34)
MCHC RBC AUTO-ENTMCNC: 32.8 G/DL (ref 31–36)
MCV RBC AUTO: 85.1 FL (ref 80–100)
METHGB MFR BLDV: 0 % (ref 0–1.5)
METHGB MFR BLDV: 0.2 % (ref 0–1.5)
METHGB MFR BLDV: 0.3 % (ref 0–1.5)
METHGB MFR BLDV: 0.4 % (ref 0–1.5)
MONOCYTES # BLD: 0.7 K/UL (ref 0–1.3)
MONOCYTES NFR BLD: 9 %
NEUTROPHILS # BLD: 4.9 K/UL (ref 1.7–7.7)
NEUTROPHILS NFR BLD: 65.1 %
NT-PROBNP SERPL-MCNC: ABNORMAL PG/ML (ref 0–124)
PCO2 BLDV: 45.9 MMHG (ref 41–51)
PCO2 BLDV: 48.2 MMHG (ref 41–51)
PCO2 BLDV: 53.4 MMHG (ref 41–51)
PCO2 BLDV: 57.9 MMHG (ref 41–51)
PH BLDV: 7.29 [PH] (ref 7.35–7.45)
PH BLDV: 7.29 [PH] (ref 7.35–7.45)
PH BLDV: 7.32 [PH] (ref 7.35–7.45)
PH BLDV: 7.39 [PH] (ref 7.35–7.45)
PLATELET # BLD AUTO: 223 K/UL (ref 135–450)
PMV BLD AUTO: 7.2 FL (ref 5–10.5)
PO2 BLDV: 31.7 MMHG (ref 25–40)
PO2 BLDV: 46.7 MMHG (ref 25–40)
PO2 BLDV: 54.2 MMHG (ref 25–40)
PO2 BLDV: 56.3 MMHG (ref 25–40)
POTASSIUM SERPL-SCNC: 4.6 MMOL/L (ref 3.5–5.1)
RBC # BLD AUTO: 4.19 M/UL (ref 4–5.2)
SAO2 % BLDV: 52 %
SAO2 % BLDV: 72 %
SAO2 % BLDV: 80 %
SAO2 % BLDV: 83 %
SARS-COV-2 RNA RESP QL NAA+PROBE: NOT DETECTED
SODIUM SERPL-SCNC: 140 MMOL/L (ref 136–145)
TROPONIN, HIGH SENSITIVITY: 146 NG/L (ref 0–14)
TROPONIN, HIGH SENSITIVITY: 197 NG/L (ref 0–14)
WBC # BLD AUTO: 7.5 K/UL (ref 4–11)

## 2025-05-20 PROCEDURE — 85025 COMPLETE CBC W/AUTO DIFF WBC: CPT

## 2025-05-20 PROCEDURE — 6360000002 HC RX W HCPCS: Performed by: STUDENT IN AN ORGANIZED HEALTH CARE EDUCATION/TRAINING PROGRAM

## 2025-05-20 PROCEDURE — 94640 AIRWAY INHALATION TREATMENT: CPT

## 2025-05-20 PROCEDURE — 2700000000 HC OXYGEN THERAPY PER DAY

## 2025-05-20 PROCEDURE — 83880 ASSAY OF NATRIURETIC PEPTIDE: CPT

## 2025-05-20 PROCEDURE — 36415 COLL VENOUS BLD VENIPUNCTURE: CPT

## 2025-05-20 PROCEDURE — 6370000000 HC RX 637 (ALT 250 FOR IP): Performed by: STUDENT IN AN ORGANIZED HEALTH CARE EDUCATION/TRAINING PROGRAM

## 2025-05-20 PROCEDURE — 93005 ELECTROCARDIOGRAM TRACING: CPT | Performed by: EMERGENCY MEDICINE

## 2025-05-20 PROCEDURE — 6370000000 HC RX 637 (ALT 250 FOR IP): Performed by: EMERGENCY MEDICINE

## 2025-05-20 PROCEDURE — 90935 HEMODIALYSIS ONE EVALUATION: CPT

## 2025-05-20 PROCEDURE — 6360000002 HC RX W HCPCS: Performed by: EMERGENCY MEDICINE

## 2025-05-20 PROCEDURE — 87636 SARSCOV2 & INF A&B AMP PRB: CPT

## 2025-05-20 PROCEDURE — 5A1D70Z PERFORMANCE OF URINARY FILTRATION, INTERMITTENT, LESS THAN 6 HOURS PER DAY: ICD-10-PCS | Performed by: STUDENT IN AN ORGANIZED HEALTH CARE EDUCATION/TRAINING PROGRAM

## 2025-05-20 PROCEDURE — 99285 EMERGENCY DEPT VISIT HI MDM: CPT

## 2025-05-20 PROCEDURE — 2060000000 HC ICU INTERMEDIATE R&B

## 2025-05-20 PROCEDURE — 6370000000 HC RX 637 (ALT 250 FOR IP)

## 2025-05-20 PROCEDURE — 80048 BASIC METABOLIC PNL TOTAL CA: CPT

## 2025-05-20 PROCEDURE — 84484 ASSAY OF TROPONIN QUANT: CPT

## 2025-05-20 PROCEDURE — 5A09357 ASSISTANCE WITH RESPIRATORY VENTILATION, LESS THAN 24 CONSECUTIVE HOURS, CONTINUOUS POSITIVE AIRWAY PRESSURE: ICD-10-PCS | Performed by: STUDENT IN AN ORGANIZED HEALTH CARE EDUCATION/TRAINING PROGRAM

## 2025-05-20 PROCEDURE — 94761 N-INVAS EAR/PLS OXIMETRY MLT: CPT

## 2025-05-20 PROCEDURE — 71045 X-RAY EXAM CHEST 1 VIEW: CPT

## 2025-05-20 PROCEDURE — 82803 BLOOD GASES ANY COMBINATION: CPT

## 2025-05-20 PROCEDURE — 94660 CPAP INITIATION&MGMT: CPT

## 2025-05-20 PROCEDURE — 2500000003 HC RX 250 WO HCPCS: Performed by: STUDENT IN AN ORGANIZED HEALTH CARE EDUCATION/TRAINING PROGRAM

## 2025-05-20 RX ORDER — ARIPIPRAZOLE 5 MG/1
10 TABLET ORAL DAILY
Status: DISCONTINUED | OUTPATIENT
Start: 2025-05-21 | End: 2025-05-22 | Stop reason: HOSPADM

## 2025-05-20 RX ORDER — MECOBALAMIN 5000 MCG
10 TABLET,DISINTEGRATING ORAL NIGHTLY
Status: DISCONTINUED | OUTPATIENT
Start: 2025-05-20 | End: 2025-05-22 | Stop reason: HOSPADM

## 2025-05-20 RX ORDER — HEPARIN SODIUM 5000 [USP'U]/ML
5000 INJECTION, SOLUTION INTRAVENOUS; SUBCUTANEOUS EVERY 8 HOURS SCHEDULED
Status: DISCONTINUED | OUTPATIENT
Start: 2025-05-20 | End: 2025-05-22 | Stop reason: HOSPADM

## 2025-05-20 RX ORDER — ALBUTEROL SULFATE 0.83 MG/ML
5 SOLUTION RESPIRATORY (INHALATION) ONCE
Status: COMPLETED | OUTPATIENT
Start: 2025-05-20 | End: 2025-05-20

## 2025-05-20 RX ORDER — PANTOPRAZOLE SODIUM 40 MG/1
40 TABLET, DELAYED RELEASE ORAL DAILY
Status: DISCONTINUED | OUTPATIENT
Start: 2025-05-21 | End: 2025-05-22 | Stop reason: HOSPADM

## 2025-05-20 RX ORDER — ATORVASTATIN CALCIUM 80 MG/1
80 TABLET, FILM COATED ORAL NIGHTLY
Status: DISCONTINUED | OUTPATIENT
Start: 2025-05-21 | End: 2025-05-22 | Stop reason: HOSPADM

## 2025-05-20 RX ORDER — BENZTROPINE MESYLATE 1 MG/1
0.5 TABLET ORAL NIGHTLY
Status: DISCONTINUED | OUTPATIENT
Start: 2025-05-20 | End: 2025-05-22 | Stop reason: HOSPADM

## 2025-05-20 RX ORDER — TORSEMIDE 100 MG/1
100 TABLET ORAL DAILY
Status: DISCONTINUED | OUTPATIENT
Start: 2025-05-21 | End: 2025-05-22 | Stop reason: HOSPADM

## 2025-05-20 RX ORDER — HEPARIN SODIUM 1000 [USP'U]/ML
3200 INJECTION, SOLUTION INTRAVENOUS; SUBCUTANEOUS PRN
Status: DISCONTINUED | OUTPATIENT
Start: 2025-05-20 | End: 2025-05-22 | Stop reason: HOSPADM

## 2025-05-20 RX ORDER — SODIUM CHLORIDE 0.9 % (FLUSH) 0.9 %
5-40 SYRINGE (ML) INJECTION PRN
Status: DISCONTINUED | OUTPATIENT
Start: 2025-05-20 | End: 2025-05-22 | Stop reason: HOSPADM

## 2025-05-20 RX ORDER — SODIUM CHLORIDE 9 MG/ML
INJECTION, SOLUTION INTRAVENOUS PRN
Status: DISCONTINUED | OUTPATIENT
Start: 2025-05-20 | End: 2025-05-22 | Stop reason: HOSPADM

## 2025-05-20 RX ORDER — LOSARTAN POTASSIUM 25 MG/1
25 TABLET ORAL NIGHTLY
Status: DISCONTINUED | OUTPATIENT
Start: 2025-05-21 | End: 2025-05-22 | Stop reason: HOSPADM

## 2025-05-20 RX ORDER — NIFEDIPINE 60 MG/1
60 TABLET, EXTENDED RELEASE ORAL DAILY
Status: DISCONTINUED | OUTPATIENT
Start: 2025-05-21 | End: 2025-05-22 | Stop reason: HOSPADM

## 2025-05-20 RX ORDER — ACETAMINOPHEN 325 MG/1
650 TABLET ORAL EVERY 6 HOURS PRN
Status: DISCONTINUED | OUTPATIENT
Start: 2025-05-20 | End: 2025-05-22 | Stop reason: HOSPADM

## 2025-05-20 RX ORDER — ONDANSETRON 4 MG/1
4 TABLET, ORALLY DISINTEGRATING ORAL EVERY 8 HOURS PRN
Status: DISCONTINUED | OUTPATIENT
Start: 2025-05-20 | End: 2025-05-22 | Stop reason: HOSPADM

## 2025-05-20 RX ORDER — POLYETHYLENE GLYCOL 3350 17 G/17G
17 POWDER, FOR SOLUTION ORAL DAILY PRN
Status: DISCONTINUED | OUTPATIENT
Start: 2025-05-20 | End: 2025-05-22 | Stop reason: HOSPADM

## 2025-05-20 RX ORDER — ONDANSETRON 2 MG/ML
4 INJECTION INTRAMUSCULAR; INTRAVENOUS EVERY 6 HOURS PRN
Status: DISCONTINUED | OUTPATIENT
Start: 2025-05-20 | End: 2025-05-22 | Stop reason: HOSPADM

## 2025-05-20 RX ORDER — IPRATROPIUM BROMIDE AND ALBUTEROL SULFATE 2.5; .5 MG/3ML; MG/3ML
1 SOLUTION RESPIRATORY (INHALATION)
Status: DISCONTINUED | OUTPATIENT
Start: 2025-05-20 | End: 2025-05-20

## 2025-05-20 RX ORDER — GABAPENTIN 100 MG/1
200 CAPSULE ORAL 2 TIMES DAILY
Status: DISCONTINUED | OUTPATIENT
Start: 2025-05-20 | End: 2025-05-22 | Stop reason: HOSPADM

## 2025-05-20 RX ORDER — CARVEDILOL 3.12 MG/1
3.12 TABLET ORAL 2 TIMES DAILY WITH MEALS
Status: DISCONTINUED | OUTPATIENT
Start: 2025-05-20 | End: 2025-05-22 | Stop reason: HOSPADM

## 2025-05-20 RX ORDER — BUPROPION HYDROCHLORIDE 150 MG/1
150 TABLET, EXTENDED RELEASE ORAL DAILY
Status: DISCONTINUED | OUTPATIENT
Start: 2025-05-21 | End: 2025-05-22 | Stop reason: HOSPADM

## 2025-05-20 RX ORDER — SEVELAMER CARBONATE 800 MG/1
1600 TABLET, FILM COATED ORAL 3 TIMES DAILY
Status: DISCONTINUED | OUTPATIENT
Start: 2025-05-20 | End: 2025-05-22 | Stop reason: HOSPADM

## 2025-05-20 RX ORDER — ALBUTEROL SULFATE 0.83 MG/ML
2.5 SOLUTION RESPIRATORY (INHALATION) EVERY 4 HOURS PRN
Status: DISCONTINUED | OUTPATIENT
Start: 2025-05-20 | End: 2025-05-22 | Stop reason: HOSPADM

## 2025-05-20 RX ORDER — CLOPIDOGREL BISULFATE 75 MG/1
75 TABLET ORAL DAILY
Status: DISCONTINUED | OUTPATIENT
Start: 2025-05-21 | End: 2025-05-22 | Stop reason: HOSPADM

## 2025-05-20 RX ORDER — NEPHROCAP 1 MG
1 CAP ORAL DAILY
Status: DISCONTINUED | OUTPATIENT
Start: 2025-05-21 | End: 2025-05-22 | Stop reason: HOSPADM

## 2025-05-20 RX ORDER — HYDROCODONE BITARTRATE AND ACETAMINOPHEN 5; 325 MG/1; MG/1
1 TABLET ORAL
Status: COMPLETED | OUTPATIENT
Start: 2025-05-20 | End: 2025-05-20

## 2025-05-20 RX ORDER — IPRATROPIUM BROMIDE AND ALBUTEROL SULFATE 2.5; .5 MG/3ML; MG/3ML
1 SOLUTION RESPIRATORY (INHALATION)
Status: COMPLETED | OUTPATIENT
Start: 2025-05-20 | End: 2025-05-20

## 2025-05-20 RX ORDER — ACETAMINOPHEN 650 MG/1
650 SUPPOSITORY RECTAL EVERY 6 HOURS PRN
Status: DISCONTINUED | OUTPATIENT
Start: 2025-05-20 | End: 2025-05-22 | Stop reason: HOSPADM

## 2025-05-20 RX ORDER — MIRTAZAPINE 15 MG/1
30 TABLET, FILM COATED ORAL NIGHTLY
Status: DISCONTINUED | OUTPATIENT
Start: 2025-05-20 | End: 2025-05-22 | Stop reason: HOSPADM

## 2025-05-20 RX ORDER — SODIUM CHLORIDE 0.9 % (FLUSH) 0.9 %
5-40 SYRINGE (ML) INJECTION EVERY 12 HOURS SCHEDULED
Status: DISCONTINUED | OUTPATIENT
Start: 2025-05-20 | End: 2025-05-22 | Stop reason: HOSPADM

## 2025-05-20 RX ADMIN — IPRATROPIUM BROMIDE AND ALBUTEROL SULFATE 1 DOSE: 2.5; .5 SOLUTION RESPIRATORY (INHALATION) at 12:17

## 2025-05-20 RX ADMIN — CARVEDILOL 3.12 MG: 3.12 TABLET, FILM COATED ORAL at 16:34

## 2025-05-20 RX ADMIN — MIRTAZAPINE 30 MG: 15 TABLET, FILM COATED ORAL at 21:52

## 2025-05-20 RX ADMIN — Medication 10 MG: at 21:52

## 2025-05-20 RX ADMIN — HYDROCODONE BITARTRATE AND ACETAMINOPHEN 1 TABLET: 5; 325 TABLET ORAL at 23:30

## 2025-05-20 RX ADMIN — SEVELAMER CARBONATE 1600 MG: 800 TABLET, FILM COATED ORAL at 21:52

## 2025-05-20 RX ADMIN — ACETAMINOPHEN 650 MG: 325 TABLET ORAL at 21:52

## 2025-05-20 RX ADMIN — GABAPENTIN 200 MG: 100 CAPSULE ORAL at 21:53

## 2025-05-20 RX ADMIN — BENZTROPINE MESYLATE 0.5 MG: 1 TABLET ORAL at 21:54

## 2025-05-20 RX ADMIN — IPRATROPIUM BROMIDE AND ALBUTEROL SULFATE 1 DOSE: 2.5; .5 SOLUTION RESPIRATORY (INHALATION) at 16:22

## 2025-05-20 RX ADMIN — ALBUTEROL SULFATE 5 MG: 2.5 SOLUTION RESPIRATORY (INHALATION) at 12:17

## 2025-05-20 RX ADMIN — SEVELAMER CARBONATE 1600 MG: 800 TABLET, FILM COATED ORAL at 16:34

## 2025-05-20 RX ADMIN — SODIUM CHLORIDE, PRESERVATIVE FREE 10 ML: 5 INJECTION INTRAVENOUS at 22:00

## 2025-05-20 RX ADMIN — HEPARIN SODIUM 5000 UNITS: 5000 INJECTION INTRAVENOUS; SUBCUTANEOUS at 22:42

## 2025-05-20 ASSESSMENT — PAIN - FUNCTIONAL ASSESSMENT: PAIN_FUNCTIONAL_ASSESSMENT: NONE - DENIES PAIN

## 2025-05-20 ASSESSMENT — PAIN SCALES - GENERAL
PAINLEVEL_OUTOF10: 8
PAINLEVEL_OUTOF10: 7

## 2025-05-20 NOTE — ED PROVIDER NOTES
THE [facility]  EMERGENCY DEPARTMENT ENCOUNTER          ATTENDING PHYSICIAN NOTE       Date of evaluation: 5/20/2025      Assessment/ Medical Decion Making     MDM:     ED Course as of 05/20/25 1616   Tue May 20, 2025   0953 60-year-old female with past medical history inclusive of end-stage renal disease on dialysis and COPD on 2 L home oxygen who presents for evaluation of shortness of breath.  Patient with acute on chronic hypoxemic respiratory failure.  Transition from nonrebreather mask to BiPAP.  She has already received steroids and nebs just prior to arrival.  Will reassess following BiPAP.  May need additional nebulizer treatments, though I suspect her symptoms are primarily volume related.  Will also evaluate for ACS, respiratory infection.  EKG obtained and interpreted by me with sinus rhythm, normal axis, normal intervals, no acute change when compared to EKG obtained 5/13/2025 including no acute ST or T wave changes suggestive of acute ischemia. [MM]   1210 Labs remarkable for elevated troponin however this is stable from her baseline x 2.  Initial VBG with mild acidosis that was primarily metabolic with borderline CO2.  On repeat evaluation patient slightly more hypercarbic.  Will increase pressure and backup rate, give nebs.  On my review of her medical record she has been admitted to the intensive care unit for similar episodes.   I have consulted intensivist for this.  I have also consulted nephrologist to arrange for dialysis given that she is due for dialysis today and has evidence of significant volume overload on her chest x-ray and lung exam.  On my repeat clinical assessment she is resting comfortably, awakens easily, and reports improved symptoms. [MM]   1228 I have discussed case with intensivist.  Since that she will likely be able to be discontinued from BiPAP following dialysis, may be able to go to dialysis center on BiPAP in which case she would not need ICU.  [facility]

## 2025-05-20 NOTE — PLAN OF CARE
Problem: Chronic Conditions and Co-morbidities  Goal: Patient's chronic conditions and co-morbidity symptoms are monitored and maintained or improved  Outcome: Progressing  Flowsheets (Taken 5/20/2025 1830)  Care Plan - Patient's Chronic Conditions and Co-Morbidity Symptoms are Monitored and Maintained or Improved:   Monitor and assess patient's chronic conditions and comorbid symptoms for stability, deterioration, or improvement   Collaborate with multidisciplinary team to address chronic and comorbid conditions and prevent exacerbation or deterioration   Update acute care plan with appropriate goals if chronic or comorbid symptoms are exacerbated and prevent overall improvement and discharge     Problem: Discharge Planning  Goal: Discharge to home or other facility with appropriate resources  Outcome: Progressing  Flowsheets (Taken 5/20/2025 1830)  Discharge to home or other facility with appropriate resources:   Identify barriers to discharge with patient and caregiver   Arrange for needed discharge resources and transportation as appropriate   Identify discharge learning needs (meds, wound care, etc)   Arrange for interpreters to assist at discharge as needed   Refer to discharge planning if patient needs post-hospital services based on physician order or complex needs related to functional status, cognitive ability or social support system     Problem: Safety - Adult  Goal: Free from fall injury  Outcome: Progressing  Flowsheets (Taken 5/20/2025 1830)  Free From Fall Injury: Instruct family/caregiver on patient safety

## 2025-05-20 NOTE — H&P
V2.0  History and Physical      Name:  Cindi Rodriguez /Age/Sex: 1965  (60 y.o. female)   MRN & CSN:  [id number] & [id number] Encounter Date/Time: 2025 1:05 PM EDT   Location:  A04/A04-04 PCP: Alessandra Anderson DO       Hospital Day: 1    Assessment and Plan:   Cindi Rodriguez is a 60 y.o. female who presents with Acute on chronic respiratory failure with hypoxia and hypercapnia (HCC)    Hospital Problems           Last Modified POA    * (Principal) Acute on chronic respiratory failure with hypoxia and hypercapnia (HCC) 2025 Yes       Plan:    Acute on chronic hypoxic and hypercapnic respite failure secondary to volume overload with pulm edema  Severe ischemic cardiomyopathy  ESRD on HD .  CAD status post complex PCI  Severe tricuspid regurg, moderate to severe mitral regurg  -Long-term nursing home resident.  Did not miss any dialysis.  Weight noted to be 68 kg here.  During the previous hospitalizations her weight has ranged from 58 to 61 kg.  -Will continue home torsemide.  - Nephrology consulted with plan for hemodialysis urgently.  -Started on BiPAP as she desaturated on nonrebreather.  Continue at this time and settings have been adjusted after recent ABG results.  Intensivist was notified and they recommended patient go to floor at this time and does not need ICU.  - Will see how she responds to the dialysis and fluid removal and then transition off BiPAP to see if she tolerates.    Troponin elevation  -Likely from demand ischemia given respiratory failure and ESRD.  No chest pain.  Monitor clinically for now.  EKG similar to prior.    COPD  - Her hypoxia and hypercapnia can be explained at this time due to volume overload.  Avoid steroids.  Continue home nebulizers.    Chronic anemia of ESRD  Bipolar disorder  Depression  GERD  RLS  Anxiety  - Appropriate medications for these conditions have been restarted.      Disposition:   Current Living situation: Nursing

## 2025-05-20 NOTE — ED NOTES
Patient Name: Cindi Rodriguez  : 1965 60 y.o.  MRN: [id number]  ED Room #: A04/A04-04     Chief complaint:   Chief Complaint   Patient presents with    Shortness of Breath     Hospital Problem/Diagnosis:   Hospital Problems           Last Modified POA    * (Principal) Acute on chronic respiratory failure with hypoxia and hypercapnia (HCC) 2025 Yes         O2 Flow Rate:O2 Device: PAP (positive airway pressure) O2 Flow Rate (L/min): 15 L/min (if applicable)  Cardiac Rhythm:   (if applicable)  Active LDA's:           How does patient ambulate? Unknown, did not assess in the Emergency Department    2. How does patient take pills? Whole with Water    3. Is patient alert? Alert    4. Is patient oriented? To Person, To Place, To Time, To Situation, and Follows Commands    5.   Patient arrived from:  nursing home  Facility Name: ___________________________________________    6. If patient is disoriented or from a Skill Nursing Facility has family been notified of admission? yes    7. Patient belongings? Belongings: Clothing and purse    Disposition of belongings? Kept with Patient     8. Any specific patient or family belongings/needs/dynamics?   a. N/a    9. Miscellaneous comments/pending orders?  a. N/a      If there are any additional questions please reach out to the Emergency Department.      Tristan Bustillos RN  25 1311       Tristan Bustillos RN  25 1320       Tristan Bustillos RN  25 [phone]

## 2025-05-21 LAB
ANION GAP SERPL CALCULATED.3IONS-SCNC: 14 MMOL/L (ref 3–16)
BASOPHILS # BLD: 0 K/UL (ref 0–0.2)
BASOPHILS NFR BLD: 0.3 %
BUN SERPL-MCNC: 39 MG/DL (ref 7–20)
CALCIUM SERPL-MCNC: 9 MG/DL (ref 8.3–10.6)
CHLORIDE SERPL-SCNC: 96 MMOL/L (ref 99–110)
CO2 SERPL-SCNC: 27 MMOL/L (ref 21–32)
CREAT SERPL-MCNC: 4.5 MG/DL (ref 0.6–1.1)
DEPRECATED RDW RBC AUTO: 19 % (ref 12.4–15.4)
EOSINOPHIL # BLD: 0 K/UL (ref 0–0.6)
EOSINOPHIL NFR BLD: 0.3 %
GFR SERPLBLD CREATININE-BSD FMLA CKD-EPI: 11 ML/MIN/{1.73_M2}
GLUCOSE SERPL-MCNC: 121 MG/DL (ref 70–99)
HCT VFR BLD AUTO: 30.1 % (ref 36–48)
HGB BLD-MCNC: 10.1 G/DL (ref 12–16)
LYMPHOCYTES # BLD: 1.5 K/UL (ref 1–5.1)
LYMPHOCYTES NFR BLD: 19.4 %
MCH RBC QN AUTO: 28.2 PG (ref 26–34)
MCHC RBC AUTO-ENTMCNC: 33.4 G/DL (ref 31–36)
MCV RBC AUTO: 84.4 FL (ref 80–100)
MONOCYTES # BLD: 0.9 K/UL (ref 0–1.3)
MONOCYTES NFR BLD: 11.1 %
NEUTROPHILS # BLD: 5.4 K/UL (ref 1.7–7.7)
NEUTROPHILS NFR BLD: 68.9 %
PLATELET # BLD AUTO: 213 K/UL (ref 135–450)
PMV BLD AUTO: 7.4 FL (ref 5–10.5)
POTASSIUM SERPL-SCNC: 4.2 MMOL/L (ref 3.5–5.1)
RBC # BLD AUTO: 3.57 M/UL (ref 4–5.2)
SODIUM SERPL-SCNC: 137 MMOL/L (ref 136–145)
WBC # BLD AUTO: 7.9 K/UL (ref 4–11)

## 2025-05-21 PROCEDURE — 6370000000 HC RX 637 (ALT 250 FOR IP)

## 2025-05-21 PROCEDURE — 2700000000 HC OXYGEN THERAPY PER DAY

## 2025-05-21 PROCEDURE — 80048 BASIC METABOLIC PNL TOTAL CA: CPT

## 2025-05-21 PROCEDURE — 2500000003 HC RX 250 WO HCPCS: Performed by: STUDENT IN AN ORGANIZED HEALTH CARE EDUCATION/TRAINING PROGRAM

## 2025-05-21 PROCEDURE — 6360000002 HC RX W HCPCS: Performed by: STUDENT IN AN ORGANIZED HEALTH CARE EDUCATION/TRAINING PROGRAM

## 2025-05-21 PROCEDURE — 6370000000 HC RX 637 (ALT 250 FOR IP): Performed by: STUDENT IN AN ORGANIZED HEALTH CARE EDUCATION/TRAINING PROGRAM

## 2025-05-21 PROCEDURE — 2060000000 HC ICU INTERMEDIATE R&B

## 2025-05-21 PROCEDURE — 85025 COMPLETE CBC W/AUTO DIFF WBC: CPT

## 2025-05-21 PROCEDURE — 36415 COLL VENOUS BLD VENIPUNCTURE: CPT

## 2025-05-21 PROCEDURE — 94761 N-INVAS EAR/PLS OXIMETRY MLT: CPT

## 2025-05-21 RX ORDER — GABAPENTIN 100 MG/1
100 CAPSULE ORAL DAILY
Qty: 3 CAPSULE | Refills: 0 | Status: SHIPPED | OUTPATIENT
Start: 2025-05-21 | End: 2025-05-24

## 2025-05-21 RX ORDER — HYDROCODONE BITARTRATE AND ACETAMINOPHEN 5; 325 MG/1; MG/1
1 TABLET ORAL EVERY 6 HOURS PRN
Status: COMPLETED | OUTPATIENT
Start: 2025-05-21 | End: 2025-05-21

## 2025-05-21 RX ADMIN — HEPARIN SODIUM 5000 UNITS: 5000 INJECTION INTRAVENOUS; SUBCUTANEOUS at 06:01

## 2025-05-21 RX ADMIN — SEVELAMER CARBONATE 1600 MG: 800 TABLET, FILM COATED ORAL at 21:38

## 2025-05-21 RX ADMIN — SODIUM CHLORIDE, PRESERVATIVE FREE 10 ML: 5 INJECTION INTRAVENOUS at 20:14

## 2025-05-21 RX ADMIN — HEPARIN SODIUM 5000 UNITS: 5000 INJECTION INTRAVENOUS; SUBCUTANEOUS at 14:25

## 2025-05-21 RX ADMIN — ACETAMINOPHEN 650 MG: 325 TABLET ORAL at 06:03

## 2025-05-21 RX ADMIN — ATORVASTATIN CALCIUM 80 MG: 80 TABLET, FILM COATED ORAL at 20:10

## 2025-05-21 RX ADMIN — BENZTROPINE MESYLATE 0.5 MG: 1 TABLET ORAL at 20:18

## 2025-05-21 RX ADMIN — CARVEDILOL 3.12 MG: 3.12 TABLET, FILM COATED ORAL at 08:34

## 2025-05-21 RX ADMIN — GABAPENTIN 200 MG: 100 CAPSULE ORAL at 20:18

## 2025-05-21 RX ADMIN — BUPROPION HYDROCHLORIDE 150 MG: 150 TABLET, EXTENDED RELEASE ORAL at 08:33

## 2025-05-21 RX ADMIN — PANTOPRAZOLE SODIUM 40 MG: 40 TABLET, DELAYED RELEASE ORAL at 08:34

## 2025-05-21 RX ADMIN — MIRTAZAPINE 30 MG: 15 TABLET, FILM COATED ORAL at 20:09

## 2025-05-21 RX ADMIN — Medication 1 MG: at 08:34

## 2025-05-21 RX ADMIN — Medication 10 MG: at 21:37

## 2025-05-21 RX ADMIN — GABAPENTIN 200 MG: 100 CAPSULE ORAL at 08:33

## 2025-05-21 RX ADMIN — NIFEDIPINE 60 MG: 60 TABLET, EXTENDED RELEASE ORAL at 08:34

## 2025-05-21 RX ADMIN — SEVELAMER CARBONATE 1600 MG: 800 TABLET, FILM COATED ORAL at 08:33

## 2025-05-21 RX ADMIN — LOSARTAN POTASSIUM 25 MG: 25 TABLET, FILM COATED ORAL at 20:11

## 2025-05-21 RX ADMIN — CLOPIDOGREL BISULFATE 75 MG: 75 TABLET, FILM COATED ORAL at 08:34

## 2025-05-21 RX ADMIN — SODIUM CHLORIDE, PRESERVATIVE FREE 10 ML: 5 INJECTION INTRAVENOUS at 08:34

## 2025-05-21 RX ADMIN — ARIPIPRAZOLE 10 MG: 5 TABLET ORAL at 08:34

## 2025-05-21 RX ADMIN — HYDROCODONE BITARTRATE AND ACETAMINOPHEN 1 TABLET: 5; 325 TABLET ORAL at 21:38

## 2025-05-21 RX ADMIN — TORSEMIDE 100 MG: 100 TABLET ORAL at 08:33

## 2025-05-21 RX ADMIN — CARVEDILOL 3.12 MG: 3.12 TABLET, FILM COATED ORAL at 17:29

## 2025-05-21 ASSESSMENT — PAIN DESCRIPTION - LOCATION
LOCATION: LEG
LOCATION: LEG

## 2025-05-21 ASSESSMENT — PAIN DESCRIPTION - DESCRIPTORS
DESCRIPTORS: ACHING
DESCRIPTORS: ACHING

## 2025-05-21 ASSESSMENT — PAIN DESCRIPTION - ORIENTATION
ORIENTATION: LEFT
ORIENTATION: LEFT

## 2025-05-21 ASSESSMENT — PAIN DESCRIPTION - PAIN TYPE
TYPE: ACUTE PAIN
TYPE: ACUTE PAIN

## 2025-05-21 ASSESSMENT — PAIN DESCRIPTION - ONSET
ONSET: ON-GOING
ONSET: ON-GOING

## 2025-05-21 ASSESSMENT — PAIN SCALES - WONG BAKER
WONGBAKER_NUMERICALRESPONSE: NO HURT
WONGBAKER_NUMERICALRESPONSE: NO HURT
WONGBAKER_NUMERICALRESPONSE: HURTS A LITTLE BIT

## 2025-05-21 ASSESSMENT — PAIN SCALES - GENERAL
PAINLEVEL_OUTOF10: 8
PAINLEVEL_OUTOF10: 8
PAINLEVEL_OUTOF10: 6
PAINLEVEL_OUTOF10: 2
PAINLEVEL_OUTOF10: 8
PAINLEVEL_OUTOF10: 7

## 2025-05-21 ASSESSMENT — PAIN DESCRIPTION - FREQUENCY
FREQUENCY: CONTINUOUS
FREQUENCY: CONTINUOUS

## 2025-05-22 VITALS
WEIGHT: 139.99 LBS | TEMPERATURE: 98 F | HEART RATE: 77 BPM | RESPIRATION RATE: 18 BRPM | BODY MASS INDEX: 21.22 KG/M2 | DIASTOLIC BLOOD PRESSURE: 72 MMHG | SYSTOLIC BLOOD PRESSURE: 114 MMHG | HEIGHT: 68 IN | OXYGEN SATURATION: 97 %

## 2025-05-22 LAB
ANION GAP SERPL CALCULATED.3IONS-SCNC: 14 MMOL/L (ref 3–16)
BASOPHILS # BLD: 0.1 K/UL (ref 0–0.2)
BASOPHILS NFR BLD: 1.2 %
BUN SERPL-MCNC: 48 MG/DL (ref 7–20)
CALCIUM SERPL-MCNC: 9 MG/DL (ref 8.3–10.6)
CHLORIDE SERPL-SCNC: 98 MMOL/L (ref 99–110)
CO2 SERPL-SCNC: 26 MMOL/L (ref 21–32)
CREAT SERPL-MCNC: 5 MG/DL (ref 0.6–1.1)
DEPRECATED RDW RBC AUTO: 19.6 % (ref 12.4–15.4)
EOSINOPHIL # BLD: 0.4 K/UL (ref 0–0.6)
EOSINOPHIL NFR BLD: 6.5 %
GFR SERPLBLD CREATININE-BSD FMLA CKD-EPI: 9 ML/MIN/{1.73_M2}
GLUCOSE SERPL-MCNC: 161 MG/DL (ref 70–99)
HCT VFR BLD AUTO: 31 % (ref 36–48)
HGB BLD-MCNC: 10 G/DL (ref 12–16)
LYMPHOCYTES # BLD: 1.6 K/UL (ref 1–5.1)
LYMPHOCYTES NFR BLD: 26.9 %
MCH RBC QN AUTO: 27.7 PG (ref 26–34)
MCHC RBC AUTO-ENTMCNC: 32.3 G/DL (ref 31–36)
MCV RBC AUTO: 85.9 FL (ref 80–100)
MONOCYTES # BLD: 0.5 K/UL (ref 0–1.3)
MONOCYTES NFR BLD: 8 %
NEUTROPHILS # BLD: 3.3 K/UL (ref 1.7–7.7)
NEUTROPHILS NFR BLD: 57.4 %
PLATELET # BLD AUTO: 220 K/UL (ref 135–450)
PMV BLD AUTO: 6.7 FL (ref 5–10.5)
POTASSIUM SERPL-SCNC: 4.2 MMOL/L (ref 3.5–5.1)
RBC # BLD AUTO: 3.61 M/UL (ref 4–5.2)
SODIUM SERPL-SCNC: 138 MMOL/L (ref 136–145)
WBC # BLD AUTO: 5.8 K/UL (ref 4–11)

## 2025-05-22 PROCEDURE — 94761 N-INVAS EAR/PLS OXIMETRY MLT: CPT

## 2025-05-22 PROCEDURE — 6370000000 HC RX 637 (ALT 250 FOR IP): Performed by: STUDENT IN AN ORGANIZED HEALTH CARE EDUCATION/TRAINING PROGRAM

## 2025-05-22 PROCEDURE — 90935 HEMODIALYSIS ONE EVALUATION: CPT

## 2025-05-22 PROCEDURE — 6360000002 HC RX W HCPCS: Performed by: STUDENT IN AN ORGANIZED HEALTH CARE EDUCATION/TRAINING PROGRAM

## 2025-05-22 PROCEDURE — 2500000003 HC RX 250 WO HCPCS: Performed by: STUDENT IN AN ORGANIZED HEALTH CARE EDUCATION/TRAINING PROGRAM

## 2025-05-22 PROCEDURE — 2700000000 HC OXYGEN THERAPY PER DAY

## 2025-05-22 RX ADMIN — GABAPENTIN 200 MG: 100 CAPSULE ORAL at 08:05

## 2025-05-22 RX ADMIN — BUPROPION HYDROCHLORIDE 150 MG: 150 TABLET, EXTENDED RELEASE ORAL at 08:05

## 2025-05-22 RX ADMIN — ARIPIPRAZOLE 10 MG: 5 TABLET ORAL at 08:05

## 2025-05-22 RX ADMIN — HEPARIN SODIUM 5000 UNITS: 5000 INJECTION INTRAVENOUS; SUBCUTANEOUS at 06:23

## 2025-05-22 RX ADMIN — PANTOPRAZOLE SODIUM 40 MG: 40 TABLET, DELAYED RELEASE ORAL at 08:05

## 2025-05-22 RX ADMIN — SEVELAMER CARBONATE 1600 MG: 800 TABLET, FILM COATED ORAL at 13:53

## 2025-05-22 RX ADMIN — TORSEMIDE 100 MG: 100 TABLET ORAL at 08:05

## 2025-05-22 RX ADMIN — HEPARIN SODIUM 5000 UNITS: 5000 INJECTION INTRAVENOUS; SUBCUTANEOUS at 13:53

## 2025-05-22 RX ADMIN — ACETAMINOPHEN 650 MG: 325 TABLET ORAL at 12:38

## 2025-05-22 RX ADMIN — NIFEDIPINE 60 MG: 60 TABLET, EXTENDED RELEASE ORAL at 08:05

## 2025-05-22 RX ADMIN — SODIUM CHLORIDE, PRESERVATIVE FREE 10 ML: 5 INJECTION INTRAVENOUS at 08:05

## 2025-05-22 RX ADMIN — SEVELAMER CARBONATE 1600 MG: 800 TABLET, FILM COATED ORAL at 08:05

## 2025-05-22 RX ADMIN — Medication 1 MG: at 08:05

## 2025-05-22 RX ADMIN — CLOPIDOGREL BISULFATE 75 MG: 75 TABLET, FILM COATED ORAL at 08:05

## 2025-05-22 RX ADMIN — CARVEDILOL 3.12 MG: 3.12 TABLET, FILM COATED ORAL at 08:05

## 2025-05-22 ASSESSMENT — PAIN DESCRIPTION - PAIN TYPE
TYPE: ACUTE PAIN
TYPE: ACUTE PAIN

## 2025-05-22 ASSESSMENT — PAIN DESCRIPTION - DESCRIPTORS
DESCRIPTORS: ACHING
DESCRIPTORS: ACHING

## 2025-05-22 ASSESSMENT — PAIN DESCRIPTION - ONSET
ONSET: ON-GOING
ONSET: ON-GOING

## 2025-05-22 ASSESSMENT — PAIN DESCRIPTION - FREQUENCY
FREQUENCY: CONTINUOUS
FREQUENCY: CONTINUOUS

## 2025-05-22 ASSESSMENT — PAIN SCALES - GENERAL
PAINLEVEL_OUTOF10: 8
PAINLEVEL_OUTOF10: 8

## 2025-05-22 ASSESSMENT — PAIN DESCRIPTION - LOCATION
LOCATION: LEG
LOCATION: LEG

## 2025-05-22 ASSESSMENT — PAIN DESCRIPTION - ORIENTATION
ORIENTATION: LEFT
ORIENTATION: LEFT

## 2025-05-22 NOTE — CARE COORDINATION
Case Management Assessment            Discharge Note                    Date / Time of Note: 5/22/2025 1:26 PM                  Discharge Note Completed by: ALBAN BOO    Patient Name: Cindi Rodriguez   YOB: 1965  Diagnosis: Acute on chronic respiratory failure with hypoxia and hypercapnia (HCC) [J96.21, J96.22]   Date / Time: 5/20/2025  9:41 AM    Current PCP: Alessandra Anderson DO  Clinic patient: No    Hospitalization in the last 30 days: Yes  Readmission Assessment  Number of Days since last admission?: 1-7 days  Previous Disposition: Long Term Care  Who is being Interviewed: Patient  What was the patient's/caregiver's perception as to why they think they needed to return back to the hospital?: Other (Comment) (medical complications)  Did you visit your Primary Care Physician after you left the hospital, before you returned this time?: No  Why weren't you able to visit your PCP?: Did not have an appointment  Did you see a specialist, such as Cardiac, Pulmonary, Orthopedic Physician, etc. after you left the hospital?: No  Who advised the patient to return to the hospital?: Other (Comment) (LTC staff)  Does the patient report anything that got in the way of taking their medications?: No  In our efforts to provide the best possible care to you and others like you, can you think of anything that we could have done to help you after you left the hospital the first time, so that you might not have needed to return so soon?: Other (Comment) (NONE)    Advance Directives:  Code Status: Full Code  [place] DNR form completed and on chart: Not Indicated    Financial:  Payor: MEDICAID [place] / Plan: MEDICA[place] DEPT OF JOB / Product Type: *No Product type* /      Pharmacy:    [facility] [id number] - [place], [place] - [address] - P [phone] - F [phone]  [address] [facility] [address]  Phone: [phone] Fax: [phone]    [facility] [id number] - [facility] [address]

## 2025-05-22 NOTE — PLAN OF CARE
Problem: Chronic Conditions and Co-morbidities  Goal: Patient's chronic conditions and co-morbidity symptoms are monitored and maintained or improved  Outcome: Progressing  Flowsheets (Taken 5/22/2025 0037)  Care Plan - Patient's Chronic Conditions and Co-Morbidity Symptoms are Monitored and Maintained or Improved:   Monitor and assess patient's chronic conditions and comorbid symptoms for stability, deterioration, or improvement   Collaborate with multidisciplinary team to address chronic and comorbid conditions and prevent exacerbation or deterioration   Update acute care plan with appropriate goals if chronic or comorbid symptoms are exacerbated and prevent overall improvement and discharge     Problem: Discharge Planning  Goal: Discharge to home or other facility with appropriate resources  Outcome: Progressing  Flowsheets (Taken 5/22/2025 0037)  Discharge to home or other facility with appropriate resources:   Identify barriers to discharge with patient and caregiver   Identify discharge learning needs (meds, wound care, etc)   Refer to discharge planning if patient needs post-hospital services based on physician order or complex needs related to functional status, cognitive ability or social support system   Arrange for needed discharge resources and transportation as appropriate     Problem: Safety - Adult  Goal: Free from fall injury  Outcome: Progressing  Flowsheets (Taken 5/22/2025 0037)  Free From Fall Injury:   Instruct family/caregiver on patient safety   Based on caregiver fall risk screen, instruct family/caregiver to ask for assistance with transferring infant if caregiver noted to have fall risk factors     Problem: Pain  Goal: Verbalizes/displays adequate comfort level or baseline comfort level  Outcome: Progressing  Flowsheets (Taken 5/22/2025 0037)  Verbalizes/displays adequate comfort level or baseline comfort level:   Encourage patient to monitor pain and request assistance   Administer

## 2025-05-22 NOTE — DISCHARGE INSTR - COC
Wound Width (cm) 2 cm 04/23/25 0942   Wound Depth (cm) 0 cm 04/23/25 0942   Wound Surface Area (cm^2) 8.4 cm^2 04/23/25 0942   Change in Wound Size % (l*w) -60 04/23/25 0942   Wound Volume (cm^3) 0 cm^3 04/23/25 0942   Wound Assessment Dry 05/22/25 0800   Drainage Amount None (dry) 05/22/25 0722   Odor None 05/22/25 0722   Netta-wound Assessment Dry/flaky;Intact 05/21/25 0500   Margins Attached edges;Defined edges 05/21/25 0500   Number of days: 91       Incision 08/26/24 Abdomen Left;Lower (Active)   Number of days: 268       Incision 08/26/24 Abdomen Lower;Medial (Active)   Number of days: 268        Elimination:  Continence:   Bowel: Yes  Bladder: Yes  Urinary Catheter: None   Colostomy/Ileostomy/Ileal Conduit: No       Date of Last BM: 5/20/2025    Intake/Output Summary (Last 24 hours) at 5/22/2025 1100  Last data filed at 5/21/2025 2200  Gross per 24 hour   Intake 205 ml   Output --   Net 205 ml     I/O last 3 completed shifts:  In: 685 [P.O.:680; I.V.:5]  Out: -     Safety Concerns:     At Risk for Falls    Impairments/Disabilities:      None    Nutrition Therapy:  Current Nutrition Therapy:   - Oral Diet:  Cardiac    Routes of Feeding: Oral  Liquids: No Restrictions  Daily Fluid Restriction: no  Last Modified Barium Swallow with Video (Video Swallowing Test): not done    Treatments at the Time of Hospital Discharge:   Respiratory Treatments: n/a  Oxygen Therapy:  is on oxygen at 3 L/min per nasal cannula.  Ventilator:    - No ventilator support    Heart Failure Instructions for Daily Management  Patient was treated for chronic systolic heart failure.  she  will require the following:    Please weigh daily on the same scale and approximately the same time of day.  Report weight gain of 3 pounds/day or 5 pounds/week to : sarah FERRARO and Alessandra Anderson -[phone].  Please use hospital discharge weight as baseline reference.   Please monitor for signs and symptoms of and report to MD:  [facility]

## 2025-05-22 NOTE — DISCHARGE SUMMARY
V2.0  Discharge Summary    Name:  Cindi Rodriguez /Age/Sex: 1965 (60 y.o. female)   Admit Date: 2025  Discharge Date: 25    MRN & CSN:  [id number] & [id number] Encounter Date and Time 25 12:55 PM EDT    Attending:  Jerome Parsons MD Discharging Provider: Jerome Parsons MD       Hospital Course:     Brief HPI: Cindi Rodriguez is a 60 y.o. femalewho presents with worsening shortness of breath and hypoxia nursing home.  Reports her weight has been going up and they only check it once a week.  She reports she is been compliant with dialysis at the nursing home.  Denies any chest pain, nausea, vomiting.  Reports being compliant with her medications as the nursing home gives her all the meds.     Brief Problem Based Course:     Acute on chronic hypoxic and hypercapnic respite failure secondary to volume overload with pulm edema  Severe ischemic cardiomyopathy  ESRD on HD .  CAD status post complex PCI  Severe tricuspid regurg, moderate to severe mitral regurg  -Long-term nursing home resident.  Did not miss any dialysis.  Weight noted to be 68 kg here.  During the previous hospitalizations her weight has ranged from 58 to 61 kg.  - Respiratory status and blood gases improved with dialysis on the day of admission.  Underwent hemodialysis again on  with fluid removed.  As per nephrology okay for discharge back to nursing home with plan to resume dialysis as per outpatient schedule.  -Given patient is ESRD and on a relatively high dose of gabapentin, discussed with patient regarding propensity for toxic accumulation which puts her at risk for encephalopathy and fluid retention.  Gabapentin has been reduced to 100 mg daily to start taper.  Recommend discontinuing at nursing home      Not on SGLT2 inhibitor, Aldactone as it is contraindicated with hemodialysis  Not on hydralazine as blood pressure is not tolerated.  Will need repeat echocardiogram to evaluate for ICD.  Do

## 2025-05-22 NOTE — PLAN OF CARE
Problem: Chronic Conditions and Co-morbidities  Goal: Patient's chronic conditions and co-morbidity symptoms are monitored and maintained or improved  5/22/2025 0745 by Abisai Childs RN  Outcome: Progressing  5/22/2025 0037 by Luz Varner RN  Outcome: Progressing  Flowsheets (Taken 5/22/2025 0037)  Care Plan - Patient's Chronic Conditions and Co-Morbidity Symptoms are Monitored and Maintained or Improved:   Monitor and assess patient's chronic conditions and comorbid symptoms for stability, deterioration, or improvement   Collaborate with multidisciplinary team to address chronic and comorbid conditions and prevent exacerbation or deterioration   Update acute care plan with appropriate goals if chronic or comorbid symptoms are exacerbated and prevent overall improvement and discharge     Problem: Discharge Planning  Goal: Discharge to home or other facility with appropriate resources  5/22/2025 0745 by Abisai Childs RN  Outcome: Progressing  5/22/2025 0037 by Luz Varner RN  Outcome: Progressing  Flowsheets (Taken 5/22/2025 0037)  Discharge to home or other facility with appropriate resources:   Identify barriers to discharge with patient and caregiver   Identify discharge learning needs (meds, wound care, etc)   Refer to discharge planning if patient needs post-hospital services based on physician order or complex needs related to functional status, cognitive ability or social support system   Arrange for needed discharge resources and transportation as appropriate     Problem: Safety - Adult  Goal: Free from fall injury  5/22/2025 0745 by Abisai Childs RN  Outcome: Progressing  5/22/2025 0037 by Luz Varner RN  Outcome: Progressing  Flowsheets (Taken 5/22/2025 0037)  Free From Fall Injury:   Instruct family/caregiver on patient safety   Based on caregiver fall risk screen, instruct family/caregiver to ask for assistance with transferring infant if caregiver noted to have fall risk factors     Problem:

## 2025-05-22 NOTE — PROGRESS NOTES
Ph: [phone], Fax: [phone]                                     [employer]                                                   [address] [place] [id number]           Reason for admission:                 Brief Summary:     Cindi Rodriguez is being seen by nephrology for ESRD.     Interval History and Plan:     Patient seen in the room   BP is well controlled   Lytes stable.   Hb 10.1  On 3 LPM oxygen  Pt uses oxygen at NH also.       HD today per TTS schedule . Orders reviewed   Follow oxygen requirement.   Monitor Hb and lytes.   Epogen if Hb < 10   Follow BP   Dose medications according to GFR  OK to DC after HD today     Thank you for allowing us to participate in this patient's care  In case of any question please call us at our 24 hour answering service [phone] or from 7 AM to 5 PM via Perfect Serve, Vo[employer]te, Epic chat or cell phone.   Dr Brittney Castaneda MD      Assessment:     ESRD  On HD  Access: TDC. No local discharge and appear clean.   Volume status: Hypervolemic.         Hypertension      Anemia        HPI      Patient is a 60 y.o. female  with PMH significant for ESRD, HTN, Anemia admitted with SOB and CXR showed pulmonary edema and nephrology consulted for dialysis. Patient seen at bedside in ER and she is on BiPAP and feeling better. Per patient her last HD was on this Saturday and she denied any fever, pain around dialysis catheter, bleeding, focal weakness, vomiting or diarrhea.         ROS:   Negative except as mentioned in HPI      Vitals:     Vitals:    05/22/25 0852   BP:    Pulse: 80   Resp: 18   Temp:    SpO2: 93%         Physical Examination:     General appearance: NAD. Alert, oriented  Respiratory: No respiratory distress. No wheezing.   Cardiovascular: Edema mild.   Abdomen: Soft. Non distended and non tender.   Other relevant findings:       Medications:       ARIPiprazole  10 mg Oral Daily    atorvastatin  80 mg Oral Nightly    
4 Eyes Skin Assessment     NAME:  Cindi Rodriguez  YOB: 1965  MEDICAL RECORD NUMBER:  [id number]    The patient is being assessed for  Admission    I agree that at least one RN has performed a thorough Head to Toe Skin Assessment on the patient. ALL assessment sites listed below have been assessed.      Areas assessed by both nurses:    Head, Face, Ears, Shoulders, Back, Chest, Arms, Elbows, Hands, Sacrum. Buttock, Coccyx, Ischium, Legs. Feet and Heels, and Under Medical Devices         Does the Patient have a Wound? No noted wound(s)  Healing wound to left calf       Papa Prevention initiated by RN: Yes  Wound Care Orders initiated by RN: No    Pressure Injury (Stage 3,4, Unstageable, DTI, NWPT, and Complex wounds) if present, place Wound referral order by RN under : No    New Ostomies, if present place, Ostomy referral order under : No     Nurse 1 eSignature: Electronically signed by Corky Montano RN on 5/20/25 at 3:23 PM EDT    **SHARE this note so that the co-signing nurse can place an eSignature**    Nurse 2 eSignature: Electronically signed by Aj Hammonds RN on 5/20/25 at 4:50 PM EDT    
AVS printed and given to transporters. IV and tele removed, pt dressed. Pt transferred to portable oxygen tank at 3L. Pt discharged to [facility] via EMS transport. Report called to receiving nurse.  
Case Management Assessment  Initial Evaluation    Date/Time of Evaluation: 5/21/2025 1:56 PM  Assessment Completed by: Susu Mina    If patient is discharged prior to next notation, then this note serves as note for discharge by case management.    Patient Name: Cindi Rodriguez                   YOB: 1965  Diagnosis: Acute on chronic respiratory failure with hypoxia and hypercapnia (HCC) [J96.21, J96.22]                   Date / Time: 5/20/2025  9:41 AM    Patient Admission Status: Inpatient   Readmission Risk (Low < 19, Mod (19-27), High > 27): Readmission Risk Score: 34.7    Current PCP: Alessandra Anderson, DO  PCP verified by CM? Yes    Chart Reviewed: Yes      History Provided by: Medical Record, Patient  Patient Orientation: Alert and Oriented    Patient Cognition: Alert    Hospitalization in the last 30 days (Readmission):  Yes    If yes, Readmission Assessment in CM Navigator will be completed.    Readmission Assessment  Number of Days since last admission?: 1-7 days  Previous Disposition: Long Term Care  Who is being Interviewed: Patient  What was the patient's/caregiver's perception as to why they think they needed to return back to the hospital?: Other (Comment) (medical complications)  Did you visit your Primary Care Physician after you left the hospital, before you returned this time?: No  Why weren't you able to visit your PCP?: Did not have an appointment  Did you see a specialist, such as Cardiac, Pulmonary, Orthopedic Physician, etc. after you left the hospital?: No  Who advised the patient to return to the hospital?: Other (Comment) (LTC staff)  Does the patient report anything that got in the way of taking their medications?: No  In our efforts to provide the best possible care to you and others like you, can you think of anything that we could have done to help you after you left the hospital the first time, so that you might not have needed to return so soon?: Other (Comment) (NONE) 
Patient is alert and oriented x4. VSS on 3L which is her baseline. Medications given per MAR, no side effects noted. Patient Up with assist of one. No complaints of pain, continuing to monitor and manage per MAR. Voiding well via BRP, bm this shift     Patient is currently resting in bed with bed alarm on for safety. Call light within reach and all fall precautions in place. Plan of care continues.    Abisai Childs RN  
Patient placed on BIPAP, resting/tolerating mask well.   05/20/25 1000   NIV Type   $NIV $Daily Charge   Ventilator ID trilogy   Suction Setup and Functional Yes   NIV Started/Stopped On   Equipment Type Trilogy   Mode Bilevel   Mask Type Full face mask   Mask Size Medium   Bonnet size Medium   Assessment   Pulse 85   Respirations 23   BP (!) 146/87   SpO2 100 %   Comfort Level Good   Using Accessory Muscles Yes   Mask Compliance Good   Skin Assessment Clean, dry, & intact   Skin Protection for O2 Device No   Settings/Measurements   PIP Observed 14 cm H20   IPAP 15 cmH20   CPAP/EPAP 5 cmH2O   Rate Ordered 16   Insp Rise Time (%) 0.8 %   FiO2  50 %   Minute Volume (L/min) 11 Liters   Mask Leak (lpm) 15 lpm   Patient's Home Machine No   Alarm Settings   Alarms On Y   RR Low (bpm) 8   RR High (bpm) 40 br/min       
Patient returned to PCU post dialysis and appears to be breathing much better. SpO2 is 94% on 5L and VBG looks ok. I spoke to the RN and the plan is to keep her off of the BiPAP tonight.     Latest Reference Range & Units 05/20/25 21:25   pH, Carlos 7.350 - 7.450  7.391   pCO2, Carlos 41.0 - 51.0 mmHg 48.2   pO2, Carlos 25.0 - 40.0 mmHg 31.7   Bicarbonate, Venous 24.0 - 28.0 mmol/L 29.2 (H)   TC02 (Calc), Carlos mmol/L 31   Base Excess, Carlos -2.0 - 3.0 mmol/L 3.5 (H)   MetHgb, Carlos 0.0 - 1.5 % 0.3   O2 Saturation Venous Not established % 52   (H): Data is abnormally high  
Patient switched to AVAPS BIPAP settings after results of VBG. Ensuring better and more consistent volumes with this mode.   05/20/25 1208   NIV Type   NIV Started/Stopped On   Equipment Type Trilogy   Mode (S)  AVAPS   Mask Type Full face mask   Mask Size Medium   Bonnet size Medium   Assessment   Pulse 78   SpO2 96 %   Comfort Level Good   Using Accessory Muscles No   Mask Compliance Good   Skin Assessment Clean, dry, & intact   Skin Protection for O2 Device No   Settings/Measurements   PIP Observed 16 cm H20   CPAP/EPAP 8 cmH2O   IPAP Min 10 cmH2O   IPAP Max 25 cmH2O   Vt (Set, mL) 450 mL   Vt (Measured) 487 mL   Rate Ordered (S)  20   Insp Rise Time (%) 0.9 %   FiO2  50 %   Minute Volume (L/min) 0 Liters   Mask Leak (lpm) 23 lpm   Patient's Home Machine No   Alarm Settings   Alarms On Y   RR Low (bpm) 8   RR High (bpm) 40 br/min       
Pt arrived on PCU from ED, pt currently on BiPAP. Pt alert and oriented, VSS. Pt oriented to room, call light, and nurse  
Pt off the floor for dialysis  
Treatment time: 3 hrs    Net UF: 2500 ml     Pre weight: 66 kg  Post weight: 63.5 kg     Access used: Rtdc  Access function:  tolerated well,  BFR 350ml/min     Medications or blood products given: heparin dwells     Regular outpatient schedule: tts     Summary of response to treatment: Pt requeseted for early termination, MD aware and agreed. Pt tolerated well. Pt remained stable throughout entire treatment and upon exiting the hemodialysis suite.      Copy of dialysis treatment record placed in chart, to be scanned into EMR.      
Treatment time: 3 hrs    Net UF: 3000 ml     Pre weight: 68.9 kg  Post weight: 66 kg     Access used: Rtdc  Access function:  tolerated well,  -350ml/min     Medications or blood products given: heparin dwells     Regular outpatient schedule: TTS     Summary of response to treatment: Pt tolerated well. Pt remained stable throughout entire treatment and upon exiting the hemodialysis suite.      Copy of dialysis treatment record placed in chart, to be scanned into EMR.      
previous study of 5/13/2025 compatible with worsening fluid overload/congestive heart failure. Right greater than left pleural effusions also noted. Electronically signed by Bob Velazquez      CBC:   Recent Labs     05/20/25  1002 05/21/25  0544   WBC 7.5 7.9   HGB 11.7* 10.1*    213     BMP:    Recent Labs     05/20/25  1002 05/21/25  0544    137   K 4.6 4.2   CL 95* 96*   CO2 24 27   BUN 41* 39*   CREATININE 5.8* 4.5*   GLUCOSE 109* 121*     Hepatic: No results for input(s): \"AST\", \"ALT\", \"BILITOT\", \"ALKPHOS\" in the last 72 hours.    Invalid input(s): \"ALB\"  Lipids:   Lab Results   Component Value Date/Time    CHOL 96 03/04/2025 03:03 AM    HDL 39 03/04/2025 03:03 AM    HDL 48 02/18/2011 10:17 AM    TRIG 173 03/04/2025 03:03 AM     Hemoglobin A1C:   Lab Results   Component Value Date/Time    LABA1C 5.3 03/03/2025 08:52 AM     TSH: No results found for: \"TSH\"  Troponin: No results found for: \"TROPONINT\"  Lactic Acid: No results for input(s): \"LACTA\" in the last 72 hours.  BNP:   Recent Labs     05/20/25  1002   PROBNP >70,000*     UA:  Lab Results   Component Value Date/Time    NITRU Negative 08/22/2024 02:54 PM    COLORU Yellow 08/22/2024 02:54 PM    PHUR 8.0 08/22/2024 02:54 PM    PHUR 8.0 08/22/2024 02:54 PM    PHUR 6.5 06/08/2023 11:40 PM    LABCAST 1-3 Hyaline 08/03/2015 02:00 PM    WBCUA 6-9 08/22/2024 02:54 PM    RBCUA 3-4 08/22/2024 02:54 PM    MUCUS 1+ 08/20/2022 11:35 PM    BACTERIA 1+ 08/22/2024 02:54 PM    CLARITYU Clear 08/22/2024 02:54 PM    LEUKOCYTESUR Negative 08/22/2024 02:54 PM    UROBILINOGEN 0.2 08/22/2024 02:54 PM    BILIRUBINUR SMALL 08/22/2024 02:54 PM    BLOODU SMALL 08/22/2024 02:54 PM    GLUCOSEU Negative 08/22/2024 02:54 PM    KETUA 15 08/22/2024 02:54 PM    AMORPHOUS 2+ 08/20/2022 11:35 PM     Urine Cultures:   Lab Results   Component Value Date/Time    LABURIN No growth at 18 to 36 hours 09/07/2022 03:40 AM     Blood Cultures:   Lab Results   Component Value Date/Time    BC

## 2025-05-22 NOTE — DISCHARGE INSTRUCTIONS
Extra Heart Failure Education/ Tools/ Resources:     https://[employer] Systems.com/publication/?i=611040   --- this is American Heart Association interactive Healthier Living with Heart Failure guidebook.  Please click hyperlink or copy / paste link into search bar. The QR Code is also available below. Use your mouse to scroll through the pages.  Lots of information about weight monitoring, diet tips, activity, meds, etc    Heart Failure Tools and Resources QR Code is below. It includes multiple resources to include symptom tracker, med tracker, further HF info, and access to a HF Support Network online Community    HF [place]ia  -- this is a free smart phone antonia available for iPhone and Android download.  Use your phone to track sodium / fluid intake, zone tool symptom tracking, weights, medications, etc. Click on this hyperlink  HF [place]   for QR code for easy download or the link is also found in the below HF Tools and Resources.      DASH (Dietary Approach to Stop Hypertension) diet --  https://www.nhlbi.nih.gov/education/dash-eating-plan -- this diet is a flexible eating plan that promotes heart healthy eating style.  Click on hyperlink or copy / paste link into search bar.  Lots of low sodium recipes and tips.    https://www.Symcat.Anne Fogarty/recipes  -- more free recipes

## 2025-05-22 NOTE — CONSULTS
Ph: [phone], Fax: [phone]                                     [employer]                                                   [address] [id number]           Reason for admission:                 Brief Summary:     Cindi Rodriguez is being seen by nephrology for ESRD.     Interval History and Plan:   Patient seen at bedside  Comfortable  /81  On BiPAP  Lytes stable.   Hb 11.7        HD today ASAP with UF  Follow oxygen requirement.   Monitor Hb and lytes.   Epogen if Hb < 10   Follow BP   Dose medications according to GFR        HD ordered and informed [facility] charge nurse      Thank you for allowing us to participate in this patient's care  In case of any question please call us at our 24 hour answering service [phone] or from 7 AM to 5 PM via Perfect Serve, [employer], Epic chat or cell phone.   Dr Brittney Castaneda MD      Assessment:     ESRD  On HD  Access: TDC. No local discharge and appear clean.   Volume status: Hypervolemic.         Hypertension      Anemia        HPI      Patient is a 60 y.o. female  with PMH significant for ESRD, HTN, Anemia admitted with SOB and CXR showed pulmonary edema and nephrology consulted for dialysis. Patient seen at bedside in ER and she is on BiPAP and feeling better. Per patient her last HD was on this Saturday and she denied any fever, pain around dialysis catheter, bleeding, focal weakness, vomiting or diarrhea.         ROS:   Negative except as mentioned in HPI      Vitals:     Vitals:    05/20/25 1217   BP:    Pulse: 78   Resp:    Temp:    SpO2: 98%         Physical Examination:     General appearance: NAD. Alert, oriented  Respiratory: No respiratory distress. No wheezing.   Cardiovascular: Edema ++  Abdomen: Soft. Non distended and non tender.   Other relevant findings:       Medications:       ipratropium 0.5 mg-albuterol 2.5 mg  1 Dose Nebulization Q4H WA RT         Labs:     Lab Results   Component 
Consult received.  Labs and notes were reviewed.  Case was discussed with the staff.    Full note to follow.    Thanks  Nephrology  [address] [place] [address] [id number]  [place] [address]  Office: [phone]  Cell: [phone]  Fax: [phone]    
Edema mild.   Abdomen: Soft. Non distended and non tender.   Other relevant findings:       Medications:       ARIPiprazole  10 mg Oral Daily    atorvastatin  80 mg Oral Nightly    Virt-Caps  1 capsule Oral Daily    benztropine  0.5 mg Oral Nightly    buPROPion  150 mg Oral Daily    carvedilol  3.125 mg Oral BID WC    clopidogrel  75 mg Oral Daily    gabapentin  200 mg Oral BID    losartan  25 mg Oral Nightly    melatonin  10 mg Oral Nightly    mirtazapine  30 mg Oral Nightly    NIFEdipine  60 mg Oral Daily    pantoprazole  40 mg Oral Daily    sevelamer  1,600 mg Oral TID    torsemide  100 mg Oral Daily    sodium chloride flush  5-40 mL IntraVENous 2 times per day    heparin (porcine)  5,000 Units SubCUTAneous 3 times per day         Labs:     Lab Results   Component Value Date    CREATININE 4.5 (H) 05/21/2025    BUN 39 (H) 05/21/2025     05/21/2025    K 4.2 05/21/2025    CL 96 (L) 05/21/2025    CO2 27 05/21/2025    CALCIUM 9.0 05/21/2025    PHOS 3.3 05/15/2025     Lab Results   Component Value Date    WBC 7.9 05/21/2025    HGB 10.1 (L) 05/21/2025    HCT 30.1 (L) 05/21/2025    MCV 84.4 05/21/2025     05/21/2025         Past Medical History:     Past Medical History:   Diagnosis Date    JONATHAN (acute kidney injury) 9/3/2014    Anxiety     Asthma     Bipolar disorder ([facility])     Bronchitis     Chronic back pain     Depression     Diabetes mellitus ([facility])     DM II (diabetes mellitus, type II), controlled ([facility]) 9/3/2014    Hyperlipidemia 12/26/2019    Hypertension     Neuropathy 1/2/2024    Obstructive sleep apnea 8/22/2011    Restless legs syndrome (RLS) 8/22/2011       Past Surgical History:     Past Surgical History:   Procedure Laterality Date    DIALYSIS CATHETER REMOVAL N/A 8/26/2024    PERITONEAL DIAYSIS CATHETER REMOVAL, INCISION AND DRAINAGE OF ABDOMINAL WALL ABCESS performed by Santos Castaneda DO at [facility] OR    ENDOMETRIAL ABLATION      INNER EAR SURGERY      TUBAL LIGATION                     
Reversal  sevelamer (RENVELA) 800 MG tablet, Take 2 tablets by mouth 3 times daily  albuterol sulfate HFA (VENTOLIN HFA) 108 (90 Base) MCG/ACT inhaler, Inhale 2 puffs into the lungs every 6 hours as needed for Wheezing  lidocaine 4 % external patch, Place 2 patches onto the skin daily Bilat lower legs  atorvastatin (LIPITOR) 80 MG tablet, Take 1 tablet by mouth at bedtime  diphenhydrAMINE (BENADRYL) 25 MG tablet, Take 1 tablet by mouth every 4 hours as needed (anaphylaxis)  ipratropium 0.5 mg-albuterol 2.5 mg (DUONEB) 0.5-2.5 (3) MG/3ML SOLN nebulizer solution, Inhale 3 mLs into the lungs every 6 hours as needed for Shortness of Breath  losartan (COZAAR) 25 MG tablet, Take 1 tablet by mouth at bedtime  mirtazapine (REMERON) 15 MG tablet, Take 2 tablets by mouth nightly  carvedilol (COREG) 3.125 MG tablet, Take 1 tablet by mouth 2 times daily (with meals)  ARIPiprazole (ABILIFY) 10 MG tablet, Take 1 tablet by mouth daily  B complex-vitamin C-folic acid (NEPHRO-JESUS) 1 MG tablet, Take 1 tablet by mouth daily  clopidogrel (PLAVIX) 75 MG tablet, Take 1 tablet by mouth daily  pantoprazole (PROTONIX) 40 MG tablet, Take 1 tablet by mouth daily  [DISCONTINUED] gabapentin (NEURONTIN) 100 MG capsule, Take 2 capsules by mouth 2 times daily.  EPINEPHrine (EPIPEN 2-CLIFTON) 0.3 MG/0.3ML SOAJ injection, Inject 0.3 mLs into the muscle as needed (anaphylaxis) Use as directed for allergic reaction    Allergies:  Shellfish-derived products    Social History:    TOBACCO: reports that she has never smoked. She has never used smokeless tobacco.  ETOH:   reports no history of alcohol use.  Patient currently lives in a nursing home    Review of Systems -   Review of Systems   All other systems reviewed and are negative.  : A 10 point review of systems was conducted, significant findings as notedin HPI.    Objective:          Physical Exam  Constitutional:       Appearance: She is ill-appearing (chronically).   HENT:      Head: Normocephalic

## 2025-05-27 ENCOUNTER — ANCILLARY PROCEDURE (OUTPATIENT)
Dept: EMERGENCY DEPT | Age: 60
DRG: 194 | End: 2025-05-27
Attending: EMERGENCY MEDICINE
Payer: MEDICAID

## 2025-05-27 ENCOUNTER — HOSPITAL ENCOUNTER (INPATIENT)
Age: 60
LOS: 7 days | Discharge: HOME HEALTH | DRG: 194 | End: 2025-06-03
Attending: EMERGENCY MEDICINE | Admitting: SURGERY
Payer: MEDICAID

## 2025-05-27 ENCOUNTER — APPOINTMENT (OUTPATIENT)
Dept: GENERAL RADIOLOGY | Age: 60
DRG: 194 | End: 2025-05-27
Payer: MEDICAID

## 2025-05-27 DIAGNOSIS — Z99.2 ESRD ON PERITONEAL DIALYSIS (HCC): Primary | ICD-10-CM

## 2025-05-27 DIAGNOSIS — J81.0 FLASH PULMONARY EDEMA (HCC): ICD-10-CM

## 2025-05-27 DIAGNOSIS — N18.6 ESRD ON PERITONEAL DIALYSIS (HCC): Primary | ICD-10-CM

## 2025-05-27 DIAGNOSIS — I50.9 CONGESTIVE HEART FAILURE, UNSPECIFIED HF CHRONICITY, UNSPECIFIED HEART FAILURE TYPE (HCC): ICD-10-CM

## 2025-05-27 DIAGNOSIS — R09.02 HYPOXIA: ICD-10-CM

## 2025-05-27 PROBLEM — J96.01 ACUTE HYPOXEMIC RESPIRATORY FAILURE (HCC): Status: ACTIVE | Noted: 2025-05-27

## 2025-05-27 LAB
ALBUMIN SERPL-MCNC: 4.4 G/DL (ref 3.4–5)
ALBUMIN/GLOB SERPL: 1 {RATIO} (ref 1.1–2.2)
ALP SERPL-CCNC: 165 U/L (ref 40–129)
ALT SERPL-CCNC: 15 U/L (ref 10–40)
ANION GAP SERPL CALCULATED.3IONS-SCNC: 23 MMOL/L (ref 3–16)
AST SERPL-CCNC: 25 U/L (ref 15–37)
BASE EXCESS BLDV CALC-SCNC: -3.8 MMOL/L (ref -2–3)
BASE EXCESS BLDV CALC-SCNC: -4.8 MMOL/L (ref -2–3)
BASOPHILS # BLD: 0.1 K/UL (ref 0–0.2)
BASOPHILS NFR BLD: 0.8 %
BILIRUB SERPL-MCNC: 0.6 MG/DL (ref 0–1)
BUN SERPL-MCNC: 61 MG/DL (ref 7–20)
CALCIUM SERPL-MCNC: 9.8 MG/DL (ref 8.3–10.6)
CHLORIDE SERPL-SCNC: 94 MMOL/L (ref 99–110)
CO2 BLDV-SCNC: 26 MMOL/L
CO2 BLDV-SCNC: 26 MMOL/L
CO2 SERPL-SCNC: 21 MMOL/L (ref 21–32)
COHGB MFR BLDV: 1.6 % (ref 0–1.5)
COHGB MFR BLDV: 1.8 % (ref 0–1.5)
CREAT SERPL-MCNC: 6.6 MG/DL (ref 0.6–1.1)
DEPRECATED RDW RBC AUTO: 19.9 % (ref 12.4–15.4)
DO-HGB MFR BLDV: 21.1 %
DO-HGB MFR BLDV: 39.1 %
EKG ATRIAL RATE: 121 BPM
EKG DIAGNOSIS: NORMAL
EKG P AXIS: 9 DEGREES
EKG P-R INTERVAL: 170 MS
EKG Q-T INTERVAL: 306 MS
EKG QRS DURATION: 110 MS
EKG QTC CALCULATION (BAZETT): 434 MS
EKG R AXIS: -50 DEGREES
EKG T AXIS: 97 DEGREES
EKG VENTRICULAR RATE: 121 BPM
EOSINOPHIL # BLD: 0.5 K/UL (ref 0–0.6)
EOSINOPHIL NFR BLD: 4.5 %
GFR SERPLBLD CREATININE-BSD FMLA CKD-EPI: 7 ML/MIN/{1.73_M2}
GLUCOSE SERPL-MCNC: 204 MG/DL (ref 70–99)
HCO3 BLDV-SCNC: 23.9 MMOL/L (ref 24–28)
HCO3 BLDV-SCNC: 24.4 MMOL/L (ref 24–28)
HCT VFR BLD AUTO: 41 % (ref 36–48)
HGB BLD-MCNC: 13.2 G/DL (ref 12–16)
LYMPHOCYTES # BLD: 1.8 K/UL (ref 1–5.1)
LYMPHOCYTES NFR BLD: 15 %
MCH RBC QN AUTO: 27.8 PG (ref 26–34)
MCHC RBC AUTO-ENTMCNC: 32.2 G/DL (ref 31–36)
MCV RBC AUTO: 86.3 FL (ref 80–100)
METHGB MFR BLDV: 0.1 % (ref 0–1.5)
METHGB MFR BLDV: 0.2 % (ref 0–1.5)
MONOCYTES # BLD: 0.6 K/UL (ref 0–1.3)
MONOCYTES NFR BLD: 4.8 %
NEUTROPHILS # BLD: 8.9 K/UL (ref 1.7–7.7)
NEUTROPHILS NFR BLD: 74.9 %
NT-PROBNP SERPL-MCNC: ABNORMAL PG/ML (ref 0–124)
PCO2 BLDV: 55 MMHG (ref 41–51)
PCO2 BLDV: 63.3 MMHG (ref 41–51)
PH BLDV: 7.19 [PH] (ref 7.35–7.45)
PH BLDV: 7.25 [PH] (ref 7.35–7.45)
PLATELET # BLD AUTO: 213 K/UL (ref 135–450)
PMV BLD AUTO: 7 FL (ref 5–10.5)
PO2 BLDV: 43.7 MMHG (ref 25–40)
PO2 BLDV: 54.8 MMHG (ref 25–40)
POTASSIUM SERPL-SCNC: 6.5 MMOL/L (ref 3.5–5.1)
POTASSIUM SERPL-SCNC: 6.9 MMOL/L (ref 3.5–5.1)
PROT SERPL-MCNC: 8.7 G/DL (ref 6.4–8.2)
RBC # BLD AUTO: 4.75 M/UL (ref 4–5.2)
SAO2 % BLDV: 60 %
SAO2 % BLDV: 79 %
SODIUM SERPL-SCNC: 138 MMOL/L (ref 136–145)
TROPONIN, HIGH SENSITIVITY: 203 NG/L (ref 0–14)
TROPONIN, HIGH SENSITIVITY: 230 NG/L (ref 0–14)
WBC # BLD AUTO: 11.9 K/UL (ref 4–11)

## 2025-05-27 PROCEDURE — 6370000000 HC RX 637 (ALT 250 FOR IP): Performed by: SURGERY

## 2025-05-27 PROCEDURE — 1200000000 HC SEMI PRIVATE

## 2025-05-27 PROCEDURE — 71045 X-RAY EXAM CHEST 1 VIEW: CPT

## 2025-05-27 PROCEDURE — 6360000002 HC RX W HCPCS

## 2025-05-27 PROCEDURE — 51798 US URINE CAPACITY MEASURE: CPT

## 2025-05-27 PROCEDURE — 99291 CRITICAL CARE FIRST HOUR: CPT | Performed by: INTERNAL MEDICINE

## 2025-05-27 PROCEDURE — 93308 TTE F-UP OR LMTD: CPT

## 2025-05-27 PROCEDURE — 5A09357 ASSISTANCE WITH RESPIRATORY VENTILATION, LESS THAN 24 CONSECUTIVE HOURS, CONTINUOUS POSITIVE AIRWAY PRESSURE: ICD-10-PCS | Performed by: SURGERY

## 2025-05-27 PROCEDURE — 83880 ASSAY OF NATRIURETIC PEPTIDE: CPT

## 2025-05-27 PROCEDURE — 99221 1ST HOSP IP/OBS SF/LOW 40: CPT | Performed by: INTERNAL MEDICINE

## 2025-05-27 PROCEDURE — 90935 HEMODIALYSIS ONE EVALUATION: CPT

## 2025-05-27 PROCEDURE — 84132 ASSAY OF SERUM POTASSIUM: CPT

## 2025-05-27 PROCEDURE — 93005 ELECTROCARDIOGRAM TRACING: CPT

## 2025-05-27 PROCEDURE — 99291 CRITICAL CARE FIRST HOUR: CPT

## 2025-05-27 PROCEDURE — 80053 COMPREHEN METABOLIC PANEL: CPT

## 2025-05-27 PROCEDURE — 94660 CPAP INITIATION&MGMT: CPT

## 2025-05-27 PROCEDURE — 6370000000 HC RX 637 (ALT 250 FOR IP)

## 2025-05-27 PROCEDURE — 96374 THER/PROPH/DIAG INJ IV PUSH: CPT

## 2025-05-27 PROCEDURE — 5A1D70Z PERFORMANCE OF URINARY FILTRATION, INTERMITTENT, LESS THAN 6 HOURS PER DAY: ICD-10-PCS | Performed by: STUDENT IN AN ORGANIZED HEALTH CARE EDUCATION/TRAINING PROGRAM

## 2025-05-27 PROCEDURE — 2700000000 HC OXYGEN THERAPY PER DAY

## 2025-05-27 PROCEDURE — 36415 COLL VENOUS BLD VENIPUNCTURE: CPT

## 2025-05-27 PROCEDURE — 85025 COMPLETE CBC W/AUTO DIFF WBC: CPT

## 2025-05-27 PROCEDURE — 82803 BLOOD GASES ANY COMBINATION: CPT

## 2025-05-27 PROCEDURE — 94761 N-INVAS EAR/PLS OXIMETRY MLT: CPT

## 2025-05-27 PROCEDURE — 84484 ASSAY OF TROPONIN QUANT: CPT

## 2025-05-27 PROCEDURE — 2580000003 HC RX 258

## 2025-05-27 RX ORDER — ARIPIPRAZOLE 5 MG/1
10 TABLET ORAL DAILY
Status: DISCONTINUED | OUTPATIENT
Start: 2025-05-27 | End: 2025-06-03 | Stop reason: HOSPADM

## 2025-05-27 RX ORDER — NITROGLYCERIN 0.4 MG/1
0.4 TABLET SUBLINGUAL EVERY 5 MIN PRN
Status: DISCONTINUED | OUTPATIENT
Start: 2025-05-27 | End: 2025-06-03 | Stop reason: HOSPADM

## 2025-05-27 RX ORDER — ATORVASTATIN CALCIUM 40 MG/1
80 TABLET, FILM COATED ORAL NIGHTLY
Status: DISCONTINUED | OUTPATIENT
Start: 2025-05-27 | End: 2025-06-03 | Stop reason: HOSPADM

## 2025-05-27 RX ORDER — PANTOPRAZOLE SODIUM 40 MG/1
40 TABLET, DELAYED RELEASE ORAL DAILY
Status: DISCONTINUED | OUTPATIENT
Start: 2025-05-27 | End: 2025-06-03 | Stop reason: HOSPADM

## 2025-05-27 RX ORDER — BENZTROPINE MESYLATE 1 MG/1
0.5 TABLET ORAL NIGHTLY
Status: DISCONTINUED | OUTPATIENT
Start: 2025-05-27 | End: 2025-06-03 | Stop reason: HOSPADM

## 2025-05-27 RX ORDER — SEVELAMER CARBONATE 800 MG/1
1600 TABLET, FILM COATED ORAL 3 TIMES DAILY
Status: DISCONTINUED | OUTPATIENT
Start: 2025-05-27 | End: 2025-06-03 | Stop reason: HOSPADM

## 2025-05-27 RX ORDER — POLYETHYLENE GLYCOL 3350 17 G/17G
17 POWDER, FOR SOLUTION ORAL DAILY
Status: DISPENSED | OUTPATIENT
Start: 2025-05-27 | End: 2025-05-29

## 2025-05-27 RX ORDER — TORSEMIDE 100 MG/1
100 TABLET ORAL DAILY
Status: DISCONTINUED | OUTPATIENT
Start: 2025-05-27 | End: 2025-05-27

## 2025-05-27 RX ORDER — NITROGLYCERIN 0.4 MG/1
TABLET SUBLINGUAL
Status: COMPLETED
Start: 2025-05-27 | End: 2025-05-27

## 2025-05-27 RX ORDER — CLOPIDOGREL BISULFATE 75 MG/1
75 TABLET ORAL DAILY
Status: DISCONTINUED | OUTPATIENT
Start: 2025-05-27 | End: 2025-06-03 | Stop reason: HOSPADM

## 2025-05-27 RX ORDER — FUROSEMIDE 10 MG/ML
40 INJECTION INTRAMUSCULAR; INTRAVENOUS ONCE
Status: COMPLETED | OUTPATIENT
Start: 2025-05-27 | End: 2025-05-27

## 2025-05-27 RX ORDER — BUPROPION HYDROCHLORIDE 150 MG/1
150 TABLET, EXTENDED RELEASE ORAL DAILY
Status: DISCONTINUED | OUTPATIENT
Start: 2025-05-27 | End: 2025-06-03 | Stop reason: HOSPADM

## 2025-05-27 RX ORDER — CARVEDILOL 6.25 MG/1
3.12 TABLET ORAL 2 TIMES DAILY WITH MEALS
Status: DISCONTINUED | OUTPATIENT
Start: 2025-05-27 | End: 2025-06-03 | Stop reason: HOSPADM

## 2025-05-27 RX ORDER — MIRTAZAPINE 30 MG/1
30 TABLET, FILM COATED ORAL NIGHTLY
Status: DISCONTINUED | OUTPATIENT
Start: 2025-05-27 | End: 2025-06-03 | Stop reason: HOSPADM

## 2025-05-27 RX ADMIN — BENZTROPINE MESYLATE 0.5 MG: 1 TABLET ORAL at 23:47

## 2025-05-27 RX ADMIN — NITROGLYCERIN 0.4 MG: 0.4 TABLET SUBLINGUAL at 10:30

## 2025-05-27 RX ADMIN — MIRTAZAPINE 30 MG: 30 TABLET, FILM COATED ORAL at 23:46

## 2025-05-27 RX ADMIN — FUROSEMIDE 40 MG: 10 INJECTION, SOLUTION INTRAMUSCULAR; INTRAVENOUS at 16:14

## 2025-05-27 RX ADMIN — BUMETANIDE 1 MG/HR: 0.25 INJECTION INTRAMUSCULAR; INTRAVENOUS at 16:23

## 2025-05-27 RX ADMIN — CARVEDILOL 3.12 MG: 6.25 TABLET, FILM COATED ORAL at 17:13

## 2025-05-27 RX ADMIN — FUROSEMIDE 40 MG: 10 INJECTION, SOLUTION INTRAVENOUS at 10:59

## 2025-05-27 RX ADMIN — ATORVASTATIN CALCIUM 80 MG: 40 TABLET, FILM COATED ORAL at 23:47

## 2025-05-27 RX ADMIN — SEVELAMER CARBONATE 1600 MG: 800 TABLET, FILM COATED ORAL at 23:46

## 2025-05-27 ASSESSMENT — PAIN DESCRIPTION - LOCATION: LOCATION: LEG

## 2025-05-27 ASSESSMENT — PAIN SCALES - GENERAL: PAINLEVEL_OUTOF10: 8

## 2025-05-27 ASSESSMENT — PAIN DESCRIPTION - ORIENTATION: ORIENTATION: LEFT

## 2025-05-27 ASSESSMENT — PAIN - FUNCTIONAL ASSESSMENT: PAIN_FUNCTIONAL_ASSESSMENT: 0-10

## 2025-05-27 NOTE — CONSULTS
Brief Consult Note:    Subjective:  Patient is a 60 year old female with a history of HFrEF (15-20%), ESRD on dialysis (T TH S) as well as frequent admissions for flash pulmonary edema who presented with sudden onset shortness of breath overnight. CXR on admission with severe bilateral pulmonary consolidations, overall history concerning for flash pulmonary edema. In the ED, she was given 40 mg lasix IV, and she was started on BiPAP.    She was evaluated by ICU team approx 1:30 after starting on BiPAP. On our evaluation, patient states that she was feeling much better. Mental status good, she was texting on her phone. States that her shortness of breath was much improved following aforementioned therapies. She is due for dialysis today as well.    Objective:  Vit: /78, HR 80, SpO2 100%, afebrile  Gen: Ill appearing female, appears older than stated age, on BiPAP  Cards: Warm extremities, well perfused, S1 S2 audible  Pulm: Coarse breath sounds bilaterally, normal work of breathing on BiPAP  Ext: No lower extremity edema    Labs:   VBG Prior to BiPAP 7.19/63.3   VBG one hour later after initiation: 7.247/55.0    Assessment   # Flash pulmonary edema (improving)  Presented with significant shortness of breath with CXR concerning for flash pulmonary edema. She was initiated on BiPAP, given lasix 40 mg IV with good response. She states her symptoms are improving. Minute ventilation is good on BiPAP. Repeat VBG 1 hour after initiation improving. Mentation is good. Would expect her symptoms to improve with dialysis today as well. Given this, she is likely appropriate for PCU level of care. No indication for intubation or intensive care unit at this time.    John Ibarra MD  ICU Resident, PGY-1  Contact via Point    Pulmonary & Critical Care    Patient seen and examined. I agree with Dr. Ibarra's history, physical, lab findings, assessment and plan.    Cindi is a 60-year-old female I was asked to help

## 2025-05-27 NOTE — ED PROVIDER NOTES
ED Attending Attestation Note     Date of evaluation: 5/27/2025    This patient was seen by the resident.  I have seen and examined the patient, agree with the workup, evaluation, management and diagnosis. The care plan has been discussed.  I have reviewed the ECG and concur with the resident's interpretation.  My assessment reveals a chronically ill-appearing female sitting up in the hospital stretcher uncomfortable and in significant respiratory distress.  She has rales in the bilateral lung fields with crackles and some diminished breath sounds in the bases.  Extremities are warm and well-perfused her abdomen is somewhat swollen and tense not particularly tender to palpation.  She is distended neck veins.    Critical Care:  Due to the immediate potential for life-threatening deterioration due to hypoxic and hypercarbic respiratory failure, I spent 55 minutes providing critical care.  This time excludes time spent performing procedures but includes time spent on direct patient care, history retrieval, review of the chart, and discussions with patient, family, and consultant(s).  .     Bryce Deshpande MD  05/27/25 0535

## 2025-05-27 NOTE — CONSULTS
Ph: [phone], Fax: [phone]                                     [facility]                                                   [address] [place], [place] [id number]           Reason for admission:                 Brief Summary:     Cindi Rodriguez is being seen by nephrology for ESRD.     Interval History and Plan:   Patient seen at bedside  Comfortable  BP  116/67  On BiPAP.   K hemolyzed   Na 138          HD today per TTS schedule ASAP with 2- 3 liter UF as tolerated\  Follow oxygen requirement.   Monitor Hb and lytes.   Epogen if Hb < 10   Follow BP. On low dose Coreg.   Dose medications according to GFR  On diuretics. Per pt she makes some urine. Follow urine output         HD orders placed and informed [facility] charge nurse  D/W floor nurse      Thank you for allowing us to participate in this patient's care  In case of any question please call us at our 24 hour answering service [phone] or from 7 AM to 5 PM via Perfect Serve, [employer], Epic chat or cell phone.   Dr Brittney Castaneda MD      Assessment:       ESRD  On HD TTS  Access: TDC. No local discharge and appear clean.   Volume status: Hypervolemic.            Hypertension       Respiratory failure         Hyperkalemia   but samples hemolyzed      HPI      Patient is a 60 y.o. female  with PMH significant for ESRD, HTN, Anemia admitted with SOB and CXR reported consolidation similar to 5/20 but appear has pulmonary edema. BNP is also elevated.  Nephrology consulted for urgent dialysis       Patient seen at bedside and appear comfortable on BiPAP and breathing better. Patient denied chest pain, focal weakness, fever, bleeding, pain around TDC. Patient feel constipated.         ROS:   Negative except as mentioned in HPI      Vitals:     Vitals:    05/27/25 1502   BP: 116/67   Pulse: 70   Resp:    Temp:    SpO2:          Physical Examination:     General appearance: NAD. Alert, oriented  Respiratory: No

## 2025-05-27 NOTE — ED NOTES
Patient Name: Cindi Rodriguez  : 1965 60 y.o.  MRN: [id number]  ED Room #: 2TR/2TRA-A2     Chief complaint:   Chief Complaint   Patient presents with    Shortness of Breath     Pt coming from [facility] for increased SOB this morning. Sats found in 80s and put on 5 L NC, normally on 3 L NC. Requiring nonrebreather. Gets dialsysis TTS and did not get run this morning.      Hospital Problem/Diagnosis:   Hospital Problems           Last Modified POA    * (Principal) Acute hypoxemic respiratory failure (HCC) 2025 Yes         O2 Flow Rate:O2 Device: PAP (positive airway pressure) O2 Flow Rate (L/min): 15 L/min (if applicable)  Cardiac Rhythm:   (if applicable)  Active LDA's:   Peripheral IV 25 Right Forearm (Active)            How does patient ambulate? Unknown, did not assess in the Emergency Department    2. How does patient take pills? Unknown, no oral medications were given in the Emergency Department    3. Is patient alert? Alert    4. Is patient oriented? To Person, To Place, To Time, To Situation, and Follows Commands    5.   Patient arrived from:  nursing home  Facility Name: ___________________________________________    6. If patient is disoriented or from a Skill Nursing Facility has family been notified of admission?     7. Patient belongings? Belongings: Cell Phone and Clothing    Disposition of belongings? Kept with Patient     8. Any specific patient or family belongings/needs/dynamics?   a.     9. Miscellaneous comments/pending orders?  a. This patient is alert and fully oriented. She says that she is wheelchair bound and unable to ambulate. IV in R forearm. HD vas cath in R side of chest. Vitals have been stable while in ED, currently on bipap. Purewick in place.       If there are any additional questions please reach out to the Emergency Department.       Tanvir Shaffer, PEACE  [phone]

## 2025-05-27 NOTE — CONSULTS
Cardiology Consultation                                                                    Pt Name: Cindi Rodriguez  Age: 60 y.o.  Sex: female  : 1965  Location: [facility]    Referring Physician: Gilberto Castaneda MD  Primary cardiologist: ALISA      Reason for Consult:     Reason for Consultation/Chief Complaint: Acute HFrEF exacerbation    HPI:      Cindi Rodriguez is a 60 y.o. female with a past medical history of CAD s/p SOLO to LAD and RCA , HFrEF EF 15-20% G3DD ECHO , severe tricuspid regurg, moderate to severe mitral regurg, HTN, DM2, HLD, ESRD on HD who presents to the ED with SOB.    Patient states that she has been having recurrent episodes of sudden severe SOB for the past few months or so. She does have a history of CAD with SOLO in  prior to which EF was 60% and post was reduced to 30%. Follow up Echos in 2025 showed decline to 15%.     Patient denies any acute chest pain or other associated symptoms apart from her acute shortness of breath.  She does state that she has been compliant with all her episodes of dialysis which she normally does .  She however does admit to having some difficulty following her fluid restriction and also her sodium restriction.  She states that over this past weekend for memorial day she did have some hamburgers and hotdogs and fries for the holiday.     Patient denies any new swelling in her lower extremities but states that her abdomen is somewhat swollen compared to her baseline.  In looking at her weight history it appears she is up about 7 to 10 pounds since being last admitted 1 week ago.    Initial vitals in the ED showed hypertension, tachycardia, hypoxia.  Initial VBG showed a pH of 7.194 with a PO2 of 43.  Patient was started on BiPAP with improvement in her gas to 7.247 with a PO2 of 54.8.  Patient also symptomatically improved with decreased work of breathing.  On my evaluation she is able to have a conversation

## 2025-05-27 NOTE — H&P
Bipolar disorder (HCC)     Bronchitis     Chronic back pain     Depression     Diabetes mellitus (HCC)     DM II (diabetes mellitus, type II), controlled (HCC) 9/3/2014    Hyperlipidemia 12/26/2019    Hypertension     Neuropathy 1/2/2024    Obstructive sleep apnea 8/22/2011    Restless legs syndrome (RLS) 8/22/2011     PSHX:  has a past surgical history that includes Inner ear surgery; Tubal ligation; Endometrial ablation; and Dialysis Catheter Removal (N/A, 8/26/2024).  Allergies:   Allergies   Allergen Reactions    Shellfish-Derived Products Hives, Itching and Swelling     Fam HX: family history includes Cancer in her maternal grandmother and mother; Diabetes in her mother; Heart Disease in her maternal grandfather.  Soc HX:   Social History     Socioeconomic History    Marital status: Single     Spouse name: None    Number of children: None    Years of education: None    Highest education level: None   Tobacco Use    Smoking status: Never    Smokeless tobacco: Never   Vaping Use    Vaping status: Never Used   Substance and Sexual Activity    Alcohol use: Never    Drug use: Never    Sexual activity: Defer   Social History Narrative    ** Merged History Encounter **          Social Drivers of Health     Food Insecurity: No Food Insecurity (5/20/2025)    Hunger Vital Sign     Worried About Running Out of Food in the Last Year: Never true     Ran Out of Food in the Last Year: Never true   Recent Concern: Food Insecurity - Food Insecurity Present (2/20/2025)    Hunger Vital Sign     Worried About Running Out of Food in the Last Year: Sometimes true     Ran Out of Food in the Last Year: Never true   Transportation Needs: No Transportation Needs (5/20/2025)    PRAPARE - Transportation     Lack of Transportation (Medical): No     Lack of Transportation (Non-Medical): No   Recent Concern: Transportation Needs - Unmet Transportation Needs (2/20/2025)    PRAPARE - Transportation     Lack of Transportation (Medical): Yes

## 2025-05-27 NOTE — ED PROVIDER NOTES
[facility]  EMERGENCY DEPARTMENT ENCOUNTER          EM RESIDENT NOTE       Date of evaluation: 5/27/2025    Chief Complaint     Shortness of Breath (Pt coming from [facility] for increased SOB this morning. Sats found in 80s and put on 5 L NC, normally on 3 L NC. Requiring nonrebreather. Gets dialsysis TTS and did not get run this morning. )      History of Present Illness     See HPI in the MDM below.    Diagnostic Results and Other Data     RADIOLOGY:  XR CHEST PORTABLE   Final Result      Severe diffuse pulmonary consolidation similar to 5/20/2025.      Grossly stable cardiac silhouette.      Right jugular dialysis catheter without change.      Electronically signed by Urabn Russell          LABS:   Results for orders placed or performed during the hospital encounter of 05/27/25   CBC with Auto Differential   Result Value Ref Range    WBC 11.9 (H) 4.0 - 11.0 K/uL    RBC 4.75 4.00 - 5.20 M/uL    Hemoglobin 13.2 12.0 - 16.0 g/dL    Hematocrit 41.0 36.0 - 48.0 %    MCV 86.3 80.0 - 100.0 fL    MCH 27.8 26.0 - 34.0 pg    MCHC 32.2 31.0 - 36.0 g/dL    RDW 19.9 (H) 12.4 - 15.4 %    Platelets 213 135 - 450 K/uL    MPV 7.0 5.0 - 10.5 fL    Neutrophils % 74.9 %    Lymphocytes % 15.0 %    Monocytes % 4.8 %    Eosinophils % 4.5 %    Basophils % 0.8 %    Neutrophils Absolute 8.9 (H) 1.7 - 7.7 K/uL    Lymphocytes Absolute 1.8 1.0 - 5.1 K/uL    Monocytes Absolute 0.6 0.0 - 1.3 K/uL    Eosinophils Absolute 0.5 0.0 - 0.6 K/uL    Basophils Absolute 0.1 0.0 - 0.2 K/uL   CMP w/ Reflex to MG   Result Value Ref Range    Sodium 138 136 - 145 mmol/L    Potassium reflex Magnesium 6.5 (HH) 3.5 - 5.1 mmol/L    Chloride 94 (L) 99 - 110 mmol/L    CO2 21 21 - 32 mmol/L    Anion Gap 23 (H) 3 - 16    Glucose 204 (H) 70 - 99 mg/dL    BUN 61 (H) 7 - 20 mg/dL    Creatinine 6.6 (HH) 0.6 - 1.1 mg/dL    Est, Glom Filt Rate 7 (A) >60    Calcium 9.8 8.3 - 10.6 mg/dL    Total Protein 8.7 (H) 6.4 - 8.2 g/dL    Albumin 4.4 3.4 - 5.0 g/dL

## 2025-05-28 LAB
ALBUMIN SERPL-MCNC: 3.8 G/DL (ref 3.4–5)
ALBUMIN/GLOB SERPL: 1.1 {RATIO} (ref 1.1–2.2)
ALP SERPL-CCNC: 130 U/L (ref 40–129)
ALT SERPL-CCNC: 12 U/L (ref 10–40)
ANION GAP SERPL CALCULATED.3IONS-SCNC: 15 MMOL/L (ref 3–16)
AST SERPL-CCNC: 22 U/L (ref 15–37)
BASOPHILS # BLD: 0 K/UL (ref 0–0.2)
BASOPHILS NFR BLD: 0.6 %
BILIRUB SERPL-MCNC: 0.8 MG/DL (ref 0–1)
BUN SERPL-MCNC: 33 MG/DL (ref 7–20)
CALCIUM SERPL-MCNC: 9.5 MG/DL (ref 8.3–10.6)
CHLORIDE SERPL-SCNC: 97 MMOL/L (ref 99–110)
CO2 SERPL-SCNC: 25 MMOL/L (ref 21–32)
CREAT SERPL-MCNC: 4.1 MG/DL (ref 0.6–1.1)
DEPRECATED RDW RBC AUTO: 19.5 % (ref 12.4–15.4)
EOSINOPHIL # BLD: 0.4 K/UL (ref 0–0.6)
EOSINOPHIL NFR BLD: 5.7 %
GFR SERPLBLD CREATININE-BSD FMLA CKD-EPI: 12 ML/MIN/{1.73_M2}
GLUCOSE SERPL-MCNC: 93 MG/DL (ref 70–99)
HCT VFR BLD AUTO: 33.1 % (ref 36–48)
HGB BLD-MCNC: 10.9 G/DL (ref 12–16)
LYMPHOCYTES # BLD: 1 K/UL (ref 1–5.1)
LYMPHOCYTES NFR BLD: 14 %
MCH RBC QN AUTO: 28.1 PG (ref 26–34)
MCHC RBC AUTO-ENTMCNC: 32.9 G/DL (ref 31–36)
MCV RBC AUTO: 85.4 FL (ref 80–100)
MONOCYTES # BLD: 0.6 K/UL (ref 0–1.3)
MONOCYTES NFR BLD: 8.6 %
NEUTROPHILS # BLD: 5.1 K/UL (ref 1.7–7.7)
NEUTROPHILS NFR BLD: 71.1 %
PLATELET # BLD AUTO: 154 K/UL (ref 135–450)
PMV BLD AUTO: 7.1 FL (ref 5–10.5)
POTASSIUM SERPL-SCNC: 4.7 MMOL/L (ref 3.5–5.1)
PROT SERPL-MCNC: 7.2 G/DL (ref 6.4–8.2)
RBC # BLD AUTO: 3.88 M/UL (ref 4–5.2)
SODIUM SERPL-SCNC: 137 MMOL/L (ref 136–145)
WBC # BLD AUTO: 7.2 K/UL (ref 4–11)

## 2025-05-28 PROCEDURE — 6370000000 HC RX 637 (ALT 250 FOR IP): Performed by: NURSE PRACTITIONER

## 2025-05-28 PROCEDURE — 2580000003 HC RX 258

## 2025-05-28 PROCEDURE — 36415 COLL VENOUS BLD VENIPUNCTURE: CPT

## 2025-05-28 PROCEDURE — 6360000002 HC RX W HCPCS: Performed by: FAMILY MEDICINE

## 2025-05-28 PROCEDURE — 85025 COMPLETE CBC W/AUTO DIFF WBC: CPT

## 2025-05-28 PROCEDURE — 6360000002 HC RX W HCPCS

## 2025-05-28 PROCEDURE — 2700000000 HC OXYGEN THERAPY PER DAY

## 2025-05-28 PROCEDURE — 6370000000 HC RX 637 (ALT 250 FOR IP): Performed by: SURGERY

## 2025-05-28 PROCEDURE — 94660 CPAP INITIATION&MGMT: CPT

## 2025-05-28 PROCEDURE — 94761 N-INVAS EAR/PLS OXIMETRY MLT: CPT

## 2025-05-28 PROCEDURE — 2060000000 HC ICU INTERMEDIATE R&B

## 2025-05-28 PROCEDURE — 99233 SBSQ HOSP IP/OBS HIGH 50: CPT | Performed by: INTERNAL MEDICINE

## 2025-05-28 PROCEDURE — 6370000000 HC RX 637 (ALT 250 FOR IP): Performed by: STUDENT IN AN ORGANIZED HEALTH CARE EDUCATION/TRAINING PROGRAM

## 2025-05-28 PROCEDURE — 6360000002 HC RX W HCPCS: Performed by: STUDENT IN AN ORGANIZED HEALTH CARE EDUCATION/TRAINING PROGRAM

## 2025-05-28 PROCEDURE — 80053 COMPREHEN METABOLIC PANEL: CPT

## 2025-05-28 RX ORDER — POLYETHYLENE GLYCOL 3350 17 G/17G
17 POWDER, FOR SOLUTION ORAL DAILY PRN
Status: DISCONTINUED | OUTPATIENT
Start: 2025-05-28 | End: 2025-05-31

## 2025-05-28 RX ORDER — FUROSEMIDE 10 MG/ML
80 INJECTION INTRAMUSCULAR; INTRAVENOUS ONCE
Status: COMPLETED | OUTPATIENT
Start: 2025-05-28 | End: 2025-05-28

## 2025-05-28 RX ORDER — HEPARIN SODIUM 1000 [USP'U]/ML
3200 INJECTION, SOLUTION INTRAVENOUS; SUBCUTANEOUS PRN
Status: DISCONTINUED | OUTPATIENT
Start: 2025-05-28 | End: 2025-06-03 | Stop reason: HOSPADM

## 2025-05-28 RX ORDER — HEPARIN SODIUM 5000 [USP'U]/ML
5000 INJECTION, SOLUTION INTRAVENOUS; SUBCUTANEOUS EVERY 8 HOURS SCHEDULED
Status: DISCONTINUED | OUTPATIENT
Start: 2025-05-28 | End: 2025-06-03 | Stop reason: HOSPADM

## 2025-05-28 RX ORDER — HYDROCODONE BITARTRATE AND ACETAMINOPHEN 5; 325 MG/1; MG/1
1 TABLET ORAL EVERY 4 HOURS PRN
Status: DISCONTINUED | OUTPATIENT
Start: 2025-05-28 | End: 2025-06-02

## 2025-05-28 RX ORDER — MECOBALAMIN 5000 MCG
5 TABLET,DISINTEGRATING ORAL
Status: DISCONTINUED | OUTPATIENT
Start: 2025-05-28 | End: 2025-06-03 | Stop reason: HOSPADM

## 2025-05-28 RX ORDER — HEPARIN SODIUM 1000 [USP'U]/ML
INJECTION, SOLUTION INTRAVENOUS; SUBCUTANEOUS
Status: COMPLETED
Start: 2025-05-28 | End: 2025-05-28

## 2025-05-28 RX ADMIN — HEPARIN SODIUM 5000 UNITS: 5000 INJECTION INTRAVENOUS; SUBCUTANEOUS at 20:16

## 2025-05-28 RX ADMIN — MIRTAZAPINE 30 MG: 30 TABLET, FILM COATED ORAL at 20:35

## 2025-05-28 RX ADMIN — ATORVASTATIN CALCIUM 80 MG: 40 TABLET, FILM COATED ORAL at 20:16

## 2025-05-28 RX ADMIN — POLYETHYLENE GLYCOL 3350 17 G: 17 POWDER, FOR SOLUTION ORAL at 21:20

## 2025-05-28 RX ADMIN — CARVEDILOL 3.12 MG: 6.25 TABLET, FILM COATED ORAL at 18:30

## 2025-05-28 RX ADMIN — BUMETANIDE 1 MG/HR: 0.25 INJECTION INTRAMUSCULAR; INTRAVENOUS at 05:38

## 2025-05-28 RX ADMIN — CLOPIDOGREL BISULFATE 75 MG: 75 TABLET, FILM COATED ORAL at 08:48

## 2025-05-28 RX ADMIN — Medication 5 MG: at 21:20

## 2025-05-28 RX ADMIN — SEVELAMER CARBONATE 1600 MG: 800 TABLET, FILM COATED ORAL at 08:48

## 2025-05-28 RX ADMIN — BENZTROPINE MESYLATE 0.5 MG: 1 TABLET ORAL at 20:16

## 2025-05-28 RX ADMIN — BUPROPION HYDROCHLORIDE 150 MG: 150 TABLET, FILM COATED, EXTENDED RELEASE ORAL at 08:48

## 2025-05-28 RX ADMIN — HYDROCODONE BITARTRATE AND ACETAMINOPHEN 1 TABLET: 5; 325 TABLET ORAL at 15:46

## 2025-05-28 RX ADMIN — CARVEDILOL 3.12 MG: 6.25 TABLET, FILM COATED ORAL at 08:48

## 2025-05-28 RX ADMIN — HEPARIN SODIUM 3200 UNITS: 1000 INJECTION INTRAVENOUS; SUBCUTANEOUS at 14:59

## 2025-05-28 RX ADMIN — HYDROCODONE BITARTRATE AND ACETAMINOPHEN 1 TABLET: 5; 325 TABLET ORAL at 20:34

## 2025-05-28 RX ADMIN — FUROSEMIDE 80 MG: 10 INJECTION, SOLUTION INTRAMUSCULAR; INTRAVENOUS at 11:56

## 2025-05-28 RX ADMIN — PANTOPRAZOLE SODIUM 40 MG: 40 TABLET, DELAYED RELEASE ORAL at 05:36

## 2025-05-28 RX ADMIN — POLYETHYLENE GLYCOL 3350 17 G: 17 POWDER, FOR SOLUTION ORAL at 08:47

## 2025-05-28 ASSESSMENT — PAIN - FUNCTIONAL ASSESSMENT: PAIN_FUNCTIONAL_ASSESSMENT: ACTIVITIES ARE NOT PREVENTED

## 2025-05-28 ASSESSMENT — PAIN DESCRIPTION - LOCATION
LOCATION: OTHER (COMMENT)
LOCATION: ABDOMEN

## 2025-05-28 ASSESSMENT — PAIN DESCRIPTION - PAIN TYPE: TYPE: ACUTE PAIN

## 2025-05-28 ASSESSMENT — PAIN DESCRIPTION - FREQUENCY: FREQUENCY: CONTINUOUS

## 2025-05-28 ASSESSMENT — PAIN SCALES - GENERAL
PAINLEVEL_OUTOF10: 9
PAINLEVEL_OUTOF10: 0

## 2025-05-28 ASSESSMENT — PAIN DESCRIPTION - DESCRIPTORS
DESCRIPTORS: ACHING
DESCRIPTORS: DISCOMFORT

## 2025-05-28 ASSESSMENT — PAIN SCALES - WONG BAKER: WONGBAKER_NUMERICALRESPONSE: NO HURT

## 2025-05-28 ASSESSMENT — PAIN DESCRIPTION - ORIENTATION: ORIENTATION: MID

## 2025-05-28 ASSESSMENT — PAIN DESCRIPTION - ONSET: ONSET: ON-GOING

## 2025-05-28 NOTE — PLAN OF CARE
Problem: Skin/Tissue Integrity  Goal: Skin integrity remains intact  Description: 1.  Monitor for areas of redness and/or skin breakdown2.  Assess vascular access sites hourly3.  Every 4-6 hours minimum:  Change oxygen saturation probe site4.  Every 4-6 hours:  If on nasal continuous positive airway pressure, respiratory therapy assess nares and determine need for appliance change or resting period  Outcome: Progressing  Flowsheets (Taken 5/28/2025 0205)  Skin Integrity Remains Intact: Monitor for areas of redness and/or skin breakdown

## 2025-05-28 NOTE — CARE COORDINATION
Case Management Assessment  Initial Evaluation    Date/Time of Evaluation: 5/28/2025 3:52 PM  Assessment Completed by: Susu Mina    If patient is discharged prior to next notation, then this note serves as note for discharge by case management.    Patient Name: Cindi Rodriguez                   YOB: 1965  Diagnosis: Flash pulmonary edema (HCC) [J81.0]  Hypoxia [R09.02]  Acute hypoxemic respiratory failure (HCC) [J96.01]  Congestive heart failure, unspecified HF chronicity, unspecified heart failure type (HCC) [I50.9]                   Date / Time: 5/27/2025 10:04 AM    Patient Admission Status: Inpatient   Readmission Risk (Low < 19, Mod (19-27), High > 27): Readmission Risk Score: 33.9    Current PCP: Alessandra Anderson, DO  PCP verified by CM? Yes    Chart Reviewed: Yes      History Provided by: Patient, Medical Record  Patient Orientation: Alert and Oriented    Patient Cognition: Alert    Hospitalization in the last 30 days (Readmission):  Yes    If yes, Readmission Assessment in CM Navigator will be complete  Readmission Assessment  Number of Days since last admission?: 1-7 days  Previous Disposition: SNF  Who is being Interviewed: Patient  What was the patient's/caregiver's perception as to why they think they needed to return back to the hospital?: Other (Comment) (medical complications)  Did you visit your Primary Care Physician after you left the hospital, before you returned this time?: No  Why weren't you able to visit your PCP?: Did not have an appointment  Did you see a specialist, such as Cardiac, Pulmonary, Orthopedic Physician, etc. after you left the hospital?: No  Who advised the patient to return to the hospital?: Home Health Staff, Caregiver, Skilled Unit  Does the patient report anything that got in the way of taking their medications?: No  In our efforts to provide the best possible care to you and others like you, can you think of anything that we could have done to help you after

## 2025-05-28 NOTE — PLAN OF CARE
Problem: Skin/Tissue Integrity  Goal: Skin integrity remains intact  Description: 1.  Monitor for areas of redness and/or skin breakdown2.  Assess vascular access sites hourly3.  Every 4-6 hours minimum:  Change oxygen saturation probe site4.  Every 4-6 hours:  If on nasal continuous positive airway pressure, respiratory therapy assess nares and determine need for appliance change or resting period  5/28/2025 1426 by Mehnaz Barajas, RN  Outcome: Progressing  Flowsheets (Taken 5/28/2025 0205 by Lynda Gaines, RN)  Skin Integrity Remains Intact: Monitor for areas of redness and/or skin breakdown

## 2025-05-29 ENCOUNTER — APPOINTMENT (OUTPATIENT)
Dept: GENERAL RADIOLOGY | Age: 60
DRG: 194 | End: 2025-05-29
Payer: MEDICAID

## 2025-05-29 LAB
ALBUMIN SERPL-MCNC: 3.5 G/DL (ref 3.4–5)
ANION GAP SERPL CALCULATED.3IONS-SCNC: 16 MMOL/L (ref 3–16)
BASOPHILS # BLD: 0 K/UL (ref 0–0.2)
BASOPHILS NFR BLD: 0.5 %
BUN SERPL-MCNC: 53 MG/DL (ref 7–20)
CALCIUM SERPL-MCNC: 9 MG/DL (ref 8.3–10.6)
CHLORIDE SERPL-SCNC: 97 MMOL/L (ref 99–110)
CO2 SERPL-SCNC: 22 MMOL/L (ref 21–32)
CREAT SERPL-MCNC: 5.4 MG/DL (ref 0.6–1.1)
DEPRECATED RDW RBC AUTO: 19.1 % (ref 12.4–15.4)
EOSINOPHIL # BLD: 0.6 K/UL (ref 0–0.6)
EOSINOPHIL NFR BLD: 9.1 %
GFR SERPLBLD CREATININE-BSD FMLA CKD-EPI: 9 ML/MIN/{1.73_M2}
GLUCOSE SERPL-MCNC: 76 MG/DL (ref 70–99)
HCT VFR BLD AUTO: 32.2 % (ref 36–48)
HGB BLD-MCNC: 10.6 G/DL (ref 12–16)
LYMPHOCYTES # BLD: 1.4 K/UL (ref 1–5.1)
LYMPHOCYTES NFR BLD: 21.4 %
MCH RBC QN AUTO: 28.2 PG (ref 26–34)
MCHC RBC AUTO-ENTMCNC: 32.8 G/DL (ref 31–36)
MCV RBC AUTO: 85.9 FL (ref 80–100)
MONOCYTES # BLD: 0.8 K/UL (ref 0–1.3)
MONOCYTES NFR BLD: 11.9 %
NEUTROPHILS # BLD: 3.7 K/UL (ref 1.7–7.7)
NEUTROPHILS NFR BLD: 57.1 %
PHOSPHATE SERPL-MCNC: 6.4 MG/DL (ref 2.5–4.9)
PLATELET # BLD AUTO: 144 K/UL (ref 135–450)
PMV BLD AUTO: 7.3 FL (ref 5–10.5)
POTASSIUM SERPL-SCNC: 5.4 MMOL/L (ref 3.5–5.1)
RBC # BLD AUTO: 3.75 M/UL (ref 4–5.2)
SODIUM SERPL-SCNC: 135 MMOL/L (ref 136–145)
WBC # BLD AUTO: 6.5 K/UL (ref 4–11)

## 2025-05-29 PROCEDURE — 71045 X-RAY EXAM CHEST 1 VIEW: CPT

## 2025-05-29 PROCEDURE — 97166 OT EVAL MOD COMPLEX 45 MIN: CPT

## 2025-05-29 PROCEDURE — 6370000000 HC RX 637 (ALT 250 FOR IP): Performed by: SURGERY

## 2025-05-29 PROCEDURE — 2060000000 HC ICU INTERMEDIATE R&B

## 2025-05-29 PROCEDURE — 36415 COLL VENOUS BLD VENIPUNCTURE: CPT

## 2025-05-29 PROCEDURE — 99233 SBSQ HOSP IP/OBS HIGH 50: CPT | Performed by: INTERNAL MEDICINE

## 2025-05-29 PROCEDURE — 97162 PT EVAL MOD COMPLEX 30 MIN: CPT

## 2025-05-29 PROCEDURE — 97116 GAIT TRAINING THERAPY: CPT

## 2025-05-29 PROCEDURE — 85025 COMPLETE CBC W/AUTO DIFF WBC: CPT

## 2025-05-29 PROCEDURE — 97530 THERAPEUTIC ACTIVITIES: CPT

## 2025-05-29 PROCEDURE — 90935 HEMODIALYSIS ONE EVALUATION: CPT

## 2025-05-29 PROCEDURE — 80069 RENAL FUNCTION PANEL: CPT

## 2025-05-29 PROCEDURE — 6360000002 HC RX W HCPCS: Performed by: FAMILY MEDICINE

## 2025-05-29 PROCEDURE — 94660 CPAP INITIATION&MGMT: CPT

## 2025-05-29 PROCEDURE — 97535 SELF CARE MNGMENT TRAINING: CPT

## 2025-05-29 PROCEDURE — 6370000000 HC RX 637 (ALT 250 FOR IP): Performed by: STUDENT IN AN ORGANIZED HEALTH CARE EDUCATION/TRAINING PROGRAM

## 2025-05-29 RX ORDER — TORSEMIDE 100 MG/1
50 TABLET ORAL DAILY
Status: DISCONTINUED | OUTPATIENT
Start: 2025-05-29 | End: 2025-06-03 | Stop reason: HOSPADM

## 2025-05-29 RX ADMIN — SEVELAMER CARBONATE 1600 MG: 800 TABLET, FILM COATED ORAL at 12:38

## 2025-05-29 RX ADMIN — CARVEDILOL 3.12 MG: 6.25 TABLET, FILM COATED ORAL at 12:39

## 2025-05-29 RX ADMIN — CARVEDILOL 3.12 MG: 6.25 TABLET, FILM COATED ORAL at 17:35

## 2025-05-29 RX ADMIN — CLOPIDOGREL BISULFATE 75 MG: 75 TABLET, FILM COATED ORAL at 12:38

## 2025-05-29 RX ADMIN — BUPROPION HYDROCHLORIDE 150 MG: 150 TABLET, FILM COATED, EXTENDED RELEASE ORAL at 12:54

## 2025-05-29 RX ADMIN — ATORVASTATIN CALCIUM 80 MG: 40 TABLET, FILM COATED ORAL at 22:58

## 2025-05-29 RX ADMIN — PANTOPRAZOLE SODIUM 40 MG: 40 TABLET, DELAYED RELEASE ORAL at 12:38

## 2025-05-29 RX ADMIN — HEPARIN SODIUM 5000 UNITS: 5000 INJECTION INTRAVENOUS; SUBCUTANEOUS at 15:57

## 2025-05-29 RX ADMIN — TORSEMIDE 50 MG: 100 TABLET ORAL at 12:54

## 2025-05-29 RX ADMIN — BENZTROPINE MESYLATE 0.5 MG: 1 TABLET ORAL at 22:58

## 2025-05-29 RX ADMIN — MIRTAZAPINE 30 MG: 30 TABLET, FILM COATED ORAL at 22:59

## 2025-05-29 RX ADMIN — SEVELAMER CARBONATE 1600 MG: 800 TABLET, FILM COATED ORAL at 22:58

## 2025-05-29 ASSESSMENT — PAIN DESCRIPTION - ONSET: ONSET: ON-GOING

## 2025-05-29 ASSESSMENT — PAIN DESCRIPTION - DESCRIPTORS: DESCRIPTORS: ACHING

## 2025-05-29 ASSESSMENT — PAIN SCALES - WONG BAKER
WONGBAKER_NUMERICALRESPONSE: NO HURT
WONGBAKER_NUMERICALRESPONSE: NO HURT

## 2025-05-29 ASSESSMENT — PAIN DESCRIPTION - LOCATION
LOCATION: OTHER (COMMENT)
LOCATION: OTHER (COMMENT)

## 2025-05-29 ASSESSMENT — PAIN - FUNCTIONAL ASSESSMENT: PAIN_FUNCTIONAL_ASSESSMENT: ACTIVITIES ARE NOT PREVENTED

## 2025-05-29 ASSESSMENT — PAIN DESCRIPTION - PAIN TYPE: TYPE: ACUTE PAIN

## 2025-05-29 ASSESSMENT — PAIN SCALES - GENERAL
PAINLEVEL_OUTOF10: 9
PAINLEVEL_OUTOF10: 9

## 2025-05-29 ASSESSMENT — PAIN DESCRIPTION - FREQUENCY: FREQUENCY: CONTINUOUS

## 2025-05-29 ASSESSMENT — PAIN DESCRIPTION - ORIENTATION: ORIENTATION: RIGHT;LEFT;MID

## 2025-05-29 NOTE — PLAN OF CARE
Problem: Safety - Adult  Goal: Free from fall injury  Recent Flowsheet Documentation  Taken 5/28/2025 2056 by Armaan Rivera RN  Free From Fall Injury:   Instruct family/caregiver on patient safety   Based on caregiver fall risk screen, instruct family/caregiver to ask for assistance with transferring infant if caregiver noted to have fall risk factors     Problem: Skin/Tissue Integrity  Goal: Skin integrity remains intact  Description: 1.  Monitor for areas of redness and/or skin breakdown2.  Assess vascular access sites hourly3.  Every 4-6 hours minimum:  Change oxygen saturation probe site4.  Every 4-6 hours:  If on nasal continuous positive airway pressure, respiratory therapy assess nares and determine need for appliance change or resting period  5/28/2025 2056 by Armaan Rivera RN  Outcome: Progressing  5/28/2025 2056 by Armaan Rivera RN  Outcome: Progressing  Flowsheets (Taken 5/28/2025 2056)  Skin Integrity Remains Intact:   Monitor for areas of redness and/or skin breakdown   Every 4-6 hours:  If on nasal continuous positive airway pressure, assess nares and determine need for appliance change or resting period   Positioning devices  5/28/2025 1426 by Mehnaz Barajas, RN  Outcome: Progressing  Flowsheets (Taken 5/28/2025 0205 by Lynda Gaines, RN)  Skin Integrity Remains Intact: Monitor for areas of redness and/or skin breakdown

## 2025-05-29 NOTE — CARE COORDINATION
Case Management Assessment           Daily Note                 Date/ Time of Note: 5/29/2025 10:12 AM         Note completed by: Ashu Leung RN    Patient Name: Cindi Rodriguez  YOB: 1965    Diagnosis:Flash pulmonary edema (HCC) [J81.0]  Hypoxia [R09.02]  Acute hypoxemic respiratory failure (HCC) [J96.01]  Congestive heart failure, unspecified HF chronicity, unspecified heart failure type (HCC) [I50.9]  Patient Admission Status: Inpatient  Date of Admission:5/27/2025 10:04 AM    Length of Stay: 2 GLOS:   Readmission Risk Score: Readmission Risk Score: 34.7    Current Plan of Care:  today, on 6Lo2.  ________________________________________________________________________________________    pending  ________________________________________________________________________________________  Discharge Plan: Long Term Acute Care (LTACH): [facility] ([place], [place])  Pre-cert required for SNF: NO  COVID Result:    Lab Results   Component Value Date/Time    COVID19 NOT DETECTED 05/20/2025 10:36 AM       Transportation PLAN for discharge: EMS transportation    Tentative discharge date: tbd    Potential assistance Purchasing Medications:    Does Patient want to participate in local refill/ meds to beds program?:      Current barriers to discharge: Medical complications    Referrals completed:     Resources/ information provided:   ________________________________________________________________________________________  Case Management Notes: patient is LTC at [facility]; OK to return, no cert needed. Recv's HD OP at [facility] TTS    Cindi and her family were provided with choice of provider; she and her family are in agreement with the discharge plan.    Emergency Contacts:  Extended Emergency Contact Information  Primary Emergency Contact: Dimitri Santo  Home Phone: [phone]  Mobile Phone: [phone]  Relation: Child   needed? No  Secondary Emergency Contact:

## 2025-05-29 NOTE — PLAN OF CARE
Problem: Discharge Planning  Goal: Discharge to home or other facility with appropriate resources  Recent Flowsheet Documentation  Taken 5/29/2025 1758 by Komal Tran, RN  Discharge to home or other facility with appropriate resources:   Identify barriers to discharge with patient and caregiver   Arrange for interpreters to assist at discharge as needed   Arrange for needed discharge resources and transportation as appropriate   Refer to discharge planning if patient needs post-hospital services based on physician order or complex needs related to functional status, cognitive ability or social support system   Identify discharge learning needs (meds, wound care, etc)    Problem: Pain  Goal: Verbalizes/displays adequate comfort level or baseline comfort level  Recent Flowsheet Documentation  Taken 5/29/2025 1758 by Komal Tran, RN  Verbalizes/displays adequate comfort level or baseline comfort level:   Encourage patient to monitor pain and request assistance   Administer analgesics based on type and severity of pain and evaluate response   Consider cultural and social influences on pain and pain management   Assess pain using appropriate pain scale   Implement non-pharmacological measures as appropriate and evaluate response   Notify Licensed Independent Practitioner if interventions unsuccessful or patient reports new pain     Problem: Safety - Adult  Goal: Free from fall injury  5/29/2025 1758 by Komal Tran, RN  Outcome: Progressing  Flowsheets (Taken 5/28/2025 2056 by Armaan Rivera RN)  Free From Fall Injury:   Instruct family/caregiver on patient safety   Based on caregiver fall risk screen, instruct family/caregiver to ask for assistance with transferring infant if caregiver noted to have fall risk factors     Problem: Skin/Tissue Integrity  Goal: Skin integrity remains intact  Description: 1.  Monitor for areas of redness and/or skin breakdown2.  Assess vascular access sites hourly3.  Every

## 2025-05-30 LAB
ALBUMIN SERPL-MCNC: 3.7 G/DL (ref 3.4–5)
ANION GAP SERPL CALCULATED.3IONS-SCNC: 15 MMOL/L (ref 3–16)
BASOPHILS # BLD: 0.1 K/UL (ref 0–0.2)
BASOPHILS NFR BLD: 0.9 %
BUN SERPL-MCNC: 35 MG/DL (ref 7–20)
CALCIUM SERPL-MCNC: 9.4 MG/DL (ref 8.3–10.6)
CHLORIDE SERPL-SCNC: 98 MMOL/L (ref 99–110)
CO2 SERPL-SCNC: 21 MMOL/L (ref 21–32)
CREAT SERPL-MCNC: 4.6 MG/DL (ref 0.6–1.1)
DEPRECATED RDW RBC AUTO: 18.7 % (ref 12.4–15.4)
EOSINOPHIL # BLD: 0.6 K/UL (ref 0–0.6)
EOSINOPHIL NFR BLD: 7.9 %
GFR SERPLBLD CREATININE-BSD FMLA CKD-EPI: 10 ML/MIN/{1.73_M2}
GLUCOSE SERPL-MCNC: 161 MG/DL (ref 70–99)
HCT VFR BLD AUTO: 33.8 % (ref 36–48)
HGB BLD-MCNC: 11 G/DL (ref 12–16)
LYMPHOCYTES # BLD: 1.5 K/UL (ref 1–5.1)
LYMPHOCYTES NFR BLD: 20.3 %
MCH RBC QN AUTO: 27.9 PG (ref 26–34)
MCHC RBC AUTO-ENTMCNC: 32.5 G/DL (ref 31–36)
MCV RBC AUTO: 85.7 FL (ref 80–100)
MONOCYTES # BLD: 1.2 K/UL (ref 0–1.3)
MONOCYTES NFR BLD: 16.2 %
NEUTROPHILS # BLD: 4 K/UL (ref 1.7–7.7)
NEUTROPHILS NFR BLD: 54.7 %
PHOSPHATE SERPL-MCNC: 5.6 MG/DL (ref 2.5–4.9)
PLATELET # BLD AUTO: 157 K/UL (ref 135–450)
PMV BLD AUTO: 7 FL (ref 5–10.5)
POTASSIUM SERPL-SCNC: 4.7 MMOL/L (ref 3.5–5.1)
RBC # BLD AUTO: 3.94 M/UL (ref 4–5.2)
SODIUM SERPL-SCNC: 134 MMOL/L (ref 136–145)
WBC # BLD AUTO: 7.2 K/UL (ref 4–11)

## 2025-05-30 PROCEDURE — 2700000000 HC OXYGEN THERAPY PER DAY

## 2025-05-30 PROCEDURE — 2060000000 HC ICU INTERMEDIATE R&B

## 2025-05-30 PROCEDURE — 36415 COLL VENOUS BLD VENIPUNCTURE: CPT

## 2025-05-30 PROCEDURE — 85025 COMPLETE CBC W/AUTO DIFF WBC: CPT

## 2025-05-30 PROCEDURE — 99233 SBSQ HOSP IP/OBS HIGH 50: CPT | Performed by: INTERNAL MEDICINE

## 2025-05-30 PROCEDURE — 6360000002 HC RX W HCPCS: Performed by: FAMILY MEDICINE

## 2025-05-30 PROCEDURE — 94761 N-INVAS EAR/PLS OXIMETRY MLT: CPT

## 2025-05-30 PROCEDURE — 6370000000 HC RX 637 (ALT 250 FOR IP): Performed by: NURSE PRACTITIONER

## 2025-05-30 PROCEDURE — 6370000000 HC RX 637 (ALT 250 FOR IP): Performed by: STUDENT IN AN ORGANIZED HEALTH CARE EDUCATION/TRAINING PROGRAM

## 2025-05-30 PROCEDURE — 6370000000 HC RX 637 (ALT 250 FOR IP): Performed by: SURGERY

## 2025-05-30 PROCEDURE — 80069 RENAL FUNCTION PANEL: CPT

## 2025-05-30 RX ADMIN — MIRTAZAPINE 30 MG: 30 TABLET, FILM COATED ORAL at 22:26

## 2025-05-30 RX ADMIN — BUPROPION HYDROCHLORIDE 150 MG: 150 TABLET, FILM COATED, EXTENDED RELEASE ORAL at 08:59

## 2025-05-30 RX ADMIN — CARVEDILOL 3.12 MG: 6.25 TABLET, FILM COATED ORAL at 08:59

## 2025-05-30 RX ADMIN — HYDROCODONE BITARTRATE AND ACETAMINOPHEN 1 TABLET: 5; 325 TABLET ORAL at 00:11

## 2025-05-30 RX ADMIN — TORSEMIDE 50 MG: 100 TABLET ORAL at 08:59

## 2025-05-30 RX ADMIN — HEPARIN SODIUM 5000 UNITS: 5000 INJECTION INTRAVENOUS; SUBCUTANEOUS at 22:26

## 2025-05-30 RX ADMIN — Medication 5 MG: at 00:07

## 2025-05-30 RX ADMIN — POLYETHYLENE GLYCOL 3350 17 G: 17 POWDER, FOR SOLUTION ORAL at 15:56

## 2025-05-30 RX ADMIN — HYDROCODONE BITARTRATE AND ACETAMINOPHEN 1 TABLET: 5; 325 TABLET ORAL at 15:56

## 2025-05-30 RX ADMIN — SEVELAMER CARBONATE 1600 MG: 800 TABLET, FILM COATED ORAL at 08:59

## 2025-05-30 RX ADMIN — SEVELAMER CARBONATE 1600 MG: 800 TABLET, FILM COATED ORAL at 13:16

## 2025-05-30 RX ADMIN — PANTOPRAZOLE SODIUM 40 MG: 40 TABLET, DELAYED RELEASE ORAL at 07:16

## 2025-05-30 RX ADMIN — HEPARIN SODIUM 5000 UNITS: 5000 INJECTION INTRAVENOUS; SUBCUTANEOUS at 13:16

## 2025-05-30 RX ADMIN — CLOPIDOGREL BISULFATE 75 MG: 75 TABLET, FILM COATED ORAL at 08:59

## 2025-05-30 RX ADMIN — Medication 5 MG: at 23:24

## 2025-05-30 RX ADMIN — ATORVASTATIN CALCIUM 80 MG: 40 TABLET, FILM COATED ORAL at 22:25

## 2025-05-30 RX ADMIN — BENZTROPINE MESYLATE 0.5 MG: 1 TABLET ORAL at 22:25

## 2025-05-30 RX ADMIN — CARVEDILOL 3.12 MG: 6.25 TABLET, FILM COATED ORAL at 17:50

## 2025-05-30 RX ADMIN — SEVELAMER CARBONATE 1600 MG: 800 TABLET, FILM COATED ORAL at 22:26

## 2025-05-30 ASSESSMENT — PAIN SCALES - GENERAL
PAINLEVEL_OUTOF10: 10
PAINLEVEL_OUTOF10: 0
PAINLEVEL_OUTOF10: 9
PAINLEVEL_OUTOF10: 0

## 2025-05-30 ASSESSMENT — PAIN SCALES - WONG BAKER: WONGBAKER_NUMERICALRESPONSE: NO HURT

## 2025-05-30 ASSESSMENT — PAIN DESCRIPTION - ORIENTATION
ORIENTATION: RIGHT;LEFT
ORIENTATION: RIGHT;LEFT;MID

## 2025-05-30 ASSESSMENT — PAIN DESCRIPTION - LOCATION
LOCATION: OTHER (COMMENT)
LOCATION: ABDOMEN

## 2025-05-30 ASSESSMENT — PAIN DESCRIPTION - ONSET
ONSET: ON-GOING
ONSET: ON-GOING

## 2025-05-30 ASSESSMENT — PAIN DESCRIPTION - DESCRIPTORS
DESCRIPTORS: ACHING;DISCOMFORT
DESCRIPTORS: ACHING;DISCOMFORT

## 2025-05-30 ASSESSMENT — PAIN DESCRIPTION - PAIN TYPE
TYPE: ACUTE PAIN
TYPE: ACUTE PAIN

## 2025-05-30 ASSESSMENT — PAIN DESCRIPTION - FREQUENCY
FREQUENCY: CONTINUOUS
FREQUENCY: CONTINUOUS

## 2025-05-30 NOTE — PLAN OF CARE
Problem: Discharge Planning  Goal: Discharge to home or other facility with appropriate resources  5/30/2025 1048 by Komal Tran RN  Outcome: Progressing  Flowsheets (Taken 5/30/2025 0209 by Peewee Saucedo RN)  Discharge to home or other facility with appropriate resources:   Arrange for needed discharge resources and transportation as appropriate   Identify barriers to discharge with patient and caregiver   Identify discharge learning needs (meds, wound care, etc)     Problem: Pain  Goal: Verbalizes/displays adequate comfort level or baseline comfort level  Recent Flowsheet Documentation  Taken 5/30/2025 0209 by Peewee Saucedo RN  Verbalizes/displays adequate comfort level or baseline comfort level:   Encourage patient to monitor pain and request assistance   Assess pain using appropriate pain scale   Administer analgesics based on type and severity of pain and evaluate response     Problem: Pain  Goal: Verbalizes/displays adequate comfort level or baseline comfort level  5/30/2025 1048 by Komal Tran RN  Outcome: Progressing  Flowsheets (Taken 5/30/2025 0209 by Peewee Saucedo RN)  Verbalizes/displays adequate comfort level or baseline comfort level:   Encourage patient to monitor pain and request assistance   Assess pain using appropriate pain scale   Administer analgesics based on type and severity of pain and evaluate response     Problem: Safety - Adult  Goal: Free from fall injury  5/30/2025 1048 by Komal Tran RN  Outcome: Progressing  Flowsheets (Taken 5/30/2025 0209 by Peewee Saucedo RN)  Free From Fall Injury:   Instruct family/caregiver on patient safety   Based on caregiver fall risk screen, instruct family/caregiver to ask for assistance with transferring infant if caregiver noted to have fall risk factors     Problem: Skin/Tissue Integrity  Goal: Skin integrity remains intact  Description: 1.  Monitor for areas of redness and/or skin breakdown2.  Assess vascular access

## 2025-05-30 NOTE — CONSULTS
[facility]  Palliative Medicine Consultation Note      Date Of Admission:5/27/2025  Date of consult: 05/30/25  Seen by PC in the past:  No    Recommendations:        Pt is well known to the palliative care team. Met with pt at the bedside. She is alert and oriented x4. She reported that her breathing is wonderful today. I asked how things had been going at [facility]. She reported the food at her facility is terrible so she orders take out at least once a day. She understands that there is a lot of sodium in take out food but she can't stomach the food at the facility. She is hoping that she can return to her apartment after this and will be able to cook for herself. She reported that she has a neighbor who is retired who can check in on her.    We discussed completing a HCPOA. Pt has three adult children. She wants her oldest son Dimitri to be her HCPOA. She she tried to talk with him about it he told her he was busy and he needed to call her back, which upset her although she admits she needs to discuss it with him again. Pt has another son and daughter who lives in [place], however she feels Dimitri knows her best. I printed her another HCPOA and she will talk about it with Dimitri later today.     We discussed code status again today, as we have many times previously, and she wants to remain a full code.     1. Goals of Care/Advanced Care planning/Code status: Full coded, confirmed today. Pt's goals are to be able to return to her apartment. She reported that she feels safe to do this now. She has three adult children who are her legal NOK and surrogate decision makers.   2. Pain: pt endorsed pain in her back since episode of shortness of breath which is now improving.   3. SOB, acute on chronic HF exacerbation: pt initially admitted with SOB and flash pulmonary edema, now much improved. Pt endorsed ordering take out every day at her facility because the food they provide is poor. She is starting to

## 2025-05-30 NOTE — PLAN OF CARE
Problem: Skin/Tissue Integrity  Goal: Skin integrity remains intact  Description: 1.  Monitor for areas of redness and/or skin breakdown2.  Assess vascular access sites hourly3.  Every 4-6 hours minimum:  Change oxygen saturation probe site4.  Every 4-6 hours:  If on nasal continuous positive airway pressure, respiratory therapy assess nares and determine need for appliance change or resting period  5/30/2025 0209 by Peewee Saucedo, RN  Outcome: Progressing  Flowsheets (Taken 5/30/2025 0209)  Skin Integrity Remains Intact: Monitor for areas of redness and/or skin breakdown     Problem: Safety - Adult  Goal: Free from fall injury  5/30/2025 0209 by Peewee Saucedo RN  Outcome: Progressing  Flowsheets (Taken 5/30/2025 0209)  Free From Fall Injury:   Instruct family/caregiver on patient safety   Based on caregiver fall risk screen, instruct family/caregiver to ask for assistance with transferring infant if caregiver noted to have fall risk factors     Problem: Respiratory - Adult  Goal: Achieves optimal ventilation and oxygenation  5/30/2025 0209 by Peewee Saucedo RN  Outcome: Progressing  Flowsheets (Taken 5/30/2025 0209)  Achieves optimal ventilation and oxygenation:   Assess for changes in respiratory status   Assess for changes in mentation and behavior   Position to facilitate oxygenation and minimize respiratory effort     Problem: Pain  Goal: Verbalizes/displays adequate comfort level or baseline comfort level  5/30/2025 0209 by Peewee Saucedo RN  Outcome: Progressing  Flowsheets (Taken 5/30/2025 0209)  Verbalizes/displays adequate comfort level or baseline comfort level:   Encourage patient to monitor pain and request assistance   Assess pain using appropriate pain scale   Administer analgesics based on type and severity of pain and evaluate response     Problem: Discharge Planning  Goal: Discharge to home or other facility with appropriate resources  5/30/2025 0209 by Peewee Saucedo, PEACE  Outcome:

## 2025-05-30 NOTE — DISCHARGE INSTRUCTIONS
Extra Heart Failure Education/ Tools/ Resources:     https://[employer]com/publication/?r=758239   --- this is American Heart Association interactive Healthier Living with Heart Failure guidebook.  Please click hyperlink or copy / paste link into search bar. The QR Code is also available below. Use your mouse to scroll through the pages.  Lots of information about weight monitoring, diet tips, activity, meds, etc    Heart Failure Tools and Resources QR Code is below. It includes multiple resources to include symptom tracker, med tracker, further HF info, and access to a HF Support Network online Community    HF Meeker Antonia  -- this is a free smart phone antonia available for iPhone and Android download.  Use your phone to track sodium / fluid intake, zone tool symptom tracking, weights, medications, etc. Click on this hyperlink  HF Meeker Antonia   for QR code for easy download or the link is also found in the below HF Tools and Resources.      DASH (Dietary Approach to Stop Hypertension) diet --  https://www.nhlbi.nih.gov/education/dash-eating-plan -- this diet is a flexible eating plan that promotes heart healthy eating style.  Click on hyperlink or copy / paste link into search bar.  Lots of low sodium recipes and tips.    https://www.[employer]/recipes  -- more free recipes         Pt in ED through triage with c/o increased pain to rectum pt reports a bump pt describes pain as burning pt states \" I think its a Hemorid\".

## 2025-05-31 LAB
ALBUMIN SERPL-MCNC: 3.6 G/DL (ref 3.4–5)
ANION GAP SERPL CALCULATED.3IONS-SCNC: 15 MMOL/L (ref 3–16)
BASOPHILS # BLD: 0 K/UL (ref 0–0.2)
BASOPHILS NFR BLD: 0.8 %
BUN SERPL-MCNC: 45 MG/DL (ref 7–20)
CALCIUM SERPL-MCNC: 9.1 MG/DL (ref 8.3–10.6)
CHLORIDE SERPL-SCNC: 98 MMOL/L (ref 99–110)
CO2 SERPL-SCNC: 21 MMOL/L (ref 21–32)
CREAT SERPL-MCNC: 5.6 MG/DL (ref 0.6–1.1)
DEPRECATED RDW RBC AUTO: 18 % (ref 12.4–15.4)
EOSINOPHIL # BLD: 0.6 K/UL (ref 0–0.6)
EOSINOPHIL NFR BLD: 10.9 %
GFR SERPLBLD CREATININE-BSD FMLA CKD-EPI: 8 ML/MIN/{1.73_M2}
GLUCOSE SERPL-MCNC: 73 MG/DL (ref 70–99)
HCT VFR BLD AUTO: 31.9 % (ref 36–48)
HGB BLD-MCNC: 10.7 G/DL (ref 12–16)
LYMPHOCYTES # BLD: 1.5 K/UL (ref 1–5.1)
LYMPHOCYTES NFR BLD: 26.5 %
MCH RBC QN AUTO: 28.3 PG (ref 26–34)
MCHC RBC AUTO-ENTMCNC: 33.4 G/DL (ref 31–36)
MCV RBC AUTO: 84.7 FL (ref 80–100)
MONOCYTES # BLD: 0.7 K/UL (ref 0–1.3)
MONOCYTES NFR BLD: 12.2 %
NEUTROPHILS # BLD: 2.8 K/UL (ref 1.7–7.7)
NEUTROPHILS NFR BLD: 49.6 %
PHOSPHATE SERPL-MCNC: 6.4 MG/DL (ref 2.5–4.9)
PLATELET # BLD AUTO: 166 K/UL (ref 135–450)
PMV BLD AUTO: 7.1 FL (ref 5–10.5)
POTASSIUM SERPL-SCNC: 5 MMOL/L (ref 3.5–5.1)
RBC # BLD AUTO: 3.77 M/UL (ref 4–5.2)
SODIUM SERPL-SCNC: 134 MMOL/L (ref 136–145)
WBC # BLD AUTO: 5.6 K/UL (ref 4–11)

## 2025-05-31 PROCEDURE — 80069 RENAL FUNCTION PANEL: CPT

## 2025-05-31 PROCEDURE — 6370000000 HC RX 637 (ALT 250 FOR IP): Performed by: SURGERY

## 2025-05-31 PROCEDURE — 2700000000 HC OXYGEN THERAPY PER DAY

## 2025-05-31 PROCEDURE — 6360000002 HC RX W HCPCS: Performed by: FAMILY MEDICINE

## 2025-05-31 PROCEDURE — 90935 HEMODIALYSIS ONE EVALUATION: CPT

## 2025-05-31 PROCEDURE — 2060000000 HC ICU INTERMEDIATE R&B

## 2025-05-31 PROCEDURE — 6370000000 HC RX 637 (ALT 250 FOR IP): Performed by: STUDENT IN AN ORGANIZED HEALTH CARE EDUCATION/TRAINING PROGRAM

## 2025-05-31 PROCEDURE — 6360000002 HC RX W HCPCS: Performed by: STUDENT IN AN ORGANIZED HEALTH CARE EDUCATION/TRAINING PROGRAM

## 2025-05-31 PROCEDURE — 6370000000 HC RX 637 (ALT 250 FOR IP): Performed by: FAMILY MEDICINE

## 2025-05-31 PROCEDURE — 36415 COLL VENOUS BLD VENIPUNCTURE: CPT

## 2025-05-31 PROCEDURE — 94761 N-INVAS EAR/PLS OXIMETRY MLT: CPT

## 2025-05-31 PROCEDURE — 6370000000 HC RX 637 (ALT 250 FOR IP): Performed by: NURSE PRACTITIONER

## 2025-05-31 PROCEDURE — 85025 COMPLETE CBC W/AUTO DIFF WBC: CPT

## 2025-05-31 RX ORDER — SENNOSIDES 8.6 MG/1
1 TABLET ORAL NIGHTLY
Status: DISCONTINUED | OUTPATIENT
Start: 2025-05-31 | End: 2025-06-01

## 2025-05-31 RX ORDER — GABAPENTIN 100 MG/1
100 CAPSULE ORAL DAILY
COMMUNITY

## 2025-05-31 RX ORDER — POLYETHYLENE GLYCOL 3350 17 G/17G
17 POWDER, FOR SOLUTION ORAL 2 TIMES DAILY
Status: DISCONTINUED | OUTPATIENT
Start: 2025-05-31 | End: 2025-06-03 | Stop reason: HOSPADM

## 2025-05-31 RX ORDER — LIDOCAINE 4 G/G
1 PATCH TOPICAL DAILY
COMMUNITY

## 2025-05-31 RX ORDER — FLUCONAZOLE 200 MG/1
200 TABLET ORAL ONCE
Status: COMPLETED | OUTPATIENT
Start: 2025-05-31 | End: 2025-05-31

## 2025-05-31 RX ORDER — SEVELAMER CARBONATE 800 MG/1
1 TABLET, FILM COATED ORAL
COMMUNITY

## 2025-05-31 RX ADMIN — HEPARIN SODIUM 5000 UNITS: 5000 INJECTION INTRAVENOUS; SUBCUTANEOUS at 23:23

## 2025-05-31 RX ADMIN — HEPARIN SODIUM 3200 UNITS: 1000 INJECTION INTRAVENOUS; SUBCUTANEOUS at 16:10

## 2025-05-31 RX ADMIN — PANTOPRAZOLE SODIUM 40 MG: 40 TABLET, DELAYED RELEASE ORAL at 06:40

## 2025-05-31 RX ADMIN — HEPARIN SODIUM 5000 UNITS: 5000 INJECTION INTRAVENOUS; SUBCUTANEOUS at 06:40

## 2025-05-31 RX ADMIN — MIRTAZAPINE 30 MG: 30 TABLET, FILM COATED ORAL at 23:23

## 2025-05-31 RX ADMIN — SENNOSIDES 8.6 MG: 8.6 TABLET, FILM COATED ORAL at 11:21

## 2025-05-31 RX ADMIN — TORSEMIDE 50 MG: 100 TABLET ORAL at 09:51

## 2025-05-31 RX ADMIN — CLOPIDOGREL BISULFATE 75 MG: 75 TABLET, FILM COATED ORAL at 09:52

## 2025-05-31 RX ADMIN — SEVELAMER CARBONATE 1600 MG: 800 TABLET, FILM COATED ORAL at 16:40

## 2025-05-31 RX ADMIN — BENZTROPINE MESYLATE 0.5 MG: 1 TABLET ORAL at 23:24

## 2025-05-31 RX ADMIN — CARVEDILOL 3.12 MG: 6.25 TABLET, FILM COATED ORAL at 09:51

## 2025-05-31 RX ADMIN — BUPROPION HYDROCHLORIDE 150 MG: 150 TABLET, FILM COATED, EXTENDED RELEASE ORAL at 09:52

## 2025-05-31 RX ADMIN — FLUCONAZOLE 200 MG: 200 TABLET ORAL at 11:21

## 2025-05-31 RX ADMIN — Medication 5 MG: at 23:32

## 2025-05-31 RX ADMIN — CARVEDILOL 3.12 MG: 6.25 TABLET, FILM COATED ORAL at 16:40

## 2025-05-31 RX ADMIN — POLYETHYLENE GLYCOL 3350 17 G: 17 POWDER, FOR SOLUTION ORAL at 11:21

## 2025-05-31 RX ADMIN — ATORVASTATIN CALCIUM 80 MG: 40 TABLET, FILM COATED ORAL at 23:24

## 2025-05-31 RX ADMIN — SEVELAMER CARBONATE 1600 MG: 800 TABLET, FILM COATED ORAL at 09:51

## 2025-05-31 NOTE — PLAN OF CARE
Problem: Discharge Planning  Goal: Discharge to home or other facility with appropriate resources  Recent Flowsheet Documentation  Taken 5/31/2025 1710 by Althea Lopez RN  Discharge to home or other facility with appropriate resources: Identify barriers to discharge with patient and caregiver     Problem: Discharge Planning  Goal: Discharge to home or other facility with appropriate resources  Outcome: Progressing  Flowsheets (Taken 5/31/2025 1710)  Discharge to home or other facility with appropriate resources: Identify barriers to discharge with patient and caregiver     Problem: Pain  Goal: Verbalizes/displays adequate comfort level or baseline comfort level  Recent Flowsheet Documentation  Taken 5/31/2025 1710 by Althea Lopez RN  Verbalizes/displays adequate comfort level or baseline comfort level:   Encourage patient to monitor pain and request assistance   Assess pain using appropriate pain scale   Implement non-pharmacological measures as appropriate and evaluate response   Administer analgesics based on type and severity of pain and evaluate response   Consider cultural and social influences on pain and pain management   Notify Licensed Independent Practitioner if interventions unsuccessful or patient reports new pain

## 2025-05-31 NOTE — PLAN OF CARE
Problem: Skin/Tissue Integrity  Goal: Skin integrity remains intact  Description: 1.  Monitor for areas of redness and/or skin breakdown2.  Assess vascular access sites hourly3.  Every 4-6 hours minimum:  Change oxygen saturation probe site4.  Every 4-6 hours:  If on nasal continuous positive airway pressure, respiratory therapy assess nares and determine need for appliance change or resting period  Outcome: Progressing  Flowsheets (Taken 5/31/2025 0055)  Skin Integrity Remains Intact: Monitor for areas of redness and/or skin breakdown  Note: PT able to turn self.      Problem: Safety - Adult  Goal: Free from fall injury  Outcome: Progressing  Flowsheets (Taken 5/31/2025 0055)  Free From Fall Injury:   Instruct family/caregiver on patient safety   Based on caregiver fall risk screen, instruct family/caregiver to ask for assistance with transferring infant if caregiver noted to have fall risk factors  Note: Call light within reach, bed alarm on, bed at lowest position      Problem: Respiratory - Adult  Goal: Achieves optimal ventilation and oxygenation  Outcome: Progressing  Flowsheets (Taken 5/31/2025 0055)  Achieves optimal ventilation and oxygenation:   Assess for changes in respiratory status   Assess for changes in mentation and behavior   Position to facilitate oxygenation and minimize respiratory effort  Note: On 1L NC, refused night time CPAP     Problem: Pain  Goal: Verbalizes/displays adequate comfort level or baseline comfort level  Outcome: Progressing  Flowsheets (Taken 5/31/2025 0055)  Verbalizes/displays adequate comfort level or baseline comfort level:   Encourage patient to monitor pain and request assistance   Assess pain using appropriate pain scale   Administer analgesics based on type and severity of pain and evaluate response  Note: Denied pain during assessment      Problem: Discharge Planning  Goal: Discharge to home or other facility with appropriate resources  Outcome:

## 2025-05-31 NOTE — CARE COORDINATION
DISCHARGE PLANNIN  Chart reviewed.  Patient is from [facility] as a long term care resident. She does not need a pre-cert to return. She receives HD outpatient at [facility].    Judi Feliz CNP asked me to stop by and talk with patient regarding discharge planning. Patient is really wanting to go back to her apartment rather than go to the LTC facility.    I met with patient at bedside to discuss. Patient states she's been working on getting back to her apartment for awhile but that the facility has not been assisting her with it.     She plans to have her son stay with her who will be available during the day (he works night shift). She has a neighbor that is agreeable to become her caregiver as long as we can get that arranged via [employer]. Patient has been in contract with [employer] to get handlebars and safety equipment in her home, but missed the meeting as she was in the hospital. She does not have a contact number for her  with [employer].   She has transportation through her insurance to get to HD.   She has three steps to get into her apartment but states she feels that possible to get up; especially with assistance from her son and neighbor.   She has a Rolator, cane, bedside commode and shower chair.  She has arranged to get her medications delivered to her apartment.    Barriers to returning home that CM team can work on:  -Patient is currently on 1L oxygen (none at baseline)  -Getting in touch with [employer] to discuss resuming in-home services (unable to complete over the weekend)    Judi Feliz CNP was made aware of the above barriers.  Plan for HD today.    UPDATE: 1222  Attempting to arrange HHC:  1) [facility] -- unable to take Medicaid at this time (Mary stated if unable to find anyone to let her know and she can try to get it approved)  2) [facility] -- VM left with Jimenez with callback number  3) [facility] -- unable to take Medicaid at this time  4) [facility]

## 2025-06-01 LAB
ALBUMIN SERPL-MCNC: 3.9 G/DL (ref 3.4–5)
ANION GAP SERPL CALCULATED.3IONS-SCNC: 13 MMOL/L (ref 3–16)
BASOPHILS # BLD: 0.1 K/UL (ref 0–0.2)
BASOPHILS NFR BLD: 1 %
BUN SERPL-MCNC: 35 MG/DL (ref 7–20)
CALCIUM SERPL-MCNC: 9.5 MG/DL (ref 8.3–10.6)
CHLORIDE SERPL-SCNC: 93 MMOL/L (ref 99–110)
CO2 SERPL-SCNC: 22 MMOL/L (ref 21–32)
CREAT SERPL-MCNC: 4.8 MG/DL (ref 0.6–1.1)
DEPRECATED RDW RBC AUTO: 18.2 % (ref 12.4–15.4)
EOSINOPHIL # BLD: 0.5 K/UL (ref 0–0.6)
EOSINOPHIL NFR BLD: 8.1 %
GFR SERPLBLD CREATININE-BSD FMLA CKD-EPI: 10 ML/MIN/{1.73_M2}
GLUCOSE SERPL-MCNC: 99 MG/DL (ref 70–99)
HCT VFR BLD AUTO: 36 % (ref 36–48)
HGB BLD-MCNC: 11.9 G/DL (ref 12–16)
LYMPHOCYTES # BLD: 1.5 K/UL (ref 1–5.1)
LYMPHOCYTES NFR BLD: 23 %
MCH RBC QN AUTO: 28.2 PG (ref 26–34)
MCHC RBC AUTO-ENTMCNC: 33.1 G/DL (ref 31–36)
MCV RBC AUTO: 85.2 FL (ref 80–100)
MONOCYTES # BLD: 0.8 K/UL (ref 0–1.3)
MONOCYTES NFR BLD: 12.3 %
NEUTROPHILS # BLD: 3.6 K/UL (ref 1.7–7.7)
NEUTROPHILS NFR BLD: 55.6 %
PHOSPHATE SERPL-MCNC: 5.5 MG/DL (ref 2.5–4.9)
PLATELET # BLD AUTO: 204 K/UL (ref 135–450)
PMV BLD AUTO: 7.1 FL (ref 5–10.5)
POTASSIUM SERPL-SCNC: 5 MMOL/L (ref 3.5–5.1)
RBC # BLD AUTO: 4.23 M/UL (ref 4–5.2)
SODIUM SERPL-SCNC: 128 MMOL/L (ref 136–145)
WBC # BLD AUTO: 6.5 K/UL (ref 4–11)

## 2025-06-01 PROCEDURE — 94618 PULMONARY STRESS TESTING: CPT

## 2025-06-01 PROCEDURE — 6370000000 HC RX 637 (ALT 250 FOR IP): Performed by: NURSE PRACTITIONER

## 2025-06-01 PROCEDURE — 85025 COMPLETE CBC W/AUTO DIFF WBC: CPT

## 2025-06-01 PROCEDURE — 6360000002 HC RX W HCPCS: Performed by: FAMILY MEDICINE

## 2025-06-01 PROCEDURE — 36415 COLL VENOUS BLD VENIPUNCTURE: CPT

## 2025-06-01 PROCEDURE — 6370000000 HC RX 637 (ALT 250 FOR IP): Performed by: STUDENT IN AN ORGANIZED HEALTH CARE EDUCATION/TRAINING PROGRAM

## 2025-06-01 PROCEDURE — 6370000000 HC RX 637 (ALT 250 FOR IP): Performed by: SURGERY

## 2025-06-01 PROCEDURE — 94680 O2 UPTK RST&XERS DIR SIMPLE: CPT

## 2025-06-01 PROCEDURE — 2060000000 HC ICU INTERMEDIATE R&B

## 2025-06-01 PROCEDURE — 80069 RENAL FUNCTION PANEL: CPT

## 2025-06-01 RX ORDER — SENNOSIDES 8.6 MG/1
1 TABLET ORAL NIGHTLY PRN
Status: DISCONTINUED | OUTPATIENT
Start: 2025-06-01 | End: 2025-06-03 | Stop reason: HOSPADM

## 2025-06-01 RX ORDER — SENNOSIDES 8.6 MG/1
1 TABLET ORAL 2 TIMES DAILY
Status: DISCONTINUED | OUTPATIENT
Start: 2025-06-01 | End: 2025-06-01

## 2025-06-01 RX ADMIN — CARVEDILOL 3.12 MG: 6.25 TABLET, FILM COATED ORAL at 16:58

## 2025-06-01 RX ADMIN — SEVELAMER CARBONATE 1600 MG: 800 TABLET, FILM COATED ORAL at 15:12

## 2025-06-01 RX ADMIN — MIRTAZAPINE 30 MG: 30 TABLET, FILM COATED ORAL at 21:42

## 2025-06-01 RX ADMIN — SEVELAMER CARBONATE 1600 MG: 800 TABLET, FILM COATED ORAL at 21:07

## 2025-06-01 RX ADMIN — SEVELAMER CARBONATE 1600 MG: 800 TABLET, FILM COATED ORAL at 08:44

## 2025-06-01 RX ADMIN — HEPARIN SODIUM 5000 UNITS: 5000 INJECTION INTRAVENOUS; SUBCUTANEOUS at 15:12

## 2025-06-01 RX ADMIN — Medication 5 MG: at 23:47

## 2025-06-01 RX ADMIN — CARVEDILOL 3.12 MG: 6.25 TABLET, FILM COATED ORAL at 08:43

## 2025-06-01 RX ADMIN — TORSEMIDE 50 MG: 100 TABLET ORAL at 08:43

## 2025-06-01 RX ADMIN — HEPARIN SODIUM 5000 UNITS: 5000 INJECTION INTRAVENOUS; SUBCUTANEOUS at 21:07

## 2025-06-01 RX ADMIN — CLOPIDOGREL BISULFATE 75 MG: 75 TABLET, FILM COATED ORAL at 08:43

## 2025-06-01 RX ADMIN — ATORVASTATIN CALCIUM 80 MG: 40 TABLET, FILM COATED ORAL at 21:07

## 2025-06-01 RX ADMIN — BENZTROPINE MESYLATE 0.5 MG: 1 TABLET ORAL at 21:07

## 2025-06-01 RX ADMIN — BUPROPION HYDROCHLORIDE 150 MG: 150 TABLET, FILM COATED, EXTENDED RELEASE ORAL at 08:43

## 2025-06-01 ASSESSMENT — PAIN SCALES - GENERAL
PAINLEVEL_OUTOF10: 0
PAINLEVEL_OUTOF10: 0

## 2025-06-01 NOTE — DISCHARGE INSTR - COC
Continuity of Care Form    Patient Name: Cindi Rodriguez   :  1965  MRN:  [id number]    Admit date:  2025  Discharge date:  6/3/2025      Code Status Order: Full Code   Advance Directives:     Admitting Physician:  Gilberto Castaneda MD  PCP: Alessandra Anderson DO    Discharging Nurse: PEACE Perry  Discharging Hospital Unit/Room#: 4459/4459-01  Discharging Unit Phone Number:     Emergency Contact:   Extended Emergency Contact Information  Primary Emergency Contact: Dimitri Santo  Home Phone: [phone]  Mobile Phone: [phone]  Relation: Child   needed? No  Secondary Emergency Contact: Buster Santo  Home Phone: [phone]  Mobile Phone: [phone]  Relation: Child    Past Surgical History:  Past Surgical History:   Procedure Laterality Date    DIALYSIS CATHETER REMOVAL N/A 2024    PERITONEAL DIAYSIS CATHETER REMOVAL, INCISION AND DRAINAGE OF ABDOMINAL WALL ABCESS performed by Santos Castaneda DO at [facility] OR    ENDOMETRIAL ABLATION      INNER EAR SURGERY      TUBAL LIGATION         Immunization History:     There is no immunization history on file for this patient.    Active Problems:  Patient Active Problem List   Diagnosis Code    Atypical chest pain R07.89    DM II (diabetes mellitus, type II), controlled ([facility]) E11.9    HTN (hypertension) I10    JONATHAN (acute kidney injury) N17.9    Asthma J45.909    History of anemia due to CKD N18.9, Z86.2    Peritonitis ([facility]) K65.9    ESRD on peritoneal dialysis ([facility]) N18.6, Z99.2    Complication of peritoneal dialysis T80.90XA    Peritonitis associated with peritoneal dialysis T85.71XA    Chronic idiopathic constipation K59.04    Lower abdominal pain R10.30    AMS (altered mental status) R41.82    Recurrent falls R29.6    Vertigo R42    Respiratory failure ([facility]CC) J96.90    Anemia D64.9    Multifocal pneumonia J18.9    Metabolic encephalopathy G93.41    Peritoneal dialysis catheter in place Z99.2    Abdominal wall abscess L02.211    Encephalopathy

## 2025-06-01 NOTE — PLAN OF CARE
Problem: Pain  Goal: Verbalizes/displays adequate comfort level or baseline comfort level  Recent Flowsheet Documentation  Taken 6/1/2025 0759 by Althea Lopez RN  Verbalizes/displays adequate comfort level or baseline comfort level:   Encourage patient to monitor pain and request assistance   Assess pain using appropriate pain scale   Administer analgesics based on type and severity of pain and evaluate response   Implement non-pharmacological measures as appropriate and evaluate response   Consider cultural and social influences on pain and pain management   Notify Licensed Independent Practitioner if interventions unsuccessful or patient reports new pain  Taken 6/1/2025 0250 by Peewee Saucedo RN  Verbalizes/displays adequate comfort level or baseline comfort level:   Encourage patient to monitor pain and request assistance   Assess pain using appropriate pain scale     Problem: Pain  Goal: Verbalizes/displays adequate comfort level or baseline comfort level  6/1/2025 0759 by Althea Lopez RN  Outcome: Progressing  Flowsheets (Taken 6/1/2025 0759)  Verbalizes/displays adequate comfort level or baseline comfort level:   Encourage patient to monitor pain and request assistance   Assess pain using appropriate pain scale   Administer analgesics based on type and severity of pain and evaluate response   Implement non-pharmacological measures as appropriate and evaluate response   Consider cultural and social influences on pain and pain management   Notify Licensed Independent Practitioner if interventions unsuccessful or patient reports new pain     Problem: Safety - Adult  Goal: Free from fall injury  Recent Flowsheet Documentation  Taken 6/1/2025 0759 by Althea Lopez RN  Free From Fall Injury: Instruct family/caregiver on patient safety  Taken 6/1/2025 0250 by Peewee Saucedo RN  Free From Fall Injury: Instruct family/caregiver on patient safety     Problem: Safety - Adult  Goal: Free from fall

## 2025-06-01 NOTE — PLAN OF CARE
Problem: Skin/Tissue Integrity  Goal: Skin integrity remains intact  Description: 1.  Monitor for areas of redness and/or skin breakdown2.  Assess vascular access sites hourly3.  Every 4-6 hours minimum:  Change oxygen saturation probe site4.  Every 4-6 hours:  If on nasal continuous positive airway pressure, respiratory therapy assess nares and determine need for appliance change or resting period  Outcome: Progressing  Flowsheets (Taken 6/1/2025 0250)  Skin Integrity Remains Intact: Monitor for areas of redness and/or skin breakdown  Note: PT able to turn self      Problem: Safety - Adult  Goal: Free from fall injury  6/1/2025 0250 by Peewee Saucedo RN  Outcome: Progressing  Flowsheets (Taken 6/1/2025 0250)  Free From Fall Injury: Instruct family/caregiver on patient safety  Note: Call light within reach, bed alarm on, bed at lowest position      Problem: Respiratory - Adult  Goal: Achieves optimal ventilation and oxygenation  Outcome: Progressing  Flowsheets (Taken 6/1/2025 0250)  Achieves optimal ventilation and oxygenation:   Assess for changes in respiratory status   Assess for changes in mentation and behavior     Problem: Pain  Goal: Verbalizes/displays adequate comfort level or baseline comfort level  6/1/2025 0250 by Peewee Saucedo RN  Outcome: Progressing  Flowsheets (Taken 6/1/2025 0250)  Verbalizes/displays adequate comfort level or baseline comfort level:   Encourage patient to monitor pain and request assistance   Assess pain using appropriate pain scale     Problem: Discharge Planning  Goal: Discharge to home or other facility with appropriate resources  6/1/2025 0250 by Peewee Saucedo RN  Flowsheets (Taken 6/1/2025 0250)  Discharge to home or other facility with appropriate resources:   Identify barriers to discharge with patient and caregiver   Arrange for needed discharge resources and transportation as appropriate     Problem: Cardiovascular - Adult  Goal: Maintains optimal cardiac

## 2025-06-02 LAB
ALBUMIN SERPL-MCNC: 3.7 G/DL (ref 3.4–5)
ANION GAP SERPL CALCULATED.3IONS-SCNC: 17 MMOL/L (ref 3–16)
BASOPHILS # BLD: 0.1 K/UL (ref 0–0.2)
BASOPHILS NFR BLD: 0.8 %
BUN SERPL-MCNC: 60 MG/DL (ref 7–20)
CALCIUM SERPL-MCNC: 9.6 MG/DL (ref 8.3–10.6)
CHLORIDE SERPL-SCNC: 95 MMOL/L (ref 99–110)
CO2 SERPL-SCNC: 17 MMOL/L (ref 21–32)
CREAT SERPL-MCNC: 6.2 MG/DL (ref 0.6–1.1)
DEPRECATED RDW RBC AUTO: 18.1 % (ref 12.4–15.4)
EOSINOPHIL # BLD: 0.4 K/UL (ref 0–0.6)
EOSINOPHIL NFR BLD: 6.2 %
GFR SERPLBLD CREATININE-BSD FMLA CKD-EPI: 7 ML/MIN/{1.73_M2}
GLUCOSE SERPL-MCNC: 123 MG/DL (ref 70–99)
HCT VFR BLD AUTO: 36.5 % (ref 36–48)
HGB BLD-MCNC: 11.9 G/DL (ref 12–16)
LYMPHOCYTES # BLD: 1.1 K/UL (ref 1–5.1)
LYMPHOCYTES NFR BLD: 17.6 %
MCH RBC QN AUTO: 28.2 PG (ref 26–34)
MCHC RBC AUTO-ENTMCNC: 32.5 G/DL (ref 31–36)
MCV RBC AUTO: 86.6 FL (ref 80–100)
MONOCYTES # BLD: 0.6 K/UL (ref 0–1.3)
MONOCYTES NFR BLD: 9.1 %
NEUTROPHILS # BLD: 4.1 K/UL (ref 1.7–7.7)
NEUTROPHILS NFR BLD: 66.3 %
PHOSPHATE SERPL-MCNC: 6.4 MG/DL (ref 2.5–4.9)
PLATELET # BLD AUTO: 167 K/UL (ref 135–450)
PMV BLD AUTO: 8.2 FL (ref 5–10.5)
POTASSIUM SERPL-SCNC: 6.2 MMOL/L (ref 3.5–5.1)
RBC # BLD AUTO: 4.21 M/UL (ref 4–5.2)
SODIUM SERPL-SCNC: 129 MMOL/L (ref 136–145)
WBC # BLD AUTO: 6.2 K/UL (ref 4–11)

## 2025-06-02 PROCEDURE — 6360000002 HC RX W HCPCS: Performed by: FAMILY MEDICINE

## 2025-06-02 PROCEDURE — 80069 RENAL FUNCTION PANEL: CPT

## 2025-06-02 PROCEDURE — 97530 THERAPEUTIC ACTIVITIES: CPT

## 2025-06-02 PROCEDURE — 6370000000 HC RX 637 (ALT 250 FOR IP): Performed by: NURSE PRACTITIONER

## 2025-06-02 PROCEDURE — 2060000000 HC ICU INTERMEDIATE R&B

## 2025-06-02 PROCEDURE — 90935 HEMODIALYSIS ONE EVALUATION: CPT

## 2025-06-02 PROCEDURE — 36415 COLL VENOUS BLD VENIPUNCTURE: CPT

## 2025-06-02 PROCEDURE — 6370000000 HC RX 637 (ALT 250 FOR IP): Performed by: STUDENT IN AN ORGANIZED HEALTH CARE EDUCATION/TRAINING PROGRAM

## 2025-06-02 PROCEDURE — 6370000000 HC RX 637 (ALT 250 FOR IP): Performed by: FAMILY MEDICINE

## 2025-06-02 PROCEDURE — 85025 COMPLETE CBC W/AUTO DIFF WBC: CPT

## 2025-06-02 PROCEDURE — 6370000000 HC RX 637 (ALT 250 FOR IP): Performed by: SURGERY

## 2025-06-02 PROCEDURE — 97110 THERAPEUTIC EXERCISES: CPT

## 2025-06-02 RX ADMIN — SEVELAMER CARBONATE 1600 MG: 800 TABLET, FILM COATED ORAL at 20:15

## 2025-06-02 RX ADMIN — POLYETHYLENE GLYCOL 3350 17 G: 17 POWDER, FOR SOLUTION ORAL at 20:35

## 2025-06-02 RX ADMIN — BUPROPION HYDROCHLORIDE 150 MG: 150 TABLET, FILM COATED, EXTENDED RELEASE ORAL at 09:53

## 2025-06-02 RX ADMIN — HEPARIN SODIUM 5000 UNITS: 5000 INJECTION INTRAVENOUS; SUBCUTANEOUS at 17:09

## 2025-06-02 RX ADMIN — TORSEMIDE 50 MG: 100 TABLET ORAL at 09:52

## 2025-06-02 RX ADMIN — SEVELAMER CARBONATE 1600 MG: 800 TABLET, FILM COATED ORAL at 17:09

## 2025-06-02 RX ADMIN — BENZTROPINE MESYLATE 0.5 MG: 1 TABLET ORAL at 20:15

## 2025-06-02 RX ADMIN — Medication 5 MG: at 20:14

## 2025-06-02 RX ADMIN — CLOPIDOGREL BISULFATE 75 MG: 75 TABLET, FILM COATED ORAL at 09:53

## 2025-06-02 RX ADMIN — PANTOPRAZOLE SODIUM 40 MG: 40 TABLET, DELAYED RELEASE ORAL at 06:19

## 2025-06-02 RX ADMIN — ATORVASTATIN CALCIUM 80 MG: 40 TABLET, FILM COATED ORAL at 20:15

## 2025-06-02 RX ADMIN — CARVEDILOL 3.12 MG: 6.25 TABLET, FILM COATED ORAL at 09:53

## 2025-06-02 RX ADMIN — SEVELAMER CARBONATE 1600 MG: 800 TABLET, FILM COATED ORAL at 09:53

## 2025-06-02 RX ADMIN — HEPARIN SODIUM 5000 UNITS: 5000 INJECTION INTRAVENOUS; SUBCUTANEOUS at 06:19

## 2025-06-02 RX ADMIN — MIRTAZAPINE 30 MG: 30 TABLET, FILM COATED ORAL at 20:15

## 2025-06-02 RX ADMIN — CARVEDILOL 3.12 MG: 6.25 TABLET, FILM COATED ORAL at 17:09

## 2025-06-02 ASSESSMENT — PAIN SCALES - GENERAL
PAINLEVEL_OUTOF10: 0

## 2025-06-02 NOTE — CARE COORDINATION
3:06 PM  Plan for patient to return home at baseline.   Per Juany EDDY     She plans to have her son stay with her who will be available during the day (he works night shift). She has a neighbor that is agreeable to become her caregiver as long as we can get that arranged via [employer]. Patient has been in contract with [employer] to get handlebars and safety equipment in her home, but missed the meeting as she was in the hospital. She does not have a contact number for her  with [employer].   She has transportation through her insurance to get to HD.   She has three steps to get into her apartment but states she feels that possible to get up; especially with assistance from her son and neighbor.   She has a Rolator, cane, bedside commode and shower chair.  She has arranged to get her medications delivered to her apartment    Will n eed updated home o2 eval prior to dc ( Must be done within 24 hrs of dc)   LVM for [employer] Juanita avelar. Awaiting call back.     Electronically signed by Ashu Leung RN, CM on 6/2/2025 at 3:13 PM.  Phone: [phone]  Fax: [phone]

## 2025-06-02 NOTE — PLAN OF CARE
Problem: Skin/Tissue Integrity  Goal: Skin integrity remains intact  Description: 1.  Monitor for areas of redness and/or skin breakdown2.  Assess vascular access sites hourly3.  Every 4-6 hours minimum:  Change oxygen saturation probe site4.  Every 4-6 hours:  If on nasal continuous positive airway pressure, respiratory therapy assess nares and determine need for appliance change or resting period  Outcome: Progressing  Flowsheets  Taken 6/2/2025 1534  Skin Integrity Remains Intact: Monitor for areas of redness and/or skin breakdown  Taken 6/2/2025 1533  Skin Integrity Remains Intact: Monitor for areas of redness and/or skin breakdown  Taken 6/2/2025 1140  Skin Integrity Remains Intact: Monitor for areas of redness and/or skin breakdown  Taken 6/2/2025 0945  Skin Integrity Remains Intact: Monitor for areas of redness and/or skin breakdown  Note: Pt is free of new or worsenng skin breakdown this shift.      Problem: Safety - Adult  Goal: Free from fall injury  Outcome: Progressing  Flowsheets  Taken 6/2/2025 1534  Free From Fall Injury: Instruct family/caregiver on patient safety  Taken 6/2/2025 1533  Free From Fall Injury: Instruct family/caregiver on patient safety  Note: Pt is compliant with all safety measrues bed alarm, gripper socks, gait belt and call light used this shift.      Problem: Respiratory - Adult  Goal: Achieves optimal ventilation and oxygenation  Outcome: Progressing  Flowsheets  Taken 6/2/2025 1140  Achieves optimal ventilation and oxygenation:   Assess for changes in respiratory status   Assess for changes in mentation and behavior   Position to facilitate oxygenation and minimize respiratory effort   Oxygen supplementation based on oxygen saturation or arterial blood gases   Assess and instruct to report shortness of breath or any respiratory difficulty   Respiratory therapy support as indicated  Taken 6/2/2025 0945  Achieves optimal ventilation and oxygenation:   Assess for changes in

## 2025-06-02 NOTE — DIALYSIS
Treatment time: 2 hours 40 minutes   Net UF: 1700 ml     Pre weight: 64.2 kg  Post weight:62.5 kg  EDW: TBD kg  Access used: R TDC    Access function: well with  ml/min     Medications or blood products given: Heparin Dwells     Regular outpatient schedule: TTS     Summary of response to treatment: Patient tolerated treatment well and without any complications. Patient remained stable throughout entire treatment and upon the exiting the dialysis suite via transport.     Report given to Julia Jaramillo RN and copy of dialysis treatment record placed in chart, to be scanned into EMR.

## 2025-06-02 NOTE — PLAN OF CARE
Problem: Skin/Tissue Integrity  Goal: Skin integrity remains intact  Description: 1.  Monitor for areas of redness and/or skin breakdown2.  Assess vascular access sites hourly3.  Every 4-6 hours minimum:  Change oxygen saturation probe site4.  Every 4-6 hours:  If on nasal continuous positive airway pressure, respiratory therapy assess nares and determine need for appliance change or resting period  6/1/2025 2153 by Gabriella Vsaques RN  Outcome: Progressing  6/1/2025 0759 by Althea Lopez RN  Outcome: Progressing  Flowsheets (Taken 6/1/2025 0759)  Skin Integrity Remains Intact: Monitor for areas of redness and/or skin breakdown     Problem: Safety - Adult  Goal: Free from fall injury  6/1/2025 2153 by Gabriella Vasques RN  Outcome: Progressing  6/1/2025 0759 by Althea Lopez RN  Outcome: Progressing  Flowsheets (Taken 6/1/2025 0759)  Free From Fall Injury: Instruct family/caregiver on patient safety     Problem: Respiratory - Adult  Goal: Achieves optimal ventilation and oxygenation  Outcome: Progressing     Problem: Pain  Goal: Verbalizes/displays adequate comfort level or baseline comfort level  6/1/2025 2153 by Gabriella Vasques RN  Outcome: Progressing  6/1/2025 0759 by Althea Lopez RN  Outcome: Progressing  Flowsheets (Taken 6/1/2025 0759)  Verbalizes/displays adequate comfort level or baseline comfort level:   Encourage patient to monitor pain and request assistance   Assess pain using appropriate pain scale   Administer analgesics based on type and severity of pain and evaluate response   Implement non-pharmacological measures as appropriate and evaluate response   Consider cultural and social influences on pain and pain management   Notify Licensed Independent Practitioner if interventions unsuccessful or patient reports new pain     Problem: Discharge Planning  Goal: Discharge to home or other facility with appropriate resources  Outcome: Progressing  Note: Working on discharge to home with son

## 2025-06-03 VITALS
HEART RATE: 89 BPM | WEIGHT: 125.22 LBS | HEIGHT: 63 IN | OXYGEN SATURATION: 92 % | DIASTOLIC BLOOD PRESSURE: 87 MMHG | BODY MASS INDEX: 22.19 KG/M2 | SYSTOLIC BLOOD PRESSURE: 120 MMHG | RESPIRATION RATE: 18 BRPM | TEMPERATURE: 98 F

## 2025-06-03 LAB
ALBUMIN SERPL-MCNC: 4.7 G/DL (ref 3.4–5)
ANION GAP SERPL CALCULATED.3IONS-SCNC: 13 MMOL/L (ref 3–16)
BASOPHILS # BLD: 0 K/UL (ref 0–0.2)
BASOPHILS NFR BLD: 0.6 %
BUN SERPL-MCNC: 19 MG/DL (ref 7–20)
CALCIUM SERPL-MCNC: 9.9 MG/DL (ref 8.3–10.6)
CHLORIDE SERPL-SCNC: 95 MMOL/L (ref 99–110)
CO2 SERPL-SCNC: 26 MMOL/L (ref 21–32)
CREAT SERPL-MCNC: 3 MG/DL (ref 0.6–1.1)
DEPRECATED RDW RBC AUTO: 17.9 % (ref 12.4–15.4)
EOSINOPHIL # BLD: 0.4 K/UL (ref 0–0.6)
EOSINOPHIL NFR BLD: 5.8 %
GFR SERPLBLD CREATININE-BSD FMLA CKD-EPI: 17 ML/MIN/{1.73_M2}
GLUCOSE SERPL-MCNC: 140 MG/DL (ref 70–99)
HCT VFR BLD AUTO: 39.5 % (ref 36–48)
HGB BLD-MCNC: 13.1 G/DL (ref 12–16)
LYMPHOCYTES # BLD: 0.9 K/UL (ref 1–5.1)
LYMPHOCYTES NFR BLD: 12.5 %
MCH RBC QN AUTO: 27.9 PG (ref 26–34)
MCHC RBC AUTO-ENTMCNC: 33.1 G/DL (ref 31–36)
MCV RBC AUTO: 84.2 FL (ref 80–100)
MONOCYTES # BLD: 0.5 K/UL (ref 0–1.3)
MONOCYTES NFR BLD: 7.8 %
NEUTROPHILS # BLD: 5.1 K/UL (ref 1.7–7.7)
NEUTROPHILS NFR BLD: 73.3 %
PHOSPHATE SERPL-MCNC: 2.7 MG/DL (ref 2.5–4.9)
PLATELET # BLD AUTO: 205 K/UL (ref 135–450)
PMV BLD AUTO: 7.3 FL (ref 5–10.5)
POTASSIUM SERPL-SCNC: 4 MMOL/L (ref 3.5–5.1)
RBC # BLD AUTO: 4.69 M/UL (ref 4–5.2)
SODIUM SERPL-SCNC: 134 MMOL/L (ref 136–145)
WBC # BLD AUTO: 7 K/UL (ref 4–11)

## 2025-06-03 PROCEDURE — 90935 HEMODIALYSIS ONE EVALUATION: CPT

## 2025-06-03 PROCEDURE — 6370000000 HC RX 637 (ALT 250 FOR IP): Performed by: FAMILY MEDICINE

## 2025-06-03 PROCEDURE — 6360000002 HC RX W HCPCS: Performed by: FAMILY MEDICINE

## 2025-06-03 PROCEDURE — 6360000002 HC RX W HCPCS: Performed by: STUDENT IN AN ORGANIZED HEALTH CARE EDUCATION/TRAINING PROGRAM

## 2025-06-03 PROCEDURE — 36415 COLL VENOUS BLD VENIPUNCTURE: CPT

## 2025-06-03 PROCEDURE — 85025 COMPLETE CBC W/AUTO DIFF WBC: CPT

## 2025-06-03 PROCEDURE — 80069 RENAL FUNCTION PANEL: CPT

## 2025-06-03 PROCEDURE — 6370000000 HC RX 637 (ALT 250 FOR IP): Performed by: STUDENT IN AN ORGANIZED HEALTH CARE EDUCATION/TRAINING PROGRAM

## 2025-06-03 PROCEDURE — 6370000000 HC RX 637 (ALT 250 FOR IP): Performed by: SURGERY

## 2025-06-03 RX ADMIN — CARVEDILOL 3.12 MG: 6.25 TABLET, FILM COATED ORAL at 16:48

## 2025-06-03 RX ADMIN — CARVEDILOL 3.12 MG: 6.25 TABLET, FILM COATED ORAL at 11:41

## 2025-06-03 RX ADMIN — TORSEMIDE 50 MG: 100 TABLET ORAL at 11:42

## 2025-06-03 RX ADMIN — PANTOPRAZOLE SODIUM 40 MG: 40 TABLET, DELAYED RELEASE ORAL at 11:43

## 2025-06-03 RX ADMIN — SEVELAMER CARBONATE 1600 MG: 800 TABLET, FILM COATED ORAL at 11:42

## 2025-06-03 RX ADMIN — POLYETHYLENE GLYCOL 3350 17 G: 17 POWDER, FOR SOLUTION ORAL at 11:45

## 2025-06-03 RX ADMIN — HEPARIN SODIUM 3200 UNITS: 1000 INJECTION INTRAVENOUS; SUBCUTANEOUS at 11:19

## 2025-06-03 RX ADMIN — HEPARIN SODIUM 5000 UNITS: 5000 INJECTION INTRAVENOUS; SUBCUTANEOUS at 15:56

## 2025-06-03 RX ADMIN — CLOPIDOGREL BISULFATE 75 MG: 75 TABLET, FILM COATED ORAL at 11:42

## 2025-06-03 RX ADMIN — SEVELAMER CARBONATE 1600 MG: 800 TABLET, FILM COATED ORAL at 15:56

## 2025-06-03 RX ADMIN — BUPROPION HYDROCHLORIDE 150 MG: 150 TABLET, FILM COATED, EXTENDED RELEASE ORAL at 11:42

## 2025-06-03 ASSESSMENT — PAIN SCALES - GENERAL: PAINLEVEL_OUTOF10: 0

## 2025-06-03 NOTE — PLAN OF CARE
Problem: Skin/Tissue Integrity  Goal: Skin integrity remains intact  Description: 1.  Monitor for areas of redness and/or skin breakdown2.  Assess vascular access sites hourly3.  Every 4-6 hours minimum:  Change oxygen saturation probe site4.  Every 4-6 hours:  If on nasal continuous positive airway pressure, respiratory therapy assess nares and determine need for appliance change or resting period  Outcome: Progressing  Flowsheets  Taken 6/3/2025 1316  Skin Integrity Remains Intact: Monitor for areas of redness and/or skin breakdown  Taken 6/3/2025 1312  Skin Integrity Remains Intact: Monitor for areas of redness and/or skin breakdown  Note: Pt is free of new or worsenng skin breakdown this shift.      Problem: Safety - Adult  Goal: Free from fall injury  6/3/2025 1317 by Julia Jaramillo, RN  Outcome: Progressing  Flowsheets (Taken 6/3/2025 1312)  Free From Fall Injury: Instruct family/caregiver on patient safety  Note: Pt is compliant with all safety measrues bed alarm, gripper socks, gait belt and call light used this shift.      Problem: Respiratory - Adult  Goal: Achieves optimal ventilation and oxygenation  6/3/2025 1317 by Julia Jaramillo, RN  Outcome: Progressing  Flowsheets (Taken 6/2/2025 1658)  Achieves optimal ventilation and oxygenation:   Assess for changes in respiratory status   Assess for changes in mentation and behavior   Position to facilitate oxygenation and minimize respiratory effort   Oxygen supplementation based on oxygen saturation or arterial blood gases   Assess and instruct to report shortness of breath or any respiratory difficulty   Respiratory therapy support as indicated  Note: Pt is weaning to less supplemental O2 needed this shift.     Problem: Pain  Goal: Verbalizes/displays adequate comfort level or baseline comfort level  Outcome: Progressing  Flowsheets (Taken 6/1/2025 0757 by Althea Lopez, RN)  Verbalizes/displays adequate comfort level or baseline comfort level:

## 2025-06-03 NOTE — PLAN OF CARE
Problem: Skin/Tissue Integrity  Goal: Skin integrity remains intact  Description: 1.  Monitor for areas of redness and/or skin breakdown2.  Assess vascular access sites hourly3.  Every 4-6 hours minimum:  Change oxygen saturation probe site4.  Every 4-6 hours:  If on nasal continuous positive airway pressure, respiratory therapy assess nares and determine need for appliance change or resting period  6/2/2025 1534 by Julia Jaramillo RN  Outcome: Progressing  Flowsheets  Taken 6/2/2025 1534  Skin Integrity Remains Intact: Monitor for areas of redness and/or skin breakdown  Taken 6/2/2025 1533  Skin Integrity Remains Intact: Monitor for areas of redness and/or skin breakdown  Taken 6/2/2025 1140  Skin Integrity Remains Intact: Monitor for areas of redness and/or skin breakdown  Taken 6/2/2025 0945  Skin Integrity Remains Intact: Monitor for areas of redness and/or skin breakdown  Note: Pt is free of new or worsenng skin breakdown this shift.      Problem: Safety - Adult  Goal: Free from fall injury  6/3/2025 0524 by Constance Aguirre RN  Outcome: Progressing  Flowsheets (Taken 6/2/2025 1534 by Julia Jaramillo RN)  Free From Fall Injury: Instruct family/caregiver on patient safety     Problem: Respiratory - Adult  Goal: Achieves optimal ventilation and oxygenation  6/3/2025 0524 by Constance Aguirre RN  Outcome: Progressing  Flowsheets (Taken 6/2/2025 1658 by Julia Jaramillo, RN)  Achieves optimal ventilation and oxygenation:   Assess for changes in respiratory status   Assess for changes in mentation and behavior   Position to facilitate oxygenation and minimize respiratory effort   Oxygen supplementation based on oxygen saturation or arterial blood gases   Assess and instruct to report shortness of breath or any respiratory difficulty   Respiratory therapy support as indicated     Problem: Pain  Goal: Verbalizes/displays adequate comfort level or baseline comfort level  6/2/2025 1534 by Julia Jaramillo RN  Outcome:

## 2025-06-03 NOTE — FLOWSHEET NOTE
Treatment time: 3.5 hours  Net UF: 2486 ml    Pre weight: 58.8 kg (standing scale)  Post weight: 56.8 kg (standing scale)  EDW: TBD    Access used: RIJ HD Tunneled cath  Access function: No problems    Medications or blood products given: None    Regular outpatient schedule: Winnie RODRIGUEZ    Summary of response to treatment: Tolerated 3.5 hour HD tx with soft BP at times, SPB 90's.   Net UF 2486 ml.  No meds given.  RIJ Tunneled cath ran reversed due to high AP alarms.  RIJ cath drsg changed, site unremarkable.    Copy of dialysis treatment record placed in chart, to be scanned into EMR.    Report called to Julia Jaramillo RN

## 2025-06-03 NOTE — DISCHARGE SUMMARY
V2.0  Discharge Summary    Name:  Cindi Rodriguez /Age/Sex: 1965 (60 y.o. female)   Admit Date: 2025  Discharge Date: 6/3/25    MRN & CSN:  [id number] & [id number] Encounter Date and Time 6/3/25 12:40 PM EDT    Attending:  Clifford Wayne MD Discharging Provider: JUSTYN Ascencio CNP       Hospital Course:     Brief HPI: Cindi Rodriguez is a 60 y.o. female  with a history of HFrEF (15-20%), ESRD on dialysis (T  S), CAD s/p SOLO to LAD and , HTN, DMII, HLD, as well as frequent admissions for flash pulmonary edema who presented with sudden onset shortness of breath overnight. CXR on admission with severe bilateral pulmonary consolidations, overall history concerning for flash pulmonary edema;Weight has increased 7 -10 lbs, she was noted to be: 25 65 kg (143 lb 4.8 oz).  Patient continues to make urine  Bedside echo was performed in the emergency department with an estimated EF of 15%. Chest x-ray was also obtained which showed significant bilateral pulmonary edema. Cardiology was consulted and recommended to continue Coreg BID, Plavix. Follow with Cardiology as outpatient with BMP at discharge. Patient received dialysis  with 2.5 L of ultrafiltration. She completed HD () with 2 L removed. She was briefly on a Bumex drip that was started on  with only 600 mL of urine output.. Nephrology consulted and following and D/C Bumex gtt and 80 MG IV Lasix given on (). She continues with HD on . Monitoring of K+ and Phos continues. Pulmonology followed with encouraged CPAP at night. CPAP order completed with assistance from CM to ensure she will have for home use. She has expressed wanting to change her facility (LTC) and now is wanting to return to her apartment. She admits that her son will be with her and she has contacted the [employer] to evaluate her apartment for needed equipment, and she admits that she has transportation to and from her HD and appointments. She admits her

## 2025-06-03 NOTE — CARE COORDINATION
This patient has been diagnosed with Chronic Hypercapnic Respiratory Failure that is subsequent to COPD and requires NIV 8-24 hours per day via face mask.  Patient's last PCO? was elevated at 54 mmHg. Alternative modes of ventilation therapy, such as BiPAP has been considered and deemed insufficient. AUDRA is not the predominant cause of hypercapnia. CPAP has been ruled out. NIMV will provide backup battery, volume targeted modes and alarms that a RAD device with a rate does not provide. Patient is at increased risk of exacerbation leading to hospital readmissions or serious harm.      Griselda Vasquez, JUSTYN - CNP  6/3/2025  1:17 PM

## 2025-06-03 NOTE — CARE COORDINATION
1:16 PM  Order faxed to [facility] for NIV    Electronically signed by Ashu Leung RN, CM on 6/3/2025 at 1:16 PM.  Phone: [phone]  Fax: [phone]      11:49 AM  Met with patient at bedside, patient VU about [facility] Care Waiver. States she would still like to return home and feels safe.   Recv'd order for NIV from Isabel at [facility]; message sent out to Griselda NP to sign and send back     Electronically signed by Ashu Leung RN, CM on 6/3/2025 at 11:53 AM.  Phone: [phone]  Fax: [phone]        10:57 AM   Spoke with Isabel HERNANDEZ with [facility]; she is sending over an email with order sheet for NIV/CPAP at home with needs for patient's insurance to aid with approval    Electronically signed by Ashu Leung RN, CM on 6/3/2025 at 11:01 AM.  Phone: [phone]  Fax: [phone]      10:20 AM  Message sent out to Julia HERNANDEZ with [facility] for needs for home NIV     Patient will need a new Home O2 desaturation screen for home o2.     Call placed to [facility]; pt is not active with [facility] as she is not old enough to be in their program. Vale with [facility] states that they made a referral to [facility] Care [facility]aiver in April to assist with home needs, however patient was denied because she missed the appt with them (patient was admitted to [facility]). A new referral was sent, but due to restructuring of the program she will not get assigned a meeting until July.   Pt in HD, unable to discuss with pt at this time. Asked bedside PEACE Dumont to call CM when pt is back to discuss.     Electronically signed by Ashu Leung RN, CM on 6/3/2025 at 10:20 AM.  Phone: [phone]  Fax: [phone]

## 2025-06-03 NOTE — CARE COORDINATION
Case Management Assessment            Discharge Note                    Date / Time of Note: 6/3/2025 2:21 PM                  Discharge Note Completed by: Ashu Leung RN    Patient Name: Cindi Rodriguez   YOB: 1965  Diagnosis: Flash pulmonary edema (HCC) [J81.0]  Hypoxia [R09.02]  Acute hypoxemic respiratory failure (HCC) [J96.01]  Congestive heart failure, unspecified HF chronicity, unspecified heart failure type (HCC) [I50.9]   Date / Time: 5/27/2025 10:04 AM    Current PCP: Alessandra Anderson DO  Clinic patient: No    Hospitalization in the last 30 days: Yes  Readmission Assessment  Number of Days since last admission?: 1-7 days  Previous Disposition: SNF  Who is being Interviewed: Patient  What was the patient's/caregiver's perception as to why they think they needed to return back to the hospital?: Other (Comment) (medical complications)  Did you visit your Primary Care Physician after you left the hospital, before you returned this time?: No  Why weren't you able to visit your PCP?: Did not have an appointment  Did you see a specialist, such as Cardiac, Pulmonary, Orthopedic Physician, etc. after you left the hospital?: No  Who advised the patient to return to the hospital?: Home Health Staff, Caregiver, Skilled Unit  Does the patient report anything that got in the way of taking their medications?: No  In our efforts to provide the best possible care to you and others like you, can you think of anything that we could have done to help you after you left the hospital the first time, so that you might not have needed to return so soon?: Other (Comment) (none)    Advance Directives:  Code Status: Full Code  [place] DNR form completed and on chart: Not Indicated    Financial:  Payor: MEDICAID [place] / Plan: MEDICAID [facility] DEPT OF JOB / Product Type: *No Product type* /      Pharmacy:    [id number] - [place], [place] - [address] - P [phone] - F [phone]  [id number]

## 2025-06-03 NOTE — PROGRESS NOTES
Ph: [phone], Fax: [phone]                                     [facility]                                                   [address] [place], [place] [id number]           Reason for admission:                 Brief Summary:     Cindi Rodriguez is being seen by nephrology for ESRD.     Interval History and Plan:       BP is better controlled . She is off O2  HD yesterday X 2 hrs with 1.7 liters removal   Labs pending from today     HD is in progress today per schedule   She gets dialysis Tuesday Thursday Saturday  Continue to monitor hemoglobin  Continue Renvela for  high phosphorus      Brittney Castaneda MD      Assessment:       ESRD  On HD TTS  Access: TDC. No local discharge and appear clean.   Volume status: Hypervolemic.      Hypertension       Respiratory failure         Hyperkalemia   but samples hemolyzed      HPI      Patient is a 60 y.o. female  with PMH significant for ESRD, HTN, Anemia admitted with SOB and CXR reported consolidation similar to 5/20 but appear has pulmonary edema. BNP is also elevated.  Nephrology consulted for urgent dialysis       Patient seen at bedside and appear comfortable on BiPAP and breathing better. Patient denied chest pain, focal weakness, fever, bleeding, pain around TDC. Patient feel constipated.         ROS:   Negative except as mentioned in HPI      Vitals:     Vitals:    06/03/25 0655   BP: (!) 143/92   Pulse:    Resp: 16   Temp: 97.9 °F (36.6 °C)   SpO2:          Physical Examination:     General appearance: NAD. Alert, oriented  Respiratory: No respiratory distress. On oxygen. Mild intermittent wheezing.   Cardiovascular: Edema mild.   Abdomen: Soft. Non distended and non tender.   Other relevant findings:       Medications:       polyethylene glycol  17 g Oral BID    torsemide  50 mg Oral Daily    heparin (porcine)  5,000 Units SubCUTAneous 3 times per day    [Held by provider] ARIPiprazole  10 mg Oral Daily    
                          Ph: [phone], Fax: [phone]                                     [facility]                                                   [address] [place] [address]           Reason for admission:                 Brief Summary:     Cindi Rodriguez is being seen by nephrology for ESRD.     Interval History and Plan:     She gets dialysis Tuesday Thursday Saturday  BP is elevated with 1 liter of O2  Seen on dialysis     HD is planned for today and orders reviewed   Continue to monitor hemoglobin  Continue Renvela for very high phosphorus      Brittney Castaneda MD      Assessment:       ESRD  On HD TTS  Access: TDC. No local discharge and appear clean.   Volume status: Hypervolemic.      Hypertension       Respiratory failure         Hyperkalemia   but samples hemolyzed      HPI      Patient is a 60 y.o. female  with PMH significant for ESRD, HTN, Anemia admitted with SOB and CXR reported consolidation similar to 5/20 but appear has pulmonary edema. BNP is also elevated.  Nephrology consulted for urgent dialysis       Patient seen at bedside and appear comfortable on BiPAP and breathing better. Patient denied chest pain, focal weakness, fever, bleeding, pain around TDC. Patient feel constipated.         ROS:   Negative except as mentioned in HPI      Vitals:     Vitals:    05/31/25 0425   BP: (!) 158/90   Pulse: 86   Resp: 18   Temp: 98.5 °F (36.9 °C)   SpO2: 95%         Physical Examination:     General appearance: NAD. Alert, oriented  Respiratory: No respiratory distress. On oxygen. Mild intermittent wheezing.   Cardiovascular: Edema mild.   Abdomen: Soft. Non distended and non tender.   Other relevant findings:       Medications:       torsemide  50 mg Oral Daily    heparin (porcine)  5,000 Units SubCUTAneous 3 times per day    [Held by provider] ARIPiprazole  10 mg Oral Daily    atorvastatin  80 mg Oral Nightly    benztropine  0.5 mg Oral Nightly    buPROPion  150 mg Oral 
                          Ph: [phone], Fax: [phone]                                     [facility]                                                   [address] [place], [place] [id number]           Reason for admission:                 Brief Summary:     Cindi Rodriguez is being seen by nephrology for ESRD.     Interval History and Plan:     She gets dialysis Tuesday Thursday Saturday  BP is better controlled with 2 liter of O2  Labs reviewed       HD yesterday with 2 liters of fluid removal    Continue to monitor hemoglobin  Continue Renvela for  high phosphorus      Brittney Castaneda MD      Assessment:       ESRD  On HD TTS  Access: TDC. No local discharge and appear clean.   Volume status: Hypervolemic.      Hypertension       Respiratory failure         Hyperkalemia   but samples hemolyzed      HPI      Patient is a 60 y.o. female  with PMH significant for ESRD, HTN, Anemia admitted with SOB and CXR reported consolidation similar to 5/20 but appear has pulmonary edema. BNP is also elevated.  Nephrology consulted for urgent dialysis       Patient seen at bedside and appear comfortable on BiPAP and breathing better. Patient denied chest pain, focal weakness, fever, bleeding, pain around TDC. Patient feel constipated.         ROS:   Negative except as mentioned in HPI      Vitals:     Vitals:    06/01/25 0430   Pulse:    Resp: 16         Physical Examination:     General appearance: NAD. Alert, oriented  Respiratory: No respiratory distress. On oxygen. Mild intermittent wheezing.   Cardiovascular: Edema mild.   Abdomen: Soft. Non distended and non tender.   Other relevant findings:       Medications:       polyethylene glycol  17 g Oral BID    senna  1 tablet Oral Nightly    torsemide  50 mg Oral Daily    heparin (porcine)  5,000 Units SubCUTAneous 3 times per day    [Held by provider] ARIPiprazole  10 mg Oral Daily    atorvastatin  80 mg Oral Nightly    benztropine  0.5 mg Oral Nightly    
                          Ph: [phone], Fax: [phone]                                     [facility]                                                   [address] [place] [address]           Reason for admission:                 Brief Summary:     Cindi Rodriguez is being seen by nephrology for ESRD.     Interval History and Plan:     She gets dialysis Tuesday Thursday Saturday  Last dialysis was done yesterday  Potassium high at 5.4 at the time of dialysis yesterday  2 L of fluid removed yesterday and had no complications during dialysis  On 1 L of oxygen now and blood pressure is good  On torsemide 50 mg a day    No indication for renal replacement therapy today  Will plan on doing tomorrow  Continue to monitor hemoglobin  Continue Renvela for very high phosphorus      Kendrick Monae MD      Assessment:       ESRD  On HD TTS  Access: TDC. No local discharge and appear clean.   Volume status: Hypervolemic.      Hypertension       Respiratory failure         Hyperkalemia   but samples hemolyzed      HPI      Patient is a 60 y.o. female  with PMH significant for ESRD, HTN, Anemia admitted with SOB and CXR reported consolidation similar to 5/20 but appear has pulmonary edema. BNP is also elevated.  Nephrology consulted for urgent dialysis       Patient seen at bedside and appear comfortable on BiPAP and breathing better. Patient denied chest pain, focal weakness, fever, bleeding, pain around TDC. Patient feel constipated.         ROS:   Negative except as mentioned in HPI      Vitals:     Vitals:    05/30/25 0441   BP: 123/75   Pulse: 86   Resp: 16   Temp: 98.6 °F (37 °C)   SpO2: 99%         Physical Examination:     General appearance: NAD. Alert, oriented  Respiratory: No respiratory distress. On oxygen. Mild intermittent wheezing.   Cardiovascular: Edema mild.   Abdomen: Soft. Non distended and non tender.   Other relevant findings:       Medications:       torsemide  50 mg Oral Daily    
                          Ph: [phone], Fax: [phone]                                     [facility]                                                   [address] [place], [place] [id number]           Reason for admission:                 Brief Summary:     Cindi Rodriguez is being seen by nephrology for ESRD.     Interval History and Plan:       BP is better controlled with 2 liter of O2  Labs are pending     UF is again planned for today for volume removal   She gets dialysis Tuesday Thursday Saturday  Continue to monitor hemoglobin  Continue Renvela for  high phosphorus      Brittney Castaneda MD      Assessment:       ESRD  On HD TTS  Access: TDC. No local discharge and appear clean.   Volume status: Hypervolemic.      Hypertension       Respiratory failure         Hyperkalemia   but samples hemolyzed      HPI      Patient is a 60 y.o. female  with PMH significant for ESRD, HTN, Anemia admitted with SOB and CXR reported consolidation similar to 5/20 but appear has pulmonary edema. BNP is also elevated.  Nephrology consulted for urgent dialysis       Patient seen at bedside and appear comfortable on BiPAP and breathing better. Patient denied chest pain, focal weakness, fever, bleeding, pain around TDC. Patient feel constipated.         ROS:   Negative except as mentioned in HPI      Vitals:     Vitals:    06/02/25 0543   BP: (!) 144/85   Pulse: 85   Resp: 16   Temp: 98.1 °F (36.7 °C)   SpO2: 99%         Physical Examination:     General appearance: NAD. Alert, oriented  Respiratory: No respiratory distress. On oxygen. Mild intermittent wheezing.   Cardiovascular: Edema mild.   Abdomen: Soft. Non distended and non tender.   Other relevant findings:       Medications:       polyethylene glycol  17 g Oral BID    torsemide  50 mg Oral Daily    heparin (porcine)  5,000 Units SubCUTAneous 3 times per day    [Held by provider] ARIPiprazole  10 mg Oral Daily    atorvastatin  80 mg Oral Nightly    
                          Ph: [phone], Fax: [phone]                                     [facility]                                                   [address] [place], [place] [id number]           Reason for admission:                 Brief Summary:     Cindi Rodriguez is being seen by nephrology for ESRD.     Interval History and Plan:   Patient seen during dialysis   Comfortable  /95  On 4 LPM Oxygen with 100 % pulse ox  Had extra UF yesterday with 1.5 liter fluid removed.   K 5.4   Na 135  Hb 10.6          HD today with 2 - 3 liter UF as tolerated  Taper down oxygen and monitor pulse ox  Monitor Hb and lytes.   Epogen if Hb < 10   Start Torsemide 50 mg daily   Follow BP. On low dose Coreg.   Dose medications according to GFR         D/W patient and dialysis nurse.       Thank you for allowing us to participate in this patient's care  In case of any question please call us at our 24 hour answering service [phone] or from 7 AM to 5 PM via [employer], [employer], Epic chat or cell phone.   Dr Brittney Castaneda MD      Assessment:       ESRD  On HD TTS  Access: TDC. No local discharge and appear clean.   Volume status: Hypervolemic.            Hypertension       Respiratory failure         Hyperkalemia   but samples hemolyzed      HPI      Patient is a 60 y.o. female  with PMH significant for ESRD, HTN, Anemia admitted with SOB and CXR reported consolidation similar to 5/20 but appear has pulmonary edema. BNP is also elevated.  Nephrology consulted for urgent dialysis       Patient seen at bedside and appear comfortable on BiPAP and breathing better. Patient denied chest pain, focal weakness, fever, bleeding, pain around TDC. Patient feel constipated.         ROS:   Negative except as mentioned in HPI      Vitals:     Vitals:    05/29/25 0400   BP: (!) 147/95   Pulse: 89   Resp: 20   Temp: 97.9 °F (36.6 °C)   SpO2: 100%         Physical Examination:     General appearance: NAD. Alert, 
      Hospitalist Progress Note      Name:  Cindi Rodriguez /Age/Sex: 1965  (60 y.o. female)   MRN & CSN:  [id number] & [id number] Encounter Date/Time: 2025 9:42 AM EDT    Location:  4459/4459-01 PCP: Alessandra Anderson DO       Hospital Day: 2         Date of Admission: 2025    Chief Complaint: Shortness of breath    Hospital Course:  Patient is a 60 year old female with a history of HFrEF (15-20%), ESRD on dialysis (T  S), CAD s/p SOLO to LAD and , HTN, DMII, HLD, as well as frequent admissions for flash pulmonary edema who presented with sudden onset shortness of breath overnight. CXR on admission with severe bilateral pulmonary consolidations, overall history concerning for flash pulmonary edema. In the ED, she was given 40 mg lasix IV, and she was started on BiPAP.  Initial vitals in the ED showed hypertension, tachycardia, hypoxia.  Initial VBG showed a pH of 7.194 with a PO2 of 43.  In the ED, she was given 40 mg lasix IV, and she was started on BiPAP with improvement in her gas to 7.247 with a PO2 of 54.8.  Patient also symptomatically improved with decreased work of breathing. Pt was able to wean from BiPAP to HF 6 LPM - her baseline is 3 LPM. Patient denies any acute chest pain or other associated symptoms apart from her acute shortness of breath.  She does state that she has been compliant with all her episodes of dialysis. Patient denies any acute chest pain or other associated symptoms apart from her acute shortness of breath.  She does state that she has been compliant with all her episodes of dialysis. Patient denies any new swelling in her lower extremities but states that her abdomen is somewhat swollen compared to her baseline.  Weight has increased 7 -10 lbs. Patient states that she does continue to make some urine and is on torsemide 100 daily which she believes that she is taking although she states that all her meds are managed by the nursing home in which she resides. She 
      Hospitalist Progress Note      Name:  Cindi Rodriguez /Age/Sex: 1965  (60 y.o. female)   MRN & CSN:  [id number] & [id number] Encounter Date/Time: 2025 9:47 AM EDT    Location:  4459/4459-01 PCP: Alessandra Anderson DO       Hospital Day: 5         Date of Admission: 2025    Chief Complaint: Shortness of breath     Hospital Course: Patient is a 60 year old female with a history of HFrEF (15-20%), ESRD on dialysis (T  S), CAD s/p SOLO to LAD and , HTN, DMII, HLD, as well as frequent admissions for flash pulmonary edema who presented with sudden onset shortness of breath overnight. CXR on admission with severe bilateral pulmonary consolidations, overall history concerning for flash pulmonary edema. In the ED, she was given 40 mg lasix IV, and she was started on BiPAP.  Initial vitals in the ED showed hypertension, tachycardia, hypoxia.  Initial VBG showed a pH of 7.194 with a PO2 of 43.  In the ED, she was given 40 mg lasix IV, and she was started on BiPAP with improvement in her gas to 7.247 with a PO2 of 54.8.  Patient also symptomatically improved with decreased work of breathing. Pt was able to wean from BiPAP to HF 6 and is on 1 LPM - her baseline is 2 LPM per EMR but pt has stated she does not wear home O2. Will order home oxygen evaluation. Patient denies any acute chest pain or other associated symptoms apart from her acute shortness of breath.  She does state that she has been compliant with all her episodes of dialysis. Patient denies any acute chest pain or other associated symptoms apart from her acute shortness of breath.  She does state that she has been compliant with all her episodes of dialysis. Patient denies any new swelling in her lower extremities but states that her abdomen is somewhat swollen compared to her baseline.  Weight has increased 7 -10 lbs, she was noted to be: 25 65 kg (143 lb 4.8 oz). Patient states that she does continue to make some urine and is on 
      Hospitalist Progress Note      Name:  Cindi Rodriguez /Age/Sex: 1965  (60 y.o. female)   MRN & CSN:  [id number] & [id number] Encounter Date/Time: 2025 8:43 AM EDT    Location:  4459/4459-01 PCP: Alessandra Anderson DO       Hospital Day: 4         Date of Admission: 2025    Chief Complaint: Shortness of breath     Hospital Course: Patient is a 60 year old female with a history of HFrEF (15-20%), ESRD on dialysis (T  S), CAD s/p SOLO to LAD and , HTN, DMII, HLD, as well as frequent admissions for flash pulmonary edema who presented with sudden onset shortness of breath overnight. CXR on admission with severe bilateral pulmonary consolidations, overall history concerning for flash pulmonary edema. In the ED, she was given 40 mg lasix IV, and she was started on BiPAP.  Initial vitals in the ED showed hypertension, tachycardia, hypoxia.  Initial VBG showed a pH of 7.194 with a PO2 of 43.  In the ED, she was given 40 mg lasix IV, and she was started on BiPAP with improvement in her gas to 7.247 with a PO2 of 54.8.  Patient also symptomatically improved with decreased work of breathing. Pt was able to wean from BiPAP to HF 6 LPM - her baseline is 2 LPM. Patient denies any acute chest pain or other associated symptoms apart from her acute shortness of breath.  She does state that she has been compliant with all her episodes of dialysis. Patient denies any acute chest pain or other associated symptoms apart from her acute shortness of breath.  She does state that she has been compliant with all her episodes of dialysis. Patient denies any new swelling in her lower extremities but states that her abdomen is somewhat swollen compared to her baseline.  Weight has increased 7 -10 lbs, she was noted to be: 25 65 kg (143 lb 4.8 oz), 25 63.5 kg (139 lb 15.9 oz), 05/15/25 61.8 kg (136 lb 3.9 oz). Patient states that she does continue to make some urine and is on torsemide 100 daily which she 
      Hospitalist Progress Note      Name:  Cindi Rodriguez /Age/Sex: 1965  (60 y.o. female)   MRN & CSN:  [id number] & [id number] Encounter Date/Time: 2025 8:57 AM EDT    Location:  4459/4459-01 PCP: Alessandra Anderson DO       Hospital Day: 6         Date of Admission: 2025    Chief Complaint: Shortness of breath     Hospital Course:  Patient is a 60 year old female with a history of HFrEF (15-20%), ESRD on dialysis (T  S), CAD s/p SOLO to LAD and , HTN, DMII, HLD, as well as frequent admissions for flash pulmonary edema who presented with sudden onset shortness of breath overnight. CXR on admission with severe bilateral pulmonary consolidations, overall history concerning for flash pulmonary edema. In the ED, she was given 40 mg lasix IV, and she was started on BiPAP.  Initial vitals in the ED showed hypertension, tachycardia, hypoxia.  Initial VBG showed a pH of 7.194 with a PO2 of 43.  In the ED, she was given 40 mg lasix IV, and she was started on BiPAP with improvement in her gas to 7.247 with a PO2 of 54.8.  Patient also symptomatically improved with decreased work of breathing. Pt was able to wean from BiPAP to HF 6 and is on 1 - 2 LPM - her baseline is 2 LPM per EMR but pt has stated she does not wear home O2. She qualified for home O2 and will have this set up upon discharge. Patient denies any acute chest pain or other associated symptoms apart from her acute shortness of breath.  She does state that she has been compliant with all her episodes of dialysis. Patient denies any acute chest pain or other associated symptoms apart from her acute shortness of breath.  She does state that she has been compliant with all her episodes of dialysis. Patient denies any new swelling in her lower extremities but states that her abdomen is somewhat swollen compared to her baseline.  Weight has increased 7 -10 lbs, she was noted to be: 25 65 kg (143 lb 4.8 oz). Patient states that she does 
      Hospitalist Progress Note      Name:  Cindi Rodriguez /Age/Sex: 1965  (60 y.o. female)   MRN & CSN:  [id number] & [id number] Encounter Date/Time: 2025 9:03 AM EDT    Location:  4459/4459-01 PCP: Alessandra Anderson DO       Hospital Day: 3         Date of Admission: 2025    Chief Complaint: Shortness of breath     Hospital Course: Patient is a 60 year old female with a history of HFrEF (15-20%), ESRD on dialysis (T  S), CAD s/p SOLO to LAD and , HTN, DMII, HLD, as well as frequent admissions for flash pulmonary edema who presented with sudden onset shortness of breath overnight. CXR on admission with severe bilateral pulmonary consolidations, overall history concerning for flash pulmonary edema. In the ED, she was given 40 mg lasix IV, and she was started on BiPAP.  Initial vitals in the ED showed hypertension, tachycardia, hypoxia.  Initial VBG showed a pH of 7.194 with a PO2 of 43.  In the ED, she was given 40 mg lasix IV, and she was started on BiPAP with improvement in her gas to 7.247 with a PO2 of 54.8.  Patient also symptomatically improved with decreased work of breathing. Pt was able to wean from BiPAP to HF 6 LPM - her baseline is 2 LPM. Patient denies any acute chest pain or other associated symptoms apart from her acute shortness of breath.  She does state that she has been compliant with all her episodes of dialysis. Patient denies any acute chest pain or other associated symptoms apart from her acute shortness of breath.  She does state that she has been compliant with all her episodes of dialysis. Patient denies any new swelling in her lower extremities but states that her abdomen is somewhat swollen compared to her baseline.  Weight has increased 7 -10 lbs, she was noted to be: 25 65 kg (143 lb 4.8 oz), 25 63.5 kg (139 lb 15.9 oz), 05/15/25 61.8 kg (136 lb 3.9 oz). Patient states that she does continue to make some urine and is on torsemide 100 daily which she 
    Pulmonary Progress Note    Patient Name: Cindi Rodriguez   Patient : 1965   Date: 2025   Admit Date: 2025     CC:sudden onset shortness of breath       Interval History: Patient seen in dialysis this a.m.  She reports improvement in her shortness of breath overall.  She is currently requiring 5 L of oxygen at baseline she is 3 L.  Having 2.5 L removed on her dialysis day today.    HPI:  \"Patient is a 60 year old female with a history of HFrEF (15-20%), ESRD on dialysis (T ), chronic hypoxic respiratory failure on 3 L of oxygen at baseline as well as frequent admissions for flash pulmonary edema who presented with sudden onset shortness of breath overnight. CXR on admission with severe bilateral pulmonary consolidations, overall history concerning for flash pulmonary edema. In the ED, she was given 40 mg lasix IV, and she was started on BiPAP.     She was evaluated by ICU team approx 1:30 after starting on BiPAP. On our evaluation, patient states that she was feeling much better. Mental status good, she was texting on her phone. States that her shortness of breath was much improved following aforementioned therapies. She is due for dialysis today as well.\"      MEDICATIONS   torsemide  50 mg Oral Daily    heparin (porcine)  5,000 Units SubCUTAneous 3 times per day    [Held by provider] ARIPiprazole  10 mg Oral Daily    atorvastatin  80 mg Oral Nightly    benztropine  0.5 mg Oral Nightly    buPROPion  150 mg Oral Daily    carvedilol  3.125 mg Oral BID WC    clopidogrel  75 mg Oral Daily    mirtazapine  30 mg Oral Nightly    pantoprazole  40 mg Oral Daily    sevelamer  1,600 mg Oral TID       Continuous Infusions:      ROS  Review of Systems   Constitutional:  Negative for activity change, fatigue and fever.   Respiratory:  Negative for shortness of breath, wheezing and stridor.    Cardiovascular:  Negative for chest pain and leg swelling.   Gastrointestinal:  Negative for abdominal distention 
   06/01/25 1051   Resting (Room Air)   SpO2 89   HR 80   Resting (On O2)   SpO2 91   O2 Device Nasal cannula   O2 Flow Rate (l/min) 1 l/min   During Walk (Room Air)   SpO2 86   Walk/Assistance Device Walker   Rate of Dyspnea 1   Symptoms Fatigue;Shortness of breath   During Walk (On O2)   SpO2 87   O2 Device Nasal cannula   O2 Flow Rate (l/min) 2 l/min   Need Additional O2 Flow Rate Rows Yes   O2 Flow Rate (l/min) 3 l/min   O2 Saturation 90   Walk/Assistance Device Walker   Rate of Dyspnea 1   Symptoms Fatigue   Comments Pt able to walk a short distance, then c/o fatigue.   After Walk   SpO2 95   O2 Device Nasal cannula   O2 Flow Rate (l/min) 3 l/min   Rate of Dyspnea 0   Does the Patient Qualify for Home O2 Yes   Liter Flow at Rest 1   Liter Flow on Exertion 3   Does the Patient Need Portable Oxygen Tanks Yes       
  Hospital Medicine Progress Note      Date of Admission: 5/27/2025  Hospital Day: 2    Chief Admission Complaint:  dypnea     Subjective:  Patient is without complaints. No adverse interval events.      Presenting Admission History:       Cindi Rodriguez is a 60 y.o. female who presents with similar pictures as last admission. Endorsing shorntess of breath and orthopnea. Requried bilevel in ED with some improvement. Lasix 40 IV once given. Will need admission for treatment of acute HFrEF (EF 15%) and ESRD on HD.     Assessment/Plan:      Current Principal Problem:  Acute hypoxemic respiratory failure (HCC)    Acute HFrEF  Acute hypoxic respiratory failure  Hypertensive emergency  Pulmonary edema  - EF 15%  - cardiology consulted  - lasix 60 BID ordered  - holding home torsemide  - continue home GDMT HFrEF meds as ordered  - patient currently requiring bilevel, was considered for ICU admission, but improved  with bilevel so admit to PCU     JONATHAN on CKD  Suspected cardiorenal syndrome  - nephrology consulted  - diuresis as above  - hx of peritoneal dialysis, now HD T, TH, S  - anticipate HD     Chronic:  Constipation    Physical Exam Performed:      General: NAD  Eyes: EOMI  ENT: neck supple  Cardiovascular: Regular rate.  Respiratory: Clear to auscultation  Gastrointestinal: Soft, non tender  Genitourinary: no suprapubic tenderness  Musculoskeletal: No edema  Skin: warm, dry  Neuro: Alert.  Psych: Mood appropriate.     BP (!) 140/91   Pulse 77   Temp 97.8 °F (36.6 °C) (Oral)   Resp 15   Ht 1.6 m (5' 3\")   Wt 66.5 kg (146 lb 9.7 oz)   SpO2 97%   BMI 25.97 kg/m²     Diet: ADULT DIET; Regular; 3 carb choices (45 gm/meal)  DVT Prophylaxis: []PPx LMWH  []SQ Heparin  []IPC/SCDs  []Eliquis  []Xarelto  []Coumadin  []Other -      Code status: Prior  PT/OT Eval Status:   []NOT yet ordered  []Ordered and Pending   []Seen with Recommendations for:  []Home independently  []Home w/ assist  []HHC  []SNF  []Acute 
  Physician Progress Note      PATIENT:               ZULMA KING  CSN #:                  [id number]  :                       1965  ADMIT DATE:       2025 10:04 AM  DISCH DATE:  RESPONDING  PROVIDER #:        FRANCIA TRUONG          QUERY TEXT:    Acute systolic Congestive Heart Failure is documented in the H&P on 25.    Please document further specificity regarding the most likely etiology of the   CHF:    The clinical indicators include:  59 yo female with history of CAD s/p SOLO to LAD and RCA , HFrEF EF 15-20%   G3DD ECHO , severe tricuspid regurg, moderate to severe mitral regurg,   HTN, DM2, HLD, ESRD on HD    VS- 98.3, 128, 29, 225/141 - 149/108 - 138/90  BNP- 69,053....CRT 6.6...Trop 230, 203...  ECHO  estimated EF of 15 - 20%. Aortic Valve: Mild regurgitation.    Mitral Valve: Moderate to severe regurgitation with a centrally directed jet.    Tricuspid Valve: Moderate regurgitation. The estimated RVSP is 48 mmHg.  CXR- Severe diffuse pulmonary consolidation similar to 2025.    Lasix IV, Cardiology consult, Nephrology consult, Bumex gtt, HD,  Options provided:  -- CHF related to Hypertensive Heart Disease  -- CHF related to Hypertensive Heart Disease and CAD  -- CHF not related to Hypertension but related to CAD  -- CHF related to Hypertensive Heart Disease and Valvular Heart Disease  -- CHF not related to Hypertension but related to valvular heart disease  -- CHF related to HTN, CAD and valvular disease  -- Other - I will add my own diagnosis  -- Disagree - Not applicable / Not valid  -- Disagree - Clinically unable to determine / Unknown  -- Refer to Clinical Documentation Reviewer    PROVIDER RESPONSE TEXT:    This patient has CHF related to hypertensive heart disease and CAD.    Query created by: Trisha Eden on 2025 1:00 PM      Electronically signed by:  FRANCIA TRUONG 2025 1:57 PM          
4 Eyes Skin Assessment     NAME:  Cindi Rodriguez  YOB: 1965  MEDICAL RECORD NUMBER:  [id number]    The patient is being assessed for  Admission    I agree that at least one RN has performed a thorough Head to Toe Skin Assessment on the patient. ALL assessment sites listed below have been assessed.      Areas assessed by both nurses:    Head, Face, Ears, Shoulders, Back, Chest, Arms, Elbows, Hands, Sacrum. Buttock, Coccyx, Ischium, Legs. Feet and Heels, and Under Medical Devices         Does the Patient have a Wound? Yes wound(s) were present on assessment. LDA wound assessment was Initiated and completed by RN  Wound left leg  Redness Bilat heels blanchable  Abrasions/ecchymosis scattered bilat LE       Papa Prevention initiated by RN: Yes  Wound Care Orders initiated by RN: Yes    Pressure Injury (Stage 3,4, Unstageable, DTI, NWPT, and Complex wounds) if present, place Wound referral order by RN under : Yes    New Ostomies, if present place, Ostomy referral order under : No     Nurse 1 eSignature: Electronically signed by Mehnaz Barajas RN on 5/27/25 at 5:30 PM EDT    **SHARE this note so that the co-signing nurse can place an eSignature**    Nurse 2 eSignature: Electronically signed by Georgette Barney RN on 5/27/25 at 6:32 PM EDT    
Called bedside for patient to used the restroom. Pt refused gait belt, explained safety reasons. Pt then refused to go the restroom.    Electronically signed by Julia Jaramillo RN on 6/2/2025 at 7:29 AM     
Cardiology Consult Service  Daily Progress Note        Admit Date:  5/27/2025  Primary cardiologist: ALISA    Reason for Consultation/Chief Complaint: Acute HFrEF exacerbation     Subjective:   Cindi Rodriguez is a 60 y.o. female with a past medical history of CAD s/p SOLO to LAD and RCA 2023, HFrEF EF 15-20% G3DD ECHO 04/25, severe tricuspid regurg, moderate to severe mitral regurg, HTN, DM2, HLD, ESRD on HD who presented to the ED with SOB and was found to be in acute hf exacerbation.     Interval history:  Patient received UF yesterday with 1.5 L removed. She is seen in dialysis this morning. She is breathing comfortably on 4L. She states that she did urinate 3 times yesterday however only 600ml is recorded as output. Denies any chest pain, palpitations, SOB.     Objective:     Medications:   torsemide  50 mg Oral Daily    heparin (porcine)  5,000 Units SubCUTAneous 3 times per day    [Held by provider] ARIPiprazole  10 mg Oral Daily    atorvastatin  80 mg Oral Nightly    benztropine  0.5 mg Oral Nightly    buPROPion  150 mg Oral Daily    carvedilol  3.125 mg Oral BID WC    clopidogrel  75 mg Oral Daily    mirtazapine  30 mg Oral Nightly    pantoprazole  40 mg Oral Daily    sevelamer  1,600 mg Oral TID       IV drips:  none      PRN:  heparin (porcine), HYDROcodone 5 mg - acetaminophen, melatonin, polyethylene glycol, nitroGLYCERIN    Vitals:    05/29/25 0200 05/29/25 0259 05/29/25 0400 05/29/25 0750   BP:   (!) 147/95 135/63   Pulse: 70 72 89    Resp:  13 20 16   Temp:   97.9 °F (36.6 °C) 98.2 °F (36.8 °C)   TempSrc:   Axillary    SpO2:  100% 100%    Weight:   66.5 kg (146 lb 9.7 oz) 66.5 kg (146 lb 9.7 oz)   Height:           Intake/Output Summary (Last 24 hours) at 5/29/2025 0929  Last data filed at 5/29/2025 0000  Gross per 24 hour   Intake 1300 ml   Output 600 ml   Net 700 ml     I/O last 3 completed shifts:  In: 1780 [P.O.:1780]  Out: 600 [Urine:600]  Wt Readings from Last 3 Encounters:   05/29/25 66.5 kg (146 
Cardiology Consult Service  Daily Progress Note        Admit Date:  5/27/2025  Primary cardiologist: ALISA    Reason for Consultation/Chief Complaint: Acute HFrEF exacerbation     Subjective:   Cindi Rodriguez is a 60 y.o. female with a past medical history of CAD s/p SOLO to LAD and RCA 2023, HFrEF EF 15-20% G3DD ECHO 04/25, severe tricuspid regurg, moderate to severe mitral regurg, HTN, DM2, HLD, ESRD on HD who presented to the ED with SOB and was found to be in acute hf exacerbation.     Interval history:  Patient received dialysis yesterday with 2.5 L of ultrafiltration.  Her breathing has improved significantly since then.  She is now on 6 L of supplemental oxygen via nasal cannula.  She looks much more comfortable as well.  She vital hallux if she is able to go home today.  We did start Bumex drip yesterday however patient with only 600 mL of urine output since then.  Otherwise no new symptoms of chest pain, palpitations.  Patient states that she feels as though her swelling is getting somewhat better but she continues to have some abdominal discomfort from her dyspnea and tachypnea yesterday.    Objective:     Medications:   heparin (porcine)  5,000 Units SubCUTAneous 3 times per day    [Held by provider] ARIPiprazole  10 mg Oral Daily    atorvastatin  80 mg Oral Nightly    benztropine  0.5 mg Oral Nightly    buPROPion  150 mg Oral Daily    carvedilol  3.125 mg Oral BID WC    clopidogrel  75 mg Oral Daily    mirtazapine  30 mg Oral Nightly    pantoprazole  40 mg Oral Daily    sevelamer  1,600 mg Oral TID    [Held by provider] sodium zirconium cyclosilicate  5 g Oral Daily    polyethylene glycol  17 g Oral Daily       IV drips:   bumetanide (BUMEX) 12.5 mg in sodium chloride 0.9 % 125 mL infusion 1 mg/hr (05/28/25 0538)       PRN:  nitroGLYCERIN    Vitals:    05/28/25 0500 05/28/25 0841 05/28/25 0843 05/28/25 1108   BP: 135/87 (!) 138/90  (!) 145/90   Pulse: 80 87 85 82   Resp: 18 16 18 16   Temp: 97.9 °F (36.6 °C) 
Cardiology Consult Service  Daily Progress Note        Admit Date:  5/27/2025  Primary cardiologist: ALISA    Reason for Consultation/Chief Complaint: Acute HFrEF exacerbation     Subjective:   Cindi Rodriguez is a 60 y.o. female with a past medical history of CAD s/p SOLO to LAD and RCA 2023, HFrEF EF 15-20% G3DD ECHO 04/25, severe tricuspid regurg, moderate to severe mitral regurg, HTN, DM2, HLD, ESRD on HD who presented to the ED with SOB and was found to be in acute hf exacerbation.     Interval history:  Patient seen at bedside this morning.  She appears much more comfortable this morning.  She remains on 1 to 2 L of supplemental oxygen but states she is breathing much easier.  While on a couple liters of oxygen she was satting 100%.  While we spoke I did turn off her oxygen.  She denies any worsening shortness of breath, chest pain, palpitations.  She continues to state that she would prefer to go home with family at discharge but would be open to going to a nursing facility if 1 could be found and is amenable to her and her family.    Objective:     Medications:   torsemide  50 mg Oral Daily    heparin (porcine)  5,000 Units SubCUTAneous 3 times per day    [Held by provider] ARIPiprazole  10 mg Oral Daily    atorvastatin  80 mg Oral Nightly    benztropine  0.5 mg Oral Nightly    buPROPion  150 mg Oral Daily    carvedilol  3.125 mg Oral BID WC    clopidogrel  75 mg Oral Daily    mirtazapine  30 mg Oral Nightly    pantoprazole  40 mg Oral Daily    sevelamer  1,600 mg Oral TID       IV drips:  none      PRN:  heparin (porcine), HYDROcodone 5 mg - acetaminophen, melatonin, polyethylene glycol, nitroGLYCERIN    Vitals:    05/30/25 0041 05/30/25 0441 05/30/25 0858 05/30/25 1141   BP:  123/75 137/85 131/70   Pulse:  86 84 81   Resp: 16 16 18 18   Temp:  98.6 °F (37 °C) 98.6 °F (37 °C) 98.6 °F (37 °C)   TempSrc:  Oral Oral Oral   SpO2:  99% 94% 100%   Weight:  65.3 kg (143 lb 15.4 oz)     Height:           Intake/Output 
Cindi Rodriguez was evaluated today and a DME order was entered for a CPAP in order to administer oxygen overnight due to the diagnosis of Acute on chronic hypoxemic/hypercapnic respiratory failure, AUDRA,.  The need for this equipment and treatment was discussed with the patient and she understands and is in agreement. Order is for CPAP overnight at 10cm H2O.   
Clinical Pharmacy Consult Note  Medication History     Admit Date: 5/27/2025    List of current medications patient is taking is complete. Home Medication list in Epic updated to reflect changes noted below.    Source of information: Facility transfer papers    Patient's home pharmacy:   [id number] - [place], [place] - [address] - P [phone] - F [phone]  [address]  [facility] [address]  Phone: [phone] Fax: [phone]      Changes made to medication list:     Medications removed - patient no longer taking per facility transfer list:   Losartan 25 mg tablet   Urea cream 10%     Medications added:   Urea-Lactic acid 10-4% cream     Medication doses adjusted:   Mirtazapine 30 mg tablet - dose changed from mirtazapine 15 mg tablet 2 tablets nightly to mirtazapine 30 mg tablet 1 tablet nightly per facility transfer papers   Melatonin 12 mg tablet - dose changed from 10 mg to 12 mg per facility transfer papers    Other notes:   Med rec completed using facility transfer papers, all last doses approximate    Current Outpatient Medications   Medication Instructions    albuterol sulfate HFA (VENTOLIN HFA) 108 (90 Base) MCG/ACT inhaler 2 puffs, Inhalation, EVERY 6 HOURS PRN    ARIPiprazole (ABILIFY) 10 mg, Oral, DAILY    atorvastatin (LIPITOR) 80 mg, Oral, Nightly    B complex-vitamin C-folic acid (NEPHRO-JESUS) 1 MG tablet 1 tablet, Oral, DAILY    benztropine (COGENTIN) 0.5 mg, Oral, NIGHTLY    buPROPion (WELLBUTRIN SR) 150 mg, Oral, DAILY    carvedilol (COREG) 3.125 mg, Oral, 2 TIMES DAILY WITH MEALS    clopidogrel (PLAVIX) 75 mg, Oral, DAILY    diphenhydrAMINE (BENADRYL) 25 mg, Oral, EVERY 4 HOURS PRN    EPINEPHrine (EPIPEN 2-CLIFTON) 0.3 mg, IntraMUSCular, PRN, Use as directed for allergic reaction    gabapentin (NEURONTIN) 100 mg, DAILY    ipratropium 0.5 mg-albuterol 2.5 mg (DUONEB) 0.5-2.5 (3) MG/3ML SOLN nebulizer solution 1 vial, Inhalation, EVERY 6 HOURS PRN    
Consulted for left lower leg wound. Wound appears intact and healed. Wound Care to sign off. Re-consult for changes or deterioration.     
Morning vitals refused by pt   
Occupational Therapy  Facility/Department: [address]  Occupational Therapy Initial Assessment and Treatment     Name: Cindi Rodriguez  : 1965  MRN: [id number]  Date of Service: 2025    Discharge Recommendations:  24 hour supervision or assist, Home with Home health OT  OT Equipment Recommendations  Equipment Needed: No       Patient Diagnosis(es): The primary encounter diagnosis was Flash pulmonary edema (HCC). Diagnoses of Congestive heart failure, unspecified HF chronicity, unspecified heart failure type (HCC) and Hypoxia were also pertinent to this visit.  Past Medical History:  has a past medical history of JONATHAN (acute kidney injury), Anxiety, Asthma, Bipolar disorder (HCC), Bronchitis, Chronic back pain, Depression, Diabetes mellitus (HCC), DM II (diabetes mellitus, type II), controlled (HCC), Hyperlipidemia, Hypertension, Neuropathy, Obstructive sleep apnea, and Restless legs syndrome (RLS).  Past Surgical History:  has a past surgical history that includes Inner ear surgery; Tubal ligation; Endometrial ablation; and Dialysis Catheter Removal (N/A, 2024).    Treatment Diagnosis: weakness and decreased activity tolerance      Assessment  Performance deficits / Impairments: Decreased functional mobility ;Decreased ADL status;Decreased strength;Decreased safe awareness;Decreased endurance;Decreased high-level IADLs;Decreased balance  Assessment: Pt is a 60 y.o. F presenting with acute heart failure exacerbation. Pt expresses she has been in a facility for ~ 8 months total. Pt expresses at her most recent facility (the past 3 months) she has not been able to progress in functional activity/mobility as she wants and she is hopeful to d/c home with her son and home health therapy. Pt this date is provided overall SPV for bed mobility with HOB elevated and bed rail. Pt is provided CGA with RW for STS to/from bed, toilet, and chair. Pt demos short household gait with CGA and RW as well. Pt is on 4L NC 
Occupational Therapy  Facility/Department: [facility]  Daily Treatment Note  NAME: Cindi Rodriguez  : 1965  MRN: [id number]    Date of Service: 2025    Discharge Recommendations:  24 hour supervision or assist, Home with Home health OT  OT Equipment Recommendations  Equipment Needed: No      Patient Diagnosis(es): The primary encounter diagnosis was Flash pulmonary edema (HCC). Diagnoses of Congestive heart failure, unspecified HF chronicity, unspecified heart failure type (HCC) and Hypoxia were also pertinent to this visit.     Assessment   Assessment: Pt this session declining all ADL tasks offered (grooming, LB / UB dressing, toileting), agreeable to fxl mobilty only. Education provided in session regarding incorporation of fxl mobiliyt w/ ADL tasks - pt verbalized understanding but cont to decline ADLs. Pt completing fxl mobiliyt w/ RW/GB 3x 20', 1x 40' in room w/ 1L O2 satting 94% or greater t/o session. Pt reporting plan is d/c to home w/ son who can provide 24hr. Pt may benefit from cont'd skilled OT while inpt, and after anticipated d/c to home, to maxmize safety / IND / fxl act rommel needed for ADL and fxl mobiliyt tasks; will cont to follow per POC.  Activity Tolerance: Patient limited by fatigue;Patient limited by endurance  Discharge Recommendations: 24 hour supervision or assist;Home with Home health OT  Equipment Needed: No     Plan  Occupational Therapy Plan  Times Per Week: 2-5  Times Per Day: Once a day  Current Treatment Recommendations: Strengthening;ROM;Balance training;Functional mobility training;Safety education & training;Patient/Caregiver education & training;Equipment evaluation, education, & procurement;Self-Care / ADL    Subjective  Subjective  Subjective: Pt semi-supine on OT approach, 1L O2 via NC. Agreed to tx w/ encouragement.  Pain: pt reporting no pain in session  Orientation  Overall Orientation Status: Within Functional Limits    Objective  Vitals  Vitals  O2 Device: Nasal 
Occupational Therapy/Physical Therapy Evaluation Attempt    Pt off unit for HD. Will f/u this PM as schedule permits and medically appropriate.     Nandini Cobb, OTR/TENZIN Veronica, PT  
Patient placed on BIPAP   05/27/25 1027   NIV Type   $NIV $Daily Charge   Ventilator ID trilogy   Suction Setup and Functional Yes   NIV Started/Stopped On   Equipment Type Trilogy   Mode Bilevel   Mask Type Full face mask   Mask Size Medium   Bonnet size Medium   Assessment   Pulse (!) 128   Respirations 25   SpO2 (!) 89 %   Comfort Level Good   Using Accessory Muscles Yes   Mask Compliance Good   Skin Assessment Clean, dry, & intact   Skin Protection for O2 Device N/A   Breath Sounds   Respiratory Pattern Tachypneic   Settings/Measurements   PIP Observed 14 cm H20   IPAP 18 cmH20   CPAP/EPAP 6 cmH2O   Vt (Measured) 472 mL   Rate Ordered 18   Insp Rise Time (%) 1 %   FiO2  100 %   I Time/ I Time % 1 s   Minute Volume (L/min) 10.6 Liters   Mask Leak (lpm) 34 lpm   Patient's Home Machine No   Alarm Settings   Alarms On Y   Apnea (secs) 20 secs   RR Low (bpm) 10   RR High (bpm) 40 br/min       
Physical Therapy  Facility/Department: [address]  Physical Therapy Initial Assessment    Name: Cindi Rodriguez  : 1965  MRN: [id number]  Date of Service: 2025    Discharge Recommendations:  24 hour supervision or assist, Home with Home health PT   PT Equipment Recommendations  Other: pt owns RW      Patient Diagnosis(es): The primary encounter diagnosis was Flash pulmonary edema (HCC). Diagnoses of Congestive heart failure, unspecified HF chronicity, unspecified heart failure type (HCC) and Hypoxia were also pertinent to this visit.  Past Medical History:  has a past medical history of JONATHAN (acute kidney injury), Anxiety, Asthma, Bipolar disorder (HCC), Bronchitis, Chronic back pain, Depression, Diabetes mellitus (HCC), DM II (diabetes mellitus, type II), controlled (HCC), Hyperlipidemia, Hypertension, Neuropathy, Obstructive sleep apnea, and Restless legs syndrome (RLS).  Past Surgical History:  has a past surgical history that includes Inner ear surgery; Tubal ligation; Endometrial ablation; and Dialysis Catheter Removal (N/A, 2024).    Assessment  Body Structures, Functions, Activity Limitations Requiring Skilled Therapeutic Intervention: Decreased functional mobility ;Decreased endurance;Increased pain  Assessment: Pt is 60 y.o. with diagnosis of flash pulmonary edema presenting with decreased functional mobility.  Pt is currently requiring CGA for all OOB mobility, which is a decline from reported baseline.  Pt reporting that she has no concerns for mobility, ambulation, or stairs at d/c, stating that her son plans to provide 24 hr supervision/assist.  Pt will benefit from skilled therapy to maximize safety and independence.  PT recommends initial 24 hr supervision/assist, with use of RW and HHPT for increased safety.  Treatment Diagnosis: decreased functional mobility due to flash pulmonary edema  Therapy Prognosis: Good  Decision Making: Medium Complexity  Requires PT Follow-Up: Yes  Activity 
Physical Therapy  Facility/Department: [facility]  Daily Treatment Note  NAME: Cindi Rodriguez  : 1965  MRN: [id number]    Date of Service: 2025    Discharge Recommendations:  24 hour supervision or assist, Home with Home health PT   PT Equipment Recommendations  Other: pt owns RW    Patient Diagnosis(es): The primary encounter diagnosis was ESRD on peritoneal dialysis (HCC). Diagnoses of Flash pulmonary edema (HCC), Congestive heart failure, unspecified HF chronicity, unspecified heart failure type (HCC), and Hypoxia were also pertinent to this visit.    Assessment  Assessment: Pt reports fatigue this PM. Agreeable to therapeutic exercises, declines mobility stating \"I did that earlier today.\" Pt tolerated therapeutic exercises with HR WNL.  Activity Tolerance: Patient tolerated treatment well  Other: pt owns RW    Plan  Physical Therapy Plan  General Plan:  (2-5 times per week)  Current Treatment Recommendations: Strengthening;ROM;Balance training;Functional mobility training;Transfer training;Endurance training;Stair training;Gait training;Neuromuscular re-education;Home exercise program;Safety education & training;Patient/Caregiver education & training;Equipment evaluation, education, & procurement;Therapeutic activities    Restrictions  Position Activity Restriction  Other Position/Activity Restrictions: up with assist     Subjective   Subjective  Subjective: Pt sitting at EOB upon PT arrival. Pt agreeable to therex with encouragement, declines mobility this date 2 reports \"I did that earlier\" (referring to OT treatment)  Pain: no complaints of pain    Objective  Mobility deferred this date 2 pt declining. Therefore emphasis of session = LE strengthening via therex.            PT Exercises  Exercise Treatment: Pt participated in seated BLE therex. Exercises included: hip flexion, hip abd/add, LAQs. 2 sets x 10 repetitions. Cues for improved technique     Safety Devices  Type of Devices: Bed alarm 
Physical Therapy/Occupational Therapy       Attempted to initiate therapies with this pt this am.  Currently in bed - O2 off.   Check O2 sats - 86-87% at rest on RA.  Increased to 1 l via NC.  Sats increased to 96-98%.    RN notified and noted pt should not have taken off O2.  Pt currently refusing therapies this AM.  Reviewed importance of OOB and activity with this pt, especially as she desires to return home.  Pt still refusing OOB -  Recommended she get OOB with nursing as she is able.  Pt expressed understanding.    Kalyn Way  PT  [id number]  Carin Turner, OTR/L #[id number]      
Physical Therapy/Occupational Therapy   Attempt    Attempted to work with pt this AM however pt off the floor for dialysis. PT/OT will attempt to work with pt at later time vs later date as appropriate and as schedule permits.     Madhav Zamarripa, PT, DPT   Ashley Barney, OTR/L [id number]    
Pt is off unit in dialysis, unable access vitals or shift assessment at this time.   
Pt off floor for dialysis.  
Pt off unit to dialysis at this time.   
Pt requesting salt, unable to have due to diet. Pt request to go to the restroom then refused again. Also refused vitals at this time.      
This patient has been diagnosed with Chronic Hypercapnic Respiratory Failure that is subsequent to COPD and requires NIV 8-24 hours per day via face mask. Alternative modes of ventilation therapy, such as BiPap with a rate, have been considered and deemed inadequate to maintain PCO2 levels within a safe, manageable range. AUDRA and treatment on Cpap has been considered and ruled out. NIMV cannot be substituted. NIMV will provide back up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

## (undated) DEVICE — NEPTUNE E-SEP SMOKE EVACUATION PENCIL, COATED, 70MM BLADE, PUSH BUTTON SWITCH: Brand: NEPTUNE E-SEP

## (undated) DEVICE — GLOVE SURG SZ 75 L12IN THK75MIL DK GRN LTX FREE

## (undated) DEVICE — GENERAL: Brand: MEDLINE INDUSTRIES, INC.

## (undated) DEVICE — SUTURE VICRYL + SZ 3-0 L27IN ABSRB UD L26MM SH 1/2 CIR VCP416H

## (undated) DEVICE — 3M™ IOBAN™ 2 ANTIMICROBIAL INCISE DRAPE 6648EZ: Brand: IOBAN™ 2

## (undated) DEVICE — SUTURE VIC + BR UD PS2 4-0 18IN VCP496G

## (undated) DEVICE — TRAP FLUID

## (undated) DEVICE — LIQUIBAND RAPID ADHESIVE 36/CS 0.8ML: Brand: MEDLINE

## (undated) DEVICE — CAP ADPT LCKING M LEUR LCK FOR PERI DLYS CATH

## (undated) DEVICE — CLEANER,CAUTERY TIP,2X2",STERILE: Brand: MEDLINE

## (undated) DEVICE — DRAPE,LAP,CHOLE,W/TROUGHS,STERILE: Brand: MEDLINE

## (undated) DEVICE — APPLICATOR MEDICATED 26 CC SOLUTION HI LT ORNG CHLORAPREP

## (undated) DEVICE — TOWEL,STOP FLAG GOLD N-W: Brand: MEDLINE

## (undated) DEVICE — GLOVE ORANGE PI 7   MSG9070